# Patient Record
Sex: FEMALE | Race: WHITE | Employment: OTHER | URBAN - METROPOLITAN AREA
[De-identification: names, ages, dates, MRNs, and addresses within clinical notes are randomized per-mention and may not be internally consistent; named-entity substitution may affect disease eponyms.]

---

## 2020-08-06 ENCOUNTER — HOSPITAL ENCOUNTER (INPATIENT)
Age: 70
LOS: 12 days | Discharge: NURSING, REHAB OR LONG-TERM CARE | DRG: 853 | End: 2020-08-18
Attending: EMERGENCY MEDICINE | Admitting: INTERNAL MEDICINE
Payer: COMMERCIAL

## 2020-08-06 ENCOUNTER — APPOINTMENT (OUTPATIENT)
Dept: GENERAL RADIOLOGY | Age: 70
DRG: 853 | End: 2020-08-06
Payer: COMMERCIAL

## 2020-08-06 ENCOUNTER — APPOINTMENT (OUTPATIENT)
Dept: CT IMAGING | Age: 70
DRG: 853 | End: 2020-08-06
Payer: COMMERCIAL

## 2020-08-06 PROBLEM — S72.002A CLOSED LEFT HIP FRACTURE, INITIAL ENCOUNTER (HCC): Status: ACTIVE | Noted: 2020-08-06

## 2020-08-06 PROBLEM — E66.01 MORBID OBESITY WITH BMI OF 45.0-49.9, ADULT (HCC): Status: ACTIVE | Noted: 2020-08-06

## 2020-08-06 LAB
A/G RATIO: 0.7 (ref 1.1–2.2)
ALBUMIN SERPL-MCNC: 3 G/DL (ref 3.4–5)
ALP BLD-CCNC: 114 U/L (ref 40–129)
ALT SERPL-CCNC: 17 U/L (ref 10–40)
ANION GAP SERPL CALCULATED.3IONS-SCNC: 9 MMOL/L (ref 3–16)
AST SERPL-CCNC: 28 U/L (ref 15–37)
BASE EXCESS ARTERIAL: 2.1 MMOL/L (ref -3–3)
BASE EXCESS ARTERIAL: 3.1 MMOL/L (ref -3–3)
BASOPHILS ABSOLUTE: 0 K/UL (ref 0–0.2)
BASOPHILS RELATIVE PERCENT: 0.3 %
BILIRUB SERPL-MCNC: 0.5 MG/DL (ref 0–1)
BUN BLDV-MCNC: 17 MG/DL (ref 7–20)
CALCIUM SERPL-MCNC: 8.6 MG/DL (ref 8.3–10.6)
CARBOXYHEMOGLOBIN ARTERIAL: 5.6 % (ref 0–1.5)
CARBOXYHEMOGLOBIN ARTERIAL: 6.3 % (ref 0–1.5)
CHLORIDE BLD-SCNC: 104 MMOL/L (ref 99–110)
CO2: 30 MMOL/L (ref 21–32)
CREAT SERPL-MCNC: 0.8 MG/DL (ref 0.6–1.2)
EKG ATRIAL RATE: 87 BPM
EKG DIAGNOSIS: NORMAL
EKG P AXIS: 63 DEGREES
EKG P-R INTERVAL: 126 MS
EKG Q-T INTERVAL: 392 MS
EKG QRS DURATION: 86 MS
EKG QTC CALCULATION (BAZETT): 471 MS
EKG R AXIS: 56 DEGREES
EKG T AXIS: -9 DEGREES
EKG VENTRICULAR RATE: 87 BPM
EOSINOPHILS ABSOLUTE: 0 K/UL (ref 0–0.6)
EOSINOPHILS RELATIVE PERCENT: 0.3 %
GFR AFRICAN AMERICAN: >60
GFR NON-AFRICAN AMERICAN: >60
GLOBULIN: 4.2 G/DL
GLUCOSE BLD-MCNC: 103 MG/DL (ref 70–99)
HCO3 ARTERIAL: 31.5 MMOL/L (ref 21–29)
HCO3 ARTERIAL: 31.7 MMOL/L (ref 21–29)
HCT VFR BLD CALC: 51.4 % (ref 36–48)
HEMOGLOBIN, ART, EXTENDED: 15.3 G/DL (ref 12–16)
HEMOGLOBIN, ART, EXTENDED: 15.6 G/DL (ref 12–16)
HEMOGLOBIN: 15.4 G/DL (ref 12–16)
LYMPHOCYTES ABSOLUTE: 0.7 K/UL (ref 1–5.1)
LYMPHOCYTES RELATIVE PERCENT: 5.5 %
MCH RBC QN AUTO: 24.1 PG (ref 26–34)
MCHC RBC AUTO-ENTMCNC: 30 G/DL (ref 31–36)
MCV RBC AUTO: 80.2 FL (ref 80–100)
METHEMOGLOBIN ARTERIAL: 0.2 %
METHEMOGLOBIN ARTERIAL: 0.2 %
MONOCYTES ABSOLUTE: 0.7 K/UL (ref 0–1.3)
MONOCYTES RELATIVE PERCENT: 5.4 %
NEUTROPHILS ABSOLUTE: 11.2 K/UL (ref 1.7–7.7)
NEUTROPHILS RELATIVE PERCENT: 88.5 %
O2 CONTENT ARTERIAL: 18 ML/DL
O2 CONTENT ARTERIAL: 19 ML/DL
O2 SAT, ARTERIAL: 89.9 %
O2 SAT, ARTERIAL: 90 %
O2 THERAPY: ABNORMAL
O2 THERAPY: ABNORMAL
PCO2 ARTERIAL: 64.2 MMHG (ref 35–45)
PCO2 ARTERIAL: 69.3 MMHG (ref 35–45)
PDW BLD-RTO: 20.4 % (ref 12.4–15.4)
PH ARTERIAL: 7.27 (ref 7.35–7.45)
PH ARTERIAL: 7.3 (ref 7.35–7.45)
PLATELET # BLD: 313 K/UL (ref 135–450)
PMV BLD AUTO: 7.7 FL (ref 5–10.5)
PO2 ARTERIAL: 60.8 MMHG (ref 75–108)
PO2 ARTERIAL: 63.8 MMHG (ref 75–108)
POTASSIUM REFLEX MAGNESIUM: 4.1 MMOL/L (ref 3.5–5.1)
POTASSIUM REFLEX MAGNESIUM: 5.7 MMOL/L (ref 3.5–5.1)
PRO-BNP: 5928 PG/ML (ref 0–124)
PROCALCITONIN: 0.11 NG/ML (ref 0–0.15)
RBC # BLD: 6.4 M/UL (ref 4–5.2)
SARS-COV-2, NAAT: NOT DETECTED
SODIUM BLD-SCNC: 143 MMOL/L (ref 136–145)
TCO2 ARTERIAL: 33.6 MMOL/L
TCO2 ARTERIAL: 33.6 MMOL/L
TOTAL PROTEIN: 7.2 G/DL (ref 6.4–8.2)
WBC # BLD: 12.7 K/UL (ref 4–11)

## 2020-08-06 PROCEDURE — 72125 CT NECK SPINE W/O DYE: CPT

## 2020-08-06 PROCEDURE — 80053 COMPREHEN METABOLIC PANEL: CPT

## 2020-08-06 PROCEDURE — 71045 X-RAY EXAM CHEST 1 VIEW: CPT

## 2020-08-06 PROCEDURE — 94761 N-INVAS EAR/PLS OXIMETRY MLT: CPT

## 2020-08-06 PROCEDURE — 6370000000 HC RX 637 (ALT 250 FOR IP): Performed by: INTERNAL MEDICINE

## 2020-08-06 PROCEDURE — 96374 THER/PROPH/DIAG INJ IV PUSH: CPT

## 2020-08-06 PROCEDURE — 6360000002 HC RX W HCPCS: Performed by: EMERGENCY MEDICINE

## 2020-08-06 PROCEDURE — 2580000003 HC RX 258: Performed by: INTERNAL MEDICINE

## 2020-08-06 PROCEDURE — 6360000002 HC RX W HCPCS: Performed by: INTERNAL MEDICINE

## 2020-08-06 PROCEDURE — 82803 BLOOD GASES ANY COMBINATION: CPT

## 2020-08-06 PROCEDURE — 96375 TX/PRO/DX INJ NEW DRUG ADDON: CPT

## 2020-08-06 PROCEDURE — 83880 ASSAY OF NATRIURETIC PEPTIDE: CPT

## 2020-08-06 PROCEDURE — 94660 CPAP INITIATION&MGMT: CPT

## 2020-08-06 PROCEDURE — 99285 EMERGENCY DEPT VISIT HI MDM: CPT

## 2020-08-06 PROCEDURE — 85025 COMPLETE CBC W/AUTO DIFF WBC: CPT

## 2020-08-06 PROCEDURE — 96376 TX/PRO/DX INJ SAME DRUG ADON: CPT

## 2020-08-06 PROCEDURE — 72100 X-RAY EXAM L-S SPINE 2/3 VWS: CPT

## 2020-08-06 PROCEDURE — 36600 WITHDRAWAL OF ARTERIAL BLOOD: CPT

## 2020-08-06 PROCEDURE — 93010 ELECTROCARDIOGRAM REPORT: CPT | Performed by: INTERNAL MEDICINE

## 2020-08-06 PROCEDURE — 94760 N-INVAS EAR/PLS OXIMETRY 1: CPT

## 2020-08-06 PROCEDURE — 93005 ELECTROCARDIOGRAM TRACING: CPT | Performed by: EMERGENCY MEDICINE

## 2020-08-06 PROCEDURE — 36415 COLL VENOUS BLD VENIPUNCTURE: CPT

## 2020-08-06 PROCEDURE — 70450 CT HEAD/BRAIN W/O DYE: CPT

## 2020-08-06 PROCEDURE — 2060000000 HC ICU INTERMEDIATE R&B

## 2020-08-06 PROCEDURE — 84145 PROCALCITONIN (PCT): CPT

## 2020-08-06 PROCEDURE — 2700000000 HC OXYGEN THERAPY PER DAY

## 2020-08-06 PROCEDURE — U0002 COVID-19 LAB TEST NON-CDC: HCPCS

## 2020-08-06 PROCEDURE — 73502 X-RAY EXAM HIP UNI 2-3 VIEWS: CPT

## 2020-08-06 PROCEDURE — 94640 AIRWAY INHALATION TREATMENT: CPT

## 2020-08-06 PROCEDURE — 73552 X-RAY EXAM OF FEMUR 2/>: CPT

## 2020-08-06 PROCEDURE — 6370000000 HC RX 637 (ALT 250 FOR IP): Performed by: EMERGENCY MEDICINE

## 2020-08-06 PROCEDURE — 72131 CT LUMBAR SPINE W/O DYE: CPT

## 2020-08-06 PROCEDURE — 84132 ASSAY OF SERUM POTASSIUM: CPT

## 2020-08-06 RX ORDER — ALBUTEROL SULFATE 2.5 MG/3ML
SOLUTION RESPIRATORY (INHALATION)
Status: DISPENSED
Start: 2020-08-06 | End: 2020-08-06

## 2020-08-06 RX ORDER — IPRATROPIUM BROMIDE AND ALBUTEROL SULFATE 2.5; .5 MG/3ML; MG/3ML
SOLUTION RESPIRATORY (INHALATION)
Status: DISPENSED
Start: 2020-08-06 | End: 2020-08-06

## 2020-08-06 RX ORDER — ACETAMINOPHEN 325 MG/1
650 TABLET ORAL EVERY 6 HOURS PRN
Status: DISCONTINUED | OUTPATIENT
Start: 2020-08-06 | End: 2020-08-18 | Stop reason: HOSPADM

## 2020-08-06 RX ORDER — AZITHROMYCIN 500 MG/1
500 TABLET, FILM COATED ORAL DAILY
Status: COMPLETED | OUTPATIENT
Start: 2020-08-06 | End: 2020-08-06

## 2020-08-06 RX ORDER — BUDESONIDE AND FORMOTEROL FUMARATE DIHYDRATE 160; 4.5 UG/1; UG/1
2 AEROSOL RESPIRATORY (INHALATION) 2 TIMES DAILY
Status: DISCONTINUED | OUTPATIENT
Start: 2020-08-06 | End: 2020-08-18 | Stop reason: HOSPADM

## 2020-08-06 RX ORDER — ALBUTEROL SULFATE 2.5 MG/3ML
2.5 SOLUTION RESPIRATORY (INHALATION)
Status: DISCONTINUED | OUTPATIENT
Start: 2020-08-26 | End: 2020-08-06

## 2020-08-06 RX ORDER — ALBUTEROL SULFATE 2.5 MG/3ML
2.5 SOLUTION RESPIRATORY (INHALATION) ONCE
Status: COMPLETED | OUTPATIENT
Start: 2020-08-06 | End: 2020-08-06

## 2020-08-06 RX ORDER — GABAPENTIN 300 MG/1
300 CAPSULE ORAL 3 TIMES DAILY
Status: DISCONTINUED | OUTPATIENT
Start: 2020-08-06 | End: 2020-08-18 | Stop reason: HOSPADM

## 2020-08-06 RX ORDER — ACETAMINOPHEN 650 MG/1
650 SUPPOSITORY RECTAL EVERY 6 HOURS PRN
Status: DISCONTINUED | OUTPATIENT
Start: 2020-08-06 | End: 2020-08-18 | Stop reason: HOSPADM

## 2020-08-06 RX ORDER — FUROSEMIDE 10 MG/ML
40 INJECTION INTRAMUSCULAR; INTRAVENOUS DAILY
Status: DISCONTINUED | OUTPATIENT
Start: 2020-08-06 | End: 2020-08-09

## 2020-08-06 RX ORDER — GABAPENTIN 300 MG/1
300 CAPSULE ORAL 3 TIMES DAILY
COMMUNITY

## 2020-08-06 RX ORDER — IPRATROPIUM BROMIDE AND ALBUTEROL SULFATE 2.5; .5 MG/3ML; MG/3ML
1 SOLUTION RESPIRATORY (INHALATION) ONCE
Status: COMPLETED | OUTPATIENT
Start: 2020-08-06 | End: 2020-08-06

## 2020-08-06 RX ORDER — ALBUTEROL SULFATE 2.5 MG/3ML
2.5 SOLUTION RESPIRATORY (INHALATION) EVERY 6 HOURS PRN
COMMUNITY
End: 2020-08-06

## 2020-08-06 RX ORDER — METHYLPREDNISOLONE SODIUM SUCCINATE 40 MG/ML
40 INJECTION, POWDER, LYOPHILIZED, FOR SOLUTION INTRAMUSCULAR; INTRAVENOUS ONCE
Status: COMPLETED | OUTPATIENT
Start: 2020-08-06 | End: 2020-08-06

## 2020-08-06 RX ORDER — ALBUTEROL SULFATE 90 UG/1
2 AEROSOL, METERED RESPIRATORY (INHALATION) EVERY 4 HOURS PRN
COMMUNITY

## 2020-08-06 RX ORDER — SODIUM CHLORIDE 0.9 % (FLUSH) 0.9 %
10 SYRINGE (ML) INJECTION PRN
Status: DISCONTINUED | OUTPATIENT
Start: 2020-08-06 | End: 2020-08-09 | Stop reason: SDUPTHER

## 2020-08-06 RX ORDER — MORPHINE SULFATE 2 MG/ML
2 INJECTION, SOLUTION INTRAMUSCULAR; INTRAVENOUS
Status: DISCONTINUED | OUTPATIENT
Start: 2020-08-06 | End: 2020-08-09

## 2020-08-06 RX ORDER — ONDANSETRON 2 MG/ML
4 INJECTION INTRAMUSCULAR; INTRAVENOUS EVERY 6 HOURS PRN
Status: DISCONTINUED | OUTPATIENT
Start: 2020-08-06 | End: 2020-08-18 | Stop reason: HOSPADM

## 2020-08-06 RX ORDER — IPRATROPIUM BROMIDE AND ALBUTEROL SULFATE 2.5; .5 MG/3ML; MG/3ML
3 SOLUTION RESPIRATORY (INHALATION)
COMMUNITY
Start: 2019-05-23

## 2020-08-06 RX ORDER — AZITHROMYCIN 250 MG/1
250 TABLET, FILM COATED ORAL DAILY
Status: COMPLETED | OUTPATIENT
Start: 2020-08-07 | End: 2020-08-10

## 2020-08-06 RX ORDER — POLYETHYLENE GLYCOL 3350 17 G/17G
17 POWDER, FOR SOLUTION ORAL DAILY PRN
Status: DISCONTINUED | OUTPATIENT
Start: 2020-08-06 | End: 2020-08-18 | Stop reason: HOSPADM

## 2020-08-06 RX ORDER — SIMVASTATIN 80 MG
80 TABLET ORAL NIGHTLY
COMMUNITY
End: 2020-09-17 | Stop reason: ALTCHOICE

## 2020-08-06 RX ORDER — IPRATROPIUM BROMIDE AND ALBUTEROL SULFATE 2.5; .5 MG/3ML; MG/3ML
1 SOLUTION RESPIRATORY (INHALATION)
Status: DISCONTINUED | OUTPATIENT
Start: 2020-08-06 | End: 2020-08-18 | Stop reason: HOSPADM

## 2020-08-06 RX ORDER — SODIUM CHLORIDE 0.9 % (FLUSH) 0.9 %
10 SYRINGE (ML) INJECTION EVERY 12 HOURS SCHEDULED
Status: DISCONTINUED | OUTPATIENT
Start: 2020-08-06 | End: 2020-08-09 | Stop reason: SDUPTHER

## 2020-08-06 RX ORDER — 0.9 % SODIUM CHLORIDE 0.9 %
30 INTRAVENOUS SOLUTION INTRAVENOUS ONCE
Status: DISCONTINUED | OUTPATIENT
Start: 2020-08-06 | End: 2020-08-06

## 2020-08-06 RX ORDER — PREDNISONE 20 MG/1
40 TABLET ORAL DAILY
Status: DISCONTINUED | OUTPATIENT
Start: 2020-08-06 | End: 2020-08-07

## 2020-08-06 RX ORDER — ATORVASTATIN CALCIUM 40 MG/1
40 TABLET, FILM COATED ORAL DAILY
Status: DISCONTINUED | OUTPATIENT
Start: 2020-08-06 | End: 2020-08-18 | Stop reason: HOSPADM

## 2020-08-06 RX ORDER — FUROSEMIDE 10 MG/ML
20 INJECTION INTRAMUSCULAR; INTRAVENOUS ONCE
Status: COMPLETED | OUTPATIENT
Start: 2020-08-06 | End: 2020-08-06

## 2020-08-06 RX ORDER — MORPHINE SULFATE 4 MG/ML
4 INJECTION, SOLUTION INTRAMUSCULAR; INTRAVENOUS
Status: DISCONTINUED | OUTPATIENT
Start: 2020-08-06 | End: 2020-08-09

## 2020-08-06 RX ORDER — IBUPROFEN 600 MG/1
600 TABLET ORAL EVERY 6 HOURS PRN
Status: ON HOLD | COMMUNITY
End: 2020-08-15 | Stop reason: HOSPADM

## 2020-08-06 RX ORDER — FENTANYL CITRATE 50 UG/ML
50 INJECTION, SOLUTION INTRAMUSCULAR; INTRAVENOUS EVERY 30 MIN PRN
Status: COMPLETED | OUTPATIENT
Start: 2020-08-06 | End: 2020-08-06

## 2020-08-06 RX ORDER — PROMETHAZINE HYDROCHLORIDE 25 MG/1
12.5 TABLET ORAL EVERY 6 HOURS PRN
Status: DISCONTINUED | OUTPATIENT
Start: 2020-08-06 | End: 2020-08-18 | Stop reason: HOSPADM

## 2020-08-06 RX ADMIN — AZITHROMYCIN 500 MG: 500 TABLET, FILM COATED ORAL at 17:41

## 2020-08-06 RX ADMIN — GABAPENTIN 300 MG: 300 CAPSULE ORAL at 17:41

## 2020-08-06 RX ADMIN — ATORVASTATIN CALCIUM 40 MG: 40 TABLET, FILM COATED ORAL at 17:41

## 2020-08-06 RX ADMIN — CEFTRIAXONE 1 G: 1 INJECTION, POWDER, FOR SOLUTION INTRAMUSCULAR; INTRAVENOUS at 17:42

## 2020-08-06 RX ADMIN — SODIUM CHLORIDE, PRESERVATIVE FREE 10 ML: 5 INJECTION INTRAVENOUS at 20:29

## 2020-08-06 RX ADMIN — FENTANYL CITRATE 50 MCG: 50 INJECTION, SOLUTION INTRAMUSCULAR; INTRAVENOUS at 12:57

## 2020-08-06 RX ADMIN — IPRATROPIUM BROMIDE AND ALBUTEROL SULFATE 1 AMPULE: .5; 3 SOLUTION RESPIRATORY (INHALATION) at 16:00

## 2020-08-06 RX ADMIN — PREDNISONE 40 MG: 20 TABLET ORAL at 17:42

## 2020-08-06 RX ADMIN — BUDESONIDE AND FORMOTEROL FUMARATE DIHYDRATE 2 PUFF: 160; 4.5 AEROSOL RESPIRATORY (INHALATION) at 20:01

## 2020-08-06 RX ADMIN — IPRATROPIUM BROMIDE AND ALBUTEROL SULFATE 1 AMPULE: .5; 3 SOLUTION RESPIRATORY (INHALATION) at 12:16

## 2020-08-06 RX ADMIN — METHYLPREDNISOLONE SODIUM SUCCINATE 40 MG: 40 INJECTION, POWDER, FOR SOLUTION INTRAMUSCULAR; INTRAVENOUS at 11:56

## 2020-08-06 RX ADMIN — GABAPENTIN 300 MG: 300 CAPSULE ORAL at 20:29

## 2020-08-06 RX ADMIN — FUROSEMIDE 20 MG: 20 INJECTION, SOLUTION INTRAMUSCULAR; INTRAVENOUS at 13:18

## 2020-08-06 RX ADMIN — ENOXAPARIN SODIUM 40 MG: 40 INJECTION SUBCUTANEOUS at 17:42

## 2020-08-06 RX ADMIN — ALBUTEROL SULFATE 2.5 MG: 2.5 SOLUTION RESPIRATORY (INHALATION) at 12:16

## 2020-08-06 RX ADMIN — FENTANYL CITRATE 50 MCG: 50 INJECTION, SOLUTION INTRAMUSCULAR; INTRAVENOUS at 11:56

## 2020-08-06 RX ADMIN — FUROSEMIDE 40 MG: 10 INJECTION, SOLUTION INTRAMUSCULAR; INTRAVENOUS at 17:42

## 2020-08-06 RX ADMIN — MORPHINE SULFATE 4 MG: 4 INJECTION, SOLUTION INTRAMUSCULAR; INTRAVENOUS at 21:15

## 2020-08-06 SDOH — HEALTH STABILITY: MENTAL HEALTH: HOW OFTEN DO YOU HAVE A DRINK CONTAINING ALCOHOL?: NEVER

## 2020-08-06 ASSESSMENT — PAIN DESCRIPTION - LOCATION
LOCATION: BACK
LOCATION: HIP
LOCATION: LEG;HIP

## 2020-08-06 ASSESSMENT — PAIN DESCRIPTION - PAIN TYPE
TYPE: ACUTE PAIN

## 2020-08-06 ASSESSMENT — PAIN DESCRIPTION - ORIENTATION
ORIENTATION: MID
ORIENTATION: LEFT
ORIENTATION: LEFT

## 2020-08-06 ASSESSMENT — PAIN DESCRIPTION - FREQUENCY
FREQUENCY: CONTINUOUS

## 2020-08-06 ASSESSMENT — PAIN DESCRIPTION - ONSET
ONSET: ON-GOING
ONSET: SUDDEN

## 2020-08-06 ASSESSMENT — PAIN DESCRIPTION - DESCRIPTORS
DESCRIPTORS: ACHING
DESCRIPTORS: CONSTANT
DESCRIPTORS: ACHING

## 2020-08-06 ASSESSMENT — PAIN SCALES - GENERAL
PAINLEVEL_OUTOF10: 9
PAINLEVEL_OUTOF10: 9
PAINLEVEL_OUTOF10: 3
PAINLEVEL_OUTOF10: 10

## 2020-08-06 ASSESSMENT — PAIN DESCRIPTION - PROGRESSION: CLINICAL_PROGRESSION: NOT CHANGED

## 2020-08-06 NOTE — PROGRESS NOTES
4 Eyes Skin Assessment     The patient is being assess for  Admission    I agree that 2 RN's have performed a thorough Head to Toe Skin Assessment on the patient. ALL assessment sites listed below have been assessed. Areas assessed by both nurses:   [x]   Head, Face, and Ears   [x]   Shoulders, Back, and Chest  [x]   Arms, Elbows, and Hands   [x]   Coccyx, Sacrum, and IschIum  [x]   Legs, Feet, and Heels        Does the Patient have Skin Breakdown?   Yes LDA WOUND CARE was Initiated documentation include the Ginny-wound, Wound Assessment, Measurements, Dressing Treatment, Drainage, and Color\",         Nigel Prevention initiated:  Yes   Wound Care Orders initiated:  Yes      [address]  nurse consulted for Pressure Injury (Stage 3,4, Unstageable, DTI, NWPT, and Complex wounds), New and Established Ostomies:  NA      Nurse 1 eSignature: Electronically signed by Umang John RN on 8/6/20 at 7:01 PM EDT    **SHARE this note so that the co-signing nurse is able to place an eSignature**    Nurse 2 eSignature: Electronically signed by Archibald Seip, RN on 8/6/20 at 7:30 PM EDT

## 2020-08-06 NOTE — ED NOTES
Report called to  [facility]      RN   To Room   0435  Cardiac monitor on during transfer  Pt's pain level     VSS, Afebrile   IV site is clean, dry and intact, Normal saline locked   Family updated on transfer            Kurt Sauceda RN  08/06/20 4140

## 2020-08-06 NOTE — ED PROVIDER NOTES
Emergency Physician Note  [address] [facility]  [address] [place] [address]  Dept: [phone]  Loc: [phone]    Chief Complaint  Back Pain (mechanical fall at home. +hypoxic on arrival. hx of COPD. does not normally wear O2. on 3L nc on arrival. room air O2 sat 76%. ) and Leg Pain (left leg pain )       History of Present Illness  Jacobo Soto is a 78 y.o. female  has a past medical history of Chronic kidney disease, COPD (chronic obstructive pulmonary disease) ([address] Hyperlipidemia, and Hypertension. who presents to the ED for fall. Patient states she was attempting to transfer herself using a walker from the bed to the bedside commode when the walker got snagged and she fell over to the left. She immediately had left hip pain, left thigh pain, and lower back pain. She states she also hit her head and she is feeling type pain and tenderness behind her right ear however she did not lose consciousness. Patient does smoke, she does not use supplemental oxygen at home, however she states when she gets short of breath she does her inhaler which helps. She states that she has been at her baseline shortness of breath, has not worsened. Denies fever, chills, malaise, chest pain, shortness of breath, cough, abdominal pain, nausea, vomiting, diarrhea, headache, sore throat, dysuria, rash. No palliative/provocative factors.        Unless otherwise stated in this report or unable to obtain because of the patient's clinical or mental status as evidenced by the medical record, this patient's positive and negative responses for review of systems, constitutional, psych, eyes, ENT, cardiovascular, respiratory, gastrointestinal, neurological, genitourinary, musculoskeletal, integument systems and systems related to the presenting problem are either stated in the preceding paragraph or were not pertinent or were negative for the symptoms and/or complaints related to the medical problem. I have reviewed the following from the nursing documentation:      Prior to Admission medications    Medication Sig Start Date End Date Taking? Authorizing Provider   simvastatin (ZOCOR) 80 MG tablet Take 80 mg by mouth nightly   Yes Historical Provider, MD   ibuprofen (ADVIL;MOTRIN) 600 MG tablet Take 600 mg by mouth every 6 hours as needed for Pain   Yes Historical Provider, MD   gabapentin (NEURONTIN) 100 MG capsule Take by mouth 3 times daily. Mg unknown per pt   Yes Historical Provider, MD   albuterol (PROVENTIL) (2.5 MG/3ML) 0.083% nebulizer solution Take 2.5 mg by nebulization every 6 hours as needed for Wheezing or Shortness of Breath   Yes Historical Provider, MD       Allergies as of 08/06/2020    (No Known Allergies)       Past Medical History:   Diagnosis Date    Chronic kidney disease     COPD (chronic obstructive pulmonary disease) ([facility]     Hyperlipidemia     Hypertension         Surgical History:   Past Surgical History:   Procedure Laterality Date    CHOLECYSTECTOMY          Family History:  History reviewed. No pertinent family history.     Social History     Socioeconomic History    Marital status:      Spouse name: Not on file    Number of children: Not on file    Years of education: Not on file    Highest education level: Not on file   Occupational History    Not on file   Social Needs    Financial resource strain: Not on file    Food insecurity     Worry: Not on file     Inability: Not on file    Transportation needs     Medical: Not on file     Non-medical: Not on file   Tobacco Use    Smoking status: Current Every Day Smoker     Types: Cigarettes    Smokeless tobacco: Never Used    Tobacco comment: pack n half a day    Substance and Sexual Activity    Alcohol use: Never     Frequency: Never    Drug use: Yes     Types: Marijuana    Sexual activity: Not on file   Lifestyle    Physical activity     Days per week: Not on file     Minutes per session: Not on file    Stress: Not on file   Relationships    Social connections     Talks on phone: Not on file     Gets together: Not on file     Attends Nondenominational service: Not on file     Active member of club or organization: Not on file     Attends meetings of clubs or organizations: Not on file     Relationship status: Not on file    Intimate partner violence     Fear of current or ex partner: Not on file     Emotionally abused: Not on file     Physically abused: Not on file     Forced sexual activity: Not on file   Other Topics Concern    Not on file   Social History Narrative    Not on file       Nursing notes reviewed. ED Triage Vitals   Enc Vitals Group      BP 08/06/20 1100 (!) 150/81      Pulse 08/06/20 1100 83      Resp 08/06/20 1100 21      Temp 08/06/20 1100 99 °F (37.2 °C)      Temp Source 08/06/20 1100 Oral      SpO2 08/06/20 1058 (!) 76 %      Weight 08/06/20 1139 (!) 314 lb 4 oz (142.5 kg)      Height 08/06/20 1139 5' 7\" (1.702 m)      Head Circumference --       Peak Flow --       Pain Score --       Pain Loc --       Pain Edu? --       Excl. in GC? --        GENERAL:    Awake, alert. Well developed, well nourished with apparent distress. Nontoxic-appearing, non-ill-appearing. HENT:    Normocephalic, Atraumatic, no lacerations. No trismus. Mild tenderness behind the right ear however no obvious hematoma or induration developing  Oropharynx clear, no tonsilar inflammation, no tonsilar exudates,   no airway obstruction, moist mucous membranes. Uvula was midline and has symmetric rise. Tympanic membranes are without bulging, without erythema, without hemotympanum, without middle ear effusion bilaterally  EYES:    Conjunctiva normal,   Pupils equal round and reactive to light,   Extraocular movements normal.  NECK:    Trachea is midline. No stridor. No meningeal signs, Supple without JVD. No C-spine midline tenderness.     CHEST:    Regular rate and regular rhythm, no murmurs/rubs/gallops,   normal S1/S2, chest wall non-tender. LUNGS:    No respiratory distress. No abdominal retractions, no sternal retractions. Diminished breath sounds with poor respiratory effort, diffuse expiratory wheezing, no rhochi, no rales  Speaking comfortably in full sentences  Patient oxygen saturation was consistently around 88% when I was in the room speaking with her and conducting my physical exam, patient was on 2 L nasal cannula at the time I bumped it up to 4 L nasal cannula she has slight improvement and now at 91%. ABDOMEN:    Soft, non-tender, non-distended. No guarding. No rebound. EXTREMITIES:   Moves extremities x4 with purpose. Patient diffusely already has limited range of motion of all 4 extremities secondary to venous stasis changes of her bilateral lower extremity and body habitus of her upper extremity, bilateral lower extremity edema with venous stasis changes, they are not warm to touch, no active weeping vesicles  Patient does have moderate tenderness to palpation with loading of the left hip and the proximal femur, however due to body habitus is unable to discern if there is any obvious deformities  distal pulses present and equal bilaterally. BACK:    No midline tenderness in the cervical, thoracic spine. Patient does report midline lumbar spinal tenderness to palpation  No deformities, no step-off. Palpation did not elicit any paraspinous tenderness. SKIN:    Warm, dry and intact. NEUROLOGIC:  Normal mental status. Moving all extremities to command. Alert and oriented x4   without focal motor deficit or gross sensory deficit. Normal speech.     PSYCHIATRIC:  Not anxious,   normal mood and affect,   thoughts are linear and organized,   without delusions/hallucinations,   responds appropriately to questions      LABS and DIAGNOSTIC RESULTS  EKG  The Ekg interpreted by me shows  normal sinus rhythm with a rate of 87  Axis is   Normal  QTc is  normal  Intervals and Durations are unremarkable. ST Segments: normal  Delta waves, Brugada Syndrome, and Short NV are not present. Prior EKG to compare with was available. No significant changes compared to prior EKG from May 2, 2013, of note today's EKG has significant artifact however there is no gross ST elevation discernible. RADIOLOGY  X-RAYS:  I have reviewed radiologic plain film image(s). ALL OTHER NON-PLAIN FILM IMAGES SUCH AS CT, ULTRASOUND AND MRI HAVE BEEN READ BY THE RADIOLOGIST. CT Head WO Contrast   Final Result   No acute intracranial abnormality. CT Cervical Spine WO Contrast   Final Result   Multilevel postsurgical changes and degenerative changes. No acute osseous   abnormality. CT LUMBAR SPINE WO CONTRAST   Final Result   1. No acute finding in the lumbar spine. 2. Advanced degenerative changes that are most notable at L4-5 contributing   to severe spinal canal stenosis. XR LUMBAR SPINE (2-3 VIEWS)   Final Result   1. Moderately displaced intertrochanteric fracture of the proximal left femur. 2. Severe degenerative change in the left hip with moderate degenerative   change on the right. 3. Technically limited evaluation of the lumbar spine due to patient body   habitus. XR HIP 2-3 VW W PELVIS LEFT   Final Result   1. Moderately displaced intertrochanteric fracture of the proximal left femur. 2. Severe degenerative change in the left hip with moderate degenerative   change on the right. 3. Technically limited evaluation of the lumbar spine due to patient body   habitus. XR FEMUR LEFT (MIN 2 VIEWS)   Final Result   Intratrochanteric left hip fracture      Moderate to severe osteoarthritis of the left hip joint         XR CHEST PORTABLE   Final Result   Mild perihilar opacities and left basilar ground-glass opacities in the   chest.  Pattern may represent developing pulmonary edema or pneumonitis. Chest X-Ray  Interpreted by: Emergency Department Physician  View: Portable chest xray  Findings: See radiology report    LABS  Results for orders placed or performed during the hospital encounter of 08/06/20   Blood Gas, Arterial   Result Value Ref Range    pH, Arterial 7.302 (L) 7.350 - 7.450    pCO2, Arterial 64.2 (H) 35.0 - 45.0 mmHg    pO2, Arterial 60.8 (L) 75.0 - 108.0 mmHg    HCO3, Arterial 31.7 (H) 21.0 - 29.0 mmol/L    Base Excess, Arterial 3.1 (H) -3.0 - 3.0 mmol/L    Hemoglobin, Art, Extended 15.3 12.0 - 16.0 g/dL    O2 Sat, Arterial 89.9 (L) >92 %    Carboxyhgb, Arterial 6.3 (H) 0.0 - 1.5 %    Methemoglobin, Arterial 0.2 <1.5 %    TCO2, Arterial 33.6 Not Established mmol/L    O2 Content, Arterial 18 Not Established mL/dL    O2 Therapy See comment    CBC Auto Differential   Result Value Ref Range    WBC 12.7 (H) 4.0 - 11.0 K/uL    RBC 6.40 (H) 4.00 - 5.20 M/uL    Hemoglobin 15.4 12.0 - 16.0 g/dL    Hematocrit 51.4 (H) 36.0 - 48.0 %    MCV 80.2 80.0 - 100.0 fL    MCH 24.1 (L) 26.0 - 34.0 pg    MCHC 30.0 (L) 31.0 - 36.0 g/dL    RDW 20.4 (H) 12.4 - 15.4 %    Platelets 143 602 - 598 K/uL    MPV 7.7 5.0 - 10.5 fL    Neutrophils % 88.5 %    Lymphocytes % 5.5 %    Monocytes % 5.4 %    Eosinophils % 0.3 %    Basophils % 0.3 %    Neutrophils Absolute 11.2 (H) 1.7 - 7.7 K/uL    Lymphocytes Absolute 0.7 (L) 1.0 - 5.1 K/uL    Monocytes Absolute 0.7 0.0 - 1.3 K/uL    Eosinophils Absolute 0.0 0.0 - 0.6 K/uL    Basophils Absolute 0.0 0.0 - 0.2 K/uL   Comprehensive Metabolic Panel w/ Reflex to MG   Result Value Ref Range    Sodium 143 136 - 145 mmol/L    Potassium reflex Magnesium 5.7 (H) 3.5 - 5.1 mmol/L    Chloride 104 99 - 110 mmol/L    CO2 30 21 - 32 mmol/L    Anion Gap 9 3 - 16    Glucose 103 (H) 70 - 99 mg/dL    BUN 17 7 - 20 mg/dL    CREATININE 0.8 0.6 - 1.2 mg/dL    GFR Non-African American >60 >60    GFR African American >60 >60    Calcium 8.6 8.3 - 10.6 mg/dL    Total Protein 7.2 6.4 - 8.2 g/dL    Alb 3.0 (L) 3.4 - 5.0 g/dL    Albumin/Globulin Ratio 0.7 (L) 1.1 - 2.2    Total Bilirubin 0.5 0.0 - 1.0 mg/dL    Alkaline Phosphatase 114 40 - 129 U/L    ALT 17 10 - 40 U/L    AST 28 15 - 37 U/L    Globulin 4.2 g/dL   Potassium w/ Reflex to Magnesium   Result Value Ref Range    Potassium reflex Magnesium 4.1 3.5 - 5.1 mmol/L   EKG 12 Lead   Result Value Ref Range    Ventricular Rate 87 BPM    Atrial Rate 87 BPM    P-R Interval 126 ms    QRS Duration 86 ms    Q-T Interval 392 ms    QTc Calculation (Bazett) 471 ms    P Axis 63 degrees    R Axis 56 degrees    T Axis -9 degrees    Diagnosis       Normal sinus rhythmT wave abnormality, consider inferior ischemiaProlonged QTAbnormal ECGWhen compared with ECG of 02-MAY-2013 11:04,QRS voltage has decreasedT wave inversion now evident in Inferior leads       Dwayne    Procedures/interventions/images ordered for this visit  Orders Placed This Encounter   Procedures    XR CHEST PORTABLE    Blood Gas, Arterial    CBC Auto Differential    Comprehensive Metabolic Panel w/ Reflex to MG    Initiate Oxygen Therapy Protocol    EKG 12 Lead       Medications ordered for this visit  No orders of the defined types were placed in this encounter. ED course notes for this visit  ED Course as of Aug 06 1336   Thu Aug 06, 2020   1306 I reviewed the x-rays, the patient does have a left intertrochanteric fracture on her left femur. I went back in the room to evaluate her. She states she is normally mildly short of breath she does not feel more short of breath than usual.  When I was in the room she was talking to was in full sentences and satting at 90%. I left the room for a few minutes and came back and she was down to 77%. She still continues to deny feeling short of breath. We set her up a little bit in bed and she improved at that point.   I believe that the patient chronically lives at a low oxygen saturation.    [SG]      ED Course User Index  [SG] obtaining history, conducting a physical exam, performing and monitoring interventions, ordering, collecting and interpreting tests, and establishing medical decision-making and discussion with the patient and/or family, specifically for management of the presenting complaint and symptoms initially, direct bedside care, reevaluation, review of records, and consultation. There was a high probability of clinically significant life-threatening deterioration in the patient's condition, which required my urgent intervention. This time does not include separately billable procedures. The note was completed using Dragon voice recognition transcription. Every effort was made to ensure accuracy; however, inadvertent transcription errors may be present despite my best efforts to edit errors.     Shaquille Giles MD  [address], [place]  08/06/20 [address] MD  08/19/20 0296

## 2020-08-06 NOTE — ED NOTES
Pt took bipap and stated \" if I die I die\" pt stated that she does not want to be \"saved\" perfect serve sent to dr Pratibha Sanchez at this time with an update, pt also started taking off monitor leads and took off her blood pressure cuff     Carlie Servin RN  08/06/20 3313

## 2020-08-06 NOTE — PROGRESS NOTES
Received report from ER RN, Naheed Cornejo. COVID test for pre-surgical testing, patient does not need isolation per house supervisor.

## 2020-08-06 NOTE — ED NOTES
Pt placed on BIpap at this time, RT at bedside      Aniyah Vilchis, [place]  08/06/20 0161

## 2020-08-06 NOTE — LETTER
Dr Leonarda Alexis [phone] F: [phone]  Surgery Scheduling Form:  DEMOGRAPHICS:                                                                                                              .    Patient Name:  Carlos Jin    Patient :  1950   Patient SS#:  [id number]      Patient Phone:  [phone] (home) [phone] (work)     Patient Address:  [place] [id number]    Insurance:    Payor/Plan Subscr  Sex Relation Sub.  Ins. ID Effective Group Num   1. DIANE PULLIAM* SHIKHA RADFORD 1950 Female  [id number] 1/1/15                                    [address]     DIAGNOSIS & PROCEDURE:                                                                                            .    Diagnosis:   Left hip fracture S72.102A    Operation:   Open treatment of left hip fracture with cephalomedullary nail    Location:  [facility]    Surgeon:  Jayleen Han    SCHEDULING INFORMATION:                                                                                         .    Requested Date:  8/10/20 OR Time:     OR Time Required:  90  Minutes         Anesthesia:  General    SA Required:  Yes x 2    Equipment:  Synthes TFNA, HANA table, large C-arm    Status:  Inhouse    Latex Allergy:  no Defibrilator or Pacemaker:  no    Isolation Precautions:  no                    Nato Thornton MD     20   BILLING INFORMATION:                                                                                                    .                          CPT Code Modifier  Total hip    Prior auth:  [id number]

## 2020-08-06 NOTE — ED NOTES
Pt placed on 3 liters oxygen per NC upon arrival sats at 88-90% on 3 liters.  Pt has hx COPD, pt states only wears oxygen as needed      Neptali Gonzales RN  08/06/20 [facility]  08/06/20 0394

## 2020-08-06 NOTE — H&P
mouth every 6 hours as needed for Pain   Yes Historical Provider, MD   gabapentin (NEURONTIN) 300 MG capsule Take 300 mg by mouth 3 times daily. Yes Historical Provider, MD   fluticasone-salmeterol (ADVAIR) 250-50 MCG/DOSE AEPB Inhale 1 puff into the lungs 2 times daily 4/24/19  Yes Historical Provider, MD   ipratropium-albuterol (Therisa Rumble) 0.5-2.5 (3) MG/3ML SOLN nebulizer solution Inhale 3 mLs into the lungs every 2 hours as needed for Shortness of Breath 5/23/19  Yes Historical Provider, MD   albuterol sulfate  (90 Base) MCG/ACT inhaler Inhale 2 puffs into the lungs every 4-6 hours as needed for Shortness of Breath    Historical Provider, MD       Allergies:  Patient has no known allergies. Social History:  The patient currently lives with     TOBACCO:   reports that she has been smoking cigarettes. She has never used smokeless tobacco.  ETOH:   reports no history of alcohol use. Family History:  Reviewed in detail and negative for DM, Early CAD, Cancer, CVA. Positive as follows:    History reviewed. No pertinent family history. REVIEW OF SYSTEMS:   Positive as noted in the HPI. All other systems reviewed and negative. PHYSICAL EXAM:    BP (!) 150/81   Pulse 91   Temp 98.2 °F (36.8 °C) (Oral)   Resp (!) 32   Ht 5' 7\" (1.702 m)   Wt (!) 314 lb 4 oz (142.5 kg)   SpO2 (!) 88%   BMI 49.22 kg/m²     General appearance: No apparent distress appears stated age and cooperative. Obese  HEENT Normal cephalic, atraumatic without obvious deformity. Pupils equal, round, and reactive to light. Extra ocular muscles intact. Conjunctivae/corneas clear. Neck: Supple, trachea midline without thyromegaly or adenopathy with full range of motion.   Lungs: Diffuse bilateral wheezing  Heart: Regular rate and rhythm with Normal S1/S2 without murmurs, rubs or gallops, point of maximum impulse non-displaced  Abdomen: Soft, non-tender or non-distended without rigidity or guarding and positive bowel sounds all four quadrants. Extremities: Bilateral lower extremity edema with significant chronic skin changes, chronic per patient  Skin: Skin color, texture, turgor normal.  Chronic skin changes lower extremities  Neurologic: Alert and oriented X 3, neurovascularly intact with sensory/motor intact upper extremities/lower extremities, bilaterally. Cranial nerves: II-XII intact, grossly non-focal.  Mental status: Alert, oriented, thought content appropriate. Capillary Refill: Acceptable  < 3 seconds  Peripheral Pulses: +3 Easily felt, not easily obliterated with pressure      CXR:  I have reviewed the CXR with the following interpretation: Perihilar opacities, pulmonary edema versus pneumonitis  EKG:  I have reviewed the EKG with the following interpretation: Normal sinus rhythm, nonspecific ST changes    CBC   Recent Labs     08/06/20  1114   WBC 12.7*   HGB 15.4   HCT 51.4*         RENAL  Recent Labs     08/06/20  1114 08/06/20  1259     --    K 5.7* 4.1     --    CO2 30  --    BUN 17  --    CREATININE 0.8  --      LFT'S  Recent Labs     08/06/20  1114   AST 28   ALT 17   BILITOT 0.5   ALKPHOS 114     COAG  No results for input(s): INR in the last 72 hours. CARDIAC ENZYMES  No results for input(s): CKTOTAL, CKMB, CKMBINDEX, TROPONINI in the last 72 hours.     U/A:    Lab Results   Component Value Date    COLORU DK YELLOW 05/03/2013    WBCUA 6-10 05/03/2013    RBCUA 3-5 05/03/2013    MUCUS 1+ 05/03/2013    BACTERIA 1+ 05/03/2013    CLARITYU CLEAR 05/03/2013    SPECGRAV 1.020 05/03/2013    LEUKOCYTESUR NEGATIVE 05/03/2013    BLOODU TRACE 05/03/2013    GLUCOSEU NEGATIVE 05/03/2013       ABG    Lab Results   Component Value Date    KSN2NII 31.7 08/06/2020    BEART 3.1 08/06/2020    X5BDJTHL 89.9 08/06/2020    PHART 7.302 08/06/2020    TNO3HRW 64.2 08/06/2020    PO2ART 60.8 08/06/2020    FQN9ZRS 33.6 08/06/2020           There are no active hospital problems to display for this translational errors.

## 2020-08-07 ENCOUNTER — ANESTHESIA EVENT (OUTPATIENT)
Dept: OPERATING ROOM | Age: 70
DRG: 853 | End: 2020-08-07
Payer: COMMERCIAL

## 2020-08-07 LAB
ALBUMIN SERPL-MCNC: 2.9 G/DL (ref 3.4–5)
ANION GAP SERPL CALCULATED.3IONS-SCNC: 10 MMOL/L (ref 3–16)
BUN BLDV-MCNC: 21 MG/DL (ref 7–20)
CALCIUM SERPL-MCNC: 8.4 MG/DL (ref 8.3–10.6)
CHLORIDE BLD-SCNC: 104 MMOL/L (ref 99–110)
CO2: 29 MMOL/L (ref 21–32)
CREAT SERPL-MCNC: 1 MG/DL (ref 0.6–1.2)
GFR AFRICAN AMERICAN: >60
GFR NON-AFRICAN AMERICAN: 55
GLUCOSE BLD-MCNC: 140 MG/DL (ref 70–99)
HCT VFR BLD CALC: 48.5 % (ref 36–48)
HEMOGLOBIN: 14.3 G/DL (ref 12–16)
MAGNESIUM: 2 MG/DL (ref 1.8–2.4)
MCH RBC QN AUTO: 24 PG (ref 26–34)
MCHC RBC AUTO-ENTMCNC: 29.5 G/DL (ref 31–36)
MCV RBC AUTO: 81.2 FL (ref 80–100)
PDW BLD-RTO: 20.4 % (ref 12.4–15.4)
PHOSPHORUS: 5.7 MG/DL (ref 2.5–4.9)
PLATELET # BLD: 249 K/UL (ref 135–450)
PMV BLD AUTO: 7.6 FL (ref 5–10.5)
POTASSIUM SERPL-SCNC: 4.8 MMOL/L (ref 3.5–5.1)
RBC # BLD: 5.98 M/UL (ref 4–5.2)
SODIUM BLD-SCNC: 143 MMOL/L (ref 136–145)
WBC # BLD: 13 K/UL (ref 4–11)

## 2020-08-07 PROCEDURE — 80069 RENAL FUNCTION PANEL: CPT

## 2020-08-07 PROCEDURE — 85027 COMPLETE CBC AUTOMATED: CPT

## 2020-08-07 PROCEDURE — 6360000002 HC RX W HCPCS: Performed by: INTERNAL MEDICINE

## 2020-08-07 PROCEDURE — 6360000002 HC RX W HCPCS: Performed by: NURSE PRACTITIONER

## 2020-08-07 PROCEDURE — 99222 1ST HOSP IP/OBS MODERATE 55: CPT | Performed by: ORTHOPAEDIC SURGERY

## 2020-08-07 PROCEDURE — 83735 ASSAY OF MAGNESIUM: CPT

## 2020-08-07 PROCEDURE — 99223 1ST HOSP IP/OBS HIGH 75: CPT | Performed by: INTERNAL MEDICINE

## 2020-08-07 PROCEDURE — 94761 N-INVAS EAR/PLS OXIMETRY MLT: CPT

## 2020-08-07 PROCEDURE — 94660 CPAP INITIATION&MGMT: CPT

## 2020-08-07 PROCEDURE — 2580000003 HC RX 258: Performed by: INTERNAL MEDICINE

## 2020-08-07 PROCEDURE — 36415 COLL VENOUS BLD VENIPUNCTURE: CPT

## 2020-08-07 PROCEDURE — 6370000000 HC RX 637 (ALT 250 FOR IP): Performed by: INTERNAL MEDICINE

## 2020-08-07 PROCEDURE — 2060000000 HC ICU INTERMEDIATE R&B

## 2020-08-07 PROCEDURE — 94640 AIRWAY INHALATION TREATMENT: CPT

## 2020-08-07 PROCEDURE — 2700000000 HC OXYGEN THERAPY PER DAY

## 2020-08-07 RX ORDER — SODIUM CHLORIDE 9 MG/ML
INJECTION, SOLUTION INTRAVENOUS CONTINUOUS
Status: CANCELLED | OUTPATIENT
Start: 2020-08-07

## 2020-08-07 RX ORDER — SODIUM CHLORIDE 0.9 % (FLUSH) 0.9 %
10 SYRINGE (ML) INJECTION PRN
Status: CANCELLED | OUTPATIENT
Start: 2020-08-07

## 2020-08-07 RX ORDER — SODIUM CHLORIDE 0.9 % (FLUSH) 0.9 %
10 SYRINGE (ML) INJECTION EVERY 12 HOURS SCHEDULED
Status: CANCELLED | OUTPATIENT
Start: 2020-08-07

## 2020-08-07 RX ORDER — METHYLPREDNISOLONE SODIUM SUCCINATE 40 MG/ML
40 INJECTION, POWDER, LYOPHILIZED, FOR SOLUTION INTRAMUSCULAR; INTRAVENOUS EVERY 8 HOURS
Status: DISCONTINUED | OUTPATIENT
Start: 2020-08-07 | End: 2020-08-09

## 2020-08-07 RX ADMIN — IPRATROPIUM BROMIDE AND ALBUTEROL SULFATE 1 AMPULE: .5; 3 SOLUTION RESPIRATORY (INHALATION) at 16:08

## 2020-08-07 RX ADMIN — BUDESONIDE AND FORMOTEROL FUMARATE DIHYDRATE 2 PUFF: 160; 4.5 AEROSOL RESPIRATORY (INHALATION) at 08:09

## 2020-08-07 RX ADMIN — ENOXAPARIN SODIUM 40 MG: 40 INJECTION SUBCUTANEOUS at 09:27

## 2020-08-07 RX ADMIN — IPRATROPIUM BROMIDE AND ALBUTEROL SULFATE 1 AMPULE: .5; 3 SOLUTION RESPIRATORY (INHALATION) at 20:19

## 2020-08-07 RX ADMIN — ENOXAPARIN SODIUM 40 MG: 40 INJECTION SUBCUTANEOUS at 21:03

## 2020-08-07 RX ADMIN — GABAPENTIN 300 MG: 300 CAPSULE ORAL at 21:03

## 2020-08-07 RX ADMIN — METHYLPREDNISOLONE SODIUM SUCCINATE 40 MG: 40 INJECTION, POWDER, FOR SOLUTION INTRAMUSCULAR; INTRAVENOUS at 09:26

## 2020-08-07 RX ADMIN — GABAPENTIN 300 MG: 300 CAPSULE ORAL at 09:26

## 2020-08-07 RX ADMIN — GABAPENTIN 300 MG: 300 CAPSULE ORAL at 14:35

## 2020-08-07 RX ADMIN — FUROSEMIDE 40 MG: 10 INJECTION, SOLUTION INTRAMUSCULAR; INTRAVENOUS at 09:26

## 2020-08-07 RX ADMIN — BUDESONIDE AND FORMOTEROL FUMARATE DIHYDRATE 2 PUFF: 160; 4.5 AEROSOL RESPIRATORY (INHALATION) at 20:19

## 2020-08-07 RX ADMIN — IPRATROPIUM BROMIDE AND ALBUTEROL SULFATE 1 AMPULE: .5; 3 SOLUTION RESPIRATORY (INHALATION) at 12:41

## 2020-08-07 RX ADMIN — SODIUM CHLORIDE, PRESERVATIVE FREE 10 ML: 5 INJECTION INTRAVENOUS at 21:03

## 2020-08-07 RX ADMIN — AZITHROMYCIN MONOHYDRATE 250 MG: 250 TABLET ORAL at 09:26

## 2020-08-07 RX ADMIN — IPRATROPIUM BROMIDE AND ALBUTEROL SULFATE 1 AMPULE: .5; 3 SOLUTION RESPIRATORY (INHALATION) at 08:09

## 2020-08-07 RX ADMIN — CEFTRIAXONE 1 G: 1 INJECTION, POWDER, FOR SOLUTION INTRAMUSCULAR; INTRAVENOUS at 16:44

## 2020-08-07 RX ADMIN — ATORVASTATIN CALCIUM 40 MG: 40 TABLET, FILM COATED ORAL at 09:26

## 2020-08-07 RX ADMIN — SODIUM CHLORIDE, PRESERVATIVE FREE 10 ML: 5 INJECTION INTRAVENOUS at 10:35

## 2020-08-07 RX ADMIN — METHYLPREDNISOLONE SODIUM SUCCINATE 40 MG: 40 INJECTION, POWDER, FOR SOLUTION INTRAMUSCULAR; INTRAVENOUS at 16:44

## 2020-08-07 ASSESSMENT — PAIN SCALES - GENERAL: PAINLEVEL_OUTOF10: 0

## 2020-08-07 NOTE — PROGRESS NOTES
Intravenous, 2 times per day  sodium chloride flush 0.9 % injection 10 mL, 10 mL, Intravenous, PRN  acetaminophen (TYLENOL) tablet 650 mg, 650 mg, Oral, Q6H PRN **OR** acetaminophen (TYLENOL) suppository 650 mg, 650 mg, Rectal, Q6H PRN  polyethylene glycol (GLYCOLAX) packet 17 g, 17 g, Oral, Daily PRN  promethazine (PHENERGAN) tablet 12.5 mg, 12.5 mg, Oral, Q6H PRN **OR** ondansetron (ZOFRAN) injection 4 mg, 4 mg, Intravenous, Q6H PRN  enoxaparin (LOVENOX) injection 40 mg, 40 mg, Subcutaneous, Daily  [COMPLETED] azithromycin (ZITHROMAX) tablet 500 mg, 500 mg, Oral, Daily **FOLLOWED BY** azithromycin (ZITHROMAX) tablet 250 mg, 250 mg, Oral, Daily  cefTRIAXone (ROCEPHIN) 1 g IVPB in 50 mL D5W minibag, 1 g, Intravenous, Q24H  ipratropium-albuterol (DUONEB) nebulizer solution 1 ampule, 1 ampule, Inhalation, Q4H WA  morphine (PF) injection 2 mg, 2 mg, Intravenous, Q2H PRN **OR** morphine (PF) injection 4 mg, 4 mg, Intravenous, Q2H PRN  furosemide (LASIX) injection 40 mg, 40 mg, Intravenous, Daily    ASSESSMENT AND PLAN    Fall  Left hip pain  Left IT hip fx  Back pain, CT noted L4.5 stenosis, no fx reported. Morbid obesity  COPD with low O2, per hospitalist she has consult pulmonolgy to see  Plan ORIF left hip Monday if medically stable per Dr Eliel Sheehan  Discussed with Dr Eliel Sheehan. Lovenox for DVT prophylaxis.      Mary Short  8/7/2020  9:10 AM

## 2020-08-07 NOTE — PROGRESS NOTES
Pt agrees to trying bipap now and overnight tonight. Placed pt on bipap 18/5, rr 16, 60%. Tolerating well, SpO2 95%. Will continue to monitor.      Electronically signed by Fransisco Woodward RCP on 8/7/2020 at 1:20 PM

## 2020-08-07 NOTE — CONSULTS
REASON FOR CONSULTATION/CC: aecopd with hip fracture      Consult at request of Rowena Mckinney MD for aecopd with hip fracture    PCP: Charleen Rob MD  Established Pulmonologist:  None    HISTORY OF PRESENT ILLNESS: Miranda Sánchez is a 78y.o. year old female with a history of copd who presents with :     Patient has a history of COPD, chronic kidney disease, hyperlipidemia and hypertension who presented last night to the ED after a fall which was mechanical in nature after tripping with a transfer and hitting her head in the process. She does have COPD and shortness of breath at baseline. In the emergency room, she was noted to be hypoxemic with oxygen titrated up. This continues to be worse through the emergency room and was placed on BiPAP. Through the night, the patient refused BiPAP and change CODE STATUS to reflect this. She is currently on 18 L of oxygen. Arterial blood gas was completed in the emergency room demonstrating pH of 7.266, PCO2 69, PO2 63. PAST MEDICAL HISTORY:  Past Medical History:   Diagnosis Date    Chronic kidney disease     COPD [facility]chronic obstructive pulmonary disease) [facility] [address]     Hyperlipidemia     Hypertension        PAST SURGICAL HISTORY:  Past Surgical History:   Procedure Laterality Date    CHOLECYSTECTOMY         FAMILY HISTORY:  family history is not on file. Noncontributory     SOCIAL HISTORY:   reports that she has been smoking cigarettes. She has been smoking about 1.50 packs per day.  She has never used smokeless tobacco.    Scheduled Meds:   methylPREDNISolone  40 mg Intravenous Q8H    budesonide-formoterol  2 puff Inhalation BID    gabapentin  300 mg Oral TID    atorvastatin  40 mg Oral Daily    sodium chloride flush  10 mL Intravenous 2 times per day    enoxaparin  40 mg Subcutaneous Daily    azithromycin  250 mg Oral Daily    cefTRIAXone (ROCEPHIN) IV  1 g Intravenous Q24H    ipratropium-albuterol  1 ampule Inhalation Q4H WA    furosemide  40 mg Intravenous Daily       Continuous Infusions:      PRN Meds:  sodium chloride flush, acetaminophen **OR** acetaminophen, polyethylene glycol, promethazine **OR** ondansetron, morphine **OR** morphine    ALLERGIES:  Patient has No Known Allergies. REVIEW OF SYSTEMS:  Constitutional: Negative for fever   HENT: Negative for sore throat  Eyes: Negative for redness   Respiratory: + for dyspnea, + cough  Cardiovascular: Negative for chest pain  Gastrointestinal: Negative for vomiting, diarrhea   Genitourinary: Negative for hematuria   Musculoskeletal: Negative for arthralgias   Skin: Negative for rash  Neurological: Negative for syncope  Hematological: Negative for adenopathy  Psychiatric/Behavorial: Negative for anxiety    Objective:   PHYSICAL EXAM:  Blood pressure 125/77, pulse 94, temperature 98 °F (36.7 °C), temperature source Axillary, resp. rate 20, height 5' 7\" (1.702 m), weight (!) 315 lb 11.2 oz (143.2 kg), SpO2 96 %.'  Gen: No distress. Body mass index is 49.45 kg/m². Eyes: PERRL. No sclera icterus. No conjunctival injection. ENT: No discharge. Pharynx clear. External appearance of ears and nose normal.  Neck: Trachea midline. No obvious mass. Resp: No accessory muscle use. No crackles. No wheezes. No rhonchi. CV: Regular rate. Regular rhythm. No murmur or rub. ++ edema. Legs wrapped  GI: Non-tender. Non-distended. No hernia. Skin: Warm, dry, normal texture and turgor. No nodule on exposed extremities. Lymph: No cervical LAD. No supraclavicular LAD. M/S: No cyanosis. No clubbing. No joint deformity. Neuro: Moves all four extremities. Psych: Oriented x 3. No anxiety. Awake. Alert. Intact judgement and insight.       Data Reviewed:   LABS:  CBC:   Recent Labs     08/06/20  1114 08/07/20  0524   WBC 12.7* 13.0*   HGB 15.4 14.3   HCT 51.4* 48.5*   MCV 80.2 81.2    249     BMP:   Recent Labs     08/06/20  1114 08/06/20  1259 08/07/20  0524     --  143   K 5.7* 4.1 4.8    --  104   CO2 30  --  29   PHOS  --   --  5.7*   BUN 17  --  21*   CREATININE 0.8  --  1.0     LIVER PROFILE:   Recent Labs     08/06/20  1114   AST 28   ALT 17   BILITOT 0.5   ALKPHOS 114     PT/INR: No results for input(s): PROTIME, INR in the last 72 hours. APTT: No results for input(s): APTT in the last 72 hours. UA:No results for input(s): NITRITE, COLORU, PHUR, LABCAST, WBCUA, RBCUA, MUCUS, TRICHOMONAS, YEAST, BACTERIA, CLARITYU, SPECGRAV, LEUKOCYTESUR, UROBILINOGEN, BILIRUBINUR, BLOODU, GLUCOSEU, AMORPHOUS in the last 72 hours. Invalid input(s): Timothy Garcias  Recent Labs     08/06/20  1145 08/06/20  1345   PHART 7.302* 7.266*   WXD1LFQ 64.2* 69.3*   PO2ART 60.8* 63.8*       Vent Information  Skin Assessment: Clean, dry, & intact  FiO2 : 60 %  SpO2: 96 %  SpO2/FiO2 ratio: 160  I Time/ I Time %: 0.9 s  Mask Type: Full face mask  Mask Size: (S) Large    Radiology Review:  Pertinent images / reports were reviewed as a part of this visit. CT Chest w/ contrast: No results found for this or any previous visit. CT Chest w/o contrast: No results found for this or any previous visit. CTPA: No results found for this or any previous visit. CXR PA/LAT:   Results for orders placed during the hospital encounter of 08/04/15   XR Chest Standard TWO VW    Narrative    INDICATION: COPD. Hypoxia. FINDINGS: Two views. Comparison is made to study dated 5/5/2013. The lungs are hyperexpanded without focal consolidation. Heart  size, mediastinal contours and pulmonary vascularity are within  normal limits. The pleural spaces are clear. Impression IMPRESSION: COPD.        CXR portable:   Results for orders placed during the hospital encounter of 08/06/20   XR CHEST PORTABLE    Narrative EXAMINATION:  ONE XRAY VIEW OF THE CHEST    8/6/2020 11:21 am    COMPARISON:  08/04/2015 radiograph    HISTORY:  ORDERING SYSTEM PROVIDED HISTORY: SOB  TECHNOLOGIST PROVIDED HISTORY:  Reason for exam:->SOB  Reason for Exam: sob    FINDINGS:  Cardiac silhouette is magnified by technique. There is increasing perihilar  opacification centrally. No pneumothorax. No significant pleural fluid. Mild left greater than right basilar ground-glass opacities. No skeletal  abnormality in the chest.  Postsurgical changes are partially seen in the  cervical spine. Impression Mild perihilar opacities and left basilar ground-glass opacities in the  chest.  Pattern may represent developing pulmonary edema or pneumonitis. Assessment:     COPD  Acute hypercapnic respiratory failure  Acute hypoxemic respiratory failure saturations less than 90% on room air  Obesity  Closed hip fracture  DNR refusing BiPAP      Plan:        -The patient is requiring surgery for her hip. Unfortunately, she has acute hypercapnic and acute hypoxemic respiratory failure secondary to COPD. -To add to this complexity, the patient is a DNR DNI refusing BiPAP. I assume she will need to be as intubated for the surgery. Therefore, if not able to extubate after the surgery, it is not clear as to what is to be done. I attempted to address this with the patient. At the end of the conversation, I advised her to think about this.  -This may be an instance where the Vapotherm at low FiO2 and high flow rate makes sense. This can help eliminate some CO2 but is not as good as BiPAP. -Wean FiO2 to 90%. Currently 5 L. - check VBG tomorrow am.   -Continue duo nebs,   -Symbicort  -Solu-Medrol 40 every 8. Concern for wound healing and steroids. Attempt to wean aggressively. -Ceftriaxone 1 g every 24. Chest x-ray with pulmonary edema versus pneumonia. On Lasix as well. Follow-up procalcitonin tomorrow. First procalcitonin was low. This note was transcribed using [id number]. Please disregard any translational errors.     Thank you for the consult    Sherlyn Mane [facility]  [phone]

## 2020-08-07 NOTE — PROGRESS NOTES
Pt is refusing bipap on waking. Pt is only getting to 85% Sp02 on 5lpm NC. Pt agreed to wear a nonrebreather for now. O2 had to be turned to 15lpm to get patient to 91%.

## 2020-08-07 NOTE — PROGRESS NOTES
Daily     PRN Meds: sodium chloride flush, acetaminophen **OR** acetaminophen, polyethylene glycol, promethazine **OR** ondansetron, morphine **OR** morphine      Intake/Output Summary (Last 24 hours) at 8/7/2020 0746  Last data filed at 8/7/2020 0617  Gross per 24 hour   Intake 150 ml   Output --   Net 150 ml       Physical Exam Performed:    BP (!) 141/75   Pulse 96   Temp 98 °F (36.7 °C) (Axillary)   Resp 20   Ht 5' 7\" (1.702 m)   Wt (!) 315 lb 11.2 oz (143.2 kg)   SpO2 95%   BMI 49.45 kg/m²     General appearance: No apparent distress appears stated age and cooperative. Obese  HEENT Normal cephalic, atraumatic without obvious deformity. Pupils equal, round, and reactive to light. Extra ocular muscles intact. Conjunctivae/corneas clear. Neck: Supple, trachea midline without thyromegaly or adenopathy with full range of motion. Lungs: Diffuse bilateral wheezing  Heart: Regular rate and rhythm with Normal S1/S2 without murmurs, rubs or gallops, point of maximum impulse non-displaced  Abdomen: Soft, non-tender or non-distended without rigidity or guarding and positive bowel sounds all four quadrants. Extremities: Bilateral lower extremity edema with significant chronic skin changes, chronic per patient. Wrapped in ACE wraps  Skin: Skin color, texture, turgor normal.  Chronic skin changes lower extremities  Neurologic: Alert and oriented X 3, neurovascularly intact with sensory/motor intact upper extremities/lower extremities, bilaterally. Cranial nerves: II-XII intact, grossly non-focal.  Mental status: Alert, oriented, thought content appropriate.   Capillary Refill: Acceptable  < 3 seconds  Peripheral Pulses: +3 Easily felt, not easily obliterated with pressure      Labs:   Recent Labs     08/06/20  1114 08/07/20  0524   WBC 12.7* 13.0*   HGB 15.4 14.3   HCT 51.4* 48.5*    249     Recent Labs     08/06/20  1114 08/06/20  1259 08/07/20  0524     --  143   K 5.7* 4.1 4.8     --  104 CO2 30  --  29   BUN 17  --  21*   CREATININE 0.8  --  1.0   CALCIUM 8.6  --  8.4   PHOS  --   --  5.7*     Recent Labs     08/06/20  1114   AST 28   ALT 17   BILITOT 0.5   ALKPHOS 114     No results for input(s): INR in the last 72 hours. No results for input(s): Valentino Bun in the last 72 hours. Urinalysis:      Lab Results   Component Value Date    NITRU NEGATIVE 05/03/2013    WBCUA 6-10 05/03/2013    BACTERIA 1+ 05/03/2013    RBCUA 3-5 05/03/2013    BLOODU TRACE 05/03/2013    SPECGRAV 1.020 05/03/2013    GLUCOSEU NEGATIVE 05/03/2013       Radiology:  CT Head WO Contrast   Final Result   No acute intracranial abnormality. CT Cervical Spine WO Contrast   Final Result   Multilevel postsurgical changes and degenerative changes. No acute osseous   abnormality. CT LUMBAR SPINE WO CONTRAST   Final Result   1. No acute finding in the lumbar spine. 2. Advanced degenerative changes that are most notable at L4-5 contributing   to severe spinal canal stenosis. XR LUMBAR SPINE (2-3 VIEWS)   Final Result   1. Moderately displaced intertrochanteric fracture of the proximal left femur. 2. Severe degenerative change in the left hip with moderate degenerative   change on the right. 3. Technically limited evaluation of the lumbar spine due to patient body   habitus. XR HIP 2-3 VW W PELVIS LEFT   Final Result   1. Moderately displaced intertrochanteric fracture of the proximal left femur. 2. Severe degenerative change in the left hip with moderate degenerative   change on the right. 3. Technically limited evaluation of the lumbar spine due to patient body   habitus.          XR FEMUR LEFT (MIN 2 VIEWS)   Final Result   Intratrochanteric left hip fracture      Moderate to severe osteoarthritis of the left hip joint         XR CHEST PORTABLE   Final Result   Mild perihilar opacities and left basilar ground-glass opacities in the   chest.  Pattern may represent developing pulmonary edema or pneumonitis. Assessment/Plan:    Active Hospital Problems    Diagnosis    Closed left hip fracture, initial encounter ([facility] [address] [S72.002A]    Mor[facility] with [facility] 45.0[facility]49.9[facility] [E66.01, Z68.42]     L hip fracture  After mechanical fall.  -Ortho consult  - morphine for pain management  -Recommend deferring surgery until respiratory status improves, currently planing for Monday if clinically improved     Acute hypoxic respiratory failure  COPD exacerbation versus pneumonia versus edema. TTE in January with normal EF. COVID negative. Initial procal low  -Work-up for pneumonia  -will treat for community-acquired pneumonia with ceftriaxone and azithromycin  - 40 mg IV lasix daily  - AM procal     COPD exacerbation  Presenting with SOB, wheezes, + leukocytosis CXR w/o consolidation or infiltrate. Placed on BiPAP in ED, now refusing to wear BiPAP any longer.  -increase steroids to IV Solu-Medrol 40 mg every 8, wean as able prior to surgery  - azithromycin x 5 days  - Duonebs q4 hours  - sputum culture pending  - wean O2 as tolerated to keep O2 sats 88-92%  -Pulmonology consult     Chronic lower extremity edema  Normal EF, has been ongoing per PCP for years. - monitor  - IV lasix here     Left kidney obstruction  Chronic. Has been followed by urology since earlier this year with plan for nephrectomy at some point which is been canceled multiple times due to COVID and other factors. - monitor     Hypertension  Stable. - continue home meds     Hyperlipidemia  Continue statin.      Morbid  Obesity  Body mass index is 49.22 kg/m². Counseled on effects of weight on overall health and chronic medical conditions and discussed weight loss.      Severe spinal stenosis  Noted on CT. Outpatient neurosurg eval     Tobacco abuse  Counseled.    - declined NRT    DVT Prophylaxis: Lovenox  Diet: Diet NPO, After Midnight  Code Status: DNR-CCA    PT/OT Eval Status: deferred to

## 2020-08-07 NOTE — PROGRESS NOTES
Pt Sp02 is in the 70's while she sleeps with NC. Pt notified that she needs the bipap. Pt agreed to wear it and Sp02 went up to 95% with it on. Will continue to monitor.

## 2020-08-07 NOTE — CARE COORDINATION
INITIAL CASE MANAGEMENT ASSESSMENT    Reviewed chart, met with patient to assess possible discharge needs. Explained Case Management role/services. SW met with patient alone at bedside today. Living Situation: Patient reports that she resides in single family home with her , three cats and one dog. Patient reports that there are five stairs leading up to the front door and she trouble getting in and out of the property. ADLs: Prior to medical admission patient reports that her  assisted her with all matters except toileting. She stated that she may need a script for a wheelchair ramp prior to discharge. She is also on 6L of oxygen today which is new. Continue to monitor for needs. DME: Prior to medical admission patient reports that she used a bedside commode, walker, wheelchair and had grab bars installed in her restroom. She stated that she doesn't anticipate any needs at discharge. PT/OT Recs: Patient is currently on bedrest and expected to have hip surgery on Monday. She will be assessed for needs shortly thereafter. Active Services: Prior to medical admission patient was pending for Passport Waiver services through [employer] on [employer]. SW left message for [facility] specialist Kendal Gold [phone] to find out if they needed anything from us before discharge. Transportation: Prior to medical admission patient reports that her  transported her to and from medical appointments and errands. SW informed that this patient will need stretcher transport at discharge. Medications: Patient receives medications from [facility]. At this time there are no issues with access or affordability. PCP: Tia Camarillo [phone] (phone) and [phone] (fax number). PLAN/COMMENTS:   1) PT/OT consult and follow up with recommendations. 2) Script for wheelchair ramp (give to family) at discharge per COA  3) Home oxygen consult if needed prior to discharge.

## 2020-08-07 NOTE — CARE COORDINATION
Advance Care Planning     Advance Care Planning Activator (Inpatient)  Conversation Note      Date of ACP Conversation: 8/6/2020    Conversation Conducted with: Patient with Decision Making Capacity    ACP Activator: [address] makes decisions on behalf of the incapacitated patient: Decision Maker is asked to consider and make decisions based on patient values, known preferences, or best interests. Health Care Decision Maker:     Current Designated Health Care Decision Maker:   Primary Decision Maker: KaylinAbrahan - Spouse - [phone]  (If there is a valid [address] named in the [address] [facility] Makers\" box in the ACP activity, but it is not visible above, be sure to open that field and then select the health care decision maker relationship (ie \"primary\") in the blank space to the right of the name.) Validate  this information as still accurate & up-to-date; edit [address] field as needed.)    Note: Assess and validate information in current ACP documents, as indicated. If no Decision Maker listed above or available through scanned documents, then:    If no Authorized Decision Maker has previously been identified, then patient chooses [address]:  \"Who would you like to name as your primary health care decision-maker? \"               Name: Smita Rosales          Phone number: [phone]  Berenice Goodwin this person be reached easily? \" Yes  Note: If the relationship of these Decision-Makers to the patient does NOT follow your state's Next of Kin hierarchy, recommend that patient complete ACP document that meets state-specific requirements to allow them to act on the patient's behalf when appropriate. Care Preferences    Ventilation:   \"If you were in your present state of health and suddenly became very ill and were unable to breathe on your own, what would your preference be about the use of a ventilator (breathing machine) if it were available to you? \"      Would the patient desire the use of ventilator (breathing machine)?: NO    \"If your health worsens and it becomes clear that your chance of recovery is unlikely, what would your preference be about the use of a ventilator (breathing machine) if it were available to you? \"     Would the patient desire the use of ventilator (breathing machine)?: NO    Resuscitation  \"CPR works best to restart the heart when there is a sudden event, like a heart attack, in someone who is otherwise healthy. Unfortunately, CPR does not typically restart the heart for people who have serious health conditions or who are very sick. \"    \"In the event your heart stopped as a result of an underlying serious health condition, would you want attempts to be made to restart your heart (answer \"yes\" for attempt to resuscitate) or would you prefer a natural death (answer \"no\" for do not attempt to resuscitate)? \" NO    NOTE: If the patient has a valid advance directive AND now provides care preference(s) that are inconsistent with that prior directive, advise the patient to consider either: creating a new advance directive that complies with state-specific requirements; or, if that is not possible, orally revoking that prior directive in accordance with state-specific requirements, which must be documented in the EHR. [x] Yes   [] No   Educated Patient / Reji Nicole regarding differences between Advance Directives and portable DNR orders. Length of ACP Conversation in minutes:  4    Conversation Outcomes:  [x] ACP discussion completed  [] Existing advance directive reviewed with patient; no changes to patient's previously recorded wishes  [] New Advance Directive completed  [] Portable Do Not Rescitate prepared for Provider review and signature  [] POLST/POST/MOLST/MOST prepared for Provider review and signature.     Follow-up plan:    [] Schedule follow-up conversation to continue planning  [] Referred individual to Provider for additional questions/concerns   [] Advised patient/agent/surrogate to review completed ACP document and update if needed with changes in condition, patient preferences or care setting    [] This note routed to one or more involved healthcare providers    Will ask MD on rounds if the patient is interested in a portable DNR-CC and follow up as appropriate. Respectfully submitted,    RAN Noel  [facility]   [phone]    Electronically signed by RAN Vasquez on 8/7/2020 at 10:35 AM

## 2020-08-08 LAB
ABO/RH: NORMAL
ALBUMIN SERPL-MCNC: 3.1 G/DL (ref 3.4–5)
ANION GAP SERPL CALCULATED.3IONS-SCNC: 7 MMOL/L (ref 3–16)
ANTIBODY SCREEN: NORMAL
APTT: 31.7 SEC (ref 24.2–36.2)
BASE EXCESS VENOUS: 3.4 MMOL/L
BUN BLDV-MCNC: 26 MG/DL (ref 7–20)
CALCIUM SERPL-MCNC: 8.9 MG/DL (ref 8.3–10.6)
CARBOXYHEMOGLOBIN: 1.5 %
CHLORIDE BLD-SCNC: 102 MMOL/L (ref 99–110)
CO2: 32 MMOL/L (ref 21–32)
CREAT SERPL-MCNC: 0.9 MG/DL (ref 0.6–1.2)
GFR AFRICAN AMERICAN: >60
GFR NON-AFRICAN AMERICAN: >60
GLUCOSE BLD-MCNC: 147 MG/DL (ref 70–99)
HCO3 VENOUS: 34 MMOL/L (ref 23–29)
HCT VFR BLD CALC: 49.2 % (ref 36–48)
HEMOGLOBIN: 14.4 G/DL (ref 12–16)
INR BLD: 1.02 (ref 0.86–1.14)
MAGNESIUM: 2.1 MG/DL (ref 1.8–2.4)
MCH RBC QN AUTO: 23.8 PG (ref 26–34)
MCHC RBC AUTO-ENTMCNC: 29.2 G/DL (ref 31–36)
MCV RBC AUTO: 81.6 FL (ref 80–100)
METHEMOGLOBIN VENOUS: 0.4 %
O2 CONTENT, VEN: 20 ML/DL
O2 SAT, VEN: 96 %
O2 THERAPY: ABNORMAL
PCO2, VEN: 80.3 MMHG (ref 40–50)
PDW BLD-RTO: 20.4 % (ref 12.4–15.4)
PH VENOUS: 7.23 (ref 7.35–7.45)
PHOSPHORUS: 4.9 MG/DL (ref 2.5–4.9)
PLATELET # BLD: 250 K/UL (ref 135–450)
PMV BLD AUTO: 7.9 FL (ref 5–10.5)
PO2, VEN: 85 MMHG
POTASSIUM SERPL-SCNC: 4.8 MMOL/L (ref 3.5–5.1)
PROCALCITONIN: 0.09 NG/ML (ref 0–0.15)
PROTHROMBIN TIME: 11.8 SEC (ref 10–13.2)
RBC # BLD: 6.03 M/UL (ref 4–5.2)
REPORT: NORMAL
SODIUM BLD-SCNC: 141 MMOL/L (ref 136–145)
TCO2 CALC VENOUS: 36 MMOL/L
WBC # BLD: 15 K/UL (ref 4–11)

## 2020-08-08 PROCEDURE — 83735 ASSAY OF MAGNESIUM: CPT

## 2020-08-08 PROCEDURE — 2700000000 HC OXYGEN THERAPY PER DAY

## 2020-08-08 PROCEDURE — 85610 PROTHROMBIN TIME: CPT

## 2020-08-08 PROCEDURE — 6370000000 HC RX 637 (ALT 250 FOR IP): Performed by: INTERNAL MEDICINE

## 2020-08-08 PROCEDURE — 0100U HC RESPIRPTHGN MULT REV TRANS & AMP PRB TECH 21 TRGT: CPT

## 2020-08-08 PROCEDURE — 86901 BLOOD TYPING SEROLOGIC RH(D): CPT

## 2020-08-08 PROCEDURE — 2580000003 HC RX 258: Performed by: INTERNAL MEDICINE

## 2020-08-08 PROCEDURE — 85027 COMPLETE CBC AUTOMATED: CPT

## 2020-08-08 PROCEDURE — 6360000002 HC RX W HCPCS: Performed by: INTERNAL MEDICINE

## 2020-08-08 PROCEDURE — 36415 COLL VENOUS BLD VENIPUNCTURE: CPT

## 2020-08-08 PROCEDURE — 80069 RENAL FUNCTION PANEL: CPT

## 2020-08-08 PROCEDURE — 94640 AIRWAY INHALATION TREATMENT: CPT

## 2020-08-08 PROCEDURE — 2060000000 HC ICU INTERMEDIATE R&B

## 2020-08-08 PROCEDURE — 82803 BLOOD GASES ANY COMBINATION: CPT

## 2020-08-08 PROCEDURE — 94761 N-INVAS EAR/PLS OXIMETRY MLT: CPT

## 2020-08-08 PROCEDURE — 86900 BLOOD TYPING SEROLOGIC ABO: CPT

## 2020-08-08 PROCEDURE — 86850 RBC ANTIBODY SCREEN: CPT

## 2020-08-08 PROCEDURE — 84145 PROCALCITONIN (PCT): CPT

## 2020-08-08 PROCEDURE — 94660 CPAP INITIATION&MGMT: CPT

## 2020-08-08 PROCEDURE — 85730 THROMBOPLASTIN TIME PARTIAL: CPT

## 2020-08-08 PROCEDURE — 87641 MR-STAPH DNA AMP PROBE: CPT

## 2020-08-08 RX ADMIN — AZITHROMYCIN MONOHYDRATE 250 MG: 250 TABLET ORAL at 09:03

## 2020-08-08 RX ADMIN — IPRATROPIUM BROMIDE AND ALBUTEROL SULFATE 1 AMPULE: .5; 3 SOLUTION RESPIRATORY (INHALATION) at 09:21

## 2020-08-08 RX ADMIN — IPRATROPIUM BROMIDE AND ALBUTEROL SULFATE 1 AMPULE: .5; 3 SOLUTION RESPIRATORY (INHALATION) at 20:25

## 2020-08-08 RX ADMIN — IPRATROPIUM BROMIDE AND ALBUTEROL SULFATE 1 AMPULE: .5; 3 SOLUTION RESPIRATORY (INHALATION) at 11:26

## 2020-08-08 RX ADMIN — MORPHINE SULFATE 2 MG: 2 INJECTION, SOLUTION INTRAMUSCULAR; INTRAVENOUS at 09:07

## 2020-08-08 RX ADMIN — METHYLPREDNISOLONE SODIUM SUCCINATE 40 MG: 40 INJECTION, POWDER, FOR SOLUTION INTRAMUSCULAR; INTRAVENOUS at 16:45

## 2020-08-08 RX ADMIN — CEFTRIAXONE 1 G: 1 INJECTION, POWDER, FOR SOLUTION INTRAMUSCULAR; INTRAVENOUS at 16:45

## 2020-08-08 RX ADMIN — ATORVASTATIN CALCIUM 40 MG: 40 TABLET, FILM COATED ORAL at 09:03

## 2020-08-08 RX ADMIN — ENOXAPARIN SODIUM 40 MG: 40 INJECTION SUBCUTANEOUS at 09:03

## 2020-08-08 RX ADMIN — SODIUM CHLORIDE, PRESERVATIVE FREE 10 ML: 5 INJECTION INTRAVENOUS at 21:00

## 2020-08-08 RX ADMIN — GABAPENTIN 300 MG: 300 CAPSULE ORAL at 20:52

## 2020-08-08 RX ADMIN — GABAPENTIN 300 MG: 300 CAPSULE ORAL at 09:03

## 2020-08-08 RX ADMIN — BUDESONIDE AND FORMOTEROL FUMARATE DIHYDRATE 2 PUFF: 160; 4.5 AEROSOL RESPIRATORY (INHALATION) at 09:20

## 2020-08-08 RX ADMIN — GABAPENTIN 300 MG: 300 CAPSULE ORAL at 13:59

## 2020-08-08 RX ADMIN — IPRATROPIUM BROMIDE AND ALBUTEROL SULFATE 1 AMPULE: .5; 3 SOLUTION RESPIRATORY (INHALATION) at 16:26

## 2020-08-08 RX ADMIN — ENOXAPARIN SODIUM 40 MG: 40 INJECTION SUBCUTANEOUS at 20:52

## 2020-08-08 RX ADMIN — FUROSEMIDE 40 MG: 10 INJECTION, SOLUTION INTRAMUSCULAR; INTRAVENOUS at 09:03

## 2020-08-08 RX ADMIN — SODIUM CHLORIDE, PRESERVATIVE FREE 10 ML: 5 INJECTION INTRAVENOUS at 10:18

## 2020-08-08 RX ADMIN — METHYLPREDNISOLONE SODIUM SUCCINATE 40 MG: 40 INJECTION, POWDER, FOR SOLUTION INTRAMUSCULAR; INTRAVENOUS at 00:41

## 2020-08-08 RX ADMIN — METHYLPREDNISOLONE SODIUM SUCCINATE 40 MG: 40 INJECTION, POWDER, FOR SOLUTION INTRAMUSCULAR; INTRAVENOUS at 09:03

## 2020-08-08 ASSESSMENT — PAIN DESCRIPTION - DESCRIPTORS
DESCRIPTORS: ACHING
DESCRIPTORS: ACHING

## 2020-08-08 ASSESSMENT — PAIN DESCRIPTION - PAIN TYPE
TYPE: ACUTE PAIN
TYPE: ACUTE PAIN

## 2020-08-08 ASSESSMENT — PAIN SCALES - GENERAL
PAINLEVEL_OUTOF10: 3
PAINLEVEL_OUTOF10: 10
PAINLEVEL_OUTOF10: 0

## 2020-08-08 ASSESSMENT — PAIN DESCRIPTION - FREQUENCY
FREQUENCY: CONTINUOUS
FREQUENCY: CONTINUOUS

## 2020-08-08 ASSESSMENT — PAIN DESCRIPTION - LOCATION
LOCATION: HIP
LOCATION: HIP

## 2020-08-08 ASSESSMENT — PAIN DESCRIPTION - ONSET
ONSET: ON-GOING
ONSET: ON-GOING

## 2020-08-08 ASSESSMENT — PAIN DESCRIPTION - PROGRESSION: CLINICAL_PROGRESSION: NOT CHANGED

## 2020-08-08 ASSESSMENT — PAIN DESCRIPTION - ORIENTATION
ORIENTATION: LEFT
ORIENTATION: LEFT

## 2020-08-08 ASSESSMENT — PAIN - FUNCTIONAL ASSESSMENT: PAIN_FUNCTIONAL_ASSESSMENT: PREVENTS OR INTERFERES SOME ACTIVE ACTIVITIES AND ADLS

## 2020-08-08 NOTE — PLAN OF CARE
Patient free from falls this shift. Fall precautions in place at all times. Bed in lowest position with two side rails up and wheels locked. Call light within reach. Patient able and agreeable to contact for safety appropriately. Skin assessment performed each shift per protocol. Patient turned and repositioned every two hours and prn with pillow support. Patient checked for incontence every two hours. Pt able to express presence/absence of pain and rate pain appropriately using numerical scale. Pain/discomfort being managed with PRN analgesics per MD orders (see MAR). Pain assessed every shift and after interventions.

## 2020-08-08 NOTE — PROGRESS NOTES
08/08/20 0930   Oxygen Therapy/Pulse Ox   O2 Therapy Oxygen humidified   $Oxygen $Daily Charge   O2 Device High flow nasal cannula  (pt refuses BiPap to ks>90)   O2 Flow Rate (L/min) 15 L/min   SpO2 95 %   Skin Assessment Clean, dry, & intact   Pulse Oximeter Device Mode Continuous   Pulse Oximeter Device Location Finger   $Pulse Oximeter $Spot check (multiple/continuous)

## 2020-08-08 NOTE — PROGRESS NOTES
Patient keeps taking nonrebreather off and stating she does not want bipap or oxygen. Pt educated on O2 demand needing to improve in order for the doctor to perform hip surgery. Pt states \"that is bullshit\". Pt does not believe she needs oxygen. Will continue to monitor and educate. Pt Sp02 drops into the 70's when she takes her Oxygen off.

## 2020-08-08 NOTE — PROGRESS NOTES
cefTRIAXone (ROCEPHIN) IV  1 g Intravenous Q24H    ipratropium-albuterol  1 ampule Inhalation Q4H WA    furosemide  40 mg Intravenous Daily     PRN Meds: sodium chloride flush, acetaminophen **OR** acetaminophen, polyethylene glycol, promethazine **OR** ondansetron, morphine **OR** morphine      Intake/Output Summary (Last 24 hours) at 8/8/2020 1000  Last data filed at 8/8/2020 0354  Gross per 24 hour   Intake 530 ml   Output 1775 ml   Net -1245 ml       Physical Exam Performed:    /83   Pulse 90   Temp 98.2 °F (36.8 °C)   Resp 22   Ht 5' 7\" (1.702 m)   Wt (!) 315 lb 11.2 oz (143.2 kg)   SpO2 95%   BMI 49.45 kg/m²     General appearance: No apparent distress appears stated age and cooperative. Obese  HEENT Normal cephalic, atraumatic without obvious deformity. Pupils equal, round, and reactive to light. Extra ocular muscles intact. Conjunctivae/corneas clear. Neck: Supple, trachea midline without thyromegaly or adenopathy with full range of motion. Lungs: Diffuse bilateral wheezing  Heart: Regular rate and rhythm with Normal S1/S2 without murmurs, rubs or gallops, point of maximum impulse non-displaced  Abdomen: Soft, non-tender or non-distended without rigidity or guarding and positive bowel sounds all four quadrants. Extremities: Bilateral lower extremity edema with significant chronic skin changes, chronic per patient. Wrapped in ACE wraps  Skin: Skin color, texture, turgor normal.  Chronic skin changes lower extremities  Neurologic: Alert and oriented X 3, neurovascularly intact with sensory/motor intact upper extremities/lower extremities, bilaterally. Cranial nerves: II-XII intact, grossly non-focal.  Mental status: Alert, oriented, thought content appropriate.   Capillary Refill: Acceptable  < 3 seconds  Peripheral Pulses: +3 Easily felt, not easily obliterated with pressure      Labs:   Recent Labs     08/06/20  1114 08/07/20  0524 08/08/20  0623   WBC 12.7* 13.0* 15.0*   HGB 15.4 14.3 14.4   HCT 51.4* 48.5* 49.2*    249 250     Recent Labs     08/06/20  1114 08/06/20  1259 08/07/20  0524 08/08/20  0623     --  143 141   K 5.7* 4.1 4.8 4.8     --  104 102   CO2 30  --  29 32   BUN 17  --  21* 26*   CREATININE 0.8  --  1.0 0.9   CALCIUM 8.6  --  8.4 8.9   PHOS  --   --  5.7* 4.9     Recent Labs     08/06/20  1114   AST 28   ALT 17   BILITOT 0.5   ALKPHOS 114     Recent Labs     08/08/20  0623   INR 1.02     No results for input(s): Ortiz Kaplan in the last 72 hours. Urinalysis:      Lab Results   Component Value Date    NITRU NEGATIVE 05/03/2013    WBCUA 6-10 05/03/2013    BACTERIA 1+ 05/03/2013    RBCUA 3-5 05/03/2013    BLOODU TRACE 05/03/2013    SPECGRAV 1.020 05/03/2013    GLUCOSEU NEGATIVE 05/03/2013       Radiology:  CT Head WO Contrast   Final Result   No acute intracranial abnormality. CT Cervical Spine WO Contrast   Final Result   Multilevel postsurgical changes and degenerative changes. No acute osseous   abnormality. CT LUMBAR SPINE WO CONTRAST   Final Result   1. No acute finding in the lumbar spine. 2. Advanced degenerative changes that are most notable at L4-5 contributing   to severe spinal canal stenosis. XR LUMBAR SPINE (2-3 VIEWS)   Final Result   1. Moderately displaced intertrochanteric fracture of the proximal left femur. 2. Severe degenerative change in the left hip with moderate degenerative   change on the right. 3. Technically limited evaluation of the lumbar spine due to patient body   habitus. XR HIP 2-3 VW W PELVIS LEFT   Final Result   1. Moderately displaced intertrochanteric fracture of the proximal left femur. 2. Severe degenerative change in the left hip with moderate degenerative   change on the right. 3. Technically limited evaluation of the lumbar spine due to patient body   habitus.          XR FEMUR LEFT (MIN 2 VIEWS)   Final Result   Intratrochanteric left hip fracture      Moderate to severe osteoarthritis of the left hip joint         XR CHEST PORTABLE   Final Result   Mild perihilar opacities and left basilar ground-glass opacities in the   chest.  Pattern may represent developing pulmonary edema or pneumonitis. Assessment/Plan:    Active Hospital Problems    Diagnosis    Hypoxemia requiring supplemental oxygen [R09.02, Z99.81]    Acute on chronic respiratory failure with hypoxia (HCC) [J96.21]    Fall [W19. XXXA]    Closed left hip fracture, initial encounter ([facility] [S72.002A]    Morbid obesity with BMI of 45.0-49.9, adult ([facility] [E66.01, Z68.42]     L hip fracture  After mechanical fall.  -Ortho consult  - morphine for pain management  -Recommend deferring surgery until respiratory status improves, currently planing for Monday if clinically improved     Acute hypoxic respiratory failure  COPD exacerbation versus pneumonia versus edema. TTE in January with normal EF. COVID negative. Initial procal low  -Work-up for pneumonia  -will treat for community-acquired pneumonia with ceftriaxone and azithromycin  - 40 mg IV lasix daily  -BiPAP as tolerated     COPD exacerbation  Presenting with SOB, wheezes, + leukocytosis CXR w/o consolidation or infiltrate. Placed on BiPAP in ED, now refusing to wear BiPAP any longer. CO2 worse this AM and more somnolent.  -increase steroids to IV Solu-Medrol 40 mg every 8, wean as able prior to surgery  - azithromycin x 5 days  - Duonebs q4 hours  - sputum culture pending  - wean O2 as tolerated to keep O2 sats 88-92%  -Pulmonology consult  - trial vapotherm, will  Not wear BIPAP     Chronic lower extremity edema  Normal EF, has been ongoing per PCP for years. - monitor  - IV lasix here     Left kidney obstruction  Chronic. Has been followed by urology since earlier this year with plan for nephrectomy at some point which is been canceled multiple times due to COVID and other factors. - monitor     Hypertension  Stable.    - continue home meds     Hyperlipidemia  Continue statin.      Morbid  Obesity  Body mass index is 49.22 kg/m². Counseled on effects of weight on overall health and chronic medical conditions and discussed weight loss.      Severe spinal stenosis  Noted on CT. Outpatient neurosurg eval     Tobacco abuse  Counseled. - declined NRT    DVT Prophylaxis: Lovenox  Diet: DIET GENERAL;  Diet NPO, After Midnight  Diet NPO, After Midnight  Code Status: DNR-CCA    PT/OT Eval Status: deferred to post-op    Dispo - pending    Emerita Ivan MD    This note was transcribed using [id number]. Please disregard any translational errors.

## 2020-08-09 ENCOUNTER — APPOINTMENT (OUTPATIENT)
Dept: GENERAL RADIOLOGY | Age: 70
DRG: 853 | End: 2020-08-09
Payer: COMMERCIAL

## 2020-08-09 LAB
ALBUMIN SERPL-MCNC: 3.2 G/DL (ref 3.4–5)
ANION GAP SERPL CALCULATED.3IONS-SCNC: 7 MMOL/L (ref 3–16)
BASE EXCESS ARTERIAL: 11 (ref -3–3)
BASE EXCESS ARTERIAL: 12.6 MMOL/L (ref -3–3)
BUN BLDV-MCNC: 33 MG/DL (ref 7–20)
CALCIUM IONIZED: 1.22 MMOL/L (ref 1.12–1.32)
CALCIUM SERPL-MCNC: 8.9 MG/DL (ref 8.3–10.6)
CARBOXYHEMOGLOBIN ARTERIAL: 1.3 % (ref 0–1.5)
CHLORIDE BLD-SCNC: 99 MMOL/L (ref 99–110)
CO2: 33 MMOL/L (ref 21–32)
CREAT SERPL-MCNC: 1 MG/DL (ref 0.6–1.2)
GFR AFRICAN AMERICAN: >60
GFR NON-AFRICAN AMERICAN: 55
GLUCOSE BLD-MCNC: 117 MG/DL (ref 70–99)
GLUCOSE BLD-MCNC: 140 MG/DL (ref 70–99)
GLUCOSE BLD-MCNC: 156 MG/DL (ref 70–99)
HCO3 ARTERIAL: 38.8 MMOL/L (ref 21–29)
HCO3 ARTERIAL: 39.7 MMOL/L (ref 21–29)
HCT VFR BLD CALC: 50.5 % (ref 36–48)
HEMOGLOBIN, ART, EXTENDED: 14.5 G/DL (ref 12–16)
HEMOGLOBIN: 14.3 G/DL (ref 12–16)
HEMOGLOBIN: 20.1 GM/DL (ref 12–16)
L. PNEUMOPHILA SEROGP 1 UR AG: NORMAL
LACTATE: 0.71 MMOL/L (ref 0.4–2)
MAGNESIUM: 2.2 MG/DL (ref 1.8–2.4)
MCH RBC QN AUTO: 23.5 PG (ref 26–34)
MCHC RBC AUTO-ENTMCNC: 28.4 G/DL (ref 31–36)
MCV RBC AUTO: 82.9 FL (ref 80–100)
METHEMOGLOBIN ARTERIAL: 0.4 %
MRSA SCREEN RT-PCR: NORMAL
O2 CONTENT ARTERIAL: 19 ML/DL
O2 SAT, ARTERIAL: 100 % (ref 93–100)
O2 SAT, ARTERIAL: 95.5 %
O2 THERAPY: ABNORMAL
PCO2 ARTERIAL: 115.3 MM HG (ref 35–45)
PCO2 ARTERIAL: 54.2 MMHG (ref 35–45)
PDW BLD-RTO: 20 % (ref 12.4–15.4)
PERFORMED ON: ABNORMAL
PERFORMED ON: ABNORMAL
PH ARTERIAL: 7.14 (ref 7.35–7.45)
PH ARTERIAL: 7.46 (ref 7.35–7.45)
PHOSPHORUS: 4.9 MG/DL (ref 2.5–4.9)
PLATELET # BLD: 232 K/UL (ref 135–450)
PMV BLD AUTO: 7.3 FL (ref 5–10.5)
PO2 ARTERIAL: 352.9 MM HG (ref 75–108)
PO2 ARTERIAL: 62.5 MMHG (ref 75–108)
POC HEMATOCRIT: 59 % (ref 36–48)
POC POTASSIUM: 5.5 MMOL/L (ref 3.5–5.1)
POC SAMPLE TYPE: ABNORMAL
POC SODIUM: 143 MMOL/L (ref 136–145)
POTASSIUM SERPL-SCNC: 5.6 MMOL/L (ref 3.5–5.1)
RBC # BLD: 6.1 M/UL (ref 4–5.2)
SODIUM BLD-SCNC: 139 MMOL/L (ref 136–145)
STREP PNEUMONIAE ANTIGEN, URINE: NORMAL
TCO2 ARTERIAL: 40.5 MMOL/L
TCO2 ARTERIAL: 43 MMOL/L
WBC # BLD: 12.5 K/UL (ref 4–11)

## 2020-08-09 PROCEDURE — 2500000003 HC RX 250 WO HCPCS: Performed by: INTERNAL MEDICINE

## 2020-08-09 PROCEDURE — 82330 ASSAY OF CALCIUM: CPT

## 2020-08-09 PROCEDURE — C9113 INJ PANTOPRAZOLE SODIUM, VIA: HCPCS | Performed by: INTERNAL MEDICINE

## 2020-08-09 PROCEDURE — 94761 N-INVAS EAR/PLS OXIMETRY MLT: CPT

## 2020-08-09 PROCEDURE — 0BH17EZ INSERTION OF ENDOTRACHEAL AIRWAY INTO TRACHEA, VIA NATURAL OR ARTIFICIAL OPENING: ICD-10-PCS | Performed by: INTERNAL MEDICINE

## 2020-08-09 PROCEDURE — 2000000000 HC ICU R&B

## 2020-08-09 PROCEDURE — 94750 HC PULMONARY COMPLIANCE STUDY: CPT

## 2020-08-09 PROCEDURE — 94002 VENT MGMT INPAT INIT DAY: CPT

## 2020-08-09 PROCEDURE — 71045 X-RAY EXAM CHEST 1 VIEW: CPT

## 2020-08-09 PROCEDURE — 36569 INSJ PICC 5 YR+ W/O IMAGING: CPT

## 2020-08-09 PROCEDURE — C1751 CATH, INF, PER/CENT/MIDLINE: HCPCS

## 2020-08-09 PROCEDURE — 99291 CRITICAL CARE FIRST HOUR: CPT | Performed by: INTERNAL MEDICINE

## 2020-08-09 PROCEDURE — 83735 ASSAY OF MAGNESIUM: CPT

## 2020-08-09 PROCEDURE — 87040 BLOOD CULTURE FOR BACTERIA: CPT

## 2020-08-09 PROCEDURE — 83605 ASSAY OF LACTIC ACID: CPT

## 2020-08-09 PROCEDURE — 85027 COMPLETE CBC AUTOMATED: CPT

## 2020-08-09 PROCEDURE — 6360000002 HC RX W HCPCS: Performed by: INTERNAL MEDICINE

## 2020-08-09 PROCEDURE — 94640 AIRWAY INHALATION TREATMENT: CPT

## 2020-08-09 PROCEDURE — 94660 CPAP INITIATION&MGMT: CPT

## 2020-08-09 PROCEDURE — 94003 VENT MGMT INPAT SUBQ DAY: CPT

## 2020-08-09 PROCEDURE — 82947 ASSAY GLUCOSE BLOOD QUANT: CPT

## 2020-08-09 PROCEDURE — 85014 HEMATOCRIT: CPT

## 2020-08-09 PROCEDURE — 2580000003 HC RX 258: Performed by: INTERNAL MEDICINE

## 2020-08-09 PROCEDURE — 2700000000 HC OXYGEN THERAPY PER DAY

## 2020-08-09 PROCEDURE — 87449 NOS EACH ORGANISM AG IA: CPT

## 2020-08-09 PROCEDURE — 6370000000 HC RX 637 (ALT 250 FOR IP): Performed by: INTERNAL MEDICINE

## 2020-08-09 PROCEDURE — 36600 WITHDRAWAL OF ARTERIAL BLOOD: CPT

## 2020-08-09 PROCEDURE — 80069 RENAL FUNCTION PANEL: CPT

## 2020-08-09 PROCEDURE — 82803 BLOOD GASES ANY COMBINATION: CPT

## 2020-08-09 PROCEDURE — 84132 ASSAY OF SERUM POTASSIUM: CPT

## 2020-08-09 PROCEDURE — 36415 COLL VENOUS BLD VENIPUNCTURE: CPT

## 2020-08-09 PROCEDURE — 87205 SMEAR GRAM STAIN: CPT

## 2020-08-09 PROCEDURE — 5A1945Z RESPIRATORY VENTILATION, 24-96 CONSECUTIVE HOURS: ICD-10-PCS | Performed by: INTERNAL MEDICINE

## 2020-08-09 PROCEDURE — 84295 ASSAY OF SERUM SODIUM: CPT

## 2020-08-09 PROCEDURE — 93005 ELECTROCARDIOGRAM TRACING: CPT | Performed by: INTERNAL MEDICINE

## 2020-08-09 PROCEDURE — 76937 US GUIDE VASCULAR ACCESS: CPT

## 2020-08-09 PROCEDURE — 87070 CULTURE OTHR SPECIMN AEROBIC: CPT

## 2020-08-09 RX ORDER — PANTOPRAZOLE SODIUM 40 MG/10ML
40 INJECTION, POWDER, LYOPHILIZED, FOR SOLUTION INTRAVENOUS DAILY
Status: DISCONTINUED | OUTPATIENT
Start: 2020-08-09 | End: 2020-08-18 | Stop reason: HOSPADM

## 2020-08-09 RX ORDER — PREDNISONE 20 MG/1
40 TABLET ORAL DAILY
Status: COMPLETED | OUTPATIENT
Start: 2020-08-09 | End: 2020-08-13

## 2020-08-09 RX ORDER — PROPOFOL 10 MG/ML
INJECTION, EMULSION INTRAVENOUS
Status: DISPENSED
Start: 2020-08-09 | End: 2020-08-09

## 2020-08-09 RX ORDER — LIDOCAINE HYDROCHLORIDE 20 MG/ML
JELLY TOPICAL ONCE
Status: DISCONTINUED | OUTPATIENT
Start: 2020-08-09 | End: 2020-08-10

## 2020-08-09 RX ORDER — LIDOCAINE HYDROCHLORIDE 10 MG/ML
5 INJECTION, SOLUTION EPIDURAL; INFILTRATION; INTRACAUDAL; PERINEURAL ONCE
Status: DISCONTINUED | OUTPATIENT
Start: 2020-08-09 | End: 2020-08-10

## 2020-08-09 RX ORDER — ETOMIDATE 2 MG/ML
INJECTION INTRAVENOUS
Status: COMPLETED | OUTPATIENT
Start: 2020-08-09 | End: 2020-08-09

## 2020-08-09 RX ORDER — SODIUM CHLORIDE 0.9 % (FLUSH) 0.9 %
10 SYRINGE (ML) INJECTION EVERY 12 HOURS SCHEDULED
Status: DISCONTINUED | OUTPATIENT
Start: 2020-08-09 | End: 2020-08-18 | Stop reason: HOSPADM

## 2020-08-09 RX ORDER — METOPROLOL TARTRATE 5 MG/5ML
10 INJECTION INTRAVENOUS ONCE
Status: DISCONTINUED | OUTPATIENT
Start: 2020-08-09 | End: 2020-08-09

## 2020-08-09 RX ORDER — SODIUM CHLORIDE 0.9 % (FLUSH) 0.9 %
10 SYRINGE (ML) INJECTION PRN
Status: DISCONTINUED | OUTPATIENT
Start: 2020-08-09 | End: 2020-08-18 | Stop reason: HOSPADM

## 2020-08-09 RX ORDER — PROPOFOL 10 MG/ML
10 INJECTION, EMULSION INTRAVENOUS
Status: DISCONTINUED | OUTPATIENT
Start: 2020-08-09 | End: 2020-08-12

## 2020-08-09 RX ORDER — CHLORHEXIDINE GLUCONATE 0.12 MG/ML
15 RINSE ORAL 2 TIMES DAILY
Status: DISCONTINUED | OUTPATIENT
Start: 2020-08-09 | End: 2020-08-12

## 2020-08-09 RX ORDER — SODIUM CHLORIDE 9 MG/ML
10 INJECTION INTRAVENOUS DAILY
Status: DISCONTINUED | OUTPATIENT
Start: 2020-08-09 | End: 2020-08-18 | Stop reason: HOSPADM

## 2020-08-09 RX ORDER — DILTIAZEM HYDROCHLORIDE 5 MG/ML
10 INJECTION INTRAVENOUS ONCE
Status: COMPLETED | OUTPATIENT
Start: 2020-08-09 | End: 2020-08-09

## 2020-08-09 RX ORDER — FUROSEMIDE 10 MG/ML
40 INJECTION INTRAMUSCULAR; INTRAVENOUS 2 TIMES DAILY
Status: DISCONTINUED | OUTPATIENT
Start: 2020-08-09 | End: 2020-08-12

## 2020-08-09 RX ORDER — ROCURONIUM BROMIDE 10 MG/ML
INJECTION, SOLUTION INTRAVENOUS
Status: COMPLETED | OUTPATIENT
Start: 2020-08-09 | End: 2020-08-09

## 2020-08-09 RX ORDER — FENTANYL CITRATE 50 UG/ML
25 INJECTION, SOLUTION INTRAMUSCULAR; INTRAVENOUS
Status: DISCONTINUED | OUTPATIENT
Start: 2020-08-09 | End: 2020-08-12

## 2020-08-09 RX ORDER — SODIUM POLYSTYRENE SULFONATE 15 G/60ML
15 SUSPENSION ORAL; RECTAL ONCE
Status: COMPLETED | OUTPATIENT
Start: 2020-08-09 | End: 2020-08-09

## 2020-08-09 RX ADMIN — CHLORHEXIDINE GLUCONATE 15 ML: 1.2 RINSE ORAL at 13:31

## 2020-08-09 RX ADMIN — PROPOFOL 20 MCG/KG/MIN: 10 INJECTION, EMULSION INTRAVENOUS at 16:58

## 2020-08-09 RX ADMIN — PREDNISONE 40 MG: 20 TABLET ORAL at 18:44

## 2020-08-09 RX ADMIN — PROPOFOL 20 MCG/KG/MIN: 10 INJECTION, EMULSION INTRAVENOUS at 09:44

## 2020-08-09 RX ADMIN — PROPOFOL 15 MCG/KG/MIN: 10 INJECTION, EMULSION INTRAVENOUS at 22:46

## 2020-08-09 RX ADMIN — Medication 25 MCG/HR: at 06:05

## 2020-08-09 RX ADMIN — CEFTRIAXONE 1 G: 1 INJECTION, POWDER, FOR SOLUTION INTRAMUSCULAR; INTRAVENOUS at 16:59

## 2020-08-09 RX ADMIN — ETOMIDATE 20 MG: 2 INJECTION INTRAVENOUS at 05:17

## 2020-08-09 RX ADMIN — IPRATROPIUM BROMIDE AND ALBUTEROL SULFATE 1 AMPULE: .5; 3 SOLUTION RESPIRATORY (INHALATION) at 15:40

## 2020-08-09 RX ADMIN — ENOXAPARIN SODIUM 40 MG: 40 INJECTION SUBCUTANEOUS at 08:17

## 2020-08-09 RX ADMIN — IPRATROPIUM BROMIDE AND ALBUTEROL SULFATE 1 AMPULE: .5; 3 SOLUTION RESPIRATORY (INHALATION) at 11:26

## 2020-08-09 RX ADMIN — CHLORHEXIDINE GLUCONATE 15 ML: 1.2 RINSE ORAL at 21:07

## 2020-08-09 RX ADMIN — SODIUM POLYSTYRENE SULFONATE 15 G: 15 SUSPENSION ORAL; RECTAL at 08:54

## 2020-08-09 RX ADMIN — METHYLPREDNISOLONE SODIUM SUCCINATE 40 MG: 40 INJECTION, POWDER, FOR SOLUTION INTRAMUSCULAR; INTRAVENOUS at 00:20

## 2020-08-09 RX ADMIN — METHYLPREDNISOLONE SODIUM SUCCINATE 40 MG: 40 INJECTION, POWDER, FOR SOLUTION INTRAMUSCULAR; INTRAVENOUS at 16:58

## 2020-08-09 RX ADMIN — ENOXAPARIN SODIUM 40 MG: 40 INJECTION SUBCUTANEOUS at 21:07

## 2020-08-09 RX ADMIN — DILTIAZEM HYDROCHLORIDE 10 MG: 5 INJECTION INTRAVENOUS at 12:04

## 2020-08-09 RX ADMIN — Medication 10 ML: at 21:07

## 2020-08-09 RX ADMIN — DILTIAZEM HYDROCHLORIDE 10 MG/HR: 5 INJECTION INTRAVENOUS at 17:31

## 2020-08-09 RX ADMIN — IPRATROPIUM BROMIDE AND ALBUTEROL SULFATE 1 AMPULE: .5; 3 SOLUTION RESPIRATORY (INHALATION) at 07:45

## 2020-08-09 RX ADMIN — PROPOFOL 10 MCG/KG/MIN: 10 INJECTION, EMULSION INTRAVENOUS at 06:04

## 2020-08-09 RX ADMIN — ROCURONIUM BROMIDE 100 MG: 10 INJECTION INTRAVENOUS at 05:19

## 2020-08-09 RX ADMIN — GABAPENTIN 300 MG: 300 CAPSULE ORAL at 08:17

## 2020-08-09 RX ADMIN — PROPOFOL 30 MCG/KG/MIN: 10 INJECTION, EMULSION INTRAVENOUS at 06:59

## 2020-08-09 RX ADMIN — Medication 10 ML: at 13:29

## 2020-08-09 RX ADMIN — ATORVASTATIN CALCIUM 40 MG: 40 TABLET, FILM COATED ORAL at 08:17

## 2020-08-09 RX ADMIN — GABAPENTIN 300 MG: 300 CAPSULE ORAL at 21:07

## 2020-08-09 RX ADMIN — DILTIAZEM HYDROCHLORIDE 5 MG/HR: 5 INJECTION INTRAVENOUS at 12:41

## 2020-08-09 RX ADMIN — GABAPENTIN 300 MG: 300 CAPSULE ORAL at 12:43

## 2020-08-09 RX ADMIN — PIPERACILLIN AND TAZOBACTAM 3.38 G: 3; .375 INJECTION, POWDER, LYOPHILIZED, FOR SOLUTION INTRAVENOUS at 18:44

## 2020-08-09 RX ADMIN — SODIUM CHLORIDE, PRESERVATIVE FREE 10 ML: 5 INJECTION INTRAVENOUS at 08:19

## 2020-08-09 RX ADMIN — METHYLPREDNISOLONE SODIUM SUCCINATE 40 MG: 40 INJECTION, POWDER, FOR SOLUTION INTRAMUSCULAR; INTRAVENOUS at 08:17

## 2020-08-09 RX ADMIN — FUROSEMIDE 40 MG: 10 INJECTION, SOLUTION INTRAMUSCULAR; INTRAVENOUS at 16:58

## 2020-08-09 RX ADMIN — MORPHINE SULFATE 2 MG: 2 INJECTION, SOLUTION INTRAMUSCULAR; INTRAVENOUS at 02:03

## 2020-08-09 RX ADMIN — PANTOPRAZOLE SODIUM 40 MG: 40 INJECTION, POWDER, FOR SOLUTION INTRAVENOUS at 13:29

## 2020-08-09 RX ADMIN — AZITHROMYCIN MONOHYDRATE 250 MG: 250 TABLET ORAL at 08:17

## 2020-08-09 RX ADMIN — BUDESONIDE AND FORMOTEROL FUMARATE DIHYDRATE 2 PUFF: 160; 4.5 AEROSOL RESPIRATORY (INHALATION) at 07:45

## 2020-08-09 RX ADMIN — FUROSEMIDE 40 MG: 10 INJECTION, SOLUTION INTRAMUSCULAR; INTRAVENOUS at 08:17

## 2020-08-09 RX ADMIN — BUDESONIDE AND FORMOTEROL FUMARATE DIHYDRATE 2 PUFF: 160; 4.5 AEROSOL RESPIRATORY (INHALATION) at 21:05

## 2020-08-09 RX ADMIN — IPRATROPIUM BROMIDE AND ALBUTEROL SULFATE 1 AMPULE: .5; 3 SOLUTION RESPIRATORY (INHALATION) at 20:30

## 2020-08-09 ASSESSMENT — PULMONARY FUNCTION TESTS
PIF_VALUE: 26
PIF_VALUE: 25
PIF_VALUE: 25
PIF_VALUE: 26
PIF_VALUE: 26
PIF_VALUE: 27
PIF_VALUE: 25
PIF_VALUE: 29
PIF_VALUE: 36
PIF_VALUE: 25
PIF_VALUE: 25
PIF_VALUE: 41
PIF_VALUE: 34
PIF_VALUE: 27
PIF_VALUE: 34
PIF_VALUE: 35
PIF_VALUE: 35
PIF_VALUE: 25
PIF_VALUE: 25
PIF_VALUE: 30
PIF_VALUE: 41
PIF_VALUE: 25
PIF_VALUE: 27
PIF_VALUE: 25
PIF_VALUE: 40
PIF_VALUE: 47
PIF_VALUE: 25
PIF_VALUE: 26

## 2020-08-09 ASSESSMENT — PAIN SCALES - GENERAL: PAINLEVEL_OUTOF10: 6

## 2020-08-09 NOTE — PROGRESS NOTES
Pulmonary Progress Note    CC: Acute hypoxic respiratory failure  Acute on chronic hypercarbic respiratory failure  Acute pulmonary edema  Multifocal pneumonia  Rapid atrial fibrillation  Possible COPD    24 hr events:  A rapid response was called early this morning when patient was found to be hypoxic to 81% and with agonal breathing. She required bag mask ventilation. ABG showed a pH of 7.1 and a PCO2 of 115. She was subsequently intubated and transferred to the ICU. She subsequently developed rapid atrial fibrillation and was started on diltiazem drip. PHYSICAL EXAM:  Blood pressure (!) 153/92, pulse 79, temperature 98.1 °F (36.7 °C), temperature source Axillary, resp. rate 20, height 5' 7\" (1.702 m), weight 238 lb 1.6 oz (108 kg), SpO2 100 %. VC+ 20/450/80%/10    Intake/Output Summary (Last 24 hours) at 8/9/2020 0729  Last data filed at 8/9/2020 0425  Gross per 24 hour   Intake 480 ml   Output 1750 ml   Net -1270 ml       Gen: Well developed; well nourished  Eyes: No scleral icterus. No conjunctival injection. ENT:  Oropharynx with ETT. External appearance of ears and nose normal.  Neck: Trachea midline. No obvious mass. No visible thyroid enlargement    Respiratory: Crackles bilaterally, no accessory muscle use  Cardiovascular: Irregular, no appreciable murmurs. BLE and BUE edema. Skin: Warm and dry. No rashes or ulcers on visible areas. Normal texture and turgor  Musculoskeletal: No cyanosis, clubbing or joint deformity.     Psychiatric: Intubated and sedated    Medications:  Current Facility-Administered Medications: propofol injection, 10 mcg/kg/min, Intravenous, Titrated  fentaNYL 10 mcg/mL infusion, 25 mcg/hr, Intravenous, Continuous  fentaNYL (SUBLIMAZE) injection 25 mcg, 25 mcg, Intravenous, Q1H PRN  lidocaine (XYLOCAINE) 2 % jelly, , Topical, Once  methylPREDNISolone sodium (SOLU-MEDROL) injection 40 mg, 40 mg, Intravenous, Q8H  enoxaparin (LOVENOX) injection 40 mg, 40 mg, Subcutaneous, BID  budesonide-formoterol (SYMBICORT) 160-4.5 MCG/ACT inhaler 2 puff, 2 puff, Inhalation, BID  gabapentin (NEURONTIN) capsule 300 mg, 300 mg, Oral, TID  atorvastatin (LIPITOR) tablet 40 mg, 40 mg, Oral, Daily  sodium chloride flush 0.9 % injection 10 mL, 10 mL, Intravenous, 2 times per day  sodium chloride flush 0.9 % injection 10 mL, 10 mL, Intravenous, PRN  acetaminophen (TYLENOL) tablet 650 mg, 650 mg, Oral, Q6H PRN **OR** acetaminophen (TYLENOL) suppository 650 mg, 650 mg, Rectal, Q6H PRN  polyethylene glycol (GLYCOLAX) packet 17 g, 17 g, Oral, Daily PRN  promethazine (PHENERGAN) tablet 12.5 mg, 12.5 mg, Oral, Q6H PRN **OR** ondansetron (ZOFRAN) injection 4 mg, 4 mg, Intravenous, Q6H PRN  [COMPLETED] azithromycin (ZITHROMAX) tablet 500 mg, 500 mg, Oral, Daily **FOLLOWED BY** azithromycin (ZITHROMAX) tablet 250 mg, 250 mg, Oral, Daily  cefTRIAXone (ROCEPHIN) 1 g IVPB in 50 mL D5W minibag, 1 g, Intravenous, Q24H  ipratropium-albuterol (DUONEB) nebulizer solution 1 ampule, 1 ampule, Inhalation, Q4H WA  morphine (PF) injection 2 mg, 2 mg, Intravenous, Q2H PRN **OR** morphine (PF) injection 4 mg, 4 mg, Intravenous, Q2H PRN  furosemide (LASIX) injection 40 mg, 40 mg, Intravenous, Daily    Data reviewed:  Labs:  CBC:   Recent Labs     08/07/20 0524 08/08/20 0623 08/09/20  0536 08/09/20  0630   WBC 13.0* 15.0*  --  12.5*   HGB 14.3 14.4 20.1* 14.3   HCT 48.5* 49.2*  --  50.5*   MCV 81.2 81.6  --  82.9    250  --  232     BMP:   Recent Labs     08/07/20 0524 08/08/20  0623 08/09/20  0630    141 139   K 4.8 4.8 5.6*    102 99   CO2 29 32 33*   PHOS 5.7* 4.9 4.9   BUN 21* 26* 33*   CREATININE 1.0 0.9 1.0     LIVER PROFILE:   Recent Labs     08/06/20  1114   AST 28   ALT 17   BILITOT 0.5   ALKPHOS 114     PT/INR:   Recent Labs     08/08/20  0623   PROTIME 11.8   INR 1.02     APTT:   Recent Labs     08/08/20  0623   APTT 31.7       Cultures:   Blood culture (8/9): pending  Nasal MRSA probe: pending  Respiratory viral panel: Negative      Films:  Chest images and reports were reviewed by me. My interpretation is:  CXR (8/9/20): RLL infiltrate and vascular congestion; ETT and gastric tube in place      Assessment:   Acute hypoxic respiratory failure  Acute on chronic hypercarbic respiratory failure  Acute pulmonary edema  Multifocal pneumonia  Rapid atrial fibrillation  Possible COPD      Plan:    Acute hypoxic respiratory failure  -Appears to be due to a combination of pulmonary edema and pneumonia  -Continue mechanical ventilation. Wean FiO2 and PEEP as able to keep oxygen saturation greater than 90%    Acute on chronic hypercarbic respiratory failure  -Appears to be due to a combination of pulmonary edema and pneumonia  -Continue mechanical ventilation. Repeat ABG    Acute pulmonary edema  -Increase Lasix to 40 mg IV twice daily  -Strict I's and O's    Multifocal pneumonia  -Change ceftriaxone to Zosyn. Continue azithromycin  -Follow culture data    Rapid atrial fibrillation  -Diltiazem drip    Possible COPD  -Change Solu-Medrol to prednisone 40 mg daily x5 days  -Symbicort   -DuoNebs every 4 hours      Prophylaxis  DVT- lovenox  GI- protonix  VAP- peridex    I spent 45 minutes of critical care time with this patient excluding procedures.     MD Juan Jose Rodriguez Pulmonary, Critical Care and Sleep

## 2020-08-09 NOTE — PROGRESS NOTES
Order for PICC line from Dr. Richard Perez. No issues gaining access into pt's right cephalic vein. Guidewire and introducer introduced with no issues. Pt is in A fib so x ray ordered to verify PICC tip placement. Awaiting results     Site CDI, however pt does have about a 6cm by6cm bruise around site from previous sticks    Bed lowered, side rails up, restraint tied.     Lesly Ronquillo RN updated on POC

## 2020-08-09 NOTE — PROGRESS NOTES
[facility], patient's , calling for updates. Plan of care and patient condition reviewed. Electronically signed by Maciel Roland RN on 8/9/2020 at 9:59 AM   1120: Patient's  in room. Dr. Joana Espinal, hospitalist in room, updating him on plan of care. Patient HR changed from NSR to A-FIB RVR, EKG to confirm. 1138: EKG showed A-fib rvr, . Dr. Joana Espinal notified. 1204: Cardizem IV push given over 2 min. Electronically signed by Maciel Roland RN on 8/9/2020 at 12:07 PM   35 67 15: Message to Dr. Joana Espinal about continued a-fib despite cardizem injection. Orders for cardizem gtt. 1255: Rounds with dr Fang Torres, pulmonology complete. Patient cardizem gtt increased per verbal order. Orders for PICC line. 1602: Patient coughing, turning purple. Patient eyes open, ventilator alarming. Propofol increased for synchrony with vent. Picc line being placed at this time. Dr. Joana Espinal, hospitalist, aware of patient's 's-150's. [id number] [address] Sandra as long as MAP is >65. Cardiology paged per verbal order from Dr. Joana Espinal. Electronically signed by Maciel Roland RN on 8/9/2020 at 4:09 PM   [id number] 0982: Patient flipped out of A-fib RVR and in to NSR. Patient slightly hypotensive, Dr. Joana Espinal notified. Ok to turn off gtt. 1858: Patient attempting to speak with ventilator in. HR increasing, patient turning purple. Patient tearful. Propofol increased.    Electronically signed by Maciel Roland RN on 8/9/2020 at 7:00 PM

## 2020-08-09 NOTE — PROGRESS NOTES
Hospitalist Progress Note      PCP: Alysha Petersen MD    Date of Admission: 8/6/2020    Chief Complaint:   Chief Complaint   Patient presents with    Back Pain     mechanical fall at home. +hypoxic on arrival. hx of COPD. does not normally wear O2. on 3L nc on arrival. room air O2 sat 76%.  Leg Pain     left leg pain      Hospital Course:  78 y.o. female with PMH significant for CKD, COPD, hypertension, hyperlipidemia who presents to [facility] with mechanical fall at home and back and leg pain. Patient says she had a mechanical fall on her left side with subsequent hip and back pain. Thinks she hit her head but no LOC. She denies feeling SOB but has had wheezing. Denies chest pain, nausea, vomiting, fevers, abdominal pain. She does not use O2 at home. LE edema and skin changes are chronic per patient and not worse than baseline.      In the ED patient was hypoxic with incr Co2 on repeat blood gas and placed on BiPAP.      Labs in the ED remarkable for potassium 5.7, recheck 4.1, White count 12.7, ABG 7.3, CO2 64, PO2 60. Imaging showed severe spinal canal stenosis at L4-L5, left hip displaced intertrochanteric fracture of the proximal left femur. Subjective: She did wear her BiPAP for most of the day yesterday but apparently overnight was refusing BiPAP again. Rapid response called for change in mental status and reportedly agonal breathing. ABG with pH 7.1, CO2 115. Seems have been misunderstanding about her CODE STATUS and she was intubated which she had stated previously she did not want. Unable to complete review of systems as patient is intubated. Updated her  at bedside. He is grateful that she was intubated and hoping she will be able to get surgery.      In a fib RVR this AM.     Medications:  Reviewed    Infusion Medications    propofol 30 mcg/kg/min (08/09/20 0659)    fentaNYL 25 mcg/hr (08/09/20 1805)     Scheduled Medications    lidocaine   Topical Once    methylPREDNISolone  40 mg Intravenous Q8H    enoxaparin  40 mg Subcutaneous BID    budesonide-formoterol  2 puff Inhalation BID    gabapentin  300 mg Oral TID    atorvastatin  40 mg Oral Daily    sodium chloride flush  10 mL Intravenous 2 times per day    azithromycin  250 mg Oral Daily    cefTRIAXone (ROCEPHIN) IV  1 g Intravenous Q24H    ipratropium-albuterol  1 ampule Inhalation Q4H WA    furosemide  40 mg Intravenous Daily     PRN Meds: fentanNYL, sodium chloride flush, acetaminophen **OR** acetaminophen, polyethylene glycol, promethazine **OR** ondansetron, morphine **OR** morphine      Intake/Output Summary (Last 24 hours) at 8/9/2020 0744  Last data filed at 8/9/2020 0425  Gross per 24 hour   Intake 480 ml   Output 1750 ml   Net -1270 ml       Physical Exam Performed:    BP (!) 153/92   Pulse 79   Temp 98.1 °F (36.7 °C) (Axillary)   Resp 20   Ht 5' 7\" (1.702 m)   Wt 238 lb 1.6 oz (108 kg)   SpO2 100%   BMI 37.29 kg/m²     General appearance: No apparent distress appears stated age and cooperative. Obese  HEENT Normal cephalic, atraumatic without obvious deformity. Pupils equal, round, and reactive to light. Neck: Supple, trachea midline without thyromegaly or adenopathy with full range of motion. Lungs: Diffuse bilateral wheezing. Intubated. Heart: Regular rate and rhythm with Normal S1/S2 without murmurs, rubs or gallops, point of maximum impulse non-displaced  Abdomen: Soft, non-tender or non-distended without rigidity or guarding and positive bowel sounds all four quadrants. Extremities: Bilateral lower extremity edema with significant chronic skin changes, chronic per patient. Wrapped in ACE wraps  Skin: Skin color, texture, turgor normal.  Chronic skin changes lower extremities.  Multiple wounds per nursing  Neurologic: Moves all 4 extremities  Mental status: Intubated  Capillary Refill: Acceptable  < 3 seconds  Peripheral Pulses: +3 Easily felt, not easily obliterated with pressure      Labs:   Recent Labs     08/07/20  0524 08/08/20  0623 08/09/20  0536 08/09/20  0630   WBC 13.0* 15.0*  --  12.5*   HGB 14.3 14.4 20.1* 14.3   HCT 48.5* 49.2*  --  50.5*    250  --  232     Recent Labs     08/07/20  0524 08/08/20  0623 08/09/20  0630    141 139   K 4.8 4.8 5.6*    102 99   CO2 29 32 33*   BUN 21* 26* 33*   CREATININE 1.0 0.9 1.0   CALCIUM 8.4 8.9 8.9   PHOS 5.7* 4.9 4.9     Recent Labs     08/06/20  1114   AST 28   ALT 17   BILITOT 0.5   ALKPHOS 114     Recent Labs     08/08/20  0623   INR 1.02     No results for input(s): CKTOTAL, TROPONINI in the last 72 hours. Urinalysis:      Lab Results   Component Value Date    NITRU NEGATIVE 05/03/2013    WBCUA 6-10 05/03/2013    BACTERIA 1+ 05/03/2013    RBCUA 3-5 05/03/2013    BLOODU TRACE 05/03/2013    SPECGRAV 1.020 05/03/2013    GLUCOSEU NEGATIVE 05/03/2013       Radiology:  XR CHEST PORTABLE   Final Result   Interval intubation. There is improved vascular congestion centrally, but   worsening airspace change in the right lower lung zone. CT Head WO Contrast   Final Result   No acute intracranial abnormality. CT Cervical Spine WO Contrast   Final Result   Multilevel postsurgical changes and degenerative changes. No acute osseous   abnormality. CT LUMBAR SPINE WO CONTRAST   Final Result   1. No acute finding in the lumbar spine. 2. Advanced degenerative changes that are most notable at L4-5 contributing   to severe spinal canal stenosis. XR LUMBAR SPINE (2-3 VIEWS)   Final Result   1. Moderately displaced intertrochanteric fracture of the proximal left femur. 2. Severe degenerative change in the left hip with moderate degenerative   change on the right. 3. Technically limited evaluation of the lumbar spine due to patient body   habitus. XR HIP 2-3 VW W PELVIS LEFT   Final Result   1. Moderately displaced intertrochanteric fracture of the proximal left femur.    2. Severe degenerative change in the left hip with moderate degenerative   change on the right. 3. Technically limited evaluation of the lumbar spine due to patient body   habitus. XR FEMUR LEFT (MIN 2 VIEWS)   Final Result   Intratrochanteric left hip fracture      Moderate to severe osteoarthritis of the left hip joint         XR CHEST PORTABLE   Final Result   Mild perihilar opacities and left basilar ground-glass opacities in the   chest.  Pattern may represent developing pulmonary edema or pneumonitis. Assessment/Plan:    Active Hospital Problems    Diagnosis    Hypoxemia requiring supplemental oxygen [R09.02, Z99.81]    Acute on chronic respiratory failure with hypoxia (HCC) [J96.21]    Fall [W19. XXXA]    Closed left hip fracture, initial encounter ([facility] [S72.002A]    Morbid obesity with BMI of 45.0-49.9, adult ([facility] [E66.01, Z68.42]     L hip fracture  After mechanical fall.  -Ortho consult  - morphine for pain management  - planning for OR Monday if clinically improved-- reassess Monday, may be able to proceed now that she is intubated     Acute hypoxic respiratory failure  COPD exacerbation versus pneumonia versus edema. TTE in January with normal EF. COVID negative. Initial procal low. -Work-up for pneumonia  -will treat for community-acquired pneumonia with ceftriaxone and azithromycin  - 40 mg IV lasix daily  -BiPAP as tolerated--early morning 8/9 rapid response called patient was emergently intubated. -Vent management per critical care     COPD exacerbation  Presenting with SOB, wheezes, + leukocytosis CXR w/o consolidation or infiltrate. Placed on BiPAP in ED, now refusing to wear BiPAP any longer.  CO2 worse this AM and more somnolent.  -increase steroids to IV Solu-Medrol 40 mg every 8, wean as able prior to surgery  - azithromycin x 5 days  - Duonebs q4 hours  - sputum culture pending  - wean O2 as tolerated to keep O2 sats 88-92%  -Pulmonology consult    A fib RVR  New AM 8/9.  - trial IV dilt, if not responding will start drip    Hyperkalemia  K 5.6.  - will give kayexelate     Chronic lower extremity edema  Normal EF, has been ongoing per PCP for years. - monitor  - IV lasix here    Wounds  Sacral wound, bilateral elbow wounds per nursing.   - wound care     Left kidney obstruction  Chronic. Has been followed by urology since earlier this year with plan for nephrectomy at some point which is been canceled multiple times due to COVID and other factors. - monitor     Hypertension  Stable. - continue home meds     Hyperlipidemia  Continue statin.      Morbid  Obesity  Body mass index is 49.22 kg/m². Counseled on effects of weight on overall health and chronic medical conditions and discussed weight loss.      Severe spinal stenosis  Noted on CT. Outpatient neurosurg eval     Tobacco abuse  Counseled. - declined NRT    DVT Prophylaxis: Lovenox  Diet: DIET GENERAL;  Diet NPO, After Midnight  Diet NPO, After Midnight  Code Status: DNR-CCA    PT/OT Eval Status: deferred to post-op    Dispo - pending    Ida Agosto MD    This note was transcribed using [id number] [employer]. Please disregard any translational errors.

## 2020-08-09 NOTE — PROGRESS NOTES
4 Eyes Skin Assessment     The patient is being assess for  Shift Handoff    I agree that 2 RN's have performed a thorough Head to Toe Skin Assessment on the patient. ALL assessment sites listed below have been assessed. Areas assessed by both nurses: angelo/  [x]   Head, Face, and Ears   [x]   Shoulders, Back, and Chest  [x]   Arms, Elbows, and Hands   [x]   Coccyx, Sacrum, and IschIum  [x]   Legs, Feet, and Heels        Does the Patient have Skin Breakdown?   Yes LDA WOUND CARE was Initiated documentation include the Ginny-wound, Wound Assessment, Measurements, Dressing Treatment, Drainage, and Color\",         Nigel Prevention initiated:  Yes   Wound Care Orders initiated:  Yes      [id number]  nurse consulted for Pressure Injury (Stage 3,4, Unstageable, DTI, NWPT, and Complex wounds), New and Established Ostomies:  No    Nurse 1 eSignature: Electronically signed by Michael Scott RN on 8/9/20 at 7:00 PM EDT    **SHARE this note so that the co-signing nurse is able to place an eSignature**    Nurse 2 eSignature: Electronically signed by Xochitl Ramirez RN on 8/10/20 at 2:39 AM EDT

## 2020-08-10 ENCOUNTER — ANESTHESIA (OUTPATIENT)
Dept: OPERATING ROOM | Age: 70
DRG: 853 | End: 2020-08-10
Payer: COMMERCIAL

## 2020-08-10 ENCOUNTER — APPOINTMENT (OUTPATIENT)
Dept: GENERAL RADIOLOGY | Age: 70
DRG: 853 | End: 2020-08-10
Payer: COMMERCIAL

## 2020-08-10 VITALS
TEMPERATURE: 96.8 F | SYSTOLIC BLOOD PRESSURE: 89 MMHG | DIASTOLIC BLOOD PRESSURE: 61 MMHG | OXYGEN SATURATION: 94 % | RESPIRATION RATE: 11 BRPM

## 2020-08-10 LAB
ALBUMIN SERPL-MCNC: 2.8 G/DL (ref 3.4–5)
ANION GAP SERPL CALCULATED.3IONS-SCNC: 11 MMOL/L (ref 3–16)
BASE EXCESS ARTERIAL: 14.8 MMOL/L (ref -3–3)
BUN BLDV-MCNC: 41 MG/DL (ref 7–20)
CALCIUM SERPL-MCNC: 8.8 MG/DL (ref 8.3–10.6)
CARBOXYHEMOGLOBIN ARTERIAL: 1 % (ref 0–1.5)
CHLORIDE BLD-SCNC: 97 MMOL/L (ref 99–110)
CO2: 33 MMOL/L (ref 21–32)
CREAT SERPL-MCNC: 1.2 MG/DL (ref 0.6–1.2)
EKG ATRIAL RATE: 312 BPM
EKG DIAGNOSIS: NORMAL
EKG Q-T INTERVAL: 314 MS
EKG QRS DURATION: 88 MS
EKG QTC CALCULATION (BAZETT): 467 MS
EKG R AXIS: 70 DEGREES
EKG T AXIS: -59 DEGREES
EKG VENTRICULAR RATE: 133 BPM
GFR AFRICAN AMERICAN: 54
GFR NON-AFRICAN AMERICAN: 45
GLUCOSE BLD-MCNC: 124 MG/DL (ref 70–99)
GLUCOSE BLD-MCNC: 124 MG/DL (ref 70–99)
GLUCOSE BLD-MCNC: 135 MG/DL (ref 70–99)
HCO3 ARTERIAL: 39.3 MMOL/L (ref 21–29)
HCT VFR BLD CALC: 44.5 % (ref 36–48)
HEMOGLOBIN, ART, EXTENDED: 13.7 G/DL (ref 12–16)
HEMOGLOBIN: 13.7 G/DL (ref 12–16)
MAGNESIUM: 1.9 MG/DL (ref 1.8–2.4)
MCH RBC QN AUTO: 23.9 PG (ref 26–34)
MCHC RBC AUTO-ENTMCNC: 30.8 G/DL (ref 31–36)
MCV RBC AUTO: 77.6 FL (ref 80–100)
METHEMOGLOBIN ARTERIAL: 0 %
O2 CONTENT ARTERIAL: 19 ML/DL
O2 SAT, ARTERIAL: 98.4 %
O2 THERAPY: ABNORMAL
PCO2 ARTERIAL: 46 MMHG (ref 35–45)
PDW BLD-RTO: 19.7 % (ref 12.4–15.4)
PERFORMED ON: ABNORMAL
PERFORMED ON: ABNORMAL
PH ARTERIAL: 7.54 (ref 7.35–7.45)
PHOSPHORUS: 2.2 MG/DL (ref 2.5–4.9)
PLATELET # BLD: 169 K/UL (ref 135–450)
PMV BLD AUTO: 7.4 FL (ref 5–10.5)
PO2 ARTERIAL: 83.2 MMHG (ref 75–108)
POTASSIUM SERPL-SCNC: 4.1 MMOL/L (ref 3.5–5.1)
RBC # BLD: 5.73 M/UL (ref 4–5.2)
RESPIRATORY PANEL PCR: NORMAL
SODIUM BLD-SCNC: 141 MMOL/L (ref 136–145)
TCO2 ARTERIAL: 40.7 MMOL/L
WBC # BLD: 10.9 K/UL (ref 4–11)

## 2020-08-10 PROCEDURE — 85027 COMPLETE CBC AUTOMATED: CPT

## 2020-08-10 PROCEDURE — 2700000000 HC OXYGEN THERAPY PER DAY

## 2020-08-10 PROCEDURE — 6370000000 HC RX 637 (ALT 250 FOR IP): Performed by: INTERNAL MEDICINE

## 2020-08-10 PROCEDURE — 3209999900 FLUORO FOR SURGICAL PROCEDURES

## 2020-08-10 PROCEDURE — 2500000003 HC RX 250 WO HCPCS: Performed by: INTERNAL MEDICINE

## 2020-08-10 PROCEDURE — 0QS736Z REPOSITION LEFT UPPER FEMUR WITH INTRAMEDULLARY INTERNAL FIXATION DEVICE, PERCUTANEOUS APPROACH: ICD-10-PCS | Performed by: ORTHOPAEDIC SURGERY

## 2020-08-10 PROCEDURE — 99291 CRITICAL CARE FIRST HOUR: CPT | Performed by: INTERNAL MEDICINE

## 2020-08-10 PROCEDURE — 6360000002 HC RX W HCPCS: Performed by: INTERNAL MEDICINE

## 2020-08-10 PROCEDURE — 2780000010 HC IMPLANT OTHER: Performed by: ORTHOPAEDIC SURGERY

## 2020-08-10 PROCEDURE — 94003 VENT MGMT INPAT SUBQ DAY: CPT

## 2020-08-10 PROCEDURE — 27245 TREAT THIGH FRACTURE: CPT | Performed by: ORTHOPAEDIC SURGERY

## 2020-08-10 PROCEDURE — 94640 AIRWAY INHALATION TREATMENT: CPT

## 2020-08-10 PROCEDURE — 71045 X-RAY EXAM CHEST 1 VIEW: CPT

## 2020-08-10 PROCEDURE — 94761 N-INVAS EAR/PLS OXIMETRY MLT: CPT

## 2020-08-10 PROCEDURE — 2580000003 HC RX 258: Performed by: INTERNAL MEDICINE

## 2020-08-10 PROCEDURE — 93010 ELECTROCARDIOGRAM REPORT: CPT | Performed by: INTERNAL MEDICINE

## 2020-08-10 PROCEDURE — 2720000010 HC SURG SUPPLY STERILE: Performed by: ORTHOPAEDIC SURGERY

## 2020-08-10 PROCEDURE — 94750 HC PULMONARY COMPLIANCE STUDY: CPT

## 2020-08-10 PROCEDURE — C1769 GUIDE WIRE: HCPCS | Performed by: ORTHOPAEDIC SURGERY

## 2020-08-10 PROCEDURE — 2580000003 HC RX 258: Performed by: ORTHOPAEDIC SURGERY

## 2020-08-10 PROCEDURE — 3600000014 HC SURGERY LEVEL 4 ADDTL 15MIN: Performed by: ORTHOPAEDIC SURGERY

## 2020-08-10 PROCEDURE — 36600 WITHDRAWAL OF ARTERIAL BLOOD: CPT

## 2020-08-10 PROCEDURE — 2709999900 HC NON-CHARGEABLE SUPPLY: Performed by: ORTHOPAEDIC SURGERY

## 2020-08-10 PROCEDURE — 82803 BLOOD GASES ANY COMBINATION: CPT

## 2020-08-10 PROCEDURE — 99232 SBSQ HOSP IP/OBS MODERATE 35: CPT | Performed by: ORTHOPAEDIC SURGERY

## 2020-08-10 PROCEDURE — 36592 COLLECT BLOOD FROM PICC: CPT

## 2020-08-10 PROCEDURE — 3700000000 HC ANESTHESIA ATTENDED CARE: Performed by: ORTHOPAEDIC SURGERY

## 2020-08-10 PROCEDURE — 99221 1ST HOSP IP/OBS SF/LOW 40: CPT | Performed by: NURSE PRACTITIONER

## 2020-08-10 PROCEDURE — C1713 ANCHOR/SCREW BN/BN,TIS/BN: HCPCS | Performed by: ORTHOPAEDIC SURGERY

## 2020-08-10 PROCEDURE — 73502 X-RAY EXAM HIP UNI 2-3 VIEWS: CPT

## 2020-08-10 PROCEDURE — 2580000003 HC RX 258: Performed by: NURSE ANESTHETIST, CERTIFIED REGISTERED

## 2020-08-10 PROCEDURE — 2500000003 HC RX 250 WO HCPCS: Performed by: ORTHOPAEDIC SURGERY

## 2020-08-10 PROCEDURE — 3700000001 HC ADD 15 MINUTES (ANESTHESIA): Performed by: ORTHOPAEDIC SURGERY

## 2020-08-10 PROCEDURE — 3600000004 HC SURGERY LEVEL 4 BASE: Performed by: ORTHOPAEDIC SURGERY

## 2020-08-10 PROCEDURE — C9113 INJ PANTOPRAZOLE SODIUM, VIA: HCPCS | Performed by: INTERNAL MEDICINE

## 2020-08-10 PROCEDURE — 83735 ASSAY OF MAGNESIUM: CPT

## 2020-08-10 PROCEDURE — 80069 RENAL FUNCTION PANEL: CPT

## 2020-08-10 PROCEDURE — 2000000000 HC ICU R&B

## 2020-08-10 PROCEDURE — 2500000003 HC RX 250 WO HCPCS: Performed by: NURSE ANESTHETIST, CERTIFIED REGISTERED

## 2020-08-10 DEVICE — SCREW BNE L36MM DIA5MM TIB LT GRN TI ST CANN LOK FULL THRD: Type: IMPLANTABLE DEVICE | Site: HIP | Status: FUNCTIONAL

## 2020-08-10 DEVICE — NAIL IM L235MM DIA11MM 130DEG SHT L PROX FEM GRN TI CANN: Type: IMPLANTABLE DEVICE | Site: HIP | Status: FUNCTIONAL

## 2020-08-10 DEVICE — IMPLANTABLE DEVICE: Type: IMPLANTABLE DEVICE | Site: HIP | Status: FUNCTIONAL

## 2020-08-10 RX ORDER — PROPOFOL 10 MG/ML
12 INJECTION, EMULSION INTRAVENOUS CONTINUOUS PRN
Status: DISCONTINUED | OUTPATIENT
Start: 2020-08-10 | End: 2020-08-12

## 2020-08-10 RX ORDER — AMMONIUM LACTATE 12 G/100G
LOTION TOPICAL PRN
Status: DISCONTINUED | OUTPATIENT
Start: 2020-08-10 | End: 2020-08-18 | Stop reason: HOSPADM

## 2020-08-10 RX ORDER — ROCURONIUM BROMIDE 10 MG/ML
INJECTION, SOLUTION INTRAVENOUS PRN
Status: DISCONTINUED | OUTPATIENT
Start: 2020-08-10 | End: 2020-08-10 | Stop reason: SDUPTHER

## 2020-08-10 RX ORDER — BUPIVACAINE HYDROCHLORIDE AND EPINEPHRINE 2.5; 5 MG/ML; UG/ML
INJECTION, SOLUTION INFILTRATION; PERINEURAL
Status: COMPLETED | OUTPATIENT
Start: 2020-08-10 | End: 2020-08-10

## 2020-08-10 RX ORDER — MAGNESIUM HYDROXIDE 1200 MG/15ML
LIQUID ORAL CONTINUOUS PRN
Status: COMPLETED | OUTPATIENT
Start: 2020-08-10 | End: 2020-08-10

## 2020-08-10 RX ORDER — SODIUM CHLORIDE 9 MG/ML
INJECTION, SOLUTION INTRAVENOUS CONTINUOUS PRN
Status: DISCONTINUED | OUTPATIENT
Start: 2020-08-10 | End: 2020-08-10 | Stop reason: SDUPTHER

## 2020-08-10 RX ORDER — POLYVINYL ALCOHOL 14 MG/ML
1 SOLUTION/ DROPS OPHTHALMIC PRN
Status: DISCONTINUED | OUTPATIENT
Start: 2020-08-10 | End: 2020-08-18 | Stop reason: HOSPADM

## 2020-08-10 RX ORDER — KETAMINE HCL IN NACL, ISO-OSM 100MG/10ML
SYRINGE (ML) INJECTION PRN
Status: DISCONTINUED | OUTPATIENT
Start: 2020-08-10 | End: 2020-08-10 | Stop reason: SDUPTHER

## 2020-08-10 RX ADMIN — Medication 25 MCG/HR: at 00:50

## 2020-08-10 RX ADMIN — Medication 20 MG: at 15:30

## 2020-08-10 RX ADMIN — PROPOFOL 30 MCG/KG/MIN: 10 INJECTION, EMULSION INTRAVENOUS at 10:34

## 2020-08-10 RX ADMIN — BUDESONIDE AND FORMOTEROL FUMARATE DIHYDRATE 2 PUFF: 160; 4.5 AEROSOL RESPIRATORY (INHALATION) at 19:44

## 2020-08-10 RX ADMIN — PANTOPRAZOLE SODIUM 40 MG: 40 INJECTION, POWDER, FOR SOLUTION INTRAVENOUS at 08:04

## 2020-08-10 RX ADMIN — ROCURONIUM BROMIDE 50 MG: 10 INJECTION INTRAVENOUS at 14:56

## 2020-08-10 RX ADMIN — DIBASIC SODIUM PHOSPHATE, MONOBASIC POTASSIUM PHOSPHATE AND MONOBASIC SODIUM PHOSPHATE 1 TABLET: 852; 155; 130 TABLET ORAL at 08:05

## 2020-08-10 RX ADMIN — GABAPENTIN 300 MG: 300 CAPSULE ORAL at 08:05

## 2020-08-10 RX ADMIN — IPRATROPIUM BROMIDE AND ALBUTEROL SULFATE 1 AMPULE: .5; 3 SOLUTION RESPIRATORY (INHALATION) at 16:57

## 2020-08-10 RX ADMIN — FUROSEMIDE 40 MG: 10 INJECTION, SOLUTION INTRAMUSCULAR; INTRAVENOUS at 08:04

## 2020-08-10 RX ADMIN — IPRATROPIUM BROMIDE AND ALBUTEROL SULFATE 1 AMPULE: .5; 3 SOLUTION RESPIRATORY (INHALATION) at 19:43

## 2020-08-10 RX ADMIN — ANORECTAL OINTMENT 4 EACH: 15.7; .44; 24; 20.6 OINTMENT TOPICAL at 10:32

## 2020-08-10 RX ADMIN — PIPERACILLIN AND TAZOBACTAM 3.38 G: 3; .375 INJECTION, POWDER, LYOPHILIZED, FOR SOLUTION INTRAVENOUS at 10:34

## 2020-08-10 RX ADMIN — SODIUM CHLORIDE: 9 INJECTION, SOLUTION INTRAVENOUS at 15:58

## 2020-08-10 RX ADMIN — Medication 10 ML: at 08:06

## 2020-08-10 RX ADMIN — DIBASIC SODIUM PHOSPHATE, MONOBASIC POTASSIUM PHOSPHATE AND MONOBASIC SODIUM PHOSPHATE 1 TABLET: 852; 155; 130 TABLET ORAL at 20:31

## 2020-08-10 RX ADMIN — Medication 10 ML: at 08:04

## 2020-08-10 RX ADMIN — PIPERACILLIN AND TAZOBACTAM 3.38 G: 3; .375 INJECTION, POWDER, LYOPHILIZED, FOR SOLUTION INTRAVENOUS at 17:04

## 2020-08-10 RX ADMIN — ANORECTAL OINTMENT 3 EACH: 15.7; .44; 24; 20.6 OINTMENT TOPICAL at 08:05

## 2020-08-10 RX ADMIN — ROCURONIUM BROMIDE 50 MG: 10 INJECTION INTRAVENOUS at 16:21

## 2020-08-10 RX ADMIN — GABAPENTIN 300 MG: 300 CAPSULE ORAL at 20:31

## 2020-08-10 RX ADMIN — PREDNISONE 40 MG: 20 TABLET ORAL at 08:05

## 2020-08-10 RX ADMIN — BUDESONIDE AND FORMOTEROL FUMARATE DIHYDRATE 2 PUFF: 160; 4.5 AEROSOL RESPIRATORY (INHALATION) at 07:21

## 2020-08-10 RX ADMIN — ATORVASTATIN CALCIUM 40 MG: 40 TABLET, FILM COATED ORAL at 08:05

## 2020-08-10 RX ADMIN — Medication 10 ML: at 20:23

## 2020-08-10 RX ADMIN — Medication 50 MCG/HR: at 14:06

## 2020-08-10 RX ADMIN — PROPOFOL 20 MCG/KG/MIN: 10 INJECTION, EMULSION INTRAVENOUS at 05:04

## 2020-08-10 RX ADMIN — GABAPENTIN 300 MG: 300 CAPSULE ORAL at 14:06

## 2020-08-10 RX ADMIN — PROPOFOL 30 MCG/KG/MIN: 10 INJECTION, EMULSION INTRAVENOUS at 14:07

## 2020-08-10 RX ADMIN — SODIUM CHLORIDE: 9 INJECTION, SOLUTION INTRAVENOUS at 14:48

## 2020-08-10 RX ADMIN — ENOXAPARIN SODIUM 40 MG: 40 INJECTION SUBCUTANEOUS at 20:31

## 2020-08-10 RX ADMIN — IPRATROPIUM BROMIDE AND ALBUTEROL SULFATE 1 AMPULE: .5; 3 SOLUTION RESPIRATORY (INHALATION) at 07:21

## 2020-08-10 RX ADMIN — PROPOFOL 30 MCG/KG/MIN: 10 INJECTION, EMULSION INTRAVENOUS at 21:33

## 2020-08-10 RX ADMIN — PIPERACILLIN AND TAZOBACTAM 3.38 G: 3; .375 INJECTION, POWDER, LYOPHILIZED, FOR SOLUTION INTRAVENOUS at 01:47

## 2020-08-10 RX ADMIN — AZITHROMYCIN MONOHYDRATE 250 MG: 250 TABLET ORAL at 08:05

## 2020-08-10 RX ADMIN — Medication 10 MG: at 15:56

## 2020-08-10 RX ADMIN — CHLORHEXIDINE GLUCONATE 15 ML: 1.2 RINSE ORAL at 20:22

## 2020-08-10 RX ADMIN — SODIUM PHOSPHATE, MONOBASIC, MONOHYDRATE 20 MMOL: 276; 142 INJECTION, SOLUTION INTRAVENOUS at 13:43

## 2020-08-10 RX ADMIN — CHLORHEXIDINE GLUCONATE 15 ML: 1.2 RINSE ORAL at 08:07

## 2020-08-10 RX ADMIN — IPRATROPIUM BROMIDE AND ALBUTEROL SULFATE 1 AMPULE: .5; 3 SOLUTION RESPIRATORY (INHALATION) at 12:06

## 2020-08-10 RX ADMIN — FUROSEMIDE 40 MG: 10 INJECTION, SOLUTION INTRAMUSCULAR; INTRAVENOUS at 17:03

## 2020-08-10 ASSESSMENT — PULMONARY FUNCTION TESTS
PIF_VALUE: 26
PIF_VALUE: 25
PIF_VALUE: 2
PIF_VALUE: 26
PIF_VALUE: 25
PIF_VALUE: 23
PIF_VALUE: 25
PIF_VALUE: 24
PIF_VALUE: 26
PIF_VALUE: 25
PIF_VALUE: 21
PIF_VALUE: 26
PIF_VALUE: 27
PIF_VALUE: 22
PIF_VALUE: 26
PIF_VALUE: 23
PIF_VALUE: 26
PIF_VALUE: 22
PIF_VALUE: 24
PIF_VALUE: 27
PIF_VALUE: 21
PIF_VALUE: 27
PIF_VALUE: 24
PIF_VALUE: 23
PIF_VALUE: 25
PIF_VALUE: 25
PIF_VALUE: 26
PIF_VALUE: 22
PIF_VALUE: 26
PIF_VALUE: 25
PIF_VALUE: 23
PIF_VALUE: 25
PIF_VALUE: 23
PIF_VALUE: 26
PIF_VALUE: 25
PIF_VALUE: 27
PIF_VALUE: 25
PIF_VALUE: 26
PIF_VALUE: 25
PIF_VALUE: 22
PIF_VALUE: 21
PIF_VALUE: 27
PIF_VALUE: 26
PIF_VALUE: 25
PIF_VALUE: 25
PIF_VALUE: 23
PIF_VALUE: 25
PIF_VALUE: 23
PIF_VALUE: 25
PIF_VALUE: 26
PIF_VALUE: 26
PIF_VALUE: 25
PIF_VALUE: 25
PIF_VALUE: 28
PIF_VALUE: 20
PIF_VALUE: 26
PIF_VALUE: 24
PIF_VALUE: 29
PIF_VALUE: 26
PIF_VALUE: 21
PIF_VALUE: 25
PIF_VALUE: 23
PIF_VALUE: 25
PIF_VALUE: 26
PIF_VALUE: 26
PIF_VALUE: 25
PIF_VALUE: 25
PIF_VALUE: 3
PIF_VALUE: 27
PIF_VALUE: 26
PIF_VALUE: 22
PIF_VALUE: 23
PIF_VALUE: 25
PIF_VALUE: 23
PIF_VALUE: 25
PIF_VALUE: 26
PIF_VALUE: 25
PIF_VALUE: 29
PIF_VALUE: 25
PIF_VALUE: 25
PIF_VALUE: 16
PIF_VALUE: 24
PIF_VALUE: 26
PIF_VALUE: 27
PIF_VALUE: 25
PIF_VALUE: 26
PIF_VALUE: 24
PIF_VALUE: 26
PIF_VALUE: 25
PIF_VALUE: 26
PIF_VALUE: 24
PIF_VALUE: 23
PIF_VALUE: 22
PIF_VALUE: 24
PIF_VALUE: 26
PIF_VALUE: 26
PIF_VALUE: 24
PIF_VALUE: 26
PIF_VALUE: 25
PIF_VALUE: 25
PIF_VALUE: 26
PIF_VALUE: 27
PIF_VALUE: 25
PIF_VALUE: 9
PIF_VALUE: 24
PIF_VALUE: 26
PIF_VALUE: 26
PIF_VALUE: 25
PIF_VALUE: 23
PIF_VALUE: 26
PIF_VALUE: 25
PIF_VALUE: 27
PIF_VALUE: 3
PIF_VALUE: 23
PIF_VALUE: 26
PIF_VALUE: 23
PIF_VALUE: 22
PIF_VALUE: 23
PIF_VALUE: 24
PIF_VALUE: 26
PIF_VALUE: 24
PIF_VALUE: 25
PIF_VALUE: 26
PIF_VALUE: 25
PIF_VALUE: 27
PIF_VALUE: 25
PIF_VALUE: 26
PIF_VALUE: 26
PIF_VALUE: 25

## 2020-08-10 ASSESSMENT — COPD QUESTIONNAIRES: CAT_SEVERITY: SEVERE

## 2020-08-10 ASSESSMENT — ENCOUNTER SYMPTOMS: SHORTNESS OF BREATH: 1

## 2020-08-10 ASSESSMENT — PAIN SCALES - GENERAL: PAINLEVEL_OUTOF10: 0

## 2020-08-10 NOTE — PROGRESS NOTES
ORTHOPAEDIC PROGRESS NOTE    Chief Complaint   Patient presents with    Back Pain     mechanical fall at home. +hypoxic on arrival. hx of COPD. does not normally wear O2. on 3L nc on arrival. room air O2 sat 76%.      Leg Pain     left leg pain        HPI  Patient seen at bedside in the ICU  Intubated, but easily awoken  Cannot perform ROS at this time      Past Medical History:   Diagnosis Date    Chronic kidney disease     COPD (chronic obstructive pulmonary disease) ([facility] [address]     Hyperlipidemia     Hypertension        Past Surgical History:   Procedure Laterality Date    CHOLECYSTECTOMY         Social History     Socioeconomic History    Marital status:      Spouse name: Not on file    Number of children: Not on file    Years of education: Not on file    Highest education level: Not on file   Occupational History    Not on file   Social Needs    Financial resource strain: Not on file    Food insecurity     Worry: Not on file     Inability: Not on file    Transportation needs     Medical: Not on file     Non-medical: Not on file   Tobacco Use    Smoking status: Current Every Day Smoker     Packs/day: 1.50     Types: Cigarettes    Smokeless tobacco: Never Used    Tobacco comment: pack n half a day    Substance and Sexual Activity    Alcohol use: Never     Frequency: Never    Drug use: Yes     Types: Marijuana     Comment: occasional     Sexual activity: Not on file   Lifestyle    Physical activity     Days per week: Not on file     Minutes per session: Not on file    Stress: Not on file   Relationships    Social connections     Talks on phone: Not on file     Gets together: Not on file     Attends Mosque service: Not on file     Active member of club or organization: Not on file     Attends meetings of clubs or organizations: Not on file     Relationship status: Not on file    Intimate partner violence     Fear of current or ex partner: Not on file     Emotionally abused: Not on file Physically abused: Not on file     Forced sexual activity: Not on file   Other Topics Concern    Not on file   Social History Narrative    Not on file       Current Facility-Administered Medications   Medication Dose Route Frequency Provider Last Rate Last Dose    polyvinyl alcohol (LIQUIFILM TEARS) 1.4 % ophthalmic solution 1 drop  1 drop Both Eyes PRN Komal Marie MD        phosphorus (K PHOS NEUTRAL) tablet 1 tablet  250 mg Oral BID Alcides Daley MD   1 tablet at 08/10/20 0805    ammonium lactate (LAC-HYDRIN) 12 % lotion   Topical PRN Alcides Daley MD        propofol injection 120 mg  12 mL Intravenous Continuous PRN Henrry Stallings MD        sodium phosphate 20 mmol in dextrose 5 % 250 mL IVPB  20 mmol Intravenous Once Henrry Stallings MD        propofol injection  10 mcg/kg/min Intravenous Titrated Trevor Arauz MD 21.4 mL/hr at 08/10/20 1034 30 mcg/kg/min at 08/10/20 1034    fentaNYL 10 mcg/mL infusion  25 mcg/hr Intravenous Continuous Henrry Stallings MD 5 mL/hr at 08/10/20 0733 50 mcg/hr at 08/10/20 0733    fentaNYL (SUBLIMAZE) injection 25 mcg  25 mcg Intravenous Q1H PRN Trevor Arauz MD        menthol-zinc oxide (CALMOSEPTINE) 0.44-20.6 % ointment 1-4 each  1-4 each Topical 4x Daily PRN Cloretchon Mckinney MD   4 each at 08/10/20 1032    pantoprazole (PROTONIX) injection 40 mg  40 mg Intravenous Daily Dioni Hurley MD   40 mg at 08/10/20 0804    And    sodium chloride (PF) 0.9 % injection 10 mL  10 mL Intravenous Daily Dioni Hurley MD   10 mL at 08/10/20 0804    chlorhexidine (PERIDEX) 0.12 % solution 15 mL  15 mL Mouth/Throat BID Dioni Hurley MD   15 mL at 08/10/20 0807    sodium chloride flush 0.9 % injection 10 mL  10 mL Intravenous 2 times per day Dioni Hurley MD   10 mL at 08/10/20 0806    sodium chloride flush 0.9 % injection 10 mL  10 mL Intravenous PRN Dioni Hurley MD        furosemide (LASIX) injection 40 mg  40 mg Intravenous BID Lionel SUÁREZ Xiomara Cohen MD   40 mg at 08/10/20 0804    piperacillin-tazobactam (ZOSYN) 3.375 g in dextrose 5 % 100 mL IVPB extended infusion (mini-bag)  3.375 g Intravenous Q8H Ebony Thapa MD 25 mL/hr at 08/10/20 1034 3.375 g at 08/10/20 1034    predniSONE (DELTASONE) tablet 40 mg  40 mg Oral Daily Yair Martins MD   40 mg at 08/10/20 0805    enoxaparin (LOVENOX) injection 40 mg  40 mg Subcutaneous BID Robel Ramos MD   40 mg at 08/09/20 2107    budesonide-formoterol (SYMBICORT) 160-4.5 MCG/ACT inhaler 2 puff  2 puff Inhalation BID Robel Ramos MD   2 puff at 08/10/20 7946    gabapentin (NEURONTIN) capsule 300 mg  300 mg Oral TID Robel Ramos MD   300 mg at 08/10/20 0805    atorvastatin (LIPITOR) tablet 40 mg  40 mg Oral Daily Robel Ramos MD   40 mg at 08/10/20 0805    acetaminophen (TYLENOL) tablet 650 mg  650 mg Oral Q6H PRN Robel Ramos MD        Or    acetaminophen (TYLENOL) suppository 650 mg  650 mg Rectal Q6H PRN Robel Ramos MD        polyethylene glycol [facility]) packet 17 g  17 g Oral Daily PRN Robel Ramos MD        promethazine (PHENERGAN) tablet 12.5 mg  12.5 mg Oral Q6H PRN Robel Ramos MD        Or    ondansetron [facility]) injection 4 mg  4 mg Intravenous Q6H PRN Robel Ramos MD        ipratropium-albuterol (DUONEB) nebulizer solution 1 ampule  1 ampule Inhalation Q4H WA Robel Ramos MD   1 ampule at 08/10/20 1206        Vitals:    08/10/20 1139 08/10/20 1200 08/10/20 1206 08/10/20 1208   BP: (!) 145/69 123/67     Pulse: 71 71 71    Resp: 20 20 20 20   Temp: 98.6 °F (37 °C)      TempSrc: Axillary      SpO2: 98%      Weight:       Height:           Physical Exam:  Body mass index is 39.64 kg/m².   NAD, sleeping  Intubated, cannot test orientation  DP pulse intact, RRR, + peripheral edema  HEENT - normocephalic and atraumatic  Abdomen - soft NDNT, protuberant  Neuro - EHL/FHL/TA/GS intact   Nods her head when asked if she can feel me touching the dorsal and plantar aspects of her left foot  LLE - TTP hip   + log roll   Does not appear in pain with palpation of knee, leg, ankle, foot      Imaging:  Images were personally reviewed by myself and discussed with the patient  Narrative    EXAMINATION:    [address] [place] [address] Drive, [address]; ONE XRAY VIEW OF THE PELVIS AND TWO    XRAY VIEWS LEFT HIP         8/6/2020 12:07 pm         COMPARISON:    None.         HISTORY:    ORDERING SYSTEM PROVIDED HISTORY: s/p fall with head trauma, c/o headache,    lower back pain, left hip pain and left thigh pain    TECHNOLOGIST PROVIDED HISTORY:    Reason for exam:->s/p fall with head trauma, c/o headache, lower back pain,    left hip pain and left thigh pain    Reason for Exam: fall,  pain hip,  pt extremely obese and immovable; ORDERING    SYSTEM PROVIDED HISTORY: s/p fall with head trauma, c/o headache, left hip    pain and left thigh pain    TECHNOLOGIST PROVIDED HISTORY:    Reason for exam:->s/p fall with head trauma, c/o headache, left hip pain and    left thigh pain    Reason for Exam: fall,  pain hip,  pt extremely obese and immovable         FINDINGS:    Very limited evaluation of the lumbar spine due to patient body habitus. Imaging was performed using a cross-table lateral technique and penetration    is suboptimal for characterization of the lumbar spine.  No obvious fractures    on AP imaging.  There is moderate degenerative disc disease and spondylosis    throughout the lumbar spine.         There is severe degenerative change in the left hip joint with near complete    loss of joint space, sclerotic change of the femoral head and acetabulum and    flattening of normal sphericity of the femoral head.  Moderate degenerative    change on the right.  There is a mildly displaced intertrochanteric fracture    of the left proximal femur.  No surrounding soft tissue abnormality.              Impression    1. Moderately displaced intertrochanteric fracture of the proximal left femur.     2. Severe degenerative change in the left hip with moderate degenerative    change on the right. 3. Technically limited evaluation of the lumbar spine due to patient body    habitus. Labs:  Reviewed    SARS-CoV-2, NAAT   COVID-19   Collected:  08/06/20 1419    Result status:  Final    Resulting lab:  [facility]    Reference range:  Not Detected    Value:  Not Detected      8/10/2020  5:55 AM - Bonilla Garsia Incoming Lab Results From Soft (Epic Adt)     Component  Value  Ref Range & Units  Status  Collected  Lab    WBC  10.9  4.0 - 11.0 K/uL  Final  08/10/2020  5:20 AM  [facility]    RBC  5. 73High    4.00 - 5.20 M/uL  Final  08/10/2020  5:20 AM  [facility]    Hemoglobin  13.7  12.0 - 16.0 g/dL  Final  08/10/2020  5:20 AM  [address] Lab    Hematocrit  44.5  36.0 - 48.0 %  Final  08/10/2020  5:20 AM  [facility]    MCV  77.6Low    80.0 - 100.0 fL  Final  08/10/2020  5:20 AM  [facility]    [facility]  23.9Low    26.0 - 34.0 pg  Final  08/10/2020  5:20 AM  [facility]    MCHC  30.8Low    31.0 - 36.0 g/dL  Final  08/10/2020  5:20 AM  [facility]    RDW  19.7High    12.4 - 15.4 %  Final  08/10/2020  5:20 AM  [facility]    Platelets  776  500 - 450 K/uL  Final  08/10/2020  5:20 AM  [facility]    MPV  7.4  5.0 - 10.5 fL  Final  08/10/2020  5:20 AM  [facility]      INR 1.02  APTT 31.7      Assessment & Plan:  78 y.o. female seen for left intertrochanteric hip fracture  Left hip osteoarthritis, severe  Morbid obesity  COPD  Tobacco abuse  Pneumonia - azithromycin and zosyn  Acute hypercapnic respiratory failure  Acute hypoxemic respiratory failure  Mechanical ventilation  Atrial fibrillation with RVR yesterday, resolved with Cardizem gtt - cleared by cardiology today    I have discussed with the patient's  Abrahan the severity of hip fractures in the elderly, including its association with high morbidity and mortality in both short- and long-term follow-up. Factors that are associated with even worse outcomes include altered mental status, dementia, poor baseline functional status, poor pulmonary function, and multiple medical comorbidities. Surgery is generally performed as soon as possible after medical clearance to minimize perioperative complications, including pneumonia, DVT/PE, urinary tract infections, delirium, pressure sores. Risks and benefits of left hip fracture fixation with cephalomedullary nail were discussed at length with the patient's  and an opportunity for questions was afforded. Possible complications of this fracture and surgery include, but are not limited to, non-union, malunion, dislocation, leg-length discrepancy, loss of reduction, bleeding, infection, persistent pain, weakness, blood clots, hardware failure, periprosthetic fracture, painful hardware, neurovascular injury, numbness around the incision, and wound healing problems. He demonstrated a good understanding of the procedure, anticipated outcomes, possible complications, the post-operative restrictions and therapy required (including possible stay at a rehabilitation facility/skilled nursing facility), and at this time voiced desire to proceed. He is aware of high risk for poor outcome given her medical and pulmonary comorbidities. An informed consent form was signed and the patient will proceed with surgery later today. I discussed with the patient's  the negative consequences of tobacco use in relation to overall general health, but also surgical healing (wound, soft tissue/tendon/ligament, bone), and how tobacco use is associated with worse pain and worse functional outcomes. Briefly described cessation techniques, including gum, patches, and even medications such as Chantix. I have advised the patient to discuss with their primary care physician.     Ana Cristina Sahu

## 2020-08-10 NOTE — PROGRESS NOTES
Pulmonary Progress Note    Date of Admission: 8/6/2020   LOS: 4 days     CC:  Chief Complaint   Patient presents with    Back Pain     mechanical fall at home. +hypoxic on arrival. hx of COPD. does not normally wear O2. on 3L nc on arrival. room air O2 sat 76%.  Leg Pain     left leg pain        HPI/Subjective  Intubated, ventilator requirements decreasing. FiO2 of 50%, PEEP of 10. Plan for or today    ROS:   Unable to obtain due to mechanical ventilation      Intake/Output Summary (Last 24 hours) at 8/10/2020 0899  Last data filed at 8/10/2020 0649  Gross per 24 hour   Intake 810.3 ml   Output 2875 ml   Net -2064.7 ml         PHYSICAL EXAM:   Blood pressure 128/63, pulse 72, temperature 97.9 °F (36.6 °C), resp. rate 20, height 5' 7\" (1.702 m), weight 253 lb 1.4 oz (114.8 kg), SpO2 96 %.'  Gen:  No acute distress. Eyes: PERRL. Anicteric sclera. No conjunctival injection. ENT: No discharge. Pharynx with ET tube. External appearance of ears and nose normal.  Neck: Trachea midline. No mass   Resp:  No crackles. No wheezes. No rhonchi. No dullness on percussion. CV: Regular rate. Regular rhythm. No murmur or rub. No edema. GI: Soft, Non-tender. Non-distended. +BS  Skin: Warm, dry, w/o erythema. Lymph: No cervical or supraclavicular LAD. M/S: No cyanosis. No clubbing.     Neuro: Intubated and sedated    Medications:    Scheduled Meds:   phosphorus  250 mg Oral BID    pantoprazole  40 mg Intravenous Daily    And    sodium chloride (PF)  10 mL Intravenous Daily    chlorhexidine  15 mL Mouth/Throat BID    sodium chloride flush  10 mL Intravenous 2 times per day    furosemide  40 mg Intravenous BID    piperacillin-tazobactam  3.375 g Intravenous Q8H    predniSONE  40 mg Oral Daily    enoxaparin  40 mg Subcutaneous BID    budesonide-formoterol  2 puff Inhalation BID    gabapentin  300 mg Oral TID    atorvastatin  40 mg Oral Daily    ipratropium-albuterol  1 ampule Inhalation Q4H WA mediastinal contours and pulmonary vascularity are within  normal limits. The pleural spaces are clear. Impression IMPRESSION: COPD. CXR portable:   Results for orders placed during the hospital encounter of 08/06/20   XR CHEST PORTABLE    Narrative EXAMINATION:  ONE XRAY VIEW OF THE CHEST    8/10/2020 4:10 am    COMPARISON:  08/09/2020 radiograph    HISTORY:  ORDERING SYSTEM PROVIDED HISTORY: respiratory failure  TECHNOLOGIST PROVIDED HISTORY:  Reason for exam:->respiratory failure  Reason for Exam: respiratory failure    FINDINGS:  Supportive devices are stable. The heart is enlarged. Moderate vascular  congestion centrally. Small pleural effusions are stable bilaterally and  there is bibasilar edema versus atelectasis in the lower lung zones. Impression Stable appearance of the chest with overall pattern representing vascular  congestion/pulmonary edema. Access  Arterial       PICC       PICC Double Lumen 97/12/80 Right Cephalic (Active)   Continued need for line? Yes 08/09/20 1647   Is this a power PICC? Yes 08/10/20 0600   Site Assessment Clean;Dry; Intact; Ecchymotic 08/10/20 0600   Red Lumen Status Flushed; Infusing 08/10/20 0600   Purple Lumen Status Flushed; Infusing 08/10/20 0600   Exposed Catheter (cm) 0 cm 08/09/20 1647   Extremity Circumference (cm) 45 cm 08/09/20 1647   Dressing Status Clean;Dry; Intact 08/10/20 0600   Dressing Type Transparent; Anti-microbial patch 08/10/20 0600   Dressing Change Due 08/19/20 08/10/20 0600   Dressing Intervention New 08/09/20 1647   Reason Not Rotated Not due 08/09/20 1829   Number of days: 0        CVC                 Assessment:       Acute hypoxemic respiratory failure with SPO2 less than 90% on room air  Chronic hypercapnic respiratory failure  Acute pulmonary edema  Pneumonia, likely gram-negative  Hip fracture    Plan:        -Full vent support,Wean supplemental oxygen to goal saturation of >90%  -Continue Lasix twice daily  -On Zosyn x7 days  -Solu-Medrol x5 days  -DuoNeb scheduled, Symbicort twice daily  -From pulmonary standpoint patient okay to proceed to surgery. She has ongoing pneumonia but ventilator requirements are much improved. Due to the immediate potential for life-threatening deterioration due to respiratory failure, I spent 33 minutes providing critical care. This time is excluding time spent performing separately billable procedures.       Thank you for this consult,    Ismael Baldwin [address] [facility]

## 2020-08-10 NOTE — ANESTHESIA POSTPROCEDURE EVALUATION
[facility] Department of Anesthesiology  Post-Anesthesia Note       Name:  Lynette Thornton                                  Age:  78 y.o. MRN:  [id number]     Last Vitals & Oxygen Saturation: /61   Pulse 71   Temp 98.6 °F (37 °C) (Axillary)   Resp 20   Ht 5' 7\" (1.702 m)   Wt 253 lb 1.4 oz (114.8 kg)   SpO2 97%   BMI 39.64 kg/m²   Patient Vitals for the past 4 hrs:   BP Pulse Resp SpO2   08/10/20 1657 -- 71 20 97 %   08/10/20 1400 128/61 75 24 --       Level of consciousness:  Sedated on vent    Respiratory: Ventilated    Cardiovascular: Hemodynamically stable. Hydration: Adequate. PONV: Adequately managed. Post-op pain: Adequately controlled. Post-op assessment: Tolerated anesthetic well without complication. Complications:  Transported to ICU on propac and ambu bag. No issues.   Report given to ICU staff    Lizzy Hatfield MD  August 10, 2020   5:14 PM

## 2020-08-10 NOTE — PROGRESS NOTES
[id number] [facility] Orthopedic Surgery   Progress Note      S/P :  SUBJECTIVE  IN ICU on vent. Able to follow simple commands and nod head to questions. Understands will have left hip surgery today. She does not appear in distress. OBJECTIVE              Physical                      VITALS:  BP (!) 114/93   Pulse 82   Temp 98.4 °F (36.9 °C) (Axillary)   Resp 18   Ht 5' 7\" (1.702 m)   Wt 253 lb 1.4 oz (114.8 kg)   SpO2 96%   BMI 39.64 kg/m²                     MUSCULOSKELETAL:  left foot NVI. Wiggles toes to command. Pedal pulses are palpable. Bilateral LE swollen and light red with scaling, appears chronic. NEUROLOGIC:                                  Sensory:  Touch:  Left Lower Extremity:  Normal, withdraws to tickling.                                               Data       CBC:   Lab Results   Component Value Date    WBC 10.9 08/10/2020    RBC 5.73 08/10/2020    HGB 13.7 08/10/2020    HCT 44.5 08/10/2020    MCV 77.6 08/10/2020    MCH 23.9 08/10/2020    MCHC 30.8 08/10/2020    RDW 19.7 08/10/2020     08/10/2020    MPV 7.4 08/10/2020        WBC:    Lab Results   Component Value Date    WBC 10.9 08/10/2020        Hemoglobin/Hematocrit:    Lab Results   Component Value Date    HGB 13.7 08/10/2020    HCT 44.5 08/10/2020        PT/INR:    Lab Results   Component Value Date    PROTIME 11.8 08/08/2020    INR 1.02 08/08/2020              Current Inpatient Medications             Current Facility-Administered Medications: polyvinyl alcohol (LIQUIFILM TEARS) 1.4 % ophthalmic solution 1 drop, 1 drop, Both Eyes, PRN  phosphorus (K PHOS NEUTRAL) tablet 1 tablet, 250 mg, Oral, BID  propofol injection, 10 mcg/kg/min, Intravenous, Titrated  fentaNYL 10 mcg/mL infusion, 25 mcg/hr, Intravenous, Continuous  fentaNYL (SUBLIMAZE) injection 25 mcg, 25 mcg, Intravenous, Q1H PRN  menthol-zinc oxide (CALMOSEPTINE) 0.44-20.6 % ointment 1-4 each, 1-4 each, Topical, 4x Daily PRN  dilTIAZem 125 mg in dextrose 5 % 125 mL infusion, 5 mg/hr, Intravenous, Continuous  pantoprazole (PROTONIX) injection 40 mg, 40 mg, Intravenous, Daily **AND** sodium chloride (PF) 0.9 % injection 10 mL, 10 mL, Intravenous, Daily  chlorhexidine (PERIDEX) 0.12 % solution 15 mL, 15 mL, Mouth/Throat, BID  sodium chloride flush 0.9 % injection 10 mL, 10 mL, Intravenous, 2 times per day  sodium chloride flush 0.9 % injection 10 mL, 10 mL, Intravenous, PRN  furosemide (LASIX) injection 40 mg, 40 mg, Intravenous, BID  piperacillin-tazobactam (ZOSYN) 3.375 g in dextrose 5 % 100 mL IVPB extended infusion (mini-bag), 3.375 g, Intravenous, Q8H  predniSONE (DELTASONE) tablet 40 mg, 40 mg, Oral, Daily  enoxaparin (LOVENOX) injection 40 mg, 40 mg, Subcutaneous, BID  budesonide-formoterol (SYMBICORT) 160-4.5 MCG/ACT inhaler 2 puff, 2 puff, Inhalation, BID  gabapentin (NEURONTIN) capsule 300 mg, 300 mg, Oral, TID  atorvastatin (LIPITOR) tablet 40 mg, 40 mg, Oral, Daily  acetaminophen (TYLENOL) tablet 650 mg, 650 mg, Oral, Q6H PRN **OR** acetaminophen (TYLENOL) suppository 650 mg, 650 mg, Rectal, Q6H PRN  polyethylene glycol (GLYCOLAX) packet 17 g, 17 g, Oral, Daily PRN  promethazine (PHENERGAN) tablet 12.5 mg, 12.5 mg, Oral, Q6H PRN **OR** ondansetron (ZOFRAN) injection 4 mg, 4 mg, Intravenous, Q6H PRN  ipratropium-albuterol (DUONEB) nebulizer solution 1 ampule, 1 ampule, Inhalation, Q4H WA    ASSESSMENT AND PLAN    Fall  Left hip pain  Left IT hip fx  Back pain, CT noted L4.5 stenosis, no fx reported. Morbid obesity  COPD with low O2,Hypoxia, had refused BIPAP, agonal breathing, sent to ICU and intubated now, stable VS, awakens to name  Plan ORIF left hip today if approved by Dr Alexus Martínez  Discussed with Dr Romayne Book.   Lovenox was held today       Sinan Edgar  8/10/2020  10:31 AM

## 2020-08-10 NOTE — CARE COORDINATION
[facility] Wound Ostomy Continence Nurse  Consult Note       NAME:  Gus Garcia  MEDICAL RECORD NUMBER:  [id number]  AGE: 78 y.o. GENDER: female  : 1950  TODAY'S DATE:  8/10/2020    Subjective   Reason for WOCN Evaluation and Assessment: mu;tiple pressure wounds and skin issues      Gus Garcia is a 78 y.o. female referred by:   [] Physician  [x] Nursing  [] Other:     Wound Identification:  Wound Type: MASD  Contributing Factors: venous stasis, decreased mobility, obesity and incontinence of urine    Wound History: chronic issues with BLE skin changes, hx of psoriasis, incontinence  Current Wound Care Treatment:  Calmoseptine to buttocks    Patient Goal of Care:  [x] Wound Healing  [] Odor Control  [] Palliative Care  [] Pain Control   [] Other:         PAST MEDICAL HISTORY        Diagnosis Date    Chronic kidney disease     [facility] disease) [facility]     Hyperlipidemia     Hypertension        PAST SURGICAL HISTORY    Past Surgical History:   Procedure Laterality Date    CHOLECYSTECTOMY         FAMILY HISTORY    History reviewed. No pertinent family history. SOCIAL HISTORY    Social History     Tobacco Use    Smoking status: Current Every Day Smoker     Packs/day: 1.50     Types: Cigarettes    Smokeless tobacco: Never Used    Tobacco comment: pack n half a day    Substance Use Topics    Alcohol use: Never     Frequency: Never    Drug use: Yes     Types: Marijuana     Comment: occasional        ALLERGIES    No Known Allergies    MEDICATIONS    No current facility-administered medications on file prior to encounter. Current Outpatient Medications on File Prior to Encounter   Medication Sig Dispense Refill    simvastatin (ZOCOR) 80 MG tablet Take 80 mg by mouth nightly      ibuprofen (ADVIL;MOTRIN) 600 MG tablet Take 600 mg by mouth every 6 hours as needed for Pain      gabapentin (NEURONTIN) 300 MG capsule Take 300 mg by mouth 3 times daily.        fluticasone-salmeterol (ADVAIR) 250-50 MCG/DOSE AEPB Inhale 1 puff into the lungs 2 times daily      ipratropium-albuterol (DUONEB) 0.5-2.5 (3) MG/3ML SOLN nebulizer solution Inhale 3 mLs into the lungs every 2 hours as needed for Shortness of Breath      albuterol sulfate  (90 Base) MCG/ACT inhaler Inhale 2 puffs into the lungs every 4-6 hours as needed for Shortness of Breath         Objective    BP (!) 114/93   Pulse 82   Temp 98.4 °F (36.9 °C) (Axillary)   Resp 18   Ht 5' 7\" (1.702 m)   Wt 253 lb 1.4 oz (114.8 kg)   SpO2 96%   BMI 39.64 kg/m²     LABS:  WBC:    Lab Results   Component Value Date    WBC 10.9 08/10/2020     H/H:    Lab Results   Component Value Date    HGB 13.7 08/10/2020    HCT 44.5 08/10/2020     PTT:    Lab Results   Component Value Date    APTT 31.7 08/08/2020   [APTT}  PT/INR:    Lab Results   Component Value Date    PROTIME 11.8 08/08/2020    INR 1.02 08/08/2020     HgBA1c:    Lab Results   Component Value Date    LABA1C 6.1 05/03/2013       Assessment   Nigel Risk Score: Nigel Scale Score: 16    Patient Active Problem List   Diagnosis Code    Closed left hip fracture, initial encounter ([facility] S72.002A    Morbid obesity with BMI of 45.0-49.9, adult ([facility]) E66.01, Z68.42    Hypoxemia requiring supplemental oxygen R09.02, Z99.81    Acute on chronic respiratory failure with hypoxia [facility]) J96.21    Fall W19. XXXA    Acute respiratory failure with hypoxia ([facility]) J96.01    Acute on chronic respiratory failure with hypercapnia ([facility]) J96.22    Acute pulmonary edema ([facility]) J81.0    Multifocal pneumonia J18.9    Rapid atrial fibrillation ([facility]) I48.91    Chronic obstructive pulmonary disease ([facility]) J44.9       Measurements:   Pt see. On vent, plans for OR later today. Has pink areas under breasts, L>R, abd pannus with linear open areas, improving with interdry. BLE with chronic venous changes, dry, scaling skin. Bilat heels intact.   Psoriasis noted to right ring finger and to elbow.  Pt does not have pressure to buttocks. sacral area. Has MASD. Response to treatment:  Well tolerated by patient.        Plan   Plan of Care: [REMOVED] Wound 08/06/20 Thigh Left;Posterior-Dressing/Treatment: Calmoseptine  [REMOVED] Wound 08/06/20 Coccyx-Dressing/Treatment: Silicone border  -moisture barrier to buttocks -Calmoseptine  -turn and reposition every 2 hours  -elevate heels, apply liquid barrier film twice daily  -amlactin to BLE      Specialty Bed Required : Yes   [x] Low Air Loss   [] Pressure Redistribution  [] Fluid Immersion  [x] Bariatric  [] Total Pressure Relief  [] Other:     Current Diet: Diet NPO, After Midnight  Dietician consult:  No    Discharge Plan:  Placement for patient upon discharge: TBD  Patient appropriate for Outpatient [address]: No    Referrals:  []   [] [facility]  [] Supplies  [] Other    Patient/Caregiver Teaching:  Level of patient/caregiver understanding able to:   [] Indicates understanding       [x] Needs reinforcement  [] Unsuccessful      [] Verbal Understanding  [] Demonstrated understanding       [] No evidence of learning  [] Refused teaching         [] N/A       Electronically signed by JUANA Lloyd on 8/10/2020 at 11:23 AM

## 2020-08-10 NOTE — CONSULTS
[facility]  Palliative Care   Consult Note    NAME:  Pranav [facility] [address] RECORD NUMBER:  [id number]  AGE: 78 y.o. GENDER: female  : 1950  TODAY'S DATE:  8/10/2020    Subjective     Reason for Consult:  goals of care and code status   Visit Type: Initial Consult      Sergio Tejada is a 78 y.o. female referred by:  [x] Physician    PAST MEDICAL HISTORY      Diagnosis Date    Chronic kidney disease     COPD (chronic obstructive pulmonary disease) ([facility]     Hyperlipidemia     Hypertension        PAST SURGICAL HISTORY  Past Surgical History:   Procedure Laterality Date    CHOLECYSTECTOMY         FAMILY HISTORY  History reviewed. No pertinent family history. SOCIAL HISTORY  Social History     Tobacco Use    Smoking status: Current Every Day Smoker     Packs/day: 1.50     Types: Cigarettes    Smokeless tobacco: Never Used    Tobacco comment: pack n half a day    Substance Use Topics    Alcohol use: Never     Frequency: Never    Drug use: Yes     Types: Marijuana     Comment: occasional        ALLERGIES  No Known Allergies    MEDICATIONS  No current facility-administered medications on file prior to encounter. Current Outpatient Medications on File Prior to Encounter   Medication Sig Dispense Refill    simvastatin (ZOCOR) 80 MG tablet Take 80 mg by mouth nightly      ibuprofen (ADVIL;MOTRIN) 600 MG tablet Take 600 mg by mouth every 6 hours as needed for Pain      gabapentin (NEURONTIN) 300 MG capsule Take 300 mg by mouth 3 times daily.        fluticasone-salmeterol (ADVAIR) 250-50 MCG/DOSE AEPB Inhale 1 puff into the lungs 2 times daily      ipratropium-albuterol (DUONEB) 0.5-2.5 (3) MG/3ML SOLN nebulizer solution Inhale 3 mLs into the lungs every 2 hours as needed for Shortness of Breath      albuterol sulfate  (90 Base) MCG/ACT inhaler Inhale 2 puffs into the lungs every 4-6 hours as needed for Shortness of Breath         Objective         /61   Pulse 71   Temp 98.6 °F (37 °C) (Axillary)   Resp 20   Ht 5' 7\" (1.702 m)   Wt 253 lb 1.4 oz (114.8 kg)   SpO2 97%   BMI 39.64 kg/m²     Code Status: DNR-CCA    Advanced Directives: not completed     Assessment        Management and Education    Persons available for education:     [] Self     [] Caregiver       [x] Spouse       [] Other Family Member   []  Other    Spiritual History:  notified: Yes,     Does the patient have a Primary Care Physician? Yes    Level of patient/caregiver understanding able to:       [x] Verbalize Understanding   [] Demonstrate Understanding       [] Teach Back       [] Needs Reinforcement     []  Other:      Teaching Time:  1hours  0 minutes     Plan        Social Service Consult Made:  Yes  Assistance filling out Living Will/HPOA:  No      Discharge Plan:  Education/support to family  Education/support to patient  Code status clarified: [facility]  Palliative care orders introduced    Discharge Environment:  [] Hospice Consult Agency:  [] Inpatient Hospice    [] Home with Hospice Care   [] ECF with Hospice  [] ECF skilled care with Hospice to follow   [] Other:    Discussion: Patient lives at home with her . I have called him he states she fell last Tuesday 8/4 refused to come to hospital, she then fell again Thursday 8/6 came to hospital found to have fracture. Her , Akbar Sarabia gives a history of her living in lift chair 24/7, she has not been in bathtub for 2 months, she was able to go to kitchen sink so he could wash her hair and then he would give her sponge bath. She had BSC next to her chair. She was able to feed herself. Smokes about pack & 1/2 a day. We have discussed code status and he agrees she does not want chest compressions/shocking or ventilator or resuscitative meds if her heart stops or she stops breathing she has repeatedly told him she wants to die a natural/peaceful death.    He did state he would like to use [facility] for rehab when she is ready to be discharged. Dr Cedeno Double informed of above. I will continue to follow Ms. Ewing's care as needed. Thank you for allowing me to participate in the care of Ms. Ewing .      Electronically signed by Diana Smith RN, [address] [facility] on 8/10/2020 at 5:13 PM  [address] [place] [address]  Office: [phone]

## 2020-08-10 NOTE — CONSULTS
Cardiac Electrophysiology Consultation   Date: 8/10/2020  Admit Date:  8/6/2020  Admission Diagnosis: Closed left hip fracture, initial encounter ([facility] [S72.002A]  Closed left hip fracture, initial encounter ([facility] [S72.002A]     Reason for Consultation: new onset atrial fibrillation with RVR  Consult Requesting Physician: Shaun Schulte MD     History of Present Illness  Nickolas Parrish is a 78y.o. year old female with past medical history significant for COPD, CKD, HTN (not on medication for this) and HLD who presented to the ED following a mechanical fall. The patient is currently intubated and sedated so most of this history was obtained through the chart. She was admitted over the weekend after sustaining a fall at home without any mention of syncope. She was found to be hypoxic, BiPap was ordered but the patient apparently took it off. She was found unresponsive with agonal breathing, a rapid response was called and the patient was intubated. She did go into atrial fibrillation with RVR yesterday morning for about 6 hours. She was on a Cardizem drip briefly and did convert to SR yesterday evening. There are plans for her to go to the OR today for her hip fracture.        Past Medical History:   Diagnosis Date    Chronic kidney disease     COPD (chronic obstructive pulmonary disease) ([facility]     Hyperlipidemia     Hypertension         Past Surgical History:   Procedure Laterality Date    CHOLECYSTECTOMY         Current Outpatient Medications   Medication Instructions    albuterol sulfate  (90 Base) MCG/ACT inhaler 2 puffs, Inhalation, EVERY 4-6 HOURS PRN    fluticasone-salmeterol (ADVAIR) 250-50 MCG/DOSE AEPB 1 puff, Inhalation, 2 TIMES DAILY    gabapentin (NEURONTIN) 300 mg, Oral, 3 TIMES DAILY    ibuprofen (ADVIL;MOTRIN) 600 mg, Oral, EVERY 6 HOURS PRN    ipratropium-albuterol (DUONEB) 0.5-2.5 (3) MG/3ML SOLN nebulizer solution 3 mLs, Inhalation, EVERY 2 HOURS PRN    simvastatin (ZOCOR) 80 mg, Oral, NIGHTLY        No Known Allergies    Social History:   reports that she has been smoking cigarettes. She has been smoking about 1.50 packs per day. She has never used smokeless tobacco. She reports current drug use. Drug: Marijuana. She reports that she does not drink alcohol. Family History:  family history is not on file. Review of Systems:  Unable to be obtained as pt is intubated and sedated.     Medications:  Scheduled Meds:   phosphorus  250 mg Oral BID    pantoprazole  40 mg Intravenous Daily    And    sodium chloride (PF)  10 mL Intravenous Daily    chlorhexidine  15 mL Mouth/Throat BID    sodium chloride flush  10 mL Intravenous 2 times per day    furosemide  40 mg Intravenous BID    piperacillin-tazobactam  3.375 g Intravenous Q8H    predniSONE  40 mg Oral Daily    enoxaparin  40 mg Subcutaneous BID    budesonide-formoterol  2 puff Inhalation BID    gabapentin  300 mg Oral TID    atorvastatin  40 mg Oral Daily    ipratropium-albuterol  1 ampule Inhalation Q4H WA      Continuous Infusions:   propofol 30 mcg/kg/min (08/10/20 1034)    fentaNYL 50 mcg/hr (08/10/20 0733)    dilTIAZem Stopped (20 1732)     PRN Meds:.polyvinyl alcohol, ammonium lactate, fentanNYL, menthol-zinc oxide, sodium chloride flush, acetaminophen **OR** acetaminophen, polyethylene glycol, promethazine **OR** ondansetron     Physical Examination:  Vitals:    08/10/20 0800   BP: (!) 114/93   Pulse: 82   Resp: 18   Temp: 98.4 °F (36.9 °C)   SpO2: 96%        Intake/Output Summary (Last 24 hours) at 8/10/2020 1150  Last data filed at 8/10/2020 0800  Gross per 24 hour   Intake 850.3 ml   Output 2360 ml   Net -1509.7 ml     In: 670.5 [I.V.:342.5; NG/GT:40]  Out: 1935    Wt Readings from Last 3 Encounters:   08/10/20 253 lb 1.4 oz (114.8 kg)     Temp  Av.2 °F (36.8 °C)  Min: 97.9 °F (36.6 °C)  Max: 98.7 °F (37.1 °C)  Pulse  Av.3  Min: 53  Max: 153  BP  Min: 94/52  Max: 131/72  SpO2  Av.4 % Min: 93 %  Max: 100 %  FiO2   Av.8 %  Min: 60 %  Max: 80 %    · Telemetry: Sinus rhythm  · Constitutional: Alert, in no acute distress. Appears stated age. · Head: Normocephalic and atraumatic. · Eyes: Conjunctivae normal. EOM are normal.   · Neck: Neck supple. No lymphadenopathy. No rigidity. No JVD present. · Cardiovascular: Normal rate, regular rhythm. No murmurs, rubs or gallops. No S3 or S4.  · Pulmonary/Chest: Scattered expiratory wheezes up upper lung fields. No crackles or rhonchi. No respiratory accessory muscle use. · Abdominal: Soft. Normal bowel sounds present. No distension, No tenderness. · Musculoskeletal: No tenderness. 1+ BLE edema    · Lymphadenopathy: Has no cervical adenopathy. · Neurological: Alert and oriented. No gross deficits. · Skin: Skin is warm and dry. No rash, lesions, ulcerations noted. · Psychiatric: No anxiety nor agitation. Labs:  Reviewed. Recent Labs     20  0620  0630 08/10/20  0520    139 141   K 4.8 5.6* 4.1    99 97*   CO2 32 33* 33*   PHOS 4.9 4.9 2.2*   BUN 26* 33* 41*   CREATININE 0.9 1.0 1.2     Recent Labs     20  0623 20  0536 20  0630 08/10/20  0520   WBC 15.0*  --  12.5* 10.9   HGB 14.4 20.1* 14.3 13.7   HCT 49.2*  --  50.5* 44.5   MCV 81.6  --  82.9 77.6*     --  232 169     No results found for: CKTOTAL, CKMB, CKMBINDEX, TROPONINI  Lab Results   Component Value Date    BNP 61.3 2013     Lab Results   Component Value Date    PROTIME 11.8 2020    INR 1.02 2020     No results found for: CHOL, HDL, TRIG    Diagnostic and imaging results reviewed. EC20  SR at 87 BPM. Baseline artifact. Echo: 20  Summary:  Overall left ventricular ejection fraction is estimated to be 55-60%. The left ventricular wall motion is normal.  There is mild concentric left ventricular hypertrophy.   The diastolic function is impaired and classified as Grade 1 (impaired  relaxation). The Aortic Valve is mildly calcified. The systolic pulmonary artery pressure cannot be estimated due to insufficient  tricuspid regurgitation. The left atrium is mildly dilated. Stress: 5/22/19    IMPRESSION      Normal nuclear myocardial perfusion scan    Assessment & Plan:    Paroxysmal atrial fibrillation with RVR, new onset   - in the setting of respiratory failure, ?pneumonia, hip fracture also short in duration (~6 hours) converted while on Cardizem drip   - CHADS2-VASc at least 2 (gender, age), possibly 3 ? HTN (not on medication for this) - would not recommend anticoagulation at this time given short duration of A fib with other acute issues - if has documented recurrence in the future would consider adding anticoagulation    Acute hypoxic and hypercapnic respiratory failure   - on vent, management per pulmonary   - ?pneumonia underlying as well    L hip fracture   - awaiting surgery, planned for today per nurse, EP status is stable for surgery    HLD   - on statin    COPD    Discussed with Dr. Tenzin Logan.     GABRIELA Cruz  [facility] [address] [place] [address], [address] [place] [address]  Phone: [phone]  Fax: [phone]    Electronically signed by GABRIELA Mckee - CNP on 8/10/2020 at 11:50 AM

## 2020-08-10 NOTE — BRIEF OP NOTE
Brief Postoperative Note      Patient: Miranda Sánchez  YOB: 1950  MRN: [id number]    Date of Procedure: 8/10/2020    Pre-Op Diagnosis: LEFT HIP FRACTURE    Post-Op Diagnosis: Same       Procedure(s):  OPEN TREATMENT OF LEFT HIP FRACTURE WITH CEPHALOMEDULLARY NAIL    Surgeon(s):  Anshul Martinez MD    Assistant:  Surgical Assistant: Ellyn Weiss    Anesthesia: General    Estimated Blood Loss (mL): 474    Complications: None    Specimens:   * No specimens in log *    Implants:  Implant Name Type Inv. Item Serial No.  Lot No. LRB No. Used Action   NAIL TFN 11MM 130 DEG Screw/Plate/Nail/Hua NAIL TFN 11MM 130 DEG  SYNTHES [id number] Left 1 Implanted   IMPL BLADE HELICAL TFNA FEN 94AA Screw/Plate/Nail/Hua IMPL BLADE HELICAL TFNA FEN 86FW  SYN[employer]ES [id number] Left 1 Implanted   SCREW LK W/ T25 STARDRIVE [id number] Screw/Plate/Nail/Hua SCREW LK W/ T25 STARDRIVE [id number]  SYNTHES [id number] Left 1 Implanted         Drains:   NG/OG/NJ/NE Tube Orogastric Center mouth (Active)   Surrounding Skin Dry; Intact 08/10/20 1201   Securement device Yes 08/10/20 1201   Status Clamped 08/10/20 1201   Placement Verified by Respiratory Status;by External Catheter Length;by Gastric Contents 08/10/20 1201   NG/OG/NJ/NE External Measurement (cm) 60 cm 08/10/20 1201   Drainage Appearance Green 08/10/20 1201   Free Water Flush (mL) 40 mL 08/10/20 0800   Output (mL) 0 ml 08/10/20 0800       Urethral Catheter Non-latex 16 fr (Active)   Catheter Indications Need for fluid management in critically ill patients in a critical care setting not able to be managed by other means such as BSC with hat, bedpan, urinal, condom catheter, or short term intermittent urethral catherization 08/10/20 1139   Securement Device Date Changed 08/09/20 08/10/20 1139   Site Assessment Pink;Red;Swelling;Urethral drainage 08/10/20 1139   Urine Color Yellow 08/10/20 1139   Urine Appearance Clear 08/10/20 1139   Output (mL) 500 mL 08/10/20 1300 Findings: acute fx    Electronically signed by Betsey Cervantes MD on 8/10/2020 at 4:49 PM

## 2020-08-10 NOTE — PROGRESS NOTES
0745: Dr. Candi Avendano in room, assessing patient. Patient aware of conversation and is able to shake head yes and no. 0900: Chaplain santillan in the room, speaking with patient. 1043: Dr. Jane Marie, cardiology and Albina Cisneros NP in room, assessing patient. 1105: Stephy Pena, Wound care in room, assessing patient's skin. 1120: Patient's  signing informed consent and DNR portion of consent. Electronically signed by Truong Khalil RN on 8/10/2020 at 11:50 AM   [id number]: Dr. Shi Found in room to see patient. Aware of DNR consent. 1434: Report given to Antonio Moraes, Surgery RN. Patient has fentanyl and propofol infusing. Electronically signed by Truong Khalil RN on 8/10/2020 at 2:35 PM   1700: Patient back from OR.   Electronically signed by Truong Khalil RN on 8/10/2020 at 7:16 PM

## 2020-08-10 NOTE — CONSULTS
Comprehensive Nutrition Assessment    Type and Reason for Visit:  Initial, Consult(vent / TF ordering / mgmt)    Nutrition Recommendations/Plan:   Refer to Nursing for TF recs post op. Regimen includes fiber containing formula, Jevity 1.2, goal rate of 50 ml per hour. With increased need for protein to heal & avoid overfeeding, recommend 2 bottles of proteinex per feeding tube per day. Do not mix directly with TF. Flush per provider. Monitor propofol dose for need to adj TF recs    Nutrition Assessment:  Pt with pmh of CKD, COPD, spinal stenosis (w/ limited mobility), HLD, HTN, left kidney obstruction (with need for nephrectomy), adm following a fall at home. Initially diet was adv to general. Pt became increasingly hypoxic with need for intubation. Propofol on board at current rate of 21.4 ml per hour, adding 565 kcals per day. Fortunately po intake noted as good prior to vent. Pt has been medically cleared for surgery with plan for left hip repair this date. Nursing reported plan to start TF post op. Will refer to Nursing for TF recs. Malnutrition Assessment:  Malnutrition Status:  Insufficient data    Context:  Acute Illness     Due to current CDC guidelines recommending 6-ft distancing for social isolation for COVID19 prevention, NFPE/malnutrition assessment was deferred at this time. Estimated Daily Nutrient Needs:  Energy (kcal):  0164-5461 (15-18 x  kg); Weight Used for Energy Requirements:        Protein (g):   (.8-1 x ABW (adj for ckd & healing); Weight Used for Protein Requirements:           Fluid (ml/day):  1 ml per kcal; Weight Used for Fluid Requirements:         Nutrition Related Findings:  Noted +2-3 generalized edema. Wounds:  (excoriated abd & buttocks. Noted Psoriasis as well.)       Current Nutrition Therapies:    Diet NPO, After Midnight  ·  Tube Feeding Will refer to Nursing for TF recs post op.  Regimen includes fiber containing formula, Jevity 1.2, goal rate

## 2020-08-10 NOTE — PROGRESS NOTES
Hospitalist Progress Note      PCP: Mau Mackey MD    Date of Admission: 8/6/2020      Subjective: intubated, nurse at bedside. Medications:  Reviewed    Infusion Medications    propofol 30 mcg/kg/min (08/10/20 0734)    fentaNYL 50 mcg/hr (08/10/20 0733)    dilTIAZem Stopped (08/09/20 1732)     Scheduled Medications    phosphorus  250 mg Oral BID    pantoprazole  40 mg Intravenous Daily    And    sodium chloride (PF)  10 mL Intravenous Daily    chlorhexidine  15 mL Mouth/Throat BID    sodium chloride flush  10 mL Intravenous 2 times per day    furosemide  40 mg Intravenous BID    piperacillin-tazobactam  3.375 g Intravenous Q8H    predniSONE  40 mg Oral Daily    enoxaparin  40 mg Subcutaneous BID    budesonide-formoterol  2 puff Inhalation BID    gabapentin  300 mg Oral TID    atorvastatin  40 mg Oral Daily    ipratropium-albuterol  1 ampule Inhalation Q4H WA     PRN Meds: polyvinyl alcohol, fentanNYL, menthol-zinc oxide, sodium chloride flush, acetaminophen **OR** acetaminophen, polyethylene glycol, promethazine **OR** ondansetron      Intake/Output Summary (Last 24 hours) at 8/10/2020 0925  Last data filed at 8/10/2020 0800  Gross per 24 hour   Intake 850.3 ml   Output 2960 ml   Net -2109.7 ml       Physical Exam Performed:    BP (!) 114/93   Pulse 82   Temp 98.4 °F (36.9 °C) (Axillary)   Resp 18   Ht 5' 7\" (1.702 m)   Wt 253 lb 1.4 oz (114.8 kg)   SpO2 96%   BMI 39.64 kg/m²     General appearance: No apparent distress  Neck: Supple  Respiratory:  Expiratory wheezes   Cardiovascular: Regular rate and rhythm with normal S1/S2 without murmurs, rubs or gallops. Abdomen: Soft. Musculoskeletal: No clubbing  Skin: Skin color, texture, turgor normal.  No rashes or lesions.   Neurologic:  Moving extremities   Psychiatric: sedated, but with some response   Capillary Refill: Brisk,< 3 seconds   Peripheral Pulses: +2 palpable, equal bilaterally       Labs:   Recent Labs 08/08/20  2640 08/09/20  0536 08/09/20  0630 08/10/20  0520   WBC 15.0*  --  12.5* 10.9   HGB 14.4 20.1* 14.3 13.7   HCT 49.2*  --  50.5* 44.5     --  232 169     Recent Labs     08/08/20  0623 08/09/20  0630 08/10/20  0520    139 141   K 4.8 5.6* 4.1    99 97*   CO2 32 33* 33*   BUN 26* 33* 41*   CREATININE 0.9 1.0 1.2   CALCIUM 8.9 8.9 8.8   PHOS 4.9 4.9 2.2*     No results for input(s): AST, ALT, BILIDIR, BILITOT, ALKPHOS in the last 72 hours. Recent Labs     08/08/20  0623   INR 1.02     No results for input(s): Ortiz Curlew in the last 72 hours. Urinalysis:      Lab Results   Component Value Date    NITRU NEGATIVE 05/03/2013    WBCUA 6-10 05/03/2013    BACTERIA 1+ 05/03/2013    RBCUA 3-5 05/03/2013    BLOODU TRACE 05/03/2013    SPECGRAV 1.020 05/03/2013    GLUCOSEU NEGATIVE 05/03/2013       Radiology:  XR CHEST PORTABLE   Final Result   Stable appearance of the chest with overall pattern representing vascular   congestion/pulmonary edema. XR CHEST PORTABLE   Final Result   Bilateral pleural effusions and bilateral airspace disease most compatible   with edema, increased since earlier today. The vilma appear prominent, likely related to enlarged pulmonary arteries. A   follow-up is recommended as hilar adenopathy is not excluded. Interval placement of a right arm PICC. XR CHEST PORTABLE   Final Result   Interval intubation. There is improved vascular congestion centrally, but   worsening airspace change in the right lower lung zone. CT Head WO Contrast   Final Result   No acute intracranial abnormality. CT Cervical Spine WO Contrast   Final Result   Multilevel postsurgical changes and degenerative changes. No acute osseous   abnormality. CT LUMBAR SPINE WO CONTRAST   Final Result   1. No acute finding in the lumbar spine. 2. Advanced degenerative changes that are most notable at L4-5 contributing   to severe spinal canal stenosis. XR LUMBAR SPINE (2-3 VIEWS)   Final Result   1. Moderately displaced intertrochanteric fracture of the proximal left femur. 2. Severe degenerative change in the left hip with moderate degenerative   change on the right. 3. Technically limited evaluation of the lumbar spine due to patient body   habitus. XR HIP 2-3 VW W PELVIS LEFT   Final Result   1. Moderately displaced intertrochanteric fracture of the proximal left femur. 2. Severe degenerative change in the left hip with moderate degenerative   change on the right. 3. Technically limited evaluation of the lumbar spine due to patient body   habitus. XR FEMUR LEFT (MIN 2 VIEWS)   Final Result   Intratrochanteric left hip fracture      Moderate to severe osteoarthritis of the left hip joint         XR CHEST PORTABLE   Final Result   Mild perihilar opacities and left basilar ground-glass opacities in the   chest.  Pattern may represent developing pulmonary edema or pneumonitis. Assessment/Plan:    Active Hospital Problems    Diagnosis    Acute respiratory failure with hypoxia ([address] [J96.01]    Acute on chronic respiratory failure with hypercapnia ([facility]) [J96.22]    Acute pulmonary edema ([facility]) [J81.0]    Multifocal pneumonia [J18.9]    Rapid atrial fibrillation ([facility]) [I48.91]    Chronic obstructive pulmonary disease ([address] [J44.9]    Hypoxemia requiring supplemental oxygen [R09.02, Z99.81]    Acute on chronic respiratory failure with hypoxia ([address] [J96.21]    Fall [W19. XXXA]    Closed left hip fracture, initial encounter [facility] [S72.002A]    Morbid obesity with BMI of 45.0-49.9, adult ([facility]) [E66.01, Z68.42]          1. Acute hypoxic respiratory failure,  COPD exacerbation and pneumonia along with pulmonary edema. procal low, iv antibiotics, intubated, steroids, pulmonary consulted. 2. Possible COPD exacerbation, presented with SOB, wheezes, + leukocytosis CXR w/o consolidation or infiltrate.  Placed on BiPAP in ED, then refused to wear BiPAP. 3. Acute on chronic respiratory failure with hypercapnia and encephalopathy, Rapid response called and patient was intubated and transferred to ICU. Pulmo following, still intubated, po steroids. 4. Acute encephalopathy, due to hypercapnia, now sedated. 5. Pneumonia, iv antibiotics as above. 6. L hip fracture, after mechanical fall. Ortho consult  7. A fib RVR,  new AM 8/9.  8. Hyperkalemia, improved  9. Hypophosphatemia, will replace  10. Sacral and elbows Wounds, wound care  11. Left kidney obstruction, chronic.  Has been followed by urology since earlier this year with plan for nephrectomy at some point which is been canceled multiple times due to COVID and other factors. 12. Essential Hypertension, monitor  13. Severe spinal stenosis, noted on CT. Outpatient neurosurg eval  14.  Tobacco abuse, counseled by my colleague during this       Diet: Diet NPO, After Midnight  Code Status: DNR-CCA        Donnell Huggins MD

## 2020-08-10 NOTE — ANESTHESIA PRE PROCEDURE
[facility] Department of Anesthesiology  Pre-Anesthesia Evaluation/Consultation       Name:  Irene Willson  : 1950  Age:  78 y.o. MRN:  [id number]  Date: 8/10/2020           Surgeon: Surgeon(s):  Mj Nichole MD    Procedure: Procedure(s):  OPEN TREATMENT OF LEFT HIP FRACTURE WITH CEPHALOMEDULLARY NAIL     No Known Allergies  Patient Active Problem List   Diagnosis    Closed left [facility] fracture, initial encounter [facility]    Morbid obesity with BMI of 45.0-49.9, adult ([facility]    Hypoxemia requiring supplemental oxygen    Acute on chronic respiratory failure with hypoxia ([facility]    Fall    Acute respiratory failure with hypoxia ([facility]    Acute on chronic respiratory failure with hypercapnia (HCC)    Acute pulmonary edema (HCC)    Multifocal pneumonia    Rapid atrial fibrillation (HCC)    Chronic obstructive pulmonary disease ([facility]     Past Medical History:   Diagnosis Date    Chronic kidney disease     COPD (chronic obstructive pulmonary disease) ([facility]     Hyperlipidemia     Hypertension      Past Surgical History:   Procedure Laterality Date    CHOLECYSTECTOMY       Social History     Tobacco Use    Smoking status: Current Every Day Smoker     Packs/day: 1.50     Types: Cigarettes    Smokeless tobacco: Never Used    Tobacco comment: pack n half a day    Substance Use Topics    Alcohol use: Never     Frequency: Never    Drug use: Yes     Types: Marijuana     Comment: occasional      Medications  No current facility-administered medications on file prior to encounter. Current Outpatient Medications on File Prior to Encounter   Medication Sig Dispense Refill    simvastatin (ZOCOR) 80 MG tablet Take 80 mg by mouth nightly      ibuprofen (ADVIL;MOTRIN) 600 MG tablet Take 600 mg by mouth every 6 hours as needed for Pain      gabapentin (NEURONTIN) 300 MG capsule Take 300 mg by mouth 3 times daily.        fluticasone-salmeterol (ADVAIR) 250-50 MCG/DOSE AEPB Inhale 1 puff into the lungs 2 times daily      ipratropium-albuterol (DUONEB) 0.5-2.5 (3) MG/3ML SOLN nebulizer solution Inhale 3 mLs into the lungs every 2 hours as needed for Shortness of Breath      albuterol sulfate  (90 Base) MCG/ACT inhaler Inhale 2 puffs into the lungs every 4-6 hours as needed for Shortness of Breath       Current Facility-Administered Medications   Medication Dose Route Frequency Provider Last Rate Last Dose    polyvinyl alcohol (LIQUIFILM TEARS) 1.4 % ophthalmic solution 1 drop  1 drop Both Eyes PRN Na Huntley MD        phosphorus (K PHOS NEUTRAL) tablet 1 tablet  250 mg Oral BID Alcides Daley MD   1 tablet at 08/10/20 0805    ammonium lactate (LAC-HYDRIN) 12 % lotion   Topical PRN Alcides Daley MD        propofol injection 120 mg  12 mL Intravenous Continuous PRN Clotilde Donnelly MD        sodium phosphate 20 mmol in dextrose 5 % 250 mL IVPB  20 mmol Intravenous Once Clotilde Donnelly MD        propofol injection  10 mcg/kg/min Intravenous Titrated Trevor Arauz MD 21.4 mL/hr at 08/10/20 1034 30 mcg/kg/min at 08/10/20 1034    fentaNYL 10 mcg/mL infusion  25 mcg/hr Intravenous Continuous Clotilde Donnelly MD 5 mL/hr at 08/10/20 0733 50 mcg/hr at 08/10/20 0733    fentaNYL (SUBLIMAZE) injection 25 mcg  25 mcg Intravenous Q1H PRN Trevor Arauz MD        menthol-zinc oxide (CALMOSEPTINE) 0.44-20.6 % ointment 1-4 each  1-4 each Topical 4x Daily PRN Mikayla Bonilla MD   4 each at 08/10/20 1032    pantoprazole (PROTONIX) injection 40 mg  40 mg Intravenous Daily Alice Euceda MD   40 mg at 08/10/20 0804    And    sodium chloride (PF) 0.9 % injection 10 mL  10 mL Intravenous Daily Alice Euceda MD   10 mL at 08/10/20 0804    chlorhexidine (PERIDEX) 0.12 % solution 15 mL  15 mL Mouth/Throat BID Alice Euceda MD   15 mL at 08/10/20 0807    sodium chloride flush 0.9 % injection 10 mL  10 mL Intravenous 2 times per day Alice Euceda MD   10 mL at 08/10/20 0806    sodium chloride flush 0.9 % injection 10 mL  10 mL Intravenous PRN Piero Chavez MD        furosemide (LASIX) injection 40 mg  40 mg Intravenous BID Piero Chavez MD   40 mg at 08/10/20 0804    piperacillin-tazobactam (ZOSYN) 3.375 g in dextrose 5 % 100 mL IVPB extended infusion (mini-bag)  3.375 g Intravenous Q8H Ang Sin MD 25 mL/hr at 08/10/20 1034 3.375 g at 08/10/20 1034    predniSONE (DELTASONE) tablet 40 mg  40 mg Oral Daily Piero Chavez MD   40 mg at 08/10/20 0805    enoxaparin (LOVENOX) injection 40 mg  40 mg Subcutaneous BID Adriana Christian MD   40 mg at 20 2107    budesonide-formoterol (SYMBICORT) 160-4.5 MCG/ACT inhaler 2 puff  2 puff Inhalation BID Adriana Christian MD   2 puff at 08/10/20 7883    gabapentin (NEURONTIN) capsule 300 mg  300 mg Oral TID Adriana Christian MD   300 mg at 08/10/20 0805    atorvastatin (LIPITOR) tablet 40 mg  40 mg Oral Daily Adriana Christian MD   40 mg at 08/10/20 0805    acetaminophen (TYLENOL) tablet 650 mg  650 mg Oral Q6H PRN Adriana Christian MD        Or    acetaminophen (TYLENOL) suppository 650 mg  650 mg Rectal Q6H PRN Adriana Christian MD        polyethylene glycol [facility]) packet 17 g  17 g Oral Daily PRN Adriana Christian MD        promethazine (PHENERGAN) tablet 12.5 mg  12.5 mg Oral Q6H PRN Adriana Christian MD        Or    ondansetron [facility]) injection 4 mg  4 mg Intravenous Q6H PRN Adriana Christian MD        ipratropium-albuterol (DUONEB) nebulizer solution 1 ampule  1 ampule Inhalation Q4H WA Adriana Christian MD   1 ampule at 08/10/20 1206     Vital Signs (Current)   Vitals:    08/10/20 1208   BP:    Pulse:    Resp: 20   Temp:    SpO2:      Vital Signs Statistics (for past 48 hrs)     Temp  Av.2 °F (36.8 °C)  Min: 97.5 °F (36.4 °C)   Min taken time: 20 1511  Max: 98.7 °F (37.1 °C)   Max taken time: 20 1631  Pulse  Av.9  Min: 48   Min taken time: 08/10/20 0500  Max: 153   Max taken time: 20 1700  Resp  Av.1  Min: 12   Min taken time: 08/10/20 0600  Max: 25   Max taken time: 20 0903  BP  Min: 92/76   Min taken time: 20 0903  Max: 188/96   Max taken time: 20 0512  MAP (mmHg)  Av.6  Min: 62   Min taken time: 20 1744  Max: 120   Max taken time: 20 1511  SpO2  Av.9 %  Min: 92 %   Min taken time: 20 2048  Max: 100 %   Max taken time: 20 2300    BP Readings from Last 3 Encounters:   08/10/20 123/67     BMI  Body mass index is 39.64 kg/m². Estimated body mass index is 39.64 kg/m² as calculated from the following:    Height as of this encounter: 5' 7\" (1.702 m). Weight as of this encounter: 253 lb 1.4 oz (114.8 kg).     CBC   Lab Results   Component Value Date    WBC 10.9 08/10/2020    RBC 5.73 08/10/2020    HGB 13.7 08/10/2020    HCT 44.5 08/10/2020    MCV 77.6 08/10/2020    RDW 19.7 08/10/2020     08/10/2020     CMP    Lab Results   Component Value Date     08/10/2020    K 4.1 08/10/2020    K 4.1 2020    CL 97 08/10/2020    CO2 33 08/10/2020    BUN 41 08/10/2020    CREATININE 1.2 08/10/2020    GFRAA 54 08/10/2020    GFRAA >60 2013    AGRATIO 0.7 2020    LABGLOM 45 08/10/2020    GLUCOSE 135 08/10/2020    PROT 7.2 2020    CALCIUM 8.8 08/10/2020    BILITOT 0.5 2020    ALKPHOS 114 2020    AST 28 2020    ALT 17 2020     BMP    Lab Results   Component Value Date     08/10/2020    K 4.1 08/10/2020    K 4.1 2020    CL 97 08/10/2020    CO2 33 08/10/2020    BUN 41 08/10/2020    CREATININE 1.2 08/10/2020    CALCIUM 8.8 08/10/2020    GFRAA 54 08/10/2020    GFRAA >60 2013    LABGLOM 45 08/10/2020    GLUCOSE 135 08/10/2020     POCGlucose  Recent Labs     20  0623 20  0630 08/10/20  0520   GLUCOSE 147* 140* 135*      Coags    Lab Results   Component Value Date    PROTIME 11.8 2020    INR 1.02 2020    APTT 31.7      HCG (If Applicable) No results found for: Celine Shutters, HCG, HCGQUANT   ABGs   Lab Results   Component Value Date    PHART 7.540 08/10/2020    PO2ART 83.2 08/10/2020    WJR2LAI 46.0 08/10/2020    EPJ3HVF 39.3 08/10/2020    BEART 14.8 08/10/2020    M1DQLNZN 98.4 08/10/2020      Type & Screen (If Applicable)  No results found for: LABABO, LABRH                         BMI: Wt Readings from Last 3 Encounters:       NPO Status: 8 Hours                          Anesthesia Evaluation  Patient summary reviewed no history of anesthetic complications:   Airway: Mallampati: III  TM distance: >3 FB   Neck ROM: full   Dental:          Pulmonary:   (+) COPD: severe,  shortness of breath:  decreased breath sounds,                            ROS comment: ETT in situ     Cardiovascular:  Exercise tolerance: poor (<4 METS),   (+) hypertension:,         Rhythm: regular  Rate: normal                    Neuro/Psych:   Negative Neuro/Psych ROS              GI/Hepatic/Renal:   (+) renal disease: CRI,           Endo/Other: Negative Endo/Other ROS                    Abdominal:   (+) obese,         Vascular: negative vascular ROS. Anesthesia Plan      general     ASA 4 - emergent     (Spoke with patient in room. Spoke with , Justino Qureshi, over the phone at [phone]. R/B/A of procedure and anesthesia described. Patient/family understand and would like to proceed. Questions and concerns addressed. DNR discussed. Patient and  would like to maintain DNR status in perioperative period.  okay with some blood pressure medications in non life threatening situations, such as normal BP fluctuations during anesthesia.  )  Induction: intravenous. MIPS: Postoperative opioids intended and Prophylactic antiemetics administered. Anesthetic plan and risks discussed with patient and spouse. Plan discussed with CRNA.               This pre-anesthesia assessment may be used as a history and physical.    DOS STAFF ADDENDUM:    Pt seen and examined, chart reviewed (including anesthesia, drug and allergy history). No interval changes to history and physical examination. Anesthetic plan, risks, benefits, alternatives, and personnel involved discussed with patient. Questions and concerns addressed. Patient(family) verbalized an understanding and agrees to proceed.       Joenathan Schilder, MD  August 10, 2020  12:50 PM

## 2020-08-11 LAB
ALBUMIN SERPL-MCNC: 2.6 G/DL (ref 3.4–5)
ANION GAP SERPL CALCULATED.3IONS-SCNC: 11 MMOL/L (ref 3–16)
BUN BLDV-MCNC: 49 MG/DL (ref 7–20)
CALCIUM SERPL-MCNC: 8.2 MG/DL (ref 8.3–10.6)
CHLORIDE BLD-SCNC: 95 MMOL/L (ref 99–110)
CO2: 36 MMOL/L (ref 21–32)
CREAT SERPL-MCNC: 1.2 MG/DL (ref 0.6–1.2)
CULTURE, RESPIRATORY: NORMAL
GFR AFRICAN AMERICAN: 54
GFR NON-AFRICAN AMERICAN: 45
GLUCOSE BLD-MCNC: 115 MG/DL (ref 70–99)
GLUCOSE BLD-MCNC: 130 MG/DL (ref 70–99)
GRAM STAIN RESULT: NORMAL
HCT VFR BLD CALC: 42.9 % (ref 36–48)
HEMOGLOBIN: 13.4 G/DL (ref 12–16)
MAGNESIUM: 1.8 MG/DL (ref 1.8–2.4)
MCH RBC QN AUTO: 23.8 PG (ref 26–34)
MCHC RBC AUTO-ENTMCNC: 31.2 G/DL (ref 31–36)
MCV RBC AUTO: 76.5 FL (ref 80–100)
PDW BLD-RTO: 19.4 % (ref 12.4–15.4)
PERFORMED ON: ABNORMAL
PHOSPHORUS: 4.4 MG/DL (ref 2.5–4.9)
PLATELET # BLD: 192 K/UL (ref 135–450)
PMV BLD AUTO: 7.4 FL (ref 5–10.5)
POTASSIUM SERPL-SCNC: 3 MMOL/L (ref 3.5–5.1)
POTASSIUM SERPL-SCNC: 4.2 MMOL/L (ref 3.5–5.1)
RBC # BLD: 5.61 M/UL (ref 4–5.2)
SODIUM BLD-SCNC: 142 MMOL/L (ref 136–145)
WBC # BLD: 13.6 K/UL (ref 4–11)

## 2020-08-11 PROCEDURE — C9113 INJ PANTOPRAZOLE SODIUM, VIA: HCPCS | Performed by: INTERNAL MEDICINE

## 2020-08-11 PROCEDURE — 94750 HC PULMONARY COMPLIANCE STUDY: CPT

## 2020-08-11 PROCEDURE — 6360000002 HC RX W HCPCS: Performed by: INTERNAL MEDICINE

## 2020-08-11 PROCEDURE — 2580000003 HC RX 258: Performed by: INTERNAL MEDICINE

## 2020-08-11 PROCEDURE — 85027 COMPLETE CBC AUTOMATED: CPT

## 2020-08-11 PROCEDURE — 6370000000 HC RX 637 (ALT 250 FOR IP): Performed by: INTERNAL MEDICINE

## 2020-08-11 PROCEDURE — 2700000000 HC OXYGEN THERAPY PER DAY

## 2020-08-11 PROCEDURE — 2000000000 HC ICU R&B

## 2020-08-11 PROCEDURE — 36592 COLLECT BLOOD FROM PICC: CPT

## 2020-08-11 PROCEDURE — 99024 POSTOP FOLLOW-UP VISIT: CPT | Performed by: ORTHOPAEDIC SURGERY

## 2020-08-11 PROCEDURE — 94003 VENT MGMT INPAT SUBQ DAY: CPT

## 2020-08-11 PROCEDURE — 99232 SBSQ HOSP IP/OBS MODERATE 35: CPT | Performed by: NURSE PRACTITIONER

## 2020-08-11 PROCEDURE — 84132 ASSAY OF SERUM POTASSIUM: CPT

## 2020-08-11 PROCEDURE — 80069 RENAL FUNCTION PANEL: CPT

## 2020-08-11 PROCEDURE — 99291 CRITICAL CARE FIRST HOUR: CPT | Performed by: INTERNAL MEDICINE

## 2020-08-11 PROCEDURE — 94760 N-INVAS EAR/PLS OXIMETRY 1: CPT

## 2020-08-11 PROCEDURE — 83735 ASSAY OF MAGNESIUM: CPT

## 2020-08-11 PROCEDURE — 94640 AIRWAY INHALATION TREATMENT: CPT

## 2020-08-11 RX ORDER — POTASSIUM CHLORIDE 29.8 MG/ML
20 INJECTION INTRAVENOUS PRN
Status: DISCONTINUED | OUTPATIENT
Start: 2020-08-11 | End: 2020-08-18 | Stop reason: HOSPADM

## 2020-08-11 RX ORDER — POTASSIUM CHLORIDE 750 MG/1
40 TABLET, FILM COATED, EXTENDED RELEASE ORAL ONCE
Status: DISCONTINUED | OUTPATIENT
Start: 2020-08-11 | End: 2020-08-11

## 2020-08-11 RX ADMIN — IPRATROPIUM BROMIDE AND ALBUTEROL SULFATE 1 AMPULE: .5; 3 SOLUTION RESPIRATORY (INHALATION) at 19:31

## 2020-08-11 RX ADMIN — GABAPENTIN 300 MG: 300 CAPSULE ORAL at 13:30

## 2020-08-11 RX ADMIN — ENOXAPARIN SODIUM 40 MG: 40 INJECTION SUBCUTANEOUS at 20:55

## 2020-08-11 RX ADMIN — Medication: at 03:00

## 2020-08-11 RX ADMIN — PROPOFOL 30 MCG/KG/MIN: 10 INJECTION, EMULSION INTRAVENOUS at 02:16

## 2020-08-11 RX ADMIN — GABAPENTIN 300 MG: 300 CAPSULE ORAL at 10:28

## 2020-08-11 RX ADMIN — POTASSIUM CHLORIDE 20 MEQ: 29.8 INJECTION, SOLUTION INTRAVENOUS at 11:10

## 2020-08-11 RX ADMIN — PROPOFOL 20 MCG/KG/MIN: 10 INJECTION, EMULSION INTRAVENOUS at 08:30

## 2020-08-11 RX ADMIN — Medication 50 MCG/HR: at 02:22

## 2020-08-11 RX ADMIN — PANTOPRAZOLE SODIUM 40 MG: 40 INJECTION, POWDER, FOR SOLUTION INTRAVENOUS at 10:27

## 2020-08-11 RX ADMIN — FUROSEMIDE 40 MG: 10 INJECTION, SOLUTION INTRAMUSCULAR; INTRAVENOUS at 10:27

## 2020-08-11 RX ADMIN — Medication 10 ML: at 10:27

## 2020-08-11 RX ADMIN — ANORECTAL OINTMENT 4 EACH: 15.7; .44; 24; 20.6 OINTMENT TOPICAL at 05:00

## 2020-08-11 RX ADMIN — CHLORHEXIDINE GLUCONATE 15 ML: 1.2 RINSE ORAL at 20:56

## 2020-08-11 RX ADMIN — FUROSEMIDE 40 MG: 10 INJECTION, SOLUTION INTRAMUSCULAR; INTRAVENOUS at 17:05

## 2020-08-11 RX ADMIN — PIPERACILLIN AND TAZOBACTAM 3.38 G: 3; .375 INJECTION, POWDER, LYOPHILIZED, FOR SOLUTION INTRAVENOUS at 17:40

## 2020-08-11 RX ADMIN — IPRATROPIUM BROMIDE AND ALBUTEROL SULFATE 1 AMPULE: .5; 3 SOLUTION RESPIRATORY (INHALATION) at 08:33

## 2020-08-11 RX ADMIN — Medication 10 ML: at 20:19

## 2020-08-11 RX ADMIN — PIPERACILLIN AND TAZOBACTAM 3.38 G: 3; .375 INJECTION, POWDER, LYOPHILIZED, FOR SOLUTION INTRAVENOUS at 10:28

## 2020-08-11 RX ADMIN — BUDESONIDE AND FORMOTEROL FUMARATE DIHYDRATE 2 PUFF: 160; 4.5 AEROSOL RESPIRATORY (INHALATION) at 08:33

## 2020-08-11 RX ADMIN — CHLORHEXIDINE GLUCONATE 15 ML: 1.2 RINSE ORAL at 10:19

## 2020-08-11 RX ADMIN — ENOXAPARIN SODIUM 40 MG: 40 INJECTION SUBCUTANEOUS at 10:27

## 2020-08-11 RX ADMIN — Medication 50 MCG/HR: at 13:29

## 2020-08-11 RX ADMIN — GABAPENTIN 300 MG: 300 CAPSULE ORAL at 20:55

## 2020-08-11 RX ADMIN — ATORVASTATIN CALCIUM 40 MG: 40 TABLET, FILM COATED ORAL at 10:28

## 2020-08-11 RX ADMIN — PIPERACILLIN AND TAZOBACTAM 3.38 G: 3; .375 INJECTION, POWDER, LYOPHILIZED, FOR SOLUTION INTRAVENOUS at 02:16

## 2020-08-11 RX ADMIN — PREDNISONE 40 MG: 20 TABLET ORAL at 10:28

## 2020-08-11 RX ADMIN — POTASSIUM CHLORIDE 20 MEQ: 29.8 INJECTION, SOLUTION INTRAVENOUS at 12:30

## 2020-08-11 RX ADMIN — Medication 10 ML: at 10:34

## 2020-08-11 RX ADMIN — IPRATROPIUM BROMIDE AND ALBUTEROL SULFATE 1 AMPULE: .5; 3 SOLUTION RESPIRATORY (INHALATION) at 15:54

## 2020-08-11 RX ADMIN — POTASSIUM CHLORIDE 20 MEQ: 29.8 INJECTION, SOLUTION INTRAVENOUS at 13:30

## 2020-08-11 RX ADMIN — IPRATROPIUM BROMIDE AND ALBUTEROL SULFATE 1 AMPULE: .5; 3 SOLUTION RESPIRATORY (INHALATION) at 11:54

## 2020-08-11 RX ADMIN — BUDESONIDE AND FORMOTEROL FUMARATE DIHYDRATE 2 PUFF: 160; 4.5 AEROSOL RESPIRATORY (INHALATION) at 19:31

## 2020-08-11 RX ADMIN — PROPOFOL 20 MCG/KG/MIN: 10 INJECTION, EMULSION INTRAVENOUS at 22:06

## 2020-08-11 RX ADMIN — Medication 50 MCG/HR: at 23:26

## 2020-08-11 RX ADMIN — PROPOFOL 20 MCG/KG/MIN: 10 INJECTION, EMULSION INTRAVENOUS at 15:49

## 2020-08-11 ASSESSMENT — PULMONARY FUNCTION TESTS
PIF_VALUE: 22
PIF_VALUE: 22
PIF_VALUE: 25
PIF_VALUE: 24
PIF_VALUE: 23
PIF_VALUE: 7
PIF_VALUE: 22
PIF_VALUE: 24
PIF_VALUE: 23
PIF_VALUE: 22
PIF_VALUE: 22
PIF_VALUE: 24
PIF_VALUE: 22
PIF_VALUE: 23
PIF_VALUE: 23
PIF_VALUE: 22
PIF_VALUE: 23
PIF_VALUE: 22
PIF_VALUE: 21
PIF_VALUE: 22
PIF_VALUE: 23
PIF_VALUE: 22
PIF_VALUE: 10
PIF_VALUE: 24
PIF_VALUE: 22
PIF_VALUE: 23
PIF_VALUE: 24
PIF_VALUE: 22
PIF_VALUE: 23
PIF_VALUE: 22
PIF_VALUE: 19

## 2020-08-11 ASSESSMENT — PAIN SCALES - GENERAL: PAINLEVEL_OUTOF10: 0

## 2020-08-11 NOTE — PROGRESS NOTES
Hospitalist Progress Note      PCP: Lupe Cho MD    Date of Admission: 8/6/2020      Subjective: intubated       Medications:  Reviewed    Infusion Medications    propofol      propofol 20 mcg/kg/min (08/11/20 0600)    fentaNYL 50 mcg/hr (08/11/20 0222)     Scheduled Medications    pantoprazole  40 mg Intravenous Daily    And    sodium chloride (PF)  10 mL Intravenous Daily    chlorhexidine  15 mL Mouth/Throat BID    sodium chloride flush  10 mL Intravenous 2 times per day    furosemide  40 mg Intravenous BID    piperacillin-tazobactam  3.375 g Intravenous Q8H    predniSONE  40 mg Oral Daily    enoxaparin  40 mg Subcutaneous BID    budesonide-formoterol  2 puff Inhalation BID    gabapentin  300 mg Oral TID    atorvastatin  40 mg Oral Daily    ipratropium-albuterol  1 ampule Inhalation Q4H WA     PRN Meds: polyvinyl alcohol, ammonium lactate, propofol, fentanNYL, menthol-zinc oxide, sodium chloride flush, acetaminophen **OR** acetaminophen, polyethylene glycol, promethazine **OR** ondansetron      Intake/Output Summary (Last 24 hours) at 8/11/2020 0652  Last data filed at 8/11/2020 0600  Gross per 24 hour   Intake 1706.44 ml   Output 4760 ml   Net -3053.56 ml       Physical Exam Performed:    BP (!) 99/56   Pulse 87   Temp 97.5 °F (36.4 °C) (Axillary)   Resp 20   Ht 5' 7\" (1.702 m)   Wt 216 lb 0.8 oz (98 kg)   SpO2 92%   BMI 33.84 kg/m²     General appearance: No apparent distress  Neck: Supple  Respiratory:  Much less wheezes   Cardiovascular: Regular rate and rhythm with normal S1/S2 without murmurs, rubs or gallops. Abdomen: Soft, non-tender, non-distended with normal bowel sounds. Musculoskeletal: No clubbing, cyanosis  Skin: Skin color, texture, turgor normal.  No rashes or lesions.   Neurologic:  Unable to examen   Psychiatric: sedated   Capillary Refill: Brisk,< 3 seconds   Peripheral Pulses: +2 palpable, equal bilaterally       Labs:   Recent Labs     08/09/20  0630 08/10/20  0520 08/11/20  0530   WBC 12.5* 10.9 13.6*   HGB 14.3 13.7 13.4   HCT 50.5* 44.5 42.9    169 192     Recent Labs     08/09/20  0630 08/10/20  0520 08/11/20  0530    141 142   K 5.6* 4.1 3.0*   CL 99 97* 95*   CO2 33* 33* 36*   BUN 33* 41* 49*   CREATININE 1.0 1.2 1.2   CALCIUM 8.9 8.8 8.2*   PHOS 4.9 2.2* 4.4     No results for input(s): AST, ALT, BILIDIR, BILITOT, ALKPHOS in the last 72 hours. No results for input(s): INR in the last 72 hours. No results for input(s): Laverda Dancer in the last 72 hours. Urinalysis:      Lab Results   Component Value Date    NITRU NEGATIVE 05/03/2013    WBCUA 6-10 05/03/2013    BACTERIA 1+ 05/03/2013    RBCUA 3-5 05/03/2013    BLOODU TRACE 05/03/2013    SPECGRAV 1.020 05/03/2013    GLUCOSEU NEGATIVE 05/03/2013       Radiology:  XR HIP LEFT (2-3 VIEWS)   Final Result      FLUORO FOR SURGICAL PROCEDURES   Final Result      XR CHEST PORTABLE   Final Result   Stable appearance of the chest with overall pattern representing vascular   congestion/pulmonary edema. XR CHEST PORTABLE   Final Result   Bilateral pleural effusions and bilateral airspace disease most compatible   with edema, increased since earlier today. The vilma appear prominent, likely related to enlarged pulmonary arteries. A   follow-up is recommended as hilar adenopathy is not excluded. Interval placement of a right arm PICC. XR CHEST PORTABLE   Final Result   Interval intubation. There is improved vascular congestion centrally, but   worsening airspace change in the right lower lung zone. CT Head WO Contrast   Final Result   No acute intracranial abnormality. CT Cervical Spine WO Contrast   Final Result   Multilevel postsurgical changes and degenerative changes. No acute osseous   abnormality. CT LUMBAR SPINE WO CONTRAST   Final Result   1. No acute finding in the lumbar spine.    2. Advanced degenerative changes that are most notable at L4-5 contributing   to severe spinal canal stenosis. XR LUMBAR SPINE (2-3 VIEWS)   Final Result   1. Moderately displaced intertrochanteric fracture of the proximal left femur. 2. Severe degenerative change in the left hip with moderate degenerative   change on the right. 3. Technically limited evaluation of the lumbar spine due to patient body   habitus. XR HIP 2-3 VW W PELVIS LEFT   Final Result   1. Moderately displaced intertrochanteric fracture of the proximal left femur. 2. Severe degenerative change in the left hip with moderate degenerative   change on the right. 3. Technically limited evaluation of the lumbar spine due to patient body   habitus. XR FEMUR LEFT (MIN 2 VIEWS)   Final Result   Intratrochanteric left hip fracture      Moderate to severe osteoarthritis of the left hip joint         XR CHEST PORTABLE   Final Result   Mild perihilar opacities and left basilar ground-glass opacities in the   chest.  Pattern may represent developing pulmonary edema or pneumonitis. Assessment/Plan:    Active Hospital Problems    Diagnosis    Acute respiratory failure with hypoxia ([facility] [J96.01]    Acute on chronic respiratory failure with hypercapnia (HCC) [J96.22]    Acute pulmonary edema (HCC) [J81.0]    Multifocal pneumonia [J18.9]    Rapid atrial fibrillation (HCC) [I48.91]    Chronic obstructive pulmonary disease ([address] [J44.9]    Hypoxemia requiring supplemental oxygen [R09.02, Z99.81]    Acute on chronic respiratory failure with hypoxia ([facility] [J96.21]    Fall [W19. XXXA]    Closed left hip fracture, initial encounter ([facility] [S72.002A]    Morbid obesity with BMI of 45.0-49.9, adult ([facility] [E66.01, Z68.42]     1. Acute hypoxic respiratory failure,  COPD exacerbation and pneumonia along with pulmonary edema. procal low, iv antibiotics, intubated, steroids, pulmonary consulted.    2. Possible COPD exacerbation, presented with SOB, wheezes, + leukocytosis CXR w/o consolidation or infiltrate. Placed on BiPAP in ED, then refused to wear BiPAP and was intubated after her PCO2 got worse and her encephalopathy got worse. 3. Acute on chronic respiratory failure with hypercapnia and encephalopathy, Rapid response was called on the floor, and patient was intubated and transferred to ICU. Pulmo following, still intubated, po steroids. 4. Acute encephalopathy, due to hypercapnia, now sedated. 5. Pneumonia, iv antibiotics as above. 6. L hip fracture, after mechanical fall. Ortho consulted, post op day 1.  7. A fib RVR,  new AM 8/9. Cardiology following, no OAC recommended at this time. 8. Hypokalemia, will replace  9. Sacral and elbows Wounds, wound care  10. Left kidney obstruction, chronic.  Has been followed by urology since earlier this year with plan for nephrectomy at some point which is been canceled multiple times due to COVID and other factors. 11. Essential Hypertension, monitor  12. Severe spinal stenosis, noted on CT. Outpatient neurosurg eval  13.  Tobacco abuse, counseled by my colleague during this       Diet: DIET TUBE FEED CONTINUOUS/CYCLIC NPO; STANDARD WITH FIBER; Orogastric; 25; 50  Code Status: Sony Sullivan MD

## 2020-08-11 NOTE — PROGRESS NOTES
1012 Bedside handoff completed with JEFFREY Gil RN. Patient remains intubated and sedated on ventilator, able to open eyes and follow commands then drifts back asleep when not engaged. IVFs infusing per pump without difficulty. Grace patent to gravity bedside drainage. Continues with bilateral soft wrist restraints. [facility] Dr Emili Raymond at bedside on AM rounds, new orders noted. 1140 Orthopedic NP, CARLOS ALBERTO Higuera, at bedside and changed Left Hip dressings, new orders noted. 1148 Sedation medications stopped for pending SBT. 1156  Patient able to wake up and follow commands. RT placed patient on SBT. 1254 RT spoke with Dr Emili Raymond and stated to place patient on previous settings, will continue with diuresis BID today and repeat SBT Weds 8/12 with possible extubation. 1259 Fentanyl and Propofol restarted at previous settings. 1736 Patient resting quietly in bed at present, remains intubated and sedated. IVFs infusing per pump without difficulty. Tolerating OG TF to present time. Grace patent to gravity bedside drainage. [address] Patient resting quietly in bed, no changes noted.   Electronically signed by Sancho Tanner RN on 8/11/2020 at 7:27 PM

## 2020-08-11 NOTE — PROGRESS NOTES
2000:Patient on mechanical ventilator,sedated,able to follow commands. It is noted 2 PIVs saline lock and PICC line in her RUE connected to propofol and fentanyl drip; goff catheter patent with urine yellow color; OG tube connected to feeding tube with Jevity 1.2 at 25 ml/hr. Lungs:breath sounds decreased in both hemithorax; skin:surgical incision in her left hip and lateral leg covered with gauze;edema:generalized. Call bell in reach. Side rails up x 3. Bed locked and low position. Cardiac monitor:NSR. Continue monitoring. 8/11/2020:  0300:Bed bath was given. linens and chux were replaced. Skin care was provided with Calmoseptine . Patient tolerate well. 0530:Blood was drawn for lab purposes.

## 2020-08-11 NOTE — PROGRESS NOTES
19:10 Shift handoff report received from Mohan Manning RN. Skin assessment completed and documented - see 4 Eyes skin assessment sheet for details. 19:55 Patient opens eyes to voice and is able to follow commands. She shakes her head no when asked re: any pain/discomfort. She is calm and cooperative. Checked left hip and drsgs. Checked ETT placement. Checked OG placement and residuals. Gave patient her proteinex through her OG tube - see diet orders. Checked PICC and PIV sites and flushed. Provided oral and goff care. Patient repositioned. Full assessment completed and documented - see assessment sheets for details. 22:00 Patient repositioned. 00:00 Checked OG placement and residuals. Checked left hip and drsgs. Checked ETT placement. Checked PICC and PIV sites. Bath given and gown and linens changed. Repositioned. Full assessment completed and documented - see assessment sheets for details. 02:00 Patient repositioned. 04:00 Checked OG placement and residuals. Checked left hip and drsgs. Checked ETT placement. Checked PICC and PIV sites. Repositioned. Full assessment completed and documented - see assessment sheets for details. 06:00 Patient repositioned. Left thigh/hip drsgs changed. PICC drsg changed. Morning labs drawn and sent to the lab for processing. Vent tubing and canisters changed.

## 2020-08-11 NOTE — OP NOTE
[address] [facility]                                OPERATIVE REPORT    PATIENT NAME: Alia Franklin                    :        1950  MED REC NO:   [id number]                          ROOM:       2105  ACCOUNT NO:   [de-identified]                           ADMIT DATE: 2020  PROVIDER:     Nelly Griffith MD      DATE OF PROCEDURE:  08/10/2020    PREOPERATIVE DIAGNOSES:  1. Left intertrochanteric hip fracture. 2.  Left hip severe osteoarthritis. 3.  Morbid obesity with BMI of 50.  4.  Respiratory failure, status post intubation. 5.  COPD and tobacco abuse. 6.  Atrial fibrillation with rapid ventricular response. 7.  Pneumonia. POSTOPERATIVE DIAGNOSES:  1. Left intertrochanteric hip fracture. 2.  Left hip severe osteoarthritis. 3.  Morbid obesity with BMI of 50.  4.  Respiratory failure, status post intubation. 5.  COPD and tobacco abuse. 6.  Atrial fibrillation with rapid ventricular response. 7.  Pneumonia. OPERATION PERFORMED:  Open treatment of left hip fracture with  cephalomedullary nail. SURGEON:  Nelly Griffith MD    ASSISTANT:  Omar Ruelas. BLOOD LOSS:  Approximately 500 mL. IMPLANTS:  1. Synthes left TFNA intermediate nail, 11 mm x 235 mm. 2.  06-JOYNER length TFNA helical blade. 3.  5.0 mm distal interlocking screw. INDICATIONS:  The patient is a 79-year-old female with extensive medical  history and morbid obesity with BMI of 50, who was admitted to the  hospital last week. She was found to have a left intertrochanteric hip  fracture. She was not cleared for surgery due to her respiratory  status. Her case was delayed. She ended being intubated on the floor  yesterday and admitted to the intensive care unit. She was cleared for  surgery today. Please refer to my preoperative note for full details of  my discussion with the patient's .   He wished to proceed. OPERATIVE PROCEDURE:  The patient was brought back to the OR directly  from the ICU. Again, she was already intubated. Traction boots were  placed to both feet. She was transferred onto the [facility] table and a  well-padded perineal post was used. All pressure points were well  padded. The legs were brought into the scissored position to facilitate  intraoperative imaging. The left operative extremity was brought into  slight adduction, rotation so that the patella was facing straight up,  and longitudinal traction. With this maneuver, the hip fracture did  reduce reasonably well. The entire left lower extremity up to the flank  was subsequently prepped and draped in the usual sterile fashion. A  full timeout was performed with all parties in agreement. The patient  is on scheduled Zosyn for her pneumonia, however, was re-dosed with  Ancef prior to the procedure. A longitudinal incision was made proximal to the greater trochanter, and  the incision was carried down past the skin and subcutaneous fat. The  patient had a very thick subcutaneous fat layer over 6 inches deep  proximally. Hemostasis was obtained as best as possible using  electrocautery and the deep fascia was incised in line with the skin  incision. Using fluoroscopic imaging, the starting point was obtained  with the starting awl and confirmed on orthogonal imaging. The imaging  was particularly difficult on the lateral view due to her body habitus. Once I was satisfied with the starting point, the awl was introduced to  the level of the lesser trochanter and the ball-tipped guidewire was  inserted. The opening reamer was used followed by sequential reaming. There was  chatter obtained at the 11 mm diameter reamer and I reamed up to size  13. The formal intermediate nail was opened and assembled on the back  table and then malleted down to the appropriate depth.   The external  targeter was then assembled and the trocar for the helical blade was  then inserted down to the lateral cortex after a small lateral hip  incision was made and the deep IT band was incised as well. The guidewire for the helical blade was then inserted and its position  was adjusted until I was happy with its position on orthogonal imaging. The goal was tip-apex distance of less than 25 mm in total.  Once I was  satisfied with the guidewire positioning, the cannulated drill was used. I decided on a 88-FF length helical blade, which was opened and then  assembled. It was then SIMEON SANCHE[facility] down to the appropriate position. I  then compressed through the helical blade and locked the proximal cap   screw down to the static position. Next, using the distal targeter, the distal interlocking screw was then  inserted. The jigs were removed and final fluoroscopic spot images were  obtained and then saved. The two separate incisions were thoroughly  irrigated with normal saline and infiltrated with Marcaine with  epinephrine. Topical tranexamic acid was also applied in the more  proximal large incision for hemostasis purposes. She could not receive  IV tranexamic acid. The wounds were then closed in a layered fashion. The proximal incision was closed in multiple layers. Deep fascia was  closed with 0 Vicryl sutures and the thick subcutaneous fat layer was  closed with multiple layers of #1 Vicryl suture. The skin was then  closed with inverted Vicryls and skin staples. The wounds were dressed  in the usual sterile fashion using water-occlusive dressing. The drapes  were removed. The patient was transferred onto the hospital bed where the traction  boots were removed. She was then transferred back to the ICU.     22-MODIFIER:  Given the patient's morbid obesity with BMI of 50, her  body habitus with thick subcutaneous fat layer of over 6 inches, the  entire procedure took over 100% longer than typical as well as required  100% more fluoroscopy time

## 2020-08-11 NOTE — PLAN OF CARE
Nonviolent/Non-Self-Destructive Behavior:  Goal: Absence of restraint-related injury  Description: Absence of restraint-related injury  Outcome: Ongoing     Problem: Nutrition  Goal: Optimal nutrition therapy  Outcome: Ongoing  Note: Patient on tube feeding with Jevity 1. 2. Tolerating well.

## 2020-08-11 NOTE — PLAN OF CARE
Problem: Urinary Elimination:  Goal: Signs and symptoms of infection will decrease  Description: Signs and symptoms of infection will decrease  Outcome: Ongoing     Goff catheter noted to be intact and secured with use of stat-lock. No signs of infection noted to urine or aidan area; including an absence of urine odor/discoloration, or aidan area drainage. Aidan care completed per shift and prn basis. Goff bag suspended from bed per protocol. Continued monitoring for risks of infection remains in place. Will re-assess clinical need for continued goff catheterization; Will discontinue per protocol/physician order. Problem: Infection - Central Venous Catheter-Associated Bloodstream Infection:  Goal: Will show no infection signs and symptoms  Description: Will show no infection signs and symptoms  8/11/2020 1223 by Meghan De La Cruz RN  Outcome: Ongoing  Site remains free of signs/symptoms for infection. No drainage, swelling, redness, pain, or warmth noted. Dressing changes continue per protocol; and on an as needed basis. Patient educated on proper Central Venous Catheter hygiene care.       Electronically signed by Meghan De La Cruz RN on 8/11/2020 at 12:26 PM

## 2020-08-11 NOTE — PROGRESS NOTES
mg, Oral, EVERY 6 HOURS PRN    ipratropium-albuterol (DUONEB) 0.5-2.5 (3) MG/3ML SOLN nebulizer solution 3 mLs, Inhalation, EVERY 2 HOURS PRN    simvastatin (ZOCOR) 80 mg, Oral, NIGHTLY        No Known Allergies    Social History:   reports that she has been smoking cigarettes. She has been smoking about 1.50 packs per day. She has never used smokeless tobacco. She reports current drug use. Drug: Marijuana. She reports that she does not drink alcohol. Family History:  family history is not on file. Review of Systems:  Unable to be obtained as pt is intubated.     Medications:  Scheduled Meds:   pantoprazole  40 mg Intravenous Daily    And    sodium chloride (PF)  10 mL Intravenous Daily    chlorhexidine  15 mL Mouth/Throat BID    sodium chloride flush  10 mL Intravenous 2 times per day    furosemide  40 mg Intravenous BID    piperacillin-tazobactam  3.375 g Intravenous Q8H    predniSONE  40 mg Oral Daily    enoxaparin  40 mg Subcutaneous BID    budesonide-formoterol  2 puff Inhalation BID    gabapentin  300 mg Oral TID    atorvastatin  40 mg Oral Daily    ipratropium-albuterol  1 ampule Inhalation Q4H WA      Continuous Infusions:   propofol      propofol Stopped (20 1148)    fentaNYL Stopped (20 1148)     PRN Meds:.potassium chloride, polyvinyl alcohol, ammonium lactate, propofol, fentanNYL, menthol-zinc oxide, sodium chloride flush, acetaminophen **OR** acetaminophen, polyethylene glycol, promethazine **OR** ondansetron     Physical Examination:  Vitals:    20 1200   BP: 101/67   Pulse: 85   Resp: 18   Temp: 98.6 °F (37 °C)   SpO2: 91%        Intake/Output Summary (Last 24 hours) at 2020 1252  Last data filed at 2020 1115  Gross per 24 hour   Intake 1776.44 ml   Output 4255 ml   Net -2478.56 ml     In: 1255.4 [I.V.:545.4; NG/GT:460]  Out: 3755    Wt Readings from Last 3 Encounters:   20 216 lb 0.8 oz (98 kg)     Temp  Av.4 °F (36.3 °C)  Min: 94.4 °F (34.7 °C)  Max: 98.8 °F (37.1 °C)  Pulse  Av.7  Min: 66  Max: 90  BP  Min: 89/61  Max: 149/83  SpO2  Av.3 %  Min: 78 %  Max: 100 %  FiO2   Av.9 %  Min: 40 %  Max: 100 %    · Telemetry: Sinus rhythm in 90s. · Constitutional: Alert, in no acute distress, able to follow simple comands. Appears stated age. · Head: Normocephalic and atraumatic. · Eyes: Conjunctivae normal. EOM are normal.   · Neck: Neck supple. No lymphadenopathy. No rigidity. No JVD present. · Cardiovascular: Normal rate, regular rhythm. No murmurs, rubs or gallops. No S3 or S4.  · Pulmonary/Chest: Rhonchi bilaterally. No wheezes or crackles. No respiratory accessory muscle use. · Abdominal: Soft. Normal bowel sounds present. No distension, No tenderness. · Musculoskeletal: No tenderness. 1+ BLE edema    · Lymphadenopathy: Has no cervical adenopathy. · Neurological: Alert and oriented. No gross deficits. · Skin: Skin is warm and dry. No rash, lesions, ulcerations noted. · Psychiatric: No anxiety nor agitation. Labs:  Reviewed. Recent Labs     20  0630 08/10/20  0520 08/11/20  0530    141 142   K 5.6* 4.1 3.0*   CL 99 97* 95*   CO2 33* 33* 36*   PHOS 4.9 2.2* 4.4   BUN 33* 41* 49*   CREATININE 1.0 1.2 1.2     Recent Labs     08/09/20  0630 08/10/20  0520 08/11/20  0530   WBC 12.5* 10.9 13.6*   HGB 14.3 13.7 13.4   HCT 50.5* 44.5 42.9   MCV 82.9 77.6* 76.5*    169 192     No results found for: CKTOTAL, CKMB, CKMBINDEX, TROPONINI  Lab Results   Component Value Date    BNP 61.3 2013     Lab Results   Component Value Date    PROTIME 11.8 2020    INR 1.02 2020     No results found for: CHOL, HDL, TRIG    Diagnostic and imaging results reviewed. EC20  SR at 87 BPM. Baseline artifact. Echo: 20  Summary:  Overall left ventricular ejection fraction is estimated to be 55-60%.   The left ventricular wall motion is normal.  There is mild concentric left ventricular hypertrophy. The diastolic function is impaired and classified as Grade 1 (impaired  relaxation). The Aortic Valve is mildly calcified. The systolic pulmonary artery pressure cannot be estimated due to insufficient  tricuspid regurgitation. The left atrium is mildly dilated. Stress: 5/22/19    IMPRESSION      Normal nuclear myocardial perfusion scan    Assessment & Plan:    Paroxysmal atrial fibrillation with RVR, new onset - stable   - has been in SR   - in the setting of respiratory failure, ?pneumonia, hip fracture also short in duration (~6 hours) converted while on Cardizem drip   - CHADS2-VASc at least 2 (gender, age), possibly 3 ? HTN (not on medication for this) - would not recommend anticoagulation at this time given short duration of A fib with other acute issues - if has documented recurrence in the future would consider adding anticoagulation    Acute hypoxic and hypercapnic respiratory failure   - on vent, management per pulmonary   - ?pneumonia underlying as well, also getting diuresed with good output net -7L    L hip fracture   - s/p L hip ORIF, POD 1    HLD   - on statin    COPD    Pt is stable from EP standpoint, will sign off. Please let us know if any issues arise.     GABRIELA Edwards  [facility], [address] [place] [address]  Phone: [phone]  Fax: [phone]    Electronically signed by GABRIELA Dougherty - CNP on 8/11/2020 at 12:52 PM

## 2020-08-11 NOTE — PROGRESS NOTES
Pulmonary Progress Note    Date of Admission: 8/6/2020   LOS: 5 days     CC:  Chief Complaint   Patient presents with    Back Pain     mechanical fall at home. +hypoxic on arrival. hx of COPD. does not normally wear O2. on 3L nc on arrival. room air O2 sat 76%.  Leg Pain     left leg pain        HPI/Subjective  Requirements continue to decline FiO2 remains a 50% with PEEP is down to 5. Status post hip repair yesterday. ROS:   Unable to obtain due to mechanical ventilation      Intake/Output Summary (Last 24 hours) at 8/11/2020 0833  Last data filed at 8/11/2020 0600  Gross per 24 hour   Intake 1666.44 ml   Output 4675 ml   Net -3008.56 ml         PHYSICAL EXAM:   Blood pressure (!) 99/56, pulse 87, temperature 97.5 °F (36.4 °C), temperature source Axillary, resp. rate 20, height 5' 7\" (1.702 m), weight 216 lb 0.8 oz (98 kg), SpO2 92 %.'  Gen:  No acute distress. Eyes: PERRL. Anicteric sclera. No conjunctival injection. ENT: No discharge. Pharynx with ET tube. External appearance of ears and nose normal.  Neck: Trachea midline. No mass   Resp:  No crackles. No wheezes. No rhonchi. No dullness on percussion. CV: Regular rate. Regular rhythm. No murmur or rub. No edema. GI: Soft, Non-tender. Non-distended. +BS  Skin: Warm, dry, w/o erythema. Lymph: No cervical or supraclavicular LAD. M/S: No cyanosis. No clubbing.     Neuro: Intubated and sedated    Medications:    Scheduled Meds:   potassium chloride  40 mEq Oral Once    pantoprazole  40 mg Intravenous Daily    And    sodium chloride (PF)  10 mL Intravenous Daily    chlorhexidine  15 mL Mouth/Throat BID    sodium chloride flush  10 mL Intravenous 2 times per day    furosemide  40 mg Intravenous BID    piperacillin-tazobactam  3.375 g Intravenous Q8H    predniSONE  40 mg Oral Daily    enoxaparin  40 mg Subcutaneous BID    budesonide-formoterol  2 puff Inhalation BID    gabapentin  300 mg Oral TID    atorvastatin  40 mg Oral Daily    ipratropium-albuterol  1 ampule Inhalation Q4H WA       Continuous Infusions:   propofol      propofol 20 mcg/kg/min (08/11/20 0600)    fentaNYL 50 mcg/hr (08/11/20 0222)       PRN Meds:  polyvinyl alcohol, ammonium lactate, propofol, fentanNYL, menthol-zinc oxide, sodium chloride flush, acetaminophen **OR** acetaminophen, polyethylene glycol, promethazine **OR** ondansetron    Labs reviewed:  CBC:   Recent Labs     08/09/20  0630 08/10/20  0520 08/11/20  0530   WBC 12.5* 10.9 13.6*   HGB 14.3 13.7 13.4   HCT 50.5* 44.5 42.9   MCV 82.9 77.6* 76.5*    169 192     BMP:   Recent Labs     08/09/20  0630 08/10/20  0520 08/11/20  0530    141 142   K 5.6* 4.1 3.0*   CL 99 97* 95*   CO2 33* 33* 36*   PHOS 4.9 2.2* 4.4   BUN 33* 41* 49*   CREATININE 1.0 1.2 1.2     LIVER PROFILE: No results for input(s): AST, ALT, LIPASE, BILIDIR, BILITOT, ALKPHOS in the last 72 hours. Invalid input(s): AMYLASE,  ALB  PT/INR:   No results for input(s): PROTIME, INR in the last 72 hours. APTT:   No results for input(s): APTT in the last 72 hours. UA:No results for input(s): NITRITE, COLORU, PHUR, LABCAST, WBCUA, RBCUA, MUCUS, TRICHOMONAS, YEAST, BACTERIA, CLARITYU, SPECGRAV, LEUKOCYTESUR, UROBILINOGEN, BILIRUBINUR, BLOODU, GLUCOSEU, AMORPHOUS in the last 72 hours. Invalid input(s): Carroll People  No results for input(s): PH, PCO2, PO2 in the last 72 hours. Films:  Radiology Review:  Pertinent images / reports were reviewed as a part of this visit. CT Chest w/ contrast: No results found for this or any previous visit. CT Chest w/o contrast: No results found for this or any previous visit. CTPA: No results found for this or any previous visit. CXR PA/LAT:   Results for orders placed during the hospital encounter of 08/04/15   XR Chest Standard TWO VW    Narrative    INDICATION: COPD. Hypoxia. FINDINGS: Two views. Comparison is made to study dated 5/5/2013.   The lungs are hyperexpanded without focal down to minimal vent settings, plan for SAT/SBT  -On Lasix, dose twice daily  -On Zosyn x7 days, stop date in  -Solu-Medrol x5 days  -DuoNeb scheduled, Symbicort twice daily  -Post hip repair yesterday    Due to the immediate potential for life-threatening deterioration due to respiratory failure, I spent 33 minutes providing critical care. This time is excluding time spent performing separately billable procedures.       Thank you for this consult,    Ismael Baldwin [address] [facility]

## 2020-08-11 NOTE — PROGRESS NOTES
[facility] Orthopedic Surgery   Progress Note      S/P :  SUBJECTIVE  In ICU on vent. Opens eyes to name. Follows simple command. She does not appear in distress. Post left hip ORIF yesterday per Dr Radha Macias    OBJECTIVE              Physical                      VITALS:  BP (!) 92/53   Pulse 82   Temp 98.6 °F (37 °C) (Axillary)   Resp 20   Ht 5' 7\" (1.702 m)   Wt 216 lb 0.8 oz (98 kg)   SpO2 93%   BMI 33.84 kg/m²                     MUSCULOSKELETAL:  Left foot wiggles to command. Left foot with palpable DP pulse. Bilateral LE with swelling, erythema and scaling, chronic in appearance. Left hip dressing saturated with serous drainage. Staples intact at left hip and cleansed with hibiclens swab then redressed in AG dressing covered with ABD pads.                     NEUROLOGIC:                                  Sensory:  Touch:  Left Lower Extremity:  normal                                        Data       CBC:   Lab Results   Component Value Date    WBC 13.6 08/11/2020    RBC 5.61 08/11/2020    HGB 13.4 08/11/2020    HCT 42.9 08/11/2020    MCV 76.5 08/11/2020    MCH 23.8 08/11/2020    MCHC 31.2 08/11/2020    RDW 19.4 08/11/2020     08/11/2020    MPV 7.4 08/11/2020        WBC:    Lab Results   Component Value Date    WBC 13.6 08/11/2020        Hemoglobin/Hematocrit:    Lab Results   Component Value Date    HGB 13.4 08/11/2020    HCT 42.9 08/11/2020        PT/INR:    Lab Results   Component Value Date    PROTIME 11.8 08/08/2020    INR 1.02 08/08/2020              Current Inpatient Medications             Current Facility-Administered Medications: potassium chloride 20 mEq/50 mL IVPB (Central Line), 20 mEq, Intravenous, PRN  polyvinyl alcohol (LIQUIFILM TEARS) 1.4 % ophthalmic solution 1 drop, 1 drop, Both Eyes, PRN  ammonium lactate (LAC-HYDRIN) 12 % lotion, , Topical, PRN  propofol injection 120 mg, 12 mL, Intravenous, Continuous PRN  propofol injection, 10 mcg/kg/min, Intravenous, Titrated  fentaNYL 10 mcg/mL infusion, 25 mcg/hr, Intravenous, Continuous  fentaNYL (SUBLIMAZE) injection 25 mcg, 25 mcg, Intravenous, Q1H PRN  menthol-zinc oxide (CALMOSEPTINE) 0.44-20.6 % ointment 1-4 each, 1-4 each, Topical, 4x Daily PRN  pantoprazole (PROTONIX) injection 40 mg, 40 mg, Intravenous, Daily **AND** sodium chloride (PF) 0.9 % injection 10 mL, 10 mL, Intravenous, Daily  chlorhexidine (PERIDEX) 0.12 % solution 15 mL, 15 mL, Mouth/Throat, BID  sodium chloride flush 0.9 % injection 10 mL, 10 mL, Intravenous, 2 times per day  sodium chloride flush 0.9 % injection 10 mL, 10 mL, Intravenous, PRN  furosemide (LASIX) injection 40 mg, 40 mg, Intravenous, BID  piperacillin-tazobactam (ZOSYN) 3.375 g in dextrose 5 % 100 mL IVPB extended infusion (mini-bag), 3.375 g, Intravenous, Q8H  predniSONE (DELTASONE) tablet 40 mg, 40 mg, Oral, Daily  enoxaparin (LOVENOX) injection 40 mg, 40 mg, Subcutaneous, BID  budesonide-formoterol (SYMBICORT) 160-4.5 MCG/ACT inhaler 2 puff, 2 puff, Inhalation, BID  gabapentin (NEURONTIN) capsule 300 mg, 300 mg, Oral, TID  atorvastatin (LIPITOR) tablet 40 mg, 40 mg, Oral, Daily  acetaminophen (TYLENOL) tablet 650 mg, 650 mg, Oral, Q6H PRN **OR** acetaminophen (TYLENOL) suppository 650 mg, 650 mg, Rectal, Q6H PRN  polyethylene glycol (GLYCOLAX) packet 17 g, 17 g, Oral, Daily PRN  promethazine (PHENERGAN) tablet 12.5 mg, 12.5 mg, Oral, Q6H PRN **OR** ondansetron (ZOFRAN) injection 4 mg, 4 mg, Intravenous, Q6H PRN  ipratropium-albuterol (DUONEB) nebulizer solution 1 ampule, 1 ampule, Inhalation, Q4H WA    ASSESSMENT AND PLAN    Fall  Left hip pain  Left IT hip fx  Back pain, CT noted L4.5 stenosis, no fx reported. Morbid obesity  COPD with low O2,Hypoxia, had refused BIPAP, agonal breathing, sent to ICU and intubated now, stable VS, awakens to name. Pulm following.    Post ORIF left hip yesterday per Dr Salma Lagunas, stable exam. Serous drainage, change dressing prn saturation  May be LLE 50% partial weight bearing when able to be out of bed  Lovenox for DVT prophylaxis    Ney [facility]  8/11/2020  11:31 AM

## 2020-08-11 NOTE — PROGRESS NOTES
4 Eyes Skin Assessment     The patient is being assess for  Shift Handoff    I agree that 2 RN's have performed a thorough Head to Toe Skin Assessment on the patient. ALL assessment sites listed below have been assessed. Areas assessed by both nurses: Berna/Jackelin  [x]   Head, Face, and Ears   [x]   Shoulders, Back, and Chest  [x]   Arms, Elbows, and Hands   [x]   Coccyx, Sacrum, and IschIum  [x]   Legs, Feet, and Heels        Does the Patient have Skin Breakdown?   Yes LDA WOUND CARE was Initiated documentation include the Ginny-wound, Wound Assessment, Measurements, Dressing Treatment, Drainage, and Color\",         Nigel Prevention initiated:  Yes   Wound Care Orders initiated:  Yes      [id number] nurse consulted for Pressure Injury (Stage 3,4, Unstageable, DTI, NWPT, and Complex wounds), New and Established Ostomies:  Yes      Nurse 1 eSignature: Electronically signed by Sudeep Meek RN on 8/11/20 at 7:26 PM EDT    **SHARE this note so that the co-signing nurse is able to place an eSignature**    Nurse 2 eSignature: Electronically signed by Filomena Jones RN on 8/11/20 at 7:27 PM EDT

## 2020-08-11 NOTE — PLAN OF CARE
Problem: Falls - Risk of:  Goal: Will remain free from falls  Description: Will remain free from falls  Outcome: Ongoing   Falling star program remains in place. Call light and personal belongings within reach. Frequent visual monitoring continues. Toileting program inplace. Patient dependent on staff for  turning/repositioning at least once every 2 hours, and on a prn basis, utilizing Maxi move lan lift. Problem: Skin Integrity:  Goal: Will show no infection signs and symptoms  Description: Will show no infection signs and symptoms  Outcome: Ongoing  Monitoring patient skin integrity for skin breakdown, turning and repositioning q2h with pillow support per protocol. Patient dependent on staff for turning and repositioning self, Maxi move lan lift utilized. Problem: Pain:  Goal: Pain level will decrease  Description: Pain level will decrease  Outcome: Ongoing   Continuing to monitor pain and discomfort. Monitoring pain level on scale of 0-10. Non- pharmacological measures encouraged to reduce discomfort/pain. PRN pain meds administeration continues when/if applicable as ordered by physician. Problem: Restraint Use - Nonviolent/Non-Self-Destructive Behavior:  Goal: Absence of restraint indications  Description: Absence of restraint indications  Outcome: Ongoing   Continues with bilateral soft wrist restraints to prevent self extubation. See flow sheet. Problem: Nutrition  Goal: Optimal nutrition therapy  Outcome: Ongoing   Continues with OG tube feeding at goal rate, tolerating to present time. Continuing to monitor.   Electronically signed by Jose Ramon Wills RN on 8/11/2020 at 12:13 PM

## 2020-08-12 LAB
ALBUMIN SERPL-MCNC: 2.6 G/DL (ref 3.4–5)
ANION GAP SERPL CALCULATED.3IONS-SCNC: 10 MMOL/L (ref 3–16)
BUN BLDV-MCNC: 50 MG/DL (ref 7–20)
CALCIUM SERPL-MCNC: 7.7 MG/DL (ref 8.3–10.6)
CHLORIDE BLD-SCNC: 93 MMOL/L (ref 99–110)
CO2: 38 MMOL/L (ref 21–32)
CREAT SERPL-MCNC: 1 MG/DL (ref 0.6–1.2)
GFR AFRICAN AMERICAN: >60
GFR NON-AFRICAN AMERICAN: 55
GLUCOSE BLD-MCNC: 126 MG/DL (ref 70–99)
HCT VFR BLD CALC: 41.2 % (ref 36–48)
HEMOGLOBIN: 12.6 G/DL (ref 12–16)
MAGNESIUM: 1.9 MG/DL (ref 1.8–2.4)
MCH RBC QN AUTO: 23.6 PG (ref 26–34)
MCHC RBC AUTO-ENTMCNC: 30.6 G/DL (ref 31–36)
MCV RBC AUTO: 76.9 FL (ref 80–100)
PDW BLD-RTO: 19.4 % (ref 12.4–15.4)
PHOSPHORUS: 4.6 MG/DL (ref 2.5–4.9)
PLATELET # BLD: 162 K/UL (ref 135–450)
PMV BLD AUTO: 7.5 FL (ref 5–10.5)
POTASSIUM SERPL-SCNC: 3.5 MMOL/L (ref 3.5–5.1)
RBC # BLD: 5.36 M/UL (ref 4–5.2)
SODIUM BLD-SCNC: 141 MMOL/L (ref 136–145)
WBC # BLD: 12.5 K/UL (ref 4–11)

## 2020-08-12 PROCEDURE — 94760 N-INVAS EAR/PLS OXIMETRY 1: CPT

## 2020-08-12 PROCEDURE — 36592 COLLECT BLOOD FROM PICC: CPT

## 2020-08-12 PROCEDURE — 6370000000 HC RX 637 (ALT 250 FOR IP): Performed by: INTERNAL MEDICINE

## 2020-08-12 PROCEDURE — 83735 ASSAY OF MAGNESIUM: CPT

## 2020-08-12 PROCEDURE — 99291 CRITICAL CARE FIRST HOUR: CPT | Performed by: INTERNAL MEDICINE

## 2020-08-12 PROCEDURE — 97530 THERAPEUTIC ACTIVITIES: CPT

## 2020-08-12 PROCEDURE — 94750 HC PULMONARY COMPLIANCE STUDY: CPT

## 2020-08-12 PROCEDURE — 97162 PT EVAL MOD COMPLEX 30 MIN: CPT

## 2020-08-12 PROCEDURE — 6360000002 HC RX W HCPCS: Performed by: INTERNAL MEDICINE

## 2020-08-12 PROCEDURE — 99024 POSTOP FOLLOW-UP VISIT: CPT | Performed by: NURSE PRACTITIONER

## 2020-08-12 PROCEDURE — 85027 COMPLETE CBC AUTOMATED: CPT

## 2020-08-12 PROCEDURE — 97167 OT EVAL HIGH COMPLEX 60 MIN: CPT

## 2020-08-12 PROCEDURE — 2000000000 HC ICU R&B

## 2020-08-12 PROCEDURE — 80069 RENAL FUNCTION PANEL: CPT

## 2020-08-12 PROCEDURE — 2580000003 HC RX 258: Performed by: INTERNAL MEDICINE

## 2020-08-12 PROCEDURE — C9113 INJ PANTOPRAZOLE SODIUM, VIA: HCPCS | Performed by: INTERNAL MEDICINE

## 2020-08-12 PROCEDURE — 94640 AIRWAY INHALATION TREATMENT: CPT

## 2020-08-12 PROCEDURE — 2700000000 HC OXYGEN THERAPY PER DAY

## 2020-08-12 PROCEDURE — 94003 VENT MGMT INPAT SUBQ DAY: CPT

## 2020-08-12 RX ORDER — OXYCODONE HYDROCHLORIDE 5 MG/1
5 TABLET ORAL EVERY 6 HOURS PRN
Status: DISCONTINUED | OUTPATIENT
Start: 2020-08-12 | End: 2020-08-18 | Stop reason: HOSPADM

## 2020-08-12 RX ORDER — ACETAZOLAMIDE 250 MG/1
250 TABLET ORAL DAILY
Status: DISCONTINUED | OUTPATIENT
Start: 2020-08-12 | End: 2020-08-17

## 2020-08-12 RX ORDER — FUROSEMIDE 10 MG/ML
40 INJECTION INTRAMUSCULAR; INTRAVENOUS DAILY
Status: DISCONTINUED | OUTPATIENT
Start: 2020-08-13 | End: 2020-08-17

## 2020-08-12 RX ADMIN — IPRATROPIUM BROMIDE AND ALBUTEROL SULFATE 1 AMPULE: .5; 3 SOLUTION RESPIRATORY (INHALATION) at 20:35

## 2020-08-12 RX ADMIN — PIPERACILLIN AND TAZOBACTAM 3.38 G: 3; .375 INJECTION, POWDER, LYOPHILIZED, FOR SOLUTION INTRAVENOUS at 18:22

## 2020-08-12 RX ADMIN — ENOXAPARIN SODIUM 40 MG: 40 INJECTION SUBCUTANEOUS at 08:37

## 2020-08-12 RX ADMIN — ATORVASTATIN CALCIUM 40 MG: 40 TABLET, FILM COATED ORAL at 08:38

## 2020-08-12 RX ADMIN — Medication 10 ML: at 08:38

## 2020-08-12 RX ADMIN — PIPERACILLIN AND TAZOBACTAM 3.38 G: 3; .375 INJECTION, POWDER, LYOPHILIZED, FOR SOLUTION INTRAVENOUS at 02:15

## 2020-08-12 RX ADMIN — FUROSEMIDE 40 MG: 10 INJECTION, SOLUTION INTRAMUSCULAR; INTRAVENOUS at 08:38

## 2020-08-12 RX ADMIN — BUDESONIDE AND FORMOTEROL FUMARATE DIHYDRATE 2 PUFF: 160; 4.5 AEROSOL RESPIRATORY (INHALATION) at 20:35

## 2020-08-12 RX ADMIN — OXYCODONE 5 MG: 5 TABLET ORAL at 11:17

## 2020-08-12 RX ADMIN — GABAPENTIN 300 MG: 300 CAPSULE ORAL at 08:38

## 2020-08-12 RX ADMIN — IPRATROPIUM BROMIDE AND ALBUTEROL SULFATE 1 AMPULE: .5; 3 SOLUTION RESPIRATORY (INHALATION) at 16:28

## 2020-08-12 RX ADMIN — POTASSIUM CHLORIDE 20 MEQ: 29.8 INJECTION, SOLUTION INTRAVENOUS at 08:38

## 2020-08-12 RX ADMIN — PIPERACILLIN AND TAZOBACTAM 3.38 G: 3; .375 INJECTION, POWDER, LYOPHILIZED, FOR SOLUTION INTRAVENOUS at 10:31

## 2020-08-12 RX ADMIN — GABAPENTIN 300 MG: 300 CAPSULE ORAL at 17:03

## 2020-08-12 RX ADMIN — Medication 10 ML: at 19:16

## 2020-08-12 RX ADMIN — PANTOPRAZOLE SODIUM 40 MG: 40 INJECTION, POWDER, FOR SOLUTION INTRAVENOUS at 08:37

## 2020-08-12 RX ADMIN — IPRATROPIUM BROMIDE AND ALBUTEROL SULFATE 1 AMPULE: .5; 3 SOLUTION RESPIRATORY (INHALATION) at 07:28

## 2020-08-12 RX ADMIN — Medication 10 ML: at 08:37

## 2020-08-12 RX ADMIN — ENOXAPARIN SODIUM 40 MG: 40 INJECTION SUBCUTANEOUS at 19:15

## 2020-08-12 RX ADMIN — POTASSIUM CHLORIDE 20 MEQ: 29.8 INJECTION, SOLUTION INTRAVENOUS at 10:32

## 2020-08-12 RX ADMIN — ACETAMINOPHEN 650 MG: 325 TABLET ORAL at 10:37

## 2020-08-12 RX ADMIN — CHLORHEXIDINE GLUCONATE 15 ML: 1.2 RINSE ORAL at 08:37

## 2020-08-12 RX ADMIN — BUDESONIDE AND FORMOTEROL FUMARATE DIHYDRATE 2 PUFF: 160; 4.5 AEROSOL RESPIRATORY (INHALATION) at 07:28

## 2020-08-12 RX ADMIN — OXYCODONE 5 MG: 5 TABLET ORAL at 21:26

## 2020-08-12 RX ADMIN — PREDNISONE 40 MG: 20 TABLET ORAL at 08:38

## 2020-08-12 RX ADMIN — GABAPENTIN 300 MG: 300 CAPSULE ORAL at 19:15

## 2020-08-12 RX ADMIN — IPRATROPIUM BROMIDE AND ALBUTEROL SULFATE 1 AMPULE: .5; 3 SOLUTION RESPIRATORY (INHALATION) at 11:29

## 2020-08-12 RX ADMIN — ACETAZOLAMIDE 250 MG: 250 TABLET ORAL at 10:38

## 2020-08-12 ASSESSMENT — PULMONARY FUNCTION TESTS
PIF_VALUE: 22
PIF_VALUE: 14
PIF_VALUE: 11
PIF_VALUE: 21
PIF_VALUE: 29
PIF_VALUE: 8
PIF_VALUE: 23
PIF_VALUE: 24
PIF_VALUE: 23

## 2020-08-12 ASSESSMENT — PAIN DESCRIPTION - PAIN TYPE
TYPE: SURGICAL PAIN

## 2020-08-12 ASSESSMENT — PAIN SCALES - GENERAL
PAINLEVEL_OUTOF10: 10
PAINLEVEL_OUTOF10: 0
PAINLEVEL_OUTOF10: 0
PAINLEVEL_OUTOF10: 10
PAINLEVEL_OUTOF10: 6
PAINLEVEL_OUTOF10: 10
PAINLEVEL_OUTOF10: 0
PAINLEVEL_OUTOF10: 3
PAINLEVEL_OUTOF10: 0

## 2020-08-12 ASSESSMENT — PAIN DESCRIPTION - DESCRIPTORS
DESCRIPTORS: THROBBING
DESCRIPTORS: STABBING
DESCRIPTORS: STABBING

## 2020-08-12 ASSESSMENT — PAIN DESCRIPTION - ONSET
ONSET: ON-GOING
ONSET: ON-GOING

## 2020-08-12 ASSESSMENT — PAIN DESCRIPTION - FREQUENCY
FREQUENCY: CONTINUOUS
FREQUENCY: CONTINUOUS

## 2020-08-12 ASSESSMENT — PAIN DESCRIPTION - PROGRESSION
CLINICAL_PROGRESSION: GRADUALLY IMPROVING
CLINICAL_PROGRESSION: GRADUALLY WORSENING

## 2020-08-12 ASSESSMENT — PAIN DESCRIPTION - LOCATION
LOCATION: HIP

## 2020-08-12 ASSESSMENT — PAIN - FUNCTIONAL ASSESSMENT
PAIN_FUNCTIONAL_ASSESSMENT: PREVENTS OR INTERFERES WITH MANY ACTIVE NOT PASSIVE ACTIVITIES
PAIN_FUNCTIONAL_ASSESSMENT: PREVENTS OR INTERFERES WITH MANY ACTIVE NOT PASSIVE ACTIVITIES

## 2020-08-12 ASSESSMENT — PAIN DESCRIPTION - ORIENTATION
ORIENTATION: LEFT

## 2020-08-12 NOTE — PROGRESS NOTES
Hospitalist Progress Note      PCP: Alysha Petersen MD    Date of Admission: 8/6/2020        Subjective: intubated, awake, nurse at bedside. Medications:  Reviewed    Infusion Medications    propofol      propofol 15 mcg/kg/min (08/11/20 2313)    fentaNYL 50 mcg/hr (08/11/20 2326)     Scheduled Medications    pantoprazole  40 mg Intravenous Daily    And    sodium chloride (PF)  10 mL Intravenous Daily    chlorhexidine  15 mL Mouth/Throat BID    sodium chloride flush  10 mL Intravenous 2 times per day    furosemide  40 mg Intravenous BID    piperacillin-tazobactam  3.375 g Intravenous Q8H    predniSONE  40 mg Oral Daily    enoxaparin  40 mg Subcutaneous BID    budesonide-formoterol  2 puff Inhalation BID    gabapentin  300 mg Oral TID    atorvastatin  40 mg Oral Daily    ipratropium-albuterol  1 ampule Inhalation Q4H WA     PRN Meds: potassium chloride, polyvinyl alcohol, ammonium lactate, propofol, fentanNYL, menthol-zinc oxide, sodium chloride flush, acetaminophen **OR** acetaminophen, polyethylene glycol, promethazine **OR** ondansetron      Intake/Output Summary (Last 24 hours) at 8/12/2020 0748  Last data filed at 8/12/2020 0600  Gross per 24 hour   Intake 1961.3 ml   Output 3350 ml   Net -1388.7 ml       Physical Exam Performed:    BP (!) 100/56   Pulse 88   Temp 98.6 °F (37 °C) (Axillary)   Resp 18   Ht 5' 7\" (1.702 m)   Wt 215 lb 2.7 oz (97.6 kg)   SpO2 93%   BMI 33.70 kg/m²     General appearance: No apparent distress  Neck: Supple  Respiratory:  Expiratory wheezes   Cardiovascular: Regular rate and rhythm with normal S1/S2 without murmurs, rubs or gallops. Abdomen: Soft, non-tender, non-distended with normal bowel sounds. Musculoskeletal: No clubbing, cyanosis   Skin: Skin color, texture, turgor normal.  No rashes or lesions.   Neurologic:  Moving all extremities   Psychiatric: Awake   Capillary Refill: Brisk,< 3 seconds   Peripheral Pulses: +2 palpable, equal bilaterally Labs:   Recent Labs     08/10/20  0520 08/11/20  0530 08/12/20  0600   WBC 10.9 13.6* 12.5*   HGB 13.7 13.4 12.6   HCT 44.5 42.9 41.2    192 162     Recent Labs     08/10/20  0520 08/11/20  0530 08/11/20  1535 08/12/20  0600    142  --  141   K 4.1 3.0* 4.2 3.5   CL 97* 95*  --  93*   CO2 33* 36*  --  38*   BUN 41* 49*  --  50*   CREATININE 1.2 1.2  --  1.0   CALCIUM 8.8 8.2*  --  7.7*   PHOS 2.2* 4.4  --  4.6     No results for input(s): AST, ALT, BILIDIR, BILITOT, ALKPHOS in the last 72 hours. No results for input(s): INR in the last 72 hours. No results for input(s): Valentino Bun in the last 72 hours. Urinalysis:      Lab Results   Component Value Date    NITRU NEGATIVE 05/03/2013    WBCUA 6-10 05/03/2013    BACTERIA 1+ 05/03/2013    RBCUA 3-5 05/03/2013    BLOODU TRACE 05/03/2013    SPECGRAV 1.020 05/03/2013    GLUCOSEU NEGATIVE 05/03/2013       Radiology:  XR HIP LEFT (2-3 VIEWS)   Final Result      FLUORO FOR SURGICAL PROCEDURES   Final Result      XR CHEST PORTABLE   Final Result   Stable appearance of the chest with overall pattern representing vascular   congestion/pulmonary edema. XR CHEST PORTABLE   Final Result   Bilateral pleural effusions and bilateral airspace disease most compatible   with edema, increased since earlier today. The vilma appear prominent, likely related to enlarged pulmonary arteries. A   follow-up is recommended as hilar adenopathy is not excluded. Interval placement of a right arm PICC. XR CHEST PORTABLE   Final Result   Interval intubation. There is improved vascular congestion centrally, but   worsening airspace change in the right lower lung zone. CT Head WO Contrast   Final Result   No acute intracranial abnormality. CT Cervical Spine WO Contrast   Final Result   Multilevel postsurgical changes and degenerative changes. No acute osseous   abnormality. CT LUMBAR SPINE WO CONTRAST   Final Result   1. No acute finding in the lumbar spine. 2. Advanced degenerative changes that are most notable at L4-5 contributing   to severe spinal canal stenosis. XR LUMBAR SPINE (2-3 VIEWS)   Final Result   1. Moderately displaced intertrochanteric fracture of the proximal left femur. 2. Severe degenerative change in the left hip with moderate degenerative   change on the right. 3. Technically limited evaluation of the lumbar spine due to patient body   habitus. XR HIP 2-3 VW W PELVIS LEFT   Final Result   1. Moderately displaced intertrochanteric fracture of the proximal left femur. 2. Severe degenerative change in the left hip with moderate degenerative   change on the right. 3. Technically limited evaluation of the lumbar spine due to patient body   habitus. XR FEMUR LEFT (MIN 2 VIEWS)   Final Result   Intratrochanteric left hip fracture      Moderate to severe osteoarthritis of the left hip joint         XR CHEST PORTABLE   Final Result   Mild perihilar opacities and left basilar ground-glass opacities in the   chest.  Pattern may represent developing pulmonary edema or pneumonitis. Assessment/Plan:    Active Hospital Problems    Diagnosis    Acute respiratory failure with hypoxia ([facility] [J96.01]    Acute on chronic respiratory failure with hypercapnia (HCC) [J96.22]    Acute pulmonary edema (HCC) [J81.0]    Multifocal pneumonia [J18.9]    Rapid atrial fibrillation (HCC) [I48.91]    Chronic obstructive pulmonary disease ([address] [J44.9]    Hypoxemia requiring supplemental oxygen [R09.02, Z99.81]    Acute on chronic respiratory failure with hypoxia ([address] [J96.21]    Fall [W19. XXXA]    [facility] [S72.002A]    Morbid obesity with BMI of 45.0-49.9, adult ([facility] [E66.01, Z68.42]     1. Acute hypoxic respiratory failure,  COPD exacerbation and pneumonia along with pulmonary edema.  procal low, iv antibiotics, intubated, steroids, pulmonary consulted. Hopefully to extubate today. 2. Possible COPD exacerbation, presented with SOB, wheezes, + leukocytosis CXR w/o consolidation or infiltrate. Placed on BiPAP in ED, then refused to wear BiPAP and was intubated after her PCO2 got worse and her encephalopathy got worse. possible extubation today. 3. Acute on chronic respiratory failure with hypercapnia and encephalopathy, Rapid response was called on the floor, and patient was intubated and transferred to ICU. Pulmo following, still intubated, po steroids. 4. Acute encephalopathy, due to hypercapnia, more awake this am.   5. Pneumonia, iv antibiotics as above. 6. L hip fracture, after mechanical fall. Ortho consulted, post op day 2.  7. A fib RVR,  new AM 8/9. Cardiology following, no OAC recommended at this time. 8.  Sacral and elbows Wounds, wound care  9. Left kidney obstruction, chronic.  Has been followed by urology since earlier this year with plan for nephrectomy at some point which is been canceled multiple times due to COVID and other factors. 10. Essential Hypertension, monitor  11. Severe spinal stenosis, noted on CT.  Outpatient neurosurg eval  12. Tobacco abuse, counseled by my colleague during this       Diet: DIET TUBE FEED CONTINUOUS/CYCLIC NPO; STANDARD WITH FIBER; Orogastric; 25; 50  Code Status: Meeta Murillo MD

## 2020-08-12 NOTE — PROGRESS NOTES
[facility] Orthopedic Surgery   Progress Note      S/P :  SUBJECTIVE  In bed. Extubated this AM. Awake and oriented to self and hospital and hip surgery. Pain is   described in left hip and with the intensity of severe. Pain is described as aching, tender. OBJECTIVE              Physical                      VITALS:  /77   Pulse 101   Temp 98.6 °F (37 °C) (Axillary)   Resp 15   Ht 5' 7\" (1.702 m)   Wt 215 lb 2.7 oz (97.6 kg)   SpO2 93%   BMI 33.70 kg/m²                     MUSCULOSKELETAL:  left foot NVI. Wiggles toes to command. Pedal pulses are palpable. NEUROLOGIC:                                  Sensory:  Touch:  Left Lower Extremity:  normal                                                 Surgical wound appears with small serous drainage on mepilex distal left thigh and small serous on ABD dressing left upper thigh.      Data       CBC:   Lab Results   Component Value Date    WBC 12.5 08/12/2020    RBC 5.36 08/12/2020    HGB 12.6 08/12/2020    HCT 41.2 08/12/2020    MCV 76.9 08/12/2020    MCH 23.6 08/12/2020    MCHC 30.6 08/12/2020    RDW 19.4 08/12/2020     08/12/2020    MPV 7.5 08/12/2020        WBC:    Lab Results   Component Value Date    WBC 12.5 08/12/2020        Hemoglobin/Hematocrit:    Lab Results   Component Value Date    HGB 12.6 08/12/2020    HCT 41.2 08/12/2020        PT/INR:    Lab Results   Component Value Date    PROTIME 11.8 08/08/2020    INR 1.02 08/08/2020              Current Inpatient Medications             Current Facility-Administered Medications: [START ON 8/13/2020] furosemide (LASIX) injection 40 mg, 40 mg, Intravenous, Daily  acetaZOLAMIDE (DIAMOX) tablet 250 mg, 250 mg, Oral, Daily  oxyCODONE (ROXICODONE) immediate release tablet 5 mg, 5 mg, Oral, Q6H PRN  potassium chloride 20 mEq/50 mL IVPB (Central Line), 20 mEq, Intravenous, PRN  polyvinyl alcohol (LIQUIFILM TEARS) 1.4 % ophthalmic solution 1 drop, 1 drop, Both Eyes, PRN  ammonium lactate (LAC-HYDRIN) 12 % lotion, , Topical, PRN  menthol-zinc oxide (CALMOSEPTINE) 0.44-20.6 % ointment 1-4 each, 1-4 each, Topical, 4x Daily PRN  pantoprazole (PROTONIX) injection 40 mg, 40 mg, Intravenous, Daily **AND** sodium chloride (PF) 0.9 % injection 10 mL, 10 mL, Intravenous, Daily  sodium chloride flush 0.9 % injection 10 mL, 10 mL, Intravenous, 2 times per day  sodium chloride flush 0.9 % injection 10 mL, 10 mL, Intravenous, PRN  piperacillin-tazobactam (ZOSYN) 3.375 g in dextrose 5 % 100 mL IVPB extended infusion (mini-bag), 3.375 g, Intravenous, Q8H  predniSONE (DELTASONE) tablet 40 mg, 40 mg, Oral, Daily  enoxaparin (LOVENOX) injection 40 mg, 40 mg, Subcutaneous, BID  budesonide-formoterol (SYMBICORT) 160-4.5 MCG/ACT inhaler 2 puff, 2 puff, Inhalation, BID  gabapentin (NEURONTIN) capsule 300 mg, 300 mg, Oral, TID  atorvastatin (LIPITOR) tablet 40 mg, 40 mg, Oral, Daily  acetaminophen (TYLENOL) tablet 650 mg, 650 mg, Oral, Q6H PRN **OR** acetaminophen (TYLENOL) suppository 650 mg, 650 mg, Rectal, Q6H PRN  polyethylene glycol (GLYCOLAX) packet 17 g, 17 g, Oral, Daily PRN  promethazine (PHENERGAN) tablet 12.5 mg, 12.5 mg, Oral, Q6H PRN **OR** ondansetron (ZOFRAN) injection 4 mg, 4 mg, Intravenous, Q6H PRN  ipratropium-albuterol (DUONEB) nebulizer solution 1 ampule, 1 ampule, Inhalation, Q4H WA    ASSESSMENT AND PLAN    Fall  Left hip pain  Left IT hip fx  Back pain, CT noted L4.5 stenosis, no fx reported. Morbid obesity  COPD with low O2,Hypoxia, had refused BIPAP, agonal breathing, sent to ICU and intubated,  Extubated today, stable VS, awakens to name. Pulm following.    Post ORIF left hip per Dr Dene [place], stable exam. Serous drainage, change dressing prn saturation  May be LLE 50% partial weight bearing when able to be out of bed  Lovenox for DVT prophylaxis      Ney [facility]  8/12/2020  12:28 PM

## 2020-08-12 NOTE — PROGRESS NOTES
Physical Therapy    Facility/Department: [address] ICU  Initial Assessment    NAME: Tommy Benavidez  : 1950  MRN: [id number]    Date of Service: 2020    Discharge Recommendations:      PT Equipment Recommendations  Other: Defer to next level of care. Tommy Benavidez scored a 8/24 on the AM-PAC short mobility form. Current research shows that an AM-PAC score of 17 or less is typically not associated with a discharge to the patient's home setting. Based on the patient's AM-PAC score and their current functional mobility deficits, it is recommended that the patient have 3-5 sessions per week of Physical Therapy at d/c to increase the patient's independence. Please see assessment section for further patient specific details. If patient discharges prior to next session this note will serve as a discharge summary. Please see below for the latest assessment towards goals. Assessment   Body structures, Functions, Activity limitations: Decreased functional mobility ; Decreased strength;Decreased endurance  Assessment: 79 y/o female admit 2020 with L Hip Fx following fall at home. (Reportedly pt fell at home  although refused to go to hospital). 8/10/2020 S/P ORIF L Hip Fx.  8/ Pt Intubated. PMH as noted including CKD, COPD. Pt joe EOB activities Depend assist x 2. Anticipate need cont PT Services low/mid intensity setting. Will monitor pt's progress. Treatment Diagnosis: 79 y/o female admit 2020 with L Hip Fx following fall at home. (Reportedly pt fell at home  although refused to go to hospital). 8/10/2020 S/P ORIF L Hip Fx.  8/ Pt Intubated. PMH as noted including CKD, COPD. Prognosis: Fair;Good  Decision Making: Medium Complexity  History: 79 y/o female admit 2020 with L Hip Fx following fall at home. (Reportedly pt fell at home  although refused to go to hospital). 8/10/2020 S/P ORIF L Hip Fx.  8/ Pt Intubated.   PMH as noted including CKD, COPD. Exam: See above. Clinical Presentation: See above. Patient Education: Role of PT, POC, Need to call for assist, Wgt bear restrictions L LE. Barriers to Learning: Pain. REQUIRES PT FOLLOW UP: Yes  Activity Tolerance  Activity Tolerance: Patient limited by endurance; Patient limited by cognitive status; Patient limited by pain       Patient Diagnosis(es): The primary encounter diagnosis was Hypoxemia requiring supplemental oxygen. Diagnoses of Acute on chronic respiratory failure with hypoxia ([address] Left leg pain, Strain of lumbar region, initial encounter, and Fall, initial encounter were also pertinent to this visit. has a past medical history of Chronic kidney disease, COPD (chronic obstructive pulmonary disease) ([address] Hyperlipidemia, and Hypertension. has a past surgical history that includes Cholecystectomy and Hip fracture surgery (Left, 8/10/2020). Restrictions  Restrictions/Precautions  Restrictions/Precautions: Weight Bearing, Fall Risk  Lower Extremity Weight Bearing Restrictions  Left Lower Extremity Weight Bearing: Partial Weight Bearing(50% Wgt Bear.)  Position Activity Restriction  Other position/activity restrictions: Morbid Obesity : 215 lb. Very limited functional activity pta. Vision/Hearing  Vision: Within Functional Limits  Hearing: Within functional limits     Subjective  General  Chart Reviewed: Yes  Patient assessed for rehabilitation services?: Yes  Additional Pertinent Hx: 79 y/o female admit 8/6/2020 with L Hip Fx following fall at home. (Reportedly pt fell at home 8/4 although refused to go to hospital). 8/10/2020 S/P ORIF L Hip Fx.  8/10-8/12/2020 Pt Intubated. PMH as noted including CKD, COPD. Family / Caregiver Present: No  Referring Practitioner: Dr. Morgan Elam  Diagnosis: L Hip Fx S/P ORIF. Follows Commands: Within Functional Limits  Subjective  Subjective: Pt agreeable to PT Eval/Rx.   Pain Screening  Patient Currently in Pain: Denies Orientation     Social/Functional History  Social/Functional History  Lives With: Spouse()  Type of Home: House  Home Layout: One level  Home Access: Stairs to enter with rails  Entrance Stairs - Number of Steps: 5 LUZ ELENA  Bathroom Shower/Tub: (Pt sponge bathes only with assist.)  Bathroom Toilet: Bedside commode  Bathroom Accessibility: Accessible  Home Equipment: Rolling walker, 4 wheeled walker, Wheelchair-manual, Grab bars, Lift chair(Pt sleeps in lift chair.)  ADL Assistance: Needs assistance( assists with all ADLs.)  Ambulation Assistance: Independent(With Rolling Walker pta. Pt amb short distances within home at most.)  Active : No( drives)  Occupation: Retired  Additional Comments: Pt reports a fall pta; states RW caused her fall. Cognition        Objective          AROM RLE (degrees)  RLE AROM: WFL  AROM LLE (degrees)  LLE AROM : WFL  AROM RUE (degrees)  RUE AROM : WFL  AROM LUE (degrees)  LUE AROM : WFL  Strength RLE  Comment: Grossly 3+/5. Functionally weak. Strength LLE  Comment: Hip 2-/5; Knee Ankle 3 3+/5. Functionally weak. Bed mobility  Supine to Sit: Maximum assistance;2 Person assistance  Sit to Supine: Maximum assistance;2 Person assistance  Transfers  Bed to Chair: Dependent/Total([address] Maxi-Ricardo.)           Exercises  Knee Long Arc Quad: 5x2 B LEs. Ankle Pumps: 10x2. Plan   Plan  Times per week: 3-5x week while in acute care setting.   Current Treatment Recommendations: Strengthening, Functional Mobility Training, Transfer Training, Safety Education & Training, Patient/Caregiver Education & Training  Safety Devices  Type of devices: Call light within reach, Left in chair, Nurse notified           AM-PAC Score  AM-PAC Inpatient Mobility Raw Score : 8 (08/12/20 1502)  AM-PAC Inpatient T-Scale Score : 28.52 (08/12/20 1502)  Mobility Inpatient CMS 0-100% Score: 86.62 (08/12/20 1502)  Mobility Inpatient CMS G-Code Modifier : CM (08/12/20 1502) Goals  Short term goals  Time Frame for Short term goals: Upon d/c acute care setting. Short term goal 1: Bed Mob Mod x 2. Short term goal 2: Attempt Transfer via Lift Equipt/Assist device assist x 2; PWB L LE (50% wgt bear). Short term goal 3: Pt participating in approp Strength Exs. Patient Goals   Patient goals : Be able to go home.        Therapy Time   Individual Concurrent Group Co-treatment   Time In 1300         Time Out [phone]         Minutes [address] [place] [address] Bruno

## 2020-08-12 NOTE — PROGRESS NOTES
Pulmonary Progress Note    Date of Admission: 8/6/2020   LOS: 6 days     CC:  Chief Complaint   Patient presents with    Back Pain     mechanical fall at home. +hypoxic on arrival. hx of COPD. does not normally wear O2. on 3L nc on arrival. room air O2 sat 76%.  Leg Pain     left leg pain        HPI/Subjective  On minimal vent settings. Currently tolerating SBT with good R SBI. -Patient is awake alert following commands. ROS:   Unable to obtain due to mechanical ventilation      Intake/Output Summary (Last 24 hours) at 8/12/2020 2856  Last data filed at 8/12/2020 0600  Gross per 24 hour   Intake 1961.3 ml   Output 3350 ml   Net -1388.7 ml         PHYSICAL EXAM:   Blood pressure (!) 109/55, pulse 93, temperature 98.6 °F (37 °C), temperature source Axillary, resp. rate 17, height 5' 7\" (1.702 m), weight 215 lb 2.7 oz (97.6 kg), SpO2 92 %.'  Gen:  No acute distress. Eyes: PERRL. Anicteric sclera. No conjunctival injection. ENT: No discharge. Pharynx with ET tube. External appearance of ears and nose normal.  Neck: Trachea midline. No mass   Resp:  No crackles. No wheezes. No rhonchi. No dullness on percussion. CV: Regular rate. Regular rhythm. No murmur or rub. No edema. GI: Soft, Non-tender.  + Obese. +BS  Skin: Warm, dry, w/o erythema. Lymph: No cervical or supraclavicular LAD. M/S: No cyanosis. No clubbing. Neuro: Awake on vent, moves all extremities. Follows commands. Strong cough.     Medications:    Scheduled Meds:   pantoprazole  40 mg Intravenous Daily    And    sodium chloride (PF)  10 mL Intravenous Daily    chlorhexidine  15 mL Mouth/Throat BID    sodium chloride flush  10 mL Intravenous 2 times per day    furosemide  40 mg Intravenous BID    piperacillin-tazobactam  3.375 g Intravenous Q8H    predniSONE  40 mg Oral Daily    enoxaparin  40 mg Subcutaneous BID    budesonide-formoterol  2 puff Inhalation BID    gabapentin  300 mg Oral TID    atorvastatin  40 mg Oral Daily dated 5/5/2013. The lungs are hyperexpanded without focal consolidation. Heart  size, mediastinal contours and pulmonary vascularity are within  normal limits. The pleural spaces are clear. Impression IMPRESSION: COPD. CXR portable:   Results for orders placed during the hospital encounter of 08/06/20   XR CHEST PORTABLE    Narrative EXAMINATION:  ONE XRAY VIEW OF THE CHEST    8/10/2020 4:10 am    COMPARISON:  08/09/2020 radiograph    HISTORY:  ORDERING SYSTEM PROVIDED HISTORY: respiratory failure  TECHNOLOGIST PROVIDED HISTORY:  Reason for exam:->respiratory failure  Reason for Exam: respiratory failure    FINDINGS:  Supportive devices are stable. The heart is enlarged. Moderate vascular  congestion centrally. Small pleural effusions are stable bilaterally and  there is bibasilar edema versus atelectasis in the lower lung zones. Impression Stable appearance of the chest with overall pattern representing vascular  congestion/pulmonary edema. Access  Arterial       PICC       PICC Double Lumen 78/00/87 Right Cephalic (Active)   Continued need for line? Yes 08/09/20 1647   Is this a power PICC? Yes 08/10/20 0600   Site Assessment Clean;Dry; Intact; Ecchymotic 08/10/20 0600   Red Lumen Status Flushed; Infusing 08/10/20 0600   Purple Lumen Status Flushed; Infusing 08/10/20 0600   Exposed Catheter (cm) 0 cm 08/09/20 1647   Extremity Circumference (cm) 45 cm 08/09/20 1647   Dressing Status Clean;Dry; Intact 08/10/20 0600   Dressing Type Transparent; Anti-microbial patch 08/10/20 0600   Dressing Change Due 08/19/20 08/10/20 0600   Dressing Intervention New 08/09/20 1647   Reason Not Rotated Not due 08/09/20 1829   Number of days: 0        CVC                 Assessment:       Acute hypoxemic respiratory failure with SPO2 less than 90% on room air  Chronic hypercapnic respiratory failure  Acute pulmonary edema  Pneumonia, likely gram-negative  Hip fracture    Plan:      -Full vent support,Wean supplemental oxygen to goal saturation of >90%  -on SBT, doing well. Plan for extubation today  -On Lasix, appears to have worsening contraction. Will decrease lasix to daily, add diamox.  -On Zosyn x7 days, stop date in  -IV steroids x5 days and stop. -DuoNeb scheduled, Symbicort twice daily  -Post hip repair    Due to the immediate potential for life-threatening deterioration due to respiratory failure, I spent 31 minutes providing critical care. This time is excluding time spent performing separately billable procedures.       Thank you for this consult,    Ismael Baldwin [facility]

## 2020-08-12 NOTE — PLAN OF CARE
Problem: Falls - Risk of:  Goal: Will remain free from falls  Description: Will remain free from falls  Outcome: Ongoing  Goal: Absence of physical injury  Description: Absence of physical injury  Outcome: Ongoing     Problem: Skin Integrity:  Goal: Will show no infection signs and symptoms  Description: Will show no infection signs and symptoms  Outcome: Ongoing  Goal: Absence of new skin breakdown  Description: Absence of new skin breakdown  Outcome: Ongoing     Problem: Pain:  Goal: Pain level will decrease  Description: Pain level will decrease  Outcome: Ongoing  Goal: Control of acute pain  Description: Control of acute pain  Outcome: Ongoing  Goal: Control of chronic pain  Description: Control of chronic pain  Outcome: Ongoing     Problem: Nutrition  Goal: Optimal nutrition therapy  Outcome: Ongoing     Problem: Urinary Elimination:  Goal: Signs and symptoms of infection will decrease  Description: Signs and symptoms of infection will decrease  Outcome: Ongoing  Goal: Complications related to the disease process, condition or treatment will be avoided or minimized  Description: Complications related to the disease process, condition or treatment will be avoided or minimized  Outcome: Ongoing

## 2020-08-12 NOTE — PROGRESS NOTES
4 Eyes Skin Assessment     The patient is being assess for  Shift Handoff    I agree that 2 RN's have performed a thorough Head to Toe Skin Assessment on the patient. ALL assessment sites listed below have been assessed. Areas assessed by both nurses:  [x]   Head, Face, and Ears   [x]   Shoulders, Back, and Chest  [x]   Arms, Elbows, and Hands   [x]   Coccyx, Sacrum, and IschIum  [x]   Legs, Feet, and Heels        Does the Patient have Skin Breakdown?   Yes LDA WOUND CARE was Initiated documentation include the Ginny-wound, Wound Assessment, Measurements, Dressing Treatment, Drainage, and Color\",         Nigel Prevention initiated:  Yes   Wound Care Orders initiated:  Yes      [id number]  nurse consulted for Pressure Injury (Stage 3,4, Unstageable, DTI, NWPT, and Complex wounds), New and Established Ostomies:  Yes      Nurse 1 eSignature: Electronically signed by Chantell Muro RN on 8/12/20 at 8:15 AM EDT    **SHARE this note so that the co-signing nurse is able to place an eSignature**    Nurse 2 eSignature: {Esignature:[id number]}

## 2020-08-12 NOTE — PLAN OF CARE
Problem: Falls - Risk of:  Goal: Will remain free from falls  Description: Will remain free from falls  8/11/2020 2336 by Hortensia Rodríguez RN  Outcome: Ongoing  Note: Fall risk precautions in place. Frequent visual monitoring in place q2h. Patient is currently intubated/sedated and has bilateral soft wrist restraints on. Problem: Falls - Risk of:  Goal: Absence of physical injury  Description: Absence of physical injury  8/11/2020 2336 by Hortensia Rodríguez RN  Outcome: Ongoing  8/11/2020 1223 by Jose Ramon Wills RN  Outcome: Ongoing  8/11/2020 1211 by Jose Ramon Wills RN  Outcome: Ongoing     Problem: Skin Integrity:  Goal: Will show no infection signs and symptoms  Description: Will show no infection signs and symptoms  8/11/2020 2336 by Hortensia Rodríguez RN  Outcome: Ongoing  Note: Monitoring patient skin integrity for skin breakdown, turning and repositioning q2h per protocol. 8/11/2020 1223 by Jose Ramon Wills RN  Outcome: Ongoing  8/11/2020 1211 by Jose Ramon Wills RN  Outcome: Ongoing     Problem: Pain:  Description: Pain management should include both nonpharmacologic and pharmacologic interventions. Goal: Pain level will decrease  Description: Pain level will decrease  8/11/2020 2336 by Hortensia Rodríguez RN  Outcome: Ongoing  Note: Continuing to monitor pain and discomfort. Monitoring pain level on the CPOT scale. Patient is intubated/sedated and is on a propofol gtt and a fentanyl gtt. Will closely monitor. Problem: Restraint Use - Nonviolent/Non-Self-Destructive Behavior:  Goal: Absence of restraint-related injury  Description: Absence of restraint-related injury  8/11/2020 2336 by Hortensia Rodríguez RN  Outcome: Ongoing  Note: There are not any restraint related injuries. Will continue to monitor closely.

## 2020-08-12 NOTE — CARE COORDINATION
INITIAL CASE MANAGEMENT ASSESSMENT    Reviewed chart, met with patient to assess possible discharge needs. Explained Case Management role/services. Living Situation: confirmed that she resides w/ her  in a single story home w/ 5 steps to ener    ADLs: independent prior to this fall w/ fracture     DME: [facility], RW, w/c, bryant angulo    PT/OT Recs: per therapists will need SNF     Active Services: [facility] was working to get ramp installed / no [facility] services     Transportation:      Medications: confirmed Basia Delacruz dual, rx are covered and obtained at [facility]    PCP: Elyssa Rios      HD/PD: n/a    PLAN/COMMENTS:   Pt requested referral to [facility] as it is near their home  Sent electronically  Called Crystal MCKENNA/CM provided contact information for patient or family to call with any questions. SW/CM will follow and assist as needed.   Electronically signed by Daniel Masters RN on 8/12/2020 at 2:13 PM

## 2020-08-12 NOTE — PROGRESS NOTES
Occupational Therapy   Occupational Therapy Initial Assessment  Date: 2020   Patient Name: Carlos Jin  MRN: [id number]     : 1950    Date of Service: 2020    Discharge Recommendations:  Patient would benefit from continued therapy after discharge, 3-5 sessions per week  OT Equipment Recommendations  Other: bon [place] scored a 13/24 on the AM-PAC ADL Inpatient form. Current research shows that an AM-PAC score of 17 or less is typically not associated with a discharge to the patient's home setting. Based on the patient's AM-PAC score and their current ADL deficits, it is recommended that the patient have 3-5 sessions per week of Occupational Therapy at d/c to increase the patient's independence. Please see assessment section for further patient specific details. If patient discharges prior to next session this note will serve as a discharge summary. Please see below for the latest assessment towards goals. Assessment   Performance deficits / Impairments: Decreased functional mobility ; Decreased endurance;Decreased ADL status; Decreased balance;Decreased strength;Decreased safe awareness  Assessment: 79 y/o female admitted  after mechanical fall causing leg and back pain. Hx of COPD. S/P ORIF L Hip Fx 8/10. Pt intubated 8/. PTA, pt lives at home with  who assists her with all ADLs. Today, pt required max A x2 for supine to sit, and sat EOB ~10 minutes max A initially then CGA with cuing for hand placement on bedrails. Pt required constant cuing to adjust posture when sitting EOB; tends to lean to right when sitting or laying. Anticipate pt will require up to max A for ADLs due to weakness, pain and WB precautions. Pt would benefit from skilled therapy while in acute care and at discharge to [address] [place] [address] and level of independence prior to returning home.   Prognosis: Good  Decision Making: High Complexity  OT Education: OT Role;Plan of Care  REQUIRES OT FOLLOW UP: Yes  Activity Tolerance  Activity Tolerance: Patient limited by pain  Safety Devices  Safety Devices in place: Yes  Type of devices: Left in chair;Call light within reach;Nurse notified; Patient at risk for falls           Patient Diagnosis(es): The primary encounter diagnosis was Hypoxemia requiring supplemental oxygen. Diagnoses of Acute on chronic respiratory failure with hypoxia ([facility] [address] Left leg pain, Strain of lumbar region, initial encounter, and Fall, initial encounter were also pertinent to this visit. has a past medical history of Chronic kidney disease, COPD (chronic obstructive pulmonary disease) ([address] Hyperlipidemia, and Hypertension. has a past surgical history that includes Cholecystectomy and Hip fracture surgery (Left, 8/10/2020). Restrictions  Restrictions/Precautions  Restrictions/Precautions: Weight Bearing, Fall Risk  Lower Extremity Weight Bearing Restrictions  Left Lower Extremity Weight Bearing: Partial Weight Bearing(50% Wgt Bear.)  Position Activity Restriction  Other position/activity restrictions: Morbid Obesity : 215 lb. Very limited functional activity pta. Subjective   General  Chart Reviewed: Yes  Patient assessed for rehabilitation services?: Yes  Additional Pertinent Hx: 77 y/o female admitted 8/6 after mechanical fall causing leg and back pain. Hx of COPD. S/P ORIF L Hip Fx 8/10. Pt intubated 8/10-8/12/2020. Family / Caregiver Present: No  Referring Practitioner: Deanna Duke MD  Subjective  Subjective: Pt in bed upon arrival and agreeable to therapy. General Comment  Comments: Per RN, deepali for therapy.     Social/Functional History  Social/Functional History  Lives With: Spouse()  Type of Home: House  Home Layout: One level  Home Access: Stairs to enter with rails  Entrance Stairs - Number of Steps: 5 LUZ ELENA  Bathroom Shower/Tub: (Pt sponge bathes only with assist.)  Bathroom Toilet: Bedside commode(Primarily uses BSC,  empties)  Bathroom Accessibility: Accessible  Home Equipment: Rolling walker, 4 wheeled walker, Wheelchair-manual, Grab bars, Lift chair(Pt sleeps in lift chair.)  ADL Assistance: Needs assistance( assists with all ADLs.)  Ambulation Assistance: Independent(With Rolling Walker pta. Pt amb short distances within home at most.)  Active : No( drives)  Occupation: Retired  Additional Comments: Pt reports a fall pta; states RW caused her fall. Objective   Vision: Within Functional Limits  Hearing: Within functional limits          Balance  Sitting Balance: Contact guard assistance(mod A initially; CGA with cuing for hand placement on bedrails. EOB ~10 minutes)  Standing Balance  Time: Unable to assess due to pain. Functional Mobility  Functional Mobility Comments: bed > chair via Maxi Ricardo; Unable to assess functional mobility due to pain  ADL  Toileting: Dependent/Total(Grace)  Additional Comments: Anticipate pt will require up to max A for ADLs at this time due to weakness, pain and WB precautions. Bed mobility  Supine to Sit: Maximum assistance;2 Person assistance(HOB elevated; mattress deflated)  Sit to Supine: (Pt in chair at end of session.)  Transfers  Transfer Comments: bed > chair via [facility]; Pt in chair at end of session. Cognition  Overall Cognitive Status: WNL  Cognition Comment: Pt very tearful and screams with movement from pain.     LUE AROM (degrees)  LUE AROM : WFL  Left Hand AROM (degrees)  Left Hand AROM: WFL  RUE AROM (degrees)  RUE AROM : WFL  Right Hand AROM (degrees)  Right Hand AROM: WFL     Plan   Plan  Times per week: 3-5  Current Treatment Recommendations: Strengthening, Patient/Caregiver Education & Training, Safety Education & Training, Self-Care / ADL, Endurance Training, Functional Mobility Training, Balance Training, Gait Training    AM-PAC Score  AM-PAC Inpatient Daily Activity Raw Score: 13 (08/12/20 8826)  AM-PAC Inpatient ADL T-Scale Score : 32.03 (08/12/20 1416)  ADL Inpatient CMS 0-100% Score: 63.03 (08/12/20 1416)  ADL Inpatient CMS G-Code Modifier : CL (08/12/20 1416)    Goals  Short term goals  Time Frame for Short term goals: Prior to DC: Short term goal 1: Pt will tolerate standing > 5 minutes for ADL tasks while maintaining WB precautions max A. Short term goal 2: Pt will complete functional mobility for ADL tasks max A. Short term goal 3: Pt will complete 10 reps of B UE therex in all planes to increase strength for ADL tasks. Short term goal 4: Pt will complete UE bathing/dressing min A. Short term goal 5: Pt will complete functional transfers for ADL tasks max A. Long term goals  Time Frame for Long term goals : STGS=LTGS  Patient Goals   Patient goals : No goals stated       Therapy Time   Individual Concurrent Group Co-treatment   Time In 1300         Time Out 1340         Minutes 40         Timed Code Treatment Minutes: 25 Minutes     This note to serve as OT d/c summary if pt is d/c-ed prior to next therapy session. Dima Smith, S/OT    I provided direct supervision and agree with note provided by S/OT.     Jason Sainz, OTR/L

## 2020-08-13 LAB
ALBUMIN SERPL-MCNC: 3 G/DL (ref 3.4–5)
ANION GAP SERPL CALCULATED.3IONS-SCNC: 8 MMOL/L (ref 3–16)
BLOOD CULTURE, ROUTINE: NORMAL
BUN BLDV-MCNC: 42 MG/DL (ref 7–20)
CALCIUM SERPL-MCNC: 7.9 MG/DL (ref 8.3–10.6)
CHLORIDE BLD-SCNC: 98 MMOL/L (ref 99–110)
CO2: 38 MMOL/L (ref 21–32)
CREAT SERPL-MCNC: 1.2 MG/DL (ref 0.6–1.2)
CULTURE, BLOOD 2: NORMAL
GFR AFRICAN AMERICAN: 54
GFR NON-AFRICAN AMERICAN: 45
GLUCOSE BLD-MCNC: 90 MG/DL (ref 70–99)
HCT VFR BLD CALC: 41.2 % (ref 36–48)
HEMOGLOBIN: 12.4 G/DL (ref 12–16)
MAGNESIUM: 2.2 MG/DL (ref 1.8–2.4)
MCH RBC QN AUTO: 23.5 PG (ref 26–34)
MCHC RBC AUTO-ENTMCNC: 30.1 G/DL (ref 31–36)
MCV RBC AUTO: 78.2 FL (ref 80–100)
PDW BLD-RTO: 19.3 % (ref 12.4–15.4)
PHOSPHORUS: 4.6 MG/DL (ref 2.5–4.9)
PLATELET # BLD: 164 K/UL (ref 135–450)
PMV BLD AUTO: 8.4 FL (ref 5–10.5)
POTASSIUM SERPL-SCNC: 3.8 MMOL/L (ref 3.5–5.1)
RBC # BLD: 5.27 M/UL (ref 4–5.2)
SODIUM BLD-SCNC: 144 MMOL/L (ref 136–145)
WBC # BLD: 14.2 K/UL (ref 4–11)

## 2020-08-13 PROCEDURE — 2500000003 HC RX 250 WO HCPCS: Performed by: INTERNAL MEDICINE

## 2020-08-13 PROCEDURE — 80069 RENAL FUNCTION PANEL: CPT

## 2020-08-13 PROCEDURE — 94760 N-INVAS EAR/PLS OXIMETRY 1: CPT

## 2020-08-13 PROCEDURE — 6360000002 HC RX W HCPCS: Performed by: ORTHOPAEDIC SURGERY

## 2020-08-13 PROCEDURE — 6360000002 HC RX W HCPCS: Performed by: INTERNAL MEDICINE

## 2020-08-13 PROCEDURE — 6370000000 HC RX 637 (ALT 250 FOR IP): Performed by: INTERNAL MEDICINE

## 2020-08-13 PROCEDURE — 2580000003 HC RX 258: Performed by: ORTHOPAEDIC SURGERY

## 2020-08-13 PROCEDURE — 83735 ASSAY OF MAGNESIUM: CPT

## 2020-08-13 PROCEDURE — 6370000000 HC RX 637 (ALT 250 FOR IP): Performed by: ORTHOPAEDIC SURGERY

## 2020-08-13 PROCEDURE — 97530 THERAPEUTIC ACTIVITIES: CPT

## 2020-08-13 PROCEDURE — 85027 COMPLETE CBC AUTOMATED: CPT

## 2020-08-13 PROCEDURE — 2580000003 HC RX 258: Performed by: INTERNAL MEDICINE

## 2020-08-13 PROCEDURE — C9113 INJ PANTOPRAZOLE SODIUM, VIA: HCPCS | Performed by: INTERNAL MEDICINE

## 2020-08-13 PROCEDURE — 2060000000 HC ICU INTERMEDIATE R&B

## 2020-08-13 PROCEDURE — 94640 AIRWAY INHALATION TREATMENT: CPT

## 2020-08-13 PROCEDURE — 2700000000 HC OXYGEN THERAPY PER DAY

## 2020-08-13 RX ORDER — DILTIAZEM HYDROCHLORIDE 5 MG/ML
5 INJECTION INTRAVENOUS ONCE
Status: COMPLETED | OUTPATIENT
Start: 2020-08-13 | End: 2020-08-13

## 2020-08-13 RX ORDER — DILTIAZEM HYDROCHLORIDE 5 MG/ML
10 INJECTION INTRAVENOUS ONCE
Status: DISCONTINUED | OUTPATIENT
Start: 2020-08-13 | End: 2020-08-13

## 2020-08-13 RX ADMIN — Medication 10 ML: at 21:11

## 2020-08-13 RX ADMIN — GABAPENTIN 300 MG: 300 CAPSULE ORAL at 21:11

## 2020-08-13 RX ADMIN — OXYCODONE 5 MG: 5 TABLET ORAL at 05:35

## 2020-08-13 RX ADMIN — PANTOPRAZOLE SODIUM 40 MG: 40 INJECTION, POWDER, FOR SOLUTION INTRAVENOUS at 09:32

## 2020-08-13 RX ADMIN — ACETAZOLAMIDE 250 MG: 250 TABLET ORAL at 11:46

## 2020-08-13 RX ADMIN — ENOXAPARIN SODIUM 40 MG: 40 INJECTION SUBCUTANEOUS at 09:31

## 2020-08-13 RX ADMIN — PIPERACILLIN AND TAZOBACTAM 3.38 G: 3; .375 INJECTION, POWDER, LYOPHILIZED, FOR SOLUTION INTRAVENOUS at 01:48

## 2020-08-13 RX ADMIN — Medication 10 ML: at 09:35

## 2020-08-13 RX ADMIN — PREDNISONE 40 MG: 20 TABLET ORAL at 09:32

## 2020-08-13 RX ADMIN — ATORVASTATIN CALCIUM 40 MG: 40 TABLET, FILM COATED ORAL at 09:34

## 2020-08-13 RX ADMIN — IPRATROPIUM BROMIDE AND ALBUTEROL SULFATE 1 AMPULE: .5; 3 SOLUTION RESPIRATORY (INHALATION) at 12:18

## 2020-08-13 RX ADMIN — ANORECTAL OINTMENT 1 EACH: 15.7; .44; 24; 20.6 OINTMENT TOPICAL at 23:00

## 2020-08-13 RX ADMIN — BUDESONIDE AND FORMOTEROL FUMARATE DIHYDRATE 2 PUFF: 160; 4.5 AEROSOL RESPIRATORY (INHALATION) at 20:44

## 2020-08-13 RX ADMIN — PIPERACILLIN AND TAZOBACTAM 3.38 G: 3; .375 INJECTION, POWDER, LYOPHILIZED, FOR SOLUTION INTRAVENOUS at 17:24

## 2020-08-13 RX ADMIN — ENOXAPARIN SODIUM 40 MG: 40 INJECTION SUBCUTANEOUS at 21:11

## 2020-08-13 RX ADMIN — PIPERACILLIN AND TAZOBACTAM 3.38 G: 3; .375 INJECTION, POWDER, LYOPHILIZED, FOR SOLUTION INTRAVENOUS at 09:32

## 2020-08-13 RX ADMIN — BUDESONIDE AND FORMOTEROL FUMARATE DIHYDRATE 2 PUFF: 160; 4.5 AEROSOL RESPIRATORY (INHALATION) at 07:53

## 2020-08-13 RX ADMIN — GABAPENTIN 300 MG: 300 CAPSULE ORAL at 09:32

## 2020-08-13 RX ADMIN — GABAPENTIN 300 MG: 300 CAPSULE ORAL at 13:56

## 2020-08-13 RX ADMIN — DILTIAZEM HYDROCHLORIDE 5 MG: 5 INJECTION INTRAVENOUS at 15:43

## 2020-08-13 RX ADMIN — IPRATROPIUM BROMIDE AND ALBUTEROL SULFATE 1 AMPULE: .5; 3 SOLUTION RESPIRATORY (INHALATION) at 20:43

## 2020-08-13 RX ADMIN — OXYCODONE 5 MG: 5 TABLET ORAL at 13:56

## 2020-08-13 RX ADMIN — DILTIAZEM HYDROCHLORIDE 5 MG: 5 INJECTION INTRAVENOUS at 16:29

## 2020-08-13 RX ADMIN — IPRATROPIUM BROMIDE AND ALBUTEROL SULFATE 1 AMPULE: .5; 3 SOLUTION RESPIRATORY (INHALATION) at 07:53

## 2020-08-13 RX ADMIN — OXYCODONE 5 MG: 5 TABLET ORAL at 21:11

## 2020-08-13 RX ADMIN — Medication 10 ML: at 09:40

## 2020-08-13 RX ADMIN — FUROSEMIDE 40 MG: 10 INJECTION, SOLUTION INTRAMUSCULAR; INTRAVENOUS at 09:32

## 2020-08-13 RX ADMIN — DILTIAZEM HYDROCHLORIDE 5 MG/HR: 5 INJECTION INTRAVENOUS at 17:23

## 2020-08-13 ASSESSMENT — PAIN SCALES - GENERAL
PAINLEVEL_OUTOF10: 2
PAINLEVEL_OUTOF10: 0
PAINLEVEL_OUTOF10: 0
PAINLEVEL_OUTOF10: 10
PAINLEVEL_OUTOF10: 0
PAINLEVEL_OUTOF10: 8
PAINLEVEL_OUTOF10: 9
PAINLEVEL_OUTOF10: 8
PAINLEVEL_OUTOF10: 7
PAINLEVEL_OUTOF10: 8

## 2020-08-13 ASSESSMENT — PAIN DESCRIPTION - DESCRIPTORS
DESCRIPTORS: ACHING
DESCRIPTORS: ACHING
DESCRIPTORS: SHARP
DESCRIPTORS: DISCOMFORT

## 2020-08-13 ASSESSMENT — PAIN DESCRIPTION - FREQUENCY
FREQUENCY: CONTINUOUS
FREQUENCY: INTERMITTENT

## 2020-08-13 ASSESSMENT — PAIN - FUNCTIONAL ASSESSMENT
PAIN_FUNCTIONAL_ASSESSMENT: PREVENTS OR INTERFERES WITH MANY ACTIVE NOT PASSIVE ACTIVITIES
PAIN_FUNCTIONAL_ASSESSMENT: INTOLERABLE, UNABLE TO DO ANY ACTIVE OR PASSIVE ACTIVITIES
PAIN_FUNCTIONAL_ASSESSMENT: PREVENTS OR INTERFERES WITH MANY ACTIVE NOT PASSIVE ACTIVITIES
PAIN_FUNCTIONAL_ASSESSMENT: PREVENTS OR INTERFERES SOME ACTIVE ACTIVITIES AND ADLS

## 2020-08-13 ASSESSMENT — PAIN DESCRIPTION - ONSET
ONSET: ON-GOING

## 2020-08-13 ASSESSMENT — PAIN DESCRIPTION - PAIN TYPE
TYPE: SURGICAL PAIN

## 2020-08-13 ASSESSMENT — PAIN DESCRIPTION - ORIENTATION
ORIENTATION: LEFT

## 2020-08-13 ASSESSMENT — PAIN DESCRIPTION - LOCATION
LOCATION: HIP

## 2020-08-13 ASSESSMENT — PAIN DESCRIPTION - PROGRESSION
CLINICAL_PROGRESSION: NOT CHANGED
CLINICAL_PROGRESSION: GRADUALLY WORSENING
CLINICAL_PROGRESSION: RAPIDLY WORSENING
CLINICAL_PROGRESSION: NOT CHANGED

## 2020-08-13 NOTE — PROGRESS NOTES
08/12/20  0600 08/13/20  0400   WBC 13.6* 12.5* 14.2*   HGB 13.4 12.6 12.4   HCT 42.9 41.2 41.2    162 164     Recent Labs     08/11/20  0530 08/11/20  1535 08/12/20  0600 08/13/20  0400     --  141 144   K 3.0* 4.2 3.5 3.8   CL 95*  --  93* 98*   CO2 36*  --  38* 38*   BUN 49*  --  50* 42*   CREATININE 1.2  --  1.0 1.2   CALCIUM 8.2*  --  7.7* 7.9*   PHOS 4.4  --  4.6 4.6     No results for input(s): AST, ALT, BILIDIR, BILITOT, ALKPHOS in the last 72 hours. No results for input(s): INR in the last 72 hours. No results for input(s): Glean Moravian in the last 72 hours. Urinalysis:      Lab Results   Component Value Date    NITRU NEGATIVE 05/03/2013    WBCUA 6-10 05/03/2013    BACTERIA 1+ 05/03/2013    RBCUA 3-5 05/03/2013    BLOODU TRACE 05/03/2013    SPECGRAV 1.020 05/03/2013    GLUCOSEU NEGATIVE 05/03/2013       Radiology:  XR HIP LEFT (2-3 VIEWS)   Final Result      FLUORO FOR SURGICAL PROCEDURES   Final Result      XR CHEST PORTABLE   Final Result   Stable appearance of the chest with overall pattern representing vascular   congestion/pulmonary edema. XR CHEST PORTABLE   Final Result   Bilateral pleural effusions and bilateral airspace disease most compatible   with edema, increased since earlier today. The vilma appear prominent, likely related to enlarged pulmonary arteries. A   follow-up is recommended as hilar adenopathy is not excluded. Interval placement of a right arm PICC. XR CHEST PORTABLE   Final Result   Interval intubation. There is improved vascular congestion centrally, but   worsening airspace change in the right lower lung zone. CT Head WO Contrast   Final Result   No acute intracranial abnormality. CT Cervical Spine WO Contrast   Final Result   Multilevel postsurgical changes and degenerative changes. No acute osseous   abnormality. CT LUMBAR SPINE WO CONTRAST   Final Result   1. No acute finding in the lumbar spine.    2. Advanced degenerative changes that are most notable at L4-5 contributing   to severe spinal canal stenosis. XR LUMBAR SPINE (2-3 VIEWS)   Final Result   1. Moderately displaced intertrochanteric fracture of the proximal left femur. 2. Severe degenerative change in the left hip with moderate degenerative   change on the right. 3. Technically limited evaluation of the lumbar spine due to patient body   habitus. XR HIP 2-3 VW W PELVIS LEFT   Final Result   1. Moderately displaced intertrochanteric fracture of the proximal left femur. 2. Severe degenerative change in the left hip with moderate degenerative   change on the right. 3. Technically limited evaluation of the lumbar spine due to patient body   habitus. XR FEMUR LEFT (MIN 2 VIEWS)   Final Result   Intratrochanteric left hip fracture      Moderate to severe osteoarthritis of the left hip joint         XR CHEST PORTABLE   Final Result   Mild perihilar opacities and left basilar ground-glass opacities in the   chest.  Pattern may represent developing pulmonary edema or pneumonitis. Assessment/Plan:    Active Hospital Problems    Diagnosis    Acute respiratory failure with hypoxia ([facility] [J96.01]    Acute on chronic respiratory failure with hypercapnia (HCC) [J96.22]    Acute pulmonary edema (HCC) [J81.0]    Multifocal pneumonia [J18.9]    Rapid atrial fibrillation (HCC) [I48.91]    Chronic obstructive pulmonary disease ([address] [J44.9]    Hypoxemia requiring supplemental oxygen [R09.02, Z99.81]    Acute on chronic respiratory failure with hypoxia ([address] [J96.21]    Fall [W19. XXXA]    [facility] [S72.002A]    Morbid obesity with BMI of 45.0-49.9, adult ([facility] [E66.01, Z68.42]     1. Acute hypoxic respiratory failure,  COPD exacerbation and pneumonia along with pulmonary edema. procal low, iv antibiotics, intubated, steroids, pulmonary consulted. extubated yesterday.    2. Possible COPD exacerbation, presented with SOB, wheezes, + leukocytosis CXR w/o consolidation or infiltrate. Placed on BiPAP in ED, then refused to wear BiPAP and was intubated after her PCO2 got worse and her encephalopathy got worse. Extubated yesterday. 3. Acute on chronic respiratory failure with hypercapnia and encephalopathy, Rapid response was called on the floor, and patient was intubated and transferred to ICU. Pulmo following, steroids,   4. Acute encephalopathy, due to hypercapnia, close to baseline now. 5. Pneumonia, iv antibiotics as above. 6. L hip fracture, after mechanical fall. Ortho consulted, post op day 3.  7. A fib RVR,  new AM 8/9. Cardiology following, no OAC recommended at this time.   8.  Sacral and elbows Wounds, wound care  9. Left kidney obstruction, chronic.  Has been followed by urology since earlier this year with plan for nephrectomy at some point which is been canceled multiple times due to COVID and other factors. 10. Essential Hypertension, monitor  11. Severe spinal stenosis, noted on CT. Outpatient neurosurg eval  12. Tobacco abuse, counseled by my colleague during this admission.        Diet: DIET GENERAL;  Code Status: Homa Browning MD

## 2020-08-13 NOTE — DISCHARGE INSTR - COC
Isolation/Infection:   Isolation          No Isolation        Patient Infection Status     Infection Onset Added Last Indicated Last Indicated By Review Planned Expiration Resolved Resolved By    None active    Resolved    COVID-19 Rule Out 08/06/20 08/06/20 08/06/20 COVID-19 (Ordered)   08/06/20 Rule-Out Test Resulted          Nurse Assessment:  Last Vital Signs: /73   Pulse 76   Temp 98.3 °F (36.8 °C) (Oral)   Resp 16   Ht 5' 7\" (1.702 m)   Wt 212 lb (96.2 kg)   SpO2 92%   BMI 33.20 kg/m²     Last documented pain score (0-10 scale): Pain Level: 2  Last Weight:   Wt Readings from Last 1 Encounters:   08/13/20 212 lb (96.2 kg)     Mental Status:  {IP PT MENTAL STATUS:20030}    IV Access:  - None    Nursing Mobility/ADLs:  Walking   Assisted  Transfer  Assisted  Bathing  Assisted  Dressing  Assisted  Toileting  Assisted  Feeding  Independent  Med Admin  Assisted  Med Delivery   whole    Wound Care Documentation and Therapy: Change left hip dressing bid and prn saturation. Elimination:  Continence:   · Bowel: Yes  · Bladder: Yes  Urinary Catheter: None   Colostomy/Ileostomy/Ileal Conduit: No       Date of Last BM: 08/18/2020    Intake/Output Summary (Last 24 hours) at 8/13/2020 1242  Last data filed at 8/13/2020 1218  Gross per 24 hour   Intake 1125 ml   Output 3270 ml   Net -2145 ml     I/O last 3 completed shifts: In: 725 [P.O.:480; I.V.:145; IV Piggyback:100]  Out: 4395 [Urine:4395]    Safety Concerns:     History of Falls (last 30 days) and At Risk for Falls    Impairments/Disabilities:      None    Nutrition Therapy:  Current Nutrition Therapy:   - Oral Diet:  General  - Oral Nutrition Supplement:  Standard  twice a day    Routes of Feeding: Oral  Liquids:  Thin Liquids  Daily Fluid Restriction: no  Last Modified Barium Swallow with Video (Video Swallowing Test): not done    Treatments at the Time of Hospital Discharge:   Respiratory Treatments:   Oxygen Therapy:  is not on home oxygen therapy. Ventilator:    - No ventilator support    Rehab Therapies: Physical Therapy, Occupational Therapy and nursing  Weight Bearing Status/Restrictions: 50% WB left leg  Other Medical Equipment (for information only, NOT a DME order):  maximove  Other Treatments:     Patient's personal belongings (please select all that are sent with patient):  None    RN SIGNATURE:  Electronically signed by Barney Flannery RN on 8/18/2020 at 12:43 PM      CASE MANAGEMENT/SOCIAL WORK SECTION    Inpatient Status Date: ***    Readmission Risk Assessment Score:  Readmission Risk              Risk of Unplanned Readmission:        13           Discharging to Facility/ Agency   · Name:   · Address:  · Phone:  · Fax:    Dialysis Facility (if applicable)   · Name:  · Address:  · Dialysis Schedule:  · Phone:  · Fax:    / signature: {Esignature:[id number]}    PHYSICIAN SECTION    Prognosis: Good    Condition at Discharge: Stable    Rehab Potential (if transferring to Rehab): Good    Recommended Labs or Other Treatments After Discharge: PT, OT, follow up with cardiology in 1 week, follow up with ortho in 1 week, follow up with PCP in 1 week. Physician Certification: I certify the above information and transfer of Wendy Manuel  is necessary for the continuing treatment of the diagnosis listed and that she requires Located within [facility] for less 30 days.      Update Admission H&P: No change in H&P    PHYSICIAN SIGNATURE:  Electronically signed by GABRIELA Juárez CNP on 8/13/20 at 12:46 PM EDT/ Dr Stacy Crowder

## 2020-08-13 NOTE — PLAN OF CARE
Nutrition Problem #1: Increased nutrient needs  Intervention: Food and/or Nutrient Delivery: Continue Current Diet, Start Oral Nutrition Supplement  Nutritional Goals: tolerate most appropriate form of nutrition

## 2020-08-13 NOTE — PROGRESS NOTES
Occupational Therapy  Facility/Department: [facility] 5W PROGRESSIVE CARE  Daily Treatment Note  NAME: Niya Reaves  : 1950  MRN: [id number]    Date of Service: 2020    Discharge Recommendations:  Patient would benefit from continued therapy after discharge, 3-5 sessions per week  OT Equipment Recommendations  Other: defer to [address] [employer] scored a  on the AM-PAC ADL Inpatient form. Current research shows that an AM-PAC score of 17 or less is typically not associated with a discharge to the patient's home setting. Based on the patient's AM-PAC score and their current ADL deficits, it is recommended that the patient have 3-5 sessions per week of Occupational Therapy at d/c to increase the patient's independence. Please see assessment section for further patient specific details. If patient discharges prior to next session this note will serve as a discharge summary. Please see below for the latest assessment towards goals. Assessment   Performance deficits / Impairments: Decreased functional mobility ; Decreased endurance;Decreased ADL status; Decreased balance;Decreased strength;Decreased safe awareness  Assessment: Pt functioning well below baseline, limited by L LE pain/WB restrictions  affecting pt's balance, fxl mobility, fxl activity tolerance, and ADL status. This date, pt max A x2 for bed mobility, sat EOB ~5 minutes with max A initially progressing to CGA, and anticipate pt would require overall max A for ADLs. Pt crying in pain throughout and HR in 160s with sititng activity. Pt unable to tolerate OOB to chair, but anticipate pt would have required use of lift equipment (maximove). Pt demonstrates need for ongoing skilled OT at d/c to maximize pt's safety and independence prior to return home. Will cont to follow while hospitalized.   Prognosis: Good  OT Education: OT Role;Plan of Care;Precautions  REQUIRES OT FOLLOW UP: Yes  Activity Tolerance  Activity Tolerance: 160s)  Standing Balance: Unable to assess(comment)    Bed mobility  Rolling to Left: 2 Person assistance;Maximum assistance  Rolling to Right: 2 Person assistance;Maximum assistance  Supine to Sit: 2 Person assistance;Maximum assistance(HOB elevated, use of rail, increased time)  Sit to Supine: 2 Person assistance;Dependent/Total(total A x3)  Scootin Person assistance;Dependent/Total(total A x4 to scoot pt up in bed)     Transfers  Transfer Comments: pt unable to tolerate d/t pain and elevated HR but would have required use of maximove     Cognition  Overall Cognitive Status: [facility]  Cognition Comment: Pt very emotional about the pain she is in during movement. Encouraged to take deep breaths        Plan   Plan  Times per week: 3-5  Current Treatment Recommendations: Strengthening, Patient/Caregiver Education & Training, Safety Education & Training, Self-Care / ADL, Endurance Training, Functional Mobility Training, Balance Training, Gait Training    AM-PAC Score        AM-[facility] Inpatient Daily Activity Raw Score: 12 (20 155)  AM-PAC Inpatient ADL T-Scale Score : 30.6 (20 155)  ADL Inpatient CMS 0-100% Score: 66.57 (20 1552)  ADL Inpatient CMS G-Code Modifier : CL (20)    Goals  Short term goals  Time Frame for Short term goals: Prior to DC: STATUS GOALS : ALL GOALS ONGOING  Short term goal 1: Pt will tolerate standing > 5 minute for ADL tasks while maintaining WB precautions max A. GOAL REVISED: Pt will tolerate standing >1 minute for functional task with max A x1-2 while maintaining WB precautions. Short term goal 2: Pt will complete functional mobility for ADL tasks max A. Short term goal 3: Pt will complete 10 reps of B UE therex in all planes to increase strength for ADL tasks. Short term goal 4: Pt will complete UE bathing/dressing min A. Short term goal 5: Pt will complete functional transfers for ADL tasks max A x1-2 using AD/lift equipment.   Long term goals  Time Frame for Long term goals : STGS=LTGS  Patient Goals   Patient goals : No goals stated       Therapy Time   Individual Concurrent Group Co-treatment   Time In 1310         Time Out 1340         Minutes Bobbi Sky, [place] [facility] 0286

## 2020-08-13 NOTE — PROGRESS NOTES
Comprehensive Nutrition Assessment    Type and Reason for Visit:  Reassess    Nutrition Recommendations/Plan:   Add Ensure Enlive 1 time per day, to start, (given ckd)    Nutrition Assessment:  Follow-up. Pt is s/p left hip repair on 8/10. Pt now extubated with diet adv to general. Intake prior to surgery was good. Anticipate improved po post op. Will add Ensure Enlive 1 time of day to start, given CKD. Wt loss trend noted, which could be r/t need for limited diet orders & diuresis. Malnutrition Assessment:  Malnutrition Status:  Insufficient data    Context:  Acute Illness     Due to current CDC guidelines recommending 6-ft distancing for social isolation for COVID19 prevention, NFPE/malnutrition assessment was deferred at this time. Estimated Daily Nutrient Needs:  Energy (kcal):  1392-5435 (15-18 x  kg); Weight Used for Energy Requirements:        Protein (g):   (.8-1 x ABW (adj for ckd & healing); Weight Used for Protein Requirements:           Fluid (ml/day):  1 ml per kcal; Weight Used for Fluid Requirements:         Nutrition Related Findings:  Noted +2 edema to upper & lower extremities. Noted BS  up & down with steroids on board      Wounds:  (SI to left hip, Psoriasis & excoriated aidan)       Current Nutrition Therapies:    DIET GENERAL;  Dietary Nutrition Supplements: Standard High Calorie Oral Supplement    Anthropometric Measures:  · Height: 5' 7\" (170.2 cm)  · Current Body Weight: 215 lb (97.5 kg)      · Ideal Body Weight: 135 lbs; % Ideal Body Weight     · BMI: 33.7  · BMI Categories: Obese Class 1 (BMI 30.0-34. 9)       Nutrition Diagnosis:   · Increased nutrient needs related to increase demand for energy/nutrients as evidenced by wounds      Nutrition Interventions:   Food and/or Nutrient Delivery:  Continue Current Diet, Start Oral Nutrition Supplement  Nutrition Education/Counseling:  No recommendation at this time   Coordination of Nutrition Care:  Continued Inpatient Monitoring    Goals:  tolerate most appropriate form of nutrition       Nutrition Monitoring and Evaluation:   Food/Nutrient Intake Outcomes:  Food and Nutrient Intake, Supplement Intake  Physical Signs/Symptoms Outcomes:  Biochemical Data, Constipation, Diarrhea, Fluid Status or Edema, Nutrition Focused Physical Findings, Skin, Weight     Discharge Planning:     Too soon to determine     Electronically signed by Yao Kirk RD, LD on 8/13/20 at 9:05 AM EDT    Contact: [phone]

## 2020-08-13 NOTE — PROGRESS NOTES
[facility] Orthopedic Surgery   Progress Note      S/P :  SUBJECTIVE  In bed. Alert and oriented . O2 nc, was moved to floor today. Pain is   described in left hip and with the intensity of moderate. Pain is described as aching. No SOB reported. OBJECTIVE              Physical                      VITALS:  /73   Pulse 76   Temp 98.3 °F (36.8 °C) (Oral)   Resp 16   Ht 5' 7\" (1.702 m)   Wt 212 lb (96.2 kg)   SpO2 92%   BMI 33.20 kg/m²                     MUSCULOSKELETAL:  left foot NVI. Wiggles toes to command. Pedal pulses are palpable. NEUROLOGIC:                                  Sensory:  Touch:  Left Lower Extremity:  normal                                                 Surgical wound appears with small serosang on left hip dressing at present time.      Data       CBC:   Lab Results   Component Value Date    WBC 14.2 08/13/2020    RBC 5.27 08/13/2020    HGB 12.4 08/13/2020    HCT 41.2 08/13/2020    MCV 78.2 08/13/2020    MCH 23.5 08/13/2020    MCHC 30.1 08/13/2020    RDW 19.3 08/13/2020     08/13/2020    MPV 8.4 08/13/2020        WBC:    Lab Results   Component Value Date    WBC 14.2 08/13/2020        Hemoglobin/Hematocrit:    Lab Results   Component Value Date    HGB 12.4 08/13/2020    HCT 41.2 08/13/2020        PT/INR:    Lab Results   Component Value Date    PROTIME 11.8 08/08/2020    INR 1.02 08/08/2020              Current Inpatient Medications             Current Facility-Administered Medications: furosemide (LASIX) injection 40 mg, 40 mg, Intravenous, Daily  acetaZOLAMIDE (DIAMOX) tablet 250 mg, 250 mg, Oral, Daily  oxyCODONE (ROXICODONE) immediate release tablet 5 mg, 5 mg, Oral, Q6H PRN  potassium chloride 20 mEq/50 mL IVPB (Central Line), 20 mEq, Intravenous, PRN  polyvinyl alcohol (LIQUIFILM TEARS) 1.4 % ophthalmic solution 1 drop, 1 drop, Both Eyes, PRN  ammonium lactate (LAC-HYDRIN) 12 % lotion, , Topical, PRN  menthol-zinc oxide (CALMOSEPTINE) 0.44-20.6 % ointment 1-4 each, 1-4 each, Topical, 4x Daily PRN  pantoprazole (PROTONIX) injection 40 mg, 40 mg, Intravenous, Daily **AND** sodium chloride (PF) 0.9 % injection 10 mL, 10 mL, Intravenous, Daily  sodium chloride flush 0.9 % injection 10 mL, 10 mL, Intravenous, 2 times per day  sodium chloride flush 0.9 % injection 10 mL, 10 mL, Intravenous, PRN  piperacillin-tazobactam (ZOSYN) 3.375 g in dextrose 5 % 100 mL IVPB extended infusion (mini-bag), 3.375 g, Intravenous, Q8H  enoxaparin (LOVENOX) injection 40 mg, 40 mg, Subcutaneous, BID  budesonide-formoterol (SYMBICORT) 160-4.5 MCG/ACT inhaler 2 puff, 2 puff, Inhalation, BID  gabapentin (NEURONTIN) capsule 300 mg, 300 mg, Oral, TID  atorvastatin (LIPITOR) tablet 40 mg, 40 mg, Oral, Daily  acetaminophen (TYLENOL) tablet 650 mg, 650 mg, Oral, Q6H PRN **OR** acetaminophen (TYLENOL) suppository 650 mg, 650 mg, Rectal, Q6H PRN  polyethylene glycol (GLYCOLAX) packet 17 g, 17 g, Oral, Daily PRN  promethazine (PHENERGAN) tablet 12.5 mg, 12.5 mg, Oral, Q6H PRN **OR** ondansetron (ZOFRAN) injection 4 mg, 4 mg, Intravenous, Q6H PRN  ipratropium-albuterol (DUONEB) nebulizer solution 1 ampule, 1 ampule, Inhalation, Q4H WA    ASSESSMENT AND PLAN    Fall  Left hip pain  Left IT hip fx  Back pain, CT noted L4.5 stenosis, no fx reported. Morbid obesity  COPD with low O2,Hypoxia, had refused BIPAP, agonal breathing, sent to ICU and intubated,  Extubated today, stable VS, awakens to name. Pulm following. Now on nasal O2 moved to ortho floor doing better.    Post ORIF left hip per Dr Chantel Vo, stable exam. Serous drainage, change dressing prn saturation  May be LLE 50% partial weight bearing when able to be out of bed  Lovenox for DVT prophylaxis      [facility]  8/13/2020  12:40 PM

## 2020-08-13 NOTE — PROGRESS NOTES
Pt to transfer to room 5128. Report called to [facility] 95. on 5w. Pt brought up to 5W with all belongings.  Electronically signed by Jennifer Meraz RN on 8/13/2020 at 2:57 PM

## 2020-08-13 NOTE — PROGRESS NOTES
Physical Therapy    Facility/Department: [address] ORTHOPEDICS  Daily Treatment Note  This note serves as patient discharge summary if pt discharges prior to next PT visit    NAME: Yamila Hoang  : 1950  MRN: [id number]    Date of Service: 2020    Discharge Recommendations:  24 hour supervision or assist, Patient would benefit from continued therapy after discharge, 3-5 sessions per week   Yamila Hoang scored a  on the AM-PAC short mobility form. Current research shows that an AM-PAC score of 17 or less is typically not associated with a discharge to the patient's home setting. Based on the patient's AM-PAC score and their current functional mobility deficits, it is recommended that the patient have 3-5 sessions per week of Physical Therapy at d/c to increase the patient's independence. Please see assessment section for further patient specific details. Assessment   Body structures, Functions, Activity limitations: Decreased functional mobility ; Decreased strength;Decreased endurance; Increased pain  Assessment: Pt is a 78 y.o. female w/ L hip fracture after falling at home. Pt underwent ORIF L Hip fracture on 8/10/2020, pt intubated from 8/. PTA, pt indep in ambulation, transfers, and ADLs using rolling walker but notes only ambulating short distances within the home. Pt lived in home w/  w/ 5 LUZ ELENA. 2020 status: Bed mobility supine>sit max A x2, rolling to left/right max A x2 but improved throughout session, sit>supine max A x3, scooting up in bed max A x4. Pt sat on EOB max A in the beginning however improved to CGA on bilat LEs for ~5 mins w/ bilateral UE support for sitting balance. Currently, patient is severely limited in functional mobility d/t high levels of pain. Pt would benefit from continued therapy while in the acute setting to improve functional mobility, transfers, and independence.  D/t high levels of pain and decreased functional mobility, anticipate pt to Social/Functional History  Social/Functional History  Lives With: Spouse()  Type of Home: House  Home Layout: One level  Home Access: Stairs to enter with rails  Entrance Stairs - Number of Steps: 5 LUZ ELENA  Bathroom Shower/Tub: (Pt sponge bathes only with assist.)  Bathroom Toilet: Bedside commode(Primarily uses BSC,  empties)  Bathroom Accessibility: Accessible  Home Equipment: Rolling walker, 4 wheeled walker, Wheelchair-manual, Grab bars, Lift chair(Pt sleeps in lift chair.)  ADL Assistance: Needs assistance( assists with all ADLs.)  Ambulation Assistance: Independent(With Rolling Walker pta. Pt amb short distances within home at most.)  Active : No( drives)  Occupation: Retired  Additional Comments: Pt reports a fall pta; states RW caused her fall. Cognition   Cognition  Overall Cognitive Status: WNL  Cognition Comment: Pt very emotional about the pain she is in during movement.  Encouraged to take deep breaths    Objective  Bed mobility  Rolling to Left: 2 Person assistance;Maximum assistance  Rolling to Right: 2 Person assistance;Maximum assistance(Rolling to R improved throughout session)  Supine to Sit: 2 Person assistance;Maximum assistance(HOB elevated, use of hand rails, and increased time)  Sit to Supine: Maximum assistance(x3. Pt needed A lifting bilateral LEs and moving trunk back into bed)  Scooting: Maximal assistance(x4)  Transfers  Comment: Unable to transfer d/t increased pain and decreased patient mobility  Balance  Posture: Fair  Sitting - Static: Fair(CGA from therapist on LEs)  Sitting - Dynamic: Fair;-(used bilateral hand railings for support and max A from therapist to maintain balance initally)  Comments: Pt sat EOB for ~5 mins using bilateral railings for support and max A from therapist in the beginning and then CGA on bilat LEs    Plan   Plan  Times per week: 3--5  Current Treatment Recommendations: Strengthening, Functional Mobility Training, Transfer Training, Safety Education & Training, Patient/Caregiver Education & Training, Home Exercise Program  Safety Devices  Type of devices: Patient at risk for falls, Left in bed, Bed alarm in place, Nurse notified, Call light within reach    AM-PAC Score  AM-PAC Inpatient Mobility Raw Score : 8 (08/13/20 1406)  AM-PAC Inpatient T-Scale Score : 28.52 (08/13/20 1406)  Mobility Inpatient CMS 0-100% Score: 86.62 (08/13/20 1406)  Mobility Inpatient CMS G-Code Modifier : CM (08/13/20 1406)    Goals  Short term goals  Time Frame for Short term goals: Upon d/c acute care setting. All goals ongoing 8/13/2020  Short term goal 1: Bed Mob Mod x 2. Short term goal 2: Attempt Transfer via Lift Equipt/Assist device assist x 2; PWB L LE (50% wgt bear). Short term goal 3: Pt participating in approp Strength Exs. Patient Goals   Patient goals : Be able to go home.        Therapy Time   Individual Concurrent Group Co-treatment   Time In 1310         Time Out 1340         Minutes [address] Physical Therapist  I attest that I was present for and made a skilled & mindful clinical judgement during the evaluation and/or treatment of this patient on 8/13/2020  Electronically signed by Cindy Wilder, [address] [place] [address] Drive [phone])  on 8/13/2020 at 3:12 PM

## 2020-08-13 NOTE — PROGRESS NOTES
HR has been sustained into 140s. Pt switching in and out of a.fib. Dr Araceli Ryan notified and called back and ordered x1 dose of cardizem 5mg IVP. Order placed and will administer as directed. Will monitor. Electronically signed by Andrew Perkins RN on 8/13/2020 at 2:04 PM       I took order for x1 dose of cardizem IVP which I was unaware that we couldn't administer this on our floor or med/surg w/ tele. Re-messaged Dr Araceli Ryan and he said to transfer patient. Order placed. Charge RN notified. Will monitor.   Electronically signed by Andrew Perkins RN on 8/13/2020 at 2:25 PM

## 2020-08-13 NOTE — PLAN OF CARE
Problem: Falls - Risk of:  Goal: Will remain free from falls  Description: Will remain free from falls  Outcome: Ongoing  Note: Fall risk assessment completed. Fall precautions in place. Call light within reach. Pt educated on calling for assistance before getting up. Walkway free of clutter. Will continue to monitor. Problem: Skin Integrity:  Goal: Will show no infection signs and symptoms  Description: Will show no infection signs and symptoms  Outcome: Ongoing  Note: Surgical incision assessed. Serosanguinous drainage noted, reinforced PRN. No odor to note. Pt being treated with IV antibiotics. Labs monitored. No overt s/s of infection. Problem: Pain:  Goal: Pain level will decrease  Description: Pain level will decrease  Outcome: Ongoing  Note: Pt assessed for pain. Pt in pain and assessed with 0-10 pain rating scale. Pt given prescribed analgesic for pain. (See eMar) Pt satisfied with pain relief thus far. Will reassess and continue to monitor. Problem: Infection - Central Venous Catheter-Associated Bloodstream Infection:  Goal: Will show no infection signs and symptoms  Description: Will show no infection signs and symptoms  Outcome: Ongoing  Note: Surgical incision assessed. Serosanguinous drainage noted, reinforced PRN. No odor to note. Pt being treated with IV antibiotics. Labs monitored. No overt s/s of infection.

## 2020-08-14 LAB
ALBUMIN SERPL-MCNC: 2.7 G/DL (ref 3.4–5)
ANION GAP SERPL CALCULATED.3IONS-SCNC: 8 MMOL/L (ref 3–16)
BUN BLDV-MCNC: 40 MG/DL (ref 7–20)
CALCIUM SERPL-MCNC: 8.1 MG/DL (ref 8.3–10.6)
CHLORIDE BLD-SCNC: 98 MMOL/L (ref 99–110)
CO2: 35 MMOL/L (ref 21–32)
CREAT SERPL-MCNC: 1 MG/DL (ref 0.6–1.2)
GFR AFRICAN AMERICAN: >60
GFR NON-AFRICAN AMERICAN: 55
GLUCOSE BLD-MCNC: 102 MG/DL (ref 70–99)
HCT VFR BLD CALC: 38.6 % (ref 36–48)
HEMOGLOBIN: 11.6 G/DL (ref 12–16)
MAGNESIUM: 2.1 MG/DL (ref 1.8–2.4)
MCH RBC QN AUTO: 24.1 PG (ref 26–34)
MCHC RBC AUTO-ENTMCNC: 30.2 G/DL (ref 31–36)
MCV RBC AUTO: 79.9 FL (ref 80–100)
PDW BLD-RTO: 19.7 % (ref 12.4–15.4)
PHOSPHORUS: 3.7 MG/DL (ref 2.5–4.9)
PLATELET # BLD: 164 K/UL (ref 135–450)
PMV BLD AUTO: 8.2 FL (ref 5–10.5)
POTASSIUM SERPL-SCNC: 3.4 MMOL/L (ref 3.5–5.1)
RBC # BLD: 4.83 M/UL (ref 4–5.2)
SODIUM BLD-SCNC: 141 MMOL/L (ref 136–145)
WBC # BLD: 15.1 K/UL (ref 4–11)

## 2020-08-14 PROCEDURE — 6360000002 HC RX W HCPCS: Performed by: ORTHOPAEDIC SURGERY

## 2020-08-14 PROCEDURE — 6370000000 HC RX 637 (ALT 250 FOR IP): Performed by: INTERNAL MEDICINE

## 2020-08-14 PROCEDURE — C9113 INJ PANTOPRAZOLE SODIUM, VIA: HCPCS | Performed by: ORTHOPAEDIC SURGERY

## 2020-08-14 PROCEDURE — 2700000000 HC OXYGEN THERAPY PER DAY

## 2020-08-14 PROCEDURE — 6360000002 HC RX W HCPCS: Performed by: INTERNAL MEDICINE

## 2020-08-14 PROCEDURE — 6370000000 HC RX 637 (ALT 250 FOR IP): Performed by: ORTHOPAEDIC SURGERY

## 2020-08-14 PROCEDURE — 80069 RENAL FUNCTION PANEL: CPT

## 2020-08-14 PROCEDURE — 94760 N-INVAS EAR/PLS OXIMETRY 1: CPT

## 2020-08-14 PROCEDURE — 85027 COMPLETE CBC AUTOMATED: CPT

## 2020-08-14 PROCEDURE — 94761 N-INVAS EAR/PLS OXIMETRY MLT: CPT

## 2020-08-14 PROCEDURE — 94640 AIRWAY INHALATION TREATMENT: CPT

## 2020-08-14 PROCEDURE — 2580000003 HC RX 258: Performed by: ORTHOPAEDIC SURGERY

## 2020-08-14 PROCEDURE — 99232 SBSQ HOSP IP/OBS MODERATE 35: CPT | Performed by: INTERNAL MEDICINE

## 2020-08-14 PROCEDURE — 2060000000 HC ICU INTERMEDIATE R&B

## 2020-08-14 PROCEDURE — U0003 INFECTIOUS AGENT DETECTION BY NUCLEIC ACID (DNA OR RNA); SEVERE ACUTE RESPIRATORY SYNDROME CORONAVIRUS 2 (SARS-COV-2) (CORONAVIRUS DISEASE [COVID-19]), AMPLIFIED PROBE TECHNIQUE, MAKING USE OF HIGH THROUGHPUT TECHNOLOGIES AS DESCRIBED BY CMS-2020-01-R: HCPCS

## 2020-08-14 PROCEDURE — 83735 ASSAY OF MAGNESIUM: CPT

## 2020-08-14 RX ORDER — POTASSIUM CHLORIDE 750 MG/1
40 TABLET, FILM COATED, EXTENDED RELEASE ORAL ONCE
Status: COMPLETED | OUTPATIENT
Start: 2020-08-14 | End: 2020-08-14

## 2020-08-14 RX ORDER — PREDNISONE 20 MG/1
40 TABLET ORAL DAILY
Status: COMPLETED | OUTPATIENT
Start: 2020-08-14 | End: 2020-08-18

## 2020-08-14 RX ADMIN — Medication 10 ML: at 10:00

## 2020-08-14 RX ADMIN — PIPERACILLIN AND TAZOBACTAM 3.38 G: 3; .375 INJECTION, POWDER, LYOPHILIZED, FOR SOLUTION INTRAVENOUS at 18:37

## 2020-08-14 RX ADMIN — ACETAZOLAMIDE 250 MG: 250 TABLET ORAL at 09:55

## 2020-08-14 RX ADMIN — ACETAMINOPHEN 650 MG: 325 TABLET ORAL at 02:01

## 2020-08-14 RX ADMIN — ATORVASTATIN CALCIUM 40 MG: 40 TABLET, FILM COATED ORAL at 09:22

## 2020-08-14 RX ADMIN — Medication 10 ML: at 20:57

## 2020-08-14 RX ADMIN — PIPERACILLIN AND TAZOBACTAM 3.38 G: 3; .375 INJECTION, POWDER, LYOPHILIZED, FOR SOLUTION INTRAVENOUS at 09:55

## 2020-08-14 RX ADMIN — PANTOPRAZOLE SODIUM 40 MG: 40 INJECTION, POWDER, FOR SOLUTION INTRAVENOUS at 09:23

## 2020-08-14 RX ADMIN — GABAPENTIN 300 MG: 300 CAPSULE ORAL at 14:21

## 2020-08-14 RX ADMIN — PREDNISONE 40 MG: 20 TABLET ORAL at 18:37

## 2020-08-14 RX ADMIN — IPRATROPIUM BROMIDE AND ALBUTEROL SULFATE 1 AMPULE: .5; 3 SOLUTION RESPIRATORY (INHALATION) at 12:31

## 2020-08-14 RX ADMIN — IPRATROPIUM BROMIDE AND ALBUTEROL SULFATE 1 AMPULE: .5; 3 SOLUTION RESPIRATORY (INHALATION) at 09:09

## 2020-08-14 RX ADMIN — BUDESONIDE AND FORMOTEROL FUMARATE DIHYDRATE 2 PUFF: 160; 4.5 AEROSOL RESPIRATORY (INHALATION) at 09:09

## 2020-08-14 RX ADMIN — APIXABAN 5 MG: 5 TABLET, FILM COATED ORAL at 20:55

## 2020-08-14 RX ADMIN — APIXABAN 5 MG: 5 TABLET, FILM COATED ORAL at 09:55

## 2020-08-14 RX ADMIN — POTASSIUM CHLORIDE 40 MEQ: 750 TABLET, FILM COATED, EXTENDED RELEASE ORAL at 09:55

## 2020-08-14 RX ADMIN — FUROSEMIDE 40 MG: 10 INJECTION, SOLUTION INTRAMUSCULAR; INTRAVENOUS at 09:23

## 2020-08-14 RX ADMIN — IPRATROPIUM BROMIDE AND ALBUTEROL SULFATE 1 AMPULE: .5; 3 SOLUTION RESPIRATORY (INHALATION) at 20:33

## 2020-08-14 RX ADMIN — GABAPENTIN 300 MG: 300 CAPSULE ORAL at 20:55

## 2020-08-14 RX ADMIN — ACETAMINOPHEN 650 MG: 325 TABLET ORAL at 14:21

## 2020-08-14 RX ADMIN — OXYCODONE 5 MG: 5 TABLET ORAL at 15:15

## 2020-08-14 RX ADMIN — BUDESONIDE AND FORMOTEROL FUMARATE DIHYDRATE 2 PUFF: 160; 4.5 AEROSOL RESPIRATORY (INHALATION) at 20:33

## 2020-08-14 RX ADMIN — IPRATROPIUM BROMIDE AND ALBUTEROL SULFATE 1 AMPULE: .5; 3 SOLUTION RESPIRATORY (INHALATION) at 16:29

## 2020-08-14 RX ADMIN — OXYCODONE 5 MG: 5 TABLET ORAL at 21:15

## 2020-08-14 RX ADMIN — GABAPENTIN 300 MG: 300 CAPSULE ORAL at 09:22

## 2020-08-14 RX ADMIN — PIPERACILLIN AND TAZOBACTAM 3.38 G: 3; .375 INJECTION, POWDER, LYOPHILIZED, FOR SOLUTION INTRAVENOUS at 02:02

## 2020-08-14 RX ADMIN — Medication 10 ML: at 09:57

## 2020-08-14 RX ADMIN — OXYCODONE 5 MG: 5 TABLET ORAL at 09:22

## 2020-08-14 ASSESSMENT — PAIN DESCRIPTION - LOCATION: LOCATION: HIP

## 2020-08-14 ASSESSMENT — PAIN SCALES - GENERAL
PAINLEVEL_OUTOF10: 0
PAINLEVEL_OUTOF10: 7
PAINLEVEL_OUTOF10: 0
PAINLEVEL_OUTOF10: 10
PAINLEVEL_OUTOF10: 0
PAINLEVEL_OUTOF10: 10
PAINLEVEL_OUTOF10: 7
PAINLEVEL_OUTOF10: 8
PAINLEVEL_OUTOF10: 6
PAINLEVEL_OUTOF10: 9

## 2020-08-14 ASSESSMENT — PAIN DESCRIPTION - ORIENTATION: ORIENTATION: LEFT

## 2020-08-14 ASSESSMENT — PAIN DESCRIPTION - PAIN TYPE: TYPE: SURGICAL PAIN

## 2020-08-14 ASSESSMENT — PAIN DESCRIPTION - DESCRIPTORS: DESCRIPTORS: SHARP

## 2020-08-14 ASSESSMENT — PAIN DESCRIPTION - FREQUENCY: FREQUENCY: INTERMITTENT

## 2020-08-14 ASSESSMENT — PAIN - FUNCTIONAL ASSESSMENT: PAIN_FUNCTIONAL_ASSESSMENT: INTOLERABLE, UNABLE TO DO ANY ACTIVE OR PASSIVE ACTIVITIES

## 2020-08-14 ASSESSMENT — PAIN DESCRIPTION - ONSET: ONSET: ON-GOING

## 2020-08-14 ASSESSMENT — PAIN DESCRIPTION - PROGRESSION: CLINICAL_PROGRESSION: RAPIDLY WORSENING

## 2020-08-14 NOTE — PROGRESS NOTES
Hospitalist Progress Note      PCP: Lupe Cho MD    Date of Admission: 8/6/2020        Subjective: Feels ok, no fever or chills, no nausea, vomiting, palpitation, chest pain or SOB. Medications:  Reviewed    Infusion Medications    dilTIAZem Stopped (08/13/20 1900)     Scheduled Medications    enoxaparin  40 mg Subcutaneous BID    furosemide  40 mg Intravenous Daily    acetaZOLAMIDE  250 mg Oral Daily    pantoprazole  40 mg Intravenous Daily    And    sodium chloride (PF)  10 mL Intravenous Daily    sodium chloride flush  10 mL Intravenous 2 times per day    piperacillin-tazobactam  3.375 g Intravenous Q8H    budesonide-formoterol  2 puff Inhalation BID    gabapentin  300 mg Oral TID    atorvastatin  40 mg Oral Daily    ipratropium-albuterol  1 ampule Inhalation Q4H WA     PRN Meds: oxyCODONE, potassium chloride, polyvinyl alcohol, ammonium lactate, menthol-zinc oxide, sodium chloride flush, acetaminophen **OR** acetaminophen, polyethylene glycol, promethazine **OR** ondansetron      Intake/Output Summary (Last 24 hours) at 8/14/2020 0916  Last data filed at 8/14/2020 0738  Gross per 24 hour   Intake 951 ml   Output 1775 ml   Net -824 ml       Physical Exam Performed:    /68   Pulse 77   Temp 98.1 °F (36.7 °C) (Oral)   Resp 18   Ht 5' 7\" (1.702 m)   Wt 205 lb 0.4 oz (93 kg)   SpO2 93%   BMI 32.11 kg/m²     General appearance: No apparent distress  Neck: Supple  Respiratory:  Expiratory wheezes   Cardiovascular: Regular rate and rhythm with normal S1/S2 without murmurs, rubs or gallops. Abdomen: Soft, non-tender, obese. Musculoskeletal: No clubbing, cyanosis  Skin: Skin color, texture, turgor normal.  No rashes or lesions.   Neurologic:  No focal weakness   Psychiatric: Alert and oriented  Capillary Refill: Brisk,< 3 seconds   Peripheral Pulses: +2 palpable, equal bilaterally       Labs:   Recent Labs     08/12/20  0600 08/13/20  0400 08/14/20  0600   WBC 12.5* 14.2* 15.1* HGB 12.6 12.4 11.6*   HCT 41.2 41.2 38.6    164 164     Recent Labs     08/12/20  0600 08/13/20  0400 08/14/20  0600    144 141   K 3.5 3.8 3.4*   CL 93* 98* 98*   CO2 38* 38* 35*   BUN 50* 42* 40*   CREATININE 1.0 1.2 1.0   CALCIUM 7.7* 7.9* 8.1*   PHOS 4.6 4.6 3.7     No results for input(s): AST, ALT, BILIDIR, BILITOT, ALKPHOS in the last 72 hours. No results for input(s): INR in the last 72 hours. No results for input(s): Yoko Moellers in the last 72 hours. Urinalysis:      Lab Results   Component Value Date    NITRU NEGATIVE 05/03/2013    WBCUA 6-10 05/03/2013    BACTERIA 1+ 05/03/2013    RBCUA 3-5 05/03/2013    BLOODU TRACE 05/03/2013    SPECGRAV 1.020 05/03/2013    GLUCOSEU NEGATIVE 05/03/2013       Radiology:  XR HIP LEFT (2-3 VIEWS)   Final Result      FLUORO FOR SURGICAL PROCEDURES   Final Result      XR CHEST PORTABLE   Final Result   Stable appearance of the chest with overall pattern representing vascular   congestion/pulmonary edema. XR CHEST PORTABLE   Final Result   Bilateral pleural effusions and bilateral airspace disease most compatible   with edema, increased since earlier today. The vilma appear prominent, likely related to enlarged pulmonary arteries. A   follow-up is recommended as hilar adenopathy is not excluded. Interval placement of a right arm PICC. XR CHEST PORTABLE   Final Result   Interval intubation. There is improved vascular congestion centrally, but   worsening airspace change in the right lower lung zone. CT Head WO Contrast   Final Result   No acute intracranial abnormality. CT Cervical Spine WO Contrast   Final Result   Multilevel postsurgical changes and degenerative changes. No acute osseous   abnormality. CT LUMBAR SPINE WO CONTRAST   Final Result   1. No acute finding in the lumbar spine. 2. Advanced degenerative changes that are most notable at L4-5 contributing   to severe spinal canal stenosis. XR LUMBAR SPINE (2-3 VIEWS)   Final Result   1. Moderately displaced intertrochanteric fracture of the proximal left femur. 2. Severe degenerative change in the left hip with moderate degenerative   change on the right. 3. Technically limited evaluation of the lumbar spine due to patient body   habitus. XR HIP 2-3 VW W PELVIS LEFT   Final Result   1. Moderately displaced intertrochanteric fracture of the proximal left femur. 2. Severe degenerative change in the left hip with moderate degenerative   change on the right. 3. Technically limited evaluation of the lumbar spine due to patient body   habitus. XR FEMUR LEFT (MIN 2 VIEWS)   Final Result   Intratrochanteric left hip fracture      Moderate to severe osteoarthritis of the left hip joint         XR CHEST PORTABLE   Final Result   Mild perihilar opacities and left basilar ground-glass opacities in the   chest.  Pattern may represent developing pulmonary edema or pneumonitis. Assessment/Plan:    Active Hospital Problems    Diagnosis    Acute respiratory failure with hypoxia ([facility] [J96.01]    Acute on chronic respiratory failure with hypercapnia (HCC) [J96.22]    Acute pulmonary edema (HCC) [J81.0]    Multifocal pneumonia [J18.9]    Rapid atrial fibrillation (HCC) [I48.91]    Chronic obstructive pulmonary disease ([facility] [J44.9]    Hypoxemia requiring supplemental oxygen [R09.02, Z99.81]    Acute on chronic respiratory failure with hypoxia [facility] [J96.21]    Fall [W19. XXXA]    Closed left hip fracture, initial encounter ([facility] [S72.002A]    Morbid obesity with BMI of 45.0-49.9, adult ([facility] [E66.01, Z68.42]     1. Acute hypoxic respiratory failure,  COPD exacerbation and pneumonia along with pulmonary edema. procal low, iv antibiotics, intubated, steroids, pulmonary consulted.  extubated 2 days ago. 2. Possible COPD exacerbation, presented with SOB, wheezes, + leukocytosis CXR w/o consolidation or infiltrate. Placed on BiPAP in ED, then refused to wear BiPAP and was intubated after her PCO2 got worse and her encephalopathy got worse. Extubated now. 3. Acute on chronic respiratory failure with hypercapnia and encephalopathy, Rapid response was called on the floor, and patient was intubated and transferred to ICU. Pulmo following, steroids,   4. Acute encephalopathy, due to hypercapnia, close to baseline now. 5. Pneumonia, iv antibiotics as above. 6. L hip fracture, after mechanical fall. Ortho consulted, post op day 3.  7. A fib RVR,  new AM 8/9, another episode yesterday, iv cardizem bolus dId not control, stArterd on drip, now SR. Cardiology were following, no OAC recommended at this time, but now with recurrence with RVR, I wilL sudhakar eliquis and ask cardiology to reevaluate. 8.  Sacral and elbows Wounds, wound care  9. Left kidney obstruction, chronic.  Has been followed by urology since earlier this year with plan for nephrectomy at some point which is been canceled multiple times due to COVID and other factors. 10. Essential Hypertension, monitor  11. Severe spinal stenosis, noted on CT. Outpatient neurosurg eval  12. Tobacco abuse, counseled by my colleague during this admission.       Diet: DIET GENERAL;  Dietary Nutrition Supplements: Standard High Calorie Oral Supplement  Code Status: Laina Mohamud MD

## 2020-08-14 NOTE — PROGRESS NOTES
Pulmonary Progress Note    CC: Acute hypoxic respiratory failure  Acute on chronic hypercarbic respiratory failure  Acute pulmonary edema  Multifocal pneumonia  Rapid atrial fibrillation  Possible COPD    24 hr events:  Transferred from ICU yesterday. On supplemental oxygen. She has a non-productive cough. ROS:  No SOB  +cough  No vomiting    PHYSICAL EXAM:  Blood pressure 116/75, pulse 84, temperature 97.2 °F (36.2 °C), temperature source Oral, resp. rate 18, height 5' 7\" (1.702 m), weight 205 lb 0.4 oz (93 kg), SpO2 94 %. Intake/Output Summary (Last 24 hours) at 8/14/2020 1706  Last data filed at 8/14/2020 1258  Gross per 24 hour   Intake 551 ml   Output 1925 ml   Net -1374 ml       Gen: Well developed; well nourished  Eyes: No scleral icterus. No conjunctival injection. ENT: External appearance of ears and nose normal.  Neck: Trachea midline. No obvious mass. No visible thyroid enlargement    Respiratory: Expiratory wheezes bilaterally, no accessory muscle use  Cardiovascular: Regular rate and rhythm, no appreciable murmurs. No edema. Skin: Warm and dry. No rashes or ulcers on visible areas. Normal texture and turgor  Musculoskeletal: No cyanosis, clubbing or joint deformity.     Psychiatric: Normal mood and affect; exhibits normal insight and judgment    Medications:  Current Facility-Administered Medications: apixaban (ELIQUIS) tablet 5 mg, 5 mg, Oral, BID  dilTIAZem 125 mg in dextrose 5 % 125 mL infusion, 5 mg/hr, Intravenous, Continuous  furosemide (LASIX) injection 40 mg, 40 mg, Intravenous, Daily  acetaZOLAMIDE (DIAMOX) tablet 250 mg, 250 mg, Oral, Daily  oxyCODONE (ROXICODONE) immediate release tablet 5 mg, 5 mg, Oral, Q6H PRN  potassium chloride 20 mEq/50 mL IVPB (Central Line), 20 mEq, Intravenous, PRN  polyvinyl alcohol (LIQUIFILM TEARS) 1.4 % ophthalmic solution 1 drop, 1 drop, Both Eyes, PRN  ammonium lactate (LAC-HYDRIN) 12 % lotion, , Topical, PRN  menthol-zinc oxide (CALMOSEPTINE) 0.44-20.6 % ointment 1-4 each, 1-4 each, Topical, 4x Daily PRN  pantoprazole (PROTONIX) injection 40 mg, 40 mg, Intravenous, Daily **AND** sodium chloride (PF) 0.9 % injection 10 mL, 10 mL, Intravenous, Daily  sodium chloride flush 0.9 % injection 10 mL, 10 mL, Intravenous, 2 times per day  sodium chloride flush 0.9 % injection 10 mL, 10 mL, Intravenous, PRN  piperacillin-tazobactam (ZOSYN) 3.375 g in dextrose 5 % 100 mL IVPB extended infusion (mini-bag), 3.375 g, Intravenous, Q8H  budesonide-formoterol (SYMBICORT) 160-4.5 MCG/ACT inhaler 2 puff, 2 puff, Inhalation, BID  gabapentin (NEURONTIN) capsule 300 mg, 300 mg, Oral, TID  atorvastatin (LIPITOR) tablet 40 mg, 40 mg, Oral, Daily  acetaminophen (TYLENOL) tablet 650 mg, 650 mg, Oral, Q6H PRN **OR** acetaminophen (TYLENOL) suppository 650 mg, 650 mg, Rectal, Q6H PRN  polyethylene glycol (GLYCOLAX) packet 17 g, 17 g, Oral, Daily PRN  promethazine (PHENERGAN) tablet 12.5 mg, 12.5 mg, Oral, Q6H PRN **OR** ondansetron (ZOFRAN) injection 4 mg, 4 mg, Intravenous, Q6H PRN  ipratropium-albuterol (DUONEB) nebulizer solution 1 ampule, 1 ampule, Inhalation, Q4H WA    Data reviewed:  Labs:  CBC:   Recent Labs     08/12/20  0600 08/13/20  0400 08/14/20  0600   WBC 12.5* 14.2* 15.1*   HGB 12.6 12.4 11.6*   HCT 41.2 41.2 38.6   MCV 76.9* 78.2* 79.9*    164 164     BMP:   Recent Labs     08/12/20  0600 08/13/20  0400 08/14/20  0600    144 141   K 3.5 3.8 3.4*   CL 93* 98* 98*   CO2 38* 38* 35*   PHOS 4.6 4.6 3.7   BUN 50* 42* 40*   CREATININE 1.0 1.2 1.0     LIVER PROFILE:   No results for input(s): AST, ALT, LIPASE, BILIDIR, BILITOT, ALKPHOS in the last 72 hours. Invalid input(s): AMYLASE,  ALB  PT/INR:   No results for input(s): PROTIME, INR in the last 72 hours. APTT:   No results for input(s): APTT in the last 72 hours.     Cultures:   Blood culture (8/9): Negative  Sputum culture (8/9): Normal gloria  Nasal MRSA probe: Negative  Respiratory viral panel: Negative  Urine strep and legionella antigens: Negative      Films:  Chest images and reports were reviewed by me. My interpretation is:  CXR (8/9/20): RLL infiltrate and vascular congestion; ETT and gastric tube in place      Assessment:   Acute hypoxic respiratory failure  Acute on chronic hypercarbic respiratory failure  Acute pulmonary edema  Multifocal pneumonia  Rapid atrial fibrillation  Possible COPD      Plan:    Acute hypoxic respiratory failure  -Appears to be due to a combination of pulmonary edema and pneumonia  -Wean supplemental oxygen as able to keep oxygen saturation greater than 90%    Acute on chronic hypercarbic respiratory failure  -PCO2 is now at her baseline    Acute pulmonary edema  -Lasix and Diamox daily  -Strict I's and O's    Multifocal pneumonia  -Zosyn (day #5). Complete 7 days of antibiotics    Possible COPD  -Resume prednisone 40 mg daily  -Symbicort twice daily  -DuoNebs every 4 hours      Thank you for allowing me to participate in the care of this patient. Will sign off. Please call with any questions or concerns.   She should come for pulmonary follow-up 9/24/2020 at 3 PM.    MD Neha Flores Pulmonary, Critical Care and Sleep

## 2020-08-14 NOTE — CARE COORDINATION
Discharge Planning:  SW contacted Louis Sepulveda at [facility] [phone] to follow up on the referral that was sent to her. Louis Sepulveda stated that she is out of network with [employer] but she has submitted for a one time approval and initiated the pre cert. Louis Sepulveda stated that she will keep this SW informed on the response. Plan: Pre cert initiated with [employer]. Will require a HENS.   Electronically signed by Babatunde Patten on 8/14/2020 at 1:05 PM

## 2020-08-15 LAB
ALBUMIN SERPL-MCNC: 2.8 G/DL (ref 3.4–5)
ANION GAP SERPL CALCULATED.3IONS-SCNC: 7 MMOL/L (ref 3–16)
BUN BLDV-MCNC: 35 MG/DL (ref 7–20)
CALCIUM SERPL-MCNC: 8.4 MG/DL (ref 8.3–10.6)
CHLORIDE BLD-SCNC: 101 MMOL/L (ref 99–110)
CO2: 32 MMOL/L (ref 21–32)
CREAT SERPL-MCNC: 0.8 MG/DL (ref 0.6–1.2)
GFR AFRICAN AMERICAN: >60
GFR NON-AFRICAN AMERICAN: >60
GLUCOSE BLD-MCNC: 138 MG/DL (ref 70–99)
HCT VFR BLD CALC: 40.9 % (ref 36–48)
HEMOGLOBIN: 12.1 G/DL (ref 12–16)
MAGNESIUM: 2.1 MG/DL (ref 1.8–2.4)
MCH RBC QN AUTO: 23.8 PG (ref 26–34)
MCHC RBC AUTO-ENTMCNC: 29.6 G/DL (ref 31–36)
MCV RBC AUTO: 80.5 FL (ref 80–100)
PDW BLD-RTO: 19.7 % (ref 12.4–15.4)
PHOSPHORUS: 3.4 MG/DL (ref 2.5–4.9)
PLATELET # BLD: 200 K/UL (ref 135–450)
PMV BLD AUTO: 8.4 FL (ref 5–10.5)
POTASSIUM SERPL-SCNC: 4.2 MMOL/L (ref 3.5–5.1)
RBC # BLD: 5.08 M/UL (ref 4–5.2)
SARS-COV-2, NAA: NOT DETECTED
SODIUM BLD-SCNC: 140 MMOL/L (ref 136–145)
WBC # BLD: 13.8 K/UL (ref 4–11)

## 2020-08-15 PROCEDURE — 94760 N-INVAS EAR/PLS OXIMETRY 1: CPT

## 2020-08-15 PROCEDURE — 6370000000 HC RX 637 (ALT 250 FOR IP): Performed by: INTERNAL MEDICINE

## 2020-08-15 PROCEDURE — 2580000003 HC RX 258: Performed by: ORTHOPAEDIC SURGERY

## 2020-08-15 PROCEDURE — 87070 CULTURE OTHR SPECIMN AEROBIC: CPT

## 2020-08-15 PROCEDURE — 87205 SMEAR GRAM STAIN: CPT

## 2020-08-15 PROCEDURE — 6370000000 HC RX 637 (ALT 250 FOR IP): Performed by: ORTHOPAEDIC SURGERY

## 2020-08-15 PROCEDURE — 2060000000 HC ICU INTERMEDIATE R&B

## 2020-08-15 PROCEDURE — 99233 SBSQ HOSP IP/OBS HIGH 50: CPT | Performed by: INTERNAL MEDICINE

## 2020-08-15 PROCEDURE — 85027 COMPLETE CBC AUTOMATED: CPT

## 2020-08-15 PROCEDURE — 6360000002 HC RX W HCPCS: Performed by: ORTHOPAEDIC SURGERY

## 2020-08-15 PROCEDURE — 83735 ASSAY OF MAGNESIUM: CPT

## 2020-08-15 PROCEDURE — 80069 RENAL FUNCTION PANEL: CPT

## 2020-08-15 PROCEDURE — 6360000002 HC RX W HCPCS: Performed by: INTERNAL MEDICINE

## 2020-08-15 PROCEDURE — 94640 AIRWAY INHALATION TREATMENT: CPT

## 2020-08-15 PROCEDURE — 2700000000 HC OXYGEN THERAPY PER DAY

## 2020-08-15 PROCEDURE — 87077 CULTURE AEROBIC IDENTIFY: CPT

## 2020-08-15 PROCEDURE — C9113 INJ PANTOPRAZOLE SODIUM, VIA: HCPCS | Performed by: ORTHOPAEDIC SURGERY

## 2020-08-15 RX ORDER — FUROSEMIDE 40 MG/1
40 TABLET ORAL DAILY
Qty: 60 TABLET | Refills: 0
Start: 2020-08-15

## 2020-08-15 RX ORDER — ACETAZOLAMIDE 250 MG/1
250 TABLET ORAL DAILY
Qty: 30 TABLET | Refills: 0 | Status: SHIPPED | OUTPATIENT
Start: 2020-08-16 | End: 2020-09-17 | Stop reason: ALTCHOICE

## 2020-08-15 RX ORDER — PREDNISONE 20 MG/1
40 TABLET ORAL DAILY
Qty: 4 TABLET | Refills: 0 | Status: SHIPPED | OUTPATIENT
Start: 2020-08-16 | End: 2020-08-16

## 2020-08-15 RX ADMIN — BUDESONIDE AND FORMOTEROL FUMARATE DIHYDRATE 2 PUFF: 160; 4.5 AEROSOL RESPIRATORY (INHALATION) at 20:27

## 2020-08-15 RX ADMIN — ANORECTAL OINTMENT 2 EACH: 15.7; .44; 24; 20.6 OINTMENT TOPICAL at 09:51

## 2020-08-15 RX ADMIN — IPRATROPIUM BROMIDE AND ALBUTEROL SULFATE 1 AMPULE: .5; 3 SOLUTION RESPIRATORY (INHALATION) at 16:46

## 2020-08-15 RX ADMIN — ACETAMINOPHEN 650 MG: 325 TABLET ORAL at 02:39

## 2020-08-15 RX ADMIN — OXYCODONE 5 MG: 5 TABLET ORAL at 09:51

## 2020-08-15 RX ADMIN — IPRATROPIUM BROMIDE AND ALBUTEROL SULFATE 1 AMPULE: .5; 3 SOLUTION RESPIRATORY (INHALATION) at 20:27

## 2020-08-15 RX ADMIN — PIPERACILLIN AND TAZOBACTAM 3.38 G: 3; .375 INJECTION, POWDER, LYOPHILIZED, FOR SOLUTION INTRAVENOUS at 18:45

## 2020-08-15 RX ADMIN — ATORVASTATIN CALCIUM 40 MG: 40 TABLET, FILM COATED ORAL at 09:51

## 2020-08-15 RX ADMIN — OXYCODONE 5 MG: 5 TABLET ORAL at 23:33

## 2020-08-15 RX ADMIN — PIPERACILLIN AND TAZOBACTAM 3.38 G: 3; .375 INJECTION, POWDER, LYOPHILIZED, FOR SOLUTION INTRAVENOUS at 09:51

## 2020-08-15 RX ADMIN — PIPERACILLIN AND TAZOBACTAM 3.38 G: 3; .375 INJECTION, POWDER, LYOPHILIZED, FOR SOLUTION INTRAVENOUS at 02:18

## 2020-08-15 RX ADMIN — Medication 10 ML: at 21:57

## 2020-08-15 RX ADMIN — PANTOPRAZOLE SODIUM 40 MG: 40 INJECTION, POWDER, FOR SOLUTION INTRAVENOUS at 09:51

## 2020-08-15 RX ADMIN — PREDNISONE 40 MG: 20 TABLET ORAL at 09:51

## 2020-08-15 RX ADMIN — APIXABAN 5 MG: 5 TABLET, FILM COATED ORAL at 09:51

## 2020-08-15 RX ADMIN — IPRATROPIUM BROMIDE AND ALBUTEROL SULFATE 1 AMPULE: .5; 3 SOLUTION RESPIRATORY (INHALATION) at 12:28

## 2020-08-15 RX ADMIN — ANORECTAL OINTMENT 3 EACH: 15.7; .44; 24; 20.6 OINTMENT TOPICAL at 02:40

## 2020-08-15 RX ADMIN — APIXABAN 5 MG: 5 TABLET, FILM COATED ORAL at 21:57

## 2020-08-15 RX ADMIN — ACETAZOLAMIDE 250 MG: 250 TABLET ORAL at 09:51

## 2020-08-15 RX ADMIN — BUDESONIDE AND FORMOTEROL FUMARATE DIHYDRATE 2 PUFF: 160; 4.5 AEROSOL RESPIRATORY (INHALATION) at 09:30

## 2020-08-15 RX ADMIN — GABAPENTIN 300 MG: 300 CAPSULE ORAL at 21:56

## 2020-08-15 RX ADMIN — POLYETHYLENE GLYCOL 3350 17 G: 17 POWDER, FOR SOLUTION ORAL at 09:51

## 2020-08-15 RX ADMIN — OXYCODONE 5 MG: 5 TABLET ORAL at 15:40

## 2020-08-15 RX ADMIN — GABAPENTIN 300 MG: 300 CAPSULE ORAL at 09:51

## 2020-08-15 RX ADMIN — FUROSEMIDE 40 MG: 10 INJECTION, SOLUTION INTRAMUSCULAR; INTRAVENOUS at 09:51

## 2020-08-15 RX ADMIN — IPRATROPIUM BROMIDE AND ALBUTEROL SULFATE 1 AMPULE: .5; 3 SOLUTION RESPIRATORY (INHALATION) at 09:30

## 2020-08-15 ASSESSMENT — PAIN DESCRIPTION - ONSET
ONSET: ON-GOING
ONSET: ON-GOING

## 2020-08-15 ASSESSMENT — PAIN DESCRIPTION - ORIENTATION
ORIENTATION: LEFT
ORIENTATION: LEFT

## 2020-08-15 ASSESSMENT — PAIN DESCRIPTION - FREQUENCY
FREQUENCY: CONTINUOUS
FREQUENCY: INTERMITTENT

## 2020-08-15 ASSESSMENT — PAIN DESCRIPTION - PROGRESSION
CLINICAL_PROGRESSION: GRADUALLY WORSENING
CLINICAL_PROGRESSION: NOT CHANGED

## 2020-08-15 ASSESSMENT — PAIN DESCRIPTION - DESCRIPTORS
DESCRIPTORS: CONSTANT
DESCRIPTORS: SHARP

## 2020-08-15 ASSESSMENT — PAIN SCALES - GENERAL
PAINLEVEL_OUTOF10: 6
PAINLEVEL_OUTOF10: 10
PAINLEVEL_OUTOF10: 8
PAINLEVEL_OUTOF10: 9
PAINLEVEL_OUTOF10: 0
PAINLEVEL_OUTOF10: 0

## 2020-08-15 ASSESSMENT — PAIN - FUNCTIONAL ASSESSMENT
PAIN_FUNCTIONAL_ASSESSMENT: PREVENTS OR INTERFERES SOME ACTIVE ACTIVITIES AND ADLS
PAIN_FUNCTIONAL_ASSESSMENT: PREVENTS OR INTERFERES SOME ACTIVE ACTIVITIES AND ADLS

## 2020-08-15 ASSESSMENT — PAIN DESCRIPTION - PAIN TYPE
TYPE: ACUTE PAIN
TYPE: ACUTE PAIN

## 2020-08-15 ASSESSMENT — PAIN DESCRIPTION - LOCATION
LOCATION: ANKLE
LOCATION: HIP

## 2020-08-15 NOTE — PLAN OF CARE
Problem: Falls - Risk of:  Goal: Will remain free from falls  Description: Will remain free from falls  Outcome: Ongoing  Goal: Absence of physical injury  Description: Absence of physical injury  Outcome: Ongoing     Problem: Skin Integrity:  Goal: Will show no infection signs and symptoms  Description: Will show no infection signs and symptoms  Outcome: Ongoing  Goal: Absence of new skin breakdown  Description: Absence of new skin breakdown  Outcome: Ongoing     Problem: Pain:  Goal: Pain level will decrease  Description: Pain level will decrease  Outcome: Ongoing  Goal: Control of acute pain  Description: Control of acute pain  Outcome: Ongoing  Goal: Control of chronic pain  Description: Control of chronic pain  Outcome: Ongoing     Problem: Nutrition  Goal: Optimal nutrition therapy  Outcome: Ongoing     Problem: Urinary Elimination:  Goal: Signs and symptoms of infection will decrease  Description: Signs and symptoms of infection will decrease  Outcome: Ongoing  Goal: Complications related to the disease process, condition or treatment will be avoided or minimized  Description: Complications related to the disease process, condition or treatment will be avoided or minimized  Outcome: Ongoing     Problem: Infection - Central Venous Catheter-Associated Bloodstream Infection:  Goal: Will show no infection signs and symptoms  Description: Will show no infection signs and symptoms  Outcome: Ongoing     Problem: OXYGENATION/RESPIRATORY FUNCTION  Goal: Patient will maintain patent airway  Outcome: Ongoing  Goal: Patient will achieve/maintain normal respiratory rate/effort  Description: Respiratory rate and effort will be within normal limits for the patient  Outcome: Ongoing     Problem: HEMODYNAMIC STATUS  Goal: Patient has stable vital signs and fluid balance  Outcome: Ongoing     Problem: FLUID AND ELECTROLYTE IMBALANCE  Goal: Fluid and electrolyte balance are achieved/maintained  Outcome: Ongoing     Problem: ACTIVITY INTOLERANCE/IMPAIRED MOBILITY  Goal: Mobility/activity is maintained at optimum level for patient  Outcome: Ongoing

## 2020-08-15 NOTE — CARE COORDINATION
Still waiting on COVID results and [employer] precert. Will continue to monitor. Respectfully submitted,    RAN Lin  [employer]   [phone]    Electronically signed by RAN Chang on 8/15/2020 at 1:54 PM

## 2020-08-15 NOTE — PROGRESS NOTES
Hospitalist Progress Note      PCP: Charleen Rob MD    Date of Admission: 8/6/2020      Subjective: feels much better, on RA, no chest pain, sob at rest or palpitation. Medications:  Reviewed    Infusion Medications    dilTIAZem Stopped (08/13/20 1900)     Scheduled Medications    apixaban  5 mg Oral BID    predniSONE  40 mg Oral Daily    furosemide  40 mg Intravenous Daily    acetaZOLAMIDE  250 mg Oral Daily    pantoprazole  40 mg Intravenous Daily    And    sodium chloride (PF)  10 mL Intravenous Daily    sodium chloride flush  10 mL Intravenous 2 times per day    piperacillin-tazobactam  3.375 g Intravenous Q8H    budesonide-formoterol  2 puff Inhalation BID    gabapentin  300 mg Oral TID    atorvastatin  40 mg Oral Daily    ipratropium-albuterol  1 ampule Inhalation Q4H WA     PRN Meds: oxyCODONE, potassium chloride, polyvinyl alcohol, ammonium lactate, menthol-zinc oxide, sodium chloride flush, acetaminophen **OR** acetaminophen, polyethylene glycol, promethazine **OR** ondansetron      Intake/Output Summary (Last 24 hours) at 8/15/2020 1210  Last data filed at 8/15/2020 0630  Gross per 24 hour   Intake 1165.7 ml   Output 2900 ml   Net -1734.3 ml       Physical Exam Performed:    /71   Pulse 80   Temp 97.7 °F (36.5 °C) (Axillary)   Resp 22   Ht 5' 7\" (1.702 m)   Wt 207 lb 7.3 oz (94.1 kg)   SpO2 97%   BMI 32.49 kg/m²     General appearance: No apparent distress  Neck: Supple  Respiratory:  Improving air entry  Cardiovascular: Regular rate and rhythm with normal S1/S2 without murmurs, rubs or gallops. Abdomen: Soft, non-tender, non-distended with normal bowel sounds. Musculoskeletal: No clubbing, cyanosis  Skin: Skin color, texture, turgor normal.  No rashes or lesions. Neurologic:  No focal weakness.   Psychiatric: Alert and oriented  Capillary Refill: Brisk,< 3 seconds   Peripheral Pulses: +2 palpable, equal bilaterally       Labs:   Recent Labs     08/13/20  0400 08/14/20  0600 08/15/20  0620   WBC 14.2* 15.1* 13.8*   HGB 12.4 11.6* 12.1   HCT 41.2 38.6 40.9    164 200     Recent Labs     08/13/20  0400 08/14/20  0600 08/15/20  0620    141 140   K 3.8 3.4* 4.2   CL 98* 98* 101   CO2 38* 35* 32   BUN 42* 40* 35*   CREATININE 1.2 1.0 0.8   CALCIUM 7.9* 8.1* 8.4   PHOS 4.6 3.7 3.4     No results for input(s): AST, ALT, BILIDIR, BILITOT, ALKPHOS in the last 72 hours. No results for input(s): INR in the last 72 hours. No results for input(s): Leellen Carota in the last 72 hours. Urinalysis:      Lab Results   Component Value Date    NITRU NEGATIVE 05/03/2013    WBCUA 6-10 05/03/2013    BACTERIA 1+ 05/03/2013    RBCUA 3-5 05/03/2013    BLOODU TRACE 05/03/2013    SPECGRAV 1.020 05/03/2013    GLUCOSEU NEGATIVE 05/03/2013       Radiology:  XR HIP LEFT (2-3 VIEWS)   Final Result      FLUORO FOR SURGICAL PROCEDURES   Final Result      XR CHEST PORTABLE   Final Result   Stable appearance of the chest with overall pattern representing vascular   congestion/pulmonary edema. XR CHEST PORTABLE   Final Result   Bilateral pleural effusions and bilateral airspace disease most compatible   with edema, increased since earlier today. The vilma appear prominent, likely related to enlarged pulmonary arteries. A   follow-up is recommended as hilar adenopathy is not excluded. Interval placement of a right arm PICC. XR CHEST PORTABLE   Final Result   Interval intubation. There is improved vascular congestion centrally, but   worsening airspace change in the right lower lung zone. CT Head WO Contrast   Final Result   No acute intracranial abnormality. CT Cervical Spine WO Contrast   Final Result   Multilevel postsurgical changes and degenerative changes. No acute osseous   abnormality. CT LUMBAR SPINE WO CONTRAST   Final Result   1. No acute finding in the lumbar spine.    2. Advanced degenerative changes that are most notable at L4-5 contributing   to severe spinal canal stenosis. XR LUMBAR SPINE (2-3 VIEWS)   Final Result   1. Moderately displaced intertrochanteric fracture of the proximal left femur. 2. Severe degenerative change in the left hip with moderate degenerative   change on the right. 3. Technically limited evaluation of the lumbar spine due to patient body   habitus. XR HIP 2-3 VW W PELVIS LEFT   Final Result   1. Moderately displaced intertrochanteric fracture of the proximal left femur. 2. Severe degenerative change in the left hip with moderate degenerative   change on the right. 3. Technically limited evaluation of the lumbar spine due to patient body   habitus. XR FEMUR LEFT (MIN 2 VIEWS)   Final Result   Intratrochanteric left hip fracture      Moderate to severe osteoarthritis of the left hip joint         XR CHEST PORTABLE   Final Result   Mild perihilar opacities and left basilar ground-glass opacities in the   chest.  Pattern may represent developing pulmonary edema or pneumonitis. Assessment/Plan:    Active Hospital Problems    Diagnosis    Acute respiratory failure with hypoxia ([facility] [J96.01]    Acute on chronic respiratory failure with hypercapnia (HCC) [J96.22]    Acute pulmonary edema (HCC) [J81.0]    Multifocal pneumonia [J18.9]    Rapid atrial fibrillation (HCC) [I48.91]    Chronic obstructive pulmonary disease ([address] [J44.9]    Hypoxemia requiring supplemental oxygen [R09.02, Z99.81]    Acute on chronic respiratory failure with hypoxia ([facility] [J96.21]    Fall [W19. XXXA]    [facility] [S72.002A]    Morbid obesity with BMI of 45.0-49.9, adult ([facility] [E66.01, Z68.42]     1. Acute hypoxic respiratory failure,  COPD exacerbation and pneumonia along with pulmonary edema. procal low, iv antibiotics, intubated, steroids, pulmonary consulted.  extubated  days ago. doing well, on RA currently  2.  Possible COPD exacerbation,

## 2020-08-15 NOTE — PROGRESS NOTES
[facility]              Progress Note      Admit Date 2020  HPI: recurrent afib for 6 hours yesterday  [id number] score 2-3    Interval history:     Ms. Pepe Lau denies chest pain, shortness of breath, palpitations      Subjective:       Scheduled Meds:   apixaban  5 mg Oral BID    predniSONE  40 mg Oral Daily    furosemide  40 mg Intravenous Daily    acetaZOLAMIDE  250 mg Oral Daily    pantoprazole  40 mg Intravenous Daily    And    sodium chloride (PF)  10 mL Intravenous Daily    sodium chloride flush  10 mL Intravenous 2 times per day    piperacillin-tazobactam  3.375 g Intravenous Q8H    budesonide-formoterol  2 puff Inhalation BID    gabapentin  300 mg Oral TID    atorvastatin  40 mg Oral Daily    ipratropium-albuterol  1 ampule Inhalation Q4H WA     Continuous Infusions:   dilTIAZem Stopped (20 1900)     PRN Meds:oxyCODONE, potassium chloride, polyvinyl alcohol, ammonium lactate, menthol-zinc oxide, sodium chloride flush, acetaminophen **OR** acetaminophen, polyethylene glycol, promethazine **OR** ondansetron       Objective:      Wt Readings from Last 3 Encounters:   08/15/20 207 lb 7.3 oz (94.1 kg)   Admit weight: Weight: (!) 314 lb 4 oz (142.5 kg)      Temperature range over 24hrs:   Temp  Av.9 °F (36.6 °C)  Min: 97.6 °F (36.4 °C)  Max: 98.2 °F (36.8 °C)  Current Respiratory Rate:  Resp: 17  Current Pulse:  Pulse: 83  Current Blood Pressure:  BP: (!) 97/56  24hr Blood Pressure Range:  Systolic (26VQG), GPD:420 , Min:97 , USL:583   ; Diastolic (44EUX), LWS:28, Min:50, Max:78    Current Pulse Oximetry:  SpO2: 95 %      Intake/Output Summary (Last 24 hours) at 8/15/2020 1934  Last data filed at 8/15/2020 1853  Gross per 24 hour   Intake 1185.7 ml   Output 1350 ml   Net -164.3 ml       Telemetry monitor:     Physical Exam:  General:   alert, NAD  Skin:  Warm and dry  Neck:  No JVD  Chest:  Clear to auscultation, respiration easy  Cardiovascular:  RRR S1S2 no murmur to auscultation  Abdomen: Bowel sounds normal, abd soft, non-tender  Extremities:  No edema  : unremarkable      Imaging  Echo: 1/9/20  Summary:  Overall left ventricular ejection fraction is estimated to be 55-60%. The left ventricular wall motion is normal.  There is mild concentric left ventricular hypertrophy. The diastolic function is impaired and classified as Grade 1 (impaired  relaxation). The Aortic Valve is mildly calcified. The systolic pulmonary artery pressure cannot be estimated due to insufficient  tricuspid regurgitation.   The left atrium is mildly dilated.       Lab Review     Renal Profile:    CC CG = 100 ml/min  Lab Results   Component Value Date    CREATININE 0.8 08/15/2020    BUN 35 08/15/2020     08/15/2020    K 4.2 08/15/2020    K 4.1 08/06/2020     08/15/2020    CO2 32 08/15/2020     CBC:    Lab Results   Component Value Date    WBC 13.8 08/15/2020    RBC 5.08 08/15/2020    HGB 12.1 08/15/2020    HCT 40.9 08/15/2020    MCV 80.5 08/15/2020    RDW 19.7 08/15/2020     08/15/2020     BNP:    Lab Results   Component Value Date    BNP 61.3 05/03/2013     Fasting Lipid Panel:  No results found for: CHOL, HDL, TRIG  Cardiac Enzymes:  No results found for: CKTOTAL, CKMB, CKMBINDEX, TROPONINI  PT/ INR   Lab Results   Component Value Date    INR 1.02 08/08/2020    PROTIME 11.8 08/08/2020     PTT No results found for: PTT   Lab Results   Component Value Date    MG 2.10 08/15/2020    No results found for: TSH    Assessment/Plan:     Patient Active Problem List   Diagnosis    Closed left hip fracture, initial encounter ([facility]    Morbid obesity with BMI of 45.0-49.9, adult ([facility]    Hypoxemia requiring supplemental oxygen    Acute on chronic respiratory failure with hypoxia ([facility]    Fall    Acute respiratory failure with hypoxia (HCC)    Acute on chronic respiratory failure with hypercapnia (HCC)    Acute pulmonary edema (HCC)    Multifocal pneumonia    Rapid atrial fibrillation ([facility]    [facility]      1. Agree with [address]. 2. Her bp is on low side thus not sure she will tolerate cardizem or metoprolol. 3. If symptoms of rapid rates, consider dig. 4. If rehab here, could monitor to get an idea about recurrence. 5. If not,  Will see as outpatient  6.  Please osiris if questions

## 2020-08-16 PROBLEM — I48.0 PAROXYSMAL ATRIAL FIBRILLATION (HCC): Status: ACTIVE | Noted: 2020-08-16

## 2020-08-16 LAB
ALBUMIN SERPL-MCNC: 3 G/DL (ref 3.4–5)
ANION GAP SERPL CALCULATED.3IONS-SCNC: 11 MMOL/L (ref 3–16)
BUN BLDV-MCNC: 39 MG/DL (ref 7–20)
CALCIUM SERPL-MCNC: 8.7 MG/DL (ref 8.3–10.6)
CHLORIDE BLD-SCNC: 103 MMOL/L (ref 99–110)
CO2: 29 MMOL/L (ref 21–32)
CREAT SERPL-MCNC: 1.1 MG/DL (ref 0.6–1.2)
GFR AFRICAN AMERICAN: 60
GFR NON-AFRICAN AMERICAN: 49
GLUCOSE BLD-MCNC: 82 MG/DL (ref 70–99)
HCT VFR BLD CALC: 40 % (ref 36–48)
HEMOGLOBIN: 12.1 G/DL (ref 12–16)
MAGNESIUM: 2.1 MG/DL (ref 1.8–2.4)
MCH RBC QN AUTO: 24.2 PG (ref 26–34)
MCHC RBC AUTO-ENTMCNC: 30.3 G/DL (ref 31–36)
MCV RBC AUTO: 79.6 FL (ref 80–100)
PDW BLD-RTO: 19.7 % (ref 12.4–15.4)
PHOSPHORUS: 3.2 MG/DL (ref 2.5–4.9)
PLATELET # BLD: 232 K/UL (ref 135–450)
PMV BLD AUTO: 8.4 FL (ref 5–10.5)
POTASSIUM SERPL-SCNC: 3.7 MMOL/L (ref 3.5–5.1)
RBC # BLD: 5.02 M/UL (ref 4–5.2)
SODIUM BLD-SCNC: 143 MMOL/L (ref 136–145)
WBC # BLD: 16.2 K/UL (ref 4–11)

## 2020-08-16 PROCEDURE — 6370000000 HC RX 637 (ALT 250 FOR IP): Performed by: ORTHOPAEDIC SURGERY

## 2020-08-16 PROCEDURE — 94760 N-INVAS EAR/PLS OXIMETRY 1: CPT

## 2020-08-16 PROCEDURE — 83735 ASSAY OF MAGNESIUM: CPT

## 2020-08-16 PROCEDURE — 6360000002 HC RX W HCPCS: Performed by: INTERNAL MEDICINE

## 2020-08-16 PROCEDURE — 6370000000 HC RX 637 (ALT 250 FOR IP): Performed by: INTERNAL MEDICINE

## 2020-08-16 PROCEDURE — 2580000003 HC RX 258: Performed by: ORTHOPAEDIC SURGERY

## 2020-08-16 PROCEDURE — 99232 SBSQ HOSP IP/OBS MODERATE 35: CPT | Performed by: NURSE PRACTITIONER

## 2020-08-16 PROCEDURE — 80069 RENAL FUNCTION PANEL: CPT

## 2020-08-16 PROCEDURE — 6360000002 HC RX W HCPCS: Performed by: ORTHOPAEDIC SURGERY

## 2020-08-16 PROCEDURE — 2060000000 HC ICU INTERMEDIATE R&B

## 2020-08-16 PROCEDURE — 85027 COMPLETE CBC AUTOMATED: CPT

## 2020-08-16 PROCEDURE — 6370000000 HC RX 637 (ALT 250 FOR IP): Performed by: NURSE PRACTITIONER

## 2020-08-16 PROCEDURE — 94640 AIRWAY INHALATION TREATMENT: CPT

## 2020-08-16 PROCEDURE — C9113 INJ PANTOPRAZOLE SODIUM, VIA: HCPCS | Performed by: ORTHOPAEDIC SURGERY

## 2020-08-16 RX ORDER — PREDNISONE 20 MG/1
40 TABLET ORAL DAILY
Qty: 2 TABLET | Refills: 0
Start: 2020-08-16 | End: 2020-08-17

## 2020-08-16 RX ADMIN — BUDESONIDE AND FORMOTEROL FUMARATE DIHYDRATE 2 PUFF: 160; 4.5 AEROSOL RESPIRATORY (INHALATION) at 08:45

## 2020-08-16 RX ADMIN — IPRATROPIUM BROMIDE AND ALBUTEROL SULFATE 1 AMPULE: .5; 3 SOLUTION RESPIRATORY (INHALATION) at 08:46

## 2020-08-16 RX ADMIN — IPRATROPIUM BROMIDE AND ALBUTEROL SULFATE 1 AMPULE: .5; 3 SOLUTION RESPIRATORY (INHALATION) at 12:19

## 2020-08-16 RX ADMIN — BUDESONIDE AND FORMOTEROL FUMARATE DIHYDRATE 2 PUFF: 160; 4.5 AEROSOL RESPIRATORY (INHALATION) at 20:45

## 2020-08-16 RX ADMIN — OXYCODONE 5 MG: 5 TABLET ORAL at 05:58

## 2020-08-16 RX ADMIN — GABAPENTIN 300 MG: 300 CAPSULE ORAL at 14:48

## 2020-08-16 RX ADMIN — PIPERACILLIN AND TAZOBACTAM 3.38 G: 3; .375 INJECTION, POWDER, LYOPHILIZED, FOR SOLUTION INTRAVENOUS at 10:12

## 2020-08-16 RX ADMIN — IPRATROPIUM BROMIDE AND ALBUTEROL SULFATE 1 AMPULE: .5; 3 SOLUTION RESPIRATORY (INHALATION) at 20:45

## 2020-08-16 RX ADMIN — GABAPENTIN 300 MG: 300 CAPSULE ORAL at 20:27

## 2020-08-16 RX ADMIN — APIXABAN 5 MG: 5 TABLET, FILM COATED ORAL at 20:28

## 2020-08-16 RX ADMIN — ATORVASTATIN CALCIUM 40 MG: 40 TABLET, FILM COATED ORAL at 10:12

## 2020-08-16 RX ADMIN — Medication 10 ML: at 20:28

## 2020-08-16 RX ADMIN — OXYCODONE 5 MG: 5 TABLET ORAL at 14:48

## 2020-08-16 RX ADMIN — FUROSEMIDE 40 MG: 10 INJECTION, SOLUTION INTRAMUSCULAR; INTRAVENOUS at 10:12

## 2020-08-16 RX ADMIN — PREDNISONE 40 MG: 20 TABLET ORAL at 10:12

## 2020-08-16 RX ADMIN — ACETAZOLAMIDE 250 MG: 250 TABLET ORAL at 10:12

## 2020-08-16 RX ADMIN — DILTIAZEM HYDROCHLORIDE 30 MG: 30 TABLET, FILM COATED ORAL at 20:27

## 2020-08-16 RX ADMIN — PIPERACILLIN AND TAZOBACTAM 3.38 G: 3; .375 INJECTION, POWDER, LYOPHILIZED, FOR SOLUTION INTRAVENOUS at 02:07

## 2020-08-16 RX ADMIN — IPRATROPIUM BROMIDE AND ALBUTEROL SULFATE 1 AMPULE: .5; 3 SOLUTION RESPIRATORY (INHALATION) at 16:01

## 2020-08-16 RX ADMIN — APIXABAN 5 MG: 5 TABLET, FILM COATED ORAL at 10:12

## 2020-08-16 RX ADMIN — PANTOPRAZOLE SODIUM 40 MG: 40 INJECTION, POWDER, FOR SOLUTION INTRAVENOUS at 10:12

## 2020-08-16 RX ADMIN — DILTIAZEM HYDROCHLORIDE 30 MG: 30 TABLET, FILM COATED ORAL at 14:48

## 2020-08-16 RX ADMIN — GABAPENTIN 300 MG: 300 CAPSULE ORAL at 10:12

## 2020-08-16 ASSESSMENT — PAIN SCALES - GENERAL
PAINLEVEL_OUTOF10: 7
PAINLEVEL_OUTOF10: 9
PAINLEVEL_OUTOF10: 5
PAINLEVEL_OUTOF10: 4
PAINLEVEL_OUTOF10: 0

## 2020-08-16 ASSESSMENT — PAIN DESCRIPTION - PROGRESSION
CLINICAL_PROGRESSION: NOT CHANGED

## 2020-08-16 ASSESSMENT — PAIN DESCRIPTION - ONSET: ONSET: ON-GOING

## 2020-08-16 ASSESSMENT — PAIN DESCRIPTION - ORIENTATION
ORIENTATION: LEFT
ORIENTATION: LEFT

## 2020-08-16 ASSESSMENT — PAIN DESCRIPTION - DESCRIPTORS: DESCRIPTORS: CONSTANT

## 2020-08-16 ASSESSMENT — PAIN DESCRIPTION - LOCATION
LOCATION: HIP
LOCATION: HIP

## 2020-08-16 ASSESSMENT — PAIN DESCRIPTION - FREQUENCY: FREQUENCY: CONTINUOUS

## 2020-08-16 ASSESSMENT — PAIN DESCRIPTION - PAIN TYPE
TYPE: ACUTE PAIN
TYPE: ACUTE PAIN

## 2020-08-16 ASSESSMENT — PAIN - FUNCTIONAL ASSESSMENT: PAIN_FUNCTIONAL_ASSESSMENT: PREVENTS OR INTERFERES SOME ACTIVE ACTIVITIES AND ADLS

## 2020-08-16 NOTE — PROGRESS NOTES
[facility]   Daily Progress Note      Admit Date:  8/6/2020    CC: atrial fib    HPI:   Nelly Hodge is a 78 y.o. female with PMH COPD, CKD, HTN, HLP. + smoker. Patient was admitted to [facility] after a fall at home and suffered a hip fx s/pORIF 8/10/20. In the hospital, she was found to be hypoxic and unresponsive requiring intubation. She was in AF for 6 hours and EP was consulted. She converted to NSR with Cardizem gtt. [address] not recommended D/T short duration of AF during acute issues. EP signed off 8/11. Dr. Leonila Freed reconsulted yesterday for recurrent AF for 6 hrs on 8/14 then back to NSR. Patient was started on [address] D?t recurrent AF with elevated CHADSVASC. Unable to start BB or CCB D/T soft BP. Today she continues with brief episodes of tachycardia. Dr. Leonila Freed reviews and is calling it atrial tach. Patient denies CP, palpitations, SOB, LH, syncope with tachycardia. In fact, she voices she would like to take the monitor off because \"nothing is wrong. \"     Review of Systems:   General: Denies fever, chills, fatigue, weakness  Skin: Denies skin changes, rash, itching, lesions.   HEENT: Denies headache, dizziness, vision changes, nosebleeds, sore throat, nasal drainage  RESP: Denies cough, sputum, dyspnea, wheeze, snoring  CARD: Denies palpitations,  Murmur, chest pain  GI:Denies nausea, vomiting, heartburn, loss of appetite, change in bowels  : Denies frequency, pain, incontinence, polyuria  VASC: Denies claudication, leg cramps, clots  MUSC/SKEL: Denies pain, stiffness, arthritis  PSYCH: Denies anxiety, depression, stress  NEURO: Denies numbness, tingling, weakness,change in mood or memory  HEME: Denies abn bruising, bleeding, anemia  ENDO: Denies intolerance to heat, cold, excessive thirst or hunger, hx thyroid disease    Objective:   /77   Pulse 68   Temp 97.9 °F (36.6 °C) (Oral)   Resp 18   Ht 5' 7\" (1.702 m)   Wt 235 lb 7.2 oz (106.8 kg)   SpO2 94%   BMI 36.88 kg/m²           Intake/Output Summary (Last 24 hours) at 8/16/2020 1110  Last data filed at 8/16/2020 1012  Gross per 24 hour   Intake 600 ml   Output 900 ml   Net -300 ml       WEIGHT:Admit Weight: (!) 314 lb 4 oz (142.5 kg)         Today  Weight: 235 lb 7.2 oz (106.8 kg)   DRY WEIGHT:  Wt Readings from Last 3 Encounters:   08/16/20 235 lb 7.2 oz (106.8 kg)       Physical Exam:  GEN: Appears obese, no acute distress  SKIN: Pink, warm, dry. Nails without clubbing. HEENT: PERRLA. Normocephalic, atraumatic. Neck supple. No adenopathy. LUNG: AP diameter normal. Diminished BS w/ exp wheezes throughout. Respiratory effort increased with activity. HEART: S1S2 A/R. No JVD. No carotid bruit. No murmur, rub or gallop. ABD: Soft, nontender. +BS X 4 quads. No hepatomegaly. EXT: Radial and pedal pulses 2+ and symmetric. Without varicosities. Trace edema. MUSCSKEL: Good ROM X4 extremities. No deformity. NEURO: A/O X3. Calm and cooperative. Telemetry: NSR HR 80s, brief episodes of tachycardia    Medications:    apixaban  5 mg Oral BID    predniSONE  40 mg Oral Daily    furosemide  40 mg Intravenous Daily    acetaZOLAMIDE  250 mg Oral Daily    pantoprazole  40 mg Intravenous Daily    And    sodium chloride (PF)  10 mL Intravenous Daily    sodium chloride flush  10 mL Intravenous 2 times per day    piperacillin-tazobactam  3.375 g Intravenous Q8H    budesonide-formoterol  2 puff Inhalation BID    gabapentin  300 mg Oral TID    atorvastatin  40 mg Oral Daily    ipratropium-albuterol  1 ampule Inhalation Q4H WA      dilTIAZem Stopped (08/13/20 1900)     oxyCODONE, potassium chloride, polyvinyl alcohol, ammonium lactate, menthol-zinc oxide, sodium chloride flush, acetaminophen **OR** acetaminophen, polyethylene glycol, promethazine **OR** ondansetron    Lab Data: I have reviewed all labs below today.    CBC:   Recent Labs     08/14/20  0600 08/15/20  0620 08/16/20  0605   WBC 15.1* 13.8* 16.2*   HGB 11.6* 12.1 12.1   HCT 38.6 40.9 40.0   MCV 79.9* 80.5 79.6*    200 232     BMP:   Recent Labs     08/14/20  0600 08/15/20  0620 08/16/20  0605    140 143   K 3.4* 4.2 3.7   CL 98* 101 103   CO2 35* 32 29   PHOS 3.7 3.4 3.2   BUN 40* 35* 39*   CREATININE 1.0 0.8 1.1     GLUCOSE:   Recent Labs     08/14/20  0600 08/15/20  0620 08/16/20 0605   GLUCOSE 102* 138* 82     LIVER PROFILE:   Lab Results   Component Value Date    AST 28 08/06/2020    ALT 17 08/06/2020    LABALBU 3.0 08/16/2020    BILIDIR 0.12 05/03/2013    BILITOT 0.5 08/06/2020    ALKPHOS 114 08/06/2020     PT/INR:   Lab Results   Component Value Date    PROTIME 11.8 08/08/2020    INR 1.02 08/08/2020     APTT:   Lab Results   Component Value Date    APTT 31.7 08/08/2020     Pro-BNP:    Lab Results   Component Value Date    PROBNP 5,928 08/06/2020     ENZYMES:No results found for: CKTOTAL, CKMB, CKMBINDEX, TROPONINI  FASTING LIPID PANEL:No results found for: CHOL, HDL, LDLCALC, TRIG    Diagnostics:    EKG:   Atrial fibrillation with rapid ventricular responseNonspecific ST and T wave abnormality , probably digitalis effectConfirmed by Christian DAVID MD ([phone]) on 8/10/2020 8:32:06 AM     ECHO: 1/9/20  Summary:  Overall left ventricular ejection fraction is estimated to be 55-60%. The left ventricular wall motion is normal.  There is mild concentric left ventricular hypertrophy. The diastolic function is impaired and classified as Grade 1 (impaired  relaxation). The Aortic Valve is mildly calcified. The systolic pulmonary artery pressure cannot be estimated due to insufficient  tricuspid regurgitation. The left atrium is mildly dilated. Stress Test:     Cardiac Angiography:     Assessment/Plan:  1.) Paroxysmal Atrial Fib: Intermittent tachycardia. Discussed with Dr. Jose E Nice. He does not think Dig will help. Her BP is improved today. Will try to add low dose short acting cardizem.    CHADSVASC- 3     Thromboembolic risk reduction: eliquis  Rate Control: Start cardizem 30mg po q6h, can change to cardizem CD as outpatient if tolerates  Consider outpatient event monitor if continues with tachycardia    Electronically signed by GABRIELA Silver CNP on 8/16/2020 at 11:10 AM

## 2020-08-16 NOTE — PROGRESS NOTES
Hospitalist Progress Note      PCP: Ann Sanchez MD    Date of Admission: 8/6/2020        Subjective: feels ok on RA, no fever, chills, chest pain or SOB at rest, no abdominal pain, hip pain controlled. Medications:  Reviewed    Infusion Medications    dilTIAZem Stopped (08/13/20 1900)     Scheduled Medications    apixaban  5 mg Oral BID    predniSONE  40 mg Oral Daily    furosemide  40 mg Intravenous Daily    acetaZOLAMIDE  250 mg Oral Daily    pantoprazole  40 mg Intravenous Daily    And    sodium chloride (PF)  10 mL Intravenous Daily    sodium chloride flush  10 mL Intravenous 2 times per day    piperacillin-tazobactam  3.375 g Intravenous Q8H    budesonide-formoterol  2 puff Inhalation BID    gabapentin  300 mg Oral TID    atorvastatin  40 mg Oral Daily    ipratropium-albuterol  1 ampule Inhalation Q4H WA     PRN Meds: oxyCODONE, potassium chloride, polyvinyl alcohol, ammonium lactate, menthol-zinc oxide, sodium chloride flush, acetaminophen **OR** acetaminophen, polyethylene glycol, promethazine **OR** ondansetron      Intake/Output Summary (Last 24 hours) at 8/16/2020 0915  Last data filed at 8/16/2020 0531  Gross per 24 hour   Intake 600 ml   Output 400 ml   Net 200 ml       Physical Exam Performed:    /70   Pulse 78   Temp 97.5 °F (36.4 °C) (Oral)   Resp 18   Ht 5' 7\" (1.702 m)   Wt 235 lb 7.2 oz (106.8 kg)   SpO2 94%   BMI 36.88 kg/m²     General appearance: No apparent distress  Neck: Supple  Respiratory:  Some wheezes   Cardiovascular: Regular rate and rhythm with normal S1/S2 without murmurs, rubs or gallops. Abdomen: Soft, non-tender, non-distended with normal bowel sounds. Musculoskeletal: No clubbing, cyanosis   Skin: Skin color, texture, turgor normal.  No rashes or lesions.   Neurologic:  No focal weakness   Psychiatric: Alert and oriented  Capillary Refill: Brisk,< 3 seconds   Peripheral Pulses: +2 palpable, equal bilaterally       Labs:   Recent Labs 08/14/20  0600 08/15/20  0620 08/16/20  0605   WBC 15.1* 13.8* 16.2*   HGB 11.6* 12.1 12.1   HCT 38.6 40.9 40.0    200 232     Recent Labs     08/14/20  0600 08/15/20  0620 08/16/20  0605    140 143   K 3.4* 4.2 3.7   CL 98* 101 103   CO2 35* 32 29   BUN 40* 35* 39*   CREATININE 1.0 0.8 1.1   CALCIUM 8.1* 8.4 8.7   PHOS 3.7 3.4 3.2     No results for input(s): AST, ALT, BILIDIR, BILITOT, ALKPHOS in the last 72 hours. No results for input(s): INR in the last 72 hours. No results for input(s): [place] in the last 72 hours. Urinalysis:      Lab Results   Component Value Date    NITRU NEGATIVE 05/03/2013    WBCUA 6-10 05/03/2013    BACTERIA 1+ 05/03/2013    RBCUA 3-5 05/03/2013    BLOODU TRACE 05/03/2013    SPECGRAV 1.020 05/03/2013    GLUCOSEU NEGATIVE 05/03/2013       Radiology:  XR HIP LEFT (2-3 VIEWS)   Final Result      FLUORO FOR SURGICAL PROCEDURES   Final Result      XR CHEST PORTABLE   Final Result   Stable appearance of the chest with overall pattern representing vascular   congestion/pulmonary edema. XR CHEST PORTABLE   Final Result   Bilateral pleural effusions and bilateral airspace disease most compatible   with edema, increased since earlier today. The vilma appear prominent, likely related to enlarged pulmonary arteries. A   follow-up is recommended as hilar adenopathy is not excluded. Interval placement of a right arm PICC. XR CHEST PORTABLE   Final Result   Interval intubation. There is improved vascular congestion centrally, but   worsening airspace change in the right lower lung zone. CT Head WO Contrast   Final Result   No acute intracranial abnormality. CT Cervical Spine WO Contrast   Final Result   Multilevel postsurgical changes and degenerative changes. No acute osseous   abnormality. CT LUMBAR SPINE WO CONTRAST   Final Result   1. No acute finding in the lumbar spine.    2. Advanced degenerative changes that are most notable at L4-5 contributing   to severe spinal canal stenosis. XR LUMBAR SPINE (2-3 VIEWS)   Final Result   1. Moderately displaced intertrochanteric fracture of the proximal left femur. 2. Severe degenerative change in the left hip with moderate degenerative   change on the right. 3. Technically limited evaluation of the lumbar spine due to patient body   habitus. XR HIP 2-3 VW W PELVIS LEFT   Final Result   1. Moderately displaced intertrochanteric fracture of the proximal left femur. 2. Severe degenerative change in the left hip with moderate degenerative   change on the right. 3. Technically limited evaluation of the lumbar spine due to patient body   habitus. XR FEMUR LEFT (MIN 2 VIEWS)   Final Result   Intratrochanteric left hip fracture      Moderate to severe osteoarthritis of the left hip joint         XR CHEST PORTABLE   Final Result   Mild perihilar opacities and left basilar ground-glass opacities in the   chest.  Pattern may represent developing pulmonary edema or pneumonitis. Assessment/Plan:    Active Hospital Problems    Diagnosis    Acute respiratory failure with hypoxia ([facility] [J96.01]    Acute on chronic respiratory failure with hypercapnia (HCC) [J96.22]    Acute pulmonary edema (HCC) [J81.0]    Multifocal pneumonia [J18.9]    Rapid atrial fibrillation (HCC) [I48.91]    Chronic obstructive pulmonary disease ([address] [J44.9]    Hypoxemia requiring supplemental oxygen [R09.02, Z99.81]    Acute on chronic respiratory failure with hypoxia ([facility] [J96.21]    Fall [W19. XXXA]    [facility] [S72.002A]    Morbid obesity with BMI of 45.0-49.9, adult ([facility] [E66.01, Z68.42]     1. Acute hypoxic respiratory failure,  COPD exacerbation and pneumonia along with pulmonary edema. procal low, iv antibiotics, intubated, steroids, pulmonary consulted.  extubated  days ago. doing well, on RA currently  2.  Possible COPD exacerbation, presented with SOB, wheezes, + leukocytosis CXR w/o consolidation or infiltrate. Placed on BiPAP in ED, then refused to wear BiPAP and was intubated after her PCO2 got worse and her encephalopathy got worse. Extubated now. po prednisone, review again on discharge based when we will be able to get her precert as she might complete her course inpatient. 3. Acute on chronic respiratory failure with hypercapnia and encephalopathy, Rapid response was called on the floor, and patient was intubated and transferred to ICU. Pulmo were following, received iv steroids, currently on prednisone. 4. Acute encephalopathy, due to hypercapnia,at baseline now. 5. Pneumonia, iv antibiotics as above. 6. L hip fracture, after mechanical fall. Ortho consulted, post op day 3.  7. A fib RVR,  new AM 8/9, had another event of RVR, treated with iv Cardizem drip, started on eliquis, follow up with cardiology as outpatient. 8.  sepsis, POA, pneumonia and COPD related, treated. 9. Left kidney obstruction, chronic.  Has been followed by urology since earlier this year with plan for nephrectomy at some point which is been canceled multiple times due to COVID and other factors. 10. Essential Hypertension, monitor  11. Severe spinal stenosis, noted on CT. Outpatient neurosurg eval  12. Tobacco abuse, counseled by my colleague during this admission.   13. Sacral and elbows Wounds, wound care      Diet: DIET GENERAL;  Dietary Nutrition Supplements: Standard High Calorie Oral Supplement  Code Status: Limited        Dispo - pending Oxana Lucas MD

## 2020-08-16 NOTE — CARE COORDINATION
8/16  placed call to [facility] regarding status of precert. Await return call. Electronically signed by Jonathan Bermudez RN on 8/16/2020 at 4:39 PM      Received call back from Ynaeth at [facility].  Precert is still pending    Electronically signed by TRACEY Suh on 8/16/2020 at 5:14 PM

## 2020-08-16 NOTE — PLAN OF CARE
Problem: Falls - Risk of:  Goal: Will remain free from falls  Description: Will remain free from falls  8/16/2020 0411 by Joyce Blum RN  Outcome: Ongoing    Goal: Absence of physical injury  Description: Absence of physical injury  8/16/2020 0411 by Joyce Blum RN  Outcome: Ongoing     Problem: Skin Integrity:  Goal: Will show no infection signs and symptoms  Description: Will show no infection signs and symptoms  8/16/2020 0411 by Joyce Blum RN  Outcome: Ongoing    Goal: Absence of new skin breakdown  Description: Absence of new skin breakdown  8/16/2020 0411 by Joyce Blum RN  Outcome: Ongoing     Problem: Pain:  Description: Pain management should include both nonpharmacologic and pharmacologic interventions.   Goal: Pain level will decrease  Description: Pain level will decrease  8/16/2020 0411 by Joyce Blum RN  Outcome: Ongoing    Goal: Control of acute pain  Description: Control of acute pain  8/16/2020 0411 by Joyce Blum RN  Outcome: Ongoing     Problem: Urinary Elimination:  Goal: Signs and symptoms of infection will decrease  Description: Signs and symptoms of infection will decrease  8/16/2020 0411 by Joyce Blum RN  Outcome: Ongoing     Problem: Infection - Central Venous Catheter-Associated Bloodstream Infection:  Goal: Will show no infection signs and symptoms  Description: Will show no infection signs and symptoms  8/16/2020 0411 by Joyce Blum RN  Outcome: Ongoing    Problem: OXYGENATION/RESPIRATORY FUNCTION  Goal: Patient will maintain patent airway  8/16/2020 0411 by Joyce Blum RN  Outcome: Ongoing     Problem: HEMODYNAMIC STATUS  Goal: Patient has stable vital signs and fluid balance  8/16/2020 0411 by Joyce Blum RN  Outcome: Ongoing     Problem: FLUID AND ELECTROLYTE IMBALANCE  Goal: Fluid and electrolyte balance are achieved/maintained  8/16/2020 0411 by Joyce Blum RN  Outcome: Ongoing

## 2020-08-17 ENCOUNTER — TELEPHONE (OUTPATIENT)
Dept: PULMONOLOGY | Age: 70
End: 2020-08-17

## 2020-08-17 LAB
ALBUMIN SERPL-MCNC: 3.1 G/DL (ref 3.4–5)
ANION GAP SERPL CALCULATED.3IONS-SCNC: 11 MMOL/L (ref 3–16)
BUN BLDV-MCNC: 38 MG/DL (ref 7–20)
CALCIUM SERPL-MCNC: 8.8 MG/DL (ref 8.3–10.6)
CHLORIDE BLD-SCNC: 105 MMOL/L (ref 99–110)
CO2: 28 MMOL/L (ref 21–32)
CREAT SERPL-MCNC: 0.9 MG/DL (ref 0.6–1.2)
CULTURE, RESPIRATORY: NORMAL
GFR AFRICAN AMERICAN: >60
GFR NON-AFRICAN AMERICAN: >60
GLUCOSE BLD-MCNC: 110 MG/DL (ref 70–99)
GRAM STAIN RESULT: NORMAL
HCT VFR BLD CALC: 43.7 % (ref 36–48)
HEMOGLOBIN: 12.8 G/DL (ref 12–16)
MAGNESIUM: 2 MG/DL (ref 1.8–2.4)
MCH RBC QN AUTO: 23.6 PG (ref 26–34)
MCHC RBC AUTO-ENTMCNC: 29.3 G/DL (ref 31–36)
MCV RBC AUTO: 80.7 FL (ref 80–100)
PDW BLD-RTO: 20.2 % (ref 12.4–15.4)
PHOSPHORUS: 3.9 MG/DL (ref 2.5–4.9)
PLATELET # BLD: 293 K/UL (ref 135–450)
PMV BLD AUTO: 8.3 FL (ref 5–10.5)
POTASSIUM SERPL-SCNC: 3.4 MMOL/L (ref 3.5–5.1)
RBC # BLD: 5.41 M/UL (ref 4–5.2)
SODIUM BLD-SCNC: 144 MMOL/L (ref 136–145)
WBC # BLD: 14.5 K/UL (ref 4–11)

## 2020-08-17 PROCEDURE — 6360000002 HC RX W HCPCS: Performed by: ORTHOPAEDIC SURGERY

## 2020-08-17 PROCEDURE — 97530 THERAPEUTIC ACTIVITIES: CPT

## 2020-08-17 PROCEDURE — 6370000000 HC RX 637 (ALT 250 FOR IP): Performed by: INTERNAL MEDICINE

## 2020-08-17 PROCEDURE — 80069 RENAL FUNCTION PANEL: CPT

## 2020-08-17 PROCEDURE — 6370000000 HC RX 637 (ALT 250 FOR IP): Performed by: NURSE PRACTITIONER

## 2020-08-17 PROCEDURE — 94761 N-INVAS EAR/PLS OXIMETRY MLT: CPT

## 2020-08-17 PROCEDURE — 6370000000 HC RX 637 (ALT 250 FOR IP): Performed by: ORTHOPAEDIC SURGERY

## 2020-08-17 PROCEDURE — C9113 INJ PANTOPRAZOLE SODIUM, VIA: HCPCS | Performed by: ORTHOPAEDIC SURGERY

## 2020-08-17 PROCEDURE — 83735 ASSAY OF MAGNESIUM: CPT

## 2020-08-17 PROCEDURE — 99232 SBSQ HOSP IP/OBS MODERATE 35: CPT | Performed by: NURSE PRACTITIONER

## 2020-08-17 PROCEDURE — 85027 COMPLETE CBC AUTOMATED: CPT

## 2020-08-17 PROCEDURE — 6360000002 HC RX W HCPCS: Performed by: INTERNAL MEDICINE

## 2020-08-17 PROCEDURE — 2060000000 HC ICU INTERMEDIATE R&B

## 2020-08-17 PROCEDURE — 94640 AIRWAY INHALATION TREATMENT: CPT

## 2020-08-17 PROCEDURE — 2580000003 HC RX 258: Performed by: ORTHOPAEDIC SURGERY

## 2020-08-17 PROCEDURE — 97110 THERAPEUTIC EXERCISES: CPT

## 2020-08-17 RX ORDER — FUROSEMIDE 40 MG/1
40 TABLET ORAL DAILY
Status: DISCONTINUED | OUTPATIENT
Start: 2020-08-18 | End: 2020-08-18 | Stop reason: HOSPADM

## 2020-08-17 RX ORDER — DILTIAZEM HYDROCHLORIDE 180 MG/1
180 CAPSULE, COATED, EXTENDED RELEASE ORAL DAILY
Status: DISCONTINUED | OUTPATIENT
Start: 2020-08-17 | End: 2020-08-18 | Stop reason: HOSPADM

## 2020-08-17 RX ORDER — POTASSIUM CHLORIDE 20 MEQ/1
40 TABLET, EXTENDED RELEASE ORAL ONCE
Status: COMPLETED | OUTPATIENT
Start: 2020-08-17 | End: 2020-08-17

## 2020-08-17 RX ADMIN — OXYCODONE 5 MG: 5 TABLET ORAL at 06:22

## 2020-08-17 RX ADMIN — OXYCODONE 5 MG: 5 TABLET ORAL at 12:27

## 2020-08-17 RX ADMIN — APIXABAN 5 MG: 5 TABLET, FILM COATED ORAL at 20:55

## 2020-08-17 RX ADMIN — GABAPENTIN 300 MG: 300 CAPSULE ORAL at 14:40

## 2020-08-17 RX ADMIN — IPRATROPIUM BROMIDE AND ALBUTEROL SULFATE 1 AMPULE: .5; 3 SOLUTION RESPIRATORY (INHALATION) at 20:15

## 2020-08-17 RX ADMIN — ACETAZOLAMIDE 250 MG: 250 TABLET ORAL at 09:22

## 2020-08-17 RX ADMIN — Medication 10 ML: at 09:23

## 2020-08-17 RX ADMIN — GABAPENTIN 300 MG: 300 CAPSULE ORAL at 09:22

## 2020-08-17 RX ADMIN — BUDESONIDE AND FORMOTEROL FUMARATE DIHYDRATE 2 PUFF: 160; 4.5 AEROSOL RESPIRATORY (INHALATION) at 20:15

## 2020-08-17 RX ADMIN — FUROSEMIDE 40 MG: 10 INJECTION, SOLUTION INTRAMUSCULAR; INTRAVENOUS at 09:07

## 2020-08-17 RX ADMIN — Medication 10 ML: at 09:08

## 2020-08-17 RX ADMIN — DILTIAZEM HYDROCHLORIDE 180 MG: 180 CAPSULE, COATED, EXTENDED RELEASE ORAL at 09:23

## 2020-08-17 RX ADMIN — IPRATROPIUM BROMIDE AND ALBUTEROL SULFATE 1 AMPULE: .5; 3 SOLUTION RESPIRATORY (INHALATION) at 17:06

## 2020-08-17 RX ADMIN — PREDNISONE 40 MG: 20 TABLET ORAL at 09:22

## 2020-08-17 RX ADMIN — POTASSIUM CHLORIDE 40 MEQ: 1500 TABLET, EXTENDED RELEASE ORAL at 12:27

## 2020-08-17 RX ADMIN — BUDESONIDE AND FORMOTEROL FUMARATE DIHYDRATE 2 PUFF: 160; 4.5 AEROSOL RESPIRATORY (INHALATION) at 08:36

## 2020-08-17 RX ADMIN — PANTOPRAZOLE SODIUM 40 MG: 40 INJECTION, POWDER, FOR SOLUTION INTRAVENOUS at 09:08

## 2020-08-17 RX ADMIN — GABAPENTIN 300 MG: 300 CAPSULE ORAL at 20:55

## 2020-08-17 RX ADMIN — APIXABAN 5 MG: 5 TABLET, FILM COATED ORAL at 09:22

## 2020-08-17 RX ADMIN — ATORVASTATIN CALCIUM 40 MG: 40 TABLET, FILM COATED ORAL at 09:22

## 2020-08-17 RX ADMIN — DILTIAZEM HYDROCHLORIDE 30 MG: 30 TABLET, FILM COATED ORAL at 03:00

## 2020-08-17 RX ADMIN — IPRATROPIUM BROMIDE AND ALBUTEROL SULFATE 1 AMPULE: .5; 3 SOLUTION RESPIRATORY (INHALATION) at 12:36

## 2020-08-17 RX ADMIN — Medication 10 ML: at 20:55

## 2020-08-17 RX ADMIN — IPRATROPIUM BROMIDE AND ALBUTEROL SULFATE 1 AMPULE: .5; 3 SOLUTION RESPIRATORY (INHALATION) at 08:35

## 2020-08-17 ASSESSMENT — PAIN DESCRIPTION - PROGRESSION
CLINICAL_PROGRESSION: NOT CHANGED

## 2020-08-17 ASSESSMENT — PAIN SCALES - GENERAL
PAINLEVEL_OUTOF10: 6
PAINLEVEL_OUTOF10: 8
PAINLEVEL_OUTOF10: 10
PAINLEVEL_OUTOF10: 0

## 2020-08-17 ASSESSMENT — PAIN - FUNCTIONAL ASSESSMENT
PAIN_FUNCTIONAL_ASSESSMENT: PREVENTS OR INTERFERES WITH MANY ACTIVE NOT PASSIVE ACTIVITIES
PAIN_FUNCTIONAL_ASSESSMENT: PREVENTS OR INTERFERES SOME ACTIVE ACTIVITIES AND ADLS

## 2020-08-17 ASSESSMENT — PAIN DESCRIPTION - FREQUENCY
FREQUENCY: CONTINUOUS
FREQUENCY: CONTINUOUS

## 2020-08-17 ASSESSMENT — PAIN DESCRIPTION - ORIENTATION
ORIENTATION: LEFT
ORIENTATION: LEFT

## 2020-08-17 ASSESSMENT — PAIN DESCRIPTION - PAIN TYPE
TYPE: ACUTE PAIN
TYPE: ACUTE PAIN

## 2020-08-17 ASSESSMENT — PAIN DESCRIPTION - LOCATION
LOCATION: HIP
LOCATION: HIP

## 2020-08-17 ASSESSMENT — PAIN DESCRIPTION - ONSET
ONSET: ON-GOING
ONSET: ON-GOING

## 2020-08-17 ASSESSMENT — PAIN DESCRIPTION - DESCRIPTORS
DESCRIPTORS: CONSTANT
DESCRIPTORS: CONSTANT

## 2020-08-17 NOTE — PROGRESS NOTES
Cardiac Electrophysiology Progress Note  Date: 8/17/2020  Admit Date:  8/6/2020  Admission Diagnosis: Closed left hip fracture, initial encounter ([facility] [S72.002A]  Closed left hip fracture, initial encounter ([facility] [S72.002A]     Reason for Follow up: new onset atrial fibrillation with RVR    History of Present Illness  Gus Garcia is a 78y.o. year old female with past medical history significant for COPD, CKD, HTN (not on medication for this) and HLD who presented to the ED following a mechanical fall. The patient is currently intubated and sedated so most of this history was obtained through the chart. She was admitted over the weekend after sustaining a fall at home without any mention of syncope. She was found to be hypoxic, BiPap was ordered but the patient apparently took it off. She was found unresponsive with agonal breathing, a rapid response was called and the patient was intubated. She did go into atrial fibrillation with RVR on 8/9 for about 6 hours. She was on a Cardizem drip briefly and did convert to SR. She again had atrial fibrillation on 8/14. She was started on short-acting Cardizem 30mg Q6H with hold parameters due to soft blood pressures. Pt feeling okay today, does have a cough. SOB at baseline. No CP or palpitations. Has been in SR with some short runs of what looks like PAT.     Past Medical History:   Diagnosis Date    Chronic kidney disease     COPD [facility]chronic obstructive pulmonary disease) ([facility]     Hyperlipidemia     Hypertension         Past Surgical History:   Procedure Laterality Date    CHOLECYSTECTOMY      HIP FRACTURE SURGERY Left 8/10/2020    OPEN TREATMENT OF LEFT HIP FRACTURE WITH CEPHALOMEDULLARY NAIL performed by Deanna Duke MD at [place] [address]       Current Outpatient Medications   Medication Instructions    acetaZOLAMIDE (DIAMOX) 250 mg, Oral, DAILY    albuterol sulfate  (90 Base) MCG/ACT inhaler 2 puffs, Inhalation, EVERY 4-6 HOURS PRN    apixaban (ELIQUIS) 5 mg, Oral, 2 TIMES DAILY    fluticasone-salmeterol (ADVAIR) 250-50 MCG/DOSE AEPB 1 puff, Inhalation, 2 TIMES DAILY    furosemide (LASIX) 40 mg, Oral, DAILY    gabapentin (NEURONTIN) 300 mg, Oral, 3 TIMES DAILY    ipratropium-albuterol (DUONEB) 0.5-2.5 (3) MG/3ML SOLN nebulizer solution 3 mLs, Inhalation, EVERY 2 HOURS PRN    predniSONE (DELTASONE) 40 mg, Oral, DAILY    simvastatin (ZOCOR) 80 mg, Oral, NIGHTLY        No Known Allergies    Social History:   reports that she has been smoking cigarettes. She has been smoking about 1.50 packs per day. She has never used smokeless tobacco. She reports current drug use. Drug: Marijuana. She reports that she does not drink alcohol. Family History:  family history is not on file. Review of Systems:  Unable to be obtained as pt is intubated.     Medications:  Scheduled Meds:   dilTIAZem  180 mg Oral Daily    potassium chloride  40 mEq Oral Once    apixaban  5 mg Oral BID    predniSONE  40 mg Oral Daily    furosemide  40 mg Intravenous Daily    acetaZOLAMIDE  250 mg Oral Daily    pantoprazole  40 mg Intravenous Daily    And    sodium chloride (PF)  10 mL Intravenous Daily    sodium chloride flush  10 mL Intravenous 2 times per day    budesonide-formoterol  2 puff Inhalation BID    gabapentin  300 mg Oral TID    atorvastatin  40 mg Oral Daily    ipratropium-albuterol  1 ampule Inhalation Q4H WA      Continuous Infusions:    PRN Meds:.oxyCODONE, potassium chloride, polyvinyl alcohol, ammonium lactate, menthol-zinc oxide, sodium chloride flush, acetaminophen **OR** acetaminophen, polyethylene glycol, promethazine **OR** ondansetron     Physical Examination:  Vitals:    08/17/20 0905   BP: 113/70   Pulse: 66   Resp: 18   Temp: 97.7 °F (36.5 °C)   SpO2: 92%        Intake/Output Summary (Last 24 hours) at 8/17/2020 0929  Last data filed at 8/17/2020 0923  Gross per 24 hour   Intake 110 ml   Output 3055 ml   Net -2945 ml     In: 10 [I.V.:10]  Out: 1055 Wt Readings from Last 3 Encounters:   20 176 lb 2.4 oz (79.9 kg)     Temp  Av.8 °F (36.6 °C)  Min: 97.4 °F (36.3 °C)  Max: 98.3 °F (36.8 °C)  Pulse  Av  Min: 14  Max: 89  BP  Min: 97/68  Max: 124/75  SpO2  Av.7 %  Min: 88 %  Max: 94 %    · Telemetry: Sinus rhythm in the 80s. · Constitutional: Alert, in no acute distress. Appears stated age. · Head: Normocephalic and atraumatic. · Eyes: Conjunctivae normal. EOM are normal.   · Neck: Neck supple. No lymphadenopathy. No rigidity. No JVD present. · Cardiovascular: Normal rate, regular rhythm. No murmurs, rubs or gallops. No S3 or S4.  · Pulmonary/Chest: Scattered expiratory wheezes bilaterally. No rhonchi or crackles. No respiratory accessory muscle use. · Abdominal: Soft. Normal bowel sounds present. No distension, No tenderness. · Musculoskeletal: No tenderness. No edema    · Lymphadenopathy: Has no cervical adenopathy. · Neurological: Alert and oriented. No gross deficits. · Skin: Skin is warm and dry. No rash, lesions, ulcerations noted. · Psychiatric: No anxiety nor agitation. Labs:  Reviewed. Recent Labs     08/15/20  0620  0605 20  0644    143 144   K 4.2 3.7 3.4*    103 105   CO2 32 29 28   PHOS 3.4 3.2 3.9   BUN 35* 39* 38*   CREATININE 0.8 1.1 0.9     Recent Labs     08/15/20  0620 20  0605 20  0644   WBC 13.8* 16.2* 14.5*   HGB 12.1 12.1 12.8   HCT 40.9 40.0 43.7   MCV 80.5 79.6* 80.7    232 293     No results found for: CKTOTAL, CKMB, CKMBINDEX, TROPONINI  Lab Results   Component Value Date    BNP 61.3 2013     Lab Results   Component Value Date    PROTIME 11.8 2020    INR 1.02 2020     No results found for: CHOL, HDL, TRIG    Diagnostic and imaging results reviewed. EC20  SR at 87 BPM. Baseline artifact. Echo: 20  Summary:  Overall left ventricular ejection fraction is estimated to be 55-60%.   The left ventricular wall motion is normal.  There is mild concentric left ventricular hypertrophy. The diastolic function is impaired and classified as Grade 1 (impaired  relaxation). The Aortic Valve is mildly calcified. The systolic pulmonary artery pressure cannot be estimated due to insufficient  tricuspid regurgitation. The left atrium is mildly dilated. Stress: 5/22/19    IMPRESSION      Normal nuclear myocardial perfusion scan    Assessment & Plan:    Paroxysmal atrial fibrillation with RVR   - change Cardizem to long acting 180mg QD since BP seemed to tolerate short acting    - CHADS2-VASc at least 2 (gender, age), possibly 3 ? HTN (not on medication for this) - on Eliquis 5mg BID    Paroxysmal atrial tachycardia   - short runs on tele, asymptomatic   - continue Cardizem, replace K    Acute hypoxic and hypercapnic respiratory failure   - improved, care per primary   - has been diuresed, can likely change Lasix to PO, consider stopping Diamox - will defer to primary/pulmonary    L hip fracture   - s/p L hip ORIF    HLD   - on statin    COPD, possible exacerbation   - care per primary    GABRIELA Guidry  [facility] [address] [place] [address], [address] [place] [address]  Phone: [phone]  Fax: [phone]    Electronically signed by GABRIELA Rojas - CNP on 8/17/2020 at 9:29 AM

## 2020-08-17 NOTE — CARE COORDINATION
Discharge Planning:  SW received a call from Henry Rowland at [facility] stating that pts insurance denied the admission to [facility] as [facility] is considered an out of network facility. SW met with pt to discuss other SNF options. List discussed with pt. Pt expressed frustration and disappointment but stated that she would at least like to stay close to [facility]. Pt is requesting a referral to Home at [facility]. SW contacted Kaleb Richey with the Home at [facility] at [phone]. Referral initiated. Will await response. Plan: Referral sent to the Home at [facility]. Awaiting response.   Electronically signed by Mary Ellen Gonis on 8/17/2020 at 2:53 PM

## 2020-08-17 NOTE — PROGRESS NOTES
Hospitalist Progress Note      PCP: Keila Garsia MD    Date of Admission: 8/6/2020        Subjective: Pt S/E. No acute events. On room air. Medications:  Reviewed    Infusion Medications     Scheduled Medications    dilTIAZem  180 mg Oral Daily    apixaban  5 mg Oral BID    predniSONE  40 mg Oral Daily    furosemide  40 mg Intravenous Daily    acetaZOLAMIDE  250 mg Oral Daily    pantoprazole  40 mg Intravenous Daily    And    sodium chloride (PF)  10 mL Intravenous Daily    sodium chloride flush  10 mL Intravenous 2 times per day    budesonide-formoterol  2 puff Inhalation BID    gabapentin  300 mg Oral TID    atorvastatin  40 mg Oral Daily    ipratropium-albuterol  1 ampule Inhalation Q4H WA     PRN Meds: oxyCODONE, potassium chloride, polyvinyl alcohol, ammonium lactate, menthol-zinc oxide, sodium chloride flush, acetaminophen **OR** acetaminophen, polyethylene glycol, promethazine **OR** ondansetron      Intake/Output Summary (Last 24 hours) at 8/17/2020 0924  Last data filed at 8/17/2020 0293  Gross per 24 hour   Intake 100 ml   Output 3055 ml   Net -2955 ml       Physical Exam Performed:    /70   Pulse 66   Temp 97.7 °F (36.5 °C) (Oral)   Resp 18   Ht 5' 7\" (1.702 m)   Wt 176 lb 2.4 oz (79.9 kg)   SpO2 92%   BMI 27.59 kg/m²     General appearance: No acute distress, appears comfortable  Neck: Supple ,no jvd  Respiratory:  Some wheezes   Cardiovascular: Regular rate and rhythm with normal S1/S2 without murmurs, rubs or gallops. Abdomen: Soft, non-tender, non-distended with normal bowel sounds. Musculoskeletal: No clubbing, cyanosis   Skin: Skin color, texture, turgor normal.  No rashes or lesions.   Neurologic:  No focal deficits  Psychiatric: Alert and oriented  Capillary Refill: Brisk,< 3 seconds   Peripheral Pulses: +2 palpable, equal bilaterally       Labs:   Recent Labs     08/15/20  0620 08/16/20  0605 08/17/20  0644   WBC 13.8* 16.2* 14.5*   HGB 12.1 12.1 12.8   HCT 40.9 40.0 43.7    232 293     Recent Labs     08/15/20  0620 08/16/20  0605 08/17/20  0644    143 144   K 4.2 3.7 3.4*    103 105   CO2 32 29 28   BUN 35* 39* 38*   CREATININE 0.8 1.1 0.9   CALCIUM 8.4 8.7 8.8   PHOS 3.4 3.2 3.9     No results for input(s): AST, ALT, BILIDIR, BILITOT, ALKPHOS in the last 72 hours. No results for input(s): INR in the last 72 hours. No results for input(s): [place] in the last 72 hours. Urinalysis:      Lab Results   Component Value Date    NITRU NEGATIVE 05/03/2013    WBCUA 6-10 05/03/2013    BACTERIA 1+ 05/03/2013    RBCUA 3-5 05/03/2013    BLOODU TRACE 05/03/2013    SPECGRAV 1.020 05/03/2013    GLUCOSEU NEGATIVE 05/03/2013       Radiology:  XR HIP LEFT (2-3 VIEWS)   Final Result      FLUORO FOR SURGICAL PROCEDURES   Final Result      XR CHEST PORTABLE   Final Result   Stable appearance of the chest with overall pattern representing vascular   congestion/pulmonary edema. XR CHEST PORTABLE   Final Result   Bilateral pleural effusions and bilateral airspace disease most compatible   with edema, increased since earlier today. The vilma appear prominent, likely related to enlarged pulmonary arteries. A   follow-up is recommended as hilar adenopathy is not excluded. Interval placement of a right arm PICC. XR CHEST PORTABLE   Final Result   Interval intubation. There is improved vascular congestion centrally, but   worsening airspace change in the right lower lung zone. CT Head WO Contrast   Final Result   No acute intracranial abnormality. CT Cervical Spine WO Contrast   Final Result   Multilevel postsurgical changes and degenerative changes. No acute osseous   abnormality. CT LUMBAR SPINE WO CONTRAST   Final Result   1. No acute finding in the lumbar spine. 2. Advanced degenerative changes that are most notable at L4-5 contributing   to severe spinal canal stenosis.          XR LUMBAR SPINE (2-3 VIEWS)   Final Result   1. Moderately displaced intertrochanteric fracture of the proximal left femur. 2. Severe degenerative change in the left hip with moderate degenerative   change on the right. 3. Technically limited evaluation of the lumbar spine due to patient body   habitus. XR HIP 2-3 VW W PELVIS LEFT   Final Result   1. Moderately displaced intertrochanteric fracture of the proximal left femur. 2. Severe degenerative change in the left hip with moderate degenerative   change on the right. 3. Technically limited evaluation of the lumbar spine due to patient body   habitus. XR FEMUR LEFT (MIN 2 VIEWS)   Final Result   Intratrochanteric left hip fracture      Moderate to severe osteoarthritis of the left hip joint         XR CHEST PORTABLE   Final Result   Mild perihilar opacities and left basilar ground-glass opacities in the   chest.  Pattern may represent developing pulmonary edema or pneumonitis. Assessment/Plan:    Active Hospital Problems    Diagnosis    Paroxysmal atrial fibrillation (HCC) [I48.0]    Acute respiratory failure with hypoxia ([facility]) [J96.01]    Acute on chronic respiratory failure with hypercapnia ([facility]) [J96.22]    Acute pulmonary edema ([facility]) [J81.0]    Multifocal pneumonia [J18.9]    Rapid atrial fibrillation (HCC) [I48.91]    Chronic obstructive pulmonary disease ([facility] [J44.9]    Hypoxemia requiring supplemental oxygen [R09.02, Z99.81]    Acute on chronic respiratory failure with hypoxia ([facility] [J96.21]    Fall [W19. XXXA]    Closed left hip fracture, initial encounter ([facility] [S72.002A]    Morbid obesity with BMI of 45.0-49.9, adult ([facility]) [E66.01, Z68.42]     COPD exacerbation  - nebs, steroids  - prednisone 40 mg po daily, will complete 8/18    Acute on chronic hypoxic respiratory failure, POA - improved, now extubated, and on RA  - with criteria: < 90% on RA, accessory muscle use, RR> 18, due to PNA on baseline COPD, pulm edema  -  Placed on BiPAP in ED, then refused to wear BiPAP and was intubated after her PCO2 got worse and her encephalopathy got worse. - pulmonary consulted     Acute encephalopathy  - due to hypercapnia  - at baseline now    CAP  -  Completed 7 days of zosyn  - blood and sputum cultures are negative  - procalcitonin is low  - mucinex    L hip fracture, after mechanical fall  - Ortho consulted, post op day 4    A fib RVR  -  new AM 8/9, had another event of RVR, now rate controlled  - treated with iv Cardizem drip  - started on eliquis  - follow up with cardiology as outpatient    sepsis, POA, pneumonia and COPD related - resolved    Left kidney obstruction, chronic.  Has been followed by urology since earlier this year with plan for nephrectomy at some point which is been canceled multiple times due to COVID and other factors    Essential Hypertension, monitor  Severe spinal stenosis, noted on CT.  Outpatient neurosurg eval  Tobacco abuse, counseled during this admission  Sacral and elbows Wounds, wound care      Diet: DIET GENERAL;  Dietary Nutrition Supplements: Standard High Calorie Oral Supplement  Code Status: Limited    Dispo - pending precert    Luzmaria Carrion DO

## 2020-08-17 NOTE — PROGRESS NOTES
continue to follow and progress as patient tolerates. Treatment Diagnosis: Impaired functional mobility  Prognosis: Good;Fair  History: as noted  PT Education: PT Role;Plan of Care;Transfer Training;General Safety; Functional Mobility Training;Weight-bearing Education  REQUIRES PT FOLLOW UP: Yes  Activity Tolerance  Activity Tolerance: Patient limited by endurance; Patient Tolerated treatment well  Activity Tolerance: Room air throughout session: 90-91%, HR 77-79 throughout session. Patient reporting mild dizziness once seated in BS chair. Patient Diagnosis(es): The primary encounter diagnosis was Hypoxemia requiring supplemental oxygen. Diagnoses of Acute on chronic respiratory failure with hypoxia ([address] Left leg pain, Strain of lumbar region, initial encounter, and Fall, initial encounter were also pertinent to this visit. has a past medical history of Chronic kidney disease, COPD (chronic obstructive pulmonary disease) ([address] Hyperlipidemia, and Hypertension. has a past surgical history that includes Cholecystectomy and Hip fracture surgery (Left, 8/10/2020). Restrictions  Restrictions/Precautions  Restrictions/Precautions: Weight Bearing, Fall Risk  Lower Extremity Weight Bearing Restrictions  Left Lower Extremity Weight Bearing: Partial Weight Bearing(50% WBing on LLE)  Position Activity Restriction  Other position/activity restrictions: Morbid Obesity : 215 lb. Very limited functional activity pta. Subjective  General  Chart Reviewed: Yes  Additional Pertinent Hx: 77 y/o female admit 8/6/2020 with L Hip Fx following fall at home. (Reportedly pt fell at home 8/4 although refused to go to hospital). 8/10/2020 S/P ORIF L Hip Fx.  8/10-8/12/2020 Pt Intubated. Patient transferred to [facility] on 8- due to atrial fibrillation, requiring Cardizem. PMH as noted including CKD, COPD. Response To Previous Treatment: Patient with no complaints from previous session.   Family / Caregiver Present: Yes()  Referring Practitioner: Dr. Radha Macias  Subjective  Subjective: Patient in bed. Awake. Lanette Rowan to \"do something. I want to move. \"  Reports L hip pain is better, rates at 6/10 during use of maximove for out of bed. Pain Screening  Patient Currently in Pain: No    Orientation  Orientation  Overall Orientation Status: Within Functional Limits     Social/Functional History  Social/Functional History  Lives With: Spouse()  Type of Home: House  Home Layout: One level  Home Access: Stairs to enter with rails  Entrance Stairs - Number of Steps: 5 LUZ ELENA  Bathroom Shower/Tub: (Pt sponge bathes only with assist.)  Bathroom Toilet: Bedside commode(Primarily uses BS,  empties)  Bathroom Accessibility: Accessible  Home Equipment: Rolling walker, 4 wheeled walker, Wheelchair-manual, Grab bars, Lift chair(Pt sleeps in lift chair.)  ADL Assistance: Needs assistance( assists with all ADLs.)  Ambulation Assistance: Independent(With Rolling Walker pta. Pt amb short distances within home at most.)  Active : No( drives)  Occupation: Retired  Additional Comments: Pt reports a fall pta; states RW caused her fall. Cognition   Cognition  Overall Cognitive Status: WFL  Cognition Comment: but expresses some unrealistic expectations regarding her ability to function at reduced L LE WBng status. Objective  Bed mobility  Rolling to Left: Moderate assistance;Maximum assistance(extra time, effortful. Use of siderail)  Rolling to Right: Moderate assistance;Maximum assistance(extra time, effortful. Use of siderail)  Scooting: Dependent/Total(to scoot back in BS chair.)  Comment: Patient rolls L and R multiple times to clean bottom, and place brief and maxi move pad under bottom  Transfers  Sit to Stand: (attempted sit to stand from [facility] chair to HD wh walker, Mod A, but patient only able to clear bottom off of chair. Trialed x 3.  Good effort.)  Bed to Chair: Dependent/Total(via maxi move lift.)  Comment: Once seated in BS chair, patient performs chair push ups, as if going to stand, with equal WBng B LEs (to assure L LE 50% WBng), x 3. Able to clear bottom. Ambulation  Ambulation?: No  Stairs/Curb  Stairs?: No  Balance  Posture: Good  Sitting - Static: Good(unsupported, in BS chair)  Sitting - Dynamic: Good(unsupported, in BS chair.)  Exercises  Knee Long Arc Quad: x 10 B, ROM L Knee extension lacks grossly 15 degrees active (full extension PROM)  Comments: Unsupported sitting in BS chair x 5 minutes. Multiple partial transfers as noted above. Plan   Plan  Times per week: 3--5  Current Treatment Recommendations: Strengthening, Functional Mobility Training, Transfer Training, Safety Education & Training, Patient/Caregiver Education & Training, Home Exercise Program  Safety Devices  Type of devices: Call light within reach, Chair alarm in place, Gait belt, Patient at risk for falls, Left in chair, Nurse notified(RN Ashley informed)    AM-PAC Score  AM-PAC Inpatient Mobility Raw Score : 9 (08/17/20 1624)  AM-PAC Inpatient T-Scale Score : 30.55 (08/17/20 1624)  Mobility Inpatient CMS 0-100% Score: 81.38 (08/17/20 1624)  Mobility Inpatient CMS G-Code Modifier : CM (08/17/20 1624)     Goals  Short term goals  Time Frame for Short term goals: Upon d/c acute care setting. All goals ongoing 8/13/2020  Short term goal 1: Bed Mob Mod x 2. Short term goal 2: Attempt Transfer via Lift Equipt/Assist device assist x 2; PWB L LE (50% wgt bear). Short term goal 3: Pt participating in approp Strength Exs.  8-: goal met  Patient Goals   Patient goals : Be able to go home.     Therapy Time   Individual Concurrent Group Co-treatment   Time In 7886         Time Out 8676         Minutes [address] [facility]  Electronically signed by Manas Mullins, [address] [place] [address] Drive [phone]  on 8/17/2020 at 5:18 PM

## 2020-08-17 NOTE — PROGRESS NOTES
Occupational Therapy    Pt seen bedside as OT/PT cotreat to transfer to chair. Pt required max A to roll R/L and place lift pad. Pt able to hold self in sidelying with use of bed rail. Pt required maxi move to transfer to chair and positioned for comfort. Pt was grateful to be out of bed. Pt left in room with physical therapy. Continue to recommend SNF for continued skilled therapy at discharge.     Time: 13 mins

## 2020-08-17 NOTE — TELEPHONE ENCOUNTER
----- Message from Saloni Solorzano MD sent at 8/14/2020  5:13 PM EDT -----  Please give patient an appointment to see me 9/24/2020 at 3 PM for hospital follow-up.

## 2020-08-17 NOTE — TELEPHONE ENCOUNTER
vm is not set up   I was able to reach the patient while she was inpatient at the hospital . She is aware this will be on her discharge forms.

## 2020-08-17 NOTE — PLAN OF CARE
Problem: Falls - Risk of:  Goal: Will remain free from falls  Description: Will remain free from falls  Outcome: Ongoing  Goal: Absence of physical injury  Description: Absence of physical injury  Outcome: Ongoing     Problem: Skin Integrity:  Goal: Will show no infection signs and symptoms  Description: Will show no infection signs and symptoms  Outcome: Ongoing  Goal: Absence of new skin breakdown  Description: Absence of new skin breakdown  Outcome: Ongoing     Problem: Pain:  Description: Pain management should include both nonpharmacologic and pharmacologic interventions.   Goal: Pain level will decrease  Description: Pain level will decrease  Outcome: Ongoing  Goal: Control of acute pain  Description: Control of acute pain  Outcome: Ongoing     Problem: Nutrition  Goal: Optimal nutrition therapy  Outcome: Ongoing     Problem: Urinary Elimination:  Goal: Signs and symptoms of infection will decrease  Description: Signs and symptoms of infection will decrease  Outcome: Ongoing     Problem: Infection - Central Venous Catheter-Associated Bloodstream Infection:  Goal: Will show no infection signs and symptoms  Description: Will show no infection signs and symptoms  Outcome: Ongoing     Problem: OXYGENATION/RESPIRATORY FUNCTION  Goal: Patient will maintain patent airway  Outcome: Ongoing  Goal: Patient will achieve/maintain normal respiratory rate/effort  Description: Respiratory rate and effort will be within normal limits for the patient  Outcome: Ongoing     Problem: HEMODYNAMIC STATUS  Goal: Patient has stable vital signs and fluid balance  Outcome: Ongoing

## 2020-08-18 ENCOUNTER — TELEPHONE (OUTPATIENT)
Dept: ORTHOPEDIC SURGERY | Age: 70
End: 2020-08-18

## 2020-08-18 VITALS
OXYGEN SATURATION: 90 % | SYSTOLIC BLOOD PRESSURE: 120 MMHG | HEIGHT: 67 IN | BODY MASS INDEX: 28.03 KG/M2 | WEIGHT: 178.6 LBS | DIASTOLIC BLOOD PRESSURE: 75 MMHG | TEMPERATURE: 98.1 F | HEART RATE: 86 BPM | RESPIRATION RATE: 18 BRPM

## 2020-08-18 LAB
ALBUMIN SERPL-MCNC: 3 G/DL (ref 3.4–5)
ANION GAP SERPL CALCULATED.3IONS-SCNC: 10 MMOL/L (ref 3–16)
BUN BLDV-MCNC: 43 MG/DL (ref 7–20)
CALCIUM SERPL-MCNC: 9 MG/DL (ref 8.3–10.6)
CHLORIDE BLD-SCNC: 106 MMOL/L (ref 99–110)
CO2: 29 MMOL/L (ref 21–32)
CREAT SERPL-MCNC: 0.9 MG/DL (ref 0.6–1.2)
GFR AFRICAN AMERICAN: >60
GFR NON-AFRICAN AMERICAN: >60
GLUCOSE BLD-MCNC: 78 MG/DL (ref 70–99)
GLUCOSE BLD-MCNC: 81 MG/DL (ref 70–99)
GLUCOSE BLD-MCNC: 94 MG/DL (ref 70–99)
HCT VFR BLD CALC: 40.9 % (ref 36–48)
HEMOGLOBIN: 12.5 G/DL (ref 12–16)
MAGNESIUM: 2.1 MG/DL (ref 1.8–2.4)
MCH RBC QN AUTO: 24.2 PG (ref 26–34)
MCHC RBC AUTO-ENTMCNC: 30.4 G/DL (ref 31–36)
MCV RBC AUTO: 79.5 FL (ref 80–100)
PDW BLD-RTO: 19.7 % (ref 12.4–15.4)
PERFORMED ON: NORMAL
PERFORMED ON: NORMAL
PHOSPHORUS: 3.7 MG/DL (ref 2.5–4.9)
PLATELET # BLD: 328 K/UL (ref 135–450)
PMV BLD AUTO: 7.9 FL (ref 5–10.5)
POTASSIUM SERPL-SCNC: 3.7 MMOL/L (ref 3.5–5.1)
RBC # BLD: 5.15 M/UL (ref 4–5.2)
SODIUM BLD-SCNC: 145 MMOL/L (ref 136–145)
WBC # BLD: 16.3 K/UL (ref 4–11)

## 2020-08-18 PROCEDURE — C9113 INJ PANTOPRAZOLE SODIUM, VIA: HCPCS | Performed by: ORTHOPAEDIC SURGERY

## 2020-08-18 PROCEDURE — 97535 SELF CARE MNGMENT TRAINING: CPT

## 2020-08-18 PROCEDURE — 6360000002 HC RX W HCPCS: Performed by: ORTHOPAEDIC SURGERY

## 2020-08-18 PROCEDURE — 6370000000 HC RX 637 (ALT 250 FOR IP): Performed by: NURSE PRACTITIONER

## 2020-08-18 PROCEDURE — 94760 N-INVAS EAR/PLS OXIMETRY 1: CPT

## 2020-08-18 PROCEDURE — 6370000000 HC RX 637 (ALT 250 FOR IP): Performed by: INTERNAL MEDICINE

## 2020-08-18 PROCEDURE — 80069 RENAL FUNCTION PANEL: CPT

## 2020-08-18 PROCEDURE — 94640 AIRWAY INHALATION TREATMENT: CPT

## 2020-08-18 PROCEDURE — 85027 COMPLETE CBC AUTOMATED: CPT

## 2020-08-18 PROCEDURE — 83735 ASSAY OF MAGNESIUM: CPT

## 2020-08-18 PROCEDURE — 97530 THERAPEUTIC ACTIVITIES: CPT

## 2020-08-18 PROCEDURE — 2580000003 HC RX 258: Performed by: ORTHOPAEDIC SURGERY

## 2020-08-18 PROCEDURE — 36592 COLLECT BLOOD FROM PICC: CPT

## 2020-08-18 PROCEDURE — 6370000000 HC RX 637 (ALT 250 FOR IP): Performed by: ORTHOPAEDIC SURGERY

## 2020-08-18 RX ORDER — DILTIAZEM HYDROCHLORIDE 180 MG/1
180 CAPSULE, COATED, EXTENDED RELEASE ORAL DAILY
Qty: 30 CAPSULE | Refills: 3 | Status: ON HOLD | OUTPATIENT
Start: 2020-08-19 | End: 2021-12-23 | Stop reason: HOSPADM

## 2020-08-18 RX ADMIN — APIXABAN 5 MG: 5 TABLET, FILM COATED ORAL at 08:35

## 2020-08-18 RX ADMIN — OXYCODONE 5 MG: 5 TABLET ORAL at 12:56

## 2020-08-18 RX ADMIN — OXYCODONE 5 MG: 5 TABLET ORAL at 06:51

## 2020-08-18 RX ADMIN — GABAPENTIN 300 MG: 300 CAPSULE ORAL at 08:35

## 2020-08-18 RX ADMIN — PANTOPRAZOLE SODIUM 40 MG: 40 INJECTION, POWDER, FOR SOLUTION INTRAVENOUS at 08:35

## 2020-08-18 RX ADMIN — Medication 10 ML: at 08:42

## 2020-08-18 RX ADMIN — IPRATROPIUM BROMIDE AND ALBUTEROL SULFATE 1 AMPULE: .5; 3 SOLUTION RESPIRATORY (INHALATION) at 08:53

## 2020-08-18 RX ADMIN — ATORVASTATIN CALCIUM 40 MG: 40 TABLET, FILM COATED ORAL at 08:35

## 2020-08-18 RX ADMIN — OXYCODONE 5 MG: 5 TABLET ORAL at 01:44

## 2020-08-18 RX ADMIN — PREDNISONE 40 MG: 20 TABLET ORAL at 08:35

## 2020-08-18 RX ADMIN — FUROSEMIDE 40 MG: 40 TABLET ORAL at 08:35

## 2020-08-18 RX ADMIN — Medication 10 ML: at 08:37

## 2020-08-18 RX ADMIN — GABAPENTIN 300 MG: 300 CAPSULE ORAL at 12:57

## 2020-08-18 RX ADMIN — DILTIAZEM HYDROCHLORIDE 180 MG: 180 CAPSULE, COATED, EXTENDED RELEASE ORAL at 08:35

## 2020-08-18 RX ADMIN — BUDESONIDE AND FORMOTEROL FUMARATE DIHYDRATE 2 PUFF: 160; 4.5 AEROSOL RESPIRATORY (INHALATION) at 08:53

## 2020-08-18 RX ADMIN — ANORECTAL OINTMENT 2 EACH: 15.7; .44; 24; 20.6 OINTMENT TOPICAL at 01:45

## 2020-08-18 ASSESSMENT — PAIN - FUNCTIONAL ASSESSMENT
PAIN_FUNCTIONAL_ASSESSMENT: PREVENTS OR INTERFERES SOME ACTIVE ACTIVITIES AND ADLS
PAIN_FUNCTIONAL_ASSESSMENT: PREVENTS OR INTERFERES WITH MANY ACTIVE NOT PASSIVE ACTIVITIES
PAIN_FUNCTIONAL_ASSESSMENT: PREVENTS OR INTERFERES WITH MANY ACTIVE NOT PASSIVE ACTIVITIES

## 2020-08-18 ASSESSMENT — PAIN DESCRIPTION - DESCRIPTORS
DESCRIPTORS: ACHING;CONSTANT
DESCRIPTORS: ACHING
DESCRIPTORS: CONSTANT;ACHING

## 2020-08-18 ASSESSMENT — PAIN DESCRIPTION - PROGRESSION
CLINICAL_PROGRESSION: NOT CHANGED
CLINICAL_PROGRESSION: GRADUALLY IMPROVING
CLINICAL_PROGRESSION: NOT CHANGED

## 2020-08-18 ASSESSMENT — PAIN DESCRIPTION - LOCATION
LOCATION: HIP

## 2020-08-18 ASSESSMENT — PAIN DESCRIPTION - FREQUENCY
FREQUENCY: CONTINUOUS

## 2020-08-18 ASSESSMENT — PAIN DESCRIPTION - PAIN TYPE
TYPE: SURGICAL PAIN

## 2020-08-18 ASSESSMENT — PAIN SCALES - GENERAL
PAINLEVEL_OUTOF10: 5
PAINLEVEL_OUTOF10: 10
PAINLEVEL_OUTOF10: 5
PAINLEVEL_OUTOF10: 9
PAINLEVEL_OUTOF10: 9

## 2020-08-18 ASSESSMENT — PAIN DESCRIPTION - ORIENTATION
ORIENTATION: LEFT

## 2020-08-18 ASSESSMENT — PAIN DESCRIPTION - ONSET
ONSET: ON-GOING
ONSET: ON-GOING

## 2020-08-18 NOTE — DISCHARGE SUMMARY
Hospital Medicine Discharge Summary    Patient: Tommy Benavidez     Gender: female  : 1950   Age: 78 y.o. MRN: [id number]    Code Status: Limited     Primary Care Provider: Waldo Curry MD    Admit Date: 2020   Discharge Date:   2020    Admitting Physician: Micheal Joshi MD  Discharge Physician: Etta Nichole, DO     Discharge Diagnoses: Active Hospital Problems    Diagnosis Date Noted    Paroxysmal atrial fibrillation ([facility] [I48.0] 2020    Acute on chronic respiratory failure with hypercapnia (HCC) [J96.22]     Acute pulmonary edema (HCC) [J81.0]     Multifocal pneumonia [J18.9]     Rapid atrial fibrillation (HCC) [I48.91]     Chronic obstructive pulmonary disease (HCC) [J44.9]     Hypoxemia requiring supplemental oxygen [R09.02, Z99.81]     Acute on chronic respiratory failure with hypoxia ([facility] [facility] [J96.21]     Fall [W19. XXXA]     Closed left hip fracture, initial encounter ([facility] [address] Griffin Curiel 2020    Morbid obesity with BMI of 45.0-49.9, adult ([facility] [address] [E66.01, Z68.42] 2020       Hospital Course: Tommy Benavidez is a 78 y.o. female with PMH COPD, CKD, HTN. Patient was admitted to [facility] after a fall at home and suffered a hip fx s/pORIF 8/10/20. In the hospital, she was found to be hypoxic and unresponsive requiring intubation. She was in AF for 6 hours and EP was consulted. She converted to NSR with Cardizem gtt. She was treated with iv abx as below with improvement. She will now be discharged to snf for rehab for her hip fracture. She will follow up with her pcp, cardiology as well. Acute on chronic hypoxic respiratory failure, POA - improved, now extubated, and on RA  - with criteria: < 90% on RA, accessory muscle use, RR> 18, due to PNA on baseline COPD, pulm edema  -  Placed on BiPAP in ED, then refused to wear BiPAP and was intubated after her PCO2 got worse and her encephalopathy got worse.   - pulmonary consulted     Acute encephalopathy  - due to hypercapnia  - at baseline now      CAP -resolved  -  Completed 7 days of zosyn  - blood and sputum cultures are negative  - procalcitonin is low     L hip fracture, after mechanical fall  - Ortho consulted, post op day 4     A fib RVR  -  new AM 8/9, now rate controlled  - started on eliquis  - follow up with cardiology as outpatient     sepsis, POA, pneumonia and COPD related - resolved     Left kidney obstruction, chronic.  Has been followed by urology since earlier this year with plan for nephrectomy at some point which is been canceled multiple times due to COVID and other factors     Essential Hypertension, monitor  Severe spinal stenosis, noted on CT. Outpatient neurosurg eval  Sacral and elbows Wounds, wound care    Disposition: To a non-[facility] facility    Exam:     /75   Pulse 86   Temp 98.1 °F (36.7 °C) (Oral)   Resp 18   Ht 5' 7\" (1.702 m)   Wt 178 lb 9.6 oz (81 kg)   SpO2 90%   BMI 27.97 kg/m²     General appearance:  No apparent distress, appears stated age and cooperative. HEENT:  Normal cephalic, atraumatic without obvious deformity. Pupils equal, round, and reactive to light. Extra ocular muscles intact. Conjunctivae/corneas clear. Neck: Supple, with full range of motion. No jugular venous distention. Trachea midline. Respiratory:  Normal respiratory effort. Clear to auscultation, bilaterally without Rales/Wheezes/Rhonchi. Cardiovascular:  Regular rate and rhythm with normal S1/S2 without murmurs, rubs or gallops. Abdomen: Soft, non-tender, non-distended with normal bowel sounds. Musculoskeletal:  No clubbing, cyanosis or edema bilaterally. Left hip/leg in brace  Skin: Skin color, texture, turgor normal.  No rashes or lesions. Neurologic:  Neurovascularly intact without any focal sensory/motor deficits.  Cranial nerves: II-XII intact, grossly non-focal.  Psychiatric:  Alert and oriented, thought content appropriate, normal insight    Consults:     IP CONSULT TO HOSPITALIST  IP CONSULT TO PULMONOLOGY  IP CONSULT TO CARDIOLOGY  IP CONSULT TO DIETITIAN  IP CONSULT TO PALLIATIVE CARE  IP CONSULT TO CARDIOLOGY    Labs: For convenience and continuity at follow-up the following most recent labs are provided:    Lab Results   Component Value Date    WBC 16.3 08/18/2020    HGB 12.5 08/18/2020    HCT 40.9 08/18/2020    MCV 79.5 08/18/2020     08/18/2020     08/18/2020    K 3.7 08/18/2020    K 4.1 08/06/2020     08/18/2020    CO2 29 08/18/2020    BUN 43 08/18/2020    CREATININE 0.9 08/18/2020    CALCIUM 9.0 08/18/2020    PHOS 3.7 08/18/2020    BNP 61.3 05/03/2013    ALKPHOS 114 08/06/2020    ALT 17 08/06/2020    AST 28 08/06/2020    BILITOT 0.5 08/06/2020    BILIDIR 0.12 05/03/2013    LABALBU 3.0 08/18/2020     Lab Results   Component Value Date    INR 1.02 08/08/2020       Radiology:  XR HIP LEFT (2-3 VIEWS)   Final Result      FLUORO FOR SURGICAL PROCEDURES   Final Result      XR CHEST PORTABLE   Final Result   Stable appearance of the chest with overall pattern representing vascular   congestion/pulmonary edema. XR CHEST PORTABLE   Final Result   Bilateral pleural effusions and bilateral airspace disease most compatible   with edema, increased since earlier today. The vilma appear prominent, likely related to enlarged pulmonary arteries. A   follow-up is recommended as hilar adenopathy is not excluded. Interval placement of a right arm PICC. XR CHEST PORTABLE   Final Result   Interval intubation. There is improved vascular congestion centrally, but   worsening airspace change in the right lower lung zone. CT Head WO Contrast   Final Result   No acute intracranial abnormality. CT Cervical Spine WO Contrast   Final Result   Multilevel postsurgical changes and degenerative changes. No acute osseous   abnormality. CT LUMBAR SPINE WO CONTRAST   Final Result   1. No acute finding in the lumbar spine.    2. Advanced degenerative MG capsule Take 300 mg by mouth 3 times daily. fluticasone-salmeterol (ADVAIR) 250-50 MCG/DOSE AEPB Inhale 1 puff into the lungs 2 times daily      ipratropium-albuterol (DUONEB) 0.5-2.5 (3) MG/3ML SOLN nebulizer solution Inhale 3 mLs into the lungs every 2 hours as needed for Shortness of Breath      albuterol sulfate  (90 Base) MCG/ACT inhaler Inhale 2 puffs into the lungs every 4-6 hours as needed for Shortness of Breath           Current Discharge Medication List      STOP taking these medications       ibuprofen (ADVIL;MOTRIN) 600 MG tablet Comments:   Reason for Stopping:         albuterol (PROVENTIL) (2.5 MG/3ML) 0.083% nebulizer solution Comments:   Reason for Stopping: Follow-up appointments:  two weeks    Provider Follow-up:    pcp    Condition at Discharge:  Stable    The patient was seen and examined on day of discharge and this discharge summary is in conjunction with any daily progress note from day of discharge. Time Spent on discharge is 45 minutes  in the examination, evaluation, counseling and review of medications and discharge plan. Signed:    Richard Almeida DO   8/18/2020      Thank you Ann Sanchez MD for the opportunity to be involved in this patient's care. If you have any questions or concerns please feel free to contact me at [phone].

## 2020-08-18 NOTE — CARE COORDINATION
DISCHARGE SUMMARY     DATE OF DISCHARGE: 08/18/2020    DISCHARGE DESTINATION:     FACILITY  Home at [facility]  Level of Care: Skilled    Report Number: [phone] ask for [facility], nurse. Fax Number:  [phone]    HENS completed    TRANSPORTATION:   Company Name:  [facility] up Time: 1pm  Phone Number: [phone]    COMMENTS: SW spoke to  via telephone and he is in agreement with discharge. RN to give him a copy of discharge paperwork so he can follow up with the facility. No further needs noted unless indicated by patient, family and/or medical staff. Respectfully submitted,    RAN Vasquez  [facility]   [phone]    Electronically signed by RAN Elizabeth on 8/18/2020 at 12:08 PM

## 2020-08-18 NOTE — PROGRESS NOTES
Pt discharged SNF at 1302. Discharge instructions given and explained. All questions explained. IV is removed. Dressing applied to site.  Transportation to SNF provided by [facility].    Electronically signed by Sneha Stanley RN on 8/18/2020 at 1:22 PM

## 2020-08-18 NOTE — PLAN OF CARE
Problem: Falls - Risk of:  Goal: Will remain free from falls  Description: Will remain free from falls  8/18/2020 0014 by Miguel Rowan RN  Outcome: Ongoing  Goal: Absence of physical injury  Description: Absence of physical injury  8/18/2020 0014 by Miguel Rowan RN  Outcome: Ongoing     Problem: Skin Integrity:  Goal: Will show no infection signs and symptoms  Description: Will show no infection signs and symptoms  8/18/2020 0014 by Miguel Rowan RN  Outcome: Ongoing  Goal: Absence of new skin breakdown  Description: Absence of new skin breakdown  8/18/2020 0014 by Miguel Rowan RN  Outcome: Ongoing     Problem: Pain:  Description: Pain management should include both nonpharmacologic and pharmacologic interventions.   Goal: Pain level will decrease  Description: Pain level will decrease  8/18/2020 0014 by Miguel Rowan RN  Outcome: Ongoing  Goal: Control of acute pain  Description: Control of acute pain  8/18/2020 0014 by Miguel Rowan RN  Outcome: Ongoing  Goal: Control of chronic pain  Description: Control of chronic pain  8/18/2020 0014 by Miguel Rowan RN  Outcome: Ongoing

## 2020-08-18 NOTE — PROGRESS NOTES
Physical Therapy  Facility/Department: [facility]  Daily Treatment Note/Cotx with OT   NAME: Jeanie Baker  : 1950  MRN: [id number]    Date of Service: 2020  Discharge Recommendations:  3-5 sessions per week, Patient would benefit from continued therapy after discharge   PT Equipment Recommendations  Other: Defer to next level of care. Assessment   Body structures, Functions, Activity limitations: Decreased functional mobility ; Decreased strength;Decreased endurance; Increased pain  Assessment: Pt is a 78 y.o. female w/ L hip fracture after falling at home. Pt underwent ORIF L Hip fracture on 8/10/2020, pt intubated from 8/. Transferred to [facility] 2020 due to atrial fibrillation, requiring Cardizem drip. Prior To Admit, pt indep in short home distance ambulation, transfers, and ADLs using rolling walker. Pt lived in home w/  w/ 5 [address]. 20 status was Supine<>sitting with Mod-Max A x 2,  Rolling Mod-Max A x 1-2 at times, and is dependent for scooting. Transfers from bed to [facility] chair Dependent, via [address]. Tolerates B LE exs in BS chair. Patient tolerated sitting EOB x 10  minutes and attempted the Stedy--however, narrow width of device did not accomdate pt. Patient continues to be functioning far below baseline status, and requires ongoing skilled therapy of low-moderate intensity, to maximize functional capabilities. Will continue to follow and progress as patient tolerates. Jeanie Baker scored a 9/24 on the AM-PAC short mobility form. Current research shows that an AM-PAC score of 17 or less is typically not associated with a discharge to the patient's home setting. Based on the patient's AM-PAC score and their current functional mobility deficits, it is recommended that the patient have 3-5 sessions per week of Physical Therapy at d/c to increase the patient's independence. Please see assessment section for further patient specific details.     If patient discharges prior to next session this note will serve as a discharge summary. Please see below for the latest assessment towards goals. Treatment Diagnosis: Impaired functional mobility  Prognosis: Good;Fair  PT Education: PT Role;Plan of Care;Transfer Training;General Safety; Functional Mobility Training;Weight-bearing Education  Patient Education: Role of PT, POC, Need to call for assist, Wgt bear restrictions L LE. Barriers to Learning: lower endurance  REQUIRES PT FOLLOW UP: Yes  Activity Tolerance  Activity Tolerance: Patient limited by endurance; Patient Tolerated treatment well     Patient Diagnosis(es): The primary encounter diagnosis was Hypoxemia requiring supplemental oxygen. Diagnoses of Acute on chronic respiratory failure with hypoxia ([facility] [address] Left leg pain, Strain of lumbar region, initial encounter, and Fall, initial encounter were also pertinent to this visit. has a past medical history of Chronic kidney disease, COPD (chronic obstructive pulmonary disease) ([facility] [address] Hyperlipidemia, and Hypertension. has a past surgical history that includes Cholecystectomy and Hip fracture surgery (Left, 8/10/2020). Restrictions  Restrictions/Precautions  Restrictions/Precautions: Weight Bearing, Fall Risk  Lower Extremity Weight Bearing Restrictions  Left Lower Extremity Weight Bearing: Partial Weight Bearing  Position Activity Restriction  Other position/activity restrictions: Morbid Obesity : 215 lb. Very limited functional activity pta. Subjective   General  Chart Reviewed: Yes  Additional Pertinent Hx: 77 y/o female admit 8/6/2020 with L Hip Fx following fall at home. (Reportedly pt fell at home 8/4 although refused to go to hospital). 8/10/2020 S/P ORIF L Hip Fx.  8/10-8/12/2020 Pt Intubated. Patient transferred to [facility] on 8- due to atrial fibrillation, requiring Cardizem. PMH as noted including CKD, COPD.   Family / Caregiver Present: No  Referring Practitioner:  Leisa Casanova  Subjective  Subjective: Pt in supine and agrees to therapy session. Pain seems better, but rated 8/10 for therapists this date. Orientation  Orientation  Overall Orientation Status: Within Functional Limits(henna decreasee insight.)     Objective   Bed mobility  Rolling to Left: Moderate assistance;Maximum assistance(x1-2; increased time to complete. Use of bed rail. Pt rolled for hygiene needs, and lift pad placement)  Rolling to Right: Moderate assistance;Maximum assistance(x1-2; increased time to complete. Use of bed rail)  Supine to Sit: Moderate assistance;Maximum assistance;2 Person assistance(HOB raised and use of rail. Asssit for LLE and trunk(pt pulling on therapist's hand w/L hand)  Sit to Supine: Maximum assistance;Dependent/Total(x3)  Scooting: Dependent/Total;Maximal assistance(scooting at EOB)  Comment: Pt rolls many time for placement of brief/clean up and linens prior to transfer. Transfers  Sit to Stand: Maximum Assistance;Dependent/Total;2 Person Assistance(attempted sit<>stand x one trial at [facility], this was unsuccessfule due to size/decreased width of the device to accomodate pt--pt was unable to pull self up to attempt standing despite Max A x 2 persons.)  Bed to Chair: Dependent/Total(opted for [facility] lift bed to recliner. Once, upright, positioned to comfort with call light and needs in reach.)  Ambulation  Ambulation?: No(unable to safely attempt)     Balance  Sitting - Static: Good  Sitting - Dynamic: Good  Comments: Pt sat eob > 10 minutes prior to attempt at standing a Stedy, CGA. Attempted stance x 1 in Stedy--unsuccessful despite Max A x 2/cannot accomade/narrow width. Comment: Assist for pericare/clean up & linens chage prior to getting up/oob vi aMaxiMove this date.               AM-PAC Score  AM-PAC Inpatient Mobility Raw Score : 9 (08/18/20 1030)  AM-PAC Inpatient T-Scale Score : 30.55 (08/18/20 1030)  Mobility Inpatient CMS 0-100% Score: 81.38 (08/18/20 1030)  Mobility Inpatient CMS G-Code Modifier : CM (08/18/20 1030)     Goals  Short term goals  Time Frame for Short term goals: Upon d/c acute care setting. All goals ongoing 8/18/2020  Short term goal 1: Bed Mob Mod x 2. Short term goal 2: Attempt Transfer via Lift Equipt/Assist device assist x 2; PWB L LE (50% wgt bear). Short term goal 3: Pt participating in approp Strength Exs.  8-: goal met & ongoing  Short term goal 4: Assess Sit<> parallel bars, Max A x 2 person. Long term goals  Time Frame for Long term goals : tbd at next level of care. Patient Goals   Patient goals : Be able to go home.     Plan    Plan  Times per week: 3--5x wk in acute setting  Current Treatment Recommendations: Strengthening, Functional Mobility Training, Transfer Training, Safety Education & Training, Patient/Caregiver Education & Training, Home Exercise Program  Safety Devices  Type of devices: Call light within reach, Chair alarm in place, Gait belt, Patient at risk for falls, Left in chair, Nurse notified(FEMI Martinez informed)   Therapy Time   Individual Concurrent Group Co-treatment   Time In [id number]         Time Out 1025         Minutes [address] Electronically signed by Sherley Mcmillan PT on 8/18/2020 at 10:32 AM

## 2020-08-18 NOTE — PROGRESS NOTES
Occupational Therapy  Facility/Department: [facility] C[facility]  Daily Treatment Note  NAME: Sergio Tejada  : 1950  MRN: [id number]    Date of Service: 2020    Discharge Recommendations:  Patient would benefit from continued therapy after discharge, 3-5 sessions per week       Sergio Tejada scored a  on the AM-PAC ADL Inpatient form. Current research shows that an AM-PAC score of 17 or less is typically not associated with a discharge to the patient's home setting. Based on the patient's AM-PAC score and their current ADL deficits, it is recommended that the patient have 3-5 sessions per week of Occupational Therapy at d/c to increase the patient's independence. Please see assessment section for further patient specific details. If patient discharges prior to next session this note will serve as a discharge summary. Please see below for the latest assessment towards goals. Assessment   Performance deficits / Impairments: Decreased functional mobility ; Decreased endurance;Decreased ADL status; Decreased balance;Decreased strength;Decreased safe awareness  Assessment: Pt toleraed session fairly well. Pt limited by the above deficits, s/p L hip fx and 50% wt bearing status. Pt requiring up to max Ax1-2 for bed mob, dependent for transfers using maxi move lift for bed to chair (unsuccessful this date with atempted sit>stand to yasmine montejo). Pt required up to [address] for ADL's. Pt is functioning below her baseline and will benefit from cont OT at d/c, at low-mod frequency. Cont poc. Prognosis: Good  OT Education: OT Role;Plan of Care;Precautions; ADL Adaptive Strategies;Transfer Training  REQUIRES OT FOLLOW UP: Yes  Activity Tolerance  Activity Tolerance: Patient limited by fatigue;Patient Tolerated treatment well  Safety Devices  Safety Devices in place: Yes(Jaymie KAHN aware)  Type of devices: Call light within reach; Left in chair;Nurse notified; Chair alarm in place; All fall risk EOB, x10 min)  Wheelchair Bed Transfers  Level of Asssistance: Dependent/Total  Wheelchair Transfers Comments: bed to chair via Maxi move lift  Bed mobility  Rolling to Left: Moderate assistance;Maximum assistance(x1-2; increased time to complete. Use of bed rail. Pt rolled for hygiene needs, and lift pad placement)  Rolling to Right: Moderate assistance;Maximum assistance(x1-2; increased time to complete. Use of bed rail)  Supine to Sit: Moderate assistance;Maximum assistance;2 Person assistance(HOB raised and use of rail. Asssit for LLE and trunk(pt pulling on therapist's hand w/L hand)  Sit to Supine: Maximum assistance;Dependent/Total(x3)  Scooting: Dependent/Total;Maximal assistance(scooting at EOB)  Transfers  Transfer Comments: Unable to complete sit>stand onto yasmine stedy, d/t limited width of stedy to accomodate pt's hips. Cognition  Overall Cognitive Status: [facility]  Cognition Comment: Noted decreased insight into deficits. Type of ROM/Therapeutic Exercise  Type of ROM/Therapeutic Exercise: AROM; Self PROM  Comment: Encouraged BUE ex's througout the day. Pt demonstrating elbow ROM, L shoulder ROM. Ptlimited in R shoulder ROM, requiring assist of LUE to assist R shoulder to 90* or less sh flexion.   Exercises  Elbow Flexion: x10  Elbow Extension: x10  Other: shoulder rotation ex's-IR,ER, x10 reps         Plan   Plan  Times per week: 3-5  Current Treatment Recommendations: Strengthening, Patient/Caregiver Education & Training, Safety Education & Training, Self-Care / ADL, Endurance Training, Functional Mobility Training, Balance Training    AM-PAC Score        AM-PAC Inpatient Daily Activity Raw Score: 12 (08/18/20 1032)  AM-PAC Inpatient ADL T-Scale Score : 30.6 (08/18/20 1032)  ADL Inpatient CMS 0-100% Score: 66.57 (08/18/20 1032)  ADL Inpatient CMS G-Code Modifier : CL (08/18/20 1032)    Goals  Short term goals  Time Frame for Short term goals: Prior to DC: goals ongoing  Short term goal 1: Pt

## 2020-08-18 NOTE — CARE COORDINATION
[phone] SW reached out to Reunion in admissions at [facility] at [facility] today regarding pre-cert. SW left message for her today. BALA will wait for a response. Respectfully submitted,    Nely WEBB, RAN  [facility]   [phone]    Electronically signed by RAN Gay on 8/18/2020 at 8:49 AM

## 2020-08-18 NOTE — CARE COORDINATION
BALA received phone call back from Juan in admissions at [facility] at [facility] today regarding pre-cert. For future reference her phone number is [phone]. SW was informed pre-cert was started this morning because therapy notes weren't in until late yesterday afternoon. She stated that insurance may have an issue with the format of the occupational therapist note. She stated that it may be too short but she will let this writer know. BALA will wait for a response and will notify medical staff.      Respectfully submitted,     RAN Yoder  [facility]   [phone]    Electronically signed by RNA Galloway on 8/18/2020 at 9:25 AM

## 2020-08-18 NOTE — PLAN OF CARE
Problem: Falls - Risk of:  Goal: Will remain free from falls  Description: Will remain free from falls  8/18/2020 1149 by Aftab Owusu RN  Outcome: Ongoing  8/18/2020 0014 by Pk Lenz RN  Outcome: Ongoing  Goal: Absence of physical injury  Description: Absence of physical injury  8/18/2020 1149 by Aftab Owusu RN  Outcome: Ongoing  8/18/2020 0014 by Pk Lenz RN  Outcome: Ongoing     Problem: Skin Integrity:  Goal: Will show no infection signs and symptoms  Description: Will show no infection signs and symptoms  8/18/2020 1149 by Aftab Owusu RN  Outcome: Ongoing  8/18/2020 0014 by Pk Lenz RN  Outcome: Ongoing  Goal: Absence of new skin breakdown  Description: Absence of new skin breakdown  8/18/2020 1149 by Aftab Owusu RN  Outcome: Ongoing  8/18/2020 0014 by Pk Lenz RN  Outcome: Ongoing     Problem: Pain:  Goal: Pain level will decrease  Description: Pain level will decrease  8/18/2020 1149 by Aftab Owusu RN  Outcome: Ongoing  8/18/2020 0014 by Pk Lenz RN  Outcome: Ongoing  Goal: Control of acute pain  Description: Control of acute pain  8/18/2020 1149 by Aftab Owusu RN  Outcome: Ongoing  8/18/2020 0014 by Pk Lenz RN  Outcome: Ongoing  Goal: Control of chronic pain  Description: Control of chronic pain  8/18/2020 1149 by Aftab Owusu RN  Outcome: Ongoing  8/18/2020 0014 by Pk Lenz RN  Outcome: Ongoing     Problem: Nutrition  Goal: Optimal nutrition therapy  Outcome: Ongoing     Problem: Infection - Central Venous Catheter-Associated Bloodstream Infection:  Goal: Will show no infection signs and symptoms  Description: Will show no infection signs and symptoms  8/18/2020 1149 by Aftab Owusu RN  Outcome: Ongoing  8/18/2020 0014 by Pk Lenz RN  Outcome: Ongoing     Problem: OXYGENATION/RESPIRATORY FUNCTION  Goal: Patient will maintain patent airway  Outcome: Ongoing  Goal: Patient will achieve/maintain normal respiratory rate/effort  Description: Respiratory rate and effort will be within normal limits for the patient  Outcome: Ongoing     Problem: HEMODYNAMIC STATUS  Goal: Patient has stable vital signs and fluid balance  Outcome: Ongoing     Problem: FLUID AND ELECTROLYTE IMBALANCE  Goal: Fluid and electrolyte balance are achieved/maintained  Outcome: Ongoing     Problem: ACTIVITY INTOLERANCE/IMPAIRED MOBILITY  Goal: Mobility/activity is maintained at optimum level for patient  Outcome: Ongoing

## 2020-08-26 ENCOUNTER — OFFICE VISIT (OUTPATIENT)
Dept: ORTHOPEDIC SURGERY | Age: 70
End: 2020-08-26

## 2020-08-26 VITALS — RESPIRATION RATE: 16 BRPM | TEMPERATURE: 97.5 F

## 2020-08-26 PROCEDURE — 99024 POSTOP FOLLOW-UP VISIT: CPT | Performed by: ORTHOPAEDIC SURGERY

## 2020-08-26 NOTE — PROGRESS NOTES
ORTHOPAEDIC PROGRESS NOTE    Chief Complaint   Patient presents with    Post-Op Check     left hip DOS 8-       HPI  8/26/2020  First postop  At [facility]  No major issues      8/10/2020  OPERATION PERFORMED:  Open treatment of left hip fracture with cephalomedullary nail.     Past Medical History:   Diagnosis Date    Chronic kidney disease     COPD (chronic obstructive pulmonary disease) ([address]     Hyperlipidemia     Hypertension        Past Surgical History:   Procedure Laterality Date    CHOLECYSTECTOMY      HIP FRACTURE SURGERY Left 8/10/2020    OPEN TREATMENT OF LEFT HIP FRACTURE WITH CEPHALOMEDULLARY NAIL performed by Rich Monson MD at [address] [place] [address] History     Socioeconomic History    Marital status:      Spouse name: Not on file    Number of children: Not on file    Years of education: Not on file    Highest education level: Not on file   Occupational History    Not on file   Social Needs    Financial resource strain: Not on file    Food insecurity     Worry: Not on file     Inability: Not on file    Transportation needs     Medical: Not on file     Non-medical: Not on file   Tobacco Use    Smoking status: Current Every Day Smoker     Packs/day: 1.50     Types: Cigarettes    Smokeless tobacco: Never Used    Tobacco comment: pack n half a day    Substance and Sexual Activity    Alcohol use: Never     Frequency: Never    Drug use: Yes     Types: Marijuana     Comment: occasional     Sexual activity: Not on file   Lifestyle    Physical activity     Days per week: Not on file     Minutes per session: Not on file    Stress: Not on file   Relationships    Social connections     Talks on phone: Not on file     Gets together: Not on file     Attends Yarsanism service: Not on file     Active member of club or organization: Not on file     Attends meetings of clubs or organizations: Not on file     Relationship status: Not on file    Intimate partner violence     Fear of current or ex partner: Not on file     Emotionally abused: Not on file     Physically abused: Not on file     Forced sexual activity: Not on file   Other Topics Concern    Not on file   Social History Narrative    Not on file       Current Outpatient Medications   Medication Sig Dispense Refill    apixaban (ELIQUIS) 5 MG TABS tablet Take 1 tablet by mouth 2 times daily 60 tablet 1    dilTIAZem (CARDIZEM CD) 180 MG extended release capsule Take 1 capsule by mouth daily 30 capsule 3    acetaZOLAMIDE (DIAMOX) 250 MG tablet Take 1 tablet by mouth daily 30 tablet 0    furosemide (LASIX) 40 MG tablet Take 1 tablet by mouth daily 60 tablet 0    simvastatin (ZOCOR) 80 MG tablet Take 80 mg by mouth nightly      gabapentin (NEURONTIN) 300 MG capsule Take 300 mg by mouth 3 times daily.  fluticasone-salmeterol (ADVAIR) 250-50 MCG/DOSE AEPB Inhale 1 puff into the lungs 2 times daily      ipratropium-albuterol (DUONEB) 0.5-2.5 (3) MG/3ML SOLN nebulizer solution Inhale 3 mLs into the lungs every 2 hours as needed for Shortness of Breath      albuterol sulfate  (90 Base) MCG/ACT inhaler Inhale 2 puffs into the lungs every 4-6 hours as needed for Shortness of Breath       No current facility-administered medications for this visit. Vitals:    08/26/20 0856   Resp: 16   Temp: 97.5 °F (36.4 °C)   TempSrc: Temporal       Physical Exam:  There is no height or weight on file to calculate BMI. Left hip/thigh surgical incisions are well healed   Staples DC'ed and steristrips applied  LLE SILT SP/DP/T/sural/saphenous nerve distributions; EHL/FHL/TA/GS intact  WWP      Imaging:  Images were personally reviewed by myself and discussed with the patient   AP pelvis and Left hip 2 views performed today in clinic - s/p cephalomedullary nailing to the hip fracture with stable reduction/alignment. There is compression through the fracture site. No hardware complications seen.   Baseline severe left hip arthrosis present, unchanged. Assessment & Plan:  78 y.o. female following up for   Diagnosis Orders   1. Closed 2-part intertrochanteric fracture of left femur with routine healing, subsequent encounter  XR HIP 2-3 VW W PELVIS LEFT   2. Arthritis of left hip         No orders of the defined types were placed in this encounter.     XRs look good  Okay to advance LLE WBAT  Recovery will be challenged by her weight, baseline medical co morbidities, and baseline severe left hip arthrosis    FU 6 months    Augustus Ranks

## 2020-08-27 PROBLEM — I47.19 PAT (PAROXYSMAL ATRIAL TACHYCARDIA): Status: ACTIVE | Noted: 2020-08-27

## 2020-08-27 PROBLEM — I47.1 PAT (PAROXYSMAL ATRIAL TACHYCARDIA) (HCC): Status: ACTIVE | Noted: 2020-08-27

## 2020-09-14 NOTE — PROGRESS NOTES
[facility]   Electrophysiology      Date: 9/17/2020    Chief Complaint:   Chief Complaint   Patient presents with    Follow-up     afib     History of Present Illness:    I saw Michael Coombs in the office for electrophysiology follow up today. She is a 78 y.o. female with a past medical history of paroxsymal atrial fibrillation, COPD, CKD, HTN and HLD. She was recently hospitalized with a hip fracture after a mechanical fall. She had respiratory failure and ended up intubated. She had two episodes of atrial fibrillation with RVR while in the hospital. She was rate controlled with Cardizem and started on Eliquis 5mg BID. She was diuresed while in the hospital as well and discharged on Lasix and Diamox. Diamox has since been stopped. She says her heart has been doing \"fine\" and she has been trying to do rehab to get home soon. She is very uncomfortable due to the stretcher. Denies any palpitations or chest pain. Dyspnea is at her baseline. No syncope or near syncope. No bleeding problem. Allergies:  No Known Allergies  Home Medications:  Prior to Visit Medications    Medication Sig Taking? Authorizing Provider   atorvastatin (LIPITOR) 40 MG tablet Take 40 mg by mouth daily Yes Historical Provider, MD   apixaban (ELIQUIS) 5 MG TABS tablet Take 1 tablet by mouth 2 times daily Yes Luzmaria Carrion DO   dilTIAZem (CARDIZEM CD) 180 MG extended release capsule Take 1 capsule by mouth daily Yes Luzmaria Carrion DO   furosemide (LASIX) 40 MG tablet Take 1 tablet by mouth daily Yes Alcides Daley MD   gabapentin (NEURONTIN) 300 MG capsule Take 300 mg by mouth 3 times daily.   Yes Historical Provider, MD   fluticasone-salmeterol (ADVAIR) 250-50 MCG/DOSE AEPB Inhale 1 puff into the lungs 2 times daily Yes Historical Provider, MD   ipratropium-albuterol (DUONEB) 0.5-2.5 (3) MG/3ML SOLN nebulizer solution Inhale 3 mLs into the lungs every 2 hours as needed for Shortness of Breath Yes Historical Provider, MD albuterol sulfate  (90 Base) MCG/ACT inhaler Inhale 2 puffs into the lungs every 4-6 hours as needed for Shortness of Breath Yes Historical Provider, MD        Past Medical History:  Past Medical History:   Diagnosis Date    Chronic kidney disease     COPD (chronic obstructive pulmonary disease) ([address]     Hyperlipidemia     Hypertension        Past Surgical History:   Past Surgical History:   Procedure Laterality Date    CHOLECYSTECTOMY      HIP FRACTURE SURGERY Left 8/10/2020    OPEN TREATMENT OF LEFT HIP FRACTURE WITH CEPHALOMEDULLARY NAIL performed by Nasir Cornejo MD at [address] [place] [address] History:   reports that she has been smoking cigarettes. She has been smoking about 1.50 packs per day. She has never used smokeless tobacco. She reports current drug use. Drug: Marijuana. She reports that she does not drink alcohol. Family History:  History reviewed. No pertinent family history. Review of Systems   Constitutional: Negative for chills, fatigue, fever and unexpected weight change. HENT: Negative for congestion, hearing loss, sinus pressure, sore throat and trouble swallowing. Respiratory: Positive for cough and shortness of breath. Negative for wheezing. Cardiovascular: Negative for chest pain, palpitations and leg swelling. Gastrointestinal: Negative for abdominal pain, blood in stool, constipation, diarrhea, nausea and vomiting. Genitourinary: Negative for hematuria. Musculoskeletal: Positive for arthralgias, back pain and gait problem. Negative for myalgias. Skin: Negative for color change, rash and wound. Neurological: Negative for dizziness, seizures, syncope, speech difficulty, weakness and light-headedness. Hematological: Does not bruise/bleed easily.         Physical Examination:  Vitals:    09/17/20 1529   BP: 108/64   Pulse: 113   Temp: 97.5 °F (36.4 °C)   SpO2: 92%      Wt Readings from Last 3 Encounters:   08/18/20 178 lb 9.6 oz (81 kg)       Physical Exam  Vitals signs reviewed. Constitutional:       Appearance: Normal appearance. She is obese. Comments: Appears uncomfortable. HENT:      Head: Normocephalic and atraumatic. Nose: Nose normal.      Mouth/Throat:      Mouth: Mucous membranes are moist.   Eyes:      Conjunctiva/sclera: Conjunctivae normal.      Pupils: Pupils are equal, round, and reactive to light. Cardiovascular:      Rate and Rhythm: Regular rhythm. Tachycardia present. Heart sounds: No murmur. No friction rub. No gallop. Pulmonary:      Effort: No respiratory distress. Breath sounds: Rhonchi present. No wheezing or rales. Comments: Scattered rhonchi bilaterally. Abdominal:      General: Abdomen is flat. Bowel sounds are normal.      Palpations: Abdomen is soft. Musculoskeletal: Normal range of motion. Right lower leg: No edema. Left lower leg: No edema. Skin:     General: Skin is warm and dry. Findings: No bruising. Neurological:      General: No focal deficit present. Mental Status: She is alert and oriented to person, place, and time. Motor: No weakness. Psychiatric:         Mood and Affect: Mood normal.         Behavior: Behavior normal.          Pertinent labs, diagnostic, device, and imaging results reviewed as a part of this visit    LABS    CBC:   Lab Results   Component Value Date    WBC 16.3 (H) 08/18/2020    HGB 12.5 08/18/2020    HCT 40.9 08/18/2020    MCV 79.5 (L) 08/18/2020     08/18/2020     BMP:   Lab Results   Component Value Date    CREATININE 0.9 08/18/2020    BUN 43 (H) 08/18/2020     08/18/2020    K 3.7 08/18/2020     08/18/2020    CO2 29 08/18/2020     CrCl cannot be calculated (Unknown ideal weight.).    Lab Results   Component Value Date    BNP 61.3 05/03/2013       Thyroid: No results found for: TSH, G1LEAEM, G7TINKA, THYROIDAB  Lipid Panel: No results found for: CHOL, HDL, TRIG  LFTs:  Lab Results   Component Value Date    ALT 17 2020    AST 28 2020    ALKPHOS 114 2020    BILITOT 0.5 2020     Coags:   Lab Results   Component Value Date    PROTIME 11.8 2020    INR 1.02 2020    APTT 31.7 2020       EC2020   ST at 110 BPM. Non-specific ST-T wave changes. Echo: 20  Summary:  Overall left ventricular ejection fraction is estimated to be 55-60%. The left ventricular wall motion is normal.  There is mild concentric left ventricular hypertrophy. The diastolic function is impaired and classified as Grade 1 (impaired  relaxation). The Aortic Valve is mildly calcified. The systolic pulmonary artery pressure cannot be estimated due to insufficient  tricuspid regurgitation. The left atrium is mildly dilated. Stress test: 19    IMPRESSION      Normal nuclear myocardial perfusion scan    Assessment & Plan:    Paroxysmal atrial fibrillation   - diagnosed during hospitalization 2020 in the setting of a hip fracture, pneumonia and respiratory failure   - on Cardizem 180mg QD, in ST today   - CHADS2-VASc 3 (age, gender, HTN) on Eliquis 5mg BID   - pt wanting to focus on rehab and getting home, does not want to make any changes to A fib therapy, advised to call should she have any symptoms concerning for A fib - likely not a good ablation candidate due to lung disease, would prefer to remain conservative with management    Paroxysmal atrial tachycardia   - also seen during recent hospitalization    - continue rate control    Benign HTN   - BP controlled    Mixed HLD   - on statin    Thank you for allowing to us to participate in the care of Nickolas Parrish. Return in about 1 year (around 2021) for w/ Dr. Aly Drivers.      GABRIELA Banda  [facility] [address]  Phone: [phone]  Fax: [phone]    Electronically signed by GABRIELA Raymond - CNP on 2020 at 3:54 PM

## 2020-09-17 ENCOUNTER — OFFICE VISIT (OUTPATIENT)
Dept: CARDIOLOGY CLINIC | Age: 70
End: 2020-09-17
Payer: COMMERCIAL

## 2020-09-17 VITALS
TEMPERATURE: 97.5 F | HEART RATE: 113 BPM | DIASTOLIC BLOOD PRESSURE: 64 MMHG | SYSTOLIC BLOOD PRESSURE: 108 MMHG | OXYGEN SATURATION: 92 %

## 2020-09-17 PROCEDURE — 1090F PRES/ABSN URINE INCON ASSESS: CPT | Performed by: NURSE PRACTITIONER

## 2020-09-17 PROCEDURE — G8400 PT W/DXA NO RESULTS DOC: HCPCS | Performed by: NURSE PRACTITIONER

## 2020-09-17 PROCEDURE — 3017F COLORECTAL CA SCREEN DOC REV: CPT | Performed by: NURSE PRACTITIONER

## 2020-09-17 PROCEDURE — 93000 ELECTROCARDIOGRAM COMPLETE: CPT | Performed by: NURSE PRACTITIONER

## 2020-09-17 PROCEDURE — 4040F PNEUMOC VAC/ADMIN/RCVD: CPT | Performed by: NURSE PRACTITIONER

## 2020-09-17 PROCEDURE — 1123F ACP DISCUSS/DSCN MKR DOCD: CPT | Performed by: NURSE PRACTITIONER

## 2020-09-17 PROCEDURE — G8417 CALC BMI ABV UP PARAM F/U: HCPCS | Performed by: NURSE PRACTITIONER

## 2020-09-17 PROCEDURE — 99214 OFFICE O/P EST MOD 30 MIN: CPT | Performed by: NURSE PRACTITIONER

## 2020-09-17 PROCEDURE — 4004F PT TOBACCO SCREEN RCVD TLK: CPT | Performed by: NURSE PRACTITIONER

## 2020-09-17 PROCEDURE — G8427 DOCREV CUR MEDS BY ELIG CLIN: HCPCS | Performed by: NURSE PRACTITIONER

## 2020-09-17 PROCEDURE — 1111F DSCHRG MED/CURRENT MED MERGE: CPT | Performed by: NURSE PRACTITIONER

## 2020-09-17 RX ORDER — ATORVASTATIN CALCIUM 40 MG/1
40 TABLET, FILM COATED ORAL DAILY
COMMUNITY

## 2020-09-17 ASSESSMENT — ENCOUNTER SYMPTOMS
TROUBLE SWALLOWING: 0
BACK PAIN: 1
NAUSEA: 0
SHORTNESS OF BREATH: 1
VOMITING: 0
CONSTIPATION: 0
COLOR CHANGE: 0
DIARRHEA: 0
ABDOMINAL PAIN: 0
SINUS PRESSURE: 0
COUGH: 1
WHEEZING: 0
SORE THROAT: 0
BLOOD IN STOOL: 0

## 2020-09-18 ENCOUNTER — APPOINTMENT (OUTPATIENT)
Dept: CT IMAGING | Age: 70
DRG: 853 | End: 2020-09-18
Payer: MEDICARE

## 2020-09-18 ENCOUNTER — APPOINTMENT (OUTPATIENT)
Dept: GENERAL RADIOLOGY | Age: 70
DRG: 853 | End: 2020-09-18
Payer: MEDICARE

## 2020-09-18 ENCOUNTER — HOSPITAL ENCOUNTER (INPATIENT)
Age: 70
LOS: 8 days | Discharge: NURSING, REHAB OR LONG-TERM CARE | DRG: 853 | End: 2020-09-26
Attending: EMERGENCY MEDICINE | Admitting: INTERNAL MEDICINE
Payer: MEDICARE

## 2020-09-18 PROBLEM — E78.5 HYPERLIPIDEMIA: Status: ACTIVE | Noted: 2020-09-18

## 2020-09-18 PROBLEM — N20.0 KIDNEY STONE: Status: ACTIVE | Noted: 2020-09-18

## 2020-09-18 PROBLEM — D72.9 NEUTROPHILIC LEUKOCYTOSIS: Status: ACTIVE | Noted: 2020-09-18

## 2020-09-18 PROBLEM — N28.9 KIDNEY LESION, NATIVE, LEFT: Status: ACTIVE | Noted: 2020-09-18

## 2020-09-18 PROBLEM — R57.9 SHOCK (HCC): Status: ACTIVE | Noted: 2020-09-18

## 2020-09-18 PROBLEM — E66.01 MORBID OBESITY DUE TO EXCESS CALORIES (HCC): Status: ACTIVE | Noted: 2020-09-18

## 2020-09-18 PROBLEM — J96.11 CHRONIC RESPIRATORY FAILURE WITH HYPOXIA (HCC): Status: ACTIVE | Noted: 2020-09-18

## 2020-09-18 LAB
A/G RATIO: 0.6 (ref 1.1–2.2)
ALBUMIN SERPL-MCNC: 2.5 G/DL (ref 3.4–5)
ALP BLD-CCNC: 170 U/L (ref 40–129)
ALT SERPL-CCNC: 24 U/L (ref 10–40)
ANION GAP SERPL CALCULATED.3IONS-SCNC: 10 MMOL/L (ref 3–16)
AST SERPL-CCNC: 60 U/L (ref 15–37)
BACTERIA: ABNORMAL /HPF
BASOPHILS ABSOLUTE: 0 K/UL (ref 0–0.2)
BASOPHILS RELATIVE PERCENT: 0.2 %
BILIRUB SERPL-MCNC: 0.4 MG/DL (ref 0–1)
BILIRUBIN URINE: ABNORMAL
BLOOD, URINE: ABNORMAL
BUN BLDV-MCNC: 47 MG/DL (ref 7–20)
CALCIUM SERPL-MCNC: 8.5 MG/DL (ref 8.3–10.6)
CHLORIDE BLD-SCNC: 99 MMOL/L (ref 99–110)
CLARITY: ABNORMAL
CO2: 23 MMOL/L (ref 21–32)
COLOR: ABNORMAL
CREAT SERPL-MCNC: 2.7 MG/DL (ref 0.6–1.2)
EOSINOPHILS ABSOLUTE: 0.1 K/UL (ref 0–0.6)
EOSINOPHILS RELATIVE PERCENT: 0.4 %
GFR AFRICAN AMERICAN: 21
GFR NON-AFRICAN AMERICAN: 18
GLOBULIN: 4.4 G/DL
GLUCOSE BLD-MCNC: 86 MG/DL (ref 70–99)
GLUCOSE URINE: ABNORMAL MG/DL
HCT VFR BLD CALC: 48.5 % (ref 36–48)
HEMOGLOBIN: 14.2 G/DL (ref 12–16)
KETONES, URINE: ABNORMAL MG/DL
LACTIC ACID: 1.2 MMOL/L (ref 0.4–2)
LEUKOCYTE ESTERASE, URINE: ABNORMAL
LYMPHOCYTES ABSOLUTE: 1.2 K/UL (ref 1–5.1)
LYMPHOCYTES RELATIVE PERCENT: 4.5 %
MCH RBC QN AUTO: 24.8 PG (ref 26–34)
MCHC RBC AUTO-ENTMCNC: 29.3 G/DL (ref 31–36)
MCV RBC AUTO: 84.5 FL (ref 80–100)
MICROSCOPIC EXAMINATION: YES
MONOCYTES ABSOLUTE: 1.3 K/UL (ref 0–1.3)
MONOCYTES RELATIVE PERCENT: 4.9 %
NEUTROPHILS ABSOLUTE: 23.4 K/UL (ref 1.7–7.7)
NEUTROPHILS RELATIVE PERCENT: 90 %
NITRITE, URINE: ABNORMAL
PDW BLD-RTO: 21.2 % (ref 12.4–15.4)
PH UA: ABNORMAL (ref 5–8)
PLATELET # BLD: 283 K/UL (ref 135–450)
PMV BLD AUTO: 8.7 FL (ref 5–10.5)
POTASSIUM REFLEX MAGNESIUM: 5.3 MMOL/L (ref 3.5–5.1)
PRO-BNP: 4662 PG/ML (ref 0–124)
PROCALCITONIN: 82.84 NG/ML (ref 0–0.15)
PROTEIN UA: ABNORMAL MG/DL
RBC # BLD: 5.74 M/UL (ref 4–5.2)
RBC UA: >100 /HPF (ref 0–4)
SARS-COV-2, NAAT: NOT DETECTED
SODIUM BLD-SCNC: 132 MMOL/L (ref 136–145)
SPECIFIC GRAVITY UA: ABNORMAL (ref 1–1.03)
TOTAL PROTEIN: 6.9 G/DL (ref 6.4–8.2)
URINE REFLEX TO CULTURE: ABNORMAL
URINE TYPE: ABNORMAL
UROBILINOGEN, URINE: ABNORMAL E.U./DL
WBC # BLD: 25.9 K/UL (ref 4–11)
WBC UA: ABNORMAL /HPF (ref 0–5)

## 2020-09-18 PROCEDURE — 36415 COLL VENOUS BLD VENIPUNCTURE: CPT

## 2020-09-18 PROCEDURE — 83605 ASSAY OF LACTIC ACID: CPT

## 2020-09-18 PROCEDURE — 2000000000 HC ICU R&B

## 2020-09-18 PROCEDURE — 96365 THER/PROPH/DIAG IV INF INIT: CPT

## 2020-09-18 PROCEDURE — 87040 BLOOD CULTURE FOR BACTERIA: CPT

## 2020-09-18 PROCEDURE — 87077 CULTURE AEROBIC IDENTIFY: CPT

## 2020-09-18 PROCEDURE — 2580000003 HC RX 258: Performed by: NURSE PRACTITIONER

## 2020-09-18 PROCEDURE — 87186 SC STD MICRODIL/AGAR DIL: CPT

## 2020-09-18 PROCEDURE — 74176 CT ABD & PELVIS W/O CONTRAST: CPT

## 2020-09-18 PROCEDURE — 87086 URINE CULTURE/COLONY COUNT: CPT

## 2020-09-18 PROCEDURE — 80053 COMPREHEN METABOLIC PANEL: CPT

## 2020-09-18 PROCEDURE — U0002 COVID-19 LAB TEST NON-CDC: HCPCS

## 2020-09-18 PROCEDURE — 2580000003 HC RX 258: Performed by: EMERGENCY MEDICINE

## 2020-09-18 PROCEDURE — 96367 TX/PROPH/DG ADDL SEQ IV INF: CPT

## 2020-09-18 PROCEDURE — 71045 X-RAY EXAM CHEST 1 VIEW: CPT

## 2020-09-18 PROCEDURE — 99285 EMERGENCY DEPT VISIT HI MDM: CPT

## 2020-09-18 PROCEDURE — 83880 ASSAY OF NATRIURETIC PEPTIDE: CPT

## 2020-09-18 PROCEDURE — 84145 PROCALCITONIN (PCT): CPT

## 2020-09-18 PROCEDURE — 6360000002 HC RX W HCPCS: Performed by: EMERGENCY MEDICINE

## 2020-09-18 PROCEDURE — 6360000002 HC RX W HCPCS: Performed by: NURSE PRACTITIONER

## 2020-09-18 PROCEDURE — 81001 URINALYSIS AUTO W/SCOPE: CPT

## 2020-09-18 PROCEDURE — 85025 COMPLETE CBC W/AUTO DIFF WBC: CPT

## 2020-09-18 RX ORDER — 0.9 % SODIUM CHLORIDE 0.9 %
1000 INTRAVENOUS SOLUTION INTRAVENOUS ONCE
Status: COMPLETED | OUTPATIENT
Start: 2020-09-18 | End: 2020-09-19

## 2020-09-18 RX ORDER — IPRATROPIUM BROMIDE AND ALBUTEROL SULFATE 2.5; .5 MG/3ML; MG/3ML
3 SOLUTION RESPIRATORY (INHALATION)
Status: CANCELLED | OUTPATIENT
Start: 2020-09-18

## 2020-09-18 RX ADMIN — MEROPENEM 1 G: 1 INJECTION, POWDER, FOR SOLUTION INTRAVENOUS at 22:43

## 2020-09-18 RX ADMIN — CEFTRIAXONE 1 G: 1 INJECTION, POWDER, FOR SOLUTION INTRAMUSCULAR; INTRAVENOUS at 21:19

## 2020-09-18 RX ADMIN — SODIUM CHLORIDE 1000 ML: 9 INJECTION, SOLUTION INTRAVENOUS at 22:24

## 2020-09-18 ASSESSMENT — ENCOUNTER SYMPTOMS
ABDOMINAL DISTENTION: 0
DIARRHEA: 0
NAUSEA: 0
BACK PAIN: 0
COLOR CHANGE: 0
VOMITING: 0
BLOOD IN STOOL: 0
CONSTIPATION: 0
COUGH: 0
ABDOMINAL PAIN: 0
SHORTNESS OF BREATH: 0

## 2020-09-18 NOTE — ED NOTES
Bed: B-09  Expected date:   Expected time:   Means of arrival:   Comments:  79 y/o WBC 35.5 ECF      Rajeev Tena RN  09/18/20 1941

## 2020-09-19 ENCOUNTER — ANESTHESIA EVENT (OUTPATIENT)
Dept: OPERATING ROOM | Age: 70
DRG: 853 | End: 2020-09-19
Payer: MEDICARE

## 2020-09-19 ENCOUNTER — APPOINTMENT (OUTPATIENT)
Dept: GENERAL RADIOLOGY | Age: 70
DRG: 853 | End: 2020-09-19
Payer: MEDICARE

## 2020-09-19 ENCOUNTER — ANESTHESIA (OUTPATIENT)
Dept: OPERATING ROOM | Age: 70
DRG: 853 | End: 2020-09-19
Payer: MEDICARE

## 2020-09-19 VITALS
DIASTOLIC BLOOD PRESSURE: 81 MMHG | SYSTOLIC BLOOD PRESSURE: 148 MMHG | RESPIRATION RATE: 6 BRPM | OXYGEN SATURATION: 99 %

## 2020-09-19 PROBLEM — E87.5 HYPERKALEMIA: Status: ACTIVE | Noted: 2020-09-19

## 2020-09-19 PROBLEM — A41.9 SEPTIC SHOCK (HCC): Status: ACTIVE | Noted: 2020-09-18

## 2020-09-19 PROBLEM — E87.1 HYPONATREMIA: Status: ACTIVE | Noted: 2020-09-19

## 2020-09-19 PROBLEM — E44.0 MODERATE PROTEIN-CALORIE MALNUTRITION (HCC): Status: ACTIVE | Noted: 2020-09-19

## 2020-09-19 PROBLEM — N17.9 ACUTE RENAL FAILURE (ARF) (HCC): Status: ACTIVE | Noted: 2020-09-19

## 2020-09-19 PROBLEM — R31.9 HEMATURIA: Status: ACTIVE | Noted: 2020-09-19

## 2020-09-19 PROBLEM — Q62.11 HYDRONEPHROSIS WITH URETEROPELVIC JUNCTION (UPJ) OBSTRUCTION: Status: ACTIVE | Noted: 2020-09-19

## 2020-09-19 PROBLEM — N13.5 URETERAL OBSTRUCTION: Status: ACTIVE | Noted: 2020-09-19

## 2020-09-19 PROBLEM — R65.21 SEPTIC SHOCK (HCC): Status: ACTIVE | Noted: 2020-09-18

## 2020-09-19 LAB
ANION GAP SERPL CALCULATED.3IONS-SCNC: 12 MMOL/L (ref 3–16)
BUN BLDV-MCNC: 47 MG/DL (ref 7–20)
CALCIUM SERPL-MCNC: 8.3 MG/DL (ref 8.3–10.6)
CHLORIDE BLD-SCNC: 107 MMOL/L (ref 99–110)
CO2: 21 MMOL/L (ref 21–32)
CREAT SERPL-MCNC: 2 MG/DL (ref 0.6–1.2)
GFR AFRICAN AMERICAN: 30
GFR NON-AFRICAN AMERICAN: 25
GLUCOSE BLD-MCNC: 112 MG/DL (ref 70–99)
GLUCOSE BLD-MCNC: 133 MG/DL (ref 70–99)
HCT VFR BLD CALC: 45 % (ref 36–48)
HEMOGLOBIN: 13.8 G/DL (ref 12–16)
MAGNESIUM: 1.7 MG/DL (ref 1.8–2.4)
MCH RBC QN AUTO: 25.5 PG (ref 26–34)
MCHC RBC AUTO-ENTMCNC: 30.7 G/DL (ref 31–36)
MCV RBC AUTO: 83 FL (ref 80–100)
PDW BLD-RTO: 20.9 % (ref 12.4–15.4)
PERFORMED ON: ABNORMAL
PHOSPHORUS: 4.9 MG/DL (ref 2.5–4.9)
PLATELET # BLD: 263 K/UL (ref 135–450)
PMV BLD AUTO: 8.4 FL (ref 5–10.5)
POTASSIUM SERPL-SCNC: 4.1 MMOL/L (ref 3.5–5.1)
RBC # BLD: 5.42 M/UL (ref 4–5.2)
SODIUM BLD-SCNC: 140 MMOL/L (ref 136–145)
WBC # BLD: 26.7 K/UL (ref 4–11)

## 2020-09-19 PROCEDURE — 2500000003 HC RX 250 WO HCPCS: Performed by: ANESTHESIOLOGY

## 2020-09-19 PROCEDURE — 02HV33Z INSERTION OF INFUSION DEVICE INTO SUPERIOR VENA CAVA, PERCUTANEOUS APPROACH: ICD-10-PCS | Performed by: EMERGENCY MEDICINE

## 2020-09-19 PROCEDURE — 7100000000 HC PACU RECOVERY - FIRST 15 MIN: Performed by: UROLOGY

## 2020-09-19 PROCEDURE — 6370000000 HC RX 637 (ALT 250 FOR IP): Performed by: INTERNAL MEDICINE

## 2020-09-19 PROCEDURE — 2709999900 HC NON-CHARGEABLE SUPPLY: Performed by: UROLOGY

## 2020-09-19 PROCEDURE — 80048 BASIC METABOLIC PNL TOTAL CA: CPT

## 2020-09-19 PROCEDURE — 6360000002 HC RX W HCPCS: Performed by: INTERNAL MEDICINE

## 2020-09-19 PROCEDURE — 99291 CRITICAL CARE FIRST HOUR: CPT | Performed by: INTERNAL MEDICINE

## 2020-09-19 PROCEDURE — 7100000001 HC PACU RECOVERY - ADDTL 15 MIN: Performed by: UROLOGY

## 2020-09-19 PROCEDURE — 83935 ASSAY OF URINE OSMOLALITY: CPT

## 2020-09-19 PROCEDURE — 2000000000 HC ICU R&B

## 2020-09-19 PROCEDURE — 2580000003 HC RX 258: Performed by: INTERNAL MEDICINE

## 2020-09-19 PROCEDURE — 83735 ASSAY OF MAGNESIUM: CPT

## 2020-09-19 PROCEDURE — 84300 ASSAY OF URINE SODIUM: CPT

## 2020-09-19 PROCEDURE — XW043N5 INTRODUCTION OF MEROPENEM-VABORBACTAM ANTI-INFECTIVE INTO CENTRAL VEIN, PERCUTANEOUS APPROACH, NEW TECHNOLOGY GROUP 5: ICD-10-PCS | Performed by: INTERNAL MEDICINE

## 2020-09-19 PROCEDURE — 2580000003 HC RX 258: Performed by: UROLOGY

## 2020-09-19 PROCEDURE — 94640 AIRWAY INHALATION TREATMENT: CPT

## 2020-09-19 PROCEDURE — 3700000001 HC ADD 15 MINUTES (ANESTHESIA): Performed by: UROLOGY

## 2020-09-19 PROCEDURE — 2580000003 HC RX 258: Performed by: EMERGENCY MEDICINE

## 2020-09-19 PROCEDURE — C1769 GUIDE WIRE: HCPCS | Performed by: UROLOGY

## 2020-09-19 PROCEDURE — 2700000000 HC OXYGEN THERAPY PER DAY

## 2020-09-19 PROCEDURE — 3700000000 HC ANESTHESIA ATTENDED CARE: Performed by: UROLOGY

## 2020-09-19 PROCEDURE — 36415 COLL VENOUS BLD VENIPUNCTURE: CPT

## 2020-09-19 PROCEDURE — 0T778DZ DILATION OF LEFT URETER WITH INTRALUMINAL DEVICE, VIA NATURAL OR ARTIFICIAL OPENING ENDOSCOPIC: ICD-10-PCS | Performed by: UROLOGY

## 2020-09-19 PROCEDURE — 85027 COMPLETE CBC AUTOMATED: CPT

## 2020-09-19 PROCEDURE — 6360000004 HC RX CONTRAST MEDICATION: Performed by: UROLOGY

## 2020-09-19 PROCEDURE — C2617 STENT, NON-COR, TEM W/O DEL: HCPCS | Performed by: UROLOGY

## 2020-09-19 PROCEDURE — 94761 N-INVAS EAR/PLS OXIMETRY MLT: CPT

## 2020-09-19 PROCEDURE — 6360000002 HC RX W HCPCS: Performed by: ANESTHESIOLOGY

## 2020-09-19 PROCEDURE — BT1F1ZZ FLUOROSCOPY OF LEFT KIDNEY, URETER AND BLADDER USING LOW OSMOLAR CONTRAST: ICD-10-PCS | Performed by: UROLOGY

## 2020-09-19 PROCEDURE — 3600000002 HC SURGERY LEVEL 2 BASE: Performed by: UROLOGY

## 2020-09-19 PROCEDURE — 82570 ASSAY OF URINE CREATININE: CPT

## 2020-09-19 PROCEDURE — 6370000000 HC RX 637 (ALT 250 FOR IP): Performed by: ANESTHESIOLOGY

## 2020-09-19 PROCEDURE — 2500000003 HC RX 250 WO HCPCS: Performed by: EMERGENCY MEDICINE

## 2020-09-19 PROCEDURE — 74420 UROGRAPHY RTRGR +-KUB: CPT

## 2020-09-19 PROCEDURE — 3600000012 HC SURGERY LEVEL 2 ADDTL 15MIN: Performed by: UROLOGY

## 2020-09-19 PROCEDURE — 84100 ASSAY OF PHOSPHORUS: CPT

## 2020-09-19 PROCEDURE — C1758 CATHETER, URETERAL: HCPCS | Performed by: UROLOGY

## 2020-09-19 DEVICE — URETERAL STENT
Type: IMPLANTABLE DEVICE | Site: URETER | Status: FUNCTIONAL
Brand: CONTOUR™

## 2020-09-19 RX ORDER — ACETAMINOPHEN 650 MG/1
650 SUPPOSITORY RECTAL EVERY 4 HOURS PRN
Status: DISCONTINUED | OUTPATIENT
Start: 2020-09-19 | End: 2020-09-26 | Stop reason: HOSPADM

## 2020-09-19 RX ORDER — HEPARIN SODIUM 5000 [USP'U]/ML
5000 INJECTION, SOLUTION INTRAVENOUS; SUBCUTANEOUS EVERY 8 HOURS SCHEDULED
Status: DISCONTINUED | OUTPATIENT
Start: 2020-09-19 | End: 2020-09-20

## 2020-09-19 RX ORDER — PHENYLEPHRINE HCL IN 0.9% NACL 1 MG/10 ML
SYRINGE (ML) INTRAVENOUS PRN
Status: DISCONTINUED | OUTPATIENT
Start: 2020-09-19 | End: 2020-09-19 | Stop reason: SDUPTHER

## 2020-09-19 RX ORDER — SUCCINYLCHOLINE/SOD CL,ISO/PF 200MG/10ML
SYRINGE (ML) INTRAVENOUS PRN
Status: DISCONTINUED | OUTPATIENT
Start: 2020-09-19 | End: 2020-09-19 | Stop reason: SDUPTHER

## 2020-09-19 RX ORDER — PROPOFOL 10 MG/ML
INJECTION, EMULSION INTRAVENOUS PRN
Status: DISCONTINUED | OUTPATIENT
Start: 2020-09-19 | End: 2020-09-19 | Stop reason: SDUPTHER

## 2020-09-19 RX ORDER — BUDESONIDE AND FORMOTEROL FUMARATE DIHYDRATE 160; 4.5 UG/1; UG/1
2 AEROSOL RESPIRATORY (INHALATION) 2 TIMES DAILY
Status: DISCONTINUED | OUTPATIENT
Start: 2020-09-19 | End: 2020-09-19 | Stop reason: SDUPTHER

## 2020-09-19 RX ORDER — LACTOBACILLUS RHAMNOSUS GG 10B CELL
2 CAPSULE ORAL 2 TIMES DAILY WITH MEALS
Status: DISCONTINUED | OUTPATIENT
Start: 2020-09-19 | End: 2020-09-26 | Stop reason: HOSPADM

## 2020-09-19 RX ORDER — ONDANSETRON 2 MG/ML
INJECTION INTRAMUSCULAR; INTRAVENOUS PRN
Status: DISCONTINUED | OUTPATIENT
Start: 2020-09-19 | End: 2020-09-19 | Stop reason: SDUPTHER

## 2020-09-19 RX ORDER — GABAPENTIN 300 MG/1
300 CAPSULE ORAL 3 TIMES DAILY
Status: DISCONTINUED | OUTPATIENT
Start: 2020-09-19 | End: 2020-09-19 | Stop reason: DRUGHIGH

## 2020-09-19 RX ORDER — SODIUM CHLORIDE 9 MG/ML
INJECTION, SOLUTION INTRAVENOUS CONTINUOUS
Status: DISCONTINUED | OUTPATIENT
Start: 2020-09-19 | End: 2020-09-19 | Stop reason: SDUPTHER

## 2020-09-19 RX ORDER — SODIUM CHLORIDE 9 MG/ML
INJECTION, SOLUTION INTRAVENOUS CONTINUOUS
Status: DISCONTINUED | OUTPATIENT
Start: 2020-09-19 | End: 2020-09-21

## 2020-09-19 RX ORDER — FENTANYL CITRATE 50 UG/ML
25 INJECTION, SOLUTION INTRAMUSCULAR; INTRAVENOUS
Status: DISCONTINUED | OUTPATIENT
Start: 2020-09-19 | End: 2020-09-20

## 2020-09-19 RX ORDER — ONDANSETRON 2 MG/ML
4 INJECTION INTRAMUSCULAR; INTRAVENOUS EVERY 4 HOURS PRN
Status: DISCONTINUED | OUTPATIENT
Start: 2020-09-19 | End: 2020-09-26 | Stop reason: HOSPADM

## 2020-09-19 RX ORDER — GABAPENTIN 300 MG/1
300 CAPSULE ORAL 2 TIMES DAILY
Status: DISCONTINUED | OUTPATIENT
Start: 2020-09-19 | End: 2020-09-21

## 2020-09-19 RX ORDER — BUDESONIDE AND FORMOTEROL FUMARATE DIHYDRATE 160; 4.5 UG/1; UG/1
2 AEROSOL RESPIRATORY (INHALATION) 2 TIMES DAILY
Status: DISCONTINUED | OUTPATIENT
Start: 2020-09-19 | End: 2020-09-26 | Stop reason: HOSPADM

## 2020-09-19 RX ORDER — BISACODYL 10 MG
10 SUPPOSITORY, RECTAL RECTAL DAILY PRN
Status: DISCONTINUED | OUTPATIENT
Start: 2020-09-19 | End: 2020-09-26 | Stop reason: HOSPADM

## 2020-09-19 RX ORDER — DEXAMETHASONE SODIUM PHOSPHATE 4 MG/ML
INJECTION, SOLUTION INTRA-ARTICULAR; INTRALESIONAL; INTRAMUSCULAR; INTRAVENOUS; SOFT TISSUE PRN
Status: DISCONTINUED | OUTPATIENT
Start: 2020-09-19 | End: 2020-09-19 | Stop reason: SDUPTHER

## 2020-09-19 RX ORDER — ETOMIDATE 2 MG/ML
INJECTION INTRAVENOUS PRN
Status: DISCONTINUED | OUTPATIENT
Start: 2020-09-19 | End: 2020-09-19 | Stop reason: SDUPTHER

## 2020-09-19 RX ORDER — FENTANYL CITRATE 50 UG/ML
25 INJECTION, SOLUTION INTRAMUSCULAR; INTRAVENOUS EVERY 5 MIN PRN
Status: DISCONTINUED | OUTPATIENT
Start: 2020-09-19 | End: 2020-09-19 | Stop reason: HOSPADM

## 2020-09-19 RX ORDER — PROMETHAZINE HYDROCHLORIDE 25 MG/ML
6.25 INJECTION, SOLUTION INTRAMUSCULAR; INTRAVENOUS
Status: DISCONTINUED | OUTPATIENT
Start: 2020-09-19 | End: 2020-09-19 | Stop reason: HOSPADM

## 2020-09-19 RX ORDER — ATORVASTATIN CALCIUM 40 MG/1
40 TABLET, FILM COATED ORAL DAILY
Status: DISCONTINUED | OUTPATIENT
Start: 2020-09-19 | End: 2020-09-26 | Stop reason: HOSPADM

## 2020-09-19 RX ORDER — FENTANYL CITRATE 50 UG/ML
50 INJECTION, SOLUTION INTRAMUSCULAR; INTRAVENOUS
Status: DISCONTINUED | OUTPATIENT
Start: 2020-09-19 | End: 2020-09-20

## 2020-09-19 RX ORDER — ACETAMINOPHEN 325 MG/1
650 TABLET ORAL EVERY 4 HOURS PRN
Status: DISCONTINUED | OUTPATIENT
Start: 2020-09-19 | End: 2020-09-26 | Stop reason: HOSPADM

## 2020-09-19 RX ORDER — IPRATROPIUM BROMIDE AND ALBUTEROL SULFATE 2.5; .5 MG/3ML; MG/3ML
1 SOLUTION RESPIRATORY (INHALATION)
Status: DISCONTINUED | OUTPATIENT
Start: 2020-09-19 | End: 2020-09-21

## 2020-09-19 RX ORDER — LABETALOL HYDROCHLORIDE 5 MG/ML
5 INJECTION, SOLUTION INTRAVENOUS EVERY 10 MIN PRN
Status: DISCONTINUED | OUTPATIENT
Start: 2020-09-19 | End: 2020-09-19 | Stop reason: HOSPADM

## 2020-09-19 RX ORDER — ALBUTEROL SULFATE 90 UG/1
2 AEROSOL, METERED RESPIRATORY (INHALATION) EVERY 6 HOURS PRN
Status: DISCONTINUED | OUTPATIENT
Start: 2020-09-19 | End: 2020-09-26 | Stop reason: HOSPADM

## 2020-09-19 RX ORDER — ALBUTEROL SULFATE 90 UG/1
AEROSOL, METERED RESPIRATORY (INHALATION) PRN
Status: DISCONTINUED | OUTPATIENT
Start: 2020-09-19 | End: 2020-09-19 | Stop reason: SDUPTHER

## 2020-09-19 RX ORDER — SODIUM CHLORIDE 0.9 % (FLUSH) 0.9 %
10 SYRINGE (ML) INJECTION PRN
Status: DISCONTINUED | OUTPATIENT
Start: 2020-09-19 | End: 2020-09-26 | Stop reason: HOSPADM

## 2020-09-19 RX ORDER — FENTANYL CITRATE 50 UG/ML
INJECTION, SOLUTION INTRAMUSCULAR; INTRAVENOUS PRN
Status: DISCONTINUED | OUTPATIENT
Start: 2020-09-19 | End: 2020-09-19 | Stop reason: SDUPTHER

## 2020-09-19 RX ORDER — LIDOCAINE HYDROCHLORIDE 20 MG/ML
INJECTION, SOLUTION EPIDURAL; INFILTRATION; INTRACAUDAL; PERINEURAL PRN
Status: DISCONTINUED | OUTPATIENT
Start: 2020-09-19 | End: 2020-09-19 | Stop reason: SDUPTHER

## 2020-09-19 RX ORDER — SODIUM CHLORIDE 0.9 % (FLUSH) 0.9 %
10 SYRINGE (ML) INJECTION EVERY 12 HOURS SCHEDULED
Status: DISCONTINUED | OUTPATIENT
Start: 2020-09-19 | End: 2020-09-26 | Stop reason: HOSPADM

## 2020-09-19 RX ORDER — MAGNESIUM HYDROXIDE 1200 MG/15ML
LIQUID ORAL
Status: COMPLETED | OUTPATIENT
Start: 2020-09-19 | End: 2020-09-19

## 2020-09-19 RX ADMIN — GABAPENTIN 300 MG: 300 CAPSULE ORAL at 21:20

## 2020-09-19 RX ADMIN — ETOMIDATE 10 MG: 2 INJECTION INTRAVENOUS at 09:30

## 2020-09-19 RX ADMIN — LIDOCAINE HYDROCHLORIDE 100 MG: 20 INJECTION, SOLUTION EPIDURAL; INFILTRATION; INTRACAUDAL; PERINEURAL at 09:30

## 2020-09-19 RX ADMIN — FENTANYL CITRATE 50 MCG: 50 INJECTION, SOLUTION INTRAMUSCULAR; INTRAVENOUS at 08:14

## 2020-09-19 RX ADMIN — Medication 2 MCG/MIN: at 00:14

## 2020-09-19 RX ADMIN — PROPOFOL 40 MG: 10 INJECTION, EMULSION INTRAVENOUS at 09:30

## 2020-09-19 RX ADMIN — BUDESONIDE AND FORMOTEROL FUMARATE DIHYDRATE 2 PUFF: 160; 4.5 AEROSOL RESPIRATORY (INHALATION) at 20:13

## 2020-09-19 RX ADMIN — DEXAMETHASONE SODIUM PHOSPHATE 10 MG: 4 INJECTION, SOLUTION INTRAMUSCULAR; INTRAVENOUS at 09:30

## 2020-09-19 RX ADMIN — Medication 140 MG: at 09:30

## 2020-09-19 RX ADMIN — GABAPENTIN 300 MG: 300 CAPSULE ORAL at 11:19

## 2020-09-19 RX ADMIN — ALBUTEROL SULFATE 2 PUFF: 90 AEROSOL, METERED RESPIRATORY (INHALATION) at 09:46

## 2020-09-19 RX ADMIN — SODIUM CHLORIDE 1000 ML: 9 INJECTION, SOLUTION INTRAVENOUS at 00:14

## 2020-09-19 RX ADMIN — MEROPENEM 500 MG: 500 INJECTION, POWDER, FOR SOLUTION INTRAVENOUS at 16:11

## 2020-09-19 RX ADMIN — BUDESONIDE AND FORMOTEROL FUMARATE DIHYDRATE 2 PUFF: 160; 4.5 AEROSOL RESPIRATORY (INHALATION) at 08:26

## 2020-09-19 RX ADMIN — HEPARIN SODIUM 5000 UNITS: 5000 INJECTION INTRAVENOUS; SUBCUTANEOUS at 15:04

## 2020-09-19 RX ADMIN — Medication 8 MCG/MIN: at 09:24

## 2020-09-19 RX ADMIN — IPRATROPIUM BROMIDE AND ALBUTEROL SULFATE 1 AMPULE: .5; 3 SOLUTION RESPIRATORY (INHALATION) at 16:30

## 2020-09-19 RX ADMIN — HEPARIN SODIUM 5000 UNITS: 5000 INJECTION INTRAVENOUS; SUBCUTANEOUS at 21:07

## 2020-09-19 RX ADMIN — MEROPENEM 500 MG: 500 INJECTION, POWDER, FOR SOLUTION INTRAVENOUS at 08:04

## 2020-09-19 RX ADMIN — Medication 2 CAPSULE: at 16:12

## 2020-09-19 RX ADMIN — IPRATROPIUM BROMIDE AND ALBUTEROL SULFATE 1 AMPULE: .5; 3 SOLUTION RESPIRATORY (INHALATION) at 08:26

## 2020-09-19 RX ADMIN — Medication 2 CAPSULE: at 11:20

## 2020-09-19 RX ADMIN — FENTANYL CITRATE 50 MCG: 50 INJECTION, SOLUTION INTRAMUSCULAR; INTRAVENOUS at 11:08

## 2020-09-19 RX ADMIN — IPRATROPIUM BROMIDE AND ALBUTEROL SULFATE 1 AMPULE: .5; 3 SOLUTION RESPIRATORY (INHALATION) at 20:13

## 2020-09-19 RX ADMIN — Medication 100 MCG: at 09:36

## 2020-09-19 RX ADMIN — SODIUM CHLORIDE: 9 INJECTION, SOLUTION INTRAVENOUS at 08:05

## 2020-09-19 RX ADMIN — FENTANYL CITRATE 50 MCG: 50 INJECTION, SOLUTION INTRAMUSCULAR; INTRAVENOUS at 15:17

## 2020-09-19 RX ADMIN — Medication 200 MCG: at 09:43

## 2020-09-19 RX ADMIN — FENTANYL CITRATE 50 MCG: 50 INJECTION INTRAMUSCULAR; INTRAVENOUS at 09:30

## 2020-09-19 RX ADMIN — ONDANSETRON 4 MG: 2 INJECTION INTRAMUSCULAR; INTRAVENOUS at 09:30

## 2020-09-19 RX ADMIN — Medication 100 MCG: at 09:56

## 2020-09-19 RX ADMIN — ATORVASTATIN CALCIUM 40 MG: 40 TABLET, FILM COATED ORAL at 11:20

## 2020-09-19 ASSESSMENT — PULMONARY FUNCTION TESTS
PIF_VALUE: 20
PIF_VALUE: 0
PIF_VALUE: 1
PIF_VALUE: 19
PIF_VALUE: 1
PIF_VALUE: 6
PIF_VALUE: 20
PIF_VALUE: 20
PIF_VALUE: 8
PIF_VALUE: 0
PIF_VALUE: 20
PIF_VALUE: 17
PIF_VALUE: 21
PIF_VALUE: 0
PIF_VALUE: 1
PIF_VALUE: 0
PIF_VALUE: 20
PIF_VALUE: 1
PIF_VALUE: 1
PIF_VALUE: 20
PIF_VALUE: 1
PIF_VALUE: 30
PIF_VALUE: 21
PIF_VALUE: 9
PIF_VALUE: 0
PIF_VALUE: 13
PIF_VALUE: 1
PIF_VALUE: 14
PIF_VALUE: 21
PIF_VALUE: 20
PIF_VALUE: 2
PIF_VALUE: 2
PIF_VALUE: 22
PIF_VALUE: 15
PIF_VALUE: 6
PIF_VALUE: 0
PIF_VALUE: 21
PIF_VALUE: 0
PIF_VALUE: 20
PIF_VALUE: 20
PIF_VALUE: 31
PIF_VALUE: 1
PIF_VALUE: 20
PIF_VALUE: 29
PIF_VALUE: 20
PIF_VALUE: 21
PIF_VALUE: 21
PIF_VALUE: 0
PIF_VALUE: 13
PIF_VALUE: 1
PIF_VALUE: 20
PIF_VALUE: 21
PIF_VALUE: 15
PIF_VALUE: 20
PIF_VALUE: 20
PIF_VALUE: 21
PIF_VALUE: 3
PIF_VALUE: 20
PIF_VALUE: 19
PIF_VALUE: 20

## 2020-09-19 ASSESSMENT — PAIN DESCRIPTION - DESCRIPTORS
DESCRIPTORS: SHARP
DESCRIPTORS: ACHING
DESCRIPTORS: SHARP
DESCRIPTORS: SHARP

## 2020-09-19 ASSESSMENT — PAIN DESCRIPTION - PROGRESSION
CLINICAL_PROGRESSION: GRADUALLY WORSENING
CLINICAL_PROGRESSION: GRADUALLY WORSENING
CLINICAL_PROGRESSION: GRADUALLY IMPROVING
CLINICAL_PROGRESSION: GRADUALLY WORSENING

## 2020-09-19 ASSESSMENT — COPD QUESTIONNAIRES: CAT_SEVERITY: MODERATE

## 2020-09-19 ASSESSMENT — PAIN DESCRIPTION - ORIENTATION
ORIENTATION: POSTERIOR;MID
ORIENTATION: POSTERIOR;MID
ORIENTATION: RIGHT;LOWER;MID;POSTERIOR
ORIENTATION: POSTERIOR;MID

## 2020-09-19 ASSESSMENT — ENCOUNTER SYMPTOMS
COUGH: 0
STRIDOR: 0
APNEA: 0
CHEST TIGHTNESS: 0
ABDOMINAL PAIN: 1
SHORTNESS OF BREATH: 0
CHOKING: 0
EYE ITCHING: 0
WHEEZING: 0
EYE REDNESS: 0
ABDOMINAL DISTENTION: 0
EYE PAIN: 0
PHOTOPHOBIA: 0
EYE DISCHARGE: 0

## 2020-09-19 ASSESSMENT — PAIN DESCRIPTION - DIRECTION
RADIATING_TOWARDS: BACK

## 2020-09-19 ASSESSMENT — PAIN - FUNCTIONAL ASSESSMENT
PAIN_FUNCTIONAL_ASSESSMENT: PREVENTS OR INTERFERES SOME ACTIVE ACTIVITIES AND ADLS
PAIN_FUNCTIONAL_ASSESSMENT: PREVENTS OR INTERFERES SOME ACTIVE ACTIVITIES AND ADLS
PAIN_FUNCTIONAL_ASSESSMENT: PREVENTS OR INTERFERES WITH MANY ACTIVE NOT PASSIVE ACTIVITIES
PAIN_FUNCTIONAL_ASSESSMENT: PREVENTS OR INTERFERES SOME ACTIVE ACTIVITIES AND ADLS

## 2020-09-19 ASSESSMENT — PAIN DESCRIPTION - PAIN TYPE
TYPE: ACUTE PAIN

## 2020-09-19 ASSESSMENT — PAIN SCALES - GENERAL
PAINLEVEL_OUTOF10: 10
PAINLEVEL_OUTOF10: 8
PAINLEVEL_OUTOF10: 4
PAINLEVEL_OUTOF10: 9
PAINLEVEL_OUTOF10: 0
PAINLEVEL_OUTOF10: 4
PAINLEVEL_OUTOF10: 6
PAINLEVEL_OUTOF10: 0

## 2020-09-19 ASSESSMENT — PAIN DESCRIPTION - LOCATION
LOCATION: ABDOMEN

## 2020-09-19 ASSESSMENT — PAIN DESCRIPTION - FREQUENCY
FREQUENCY: INTERMITTENT

## 2020-09-19 ASSESSMENT — PAIN DESCRIPTION - ONSET
ONSET: AWAKENED FROM SLEEP
ONSET: ON-GOING
ONSET: ON-GOING

## 2020-09-19 NOTE — ED NOTES
Pt O2 sat 88% on room air while sleeping, placed pt on 2L NC now sat is 95%, Dinorah HILL made aware     Gem Curtis RN  09/18/20 7926

## 2020-09-19 NOTE — PROGRESS NOTES
PACU Transfer Note    Vitals:    09/19/20 1050   BP: 139/65   Pulse: 78   Resp: 16   Temp: 96.1 °F (35.6 °C)   SpO2: 97%       In: -   Out: 225 [Urine:225]    Pain assessment:  None; resting RR>10  Pain Level: 0    Report given to Receiving unit [facility] RN.    9/19/2020 10:52 AM

## 2020-09-19 NOTE — ANESTHESIA PRE PROCEDURE
[facility] Department of Anesthesiology  Pre-Anesthesia Evaluation/Consultation       Name:  Gabrielle Cordero  : 1950  Age:  78 y.o.                                            MRN:  [id number]  Date: 2020           Surgeon: Surgeon(s):  Olinda Yanez MD    Procedure: Procedure(s):  CYSTOSCOPY URETERAL STENT INSERTION LEFT     No Known Allergies  Patient Active Problem List   Diagnosis    [facility]osed left [facility] fracture[facility]    Morbid obesity with BMI of 45.0-49.9, adult ([address]    Hypoxemia requiring supplemental oxygen    Acute on chronic respiratory failure with hypoxia ([address]    Acute on chronic respiratory failure with hypercapnia (HCC)    Acute pulmonary edema (HCC)    Multifocal pneumonia    Rapid atrial fibrillation (HCC)    Chronic obstructive pulmonary disease (HCC)    Paroxysmal atrial fibrillation (HCC)    PAT (paroxysmal atrial tachycardia) ([facility]    Kidney stone    Shock ([address]    Neutrophilic leukocytosis    Morbid obesity due to excess calories (HCC)    Chronic respiratory failure with hypoxia (HCC)    Hyperlipidemia    Kidney lesion, native, left    Hyponatremia    Hyperkalemia    Acute renal failure (ARF) (HCC)    Moderate protein-calorie malnutrition (HCC)    Hydronephrosis with left ureteropelvic junction (UPJ) obstruction    Hematuria     Past Medical History:   Diagnosis Date    Chronic kidney disease     COPD (chronic obstructive pulmonary disease) ([facility]     Hyperlipidemia     Hypertension      Past Surgical History:   Procedure Laterality Date    CHOLECYSTECTOMY      HIP FRACTURE SURGERY Left 8/10/2020    OPEN TREATMENT OF LEFT HIP FRACTURE WITH CEPHALOMEDULLARY NAIL performed by Eliceo [facility]orga [facility] History     Tobacco Use    Smoking status: Current Every Day Smoker     Packs/day: 1.50     Types: Cigarettes    Smokeless tobacco: Never Used    Tobacco comment: pack n half a day    Substance Use Topics    Alcohol use: Never     Frequency: Never    Drug use: Yes     Types: Marijuana     Comment: occasional      Medications  No current facility-administered medications on file prior to encounter. Current Outpatient Medications on File Prior to Encounter   Medication Sig Dispense Refill    gabapentin (NEURONTIN) 300 MG capsule Take 300 mg by mouth 3 times daily.        atorvastatin (LIPITOR) 40 MG tablet Take 40 mg by mouth daily      apixaban (ELIQUIS) 5 MG TABS tablet Take 1 tablet by mouth 2 times daily 60 tablet 1    dilTIAZem (CARDIZEM CD) 180 MG extended release capsule Take 1 capsule by mouth daily 30 capsule 3    furosemide (LASIX) 40 MG tablet Take 1 tablet by mouth daily 60 tablet 0    fluticasone-salmeterol (ADVAIR) 250-50 MCG/DOSE AEPB Inhale 1 puff into the lungs 2 times daily      ipratropium-albuterol (DUONEB) 0.5-2.5 (3) MG/3ML SOLN nebulizer solution Inhale 3 mLs into the lungs every 2 hours as needed for Shortness of Breath      albuterol sulfate  (90 Base) MCG/ACT inhaler Inhale 2 puffs into the lungs every 4-6 hours as needed for Shortness of Breath       Current Facility-Administered Medications   Medication Dose Route Frequency Provider Last Rate Last Dose    atorvastatin (LIPITOR) tablet 40 mg  40 mg Oral Daily Mitch Bucio MD        sodium chloride flush 0.9 % injection 10 mL  10 mL Intravenous 2 times per day Mitch Bucio MD        sodium chloride flush 0.9 % injection 10 mL  10 mL Intravenous PRN Mitch Bucio MD        acetaminophen (TYLENOL) tablet 650 mg  650 mg Oral Q4H PRN Mitch Bucio MD        Or    acetaminophen (TYLENOL) suppository 650 mg  650 mg Rectal Q4H PRN Mitch Bucio MD        ondansetron [facility]) injection 4 mg  4 mg Intravenous Q4H PRN Mitch Bucio MD        bisacodyl (DULCOLAX) suppository 10 mg  10 mg Rectal Daily PRN Mitch Bucio MD        meropenem (MERREM) 500 mg IVPB (mini-bag)  500 mg Intravenous Antony Herrmann MD time: 20 0630  MAP (mmHg)  Av  Min: 39   Min taken time: 20 0430  Max: 80   Max taken time: 20 0630  SpO2  Av.1 %  Min: 83 %   Min taken time: 20 0545  Max: 100 %   Max taken time: 20 0700    BP Readings from Last 3 Encounters:   20 86/63   20 108/64   20 120/75     BMI  Body mass index is 38.29 kg/m². Estimated body mass index is 38.29 kg/m² as calculated from the following:    Height as of this encounter: 5' 7\" (1.702 m). Weight as of this encounter: 244 lb 7.8 oz (110.9 kg).     CBC   Lab Results   Component Value Date    WBC 26.7 2020    RBC 5.42 2020    HGB 13.8 2020    HCT 45.0 2020    MCV 83.0 2020    RDW 20.9 2020     2020     CMP    Lab Results   Component Value Date     2020    K 4.1 2020    K 5.3 2020     2020    CO2 21 2020    BUN 47 2020    CREATININE 2.0 2020    GFRAA 30 2020    GFRAA >60 2013    AGRATIO 0.6 2020    LABGLOM 25 2020    GLUCOSE 112 2020    PROT 6.9 2020    CALCIUM 8.3 2020    BILITOT 0.4 2020    ALKPHOS 170 2020    AST 60 2020    ALT 24 2020     BMP    Lab Results   Component Value Date     2020    K 4.1 2020    K 5.3 2020     2020    CO2 21 2020    BUN 47 2020    CREATININE 2.0 2020    CALCIUM 8.3 2020    GFRAA 30 2020    GFRAA >60 2013    LABGLOM 25 2020    GLUCOSE 112 2020     POCGlucose  Recent Labs     20  19520  0805   GLUCOSE 86 112*      Coags    Lab Results   Component Value Date    PROTIME 11.8 2020    INR 1.02 2020    APTT 31.7      HCG (If Applicable) No results found for: PREGTESTUR, PREGSERUM, HCG, HCGQUANT   ABGs   Lab Results   Component Value Date    PHART 7.540 08/10/2020    PO2ART 83.2 08/10/2020    WXX7BSN 46.0 08/10/2020 UKS3COT 39.3 08/10/2020    BEART 14.8 08/10/2020    U9CLXAHE 98.4 08/10/2020      Type & Screen (If Applicable)  No results found for: LABABO, LABRH                         BMI: Wt Readings from Last 3 Encounters:       NPO Status:8 hours                          Anesthesia Evaluation  Patient summary reviewed no history of anesthetic complications:   Airway: Mallampati: III  TM distance: >3 FB   Neck ROM: full   Dental:    (+) partials      Pulmonary:normal exam    (+) COPD: moderate,                             Cardiovascular:  Exercise tolerance: poor (<4 METS),   (+) hypertension:, dysrhythmias: atrial fibrillation,       ECG reviewed  Rhythm: regular  Rate: normal  Echocardiogram reviewed         Beta Blocker:  Not on Beta Blocker         Neuro/Psych:   Negative Neuro/Psych ROS              GI/Hepatic/Renal:   (+) renal disease: kidney stones,           Endo/Other:    (+) blood dyscrasia (Eliquis): anticoagulation therapy:., .                 Abdominal:   (+) obese,         Vascular: negative vascular ROS. Anesthesia Plan      general     ASA 4 - emergent       Induction: intravenous. MIPS: Postoperative opioids intended and Prophylactic antiemetics administered. Anesthetic plan and risks discussed with patient and spouse. This pre-anesthesia assessment may be used as a history and physical.    DOS STAFF ADDENDUM:    Pt seen and examined, chart reviewed (including anesthesia, drug and allergy history). No interval changes to history and physical examination. Anesthetic plan, risks, benefits, alternatives, and personnel involved discussed with patient. Questions and concerns addressed. Patient(family) verbalized an understanding and agrees to proceed.       Linwood Haque MD  September 19, 2020  8:51 AM

## 2020-09-19 NOTE — OP NOTE
[address] [place] [address] Patricio Nettles 16                                OPERATIVE REPORT    PATIENT NAME: Tadeo Prasad                    :        1950  MED REC NO:   [id number]                          ROOM:       2114  ACCOUNT NO:   [de-identified]                           ADMIT DATE: 2020  PROVIDER:     Ambrose Rodriguez MD    DATE OF PROCEDURE:  2020    PREOPERATIVE DIAGNOSIS:  Left UPJ stone, complicated by infection. POSTOPERATIVE DIAGNOSIS:  Left UPJ stone, complicated by infection. OPERATIONS PERFORMED:  Cystoscopy and left ureteral stent placement,  which included a retrograde pyelogram.    SURGEON:  Ambrose Rodriguez MD    ANESTHESIA:  General.    SPECIMEN:  None. STENT:  A 7-Bengali, 26-cm double-J stent. ESTIMATED BLOOD LOSS:  Less than 50 mL. INDICATIONS:  A 78year-old patient who presented with gross hematuria  and an elevated white blood cell count. CT scan showed an obstructing  stone with marked inflammatory changes of the left kidney. OPERATIVE PROCEDURE:  After appropriate anesthesia, routine prep and  drape, cystoscopic examination showed just a few cystitis throughout the  bladder. A small amount of clot was irrigated from the bladder. Both  ureteral orifices were in the normal location. The left ureteral orifice was cannulated with a 0.028 guidewire that was  advanced up to the stone, but initially would not bypass the stone. Using an open-ended ureteral stent and an angled guidewire, the stone  was very carefully negotiated and the guidewire was eventually  successfully moved beyond the stone into the upper collecting system.    The open-ended catheter was used at times to inject contrast to outline  the ureter and then it was advanced up over the guidewire into the renal  pelvis and these were passed by the collecting system, indicating that  the guidewire had been advanced into a good position. The wire was now reinserted. The open-ended catheter was removed. An  8-Swazi stent was not available. A 7-Swazi, 26-cm double-J stent that  was successfully placed over the guidewire using fluoroscopic and  cystoscopic guidance. The catheter was replaced in her bladder, and she was awakened and  transferred to the recovery room, having tolerated the procedure well.         Sumi Rivero MD    D: 09/19/2020 10:45:14       T: 09/19/2020 11:27:48     [email or web address]  Job#: [id number]     Doc#: [id number]    CC:

## 2020-09-19 NOTE — ED NOTES
Called Urology [phone] at [phone];  Dr. Helms Adjutant called back at [phone], spoke to [address] [place] [address]  09/18/20 6146

## 2020-09-19 NOTE — PROGRESS NOTES
8130- Patient admitted to ICU from ED. Transferred to ICU bed without difficulty. Alert and oriented x4. Levophed running. See MAR. Dr. Randall Brood aware. See new orders. 7919- Critical care (Dr. Drea Lucas) made aware via [employer]. No new orders. 0825- Dr. Sumaya Estrella called this RN. Patient will go to OR for cystoscopy around 0830 today. 0730- Report given to Ramiro Paul RN.

## 2020-09-19 NOTE — PLAN OF CARE
Problem: Skin Integrity:  Goal: Absence of new skin breakdown  Outcome: Ongoing, pt made aware of POC to reposition Q2hrs, in agreement, mepilex on R heel, protective cream to buttocks. Problem: Pain:  Goal: Control of acute pain  Outcome: Ongoing, c/o pain L flank area, intermittent, says 6/10 tolerable, pt made aware to let nurse know if pain start to increase.

## 2020-09-19 NOTE — ANESTHESIA POSTPROCEDURE EVALUATION
[facility] Department of Anesthesiology  Post-Anesthesia Note       Name:  Shiela Wahl                                  Age:  78 y.o. MRN:  [id number]     Last Vitals & Oxygen Saturation: /65   Pulse 78   Temp 96.1 °F (35.6 °C) (Temporal)   Resp 16   Ht 5' 7\" (1.702 m)   Wt 244 lb 7.8 oz (110.9 kg)   SpO2 97%   BMI 38.29 kg/m²   Patient Vitals for the past 4 hrs:   BP Temp Temp src Pulse Resp SpO2   09/19/20 1050 139/65 96.1 °F (35.6 °C) Temporal 78 16 97 %   09/19/20 1040 117/60 -- -- 77 23 98 %   09/19/20 1035 117/67 -- -- 77 19 100 %   09/19/20 1030 (!) 137/119 -- -- 80 27 100 %   09/19/20 1027 (!) 117/40 96.8 °F (36 °C) Temporal 78 19 94 %   09/19/20 0800 -- -- -- 71 -- --       Level of consciousness:  Awake, alert to baseline    Respiratory: Respirations easy, no distress. Stable. 3L NC 02    Cardiovascular: Hemodynamically stable. Hydration: Adequate. PONV: Adequately managed. Post-op pain: Adequately controlled. Post-op assessment: Tolerated anesthetic well without complication. Complications:  None. Patient transferred to ICU on propac and NC 02. No issues. Report given to ICU staff.     Joenathan Schilder, MD  September 19, 2020   11:55 AM

## 2020-09-19 NOTE — CONSULTS
PATIENT IS SEEN AT THE REQUEST OF DR. Tinoco for respiratory failure    CONSULTING PHYSICIAN: Earnest    HISTORY OF PRESENT ILLNESS:  This is a 78 y.o. female who presented to the ED on 9/18 with a CC of abnormal labs. Patient was sent to the ED for abnormal labs. Elevated WBC was the concern. Admitted for kidney stone. No history of kidney problems. Has been at rehab for recent hip fracture. Does indeed feel sick but denies any significant medical problems       Established Pulmonologist:  [facility] in the past     PAST MEDICAL HISTORY:  Past Medical History:   Diagnosis Date    Chronic kidney disease     COPD [facility]ch[facility] obstructive [facility] disease) [facility] [address]     Hyperlipidemia     Hypertension        PAST SURGICAL HISTORY:  Past Surgical History:   Procedure Laterality Date    CHOLECYSTECTOMY      HIP FRACTURE SURGERY Left 8/10/2020    OPEN TREATMENT OF LEFT HIP FRACTURE WITH CEPHALOMEDULLARY NAIL performed by Yara Yanes MD at [facility]:  Denies any family medical history     SOCIAL HISTORY:   reports that she has been smoking cigarettes. She has been smoking about 1.50 packs per day. She has never used smokeless tobacco.   No ETOH no drugs   Has been in nursing home rehabing from [facility]    Scheduled Meds:   meropenem  500 mg Intravenous Q8H       Continuous Infusions:   norepinephrine 10 mcg/min (09/19/20 0533)       PRN Meds:  albuterol sulfate HFA, iopamidol    ALLERGIES:  Patient has No Known Allergies.     REVIEW OF SYSTEMS:  Constitutional: Negative for fever or chills  HENT: Negative for sore throat  Eyes: Negative for redness   Respiratory: Some SOB  Cardiovascular: Negative for chest pain  Gastrointestinal: Ab pain   Genitourinary: Negative for hematuria, negative for dysuria  Musculoskeletal: Negative for arthralgias   Skin: Negative for rash  Neurological: Negative for syncope  Hematological: Negative for adenopathy  Extremities:  Negative for swelling    PHYSICAL EXAM:  Blood pressure 99/78, pulse 73, temperature 97.6 °F (36.4 °C), temperature source Axillary, resp. rate 12, height 5' 7\" (1.702 m), weight 244 lb 7.8 oz (110.9 kg), SpO2 100 %.'  Gen: Moderate distress   Eyes: PERRL. No sclera icterus. No conjunctival injection. ENT: No discharge. Pharynx clear. Neck: Trachea midline. No obvious mass. Resp: Diminished   CV: Regular rate. Regular rhythm. No murmur or rub. GI: Non-tender. Non-distended. No hernia. BS present. Skin: Dry skin  Lymph: No cervical LAD. No supraclavicular LAD. M/S: No cyanosis. No joint deformity. Neuro: Awake. Alert. Moves all four extremities. EXT:   trace edema, no clubbing    LABS:  CBC:   Recent Labs     09/18/20 1951   WBC 25.9*   HGB 14.2   HCT 48.5*   MCV 84.5        BMP:   Recent Labs     09/18/20 1951   *   K 5.3*   CL 99   CO2 23   BUN 47*   CREATININE 2.7*     LIVER PROFILE:   Recent Labs     09/18/20 1951   AST 60*   ALT 24   BILITOT 0.4   ALKPHOS 170*     PT/INR: No results for input(s): PROTIME, INR in the last 72 hours. APTT: No results for input(s): APTT in the last 72 hours. UA:  Recent Labs     09/18/20 1949   COLORU RED*   PHUR Color Interfer*   WBCUA 6-9*   RBCUA >100*   BACTERIA 4+*   CLARITYU TURBID*   SPECGRAV Color Interfer   LEUKOCYTESUR LARGE*   UROBILINOGEN Color Interfer*   BILIRUBINUR Color Interfer*   BLOODU LARGE*   GLUCOSEU Color Interfer*     No results for input(s): PHART, LKX0AHG, PO2ART in the last 72 hours. Cultures:  Pending     PFTs:  Mild restriction        ECHO:  Overall left ventricular ejection fraction is estimated to be 55-60%. The left ventricular wall motion is normal. There is mild concentric left ventricular hypertrophy. The diastolic function is impaired and classified as Grade 1 (impaired relaxation). The Aortic Valve is mildly calcified. The systolic pulmonary artery pressure cannot be estimated due to insufficient tricuspid regurgitation.  The left atrium is mildly dilated. ABG:  None    Chest X-ray:  Chest imaging was reviewed by me and showed interstitial prominence, c-spine hardware, clear lungs otherwise     Ab CT:  There is moderate left hydronephrosis which appears to be secondary to an 8    mm calculus at the UPJ.         That said, there is also a suspicious appearing lesion adjacent to or within    the left kidney, with heterogeneous hyperdensity.  That could represent a    hematoma from the kidney, but renal neoplasm must be considered as well. Urologic consultation is recommended. I reviewed all the above labs and studies pertaining to this visit. ASSESSMENT:  · Septic Shock  · Left Hydronephrosis due to nephrolithiasis   · DARSHAN with CKD  · Acute Hypoxic Respiratory Failure with saturations less than 90% on room air  · COPD per Chart (no obstruction on old PFTs)  · ? Renal lesion     PLAN:  · Levophed for MAP of 65  · Add IV maintenance fluids with NS at 100 ml/hr  · Add Heparin for DVT prophylaxis  · Merrem to continue  · Add Lactobacillus  · Trend Procal  · Urine cultures  · Add Duonebs q 4 hours  · Add symbicort as replacement for Advair  · Urology consult with OR planned for today       Critical care time of 31 minutes. This does not include procedural time. Please see procedural notes for details.     Mikala Das, [facility] Pulmonary

## 2020-09-19 NOTE — ED NOTES
Pt sat dropping again while sleeping into 80's, bumped O2 to 4L NC, O2 sat now 95%        Bhavna Raines, FEMI  09/19/20 5678

## 2020-09-19 NOTE — ED NOTES
Report given to [facility], all questions answered at this time.       Alka Mattson, FEMI  09/19/20 2614

## 2020-09-19 NOTE — ED NOTES
This procedure has been fully reviewed with the patient and written informed consent has been obtained     Delaine Ganser, FEMI  09/18/20 8203

## 2020-09-19 NOTE — H&P
Hospital Medicine  History and Physical    PCP: Argenis Stanton MD  Patient Name: Sergio Tejada    Date of Service: Pt seen/examined on 09/18/2020 and admitted to Inpatient with expected LOS greater than two midnights due to medical therapy    CHIEF COMPLAINT:  Pt sent from her NH for evaluation of leukocytosis and hematuria  HISTORY OF PRESENT ILLNESS: Patient is a 53-year-old woman with a history of hyperlipidemia, atrial fibrillation, COPD with chronic hypoxic respiratory failure and morbid obesity. She was sent from her care facility for evaluation after finding abnormal labs. She had an elevated white blood cell count and hematuria. Patient denies any symptoms. On evaluation she was found to have Hydronephrosis with left UPJ obstruction thought to be a kidney stone. After her arrival in the ER she became hypotensive requiring pressers to maintain her blood pressure. She is being admitted for further evaluation and treatment. Associated signs and symptoms do not include fever or chills, nausea, vomiting, abdominal pain, hemoptysis, hematochezia, diarrhea, constipation or urinary symptoms. Past Medical History:        Diagnosis Date    Chronic kidney disease     COPD [facility]chronic obstructive [facility] disease) ([facility] [address]     Hyperlipidemia     Hypertension        Past Surgical History:        Procedure Laterality Date    CHOLECYSTECTOMY      HIP FRACTURE SURGERY Left 8/10/2020    OPEN TREATMENT OF LEFT HIP FRACTURE WITH CEPHALOMEDULLARY NAIL performed by Carmelia Siemens, MD at [address] [place] [address] Place:  Patient has no known allergies. Medications Prior to Admission:    Prior to Admission medications    Medication Sig Start Date End Date Taking?  Authorizing Provider   atorvastatin (LIPITOR) 40 MG tablet Take 40 mg by mouth daily    Historical Provider, MD   apixaban (ELIQUIS) 5 MG TABS tablet Take 1 tablet by mouth 2 times daily 8/18/20   Luzmaria Carrion, DO   dilTIAZem (CARDIZEM CD) 180 MG extended release capsule Take 1 capsule by mouth daily 8/19/20   Luzmaria Carrion DO   furosemide (LASIX) 40 MG tablet Take 1 tablet by mouth daily 8/15/20   Alcides Daley MD   gabapentin (NEURONTIN) 300 MG capsule Take 300 mg by mouth 3 times daily. Historical Provider, MD   fluticasone-salmeterol (ADVAIR) 250-50 MCG/DOSE AEPB Inhale 1 puff into the lungs 2 times daily 4/24/19   Historical Provider, MD   ipratropium-albuterol (Melene Sat) 0.5-2.5 (3) MG/3ML SOLN nebulizer solution Inhale 3 mLs into the lungs every 2 hours as needed for Shortness of Breath 5/23/19   Historical Provider, MD   albuterol sulfate  (90 Base) MCG/ACT inhaler Inhale 2 puffs into the lungs every 4-6 hours as needed for Shortness of Breath    Historical Provider, MD       Family History:   Family history is negative for accelerated coronary artery disease, diabetes or malignancies. Social History:   TOBACCO:   reports that she has been smoking cigarettes. She has been smoking about 1.50 packs per day. She has never used smokeless tobacco.  ETOH:   reports no history of alcohol use. OCCUPATION:      REVIEW OF SYSTEMS:  A full review of systems was performed and is negative except for that which appears in the HPI    Physical Exam:    Vitals: BP (!) 84/49   Pulse 111   Temp 98.6 °F (37 °C) (Oral)   Resp 14   Wt 248 lb 3.8 oz (112.6 kg)   SpO2 90%   BMI 38.88 kg/m²   General appearance: WD/WN 78y.o. year-old  female who is alert, appears stated age and is cooperative  HEENT: Head: Normocephalic, no lesions, without obvious abnormality. Eye: Normal external eye, conjunctiva, lids cornea, PEERL. Ears: Normal external ears. Non-tender. Nose: Normal external nose, mucus membranes and septum. Pharynx: Dental Hygiene adequate. Normal buccal mucosa. Normal pharynx.   Neck: no adenopathy, no carotid bruit, no JVD, supple, symmetrical, trachea midline and thyroid not enlarged, symmetric, no tenderness/mass/nodules  Lungs: clear to auscultation was utilized to reduce the radiation dose to as low as reasonably achievable. COMPARISON: None. HISTORY: ORDERING SYSTEM PROVIDED HISTORY: painless hematuria TECHNOLOGIST PROVIDED HISTORY: Reason for exam:->painless hematuria Additional Contrast?->None Reason for Exam: painless hematuria Acuity: Acute Type of Exam: Initial FINDINGS: Lower Chest: There is dependent atelectasis/scarring within the visualized lungs bilaterally, greater on the right. The base of the heart is unremarkable. Visualized extra thoracic soft tissues are. Organs: Evaluation of the solid abdominal viscera is limited without intravenous contrast.  In addition, there is streak artifact generated by the patient's body habitus and by the patient's arms at her side. Having said that, no acute or suspicious hepatic abnormalities identified. Noncontrast imaging of the spleen is unremarkable. Normal adrenals. Normal pancreas. There is an indeterminate but suspicious lesions seen within the left kidney, measuring 6.8 x 5.9 x 6.8 cm. This lesion appears to be obliterating the majority of an atrophy renal parenchyma. There is moderate left-sided hydronephrosis identified, which appears to be secondary to a 8 mm calculus at the left ureteropelvic junction. The more distal left ureter is unremarkable in appearance. The right kidney is unremarkable in appearance. No hydronephrosis or hydroureter is seen. No renal or ureteral calculi are seen on the right GI/Bowel: There is moderate diverticulosis of the large bowel, but without CT evidence of diverticulitis. The appendix is normal. Distal esophagus, stomach, duodenal sweep, and the remainder of the small bowel are unremarkable. Pelvis: Uterus and adnexa regions are unremarkable in appearance. Urinary bladder unremarkable. No free pelvic fluid is seen. Peritoneum/Retroperitoneum: Abdominal aorta normal in caliber. Shotty retroperitoneal lymph nodes are seen.   No pathologically enlarged lymph nodes are identified, based upon size criteria. Bones/Soft Tissues: Patient is status post left hip gamma nail placement. The fracture plane continues to be visualized, but callus formation is seen. No acute bony abnormalities are detected. There is moderate left hydronephrosis which appears to be secondary to an 8 mm calculus at the UPJ. That said, there is also a suspicious appearing lesion adjacent to or within the left kidney, with heterogeneous hyperdensity. That could represent a hematoma from the kidney, but renal neoplasm must be considered as well. Urologic consultation is recommended. Xr Chest Portable    Result Date: 9/18/2020  EXAMINATION: ONE XRAY VIEW OF THE CHEST 9/18/2020 8:09 pm COMPARISON: Chest radiograph performed August 10, 2020. HISTORY: ORDERING SYSTEM PROVIDED HISTORY: SOB TECHNOLOGIST PROVIDED HISTORY: Reason for exam:-> SOB Reason for Exam: sob Acuity: Acute Type of Exam: Initial FINDINGS: The lungs are clear without focal consolidation. No pleural effusion or pneumothorax. The cardiac silhouette is within normal limits. Atherosclerotic changes of the thoracic aorta. No acute osseous abnormality. Partially visualized cervicothoracic spinal fusion hardware. No acute cardiopulmonary findings. Xr Hip 2-3 Vw W Pelvis Left    Result Date: 8/26/2020  Radiology exam is complete. No Radiologist dictation. Please follow up with ordering provider.        Assessment:   Principal Problem:    Kidney stone  Active Problems:    Chronic obstructive pulmonary disease (HCC)    Paroxysmal atrial fibrillation (HCC)    Shock (HCC)    Neutrophilic leukocytosis    Morbid obesity due to excess calories (HCC)    Chronic respiratory failure with hypoxia (HCC)    Hyperlipidemia    Kidney lesion, native, left    Hyponatremia    Hyperkalemia    Acute renal failure (ARF) (HCC)    Moderate protein-calorie malnutrition (HCC)    Hydronephrosis with left ureteropelvic junction (UPJ) obstruction Hematuria  Resolved Problems:    * No resolved hospital problems. *      Plan:       *COVID testing in anticipation of possible surgery/procedure and per hospital policy as patient came from an outside facility. Low clinical concern for COVID19    Kidney stone with hydronephrosis with left ureteropelvic junction (UPJ) obstruction with Hematuria - Urology has been contacted and plans a cystoscopy with possible stone extraction in the morning. Will keep NPO after midnight to facilitate this procedure     Kidney lesion, native, left - Unclear significance; will consult Urology to evaluate    Neutrophilic leukocytosis - possibility of infected stone? Will start Meropenem and monitor     Shock ([address] - a central line was placed and Pt was started on Levophed. Will monitor in the ICU    Acute renal failure (ARF) ([address] - possibly associated with ureteropelvic junction (UPJ) obstruction vs shock. Will collect renal labs (see below), hydrate and avoid nephrotoxic agents  · Urinalysis with Micro  · Urine Culture  · FEna (Urine Sodium, Urine Creatinine)  · Cortisol (total)  · Uric Acid  · TSH with Reflex  · Urine Osmolality    Hyponatremia - mild and asymptomatic. Will give normal Saline and monitor Sodium levels. Hyperkalemia - monitor for improvement with IV fluids    COPD - without acute exacerbation. Will provide inhaled Albuterol  treatments q4 hours as needed. Patient will be monitored closely, and deep breathing and coughing will be encouraged while awake. Chronic respiratory failure with hypoxia (HCC) - due to COPD; provide supplemental oxygen as necessary to keep SaO2 92% or greater. Paroxysmal atrial fibrillation (HCC) - rate controlled. Hold po Cardizem due to hypotension and continue Eliquis      Moderate protein-calorie malnutrition ([address] - Patient encouraged to eat a balanced diet, including protein-rich foods. Dietary has been consulted to assess her nutrition status and make recommendations. Hyperlipidemia - No current evidence of Rhabdomyolysis or other adverse effects. Continue statin therapy while in the hospital    Morbid obesity due to excess calories (Body mass index is 38.88 kg/m². ) - Complicating assessment and treatment. Placing patient at risk for multiple co-morbidities as well as early death and contributing to the patient's presentation.  on weight loss when appropriate. Pt has a high probability of imminent or life-threatening deterioration requiring close monitoring, and highly complex decision-making and/or interventions of high intensity to assess, manipulate, and support his critical organ systems to prevent the his inevitable decline which would occur if left untreated. A total critical care time 55 minutes was used. This includes but not limited to examining patient, collaborating with other physicians, monitoring vital signs, telemetry, continuous pulse oximetry, and clinical response to IV medications; documentation time, review and interpretation of laboratory and radiological data, review of nursing notes and old record review. This time excludes any time that may have been spent performing procedures for life threatening organ failure. DVT Prophylaxis: Eliquis  Diet: Diet NPO Effective Now  Code Status: Limited  (Advanced care planning has been discussed with patient and/or responsible family member and is reflected in the code status.  Further orders associated with this have been entered if appropriate)    Disposition: Anticipate that patient will remain in the hospital for 2 to 3 days depending on further evaluation and clinical course    Please note that over 50 minutes was spent in evaluating the patient, review of records and results, discussion with staff/family, etc.    Nia Kaplan MD

## 2020-09-19 NOTE — ED PROVIDER NOTES
[address]      Pt Name: June Shepherd  CGN:[id number]  [place] 1950  Date of evaluation: 9/18/2020  Provider: GABRIELA Gil - CNP   Attending provider:  Valentin Sherwood MD      Chief Complaint:    Chief Complaint   Patient presents with    Abnormal Lab     Pt sent from [facility] due to blood in urine and they said she had WBC of 35.5. Pt denies any painful urination or itching burning. Nursing Notes, Past Medical Hx, Past Surgical Hx, Social Hx, Allergies, and Family Hx were all reviewed and agreed with or any disagreements were addressed in the HPI.    HPI:  (Location, Duration, Timing, Severity, Quality, Assoc Sx, Context, Modifying factors)  This is a  78 y.o. female with PMH significant for HLD, HTN, COPD, CKD, and A. fib anticoagulated on either Plavix or Xarelto who presents to the emergency department today via EMS from nursing home due to a high white count and gross hematuria. No fevers. Patient is alert and oriented to person place and year. She denies any complaints.     PastMedical/Surgical History:      Diagnosis Date    Chronic kidney disease     COPD (chronic obstructive pulmonary disease) (HCC)     Hyperlipidemia     Hypertension          Procedure Laterality Date    CHOLECYSTECTOMY      HIP FRACTURE SURGERY Left 8/10/2020    OPEN TREATMENT OF LEFT HIP FRACTURE WITH CEPHALOMEDULLARY NAIL performed by Lamond Osgood, MD at [place] [address]       Medications:  Previous Medications    ALBUTEROL SULFATE  (90 BASE) MCG/ACT INHALER    Inhale 2 puffs into the lungs every 4-6 hours as needed for Shortness of Breath    APIXABAN (ELIQUIS) 5 MG TABS TABLET    Take 1 tablet by mouth 2 times daily    ATORVASTATIN (LIPITOR) 40 MG TABLET    Take 40 mg by mouth daily    DILTIAZEM (CARDIZEM CD) 180 MG EXTENDED RELEASE CAPSULE    Take 1 capsule by mouth daily    FLUTICASONE-SALMETEROL (ADVAIR) 250-50 MCG/DOSE AEPB Inhale 1 puff into the lungs 2 times daily    FUROSEMIDE (LASIX) 40 MG TABLET    Take 1 tablet by mouth daily    GABAPENTIN (NEURONTIN) 300 MG CAPSULE    Take 300 mg by mouth 3 times daily. IPRATROPIUM-ALBUTEROL (DUONEB) 0.5-2.5 (3) MG/3ML SOLN NEBULIZER SOLUTION    Inhale 3 mLs into the lungs every 2 hours as needed for Shortness of Breath         Review of Systems:  Review of Systems   Constitutional: Negative for chills, diaphoresis, fatigue and fever. HENT: Negative. Respiratory: Negative for cough and shortness of breath. Cardiovascular: Negative for chest pain. Gastrointestinal: Negative for abdominal distention, abdominal pain, blood in stool, constipation, diarrhea, nausea and vomiting. Genitourinary: Positive for hematuria. Negative for dysuria, flank pain, frequency and pelvic pain. Musculoskeletal: Negative for back pain. Skin: Negative for color change, pallor and rash. Allergic/Immunologic: Negative for immunocompromised state. Neurological: Negative for dizziness, syncope and headaches. Hematological: Negative for adenopathy. Psychiatric/Behavioral: Negative for confusion. All other systems reviewed and are negative. Positives and Pertinent negatives as per HPI. Except as noted above in the ROS, problem specific ROS was completed and is negative. Physical Exam:  Physical Exam  Vitals signs and nursing note reviewed. Constitutional:       General: She is not in acute distress. Appearance: Normal appearance. She is well-developed. She is not toxic-appearing. HENT:      Head: Normocephalic and atraumatic. Eyes:      General: No scleral icterus. Conjunctiva/sclera: Conjunctivae normal.   Neck:      Musculoskeletal: Normal range of motion and neck supple. Vascular: No JVD. Cardiovascular:      Rate and Rhythm: Normal rate and regular rhythm. Heart sounds: Normal heart sounds.    Pulmonary:      Effort: Pulmonary effort is normal. No respiratory distress. Breath sounds: Normal breath sounds. Abdominal:      General: Bowel sounds are normal. There is no distension. Palpations: Abdomen is soft. Abdomen is not rigid. Tenderness: There is no abdominal tenderness. There is no right CVA tenderness, left CVA tenderness, guarding or rebound. Comments: Obese nontender abdomen   Musculoskeletal: Normal range of motion. Right lower leg: Pitting Edema present. Left lower leg: Pitting Edema present. Skin:     General: Skin is warm and dry. Capillary Refill: Capillary refill takes less than 2 seconds. Findings: No rash. Neurological:      General: No focal deficit present. Mental Status: She is alert and oriented to person, place, and time. Cranial Nerves: Cranial nerves are intact.    Psychiatric:         Mood and Affect: Mood normal.         MEDICAL DECISION MAKING    Vitals:    Vitals:    09/18/20 2226 09/18/20 2243 09/18/20 2255 09/18/20 2303   BP:  95/60  102/65   Pulse: 75   74   Resp: 17      Temp:       TempSrc:       SpO2: 93%  98% 99%   Weight:           LABS:  Labs Reviewed   CBC WITH AUTO DIFFERENTIAL - Abnormal; Notable for the following components:       Result Value    WBC 25.9 (*)     RBC 5.74 (*)     Hematocrit 48.5 (*)     MCH 24.8 (*)     MCHC 29.3 (*)     RDW 21.2 (*)     Neutrophils Absolute 23.4 (*)     All other components within normal limits    Narrative:     Performed at:  [address] [address]   Phone [phone]   COMPREHENSIVE METABOLIC PANEL W/ REFLEX TO MG FOR LOW K - Abnormal; Notable for the following components:    Sodium 132 (*)     Potassium reflex Magnesium 5.3 (*)     BUN 47 (*)     CREATININE 2.7 (*)     GFR Non- 18 (*)     GFR  21 (*)     Alb 2.5 (*)     Albumin/Globulin Ratio 0.6 (*)     Alkaline Phosphatase 170 (*)     AST 60 (*)     All other components within normal limits Narrative:     Performed at:  [facility]  [address] [facility] [address]   Phone [phone]   URINE RT REFLEX TO CULTURE - Abnormal; Notable for the following components:    Color, UA RED (*)     Clarity, UA TURBID (*)     Glucose, Ur Color Interfer (*)     Bilirubin Urine Color Interfer (*)     Ketones, Urine Color Interfer (*)     Blood, Urine LARGE (*)     pH, UA Color Interfer (*)     Protein, UA Color Interfer (*)     Urobilinogen, Urine Color Interfer (*)     Nitrite, Urine Color Interfer (*)     Leukocyte Esterase, Urine LARGE (*)     All other components within normal limits    Narrative:     Performed at:  [facility]  [address] [facility] [address]   Phone [phone]   PROCALCITONIN - Abnormal; Notable for the following components:    Procalcitonin 82.84 (*)     All other components within normal limits    Narrative:     Performed at:  [facility]  [address] [facility] [address]   Phone [phone]   MICROSCOPIC URINALYSIS - Abnormal; Notable for the following components:    WBC, UA 6-9 (*)     RBC, UA >100 (*)     Bacteria, UA 4+ (*)     All other components within normal limits    Narrative:     Performed at:  [facility]  [address] [facility] [address]   Phone [phone]   BRAIN NATRIURETIC PEPTIDE - Abnormal; Notable for the following components:    Pro-BNP 4,662 (*)     All other components within normal limits    Narrative:     Performed at:  [facility]  [address] [facility] [address]   Phone [phone]   CULTURE, BLOOD 1   CULTURE, BLOOD 2   LACTIC ACID, PLASMA    Narrative:     Performed at:  [facility]  [address] [facility] [address]   Phone [phone] of labs reviewed and werenegative at this time or not returned at the time of this note. RADIOLOGY:   Non-plain film images such as CT, Ultrasound and MRI are read by the radiologist. GABRIELA Jimenez CNP have directly visualized the radiologic plain film image(s) with the below findings:        Interpretation per the Radiologist below, if available at the time of this note:    CT ABDOMEN PELVIS WO CONTRAST Additional Contrast? None   Final Result   There is moderate left hydronephrosis which appears to be secondary to an 8   mm calculus at the UPJ. That said, there is also a suspicious appearing lesion adjacent to or within   the left kidney, with heterogeneous hyperdensity. That could represent a   hematoma from the kidney, but renal neoplasm must be considered as well. Urologic consultation is recommended. XR CHEST PORTABLE   Final Result   No acute cardiopulmonary findings. Xr Chest Portable    Result Date: 9/18/2020  EXAMINATION: ONE XRAY VIEW OF THE CHEST 9/18/2020 8:09 pm COMPARISON: Chest radiograph performed August 10, 2020. HISTORY: ORDERING SYSTEM PROVIDED HISTORY: SOB TECHNOLOGIST PROVIDED HISTORY: Reason for exam:-> SOB Reason for Exam: sob Acuity: Acute Type of Exam: Initial FINDINGS: The lungs are clear without focal consolidation. No pleural effusion or pneumothorax. The cardiac silhouette is within normal limits. Atherosclerotic changes of the thoracic aorta. No acute osseous abnormality. Partially visualized cervicothoracic spinal fusion hardware. No acute cardiopulmonary findings. Xr Hip 2-3 Vw W Pelvis Left    Result Date: 8/26/2020  Radiology exam is complete. No Radiologist dictation. Please follow up with ordering provider. MEDICAL DECISION MAKING / ED COURSE:      CRITICAL CARE NOTE:  There was a high probability of clinically significant life-threatening deterioration of the patient's condition requiring my urgent intervention.     Total critical care time was at least 32 minutes. This includes vital sign monitoring, pulse oximetry monitoring, telemetry monitoring, clinical response to the IV medications, reviewing the nursing notes, consultation time, dictation/documentation time, and interpretation of the labwork. This excludes any separately billable procedures performed. PROCEDURES:   Procedures    None    Patient was given:  Medications   iopamidol (ISOVUE-370) 76 % injection 75 mL (0 mLs Intravenous Not Given 9/18/20 2117)   0.9 % sodium chloride bolus (1,000 mLs Intravenous New Bag 9/18/20 2224)   0.9 % sodium chloride bolus (has no administration in time range)   norepinephrine (LEVOPHED) 16 mg in dextrose 5% 250 mL infusion (has no administration in time range)   cefTRIAXone (ROCEPHIN) 1 g IVPB in 50 mL D5W minibag (0 g Intravenous Stopped 9/18/20 2200)   meropenem (MERREM) 1 g in sodium chloride 0.9 % 100 mL IVPB (mini-bag) (1 g Intravenous New Bag 9/18/20 2243)       Differential Diagnosis: Urinary retention, pyelonephritis, bladder infection, urosepsis, GI emergency, surgical emergency, dehydration, musculoskeletal back pain, vaginal candidiasis, other    Patient presents from nursing home with high white count and gross hematuria. See HPI for full presentation. Physical exam as above. Obese 78year-old lying in bed in no acute distress. She is alert and oriented to person place and year. She denies any complaints. Abdomen soft and nontender. Lungs CTA and cardiac exam normal.  CT shows an 8 mm stone at the left UPJ. There is also a suspicion for left kidney lesion. Urine macro shows large leukocytes, and micro micro shows 6-9 WBCs, >100 RBCs, and 4+ bacteria. Procalcitonin 82. CBC shows leukocytosis with a white count of 25 and left shift of 23 with no bandemia. Hemoglobin 14. Lactic acid normal.  Chemistry shows an DARSHAN with a creatinine of 2.7 and GFR of 18. No gross electrolyte abnormality or liver dysfunction.     Emergency department course included meropenem. She was also given fluids which did not help her blood pressures ultimately a central line was placed and Levophed was ordered. I consulted with urology, Dr. Jesse Garcia, who advised he would take for stent in the morning. Patient was given all test results and given an opportunity to ask and have any questions answered. She was agreeable to admission. The hospitalist, Dr. Danielle Stapleton, was consulted and agreed to meet patient and write orders. The patient tolerated their visit well. I evaluated the patient. The physician was available for consultation as needed. The patient and / or the family were informed of the results of any tests, a time was given to answer questions, a plan was proposed and they agreed with plan. CLINICAL IMPRESSION:  1. Kidney stone on left side    2. Gross hematuria    3. DARSHAN (acute kidney injury) ([facility] [address]    4. Leukocytosis, unspecified type    5. Elevated procalcitonin    6.  Abnormal CT scan, kidney        DISPOSITION Admitted 09/18/2020 11:12:52 PM      PATIENT REFERRED TO:  Magdaleno Ibrahim, [address] [facility] [phone]            DISCHARGE MEDICATIONS:  New Prescriptions    No medications on file       DISCONTINUED MEDICATIONS:  Discontinued Medications    No medications on file              (Please note the MDM and HPI sections of this note were completed with a voice recognition program.  Efforts were made to edit the dictations but occasionally words are mis-transcribed.)    Electronically signed, GABRIELA Messer CNP,           GABRIELA Messer CNP  09/18/20 5091

## 2020-09-19 NOTE — CONSULTS
Comprehensive Nutrition Assessment    Type and Reason for Visit:  Consult, Positive Nutrition Screen, Wound(nutritional assessment)    Nutrition Recommendations/Plan:   Diet progression per MD, currently NPO. Start Ensure HP once diet advances for wound healing. Nutrition Assessment:  Pt presented with abnormal labs and found to have kidney stone. Pt with PMH of COPD and HTN. Pt with increased needs r/t wounds to buttock and ankle. Pt wt is currently up 30#  from previous admission last month. Pt currently NPO but will trial HP supplement when diet advances.     Malnutrition Assessment:  Malnutrition Status:  No malnutrition      Estimated Daily Nutrient Needs:  Energy (kcal):  4317-2248 kcal (15-18 kcal/kg CBW)  Protein (g):   gm (1.4-2gm/kg IBW)       Fluid (ml/day):  1 mL/kcal    Nutrition Related Findings:  Generalized trace edema, elevated K+      Wounds:  Stage II, Unstageable, Pressure Injury(ankle, buttock)       Current Nutrition Therapies:    Diet NPO Effective Now Exceptions are: Ice Chips, Sips with Meds    Anthropometric Measures:  · Height: 5' 7\" (170.2 cm)  · Current Body Weight: 244 lb (110.7 kg)   · Admission Body Weight: 248 lb (112.5 kg)     · Ideal Body Weight: 135 lbs; % Ideal Body Weight 180.7 %   · BMI: 38.2  · BMI Categories: Obese Class 2 (BMI 35.0 -39.9)       Nutrition Diagnosis:   · Increased nutrient needs related to increase demand for energy/nutrients as evidenced by wounds      Nutrition Interventions:   Food and/or Nutrient Delivery:  Continue NPO(start diet/supplement when appropriate per MD)  Nutrition Education/Counseling:  No recommendation at this time   Coordination of Nutrition Care:  Continued Inpatient Monitoring    Goals:  tolerate most appropriate form of nutrition per MD       Nutrition Monitoring and Evaluation:   Food/Nutrient Intake Outcomes:  Diet Advancement/Tolerance  Physical Signs/Symptoms Outcomes:  Biochemical Data, Fluid Status or Edema, Weight, Skin, Nutrition Focused Physical Findings     Discharge Planning:     Too soon to determine     Electronically signed by Martir Pardees RD, LD on 9/19/20 at 7:40 AM EDT    Contact: [phone]

## 2020-09-19 NOTE — CONSULTS
Intimate partner violence     Fear of current or ex partner: Not on file     Emotionally abused: Not on file     Physically abused: Not on file     Forced sexual activity: Not on file   Other Topics Concern    Not on file   Social History Narrative    Not on file       Family History:  History reviewed. No pertinent family history.     Meds:   Current Facility-Administered Medications: atorvastatin (LIPITOR) tablet 40 mg, 40 mg, Oral, Daily  apixaban (ELIQUIS) tablet 5 mg, 5 mg, Oral, BID  sodium chloride flush 0.9 % injection 10 mL, 10 mL, Intravenous, 2 times per day  sodium chloride flush 0.9 % injection 10 mL, 10 mL, Intravenous, PRN  acetaminophen (TYLENOL) tablet 650 mg, 650 mg, Oral, Q4H PRN **OR** acetaminophen (TYLENOL) suppository 650 mg, 650 mg, Rectal, Q4H PRN  ondansetron (ZOFRAN) injection 4 mg, 4 mg, Intravenous, Q4H PRN  0.9 % sodium chloride infusion, , Intravenous, Continuous  bisacodyl (DULCOLAX) suppository 10 mg, 10 mg, Rectal, Daily PRN  meropenem (MERREM) 500 mg IVPB (mini-bag), 500 mg, Intravenous, Q8H  albuterol sulfate  (90 Base) MCG/ACT inhaler 2 puff, 2 puff, Inhalation, Q6H PRN  heparin (porcine) injection 5,000 Units, 5,000 Units, Subcutaneous, 3 times per day  lactobacillus (CULTURELLE) capsule 2 capsule, 2 capsule, Oral, BID WC  0.9 % sodium chloride infusion, , Intravenous, Continuous  ipratropium-albuterol (DUONEB) nebulizer solution 1 ampule, 1 ampule, Inhalation, Q4H WA  budesonide-formoterol (SYMBICORT) 160-4.5 MCG/ACT inhaler 2 puff, 2 puff, Inhalation, BID  gabapentin (NEURONTIN) capsule 300 mg, 300 mg, Oral, BID  fentaNYL (SUBLIMAZE) injection 25 mcg, 25 mcg, Intravenous, Q1H PRN **OR** fentaNYL (SUBLIMAZE) injection 50 mcg, 50 mcg, Intravenous, Q1H PRN  iopamidol (ISOVUE-370) 76 % injection 75 mL, 75 mL, Intravenous, ONCE PRN  norepinephrine (LEVOPHED) 16 mg in dextrose 5% 250 mL infusion, 2 mcg/min, Intravenous, Continuous    Vitals:  BP 86/63   Pulse 70   Temp recommended.         -    Impression/Plan: left UPJ stone - complicated by infection  - cysto left stent   - AB rx per cultures   Later ESWL vs ureter    ELY Leung MD 6/25/14031:22 AM

## 2020-09-19 NOTE — BRIEF OP NOTE
Brief Postoperative Note      Patient: Carlos Jin  YOB: 1950  MRN: [id number]    Date of Procedure: 9/19/2020    Pre-Op Diagnosis: Left UPJ stone -     Post-Op Diagnosis: Same       Procedure(s):  CYSTOSCOPY URETERAL STENT INSERTION LEFT RETROGRADE PYELOGRAM    Surgeon(s):  Agnes Obregon MD    Assistant:  Surgical Assistant: Rodriguez Lazo RN    Anesthesia: Monitor Anesthesia Care    Estimated Blood Loss (mL): Minimal    Complications: None    Specimens:   * No specimens in log *    Implants:  Implant Name Type Inv.  Item Serial No.  Lot No. LRB No. Used Action   STENT URET CONTUR W/O GW [id number] Stent:Urological STENT URET CONTUR W/O GW [id number]  [facility]: [address] [place] [address] [id number] 1 Implanted         Drains:   Urethral Catheter Latex 16 fr (Active)   Output (mL) 225 mL 09/19/20 0900       [REMOVED] Ureteral Catheter 16 fr (Removed)   Urine Color Red 09/19/20 0353   Urine Appearance Hazy 09/19/20 0353   Urine Odor Malodorous 09/19/20 0353   Output (ml) 50 ml 09/19/20 0353       [REMOVED] External Urinary Catheter (Removed)   Placement Initiated 09/19/20 0327   Skin Assessment No Injury 09/19/20 0327       Findings: left 7/26 cm stent - purulent drainage     Electronically signed by Ashwini Underwood MD on 9/19/2020 at 10:16 AM

## 2020-09-19 NOTE — PLAN OF CARE
Problem: Nutrition  Goal: Optimal nutrition therapy  Outcome: Ongoing   Nutrition Problem #1: Increased nutrient needs  Intervention: Food and/or Nutrient Delivery: Continue NPO(start diet/supplement when appropriate per MD)  Nutritional Goals: tolerate most appropriate form of nutrition per MD

## 2020-09-19 NOTE — PROGRESS NOTES
Hospitalist ICU Progress Note    CC: Hydronephrosis with ureteropelvic junction (UPJ) obstruction    Hospital course:  67yo WF with PMHX sig for hyperlipidemia, afib, COPD with chronic hypoxic resp failure and morbid obesity presents with 1 week of worsening L flank pain and feeling poorly. She was found to have L hydronephrosis with L UPJ obstruction and was hypotensive on admission. Septic shock POA based on UTI, ureteral obstruction, leukocytosis, hypotension requiring pressors, acute renal failure. PT doing somewhat better this AM, going to OR for stent placement. Admit date: 9/18/2020  Days in hospital:  1    24 Hour Events: flank pain and feels poorly    Subjective: still c/o L sided flank pain and feeling poorly - going for cysto with stent placement this AM    ROS:  A comprehensive review of systems was negative except for: L sided flank pain, weak . Objective:    VITALS:  /65   Pulse 78   Temp 96.1 °F (35.6 °C) (Temporal)   Resp 16   Ht 5' 7\" (1.702 m)   Wt 244 lb 7.8 oz (110.9 kg)   SpO2 97%   BMI 38.29 kg/m²     Gen: obese, ill appearing  HEENT: NC/AT, moist mucous membranes, no oropharyngeal erythema or exudate  Unable to fully assess due to mechanical ventilation and /or BiPAP  Neck: supple, trachea midline, no anterior cervical or SC LAD  Heart:  Normal s1/s2, RRR, no murmurs, gallops, or rubs. trace leg edema  Lungs:  diminished bilaterally, no wheeze, no rales, no rhonchi, no crackles, no use of accessory muscles  Abd: bowel sounds present, soft, nontender, nondistended, no masses  Extrem:  No clubbing, cyanosis,  trace edema, peripheral pulses 1+, sluggish capillary refill  Skin: no rashes or lesions, pale color/perfusion  Psych:  A & O x3    Neuro: grossly intact, moves all four extremities.   weak    Radiology (personally reviewed by me):  CT abd pelvis:    Impression:          There is moderate left hydronephrosis which appears to be secondary to an 8   mm calculus at the UPJ. That said, there is also a suspicious appearing lesion adjacent to or within   the left kidney, with heterogeneous hyperdensity.  That could represent a   hematoma from the kidney, but renal neoplasm must be considered as well. Urologic consultation is recommended. Assessment:    Principal Problem:    Hydronephrosis with left ureteropelvic junction (UPJ) obstruction  Active Problems:    Chronic obstructive pulmonary disease (HCC)    Paroxysmal atrial fibrillation (HCC)    Kidney stone    Shock (HCC)    Neutrophilic leukocytosis    Morbid obesity due to excess calories (HCC)    Chronic respiratory failure with hypoxia (HCC)    Hyperlipidemia    Kidney lesion, native, left    Hyponatremia    Hyperkalemia    Acute renal failure (ARF) (HCC)    Hematuria    Ureteral obstruction  Resolved Problems:    * No resolved hospital problems. *      Plan:  1. Ureteral obstruction with kidney stone with L sided hydronephrosis  2. UTI - on meropenem - awaiting cultures  3. Septic shock - POA based on UTI, ureteral obstruction, leukocytosis, hypotension requiring pressors, acute renal failure   4. Acute renal failure - improving with IVF  5. HTN - hold meds for now - cardizem CD and lasix, monitor closely  6. Holding eliquis for now due to renal failure - can likely resume tomorrow    I spent 35 minutes providing critical care for septic shock. This time is excluding time spent performing procedures.     Prognosis:  Fair    Code status:  DNR/CCA    DVT prophylaxis: Heparin IV  GI prophylaxis: none  Antibiotic prophylaxis indicated:   yes - lactobacillus    Diet:  Diet NPO Effective Now Exceptions are: Ice Chips, Sips with Meds    Disposition:  Long term nursing facility or group home    Medications:  Scheduled Meds:   atorvastatin  40 mg Oral Daily    sodium chloride flush  10 mL Intravenous 2 times per day    meropenem  500 mg Intravenous Q8H    heparin Monica Berry MD on 9/19/2020 at 11:00 AM

## 2020-09-19 NOTE — ED PROVIDER NOTES
facial asymmetry and headaches. Hematological: Negative for adenopathy. Does not bruise/bleed easily. Psychiatric/Behavioral: Negative for agitation, behavioral problems, confusion, decreased concentration, dysphoric mood, hallucinations, self-injury, sleep disturbance and suicidal ideas. The patient is not nervous/anxious and is not hyperactive. Except as noted above the remainder of the review of systems was reviewed and negative. PAST MEDICAL HISTORY     Past Medical History:   Diagnosis Date    Chronic kidney disease     COPD (chronic obstructive pulmonary disease) ([facility] [address]     Hyperlipidemia     Hypertension        SURGICAL HISTORY       Past Surgical History:   Procedure Laterality Date    CHOLECYSTECTOMY      HIP FRACTURE SURGERY Left 8/10/2020    OPEN TREATMENT OF LEFT HIP FRACTURE WITH CEPHALOMEDULLARY NAIL performed by Eliceo Mayorga MD at [address]       Current Discharge Medication List      CONTINUE these medications which have NOT CHANGED    Details   atorvastatin (LIPITOR) 40 MG tablet Take 40 mg by mouth daily      apixaban (ELIQUIS) 5 MG TABS tablet Take 1 tablet by mouth 2 times daily  Qty: 60 tablet, Refills: 1      dilTIAZem (CARDIZEM CD) 180 MG extended release capsule Take 1 capsule by mouth daily  Qty: 30 capsule, Refills: 3      furosemide (LASIX) 40 MG tablet Take 1 tablet by mouth daily  Qty: 60 tablet, Refills: 0      gabapentin (NEURONTIN) 300 MG capsule Take 300 mg by mouth 3 times daily. fluticasone-salmeterol (ADVAIR) 250-50 MCG/DOSE AEPB Inhale 1 puff into the lungs 2 times daily      ipratropium-albuterol (DUONEB) 0.5-2.5 (3) MG/3ML SOLN nebulizer solution Inhale 3 mLs into the lungs every 2 hours as needed for Shortness of Breath      albuterol sulfate  (90 Base) MCG/ACT inhaler Inhale 2 puffs into the lungs every 4-6 hours as needed for Shortness of Breath             ALLERGIES     Patient has no known allergies.     FAMILY HISTORY      History reviewed. No pertinent family history. SOCIAL HISTORY       Social History     Socioeconomic History    Marital status:      Spouse name: None    Number of children: None    Years of education: None    Highest education level: None   Occupational History    None   Social Needs    Financial resource strain: None    Food insecurity     Worry: None     Inability: None    Transportation needs     Medical: None     Non-medical: None   Tobacco Use    Smoking status: Current Every Day Smoker     Packs/day: 1.50     Types: Cigarettes    Smokeless tobacco: Never Used    Tobacco comment: pack n half a day    Substance and Sexual Activity    Alcohol use: Never     Frequency: Never    Drug use: Yes     Types: Marijuana     Comment: occasional     Sexual activity: None   Lifestyle    Physical activity     Days per week: None     Minutes per session: None    Stress: None   Relationships    Social connections     Talks on phone: None     Gets together: None     Attends Yarsani service: None     Active member of club or organization: None     Attends meetings of clubs or organizations: None     Relationship status: None    Intimate partner violence     Fear of current or ex partner: None     Emotionally abused: None     Physically abused: None     Forced sexual activity: None   Other Topics Concern    None   Social History Narrative    None       PHYSICAL EXAM       ED Triage Vitals   BP Temp Temp Source Pulse Resp SpO2 Height Weight   09/18/20 1942 09/18/20 1942 09/18/20 1942 09/18/20 1942 09/18/20 1942 09/18/20 1942 09/19/20 0115 09/18/20 1942   (!) 103/51 98.6 °F (37 °C) Oral 73 18 93 % 5' 7\" (1.702 m) 248 lb 3.8 oz (112.6 kg)       Physical Exam  Vitals signs and nursing note reviewed. Constitutional:       Appearance: She is well-developed. She is ill-appearing. She is not diaphoretic. HENT:      Head: Normocephalic and atraumatic.       Right Ear: External ear normal. Left Ear: External ear normal.   Eyes:      General:         Right eye: No discharge. Left eye: No discharge. Conjunctiva/sclera: Conjunctivae normal.      Pupils: Pupils are equal, round, and reactive to light. Neck:      Musculoskeletal: Normal range of motion and neck supple. Cardiovascular:      Rate and Rhythm: Normal rate and regular rhythm. Heart sounds: No murmur. Pulmonary:      Effort: Pulmonary effort is normal. No respiratory distress. Breath sounds: Normal breath sounds. No wheezing or rales. Abdominal:      General: Bowel sounds are normal. There is no distension. Palpations: Abdomen is soft. There is no mass. Tenderness: There is no abdominal tenderness. There is no guarding or rebound. Genitourinary:     Comments: Deferred  Musculoskeletal: Normal range of motion. General: No deformity. Skin:     General: Skin is warm. Findings: No erythema or rash. Neurological:      Mental Status: She is alert and oriented to person, place, and time. She is not disoriented. Cranial Nerves: No cranial nerve deficit. Motor: No atrophy or abnormal muscle tone. Coordination: Coordination normal.   Psychiatric:         Behavior: Behavior normal.         Thought Content: Thought content normal.         DIAGNOSTIC RESULTS     EKG: All EKG's are interpreted by the Emergency Department Physician who either signs or Co-signs this chart in the absence of acardiologist.    None    RADIOLOGY:   Non-plain film images such as CT, Ultrasoundand MRI are read by the radiologist. Plain radiographic images are visualized and preliminarily interpreted by the emergency physician with the below findings:    Impression    There is moderate left hydronephrosis which appears to be secondary to an 8    mm calculus at the UPJ.         That said, there is also a suspicious appearing lesion adjacent to or within    the left kidney, with heterogeneous hyperdensity. (*)     All other components within normal limits    Narrative:     Performed at:  [facility]  [address] [place], [place] [address]   Phone [phone]   MICROSCOPIC URINALYSIS - Abnormal; Notable for the following components:    WBC, UA 6-9 (*)     RBC, UA >100 (*)     Bacteria, UA 4+ (*)     All other components within normal limits    Narrative:     Performed at:  [facility]  [address] [place], [place] [address]   Phone [phone]   BRAIN NATRIURETIC PEPTIDE - Abnormal; Notable for the following components:    Pro-BNP 4,662 (*)     All other components within normal limits    Narrative:     Performed at:  [facility]  [address] [facility] [address]   Phone [phone]   CULTURE, BLOOD 1   CULTURE, BLOOD 2   LACTIC ACID, PLASMA    Narrative:     Performed at:  [address] [address]   Phone [phone]    Narrative:     Performed at:  [facility]  [address] [place] [address]   Phone [phone]   OSMOLALITY, URINE   SODIUM, URINE, RANDOM   CREATININE, RANDOM URINE   CORTISOL TOTAL   URIC ACID   TSH WITH REFLEX       All other labs were withinnormal range or not returned as of this dictation. EMERGENCY DEPARTMENT COURSE and DIFFERENTIAL DIAGNOSIS/MDM:     PMH, Surgical Hx, FH, Social Hx reviewed by myself (ETOH usage, Tobacco usage, Drug usage reviewed by myself, no pertinent Hx)- No Pertinent Hx     Old records were reviewed by me    78 yr old with ureter stone and possible urine infection. Concern for sepsis/septic shock. Hypotensive. Fluids and pressors started. Dr Kacie Laughlin for Urology consulted. Will arrange early morning stent placement. Afebrile rectally. Possible lesion on kidney unclear if neoplasm or hematoma, discussed with Urology.  Admission to  [facility]. CRITICAL CARE TIME   Total Critical Caretime was 99 minutes, excluding separately reportable procedures. There was a high probability of clinically significant/life threatening deterioration in the patient's condition which required my urgent intervention. PROCEDURES:  Central Line    Date/Time: 9/19/2020 5:07 AM  Performed by: Sheyla Fong MD  Authorized by: Sheyla Fong MD     Consent:     Consent obtained:  Verbal    Consent given by:  Patient    Risks discussed:  Arterial puncture, bleeding, infection, incorrect placement, pneumothorax and nerve damage    Alternatives discussed:  No treatment  Pre-procedure details:     Hand hygiene: Hand hygiene performed prior to insertion      Sterile barrier technique: All elements of maximal sterile technique followed      Skin preparation:  2% chlorhexidine    Skin preparation agent: Skin preparation agent completely dried prior to procedure    Procedure details:     Location:  R femoral    Patient position:  Flat    Procedural supplies:  Triple lumen    Catheter size:  7 Fr    Landmarks identified: yes      Ultrasound guidance: yes      Sterile ultrasound techniques: Sterile gel and sterile probe covers were used      Number of attempts:  1    Successful placement: yes    Post-procedure details:     Post-procedure:  Dressing applied    Assessment:  Blood return through all ports and free fluid flow    Patient tolerance of procedure: Tolerated well, no immediate complications  Comments:      EBL 10cc         FINAL IMPRESSION      1. Kidney stone on left side    2. Gross hematuria    3. DARSHAN (acute kidney injury) ([address]    4. Leukocytosis, unspecified type    5. Elevated procalcitonin    6. Abnormal CT scan, kidney    7.  Septicemia [facility])          DISPOSITION/PLAN   DISPOSITION Admitted 09/18/2020 11:12:52 PM    PATIENT REFERRED TO:  Betzy Vallejo [id number] [phone]            DISCHARGE MEDICATIONS:  Current Discharge Medication List             (Please note that portions ofthis note were completed with a voice recognition program.  Efforts were made to edit the dictations but occasionally words are mis-transcribed.)    Valentin Sherwood MD(electronically signed)  Attending Emergency Physician          Valentin Sherwood MD  09/22/20 [id number]

## 2020-09-19 NOTE — PROGRESS NOTES
4 Eyes Skin Assessment     The patient is being assess for  Admission    I agree that 2 RN's have performed a thorough Head to Toe Skin Assessment on the patient. ALL assessment sites listed below have been assessed. Areas assessed by both nurses: Sheliah Deann  [x]   Head, Face, and Ears   [x]   Shoulders, Back, and Chest  [x]   Arms, Elbows, and Hands   [x]   Coccyx, Sacrum, and IschIum  [x]   Legs, Feet, and Heels        Does the Patient have Skin Breakdown?   Yes LDA WOUND CARE was Initiated documentation include the Ginny-wound, Wound Assessment, Measurements, Dressing Treatment, Drainage, and Color\",         Nigel Prevention initiated:  {YES/NO:19732}   Wound Care Orders initiated:  No      WOC nurse consulted for Pressure Injury (Stage 3,4, Unstageable, DTI, NWPT, and Complex wounds), New and Established Ostomies:  Yes      Nurse 1 eSignature: Electronically signed by Sena Hernandez RN on 9/19/20 at 7:55 AM EDT    **SHARE this note so that the co-signing nurse is able to place an eSignature**    Nurse 2 eSignature: {Esignature:[id number]}

## 2020-09-20 LAB
ANION GAP SERPL CALCULATED.3IONS-SCNC: 7 MMOL/L (ref 3–16)
BASOPHILS ABSOLUTE: 0.1 K/UL (ref 0–0.2)
BASOPHILS RELATIVE PERCENT: 0.6 %
BUN BLDV-MCNC: 43 MG/DL (ref 7–20)
CALCIUM SERPL-MCNC: 8.2 MG/DL (ref 8.3–10.6)
CHLORIDE BLD-SCNC: 109 MMOL/L (ref 99–110)
CO2: 22 MMOL/L (ref 21–32)
CREAT SERPL-MCNC: 1.3 MG/DL (ref 0.6–1.2)
CREATININE URINE: 6.5 MG/DL (ref 28–259)
EOSINOPHILS ABSOLUTE: 0 K/UL (ref 0–0.6)
EOSINOPHILS RELATIVE PERCENT: 0 %
GFR AFRICAN AMERICAN: 49
GFR NON-AFRICAN AMERICAN: 41
GLUCOSE BLD-MCNC: 132 MG/DL (ref 70–99)
HCT VFR BLD CALC: 40.4 % (ref 36–48)
HEMOGLOBIN: 12.3 G/DL (ref 12–16)
LYMPHOCYTES ABSOLUTE: 0.7 K/UL (ref 1–5.1)
LYMPHOCYTES RELATIVE PERCENT: 5.5 %
MCH RBC QN AUTO: 25.1 PG (ref 26–34)
MCHC RBC AUTO-ENTMCNC: 30.4 G/DL (ref 31–36)
MCV RBC AUTO: 82.3 FL (ref 80–100)
MONOCYTES ABSOLUTE: 0.4 K/UL (ref 0–1.3)
MONOCYTES RELATIVE PERCENT: 3.4 %
NEUTROPHILS ABSOLUTE: 10.7 K/UL (ref 1.7–7.7)
NEUTROPHILS RELATIVE PERCENT: 90.5 %
OSMOLALITY URINE: 460 MOSM/KG (ref 390–1070)
PDW BLD-RTO: 20.5 % (ref 12.4–15.4)
PLATELET # BLD: 281 K/UL (ref 135–450)
PMV BLD AUTO: 8.3 FL (ref 5–10.5)
POTASSIUM REFLEX MAGNESIUM: 5 MMOL/L (ref 3.5–5.1)
PROCALCITONIN: 21.99 NG/ML (ref 0–0.15)
RBC # BLD: 4.91 M/UL (ref 4–5.2)
SODIUM BLD-SCNC: 138 MMOL/L (ref 136–145)
SODIUM URINE: 46 MMOL/L
WBC # BLD: 11.9 K/UL (ref 4–11)

## 2020-09-20 PROCEDURE — 6370000000 HC RX 637 (ALT 250 FOR IP): Performed by: INTERNAL MEDICINE

## 2020-09-20 PROCEDURE — 2700000000 HC OXYGEN THERAPY PER DAY

## 2020-09-20 PROCEDURE — 84145 PROCALCITONIN (PCT): CPT

## 2020-09-20 PROCEDURE — 6360000002 HC RX W HCPCS: Performed by: INTERNAL MEDICINE

## 2020-09-20 PROCEDURE — 2580000003 HC RX 258: Performed by: INTERNAL MEDICINE

## 2020-09-20 PROCEDURE — 85025 COMPLETE CBC W/AUTO DIFF WBC: CPT

## 2020-09-20 PROCEDURE — 94640 AIRWAY INHALATION TREATMENT: CPT

## 2020-09-20 PROCEDURE — 99233 SBSQ HOSP IP/OBS HIGH 50: CPT | Performed by: INTERNAL MEDICINE

## 2020-09-20 PROCEDURE — 80048 BASIC METABOLIC PNL TOTAL CA: CPT

## 2020-09-20 PROCEDURE — 1200000000 HC SEMI PRIVATE

## 2020-09-20 PROCEDURE — 94761 N-INVAS EAR/PLS OXIMETRY MLT: CPT

## 2020-09-20 RX ORDER — TRAMADOL HYDROCHLORIDE 50 MG/1
50 TABLET ORAL EVERY 6 HOURS PRN
Status: DISCONTINUED | OUTPATIENT
Start: 2020-09-20 | End: 2020-09-26 | Stop reason: HOSPADM

## 2020-09-20 RX ORDER — GABAPENTIN 300 MG/1
300 CAPSULE ORAL 3 TIMES DAILY
Status: DISCONTINUED | OUTPATIENT
Start: 2020-09-20 | End: 2020-09-20

## 2020-09-20 RX ORDER — TRAMADOL HYDROCHLORIDE 50 MG/1
100 TABLET ORAL EVERY 6 HOURS PRN
Status: DISCONTINUED | OUTPATIENT
Start: 2020-09-20 | End: 2020-09-26 | Stop reason: HOSPADM

## 2020-09-20 RX ADMIN — FENTANYL CITRATE 25 MCG: 50 INJECTION, SOLUTION INTRAMUSCULAR; INTRAVENOUS at 03:04

## 2020-09-20 RX ADMIN — IPRATROPIUM BROMIDE AND ALBUTEROL SULFATE 1 AMPULE: .5; 3 SOLUTION RESPIRATORY (INHALATION) at 12:17

## 2020-09-20 RX ADMIN — GABAPENTIN 300 MG: 300 CAPSULE ORAL at 08:16

## 2020-09-20 RX ADMIN — MEROPENEM 500 MG: 500 INJECTION, POWDER, FOR SOLUTION INTRAVENOUS at 00:47

## 2020-09-20 RX ADMIN — GABAPENTIN 300 MG: 300 CAPSULE ORAL at 20:51

## 2020-09-20 RX ADMIN — IPRATROPIUM BROMIDE AND ALBUTEROL SULFATE 1 AMPULE: .5; 3 SOLUTION RESPIRATORY (INHALATION) at 08:39

## 2020-09-20 RX ADMIN — APIXABAN 5 MG: 5 TABLET, FILM COATED ORAL at 08:16

## 2020-09-20 RX ADMIN — MEROPENEM 500 MG: 500 INJECTION, POWDER, FOR SOLUTION INTRAVENOUS at 16:29

## 2020-09-20 RX ADMIN — BUDESONIDE AND FORMOTEROL FUMARATE DIHYDRATE 2 PUFF: 160; 4.5 AEROSOL RESPIRATORY (INHALATION) at 20:00

## 2020-09-20 RX ADMIN — IPRATROPIUM BROMIDE AND ALBUTEROL SULFATE 1 AMPULE: .5; 3 SOLUTION RESPIRATORY (INHALATION) at 16:31

## 2020-09-20 RX ADMIN — MEROPENEM 500 MG: 500 INJECTION, POWDER, FOR SOLUTION INTRAVENOUS at 08:16

## 2020-09-20 RX ADMIN — SODIUM CHLORIDE: 9 INJECTION, SOLUTION INTRAVENOUS at 13:32

## 2020-09-20 RX ADMIN — SODIUM CHLORIDE, PRESERVATIVE FREE 10 ML: 5 INJECTION INTRAVENOUS at 20:55

## 2020-09-20 RX ADMIN — Medication 2 CAPSULE: at 08:16

## 2020-09-20 RX ADMIN — IPRATROPIUM BROMIDE AND ALBUTEROL SULFATE 1 AMPULE: .5; 3 SOLUTION RESPIRATORY (INHALATION) at 20:00

## 2020-09-20 RX ADMIN — Medication 2 CAPSULE: at 16:29

## 2020-09-20 RX ADMIN — ATORVASTATIN CALCIUM 40 MG: 40 TABLET, FILM COATED ORAL at 08:16

## 2020-09-20 RX ADMIN — APIXABAN 5 MG: 5 TABLET, FILM COATED ORAL at 20:51

## 2020-09-20 RX ADMIN — MEROPENEM 500 MG: 500 INJECTION, POWDER, FOR SOLUTION INTRAVENOUS at 20:51

## 2020-09-20 RX ADMIN — BUDESONIDE AND FORMOTEROL FUMARATE DIHYDRATE 2 PUFF: 160; 4.5 AEROSOL RESPIRATORY (INHALATION) at 08:39

## 2020-09-20 RX ADMIN — SODIUM CHLORIDE: 9 INJECTION, SOLUTION INTRAVENOUS at 04:18

## 2020-09-20 ASSESSMENT — PAIN DESCRIPTION - LOCATION: LOCATION: ABDOMEN

## 2020-09-20 ASSESSMENT — PAIN SCALES - GENERAL
PAINLEVEL_OUTOF10: 0
PAINLEVEL_OUTOF10: 0
PAINLEVEL_OUTOF10: 2
PAINLEVEL_OUTOF10: 0

## 2020-09-20 ASSESSMENT — PAIN DESCRIPTION - ORIENTATION: ORIENTATION: RIGHT;MID;POSTERIOR

## 2020-09-20 ASSESSMENT — PAIN DESCRIPTION - DIRECTION: RADIATING_TOWARDS: BACK

## 2020-09-20 ASSESSMENT — PAIN DESCRIPTION - PAIN TYPE: TYPE: ACUTE PAIN

## 2020-09-20 ASSESSMENT — PAIN DESCRIPTION - FREQUENCY: FREQUENCY: INTERMITTENT

## 2020-09-20 NOTE — PROGRESS NOTES
Pt lying in bed, eyes closed but awakens when entering room. Dinner set up. Pt denies any discomfort at this time. Pt on 4L/min NC. SR on monitor.

## 2020-09-20 NOTE — PROGRESS NOTES
Pt Name: [id number] Record Number: [id number]  Date of Birth 1950   Today's Date: 9/20/2020      Subjective:  Feels better - no pain - tolerating PO    ROS: Constitutional: No fever    Vitals:  Vitals:    09/20/20 0800 09/20/20 0815 09/20/20 0830 09/20/20 0900   BP: 134/78  138/79 135/62   Pulse: 79 76 73 77   Resp: 14 15 14 13   Temp: 98.2 °F (36.8 °C)      TempSrc: Axillary      SpO2: 99% 99% 97% 98%   Weight:       Height:         I/O last 3 completed shifts:   In: 2472 [P.O.:30; I.V.:2142; IV Piggyback:300]  Out: 1160 [Urine:1160]    Exam:  General: Awake, oriented, no acute distress  Respiratory: Nonlabored breathing  Abdomen: Soft, non-tender, non-distended, no masses  : urine clearing  Skin: Skin color, texture, turgor normal, no rashes or lesions  Neurologic: no gross deficits    CURRENT MEDICATIONS   Scheduled Meds:   apixaban  5 mg Oral BID    atorvastatin  40 mg Oral Daily    sodium chloride flush  10 mL Intravenous 2 times per day    meropenem  500 mg Intravenous Q8H    lactobacillus  2 capsule Oral BID WC    ipratropium-albuterol  1 ampule Inhalation Q4H WA    budesonide-formoterol  2 puff Inhalation BID    gabapentin  300 mg Oral BID     Continuous Infusions:   sodium chloride 100 mL/hr at 09/20/20 0418    norepinephrine Stopped (09/20/20 0849)     PRN Meds:.traMADol **OR** traMADol, sodium chloride flush, acetaminophen **OR** acetaminophen, ondansetron, bisacodyl, albuterol sulfate HFA, iopamidol    LABS     Recent Labs     09/18/20 1951 09/19/20  0805 09/19/20  0816 09/20/20  0520   WBC 25.9* 26.7*  --  11.9*   HGB 14.2 13.8  --  12.3   HCT 48.5* 45.0  --  40.4    263  --  281   * 140  --  138   K 5.3* 4.1  --  5.0   CL 99 107  --  109   CO2 23 21  --  22   BUN 47* 47*  --  43*   CREATININE 2.7* 2.0*  --  1.3*   MG  --   --  1.70*  --    PHOS  --   --  4.9  --    CALCIUM 8.5 8.3  --  8.2*   AST 60*  --   --   --    ALT 24  --   --   --    BILITOT 0.4  -- --   --      - cultures in progress      ASSESSMENT   1. Hospital day # 2  2. Left UPJ stone with pyonephrosis- feels better, lower WBC, off pressors   PLAN   1. Continue AB rx - follow UC&S   2. Will evaluate renal fx on left before deciding on ureteroscopy - kidney appears more diffusely infected and less concerning for a mass   3.  Will sign off - office will arrange f/u testing and procedures       Mady Morales MD 9/20/2020 9:37 AM

## 2020-09-20 NOTE — PROGRESS NOTES
At 1300 oxygen saturation alarmed 68% with good correlation. Pt lying in bed with eyes closed. No distress. Woke pt up and put on 2L/min NC and oxygen immed came up to the 90s. MD Maira Enriquez aware this am on rounds pt with oxygenation drops with sleep and overnight pulse oximetry ordered.

## 2020-09-20 NOTE — PROGRESS NOTES
9/19/2020  1930 handoff from Katrinevelyne Collazo, pt awake, oriented to name, place and situation, 02 @2l/nc, sats 97%, levo at 2mcgs/min. Rates pain 6/10 L flank, states its tolerable. S/p Cystiscopy and left ureteral stent placement. Grace with brown/bloody tinted urine. 2030 Repostioned, tolerated well., 02 weaned to 1l/nc. 2230 o2 sats kept dropping low 80's, pt denies DX of sleep apnea,     9/20/2020  0030 Levo up to 4mcgs, o2 @ 2l/nc BP drop when pt asleep. 0630  Pt had episodes of sleep apnea sats dropped to 80'S, but up to 90's once woken up, Dr. Yenny Yancey notified, if could benefit from sleep study, NNO at this time. 0710 Handoff to [employer].  RN  Electronically signed by Dottie Palomo RN on 9/20/2020 at 11:23 PM

## 2020-09-20 NOTE — PROGRESS NOTES
Hospitalist ICU Progress Note    CC: Hydronephrosis with ureteropelvic junction (UPJ) obstruction    Hospital course:  67yo WF with PMHX sig for hyperlipidemia, afib, COPD with chronic hypoxic resp failure and morbid obesity presents with 1 week of worsening L flank pain and feeling poorly. She was found to have L hydronephrosis with L UPJ obstruction and was hypotensive on admission. Septic shock POA based on UTI, ureteral obstruction, leukocytosis, hypotension requiring pressors, acute renal failure. Had stent placed in OR on 9/20. Will need stone removal at later point. Admit date: 9/18/2020  Days in hospital:  2    24 Hour Events: sleeping well, showing some signs of apnea overnight    Subjective: only required minimal fentanyl overnight - one dose - weaning levophed - will change to tramadol. Renal function improving, so will restart eliquis today. ROS:  A comprehensive review of systems was nor performed due to pt resting. Appears comfortable    Objective:    VITALS:  BP (!) 110/58   Pulse 78   Temp 98.4 °F (36.9 °C) (Oral)   Resp 16   Ht 5' 7\" (1.702 m)   Wt 246 lb 4.1 oz (111.7 kg)   SpO2 96%   BMI 38.57 kg/m²     Gen: obese, ill appearing  HEENT: NC/AT, moist mucous membranes, no oropharyngeal erythema or exudate    Neck: supple, trachea midline, no anterior cervical or SC LAD  Heart:  Normal s1/s2, RRR, no murmurs, gallops, or rubs.  trace leg edema  Lungs:  diminished bilaterally, no wheeze, no rales, no rhonchi, no crackles, no use of accessory muscles  Abd: bowel sounds present, soft, nontender, nondistended, no masses  Extrem:  No clubbing, cyanosis,  trace edema, peripheral pulses 1+, sluggish capillary refill  Skin: no rashes or lesions, pale color/perfusion  Psych:    Neuro: Unable to assess due to sleeping but moved all extremities to roll over in bed    Radiology (personally reviewed by me):  CT abd pelvis:    Impression:         flush  10 mL Intravenous 2 times per day    meropenem  500 mg Intravenous Q8H    heparin (porcine)  5,000 Units Subcutaneous 3 times per day    lactobacillus  2 capsule Oral BID WC    ipratropium-albuterol  1 ampule Inhalation Q4H WA    budesonide-formoterol  2 puff Inhalation BID    gabapentin  300 mg Oral BID       PRN Meds:  sodium chloride flush, acetaminophen **OR** acetaminophen, ondansetron, bisacodyl, albuterol sulfate HFA, fentanNYL **OR** fentanNYL, iopamidol    IV:   sodium chloride 100 mL/hr at 09/20/20 0418    norepinephrine 4 mcg/min (09/20/20 0046)         Intake/Output Summary (Last 24 hours) at 9/20/2020 0649  Last data filed at 9/20/2020 0401  Gross per 24 hour   Intake 2472 ml   Output 1160 ml   Net 1312 ml       Results:  CBC:   Recent Labs     09/18/20 1951 09/19/20 0805 09/20/20 0520   WBC 25.9* 26.7* 11.9*   HGB 14.2 13.8 12.3   HCT 48.5* 45.0 40.4   MCV 84.5 83.0 82.3    263 281     BMP:   Recent Labs     09/18/20 1951 09/19/20 0805 09/19/20 0816 09/20/20  0520   * 140  --  138   K 5.3* 4.1  --  5.0   CL 99 107  --  109   CO2 23 21  --  22   PHOS  --   --  4.9  --    BUN 47* 47*  --  43*   CREATININE 2.7* 2.0*  --  1.3*     Mag: No results for input(s): MAG in the last 72 hours. LIVER PROFILE:   Recent Labs     09/18/20 1951   AST 60*   ALT 24   BILITOT 0.4   ALKPHOS 170*     PT/INR: No results for input(s): PROTIME, INR in the last 72 hours. APTT: No results for input(s): APTT in the last 72 hours.   UA:  Recent Labs     09/18/20 1951   COLORU RED*   PHUR Color Interfer*   WBCUA 6-9*   RBCUA >100*   BACTERIA 4+*   CLARITYU TURBID*   SPECGRAV Color Interfer   LEUKOCYTESUR LARGE*   UROBILINOGEN Color Interfer*   BILIRUBINUR Color Interfer*   BLOODU LARGE*   GLUCOSEU Color Interfer*       ABG:   Lab Results   Component Value Date    PHART 7.540 08/10/2020    HJV1DMK 46.0 08/10/2020    PO2ART 83.2 08/10/2020    PHI0WUD 39.3 08/10/2020    BEART 14.8 08/10/2020 CDX9UMX 40.7 08/10/2020    T7UDJKWL 98.4 08/10/2020       Lab Results   Component Value Date    CALCIUM 8.2 (L) 09/20/2020    PHOS 4.9 09/19/2020             Electronically signed by Elida Henriquez MD on 9/20/2020 at 6:49 AM

## 2020-09-20 NOTE — PROGRESS NOTES
Pulmonary Progress Note    CC:  Follow up hydronephrosis    Subjective:  OR yesterday for ureteral stent  Levophed requirements have decreased  Cr improved   Feels better  Weak    ROS  Feels better  Weak    Intake/Output Summary (Last 24 hours) at 9/20/2020 0728  Last data filed at 9/20/2020 0401  Gross per 24 hour   Intake 2472 ml   Output 1160 ml   Net 1312 ml         PHYSICAL EXAM:  Blood pressure (!) 110/58, pulse 78, temperature 98.4 °F (36.9 °C), temperature source Oral, resp. rate 16, height 5' 7\" (1.702 m), weight 246 lb 4.1 oz (111.7 kg), SpO2 96 %.'  Gen: No distress. Tired appearing   Eyes: PERRL. No sclera icterus. No conjunctival injection. ENT: No discharge. Pharynx clear. External appearance of ears and nose normal.  Neck: Trachea midline. No obvious mass. Resp: No crackles. No wheezes. No rhonchi. No dullness on percussion. CV: Regular rate. Regular rhythm. No murmur or rub. GI: Non-tender. Non-distended. No hernia. Skin: Dry, flaky skin  Lymph: No cervical LAD. No supraclavicular LAD. M/S: No cyanosis. No clubbing. No joint deformity. Neuro: Moves all four extremities. CN 2-12 tested, no defect noted.   Ext:   + edema    Medications:    Scheduled Meds:   apixaban  5 mg Oral BID    atorvastatin  40 mg Oral Daily    sodium chloride flush  10 mL Intravenous 2 times per day    meropenem  500 mg Intravenous Q8H    lactobacillus  2 capsule Oral BID     ipratropium-albuterol  1 ampule Inhalation Q4H WA    budesonide-formoterol  2 puff Inhalation BID    gabapentin  300 mg Oral BID       Continuous Infusions:   sodium chloride 100 mL/hr at 09/20/20 0418    norepinephrine 4 mcg/min (09/20/20 0046)       PRN Meds:  traMADol **OR** traMADol, sodium chloride flush, acetaminophen **OR** acetaminophen, ondansetron, bisacodyl, albuterol sulfate HFA, iopamidol    Labs:  CBC:   Recent Labs     09/18/20  1951 09/19/20  0805 09/20/20  0520   WBC 25.9* 26.7* 11.9*   HGB 14.2 13.8 12.3   HCT

## 2020-09-20 NOTE — PROGRESS NOTES
Physician Progress Note      Jaymie Pak  CSN #:                  [id number]  :                       1950  ADMIT DATE:       2020 7:40 PM  [address] [place] DATE:  RESPONDING  PROVIDER #:        Shun Ramos MD          QUERY TEXT:    Pt admitted with Kidney stone with hydronephrosis  . Pt noted to have elevated   WBC, DARSHAN and + UA, and noted Shock in H and P. If possible, please document   in the progress notes and discharge summary if you are evaluating and /or   treating any of the following: The medical record reflects the following:  Risk Factors: Hx COPD, CKD  Clinical Indicators: Presents with hematuria and reported elevated WBC,   WBC=25.9, Neutrophils=23.4, BUN=47, Creat=2.7, GFR=18, UA+, B/P initially   103/51 dropped to 84/49  Treatment: Meropenem and Rocephin given, IV bolus and IVF and Levophed,   Central line placed  Options provided:  -- Sepsis, present on admission  -- Sepsis, now resolved,  -- No Sepsis, localized infection only *** (if known)  -- Sepsis was ruled out  -- Other - I will add my own diagnosis  -- Disagree - Not applicable / Not valid  -- Disagree - Clinically unable to determine / Unknown  -- Refer to Clinical Documentation Reviewer    PROVIDER RESPONSE TEXT:    This patient has sepsis which was present on admission. Query created by: [facility] on 2020 8:06 AM      QUERY TEXT:    Pt admitted with Kidney stone and has shock documented. If possible, please   document in progress notes and discharge summary further specificity regarding   the type of shock:     The medical record reflects the following:  Risk Factors: Hx HTN, COPD, CKD  Clinical Indicators: B/P initially 103/51 dropped to 84/49,WBC=25.9,   Neutrophils=23.4, Iw=117, K=5.3, BUN=47, Creat=2.7, GFR=18, Alb=2.5, Alk   Phos=170, AST=60, Procal=82.84, UA+, Pro-TIB=8125,  Treatment: IV bolus, IVF and Levophed, Admit to ICU  Options provided:  -- Septic Shock  -- Hypovolemic Shock  -- Other - I will add my own diagnosis  -- Disagree - Not applicable / Not valid  -- Disagree - Clinically unable to determine / Unknown  -- Refer to Clinical Documentation Reviewer    PROVIDER RESPONSE TEXT:    This patient has Septic Shock. Query created by:  Catie Porras on 9/19/2020 8:21 AM      Electronically signed by:  Jens Espinal MD 9/20/2020 6:12 AM

## 2020-09-21 LAB
ANION GAP SERPL CALCULATED.3IONS-SCNC: 6 MMOL/L (ref 3–16)
BASOPHILS ABSOLUTE: 0 K/UL (ref 0–0.2)
BASOPHILS RELATIVE PERCENT: 0.1 %
BUN BLDV-MCNC: 35 MG/DL (ref 7–20)
CALCIUM SERPL-MCNC: 8.4 MG/DL (ref 8.3–10.6)
CHLORIDE BLD-SCNC: 114 MMOL/L (ref 99–110)
CO2: 22 MMOL/L (ref 21–32)
CREAT SERPL-MCNC: 0.9 MG/DL (ref 0.6–1.2)
EOSINOPHILS ABSOLUTE: 0 K/UL (ref 0–0.6)
EOSINOPHILS RELATIVE PERCENT: 0 %
GFR AFRICAN AMERICAN: >60
GFR NON-AFRICAN AMERICAN: >60
GLUCOSE BLD-MCNC: 78 MG/DL (ref 70–99)
GLUCOSE BLD-MCNC: 83 MG/DL (ref 70–99)
HCT VFR BLD CALC: 38.5 % (ref 36–48)
HEMOGLOBIN: 11.7 G/DL (ref 12–16)
LYMPHOCYTES ABSOLUTE: 1.1 K/UL (ref 1–5.1)
LYMPHOCYTES RELATIVE PERCENT: 7.8 %
MAGNESIUM: 1.8 MG/DL (ref 1.8–2.4)
MCH RBC QN AUTO: 25 PG (ref 26–34)
MCHC RBC AUTO-ENTMCNC: 30.3 G/DL (ref 31–36)
MCV RBC AUTO: 82.5 FL (ref 80–100)
MONOCYTES ABSOLUTE: 0.4 K/UL (ref 0–1.3)
MONOCYTES RELATIVE PERCENT: 3.1 %
NEUTROPHILS ABSOLUTE: 12.3 K/UL (ref 1.7–7.7)
NEUTROPHILS RELATIVE PERCENT: 89 %
PDW BLD-RTO: 20.4 % (ref 12.4–15.4)
PERFORMED ON: NORMAL
PHOSPHORUS: 2.5 MG/DL (ref 2.5–4.9)
PLATELET # BLD: 230 K/UL (ref 135–450)
PMV BLD AUTO: 8.4 FL (ref 5–10.5)
POTASSIUM REFLEX MAGNESIUM: 3.9 MMOL/L (ref 3.5–5.1)
RBC # BLD: 4.66 M/UL (ref 4–5.2)
SODIUM BLD-SCNC: 142 MMOL/L (ref 136–145)
WBC # BLD: 13.8 K/UL (ref 4–11)

## 2020-09-21 PROCEDURE — 6360000002 HC RX W HCPCS: Performed by: NURSE PRACTITIONER

## 2020-09-21 PROCEDURE — 85025 COMPLETE CBC W/AUTO DIFF WBC: CPT

## 2020-09-21 PROCEDURE — 97162 PT EVAL MOD COMPLEX 30 MIN: CPT

## 2020-09-21 PROCEDURE — 94760 N-INVAS EAR/PLS OXIMETRY 1: CPT

## 2020-09-21 PROCEDURE — 97530 THERAPEUTIC ACTIVITIES: CPT

## 2020-09-21 PROCEDURE — 2580000003 HC RX 258: Performed by: INTERNAL MEDICINE

## 2020-09-21 PROCEDURE — 84100 ASSAY OF PHOSPHORUS: CPT

## 2020-09-21 PROCEDURE — 2060000000 HC ICU INTERMEDIATE R&B

## 2020-09-21 PROCEDURE — 6370000000 HC RX 637 (ALT 250 FOR IP): Performed by: NURSE PRACTITIONER

## 2020-09-21 PROCEDURE — 2500000003 HC RX 250 WO HCPCS: Performed by: INTERNAL MEDICINE

## 2020-09-21 PROCEDURE — 6370000000 HC RX 637 (ALT 250 FOR IP): Performed by: INTERNAL MEDICINE

## 2020-09-21 PROCEDURE — APPNB15 APP NON BILLABLE TIME 0-15 MINS: Performed by: NURSE PRACTITIONER

## 2020-09-21 PROCEDURE — 6360000002 HC RX W HCPCS: Performed by: INTERNAL MEDICINE

## 2020-09-21 PROCEDURE — 80048 BASIC METABOLIC PNL TOTAL CA: CPT

## 2020-09-21 PROCEDURE — 97167 OT EVAL HIGH COMPLEX 60 MIN: CPT

## 2020-09-21 PROCEDURE — 94640 AIRWAY INHALATION TREATMENT: CPT

## 2020-09-21 PROCEDURE — 83735 ASSAY OF MAGNESIUM: CPT

## 2020-09-21 PROCEDURE — 99232 SBSQ HOSP IP/OBS MODERATE 35: CPT | Performed by: INTERNAL MEDICINE

## 2020-09-21 PROCEDURE — 97110 THERAPEUTIC EXERCISES: CPT

## 2020-09-21 PROCEDURE — 2700000000 HC OXYGEN THERAPY PER DAY

## 2020-09-21 PROCEDURE — 36415 COLL VENOUS BLD VENIPUNCTURE: CPT

## 2020-09-21 RX ORDER — ESCITALOPRAM OXALATE 10 MG/1
20 TABLET ORAL DAILY
COMMUNITY
End: 2021-09-10

## 2020-09-21 RX ORDER — IPRATROPIUM BROMIDE AND ALBUTEROL SULFATE 2.5; .5 MG/3ML; MG/3ML
1 SOLUTION RESPIRATORY (INHALATION) EVERY 4 HOURS PRN
Status: DISCONTINUED | OUTPATIENT
Start: 2020-09-21 | End: 2020-09-26 | Stop reason: HOSPADM

## 2020-09-21 RX ORDER — DILTIAZEM HYDROCHLORIDE 180 MG/1
180 CAPSULE, COATED, EXTENDED RELEASE ORAL DAILY
Status: DISCONTINUED | OUTPATIENT
Start: 2020-09-21 | End: 2020-09-26 | Stop reason: HOSPADM

## 2020-09-21 RX ORDER — POTASSIUM CHLORIDE 20 MEQ/1
20 TABLET, EXTENDED RELEASE ORAL DAILY
COMMUNITY

## 2020-09-21 RX ORDER — DILTIAZEM HYDROCHLORIDE 5 MG/ML
10 INJECTION INTRAVENOUS ONCE
Status: COMPLETED | OUTPATIENT
Start: 2020-09-21 | End: 2020-09-21

## 2020-09-21 RX ORDER — ACETAZOLAMIDE 250 MG/1
250 TABLET ORAL DAILY
Status: ON HOLD | COMMUNITY
End: 2021-12-23 | Stop reason: HOSPADM

## 2020-09-21 RX ORDER — OXYCODONE HYDROCHLORIDE 5 MG/1
5 TABLET ORAL EVERY 4 HOURS PRN
Status: ON HOLD | COMMUNITY
End: 2020-09-26 | Stop reason: HOSPADM

## 2020-09-21 RX ORDER — MAGNESIUM SULFATE IN WATER 40 MG/ML
2 INJECTION, SOLUTION INTRAVENOUS ONCE
Status: COMPLETED | OUTPATIENT
Start: 2020-09-21 | End: 2020-09-21

## 2020-09-21 RX ORDER — GABAPENTIN 300 MG/1
300 CAPSULE ORAL 3 TIMES DAILY
Status: DISCONTINUED | OUTPATIENT
Start: 2020-09-21 | End: 2020-09-26 | Stop reason: HOSPADM

## 2020-09-21 RX ORDER — ESCITALOPRAM OXALATE 10 MG/1
10 TABLET ORAL DAILY
Status: DISCONTINUED | OUTPATIENT
Start: 2020-09-21 | End: 2020-09-26 | Stop reason: HOSPADM

## 2020-09-21 RX ADMIN — BUDESONIDE AND FORMOTEROL FUMARATE DIHYDRATE 2 PUFF: 160; 4.5 AEROSOL RESPIRATORY (INHALATION) at 08:20

## 2020-09-21 RX ADMIN — ATORVASTATIN CALCIUM 40 MG: 40 TABLET, FILM COATED ORAL at 10:15

## 2020-09-21 RX ADMIN — DILTIAZEM HYDROCHLORIDE 2.5 MG/HR: 5 INJECTION INTRAVENOUS at 03:15

## 2020-09-21 RX ADMIN — MEROPENEM 500 MG: 500 INJECTION, POWDER, FOR SOLUTION INTRAVENOUS at 15:50

## 2020-09-21 RX ADMIN — GABAPENTIN 300 MG: 300 CAPSULE ORAL at 20:37

## 2020-09-21 RX ADMIN — BUDESONIDE AND FORMOTEROL FUMARATE DIHYDRATE 2 PUFF: 160; 4.5 AEROSOL RESPIRATORY (INHALATION) at 20:03

## 2020-09-21 RX ADMIN — TRAMADOL HYDROCHLORIDE 100 MG: 50 TABLET, FILM COATED ORAL at 23:27

## 2020-09-21 RX ADMIN — MAGNESIUM SULFATE IN WATER 2 G: 40 INJECTION, SOLUTION INTRAVENOUS at 11:52

## 2020-09-21 RX ADMIN — DILTIAZEM HYDROCHLORIDE 180 MG: 180 CAPSULE, COATED, EXTENDED RELEASE ORAL at 10:16

## 2020-09-21 RX ADMIN — SODIUM CHLORIDE, PRESERVATIVE FREE 10 ML: 5 INJECTION INTRAVENOUS at 20:37

## 2020-09-21 RX ADMIN — ANORECTAL OINTMENT: 15.7; .44; 24; 20.6 OINTMENT TOPICAL at 16:49

## 2020-09-21 RX ADMIN — DILTIAZEM HYDROCHLORIDE 10 MG: 5 INJECTION INTRAVENOUS at 03:06

## 2020-09-21 RX ADMIN — APIXABAN 5 MG: 5 TABLET, FILM COATED ORAL at 10:15

## 2020-09-21 RX ADMIN — MEROPENEM 500 MG: 500 INJECTION, POWDER, FOR SOLUTION INTRAVENOUS at 02:41

## 2020-09-21 RX ADMIN — ESCITALOPRAM OXALATE 10 MG: 10 TABLET ORAL at 10:16

## 2020-09-21 RX ADMIN — GABAPENTIN 300 MG: 300 CAPSULE ORAL at 15:50

## 2020-09-21 RX ADMIN — APIXABAN 5 MG: 5 TABLET, FILM COATED ORAL at 20:37

## 2020-09-21 RX ADMIN — MEROPENEM 500 MG: 500 INJECTION, POWDER, FOR SOLUTION INTRAVENOUS at 10:15

## 2020-09-21 RX ADMIN — MEROPENEM 500 MG: 500 INJECTION, POWDER, FOR SOLUTION INTRAVENOUS at 20:35

## 2020-09-21 RX ADMIN — Medication 2 CAPSULE: at 10:15

## 2020-09-21 RX ADMIN — IPRATROPIUM BROMIDE AND ALBUTEROL SULFATE 1 AMPULE: .5; 3 SOLUTION RESPIRATORY (INHALATION) at 08:21

## 2020-09-21 RX ADMIN — Medication 2 CAPSULE: at 16:49

## 2020-09-21 ASSESSMENT — PAIN DESCRIPTION - LOCATION: LOCATION: NECK

## 2020-09-21 ASSESSMENT — PAIN DESCRIPTION - FREQUENCY: FREQUENCY: CONTINUOUS

## 2020-09-21 ASSESSMENT — PAIN SCALES - GENERAL
PAINLEVEL_OUTOF10: 0
PAINLEVEL_OUTOF10: 0
PAINLEVEL_OUTOF10: 10
PAINLEVEL_OUTOF10: 0

## 2020-09-21 ASSESSMENT — PAIN DESCRIPTION - ORIENTATION: ORIENTATION: UPPER

## 2020-09-21 ASSESSMENT — PAIN DESCRIPTION - DESCRIPTORS: DESCRIPTORS: ACHING

## 2020-09-21 ASSESSMENT — PAIN DESCRIPTION - ONSET: ONSET: ON-GOING

## 2020-09-21 NOTE — CARE COORDINATION
[facility] Wound Ostomy Continence Nurse  Consult Note       NAME:  Michael Coombs  MEDICAL RECORD NUMBER:  [id number]  AGE: 78 y.o. GENDER: female  : 1950  TODAY'S DATE:  2020    Subjective   Reason for WOCN Evaluation and Assessment: buttock wounds, L heel wound    Michael Coombs is a 78 y.o. female referred by:   [] Physician  [x] Nursing  [] Other:     Wound Identification:  Wound Type: pressure and MASD  Contributing Factors: chronic pressure, decreased mobility, shear force, obesity and incontinence of urine    Wound History: patient from home. States that she sits in chair most of the day. Wears briefs . Current Wound Care Treatment:  mepilex border, zinc barrier    Patient Goal of Care:  [x] Wound Healing  [] Odor Control  [] Palliative Care  [] Pain Control   [] Other:         PAST MEDICAL HISTORY        Diagnosis Date    Chronic kidney disease     COPD (chronic obstructive pulmonary disease) ([facility] [address]     Hyperlipidemia     Hypertension        PAST SURGICAL HISTORY    Past Surgical History:   Procedure Laterality Date    CHOLECYSTECTOMY      CYSTOSCOPY Left 2020    CYSTOSCOPY URETERAL STENT INSERTION LEFT RETROGRADE PYELOGRAM performed by Sherry Diego MD at [address] [place] [address] Left 8/10/2020    OPEN TREATMENT OF LEFT HIP FRACTURE WITH CEPHALOMEDULLARY NAIL performed by Barbara Cruz MD at [address] [place] [address]    History reviewed. No pertinent family history. SOCIAL HISTORY    Social History     Tobacco Use    Smoking status: Current Every Day Smoker     Packs/day: 1.50     Types: Cigarettes    Smokeless tobacco: Never Used    Tobacco comment: pack n half a day    Substance Use Topics    Alcohol use: Never     Frequency: Never    Drug use: Yes     Types: Marijuana     Comment: occasional        ALLERGIES    No Known Allergies    MEDICATIONS    No current facility-administered medications on file prior to encounter.       Current Outpatient Medications on File Prior to Encounter   Medication Sig Dispense Refill    acetaZOLAMIDE (DIAMOX) 250 MG tablet Take 250 mg by mouth daily      escitalopram (LEXAPRO) 10 MG tablet Take 10 mg by mouth daily      oxyCODONE (ROXICODONE) 5 MG immediate release tablet Take 5 mg by mouth every 4 hours as needed for Pain.  potassium chloride (KLOR-CON M) 20 MEQ extended release tablet Take 20 mEq by mouth daily      atorvastatin (LIPITOR) 40 MG tablet Take 40 mg by mouth daily      apixaban (ELIQUIS) 5 MG TABS tablet Take 1 tablet by mouth 2 times daily 60 tablet 1    dilTIAZem (CARDIZEM CD) 180 MG extended release capsule Take 1 capsule by mouth daily 30 capsule 3    furosemide (LASIX) 40 MG tablet Take 1 tablet by mouth daily 60 tablet 0    gabapentin (NEURONTIN) 300 MG capsule Take 300 mg by mouth 3 times daily.        fluticasone-salmeterol (ADVAIR) 250-50 MCG/DOSE AEPB Inhale 1 puff into the lungs 2 times daily      ipratropium-albuterol (DUONEB) 0.5-2.5 (3) MG/3ML SOLN nebulizer solution Inhale 3 mLs into the lungs every 2 hours as needed for Shortness of Breath      albuterol sulfate  (90 Base) MCG/ACT inhaler Inhale 2 puffs into the lungs every 4-6 hours as needed for Shortness of Breath         Objective    /69   Pulse 101   Temp 98.4 °F (36.9 °C) (Axillary)   Resp 19   Ht 5' 7\" (1.702 m)   Wt 260 lb 9.3 oz (118.2 kg)   SpO2 97%   BMI 40.81 kg/m²     LABS:  WBC:    Lab Results   Component Value Date    WBC 13.8 09/21/2020     H/H:    Lab Results   Component Value Date    HGB 11.7 09/21/2020    HCT 38.5 09/21/2020     PTT:    Lab Results   Component Value Date    APTT 31.7 08/08/2020   [APTT}  PT/INR:    Lab Results   Component Value Date    PROTIME 11.8 08/08/2020    INR 1.02 08/08/2020     HgBA1c:    Lab Results   Component Value Date    LABA1C 6.1 05/03/2013       Assessment   Nigel Risk Score: Nigel Scale Score: 16    Patient Active Problem List   Diagnosis Code    Closed 1511   Ginny-wound Assessment Red 09/21/20 1511   Pink%Wound Bed 10 09/21/20 1511   Red%Wound Bed 10 09/19/20 0425   Yellow%Wound Bed 90 09/21/20 1511   Culture Taken No 09/19/20 0425   Number of days: 2     Patient awake and alert in bed. Mepilex border removed from patient L heel. Patient has area of slough. Unstageable wound. Surrounded by redness. Aquacel AG ribbon cut to fit, dampened 2x2, kerlix. Change q3 days. Offload heels with heel protectors. Patient assist with turning by bedside RN. Patient has multiple areas of redness, flaky, scattered purple areas, most likely from chronic tissue injury and incontinence. These areas on buttock are from MASD and not pressure. Would recommend calmoseptine. Pt has goff catheter to manage urinary incontinence at this time. Do not use briefs or diapers. Pt already in bariatric specialty bed. Response to treatment:  Well tolerated by patient.      Plan   Plan of Care:   L heel wound: aquacel AG, dampened 2x2, kerlix; change q3 days  MASD on buttocks- calmoseptine  -turn and reposition every 2 hours  -underwicking pad only- no diapers or briefs    Specialty Bed Required : Yes   [x] Low Air Loss   [] Pressure Redistribution  [] Fluid Immersion  [x] Bariatric  [] Total Pressure Relief  [] Other:     Current Diet: DIET GENERAL;  Dietary Nutrition Supplements: Low Calorie High Protein Supplement  Dietician consult:  No    Discharge Plan:  Placement for patient upon discharge:TBD  Patient appropriate for Outpatient [address]: No    Referrals:  []   [] [facility]  [] Supplies  [] Other    Patient/Caregiver Teaching:  Level of patient/caregiver understanding able to:   [] Indicates understanding       [] Needs reinforcement  [] Unsuccessful      [x] Verbal Understanding  [] Demonstrated understanding       [] No evidence of learning  [] Refused teaching         [] N/A       Electronically signed by Aydee Nava RN, on 9/21/2020 at 3:17 PM

## 2020-09-21 NOTE — PROGRESS NOTES
Overnight sleep study performed on room air. Results are in the soft chart.   Electronically signed by Cuong Webber RCP on 9/21/20 at 5:06 AM EDT

## 2020-09-21 NOTE — PROGRESS NOTES
Hospitalist Progress Note      PCP: Ron Fernandez MD    Date of Admission: 9/18/2020      Hospital Course: patient with chronic respiratory failure, admitted with left flank pain, diagnosed with septic shock, pyelonephritis, kidney lesion, urology consulted, their assessment this is related to infection, stent placed on the 19. developed RVR, started on cardizem drip,switching to po and transferring to PCU. Subjective: feels much better, no fever, chills, chest pain or palpitation        Medications:  Reviewed    Infusion Medications   Scheduled Medications    dilTIAZem  180 mg Oral Daily    gabapentin  300 mg Oral TID    escitalopram  10 mg Oral Daily    apixaban  5 mg Oral BID    meropenem  500 mg Intravenous Q6H    atorvastatin  40 mg Oral Daily    sodium chloride flush  10 mL Intravenous 2 times per day    lactobacillus  2 capsule Oral BID WC    budesonide-formoterol  2 puff Inhalation BID     PRN Meds: ipratropium-albuterol, traMADol **OR** traMADol, sodium chloride flush, acetaminophen **OR** acetaminophen, ondansetron, bisacodyl, albuterol sulfate HFA, iopamidol      Intake/Output Summary (Last 24 hours) at 9/21/2020 0907  Last data filed at 9/21/2020 0400  Gross per 24 hour   Intake 2213.3 ml   Output 965 ml   Net 1248.3 ml       Physical Exam Performed:    /65   Pulse 105   Temp 98 °F (36.7 °C) (Oral)   Resp 17   Ht 5' 7\" (1.702 m)   Wt 260 lb 9.3 oz (118.2 kg)   SpO2 99%   BMI 40.81 kg/m²     General appearance: No apparent distress  Neck: Supple  Respiratory:  Normal respiratory effort. Clear to auscultation, bilaterally without Rales/Wheezes/Rhonchi. Cardiovascular: irregularly irregular   Abdomen: Soft, non-tender, non-distended   Musculoskeletal: No clubbing, cyanosis  Skin: Skin color, texture, turgor normal.  No rashes or lesions.   Neurologic:  No focal weakness   Psychiatric: Alert and oriented  Capillary Refill: Brisk,< 3 seconds   Peripheral Pulses: +2 palpable, equal bilaterally       Labs:   Recent Labs     09/19/20  0805 09/20/20  0520 09/21/20  0502   WBC 26.7* 11.9* 13.8*   HGB 13.8 12.3 11.7*   HCT 45.0 40.4 38.5    281 230     Recent Labs     09/19/20  0805 09/19/20  0816 09/20/20  0520 09/21/20  0502     --  138 142   K 4.1  --  5.0 3.9     --  109 114*   CO2 21  --  22 22   BUN 47*  --  43* 35*   CREATININE 2.0*  --  1.3* 0.9   CALCIUM 8.3  --  8.2* 8.4   PHOS  --  4.9  --   --      Recent Labs     09/18/20 1951   AST 60*   ALT 24   BILITOT 0.4   ALKPHOS 170*     No results for input(s): INR in the last 72 hours. No results for input(s): Tiney Alaniz in the last 72 hours. Urinalysis:      Lab Results   Component Value Date    NITRU Color Interfer 09/18/2020    WBCUA 6-9 09/18/2020    BACTERIA 4+ 09/18/2020    RBCUA >100 09/18/2020    BLOODU LARGE 09/18/2020    SPECGRAV Color Interfer 09/18/2020    GLUCOSEU Color Interfer 09/18/2020       Radiology:  FL RETROGRADE PYELOGRAM LEFT   Final Result      CT ABDOMEN PELVIS WO CONTRAST Additional Contrast? None   Final Result   There is moderate left hydronephrosis which appears to be secondary to an 8   mm calculus at the UPJ. That said, there is also a suspicious appearing lesion adjacent to or within   the left kidney, with heterogeneous hyperdensity. That could represent a   hematoma from the kidney, but renal neoplasm must be considered as well. Urologic consultation is recommended. XR CHEST PORTABLE   Final Result   No acute cardiopulmonary findings.                  Assessment/Plan:    Active Hospital Problems    Diagnosis    Hyponatremia [E87.1]    Hyperkalemia [E87.5]    Acute renal failure (ARF) (HCC) [N17.9]    Hydronephrosis with left ureteropelvic junction (UPJ) obstruction [Q62.11]    Hematuria [R31.9]    Ureteral obstruction [N13.5]    Hydronephrosis with urinary obstruction due to ureteral calculus [N13.2]    DARSHAN (acute kidney injury) ([address] [N17.9]    Kidney stone [N20.0]    Septic shock ([address] [A41.9, D03.57]    Neutrophilic leukocytosis [B29.2]    Morbid obesity due to excess calories ([address] [E66.01]    Chronic respiratory failure with hypoxia (HCC) [J96.11]    Hyperlipidemia [E78.5]    Kidney lesion, native, left [N28.9]    Paroxysmal atrial fibrillation (HCC) [I48.0]    Chronic obstructive pulmonary disease ([address] [J44.9]    Acute respiratory failure with hypoxia ([address] [J96.01]     1. Septic shock and severe sepsis, POA, due to complicated  UTI/pyelonephritis, S/P stent placement, iv meropenem for now, follow up with urology as outpatient. 2.  Complicated UTI/pyelonephriits, as above. 3. Acute renal failure, improving. 4.  HTN, controlled  5. afib with RVR, changing Cardizem to po.        DVT Prophylaxis: e;iquis   Diet: DIET GENERAL;  Code Status: Limited        Dispo - transfer to PCU     Cynthia Bravo MD

## 2020-09-21 NOTE — PROGRESS NOTES
will require dependent/total A for LE bathing/dressing, min A for UE bathing/dressing and SBA for grooming while seated d/t weakness and ROM observed. Pt would benefit from skilled therapy while in acute care and at discharge to [address] [place] [address] and level of independence. Pt in agreement that she needs to received skilled therapy at discharge, but does not wish to return to Home at [facility]. Prognosis: Fair  Decision Making: High Complexity  OT Education: OT Role;Plan of Care  REQUIRES OT FOLLOW UP: Yes  Activity Tolerance  Activity Tolerance: Patient limited by fatigue  Activity Tolerance: Very weak from immobility. Pt c/o pain in L knee when completing supine > sit. Safety Devices  Safety Devices in place: Yes  Type of devices: Left in bed;Bed alarm in place;Call light within reach;Nurse notified; Patient at risk for falls           Patient Diagnosis(es): The primary encounter diagnosis was Kidney stone on left side. Diagnoses of Gross hematuria, DARSHAN (acute kidney injury) ([facility] [address] Leukocytosis, unspecified type, Elevated procalcitonin, and Abnormal CT scan, kidney were also pertinent to this visit. has a past medical history of Chronic kidney disease, COPD (chronic obstructive pulmonary disease) ([address] Hyperlipidemia, and Hypertension. has a past surgical history that includes Cholecystectomy; Hip fracture surgery (Left, 8/10/2020); and Cystoscopy (Left, 9/19/2020). Restrictions  Restrictions/Precautions  Restrictions/Precautions: Fall Risk  Position Activity Restriction  Other position/activity restrictions: Pt L hip ORIF 8/10/2020. Per Dr. Siria Magallanes (ortho) office visit 8/26 pt may advance to WBAT. -- Grace, 1L O2 via NC    Subjective   General  Chart Reviewed: Yes  Patient assessed for rehabilitation services?: Yes  Additional Pertinent Hx: 77 y/o female admitted 9/19 from nursing home for evaluation of leukocytosis and hematuria. Pt found to have L UPJ stone with pyonephrosis. S/p ureteral stent 9/19.  S/p ORIF L Hip 8/10/2020- Per Dr. Radha Macias (ortho) office visit 8/26 pt may advance to WBAT. Family / Caregiver Present: No  Referring Practitioner: Apoorva Louise DO  Subjective  Subjective: Pt in bed upon arrival and agreeable to OT evaluation/treatment. Pt c/o pain in L knee when completing supine > sit. General Comment  Comments: Per RN, okay for therapy. Social/Functional History  Social/Functional History  Lives With: Spouse  Type of Home: House  Home Layout: One level  Home Access: Stairs to enter with rails  Entrance Stairs - Number of Steps: 5 LUZ ELENA  Bathroom Shower/Tub: (Pt sponge bathes only with assist)  Bathroom Toilet: Bedside commode(primarily uses BSC-  empties)  Bathroom Accessibility: Accessible  Home Equipment: Rolling walker, 4 wheeled walker, Wheelchair-manual, Grab bars, Lift chair  ADL Assistance: Needs assistance( assists with all ADLs)  Homemaking Assistance: (spouse assists with IADLs)  IADL Comments: Pt sleeps in lift chair  Additional Comments: Pt with a fall in August resulting in L hip fracture s/p L hip ORIF 8/10 and DC to 8/18 to ECF (Home at [facility]). Pt unable to elaborate today on functional status at [facility]. Objective   Vision: Within Functional Limits  Hearing: Within functional limits       Balance  Sitting Balance: Contact guard assistance(mod A initially, progressing to CGA with cuing for hand placement.)  Standing Balance  Comment: Pt only able to minimally offload bottom with standing attempts  from EOB using Margarite Lelo support with max A x2 - 5x total.   Functional Mobility  Functional Mobility Comments: Anticipate pt will require Maxi Ricardo for functional mobility and transfers. ADL  Toileting: Dependent/Total(Grace)  Additional Comments: Anticipate pt will require dependent/total A for LE bathing/dressing, min A for UE bathing/dressing and SBA for grooming while seated d/t weakness and ROM observed.      Bed mobility  Rolling to Left: Maximum assistance  Rolling to Right: Maximum assistance  Supine to Sit: Dependent/Total;2 Person assistance  Sit to Supine: Dependent/Total;2 Person assistance  Scooting: Dependent/Total;2 Person assistance  Comment: Pt in bed at start/end of session. Transfers  Transfer Comments: Pt unable to complete transfers d/t weakness. Pt reached forward to cross bar of Clarine Samples and pulled self forward from EOB x5 with min A from therapist; minimal clearance from EOB each trial.     Cognition  Overall Cognitive Status: WFL    LUE AROM (degrees)  LUE AROM : WFL  Left Hand AROM (degrees)  Left Hand AROM: WFL  RUE AROM (degrees)  RUE AROM : WFL  Right Hand AROM (degrees)  Right Hand AROM: WFL     Plan   Plan  Times per week: 3-5  Current Treatment Recommendations: Strengthening, Patient/Caregiver Education & Training, Balance Training, Functional Mobility Training, Endurance Training, Safety Education & Training, Self-Care / ADL    AM-PAC Score  AM-PAC Inpatient Daily Activity Raw Score: 13 (09/21/20 1337)  AM-PAC Inpatient ADL T-Scale Score : 32.03 (09/21/20 1337)  ADL Inpatient CMS 0-100% Score: 63.03 (09/21/20 1337)  ADL Inpatient CMS G-Code Modifier : CL (09/21/20 1337)    Goals  Short term goals  Time Frame for Short term goals: Prior to DC: Short term goal 1: Pt will complete bed mobility mod A. Short term goal 2: Pt will complete functional transfers with yasmine montejo for ADL tasks mod A. Short term goal 3: Pt will tolerate standing > 5 minutes for functional tasks mod A. Short term goal 4: Pt will complete grooming while seated with setup. Short term goal 5: Pt will tolerate UE exercises in all planes to increase strength/endurance for ADLs and transfers  Long term goals  Time Frame for Long term goals : STGS=LTGS  Patient Goals   Patient goals :  To go home       Therapy Time   Individual Concurrent Group Co-treatment   Time In 1125         Time Out 1205         Minutes 40         Timed Code Treatment Minutes: 25 Minutes     This note to serve as OT d/c summary if pt is d/c-ed prior to next therapy session. Lori Espitia, S/OT    I provided direct supervision and agree with note provided by S/OT.   Adebayo Hamm, OTR/L

## 2020-09-21 NOTE — PROGRESS NOTES
Pt taken off 4L NC and placed on RA for overnight oximetry per order. RN notified and aware of changes to pt oxygen. RA 94%. Will monitor pt overnight.      Electronically signed by Anshul Dudley RCP on 9/20/2020 at 8:47 PM

## 2020-09-21 NOTE — PROGRESS NOTES
Pulmonary Progress Note    Date of Admission: 9/18/2020   LOS: 3 days       CC:  sepsis    Subjective:     A fib over night  Started on Dilt drip after push. Continues in A fib. Had overnight pulse ox. ROS:   No nausea  No Vomiting  No chest pain         PHYSICAL EXAM:   Blood pressure 111/65, pulse 105, temperature 98 °F (36.7 °C), temperature source Oral, resp. rate 17, height 5' 7\" (1.702 m), weight 260 lb 9.3 oz (118.2 kg), SpO2 99 %.'  Gen: No distress. ENT:   Resp: No accessory muscle use. No crackles. No wheezes. No rhonchi. CV: Regular rate. Regular rhythm. No murmur or rub. No edema. Skin: Warm, dry, normal texture and turgor. No nodule on exposed extremities. M/S: No cyanosis. No clubbing. No joint deformity. Psych: Oriented x 3. No anxiety. Awake. Alert. Intact judgement and insight. Good Mood / Affect.   Memory appears in tact    Medications:    Scheduled Meds:   apixaban  5 mg Oral BID    meropenem  500 mg Intravenous Q6H    atorvastatin  40 mg Oral Daily    sodium chloride flush  10 mL Intravenous 2 times per day    lactobacillus  2 capsule Oral BID WC    ipratropium-albuterol  1 ampule Inhalation Q4H WA    budesonide-formoterol  2 puff Inhalation BID    gabapentin  300 mg Oral BID       Continuous Infusions:   dilTIAZem 10 mg/hr (09/21/20 0701)       PRN Meds:  traMADol **OR** traMADol, sodium chloride flush, acetaminophen **OR** acetaminophen, ondansetron, bisacodyl, albuterol sulfate HFA, iopamidol    Labs reviewed:  CBC:   Recent Labs     09/19/20  0805 09/20/20  0520 09/21/20  0502   WBC 26.7* 11.9* 13.8*   HGB 13.8 12.3 11.7*   HCT 45.0 40.4 38.5   MCV 83.0 82.3 82.5    281 230     BMP:   Recent Labs     09/19/20  0805 09/19/20  0816 09/20/20  0520 09/21/20  0502     --  138 142   K 4.1  --  5.0 3.9     --  109 114*   CO2 21  --  22 22   PHOS  --  4.9  --   --    BUN 47*  --  43* 35*   CREATININE 2.0*  --  1.3* 0.9     LIVER PROFILE:   Recent Labs 09/18/20 1951   AST 60*   ALT 24   BILITOT 0.4   ALKPHOS 170*     PT/INR: No results for input(s): PROTIME, INR in the last 72 hours. APTT: No results for input(s): APTT in the last 72 hours. UA:  Recent Labs     09/18/20 1951   COLORU RED*   PHUR Color Interfer*   WBCUA 6-9*   RBCUA >100*   BACTERIA 4+*   CLARITYU TURBID*   SPECGRAV Color Interfer   LEUKOCYTESUR LARGE*   UROBILINOGEN Color Interfer*   BILIRUBINUR Color Interfer*   BLOODU LARGE*   GLUCOSEU Color Interfer*     No results for input(s): PH, PCO2, PO2 in the last 72 hours. Cx:      Films:  Radiology Review:  Pertinent images / reports were reviewed as a part of this visit. CT Chest w/ contrast: No results found for this or any previous visit. CT Chest w/o contrast: No results found for this or any previous visit. CTPA: No results found for this or any previous visit. CXR PA/LAT:   Results for orders placed during the hospital encounter of 08/04/15   XR Chest Standard TWO VW    Narrative    INDICATION: COPD. Hypoxia. FINDINGS: Two views. Comparison is made to study dated 5/5/2013. The lungs are hyperexpanded without focal consolidation. Heart  size, mediastinal contours and pulmonary vascularity are within  normal limits. The pleural spaces are clear. Impression IMPRESSION: COPD. CXR portable:   Results for orders placed during the hospital encounter of 09/18/20   XR CHEST PORTABLE    Narrative EXAMINATION:  ONE XRAY VIEW OF THE CHEST    9/18/2020 8:09 pm    COMPARISON:  Chest radiograph performed August 10, 2020. HISTORY:  ORDERING SYSTEM PROVIDED HISTORY: SOB  TECHNOLOGIST PROVIDED HISTORY:  Reason for exam:-> SOB  Reason for Exam: sob  Acuity: Acute  Type of Exam: Initial    FINDINGS:  The lungs are clear without focal consolidation. No pleural effusion or  pneumothorax. The cardiac silhouette is within normal limits. Atherosclerotic changes of the thoracic aorta. No acute osseous abnormality.   Partially

## 2020-09-21 NOTE — PROGRESS NOTES
0730 - Shift report received from [facility]. Four eyes skin assessment completed. 3196 - Rounds completed with Dr. Sabino Garrido and Sobeida Pak NP. See new orders. 0900 - Dr. Virgie Musa at bedside. See new order. Patient PCU status. [address] [place] [address] [place] [address],[address] called for an update. All updates given. 1016 - Cardizem gtt turned off per order. Started on PO cardizem. 1215 - PT/OT at bedside to work with patient - see note. Recommend using lift if need to get patient up out of bed.    1400 - Patient's  at bedside. Updated on patient condition. [address] at bedside to evaluate patient. See new orders. Dressing applied to left heel. 1900 - Shift report given to PENNY ANDERSON [facility]. Dinner at patient's bedside.     Electronically signed by Aniyah No RN on 9/21/2020 at 7:08 PM

## 2020-09-21 NOTE — PROGRESS NOTES
Physical Therapy    Facility/Department: [facility]  Initial Assessment    NAME: Michael Coombs  : 1950  MRN: [id number]    Date of Service: 2020    Discharge Recommendations:  Continue to assess pending progress   Michael Coombs scored a 8/24 on the AM-PAC short mobility form. Current research shows that an AM-PAC score of 17 or less is typically not associated with a discharge to the patient's home setting. Based on the patient's AM-PAC score and their current functional mobility deficits, it is recommended that the patient have 3-5 sessions per week of Physical Therapy at d/c to increase the patient's independence. Please see assessment section for further patient specific details. If patient discharges prior to next session this note will serve as a discharge summary. Please see below for the latest assessment towards goals. Assessment   Body structures, Functions, Activity limitations: Decreased functional mobility ; Decreased strength;Decreased safe awareness;Decreased endurance  Assessment: 79 y/o female admit from SNF setting 2020 with Gross Hematuria, Pyelonephritis, Septic Shock. 2020 S/P Cystoscopy and L Ureteral Stent Placement. PMH as noted including CKD, COPD, L Hip Fx/ORIF (8/10/2020, Dr. Hasmukh Norris). Pt admit from SNF setting; currently pt unable to transfer to chair via Stedy despite assist x 2. At this time, anticipate return to SNF setting with cont PT Services. Prognosis: Fair  Decision Making: Medium Complexity  History: 79 y/o female admit from SNF setting 2020 with Gross Hematuria, Pyelonephritis, Septic Shock. 2020 S/P Cystoscopy and L Ureteral Stent Placement. PMH as noted including CKD, COPD, L Hip Fx/ORIF (8/10/2020, Dr. Hasmukh Norris). Exam: See above. Clinical Presentation: See above. Patient Education: Role of PT, POC, Need to call for assist, OOB via Stedy. Barriers to Learning: Cognitive.   REQUIRES PT FOLLOW UP: Yes  Activity Tolerance  Activity Tolerance: Patient limited by endurance       Patient Diagnosis(es): The primary encounter diagnosis was Kidney stone on left side. Diagnoses of Gross hematuria, DARSHAN (acute kidney injury) ([facility] [address] Leukocytosis, unspecified type, Elevated procalcitonin, and Abnormal CT scan, kidney were also pertinent to this visit. has a past medical history of Chronic kidney disease, COPD (chronic obstructive pulmonary disease) ([address] Hyperlipidemia, and Hypertension. has a past surgical history that includes Cholecystectomy; Hip fracture surgery (Left, 8/10/2020); and Cystoscopy (Left, 9/19/2020). Restrictions  Restrictions/Precautions  Restrictions/Precautions: Fall Risk  Position Activity Restriction  Other position/activity restrictions: Recent L Hip Fx/ORIF 8/10/2020 (Dr Kathi Minor). Office visit 8/26/2020 : pt may advance to WBAT. O2 1L via NC. Vision/Hearing        Subjective  General  Chart Reviewed: Yes  Patient assessed for rehabilitation services?: Yes  Additional Pertinent Hx: 79 y/o female admit from SNF setting 9/19/2020 with Gross Hematuria, Pyelonephritis, Septic Shock. 9/19/2020 S/P Cystoscopy and L Ureteral Stent Placement. PMH as noted including CKD, COPD, L Hip Fx/ORIF (8/10/2020, Dr. Kathi Minor). Family / Caregiver Present: No  Referring Practitioner: Dr. Red Espinal  Diagnosis: Gross Hematuria, Pyelonephritis, Septic Shock. Follows Commands: Within Functional Limits  Other (Comment): Pt follows simple commands. Subjective  Subjective: Pt agreeable to PT Eval/Rx. Pt c/o L knee pain as proceed to EOB.   Pain Screening  Patient Currently in Pain: Denies          Orientation     Social/Functional History  Social/Functional History  Lives With: Spouse  Type of Home: House  Home Layout: One level  Home Access: Stairs to enter with rails  Entrance Stairs - Number of Steps: 5 LUZ ELENA  Bathroom Shower/Tub: (Pt sponge bathes only with assist)  Bathroom Toilet: Bedside commode(Pt primarily uses BSC-  empties it.)  Bathroom Accessibility: Accessible  Home Equipment: Rolling walker, 4 wheeled walker, Wheelchair-manual, Grab bars, Lift chair  ADL Assistance: Needs assistance( assist with daily care needs.)  Homemaking Assistance: ( takes care of homemaking needs.)  Ambulation Assistance: (Pt not amb since hip fx/surg (8/10/2020). )  Transfer Assistance: Needs assistance  IADL Comments: Pt sleeps in lift chair. Additional Comments: Pt with a fall in August resulting in L hip fracture s/p L hip ORIF 8/10 and DC to 8/18/2020  to SNF setting (Home at [facility]). Cognition        Objective          AROM RLE (degrees)  RLE AROM: WFL  AROM LLE (degrees)  LLE AROM : WFL  LLE General AROM: Limited joe hip/knee ROM supine although appears functional at EOB. AROM RUE (degrees)  RUE AROM : WFL  AROM LUE (degrees)  LUE AROM : WFL  Strength Other  Other: LEs appear grossly 3- 3/5 throughout; functionally very weak/debilitated. Bed mobility  Rolling to Left: Maximum assistance  Rolling to Right: Maximum assistance  Supine to Sit: Dependent/Total;2 Person assistance  Sit to Supine: Dependent/Total;2 Person assistance  Transfers  Bed to Chair: Unable to assess  Comment: Attempt Transfer via Stedy from EOB, WBAT although unable despite Max assist x 2. Completed 5 reps : pt attempt transfer pull self up using crossbar of Stedy with assist x 2; very slight clearance off EOB. Ambulation  Ambulation?: No              Plan   Plan  Times per week: 3-5x week while in acute care setting.   Current Treatment Recommendations: Strengthening, Functional Mobility Training, Transfer Training, Gait Training, Safety Education & Training, Patient/Caregiver Education & Training  Safety Devices  Type of devices: Bed alarm in place, Call light within reach, Left in bed, Nurse notified           AM-PAC Score  AM-PAC Inpatient Mobility Raw Score : 8 (09/21/20 1508)  AM-PAC Inpatient T-Scale Score : 28.52 (09/21/20

## 2020-09-21 NOTE — PROGRESS NOTES
4 Eyes Skin Assessment     The patient is being assess for  Shift Handoff    I agree that 2 RN's have performed a thorough Head to Toe Skin Assessment on the patient. ALL assessment sites listed below have been assessed. Areas assessed by both nurses: Marrubinalj Sevin   [x]   Head, Face, and Ears   [x]   Shoulders, Back, and Chest  [x]   Arms, Elbows, and Hands   [x]   Coccyx, Sacrum, and IschIum  [x]   Legs, Feet, and Heels        Does the Patient have Skin Breakdown?   Yes LDA WOUND CARE was Initiated documentation include the Ginny-wound, Wound Assessment, Measurements, Dressing Treatment, Drainage, and Color\",         Nigel Prevention initiated:  Yes   Wound Care Orders initiated:  Yes      [id number] nurse consulted for Pressure Injury (Stage 3,4, Unstageable, DTI, NWPT, and Complex wounds), New and Established Ostomies:  Yes      Nurse 1 eSignature: Electronically signed by Gigi Jennings RN on 9/21/20 at 7:59 AM EDT    **SHARE this note so that the co-signing nurse is able to place an eSignature**    Nurse 2 eSignature: Electronically signed by Tino Christian RN on 9/21/20 at 10:12 AM EDT

## 2020-09-21 NOTE — PROGRESS NOTES
4 Eyes Skin Assessment     The patient is being assess for  Shift Handoff    I agree that 2 RN's have performed a thorough Head to Toe Skin Assessment on the patient. ALL assessment sites listed below have been assessed. Areas assessed by both nurses: Joselin/Uriah  [x]   Head, Face, and Ears   [x]   Shoulders, Back, and Chest  [x]   Arms, Elbows, and Hands   [x]   Coccyx, Sacrum, and IschIum  [x]   Legs, Feet, and Heels        Does the Patient have Skin Breakdown?   Yes LDA WOUND CARE was Initiated documentation include the Ginny-wound, Wound Assessment, Measurements, Dressing Treatment, Drainage, and Color\",         Nigel Prevention initiated:  Yes   Wound Care Orders initiated:  Yes      [id number] nurse consulted for Pressure Injury (Stage 3,4, Unstageable, DTI, NWPT, and Complex wounds), New and Established Ostomies:  Yes      Nurse 1 eSignature: Electronically signed by Augusta Zuniga RN on 9/21/20 at 7:10 PM EDT    **SHARE this note so that the co-signing nurse is able to place an eSignature**    Nurse 2 eSignature: Electronically signed by Sebastián Cooney RN on 9/21/20 at 7:57 PM EDT

## 2020-09-21 NOTE — PROGRESS NOTES
1920- received report from [facility]- patient in bed, drowsy. 0000- patient sleeping     0300- converted from NSR to Afib. Dr Prince Malcolm was messaged, ordered diltiazem IVP and infusion     0600- removed triple lumen fem, held pressure.      0460- switched out patients bed for a speciality bed     0615- right grown site still soft     0630- right grow site soft, no pian     0710- Right groin soft, Report given to [employer]

## 2020-09-22 LAB
ANION GAP SERPL CALCULATED.3IONS-SCNC: 6 MMOL/L (ref 3–16)
BASOPHILS ABSOLUTE: 0.1 K/UL (ref 0–0.2)
BASOPHILS RELATIVE PERCENT: 0.5 %
BLOOD CULTURE, ROUTINE: NORMAL
BUN BLDV-MCNC: 25 MG/DL (ref 7–20)
CALCIUM SERPL-MCNC: 8.4 MG/DL (ref 8.3–10.6)
CHLORIDE BLD-SCNC: 111 MMOL/L (ref 99–110)
CO2: 22 MMOL/L (ref 21–32)
CREAT SERPL-MCNC: 0.8 MG/DL (ref 0.6–1.2)
CULTURE, BLOOD 2: NORMAL
EOSINOPHILS ABSOLUTE: 0.1 K/UL (ref 0–0.6)
EOSINOPHILS RELATIVE PERCENT: 0.5 %
GFR AFRICAN AMERICAN: >60
GFR NON-AFRICAN AMERICAN: >60
GLUCOSE BLD-MCNC: 73 MG/DL (ref 70–99)
HCT VFR BLD CALC: 40.9 % (ref 36–48)
HEMOGLOBIN: 12.7 G/DL (ref 12–16)
LYMPHOCYTES ABSOLUTE: 2.3 K/UL (ref 1–5.1)
LYMPHOCYTES RELATIVE PERCENT: 21.3 %
MAGNESIUM: 2 MG/DL (ref 1.8–2.4)
MCH RBC QN AUTO: 25.4 PG (ref 26–34)
MCHC RBC AUTO-ENTMCNC: 31 G/DL (ref 31–36)
MCV RBC AUTO: 81.9 FL (ref 80–100)
MONOCYTES ABSOLUTE: 0.7 K/UL (ref 0–1.3)
MONOCYTES RELATIVE PERCENT: 6.3 %
NEUTROPHILS ABSOLUTE: 7.7 K/UL (ref 1.7–7.7)
NEUTROPHILS RELATIVE PERCENT: 71.4 %
PDW BLD-RTO: 20.1 % (ref 12.4–15.4)
PHOSPHORUS: 2.3 MG/DL (ref 2.5–4.9)
PLATELET # BLD: 234 K/UL (ref 135–450)
PMV BLD AUTO: 8.1 FL (ref 5–10.5)
POTASSIUM REFLEX MAGNESIUM: 3.6 MMOL/L (ref 3.5–5.1)
PROCALCITONIN: 4.47 NG/ML (ref 0–0.15)
RBC # BLD: 4.99 M/UL (ref 4–5.2)
SODIUM BLD-SCNC: 139 MMOL/L (ref 136–145)
WBC # BLD: 10.8 K/UL (ref 4–11)

## 2020-09-22 PROCEDURE — 6370000000 HC RX 637 (ALT 250 FOR IP): Performed by: INTERNAL MEDICINE

## 2020-09-22 PROCEDURE — 84100 ASSAY OF PHOSPHORUS: CPT

## 2020-09-22 PROCEDURE — 6370000000 HC RX 637 (ALT 250 FOR IP): Performed by: NURSE PRACTITIONER

## 2020-09-22 PROCEDURE — 94640 AIRWAY INHALATION TREATMENT: CPT

## 2020-09-22 PROCEDURE — 85025 COMPLETE CBC W/AUTO DIFF WBC: CPT

## 2020-09-22 PROCEDURE — 84145 PROCALCITONIN (PCT): CPT

## 2020-09-22 PROCEDURE — 36415 COLL VENOUS BLD VENIPUNCTURE: CPT

## 2020-09-22 PROCEDURE — 6360000002 HC RX W HCPCS: Performed by: INTERNAL MEDICINE

## 2020-09-22 PROCEDURE — 2580000003 HC RX 258: Performed by: INTERNAL MEDICINE

## 2020-09-22 PROCEDURE — 80048 BASIC METABOLIC PNL TOTAL CA: CPT

## 2020-09-22 PROCEDURE — 2700000000 HC OXYGEN THERAPY PER DAY

## 2020-09-22 PROCEDURE — 94761 N-INVAS EAR/PLS OXIMETRY MLT: CPT

## 2020-09-22 PROCEDURE — 83735 ASSAY OF MAGNESIUM: CPT

## 2020-09-22 PROCEDURE — 2060000000 HC ICU INTERMEDIATE R&B

## 2020-09-22 RX ORDER — LEVOFLOXACIN 5 MG/ML
500 INJECTION, SOLUTION INTRAVENOUS EVERY 24 HOURS
Status: DISCONTINUED | OUTPATIENT
Start: 2020-09-22 | End: 2020-09-24

## 2020-09-22 RX ADMIN — DILTIAZEM HYDROCHLORIDE 180 MG: 180 CAPSULE, COATED, EXTENDED RELEASE ORAL at 08:19

## 2020-09-22 RX ADMIN — TRAMADOL HYDROCHLORIDE 100 MG: 50 TABLET, FILM COATED ORAL at 18:56

## 2020-09-22 RX ADMIN — SODIUM CHLORIDE, PRESERVATIVE FREE 10 ML: 5 INJECTION INTRAVENOUS at 21:33

## 2020-09-22 RX ADMIN — GABAPENTIN 300 MG: 300 CAPSULE ORAL at 08:18

## 2020-09-22 RX ADMIN — ESCITALOPRAM OXALATE 10 MG: 10 TABLET ORAL at 08:19

## 2020-09-22 RX ADMIN — Medication 2 CAPSULE: at 08:18

## 2020-09-22 RX ADMIN — GABAPENTIN 300 MG: 300 CAPSULE ORAL at 21:32

## 2020-09-22 RX ADMIN — Medication 2 CAPSULE: at 17:32

## 2020-09-22 RX ADMIN — BUDESONIDE AND FORMOTEROL FUMARATE DIHYDRATE 2 PUFF: 160; 4.5 AEROSOL RESPIRATORY (INHALATION) at 07:19

## 2020-09-22 RX ADMIN — LEVOFLOXACIN 500 MG: 5 INJECTION, SOLUTION INTRAVENOUS at 08:18

## 2020-09-22 RX ADMIN — POTASSIUM & SODIUM PHOSPHATES POWDER PACK 280-160-250 MG 250 MG: 280-160-250 PACK at 08:18

## 2020-09-22 RX ADMIN — MEROPENEM 500 MG: 500 INJECTION, POWDER, FOR SOLUTION INTRAVENOUS at 05:08

## 2020-09-22 RX ADMIN — ATORVASTATIN CALCIUM 40 MG: 40 TABLET, FILM COATED ORAL at 08:19

## 2020-09-22 RX ADMIN — APIXABAN 5 MG: 5 TABLET, FILM COATED ORAL at 21:32

## 2020-09-22 RX ADMIN — APIXABAN 5 MG: 5 TABLET, FILM COATED ORAL at 08:18

## 2020-09-22 RX ADMIN — SODIUM CHLORIDE, PRESERVATIVE FREE 10 ML: 5 INJECTION INTRAVENOUS at 08:19

## 2020-09-22 RX ADMIN — GABAPENTIN 300 MG: 300 CAPSULE ORAL at 14:11

## 2020-09-22 ASSESSMENT — PAIN DESCRIPTION - LOCATION: LOCATION: FOOT;LEG

## 2020-09-22 ASSESSMENT — PAIN SCALES - GENERAL
PAINLEVEL_OUTOF10: 10
PAINLEVEL_OUTOF10: 0
PAINLEVEL_OUTOF10: 0

## 2020-09-22 ASSESSMENT — PAIN DESCRIPTION - PAIN TYPE: TYPE: ACUTE PAIN

## 2020-09-22 NOTE — PROGRESS NOTES
3.6   CL  --  109 114* 111*   CO2  --  22 22 22   BUN  --  43* 35* 25*   CREATININE  --  1.3* 0.9 0.8   CALCIUM  --  8.2* 8.4 8.4   PHOS 4.9  --  2.5 2.3*     No results for input(s): AST, ALT, BILIDIR, BILITOT, ALKPHOS in the last 72 hours. No results for input(s): INR in the last 72 hours. No results for input(s): Barry Gibson in the last 72 hours. Urinalysis:      Lab Results   Component Value Date    NITRU Color Interfer 09/18/2020    WBCUA 6-9 09/18/2020    BACTERIA 4+ 09/18/2020    RBCUA >100 09/18/2020    BLOODU LARGE 09/18/2020    SPECGRAV Color Interfer 09/18/2020    GLUCOSEU Color Interfer 09/18/2020       Radiology:  FL RETROGRADE PYELOGRAM LEFT   Final Result      CT ABDOMEN PELVIS WO CONTRAST Additional Contrast? None   Final Result   There is moderate left hydronephrosis which appears to be secondary to an 8   mm calculus at the UPJ. That said, there is also a suspicious appearing lesion adjacent to or within   the left kidney, with heterogeneous hyperdensity. That could represent a   hematoma from the kidney, but renal neoplasm must be considered as well. Urologic consultation is recommended. XR CHEST PORTABLE   Final Result   No acute cardiopulmonary findings.                  Assessment/Plan:    Active Hospital Problems    Diagnosis    Abnormal CT scan, kidney [R93.429]    Hyponatremia [E87.1]    Hyperkalemia [E87.5]    Acute renal failure (ARF) (HCC) [N17.9]    Hydronephrosis with left ureteropelvic junction (UPJ) obstruction [Q62.11]    Hematuria [R31.9]    Ureteral obstruction [N13.5]    Hydronephrosis with urinary obstruction due to ureteral calculus [N13.2]    DARSHAN (acute kidney injury) ([address] [N17.9]    Kidney stone [N20.0]    Septic shock ([address] [A41.9, Q49.34]    Neutrophilic leukocytosis [N60.2]    Morbid obesity due to excess calories ([address] [E66.01]    Chronic respiratory failure with hypoxia ([address] [J96.11]    Hyperlipidemia [E78.5]    Kidney lesion, native, left [N28.9]    Paroxysmal atrial fibrillation (HCC) [I48.0]    Chronic obstructive pulmonary disease ([facility] [address] [J44.9]    Acute respiratory failure with hypoxia ([facility] [address] [J96.01]     1. Septic shock and severe sepsis, POA, due to complicated  UTI/pyelonephritis, S/P stent placement, iv meropenem will be changed to Levaquin and monitor clinically, follow up with urology as outpatient. procalcitonin decreased. 2. Complicated UTI/pyelonephriits, as above. 3. Acute renal failure, improving. 4. HTN, controlled. 5. Afib with RVR, changed Cardizem to po.      Diet: DIET GENERAL;  Dietary Nutrition Supplements: Low Calorie High Protein Supplement  Code Status: Mj Clayton MD

## 2020-09-22 NOTE — PLAN OF CARE
Problem: Skin Integrity:  Goal: Will show no infection signs and symptoms  Description: Will show no infection signs and symptoms  Outcome: Ongoing  Goal: Absence of new skin breakdown  Description: Absence of new skin breakdown  Outcome: Ongoing     Problem: Falls - Risk of:  Goal: Will remain free from falls  Description: Will remain free from falls  Outcome: Ongoing  Goal: Absence of physical injury  Description: Absence of physical injury  Outcome: Ongoing     Problem: Nutrition  Goal: Optimal nutrition therapy  Outcome: Ongoing     Problem: Pain:  Goal: Pain level will decrease  Description: Pain level will decrease  Outcome: Ongoing  Goal: Control of acute pain  Description: Control of acute pain  Outcome: Ongoing  Goal: Control of chronic pain  Description: Control of chronic pain  Outcome: Ongoing

## 2020-09-22 NOTE — PROGRESS NOTES
Physician Progress Note      PATIENTCasimir Kiersten  CSN #:                  [id number]  :                       1950  ADMIT DATE:       2020 7:40 PM  [address] [place] DATE:  RESPONDING  PROVIDER #:        Darian Bolton MD          QUERY TEXT:    Dear Dr. Naveen Henriquez    Patient admitted with Sepsis d/t ureteral stone and UTI. Per Nursing   flowsheet, noted to also have Pressure wound/ulcer-stage 2 to lt dorsal ankle,   POA and DTI to buttocks, POA. If possible, please document in progress notes   and discharge summary the type of ulcer, site of the ulcer and POA status of   the ulcer: The medical record reflects the following:  Risk Factors: decreased mobility d/t recent surgery  Clinical Indicators: documentation in Nursing flowsheet as above  Treatment: ankle wound treated w/silicone pad, buttocks treated w/zinc,   pressure relief and monitoring    Thank you  Zan Caba RN, CCDS  [email or web address]  Options provided:  -- Pressure wound/ulcer-stage 2 to lt dorsal ankle, POA and DTI to buttocks,   POA.  -- Other - I will add my own diagnosis  -- Disagree - Not applicable / Not valid  -- Disagree - Clinically unable to determine / Unknown  -- Refer to Clinical Documentation Reviewer    PROVIDER RESPONSE TEXT:    This patient has a Pressure wound/ulcer-stage 2 to lt dorsal ankle, POA and   DTI to buttocks, POA.     Query created by: Liudmila Corrigan on 2020 8:40 AM      Electronically signed by:  Darian Bolton MD 2020 7:34 AM

## 2020-09-22 NOTE — PROGRESS NOTES
1850 Report called to Rashawn Brewer RN on 5W.      Electronically signed by Samuel Maravilla RN on 9/22/2020 at 7:04 PM

## 2020-09-23 LAB
ANION GAP SERPL CALCULATED.3IONS-SCNC: 6 MMOL/L (ref 3–16)
BASOPHILS ABSOLUTE: 0 K/UL (ref 0–0.2)
BASOPHILS RELATIVE PERCENT: 0.5 %
BUN BLDV-MCNC: 20 MG/DL (ref 7–20)
CALCIUM SERPL-MCNC: 8.6 MG/DL (ref 8.3–10.6)
CHLORIDE BLD-SCNC: 110 MMOL/L (ref 99–110)
CO2: 23 MMOL/L (ref 21–32)
CREAT SERPL-MCNC: 0.7 MG/DL (ref 0.6–1.2)
EOSINOPHILS ABSOLUTE: 0.5 K/UL (ref 0–0.6)
EOSINOPHILS RELATIVE PERCENT: 4.8 %
GFR AFRICAN AMERICAN: >60
GFR NON-AFRICAN AMERICAN: >60
GLUCOSE BLD-MCNC: 77 MG/DL (ref 70–99)
HCT VFR BLD CALC: 41.3 % (ref 36–48)
HCT VFR BLD CALC: 44.2 % (ref 36–48)
HEMOGLOBIN: 12.8 G/DL (ref 12–16)
HEMOGLOBIN: 13.4 G/DL (ref 12–16)
LYMPHOCYTES ABSOLUTE: 2.1 K/UL (ref 1–5.1)
LYMPHOCYTES RELATIVE PERCENT: 21.1 %
MAGNESIUM: 1.7 MG/DL (ref 1.8–2.4)
MCH RBC QN AUTO: 25.1 PG (ref 26–34)
MCHC RBC AUTO-ENTMCNC: 30.4 G/DL (ref 31–36)
MCV RBC AUTO: 82.7 FL (ref 80–100)
MONOCYTES ABSOLUTE: 0.8 K/UL (ref 0–1.3)
MONOCYTES RELATIVE PERCENT: 7.7 %
NEUTROPHILS ABSOLUTE: 6.7 K/UL (ref 1.7–7.7)
NEUTROPHILS RELATIVE PERCENT: 65.9 %
OCCULT BLOOD SCREENING: ABNORMAL
ORGANISM: ABNORMAL
PDW BLD-RTO: 20.4 % (ref 12.4–15.4)
PHOSPHORUS: 3 MG/DL (ref 2.5–4.9)
PLATELET # BLD: 241 K/UL (ref 135–450)
PMV BLD AUTO: 7.9 FL (ref 5–10.5)
POTASSIUM REFLEX MAGNESIUM: 4 MMOL/L (ref 3.5–5.1)
RBC # BLD: 5.35 M/UL (ref 4–5.2)
SODIUM BLD-SCNC: 139 MMOL/L (ref 136–145)
URINE CULTURE, ROUTINE: ABNORMAL
WBC # BLD: 10.2 K/UL (ref 4–11)

## 2020-09-23 PROCEDURE — 2580000003 HC RX 258: Performed by: INTERNAL MEDICINE

## 2020-09-23 PROCEDURE — 2700000000 HC OXYGEN THERAPY PER DAY

## 2020-09-23 PROCEDURE — 97530 THERAPEUTIC ACTIVITIES: CPT

## 2020-09-23 PROCEDURE — 85014 HEMATOCRIT: CPT

## 2020-09-23 PROCEDURE — C9113 INJ PANTOPRAZOLE SODIUM, VIA: HCPCS | Performed by: INTERNAL MEDICINE

## 2020-09-23 PROCEDURE — 36415 COLL VENOUS BLD VENIPUNCTURE: CPT

## 2020-09-23 PROCEDURE — G0328 FECAL BLOOD SCRN IMMUNOASSAY: HCPCS

## 2020-09-23 PROCEDURE — 94761 N-INVAS EAR/PLS OXIMETRY MLT: CPT

## 2020-09-23 PROCEDURE — 83735 ASSAY OF MAGNESIUM: CPT

## 2020-09-23 PROCEDURE — 6370000000 HC RX 637 (ALT 250 FOR IP): Performed by: INTERNAL MEDICINE

## 2020-09-23 PROCEDURE — 80048 BASIC METABOLIC PNL TOTAL CA: CPT

## 2020-09-23 PROCEDURE — 84100 ASSAY OF PHOSPHORUS: CPT

## 2020-09-23 PROCEDURE — 85018 HEMOGLOBIN: CPT

## 2020-09-23 PROCEDURE — 94640 AIRWAY INHALATION TREATMENT: CPT

## 2020-09-23 PROCEDURE — 84145 PROCALCITONIN (PCT): CPT

## 2020-09-23 PROCEDURE — 85025 COMPLETE CBC W/AUTO DIFF WBC: CPT

## 2020-09-23 PROCEDURE — 6370000000 HC RX 637 (ALT 250 FOR IP): Performed by: NURSE PRACTITIONER

## 2020-09-23 PROCEDURE — 6360000002 HC RX W HCPCS: Performed by: INTERNAL MEDICINE

## 2020-09-23 PROCEDURE — 2060000000 HC ICU INTERMEDIATE R&B

## 2020-09-23 RX ORDER — PANTOPRAZOLE SODIUM 40 MG/10ML
40 INJECTION, POWDER, LYOPHILIZED, FOR SOLUTION INTRAVENOUS 2 TIMES DAILY
Status: DISCONTINUED | OUTPATIENT
Start: 2020-09-23 | End: 2020-09-26 | Stop reason: HOSPADM

## 2020-09-23 RX ORDER — SODIUM CHLORIDE 9 MG/ML
10 INJECTION INTRAVENOUS 2 TIMES DAILY
Status: DISCONTINUED | OUTPATIENT
Start: 2020-09-23 | End: 2020-09-26 | Stop reason: HOSPADM

## 2020-09-23 RX ORDER — MAGNESIUM SULFATE 1 G/100ML
1 INJECTION INTRAVENOUS ONCE
Status: COMPLETED | OUTPATIENT
Start: 2020-09-23 | End: 2020-09-23

## 2020-09-23 RX ADMIN — Medication 2 CAPSULE: at 16:53

## 2020-09-23 RX ADMIN — Medication 10 ML: at 20:56

## 2020-09-23 RX ADMIN — ATORVASTATIN CALCIUM 40 MG: 40 TABLET, FILM COATED ORAL at 09:27

## 2020-09-23 RX ADMIN — LEVOFLOXACIN 500 MG: 5 INJECTION, SOLUTION INTRAVENOUS at 09:28

## 2020-09-23 RX ADMIN — ACETAMINOPHEN 650 MG: 325 TABLET ORAL at 20:52

## 2020-09-23 RX ADMIN — Medication 2 CAPSULE: at 09:26

## 2020-09-23 RX ADMIN — BUDESONIDE AND FORMOTEROL FUMARATE DIHYDRATE 2 PUFF: 160; 4.5 AEROSOL RESPIRATORY (INHALATION) at 20:32

## 2020-09-23 RX ADMIN — APIXABAN 5 MG: 5 TABLET, FILM COATED ORAL at 09:27

## 2020-09-23 RX ADMIN — GABAPENTIN 300 MG: 300 CAPSULE ORAL at 13:04

## 2020-09-23 RX ADMIN — Medication 10 ML: at 13:07

## 2020-09-23 RX ADMIN — DILTIAZEM HYDROCHLORIDE 180 MG: 180 CAPSULE, COATED, EXTENDED RELEASE ORAL at 09:27

## 2020-09-23 RX ADMIN — SODIUM CHLORIDE, PRESERVATIVE FREE 10 ML: 5 INJECTION INTRAVENOUS at 09:28

## 2020-09-23 RX ADMIN — MAGNESIUM SULFATE HEPTAHYDRATE 1 G: 1 INJECTION, SOLUTION INTRAVENOUS at 13:26

## 2020-09-23 RX ADMIN — PANTOPRAZOLE SODIUM 40 MG: 40 INJECTION, POWDER, FOR SOLUTION INTRAVENOUS at 13:05

## 2020-09-23 RX ADMIN — TRAMADOL HYDROCHLORIDE 100 MG: 50 TABLET, FILM COATED ORAL at 20:53

## 2020-09-23 RX ADMIN — SODIUM CHLORIDE, PRESERVATIVE FREE 10 ML: 5 INJECTION INTRAVENOUS at 20:53

## 2020-09-23 RX ADMIN — GABAPENTIN 300 MG: 300 CAPSULE ORAL at 20:52

## 2020-09-23 RX ADMIN — PANTOPRAZOLE SODIUM 40 MG: 40 INJECTION, POWDER, FOR SOLUTION INTRAVENOUS at 20:53

## 2020-09-23 RX ADMIN — GABAPENTIN 300 MG: 300 CAPSULE ORAL at 09:27

## 2020-09-23 RX ADMIN — BUDESONIDE AND FORMOTEROL FUMARATE DIHYDRATE 2 PUFF: 160; 4.5 AEROSOL RESPIRATORY (INHALATION) at 08:46

## 2020-09-23 RX ADMIN — ESCITALOPRAM OXALATE 10 MG: 10 TABLET ORAL at 09:27

## 2020-09-23 ASSESSMENT — PAIN SCALES - GENERAL
PAINLEVEL_OUTOF10: 8
PAINLEVEL_OUTOF10: 0
PAINLEVEL_OUTOF10: 8
PAINLEVEL_OUTOF10: 0

## 2020-09-23 NOTE — CARE COORDINATION
Discharge Planning:  SW met with pt to discuss d/c plans. Pt reported that she was at [facility] for skilled care but would like to go to a different facility upon d/c. Pt stated that she would also like for this SW to talk with her spouse, as he has a list of facilities to chose from. SW contacted pts spouse to discuss d/c plans. Misael Haqueemperatrizcali Bee [phone])  SW reviewed the [facility] Dual list with Jelly June. The Plan for Transition of Care is related to the following treatment goals: \"Get my wife the care that she needs. \"    The patient representative, pts spouse was provided with a choice of provider and agrees   with the discharge plan. [x] Yes [] No    Freedom of choice list was provided with basic dialogue that supports the patient's individualized plan of care/goals, treatment preferences and shares the quality data associated with the providers. [x] Yes [] No      Abrahan requested that this SW fax a referral to the following facilities:  · [facility]  · [facility]   · [facility]    Referrals faxed via [employer]. Will await a response. Pt will require a pre cert through [facility]. Plan: pt was at [email or web address] for skilled but is requesting a new SNF. Referrals faxed. Will require a pre cert.    Electronically signed by Candido Starr on 9/23/2020 at 10:33 AM

## 2020-09-23 NOTE — PROGRESS NOTES
09/23/20  0556   WBC 13.8* 10.8 10.2   HGB 11.7* 12.7 13.4   HCT 38.5 40.9 44.2    234 241     Recent Labs     09/21/20  0502 09/22/20  0447 09/23/20  0556    139 139   K 3.9 3.6 4.0   * 111* 110   CO2 22 22 23   BUN 35* 25* 20   CREATININE 0.9 0.8 0.7   CALCIUM 8.4 8.4 8.6   PHOS 2.5 2.3* 3.0     No results for input(s): AST, ALT, BILIDIR, BILITOT, ALKPHOS in the last 72 hours. No results for input(s): INR in the last 72 hours. No results for input(s): Valentino Bun in the last 72 hours. Urinalysis:      Lab Results   Component Value Date    NITRU Color Interfer 09/18/2020    WBCUA 6-9 09/18/2020    BACTERIA 4+ 09/18/2020    RBCUA >100 09/18/2020    BLOODU LARGE 09/18/2020    SPECGRAV Color Interfer 09/18/2020    GLUCOSEU Color Interfer 09/18/2020       Radiology:  FL RETROGRADE PYELOGRAM LEFT   Final Result      CT ABDOMEN PELVIS WO CONTRAST Additional Contrast? None   Final Result   There is moderate left hydronephrosis which appears to be secondary to an 8   mm calculus at the UPJ. That said, there is also a suspicious appearing lesion adjacent to or within   the left kidney, with heterogeneous hyperdensity. That could represent a   hematoma from the kidney, but renal neoplasm must be considered as well. Urologic consultation is recommended. XR CHEST PORTABLE   Final Result   No acute cardiopulmonary findings.                  Assessment/Plan:    Active Hospital Problems    Diagnosis    Abnormal CT scan, kidney [R93.429]    Hyponatremia [E87.1]    Hyperkalemia [E87.5]    Acute renal failure (ARF) (HCC) [N17.9]    Hydronephrosis with left ureteropelvic junction (UPJ) obstruction [Q62.11]    Hematuria [R31.9]    Ureteral obstruction [N13.5]    Hydronephrosis with urinary obstruction due to ureteral calculus [N13.2]    DARSHAN (acute kidney injury) ([facility]) [N17.9]    Kidney stone [N20.0]    Septic shock ([facility] [place]) [A41.9, J01.86]    Neutrophilic leukocytosis [G69.5]    Morbid obesity due to excess calories ([facility] [address] [E66.01]    Chronic respiratory failure with hypoxia (HCC) [J96.11]    Hyperlipidemia [E78.5]    Kidney lesion, native, left [N28.9]    Paroxysmal atrial fibrillation (HCC) [I48.0]    Chronic obstructive pulmonary disease ([facility] [address] [J44.9]    Acute respiratory failure with hypoxia ([facility] [address] [J96.01]     1. Septic shock and severe sepsis, POA, due to complicated  UTI/pyelonephritis, S/P stent placement, iv meropenem  changed to Levaquin, sensitivity noted, follow up with urology as outpatient. procalcitonin decreased. 2. Complicated UTI/pyelonephriits, as above. 3. Acute renal failure, improving. 4. HTN, controlled. 5. Afib with RVR, changed Cardizem to po. hold eliquis  6. Black stool reported and FOBT positive, will hold eliquis, consulted GI, added BID pantoprazole, discussed with nurse.        Diet: DIET GENERAL;  Dietary Nutrition Supplements: Low Calorie High Protein Supplement  Code Status: Kaylan Smyth MD

## 2020-09-23 NOTE — PLAN OF CARE
Problem: Skin Integrity:  Goal: Will show no infection signs and symptoms  Description: Will show no infection signs and symptoms  Outcome: Ongoing  Note: Skin assessment completed every shift. Pt assessed for incontinence, appropriate barrier cream applied prn. Pt encouraged to turn/rotate every 2 hours. Assistance provided if pt unable to do so themselves. Pt  refusing turns this shift pt educated on the importance of repositioning to prevent injury or skin breakdown. Goal: Absence of new skin breakdown  Description: Absence of new skin breakdown  Outcome: Ongoing     Problem: Falls - Risk of:  Goal: Will remain free from falls  Description: Will remain free from falls  Outcome: Ongoing  Note: Fall risk precautions in place. Bed in lowest position with wheels locked,bed alarm in place and activated,non-skid socks on pt, fall risk ID on pt, call light in reach, will continue to monitor. Goal: Absence of physical injury  Description: Absence of physical injury  Outcome: Ongoing     Problem: Nutrition  Goal: Optimal nutrition therapy  Outcome: Ongoing     Problem: Pain:  Goal: Pain level will decrease  Description: Pain level will decrease  Outcome: Ongoing  Note: Pain/discomfort being managed with PRN analgesics per MD orders. Pt able to express presence and absence of pain and rate pain appropriately using numerical scale. Pt states pain is well controlled. Will continue to monitor. Goal: Control of acute pain  Description: Control of acute pain  Outcome: Ongoing  Goal: Control of chronic pain  Description: Control of chronic pain  Outcome: Ongoing     Problem:  Activity:  Goal: Ability to tolerate increased activity will improve  Description: Ability to tolerate increased activity will improve  Outcome: Ongoing   Electronically signed by Mercedes Asencio RN on 9/22/2020 at 11:29 PM

## 2020-09-23 NOTE — PROGRESS NOTES
Occupational Therapy  Facility/Department: [facility] 5W PROGRESSIVE CARE  Daily Treatment Note  NAME: Vijaya Payton  : 1950  MRN: [id number]    Date of Service: 2020    Discharge Recommendations:  Patient would benefit from continued therapy after discharge, 3-5 sessions per week       Assessment   Performance deficits / Impairments: Decreased functional mobility ; Decreased endurance;Decreased ADL status; Decreased balance;Decreased strength  Assessment: Pt limited with therapy due to fear of falling. Pt reporting she would attempt standing if two people present. Discussed need to start standing and getting OOB if she wants to attempt standing. Pt did agreed to exercises. Will attempt back tomorrow as schedule permits to attempt a cotx. Prognosis: Fair  Activity Tolerance  Activity Tolerance: Patient limited by fatigue  Safety Devices  Type of devices: Left in bed;Bed alarm in place;Call light within reach;Nurse notified; Patient at risk for falls         Patient Diagnosis(es): The primary encounter diagnosis was Kidney stone on left side. Diagnoses of Gross hematuria, DARSHAN (acute kidney injury) ([address] Leukocytosis, unspecified type, Elevated procalcitonin, Abnormal CT scan, kidney, and Septicemia ([address] were also pertinent to this visit. has a past medical history of Chronic kidney disease, COPD (chronic obstructive pulmonary disease) ([address] Hyperlipidemia, and Hypertension. has a past surgical history that includes Cholecystectomy; Hip fracture surgery (Left, 8/10/2020); and Cystoscopy (Left, 2020). Restrictions  Restrictions/Precautions  Restrictions/Precautions: Fall Risk  Position Activity Restriction  Other position/activity restrictions: Recent L Hip Fx/ORIF 8/10/2020 (Dr Radha Macias). Office visit 2020 : pt may advance to WBAT. O2 1L via NC.   Subjective   General  Chart Reviewed: Yes  Patient assessed for rehabilitation services?: Yes  Additional Pertinent Hx: 79 y/o female admitted 9/19 from nursing home for evaluation of leukocytosis and hematuria. Pt found to have L UPJ stone with pyonephrosis. S/p ureteral stent 9/19. S/p ORIF L Hip 8/10/2020- Per Dr. Mario Lenz (ortho) office visit 8/26 pt may advance to WBAT. Family / Caregiver Present: No  Referring Practitioner: Lázaro Heller DO  Subjective  Subjective: Pt seen bedside. Pt verbalized fear of falling. Pt declined OOB activity but agreed to UE exercise. General Comment  Comments: Per RN, okay for therapy. Objective       Functional Mobility  Functional Mobility Comments: Pt declined attempts to stand due to fear of falling. Pt agreed to attempt standing at the stedy with two therapists present. Bed mobility  Comment: Declined sitting EOB. Type of ROM/Therapeutic Exercise  Comment: Issued pt HEP to complete with red theraband. Pt agreed to attempt a second set later this date. Pt also completed leg lift with assist, ab/adduction with assist to prevent sliding of left heel on the bed, knee extension isometric exercise. Plan   Plan  Times per week: 3-5  Current Treatment Recommendations: Strengthening, Patient/Caregiver Education & Training, Balance Training, Functional Mobility Training, Endurance Training, Safety Education & Training, Self-Care / ADL    AM-PAC Score        AM-PAC Inpatient Daily Activity Raw Score: 13 (09/23/20 1600)  AM-PAC Inpatient ADL T-Scale Score : 32.03 (09/23/20 1600)  ADL Inpatient CMS 0-100% Score: 63.03 (09/23/20 1600)  ADL Inpatient CMS G-Code Modifier : CL (09/23/20 1600)    Goals  Short term goals  Time Frame for Short term goals: Prior to DC: Short term goal 1: Pt will complete bed mobility mod A. Short term goal 2: Pt will complete functional transfers with yasmine stedy for ADL tasks mod A. Short term goal 3: Pt will tolerate standing > 5 minutes for functional tasks mod A. Short term goal 4: Pt will complete grooming while seated with setup.   Short term goal 5: Pt will tolerate UE exercises in all planes to increase strength/endurance for ADLs and transfers  Long term goals  Time Frame for Long term goals : STGS=LTGS  Patient Goals   Patient goals : To go home       Therapy Time   Individual Concurrent Group Co-treatment   Time In 1520         Time Out 1600         Minutes 40              This note to serve as OT d/c summary if pt is d/c-ed from hospital prior to next OT session.     Anat Card, [address] [place] [address]

## 2020-09-23 NOTE — CARE COORDINATION
Discharge Planning:  BALA received the following responses for SNF placement:  [facility] [address] spoke with Madiha Jaime at [facility], AN [facility]FFILIATE OF [facility] who stated that she is able to accept this pt. BALA spoke with pts spouse re: d/c planning. Pts spouse stated that he would be interested in pt going to [facility], AN [facility]FFIL[facility] OF [facility]. SW spoke with Madiha Jaime who stated that she will begin the pre cert. Pts spouse reported that he will be following up with Home at [facility]. Plan: [facility], AN [facility] OF [facility] pending pre cert. Transfer from Home at [facility].   Electronically signed by Violet Garcia on 9/23/2020 at 12:28 PM

## 2020-09-23 NOTE — CONSULTS
GASTROENTEROLOGY INPATIENT CONSULTATION      IDENTIFYING DATA/REASON FOR CONSULTATION   PATIENT:  Nelly Hodge  MRN:  [id number]  ADMIT DATE: 9/18/2020  TIME OF EVALUATION: 9/23/2020 2:20 PM  HOSPITAL STAY:   LOS: 5 days     REASON FOR CONSULTATION:  Possible UGIB    HISTORY OF PRESENT ILLNESS   Nelly Hodge is a 78 y.o. female with a PMH of HLD, Afib, COPD, morbid obesity, CKD, and HTN who presented on 9/18/2020 with abnormal labs of hematuria and leukocytosis. We have been consulted regarding possible upper GI bleed due to +occult blood and report of melena. Patient states she has been feeling increasingly tired since being in the hospital.  She states she has never experienced any GI bleeding. Denies noticing any black or bloody stools. Patient states she is a current smoker. She states she takes NSAIDs occasionally for pain, but denies chronic use. Reports occasional reflux when she eats certain foods. States she doesn't have a history of ulcers. Not on any PPI therapy at home. Denies ETOH use. Denies taking iron supplements or Pepto Bismol. Patient states she takes Eliquis for Afib. Pt states she has a history of constipation. States she has been having infrequent bowel movements recently. Denies straining to defecate. Denies history of hemorrhoids, hx of fissures, rectal pain, or abdominal pain. Per RN, patient had a black stool last night. Has not had any episodes since. Patient states she has never had a colonoscopy or EGD before.       PAST MEDICAL, SURGICAL, FAMILY, and SOCIAL HISTORY     Past Medical History:   Diagnosis Date    Chronic kidney disease     COPD (chronic obstructive pulmonary disease) [address] [address]     Hyperlipidemia     Hypertension      Past Surgical History:   Procedure Laterality Date    CHOLECYSTECTOMY      CYSTOSCOPY Left 9/19/2020    CYSTOSCOPY URETERAL STENT INSERTION LEFT RETROGRADE PYELOGRAM performed by Lion Wells MD at [facility] FRACTURE SURGERY Left 8/10/2020    OPEN TREATMENT OF LEFT HIP FRACTURE WITH CEPHALOMEDULLARY NAIL performed by Aroldo Cardoza MD at [place] [address]     History reviewed. No pertinent family history.   Social History     Socioeconomic History    Marital status:      Spouse name: None    Number of children: None    Years of education: None    Highest education level: None   Occupational History    None   Social Needs    Financial resource strain: None    Food insecurity     Worry: None     Inability: None    Transportation needs     Medical: None     Non-medical: None   Tobacco Use    Smoking status: Current Every Day Smoker     Packs/day: 1.50     Types: Cigarettes    Smokeless tobacco: Never Used    Tobacco comment: pack n half a day    Substance and Sexual Activity    Alcohol use: Never     Frequency: Never    Drug use: Yes     Types: Marijuana     Comment: occasional     Sexual activity: None   Lifestyle    Physical activity     Days per week: None     Minutes per session: None    Stress: None   Relationships    Social connections     Talks on phone: None     Gets together: None     Attends Yarsanism service: None     Active member of club or organization: None     Attends meetings of clubs or organizations: None     Relationship status: None    Intimate partner violence     Fear of current or ex partner: None     Emotionally abused: None     Physically abused: None     Forced sexual activity: None   Other Topics Concern    None   Social History Narrative    None       MEDICATIONS   SCHEDULED:  pantoprazole, 40 mg, BID    And  sodium chloride (PF), 10 mL, BID  magnesium sulfate, 1 g, Once  levofloxacin, 500 mg, Q24H  dilTIAZem, 180 mg, Daily  gabapentin, 300 mg, TID  escitalopram, 10 mg, Daily  [Held by provider] apixaban, 5 mg, BID  atorvastatin, 40 mg, Daily  sodium chloride flush, 10 mL, 2 times per day  lactobacillus, 2 capsule, BID WC  budesonide-formoterol, 2 puff, BID      FLUIDS/DRIPS: PRNs: ipratropium-albuterol, 1 ampule, Q4H PRN  menthol-zinc oxide, , BID PRN  traMADol, 50 mg, Q6H PRN    Or  traMADol, 100 mg, Q6H PRN  sodium chloride flush, 10 mL, PRN  acetaminophen, 650 mg, Q4H PRN    Or  acetaminophen, 650 mg, Q4H PRN  ondansetron, 4 mg, Q4H PRN  bisacodyl, 10 mg, Daily PRN  albuterol sulfate HFA, 2 puff, Q6H PRN  iopamidol, 75 mL, ONCE PRN      ALLERGIES:  She No Known Allergies    REVIEW OF SYSTEMS   Pertinent ROS noted in HPI    PHYSICAL EXAM     Vitals:    09/23/20 0633 09/23/20 0745 09/23/20 0847 09/23/20 1145   BP:  111/81  111/69   Pulse:  61  112   Resp:  16 18 14   Temp:  97.5 °F (36.4 °C)  97.4 °F (36.3 °C)   TempSrc:  Oral  Oral   SpO2:  94% 95% 96%   Weight: 274 lb 11.2 oz (124.6 kg)      Height:           I/O last 3 completed shifts: In: [address] [P.O.:360; IV Piggyback:100]  Out: 1085 [Urine:1085]      Physical Exam:  General appearance: alert, cooperative, no distress, appears stated age  Eyes: Anicteric  Head: Normocephalic, without obvious abnormality  Lungs: clear to auscultation bilaterally, Normal Effort  Heart: regular rate and rhythm, normal S1 and S2, no murmurs or rubs  Abdomen: soft, non-distended, non-tender. Bowel sounds normal. No masses,  no organomegaly. Extremities: atraumatic, no cyanosis or edema  Skin: warm and dry, no jaundice  Neuro: Grossly intact, A&OX3      LABS AND IMAGING   Laboratory   Recent Labs     09/21/20  0502 09/22/20  0447 09/23/20  0556   WBC 13.8* 10.8 10.2   HGB 11.7* 12.7 13.4   HCT 38.5 40.9 44.2   MCV 82.5 81.9 82.7    234 241     Recent Labs     09/21/20  0502 09/22/20  0447 09/23/20  0556    139 139   K 3.9 3.6 4.0   * 111* 110   CO2 22 22 23   PHOS 2.5 2.3* 3.0   BUN 35* 25* 20   CREATININE 0.9 0.8 0.7     No results for input(s): AST, ALT, ALB, BILIDIR, BILITOT, ALKPHOS in the last 72 hours. No results for input(s): LIPASE, AMYLASE in the last 72 hours.   No results for input(s): PROTIME, INR in the last 72 hours.    Imaging  FL RETROGRADE PYELOGRAM LEFT   Final Result      CT ABDOMEN PELVIS WO CONTRAST Additional Contrast? None   Final Result   There is moderate left hydronephrosis which appears to be secondary to an 8   mm calculus at the UPJ. That said, there is also a suspicious appearing lesion adjacent to or within   the left kidney, with heterogeneous hyperdensity. That could represent a   hematoma from the kidney, but renal neoplasm must be considered as well. Urologic consultation is recommended. XR CHEST PORTABLE   Final Result   No acute cardiopulmonary findings. ASSESSMENT AND RECOMMENDATIONS   78 y.o. female with a PMH of HLD, Afib, COPD, morbid obesity, CKD, and HTN who presented 9/18/2020 with abnormal labs of hematuria and leukocytosis. We have been consulted regarding +occult blood and report of melena. IMPRESSION:    1. Melena, possible UGIB  -Unclear etiology at this time  -Per RN, patient had a black stool last night. Has not had any since.   -+ occult blood  -Denies chronic NSAID use or hx of ulcers  -No prior EGD  -Not on any Iron therapy. No recent use of Pepto Bismol.  -Hgb 13.4 and stable. BUN normal.  2. Afib with RVR  -On PO cardizem   -Typically on Eliquis. Currently on hold. RECOMMENDATIONS:      1) Continue PPI  2) Continue to monitor H/H  3) Diet clear liquids for now  4) Will possibly need EGD to assess for bleed. Hgb stable at this time and no new signs of bleeding. Will discuss case with Dr. Machelle Hou. If you have any questions or need any further information, please feel free to contact our consult team.  Thank you for allowing us to participate in the care of John Serra. The note was completed using Dragon voice recognition transcription. Every effort was made to ensure accuracy; however, inadvertent transcription errors may be present despite my best efforts to edit errors.       Aldo Salinas PA-C

## 2020-09-23 NOTE — PROGRESS NOTES
4 Eyes Skin Assessment     The patient is being assess for  Transfer to New Unit    I agree that 2 RN's have performed a thorough Head to Toe Skin Assessment on the patient. ALL assessment sites listed below have been assessed. Areas assessed by both nurses:   [x]   Head, Face, and Ears   [x]   Shoulders, Back, and Chest  [x]   Arms, Elbows, and Hands   [x]   Coccyx, Sacrum, and IschIum  [x]   Legs, Feet, and Heels        Does the Patient have Skin Breakdown?   Yes LDA WOUND CARE was Initiated documentation include the Ginny-wound, Wound Assessment, Measurements, Dressing Treatment, Drainage, and Color\",  LDA's present        Nigel Prevention initiated:  Yes   Wound Care Orders initiated:  Yes      WOC nurse consulted for Pressure Injury (Stage 3,4, Unstageable, DTI, NWPT, and Complex wounds), New and Established Ostomies:  Yes      Nurse 1 eSignature: Electronically signed by Elvi Gould RN on 9/22/20 at 11:14 PM EDT    **SHARE this note so that the co-signing nurse is able to place an eSignature**    Nurse 2 eSignature: Electronically signed by Christopher Joya RN on 9/22/20 at 11:14 PM EDT

## 2020-09-24 ENCOUNTER — ANESTHESIA EVENT (OUTPATIENT)
Dept: ENDOSCOPY | Age: 70
DRG: 853 | End: 2020-09-24
Payer: MEDICARE

## 2020-09-24 ENCOUNTER — ANESTHESIA (OUTPATIENT)
Dept: ENDOSCOPY | Age: 70
DRG: 853 | End: 2020-09-24
Payer: MEDICARE

## 2020-09-24 VITALS — OXYGEN SATURATION: 100 % | DIASTOLIC BLOOD PRESSURE: 58 MMHG | SYSTOLIC BLOOD PRESSURE: 90 MMHG

## 2020-09-24 LAB
ANION GAP SERPL CALCULATED.3IONS-SCNC: 7 MMOL/L (ref 3–16)
BASOPHILS ABSOLUTE: 0.1 K/UL (ref 0–0.2)
BASOPHILS RELATIVE PERCENT: 0.6 %
BUN BLDV-MCNC: 18 MG/DL (ref 7–20)
CALCIUM SERPL-MCNC: 8.4 MG/DL (ref 8.3–10.6)
CHLORIDE BLD-SCNC: 110 MMOL/L (ref 99–110)
CO2: 23 MMOL/L (ref 21–32)
CREAT SERPL-MCNC: 0.6 MG/DL (ref 0.6–1.2)
EOSINOPHILS ABSOLUTE: 0.7 K/UL (ref 0–0.6)
EOSINOPHILS RELATIVE PERCENT: 5.8 %
GFR AFRICAN AMERICAN: >60
GFR NON-AFRICAN AMERICAN: >60
GLUCOSE BLD-MCNC: 101 MG/DL (ref 70–99)
GLUCOSE BLD-MCNC: 151 MG/DL (ref 70–99)
GLUCOSE BLD-MCNC: 83 MG/DL (ref 70–99)
GLUCOSE BLD-MCNC: 84 MG/DL (ref 70–99)
GLUCOSE BLD-MCNC: 94 MG/DL (ref 70–99)
HCT VFR BLD CALC: 41.6 % (ref 36–48)
HCT VFR BLD CALC: 42.4 % (ref 36–48)
HEMOGLOBIN: 12.8 G/DL (ref 12–16)
HEMOGLOBIN: 13 G/DL (ref 12–16)
LYMPHOCYTES ABSOLUTE: 2.5 K/UL (ref 1–5.1)
LYMPHOCYTES RELATIVE PERCENT: 21.2 %
MAGNESIUM: 1.7 MG/DL (ref 1.8–2.4)
MCH RBC QN AUTO: 25.1 PG (ref 26–34)
MCHC RBC AUTO-ENTMCNC: 30.7 G/DL (ref 31–36)
MCV RBC AUTO: 81.7 FL (ref 80–100)
MONOCYTES ABSOLUTE: 0.8 K/UL (ref 0–1.3)
MONOCYTES RELATIVE PERCENT: 6.9 %
NEUTROPHILS ABSOLUTE: 7.7 K/UL (ref 1.7–7.7)
NEUTROPHILS RELATIVE PERCENT: 65.5 %
PDW BLD-RTO: 20.1 % (ref 12.4–15.4)
PERFORMED ON: ABNORMAL
PERFORMED ON: ABNORMAL
PERFORMED ON: NORMAL
PERFORMED ON: NORMAL
PHOSPHORUS: 3.1 MG/DL (ref 2.5–4.9)
PLATELET # BLD: 255 K/UL (ref 135–450)
PMV BLD AUTO: 8.2 FL (ref 5–10.5)
POTASSIUM REFLEX MAGNESIUM: 3.6 MMOL/L (ref 3.5–5.1)
PROCALCITONIN: 0.87 NG/ML (ref 0–0.15)
RBC # BLD: 5.1 M/UL (ref 4–5.2)
SARS-COV-2, NAAT: NOT DETECTED
SODIUM BLD-SCNC: 140 MMOL/L (ref 136–145)
WBC # BLD: 11.7 K/UL (ref 4–11)

## 2020-09-24 PROCEDURE — 2700000000 HC OXYGEN THERAPY PER DAY

## 2020-09-24 PROCEDURE — 6360000002 HC RX W HCPCS: Performed by: INTERNAL MEDICINE

## 2020-09-24 PROCEDURE — 85018 HEMOGLOBIN: CPT

## 2020-09-24 PROCEDURE — 83735 ASSAY OF MAGNESIUM: CPT

## 2020-09-24 PROCEDURE — 36415 COLL VENOUS BLD VENIPUNCTURE: CPT

## 2020-09-24 PROCEDURE — 6370000000 HC RX 637 (ALT 250 FOR IP): Performed by: INTERNAL MEDICINE

## 2020-09-24 PROCEDURE — 2060000000 HC ICU INTERMEDIATE R&B

## 2020-09-24 PROCEDURE — C9113 INJ PANTOPRAZOLE SODIUM, VIA: HCPCS | Performed by: INTERNAL MEDICINE

## 2020-09-24 PROCEDURE — 6360000002 HC RX W HCPCS: Performed by: NURSE ANESTHETIST, CERTIFIED REGISTERED

## 2020-09-24 PROCEDURE — 80048 BASIC METABOLIC PNL TOTAL CA: CPT

## 2020-09-24 PROCEDURE — 7100000000 HC PACU RECOVERY - FIRST 15 MIN: Performed by: INTERNAL MEDICINE

## 2020-09-24 PROCEDURE — 7100000001 HC PACU RECOVERY - ADDTL 15 MIN: Performed by: INTERNAL MEDICINE

## 2020-09-24 PROCEDURE — 3700000001 HC ADD 15 MINUTES (ANESTHESIA): Performed by: INTERNAL MEDICINE

## 2020-09-24 PROCEDURE — 85014 HEMATOCRIT: CPT

## 2020-09-24 PROCEDURE — 94640 AIRWAY INHALATION TREATMENT: CPT

## 2020-09-24 PROCEDURE — 3700000000 HC ANESTHESIA ATTENDED CARE: Performed by: INTERNAL MEDICINE

## 2020-09-24 PROCEDURE — 97530 THERAPEUTIC ACTIVITIES: CPT | Performed by: PHYSICAL THERAPIST

## 2020-09-24 PROCEDURE — 6370000000 HC RX 637 (ALT 250 FOR IP): Performed by: UROLOGY

## 2020-09-24 PROCEDURE — 2580000003 HC RX 258: Performed by: UROLOGY

## 2020-09-24 PROCEDURE — 2500000003 HC RX 250 WO HCPCS: Performed by: NURSE ANESTHETIST, CERTIFIED REGISTERED

## 2020-09-24 PROCEDURE — 85025 COMPLETE CBC W/AUTO DIFF WBC: CPT

## 2020-09-24 PROCEDURE — 2580000003 HC RX 258: Performed by: INTERNAL MEDICINE

## 2020-09-24 PROCEDURE — 2709999900 HC NON-CHARGEABLE SUPPLY: Performed by: INTERNAL MEDICINE

## 2020-09-24 PROCEDURE — 2580000003 HC RX 258: Performed by: NURSE ANESTHETIST, CERTIFIED REGISTERED

## 2020-09-24 PROCEDURE — 97530 THERAPEUTIC ACTIVITIES: CPT

## 2020-09-24 PROCEDURE — 3609017100 HC EGD: Performed by: INTERNAL MEDICINE

## 2020-09-24 PROCEDURE — 94761 N-INVAS EAR/PLS OXIMETRY MLT: CPT

## 2020-09-24 PROCEDURE — 0DJ08ZZ INSPECTION OF UPPER INTESTINAL TRACT, VIA NATURAL OR ARTIFICIAL OPENING ENDOSCOPIC: ICD-10-PCS | Performed by: INTERNAL MEDICINE

## 2020-09-24 PROCEDURE — 84100 ASSAY OF PHOSPHORUS: CPT

## 2020-09-24 PROCEDURE — U0002 COVID-19 LAB TEST NON-CDC: HCPCS

## 2020-09-24 RX ORDER — SODIUM CHLORIDE 9 MG/ML
INJECTION, SOLUTION INTRAVENOUS CONTINUOUS PRN
Status: DISCONTINUED | OUTPATIENT
Start: 2020-09-24 | End: 2020-09-24 | Stop reason: SDUPTHER

## 2020-09-24 RX ORDER — LIDOCAINE HYDROCHLORIDE 20 MG/ML
INJECTION, SOLUTION EPIDURAL; INFILTRATION; INTRACAUDAL; PERINEURAL PRN
Status: DISCONTINUED | OUTPATIENT
Start: 2020-09-24 | End: 2020-09-24 | Stop reason: SDUPTHER

## 2020-09-24 RX ORDER — LABETALOL HYDROCHLORIDE 5 MG/ML
5 INJECTION, SOLUTION INTRAVENOUS EVERY 10 MIN PRN
Status: DISCONTINUED | OUTPATIENT
Start: 2020-09-24 | End: 2020-09-24

## 2020-09-24 RX ORDER — LEVOFLOXACIN 500 MG/1
500 TABLET, FILM COATED ORAL DAILY
Status: DISCONTINUED | OUTPATIENT
Start: 2020-09-25 | End: 2020-09-26 | Stop reason: HOSPADM

## 2020-09-24 RX ORDER — PROPOFOL 10 MG/ML
INJECTION, EMULSION INTRAVENOUS PRN
Status: DISCONTINUED | OUTPATIENT
Start: 2020-09-24 | End: 2020-09-24 | Stop reason: SDUPTHER

## 2020-09-24 RX ORDER — PROMETHAZINE HYDROCHLORIDE 25 MG/ML
6.25 INJECTION, SOLUTION INTRAMUSCULAR; INTRAVENOUS
Status: DISCONTINUED | OUTPATIENT
Start: 2020-09-24 | End: 2020-09-24

## 2020-09-24 RX ORDER — MAGNESIUM SULFATE 1 G/100ML
1 INJECTION INTRAVENOUS ONCE
Status: COMPLETED | OUTPATIENT
Start: 2020-09-24 | End: 2020-09-24

## 2020-09-24 RX ORDER — ONDANSETRON 2 MG/ML
4 INJECTION INTRAMUSCULAR; INTRAVENOUS
Status: DISCONTINUED | OUTPATIENT
Start: 2020-09-24 | End: 2020-09-24

## 2020-09-24 RX ADMIN — TRAMADOL HYDROCHLORIDE 100 MG: 50 TABLET, FILM COATED ORAL at 22:47

## 2020-09-24 RX ADMIN — MAGNESIUM SULFATE HEPTAHYDRATE 1 G: 1 INJECTION, SOLUTION INTRAVENOUS at 13:59

## 2020-09-24 RX ADMIN — LEVOFLOXACIN 500 MG: 5 INJECTION, SOLUTION INTRAVENOUS at 09:36

## 2020-09-24 RX ADMIN — PANTOPRAZOLE SODIUM 40 MG: 40 INJECTION, POWDER, FOR SOLUTION INTRAVENOUS at 21:36

## 2020-09-24 RX ADMIN — ESCITALOPRAM OXALATE 10 MG: 10 TABLET ORAL at 13:23

## 2020-09-24 RX ADMIN — GABAPENTIN 300 MG: 300 CAPSULE ORAL at 13:24

## 2020-09-24 RX ADMIN — SODIUM CHLORIDE: 9 INJECTION, SOLUTION INTRAVENOUS at 11:21

## 2020-09-24 RX ADMIN — Medication 2 CAPSULE: at 13:23

## 2020-09-24 RX ADMIN — SODIUM CHLORIDE, PRESERVATIVE FREE 10 ML: 5 INJECTION INTRAVENOUS at 10:03

## 2020-09-24 RX ADMIN — APIXABAN 5 MG: 5 TABLET, FILM COATED ORAL at 21:36

## 2020-09-24 RX ADMIN — ATORVASTATIN CALCIUM 40 MG: 40 TABLET, FILM COATED ORAL at 13:24

## 2020-09-24 RX ADMIN — BUDESONIDE AND FORMOTEROL FUMARATE DIHYDRATE 2 PUFF: 160; 4.5 AEROSOL RESPIRATORY (INHALATION) at 20:28

## 2020-09-24 RX ADMIN — Medication 2 CAPSULE: at 16:22

## 2020-09-24 RX ADMIN — PANTOPRAZOLE SODIUM 40 MG: 40 INJECTION, POWDER, FOR SOLUTION INTRAVENOUS at 09:36

## 2020-09-24 RX ADMIN — Medication 10 ML: at 10:02

## 2020-09-24 RX ADMIN — SODIUM CHLORIDE, PRESERVATIVE FREE 10 ML: 5 INJECTION INTRAVENOUS at 21:45

## 2020-09-24 RX ADMIN — Medication 10 ML: at 21:45

## 2020-09-24 RX ADMIN — PROPOFOL 50 MG: 10 INJECTION, EMULSION INTRAVENOUS at 11:26

## 2020-09-24 RX ADMIN — DILTIAZEM HYDROCHLORIDE 180 MG: 180 CAPSULE, COATED, EXTENDED RELEASE ORAL at 13:23

## 2020-09-24 RX ADMIN — TRAMADOL HYDROCHLORIDE 100 MG: 50 TABLET, FILM COATED ORAL at 16:22

## 2020-09-24 RX ADMIN — LIDOCAINE HYDROCHLORIDE 100 MG: 20 INJECTION, SOLUTION EPIDURAL; INFILTRATION; INTRACAUDAL; PERINEURAL at 11:24

## 2020-09-24 RX ADMIN — GABAPENTIN 300 MG: 300 CAPSULE ORAL at 21:36

## 2020-09-24 RX ADMIN — BUDESONIDE AND FORMOTEROL FUMARATE DIHYDRATE 2 PUFF: 160; 4.5 AEROSOL RESPIRATORY (INHALATION) at 08:25

## 2020-09-24 RX ADMIN — PROPOFOL 30 MG: 10 INJECTION, EMULSION INTRAVENOUS at 11:31

## 2020-09-24 ASSESSMENT — PULMONARY FUNCTION TESTS
PIF_VALUE: 0

## 2020-09-24 ASSESSMENT — PAIN - FUNCTIONAL ASSESSMENT
PAIN_FUNCTIONAL_ASSESSMENT: PREVENTS OR INTERFERES SOME ACTIVE ACTIVITIES AND ADLS
PAIN_FUNCTIONAL_ASSESSMENT: 0-10

## 2020-09-24 ASSESSMENT — PAIN SCALES - GENERAL
PAINLEVEL_OUTOF10: 3
PAINLEVEL_OUTOF10: 0
PAINLEVEL_OUTOF10: 0
PAINLEVEL_OUTOF10: 8
PAINLEVEL_OUTOF10: 0
PAINLEVEL_OUTOF10: 0
PAINLEVEL_OUTOF10: 9

## 2020-09-24 ASSESSMENT — PAIN DESCRIPTION - LOCATION
LOCATION: ABDOMEN
LOCATION: GENERALIZED

## 2020-09-24 ASSESSMENT — PAIN DESCRIPTION - DESCRIPTORS
DESCRIPTORS: ACHING
DESCRIPTORS: ACHING

## 2020-09-24 ASSESSMENT — COPD QUESTIONNAIRES: CAT_SEVERITY: MODERATE

## 2020-09-24 ASSESSMENT — PAIN DESCRIPTION - PAIN TYPE
TYPE: CHRONIC PAIN
TYPE: ACUTE PAIN

## 2020-09-24 ASSESSMENT — PAIN DESCRIPTION - FREQUENCY
FREQUENCY: INTERMITTENT
FREQUENCY: CONTINUOUS

## 2020-09-24 ASSESSMENT — PAIN DESCRIPTION - ORIENTATION: ORIENTATION: RIGHT;LOWER

## 2020-09-24 ASSESSMENT — PAIN DESCRIPTION - ONSET
ONSET: SUDDEN
ONSET: ON-GOING

## 2020-09-24 ASSESSMENT — PAIN DESCRIPTION - PROGRESSION: CLINICAL_PROGRESSION: RAPIDLY WORSENING

## 2020-09-24 NOTE — PROGRESS NOTES
Physical Therapy  Shiela Wahl  [phone]  [id number]    PT and OT in to see pt for therapy; pt very irritated when therapist attempted to clarify prior functional level as pt giving conflicting information. Pt reports she was walking up until this hospitalization including after her hip surgery. Information in chart indicated pt had not walked since prior to her hip surgery. Pt then became very irritated with therapist stating \"I just don't remember\". Attempted to educate pt on importance of getting up OOB but pt declined. Reviewed benefits of working with therapy to get her back home but pt stated \"Im not doing anything now\". Pt's lunch arrived and pt said \"Im eating\"; assisted with tray table to allow pt to reach her lunch tray. Offered a towel over her to keep from spilling any lunch on her self but she declined. Recommend return to ECF with or without therapy depending on pt's tolerance.   Luzmaria Covington, PT, [id number]  Total time spent with pt:  11 min

## 2020-09-24 NOTE — PLAN OF CARE
Problem: Nutrition  Goal: Optimal nutrition therapy  Outcome: Ongoing     Nutrition Problem #1: Increased nutrient needs  Intervention: Food and/or Nutrient Delivery: Continue Current Diet, Discontinue Oral Nutrition Supplement  Nutritional Goals: Consume >50% of meals

## 2020-09-24 NOTE — PROGRESS NOTES
Comprehensive Nutrition Assessment    Type and Reason for Visit:  Reassess, Wound    Nutrition Recommendations/Plan:   Continue Current Diet; general   Discontinue ONS    Nutrition Assessment:  Pt reports her intake has increased and is tolerating well. Currently on general diet with high calorie ONS. Does not drink shakes because she does not like the taste. Declined snacks. Will continue to monitor. Malnutrition Assessment:  Malnutrition Status:  No malnutrition    Context:  Acute Illness     Findings of the 6 clinical characteristics of malnutrition:  Energy Intake:  No significant decrease in energy intake  Weight Loss:  No significant weight loss     Body Fat Loss:  No significant body fat loss     Muscle Mass Loss:  No significant muscle mass loss    Fluid Accumulation:  No significant fluid accumulation     Strength:  Not Performed    Estimated Daily Nutrient Needs:  Energy (kcal):  0870-3316 kcal (15-18 kcal/kg CBW); Weight Used for Energy Requirements:        Protein (g):   gm (1.4-2gm/kg IBW); Weight Used for Protein Requirements:           Fluid (ml/day):  1 mL/kcal; Weight Used for Fluid Requirements:         Nutrition Related Findings:  +1 pitting generalized, and BLE edema.       Wounds:  Stage II, Unstageable, Pressure Injury(ankle, buttock)       Current Nutrition Therapies:    DIET GENERAL;  Dietary Nutrition Supplements: Standard High Calorie Oral Supplement    Anthropometric Measures:  · Height: 5' 7\" (170.2 cm)  · Current Body Weight: 267 lb (121.1 kg)   · Admission Body Weight: 248 lb (112.5 kg)      · Ideal Body Weight: 135 lbs; % Ideal Body Weight 180.7 %   · BMI: 41.8  · BMI Categories: Obese Class 2 (BMI 35.0 -39.9)       Nutrition Diagnosis:   · Increased nutrient needs related to increase demand for energy/nutrients as evidenced by wounds      Nutrition Interventions:   Food and/or Nutrient Delivery:  Continue Current Diet, Discontinue Oral Nutrition Supplement  Nutrition Education/Counseling:  No recommendation at this time   Coordination of Nutrition Care:  Continued Inpatient Monitoring    Goals:  Consume >50% of meals       Nutrition Monitoring and Evaluation:    Food/Nutrient Intake Outcomes:  Diet Advancement/Tolerance  Physical Signs/Symptoms Outcomes:  Biochemical Data, Fluid Status or Edema, Weight, Skin, Nutrition Focused Physical Findings     Discharge Planning:     Too soon to determine     Electronically signed by Elsy Mendez on 9/24/20 at 1:46 PM EDT    Contact: [phone]

## 2020-09-24 NOTE — OP NOTE
Endoscopy Note    Patient: Wendy Manuel  : 1950  Acct#:     Procedure: Esophagogastroduodenoscopy                          Date:  2020     Surgeon:   Kristen Gaona MD    Referring Physician:  Octavio Santo MD    Indications: This is a 78 y.o. female  who presents for  EGD due to an episode of melena, and a positive FOBT. Hgb stable. Postoperative Diagnosis:    1. 5 mm inlet patch in the proximal esophagus. 2. Diffuse mild gastric erythema. Given recent eliquis use, biopsies not obtained. 3. Duodenal lymphangiectasia    Anesthesia:  MAC    Consent:  The patient or their legal guardian has signed an informed consent, and is aware of the potential risks, benefits, alternatives, and potential complications of this procedure. These include, but are not limited to hemorrhage, bleeding, post procedural pain, perforation, phlebitis, aspiration, hypotension, hypoxia, cardiovascular events such as arryhthmia, and possibly death. Description of Procedure: The patient was then taken to the endoscopy suite, placed in the left lateral decubitus position and the above IV sedation was administrered. The Olympus video endoscope was placed through the patient's oropharynx without difficulty to the extent of the 2nd portion of the duodenum. Both forward and retroflexed views of the stomach were obtained. Findings:    Esophagus: The esophagus was notable for a 5 mm inlet patch, and otherwise appeared normal without evidence of Campbell's esophagus or reflux esophagitis. The Z line was irregular, located 39 cm from the incisors. Stomach: The stomach was examined on forward and retroflexed views. A small hiatal hernia was present. Diffuse mild gastric erythema was present, suspicious for portal hypertensive gastropathy, although patient has no history of cirrhosis and no imaging of cirrhosis.    Duodenum: The first and 2nd portions of the duodenum were notable for edematous, nodular mucosa with prominent lymphangiectasia. The scope was then withdrawn back into the stomach, it was decompressed, and the scope was completely withdrawn. The patient tolerated the procedure well and was taken to the post anesthesia care unit in good condition. Estimated Blood Loss: None    Impression:   1) See post procedure diagnoses    Recommendations:   - Return to the hospital leach for ongoing medications. - Will obtain an H. Pylori stool Ag. - Resume regular medications, but avoidance of NSAID use. Fan Roth from GI standpoint to resume Eliquis. - Resume diet as tolerated. - Given positive FOBT, recommend outpatient colonoscopy. Will arrange and my office will contact the patient. Thank you for allowing me to participate in this patient's care. No further GI work-up needed at this time. We will sign off, however please contact us with any additional questions at [phone].       BRAEDEN Zamora [address] [facility]

## 2020-09-24 NOTE — H&P
Pre-operative History and Physical    Patient: John Serra  : 1950  Acct#:     Intended Procedure:  Melena    HISTORY OF PRESENT ILLNESS:  The patient is a 78 y.o. female  who presents for  EGD due to an episode of melena, and a positive FOBT. Hgb stable. Past Medical History:        Diagnosis Date    Chronic kidney disease     COPD (chronic obstructive pulmonary disease) ([address]     Hyperlipidemia     Hypertension      Past Surgical History:        Procedure Laterality Date    CHOLECYSTECTOMY      CYSTOSCOPY Left 2020    CYSTOSCOPY URETERAL STENT INSERTION LEFT RETROGRADE PYELOGRAM performed by Modesta Hobbs MD at [address] [place] [address] Left 8/10/2020    OPEN TREATMENT OF LEFT HIP FRACTURE WITH CEPHALOMEDULLARY NAIL performed by Rich Monson MD at [place] [address]     Medications Prior to Admission:   No current facility-administered medications on file prior to encounter. Current Outpatient Medications on File Prior to Encounter   Medication Sig Dispense Refill    acetaZOLAMIDE (DIAMOX) 250 MG tablet Take 250 mg by mouth daily      escitalopram (LEXAPRO) 10 MG tablet Take 10 mg by mouth daily      oxyCODONE (ROXICODONE) 5 MG immediate release tablet Take 5 mg by mouth every 4 hours as needed for Pain.  potassium chloride (KLOR-CON M) 20 MEQ extended release tablet Take 20 mEq by mouth daily      atorvastatin (LIPITOR) 40 MG tablet Take 40 mg by mouth daily      apixaban (ELIQUIS) 5 MG TABS tablet Take 1 tablet by mouth 2 times daily 60 tablet 1    dilTIAZem (CARDIZEM CD) 180 MG extended release capsule Take 1 capsule by mouth daily 30 capsule 3    furosemide (LASIX) 40 MG tablet Take 1 tablet by mouth daily 60 tablet 0    gabapentin (NEURONTIN) 300 MG capsule Take 300 mg by mouth 3 times daily.        fluticasone-salmeterol (ADVAIR) 250-50 MCG/DOSE AEPB Inhale 1 puff into the lungs 2 times daily      ipratropium-albuterol (DUONEB) 0.5-2.5 (3) MG/3ML SOLN nebulizer solution Inhale 3 mLs into the lungs every 2 hours as needed for Shortness of Breath      albuterol sulfate  (90 Base) MCG/ACT inhaler Inhale 2 puffs into the lungs every 4-6 hours as needed for Shortness of Breath          Allergies:  Patient has no known allergies. Social History:   TOBACCO:   reports that she has been smoking cigarettes. She has been smoking about 1.50 packs per day. She has never used smokeless tobacco.  ETOH:   reports no history of alcohol use. DRUGS:   reports current drug use. Drug: Marijuana. PHYSICAL EXAM:      Vital Signs: /65   Pulse 71   Temp 97.2 °F (36.2 °C) (Temporal)   Resp 17   Ht 5' 7\" (1.702 m)   Wt 267 lb (121.1 kg)   SpO2 99%   BMI 41.82 kg/m²    Airway: No stridor or wheezing noted. Good air movement  Pulmonary: without wheezes. Clear to auscultation  Cardiac:regular rate and rhythm without loud murmurs  Abdomen:soft, nontender,  Bowel sounds present    Pre-Procedure Assessment / Plan:  1) EGD    ASA Grade:  ASA 3 - Patient with moderate systemic disease with functional limitations  Mallampati Classification:  Class II    Level of Sedation Plan:MAC    Post Procedure plan: Return to same level of care    I assessed the patient and find that the patient is in satisfactory condition to proceed with the planned procedure and sedation plan. I have explained the risk, benefits, and alternatives to the procedure; the patient understands and agrees to proceed.        Elmer Chiu MD  9/24/2020

## 2020-09-24 NOTE — PROGRESS NOTES
Occupational Therapy  Pt out of room to test. Will follow and re-attempt as schedule permits.   Caren TORIBIO/ALEX,029

## 2020-09-24 NOTE — ANESTHESIA PRE PROCEDURE
[facility] Department of Anesthesiology  Pre-Anesthesia Evaluation/Consultation       Name:  Jeanie Baker  : 1950  Age:  78 y.o.                                            MRN:  [id number]  Date: 2020           Surgeon: Surgeon(s):  Juan Jose Orellana MD    Procedure: Procedure(s):  EGD DIAGNOSTIC ONLY     No Known Allergies  Patient Active Problem List   Diagnosis    [facility] left [facility] fracture[facility]    Morbid obesity with BMI of 45.0-49.9, adult ([address]    Hypoxemia requiring supplemental oxygen    Acute respiratory failure with hypoxia ([address]    Acute on chronic respiratory failure with hypercapnia (HCC)    Acute pulmonary edema (HCC)    Multifocal pneumonia    Rapid atrial fibrillation (HCC)    Chronic obstructive pulmonary disease (HCC)    Paroxysmal atrial fibrillation (HCC)    PAT (paroxysmal atrial tachycardia) ([address]    Kidney stone    Septic shock (HCC)    Neutrophilic leukocytosis    Morbid obesity due to excess calories (HCC)    Chronic respiratory failure with hypoxia (HCC)    Hyperlipidemia    Kidney lesion, native, left    Hyponatremia    Hyperkalemia    Acute renal failure (ARF) ([address]    Moderate protein-calorie malnutrition (HCC)    Hydronephrosis with left ureteropelvic junction (UPJ) obstruction    Hematuria    Ureteral obstruction    Hydronephrosis with urinary obstruction due to ureteral calculus    DARSHAN (acute kidney injury) ([address]    Abnormal CT scan, kidney     Past Medical History:   Diagnosis Date    Chronic kidney disease     COPD (chronic obstructive pulmonary disease) ([address]     Hyperlipidemia     Hypertension      Past Surgical History:   Procedure Laterality Date    CHOLECYSTECTOMY      CYSTOSCOPY Left 2020    CYSTOSCOPY URETERAL STENT INSERTION LEFT RETROGRADE PYELOGRAM performed by Brad Edwards MD at [address] [place] [address] Left 8/10/2020    OPEN TREATMENT OF LEFT HIP FRACTURE WITH CEPHALOMEDULLARY NAIL performed by Santosh Loya MD at . [facility] History     Tobacco Use    Smoking status: Current Every Day Smoker     Packs/day: 1.50     Types: Cigarettes    Smokeless tobacco: Never Used    Tobacco comment: pack n half a day    Substance Use Topics    Alcohol use: Never     Frequency: Never    Drug use: Yes     Types: Marijuana     Comment: occasional      Medications  Current Facility-Administered Medications on File Prior to Visit   Medication Dose Route Frequency Provider Last Rate Last Dose    magnesium sulfate 1 g in dextrose 5% 100 mL IVPB  1 g Intravenous Once Alcides Daley MD        pantoprazole (PROTONIX) injection 40 mg  40 mg Intravenous BID Alcides Daley MD   40 mg at 09/24/20 0936    And    sodium chloride (PF) 0.9 % injection 10 mL  10 mL Intravenous BID Alcides Daley MD   10 mL at 09/24/20 1002    levoFLOXacin (LEVAQUIN) 500 MG/100ML infusion 500 mg  500 mg Intravenous Q24H Alcides Daley MD   Stopped at 09/24/20 1042    dilTIAZem (CARDIZEM CD) extended release capsule 180 mg  180 mg Oral Daily GABRIELA Kline CNP   180 mg at 09/23/20 0927    ipratropium-albuterol (DUONEB) nebulizer solution 1 ampule  1 ampule Inhalation Q4H PRN GABRIELA Kline CNP        gabapentin (NEURONTIN) capsule 300 mg  300 mg Oral TID GABRIELA Kline CNP   300 mg at 09/23/20 2052    escitalopram (LEXAPRO) tablet 10 mg  10 mg Oral Daily GABRIELA Kline CNP   10 mg at 09/23/20 0927    menthol-zinc oxide (CALMOSEPTINE) 0.44-20.6 % ointment   Topical BID PRN Alcides Daley MD        [Held by provider] apixaban (ELIQUIS) tablet 5 mg  5 mg Oral BID Anali Johnson MD   5 mg at 09/23/20 0927    traMADol (ULTRAM) tablet 50 mg  50 mg Oral Q6H PRN Anali Johnson MD        Or    traMADol Nivia Heal) tablet 100 mg  100 mg Oral Q6H PRN Anali Johnson MD   100 mg at 09/23/20 2053    atorvastatin (LIPITOR) tablet 40 mg  40 mg Oral Daily Lorrene Drum, MD   40 mg at 09/23/20 7261    sodium chloride flush 0.9 % injection 10 mL  10 mL Intravenous 2 times per day Christeen Hodgkins, MD   10 mL at 09/24/20 1003    sodium chloride flush 0.9 % injection 10 mL  10 mL Intravenous PRN Christeen Hodgkins, MD        acetaminophen (TYLENOL) tablet 650 mg  650 mg Oral Q4H PRN Christeen Hodgkins, MD   650 mg at 09/23/20 2052    Or    acetaminophen (TYLENOL) suppository 650 mg  650 mg Rectal Q4H PRN Christeen Hodgkins, MD        ondansetron [facility]) injection 4 mg  4 mg Intravenous Q4H PRN Christeen Hodgkins, MD        bisacodyl (DULCOLAX) suppository 10 mg  10 mg Rectal Daily PRN Christeen Hodgkins, MD        albuterol sulfate  (90 Base) MCG/ACT inhaler 2 puff  2 puff Inhalation Q6H PRN Christeen Hodgkins, MD        lactobacillus (CULTURELLE) capsule 2 capsule  2 capsule Oral BID  Elizabeth Talbert, DO   2 capsule at 09/23/20 1653    budesonide-formoterol (SYMBICORT) 160-4.5 MCG/ACT inhaler 2 puff  2 puff Inhalation BID Elizabeth Talbert, DO   2 puff at 09/24/20 0825    iopamidol (ISOVUE-370) 76 % injection 75 mL  75 mL Intravenous ONCE PRN Christeen Hodgkins, MD         Current Outpatient Medications on File Prior to Visit   Medication Sig Dispense Refill    acetaZOLAMIDE (DIAMOX) 250 MG tablet Take 250 mg by mouth daily      escitalopram (LEXAPRO) 10 MG tablet Take 10 mg by mouth daily      oxyCODONE (ROXICODONE) 5 MG immediate release tablet Take 5 mg by mouth every 4 hours as needed for Pain.       potassium chloride (KLOR-CON M) 20 MEQ extended release tablet Take 20 mEq by mouth daily      atorvastatin (LIPITOR) 40 MG tablet Take 40 mg by mouth daily      apixaban (ELIQUIS) 5 MG TABS tablet Take 1 tablet by mouth 2 times daily 60 tablet 1    dilTIAZem (CARDIZEM CD) 180 MG extended release capsule Take 1 capsule by mouth daily 30 capsule 3    furosemide (LASIX) 40 MG tablet Take 1 tablet by mouth daily 60 tablet 0    gabapentin (NEURONTIN) 300 MG capsule Take 300 mg by mouth 3 times daily.  fluticasone-salmeterol (ADVAIR) 250-50 MCG/DOSE AEPB Inhale 1 puff into the lungs 2 times daily      ipratropium-albuterol (DUONEB) 0.5-2.5 (3) MG/3ML SOLN nebulizer solution Inhale 3 mLs into the lungs every 2 hours as needed for Shortness of Breath      albuterol sulfate  (90 Base) MCG/ACT inhaler Inhale 2 puffs into the lungs every 4-6 hours as needed for Shortness of Breath       No current facility-administered medications for this visit. No current outpatient medications on file.      Facility-Administered Medications Ordered in Other Visits   Medication Dose Route Frequency Provider Last Rate Last Dose    magnesium sulfate 1 g in dextrose 5% 100 mL IVPB  1 g Intravenous Once Alcides Daley MD        pantoprazole (PROTONIX) injection 40 mg  40 mg Intravenous BID Alcides Daley MD   40 mg at 09/24/20 0936    And    sodium chloride (PF) 0.9 % injection 10 mL  10 mL Intravenous BID Alcides Daley MD   10 mL at 09/24/20 1002    levoFLOXacin (LEVAQUIN) 500 MG/100ML infusion 500 mg  500 mg Intravenous Q24H Alcides Daley MD   Stopped at 09/24/20 1042    dilTIAZem (CARDIZEM CD) extended release capsule 180 mg  180 mg Oral Daily [facility], APRN - CNP   180 mg at 09/23/20 0927    ipratropium-albuterol (DUONEB) nebulizer solution 1 ampule  1 ampule Inhalation Q4H PRN [facility] APRN - CNP        gabapentin (NEURONTIN) capsule 300 mg  300 mg Oral TID [facility], APRN - CNP   300 mg at 09/23/20 2052    escitalopram (LEXAPRO) tablet 10 mg  10 mg Oral Daily [facility], APRN - CNP   10 mg at 09/23/20 0927    menthol-zinc oxide (CALMOSEPTINE) 0.44-20.6 % ointment   Topical BID PRN Alcides Daley MD        [Held by provider] apixaban (ELIQUIS) tablet 5 mg  5 mg Oral BID Mary Herrera MD   5 mg at 09/23/20 0927    traMADol (ULTRAM) tablet 50 mg  50 mg Oral Q6H PRN Mary Herrera MD        Or    traMADol [facility]) tablet 100 mg  100 mg Oral Q6H PRN Sehreen Bello MD   100 mg at 20    atorvastatin (LIPITOR) tablet 40 mg  40 mg Oral Daily Ludin Shell MD   40 mg at 20 4422    sodium chloride flush 0.9 % injection 10 mL  10 mL Intravenous 2 times per day Ludin Shell MD   10 mL at 20 1003    sodium chloride flush 0.9 % injection 10 mL  10 mL Intravenous PRN Ludin Shell MD        acetaminophen (TYLENOL) tablet 650 mg  650 mg Oral Q4H PRN Ludin Shell MD   650 mg at 20    Or    acetaminophen (TYLENOL) suppository 650 mg  650 mg Rectal Q4H PRN Ludin Shell MD        [facility]ron [facility]) injection 4 mg  4 mg Intravenous Q4H PRN Ludin Shell MD        bisacodyl (DULCOLAX) suppository 10 mg  10 mg Rectal Daily PRN Ludin Shell MD        albuterol sulfate  (90 Base) MCG/ACT inhaler 2 puff  2 puff Inhalation Q6H PRN Ludin Shell MD        lactobacillus (CULTURELLE) capsule 2 capsule  2 capsule Oral BID WC Mercer Lesch, DO   2 capsule at 20 1653    budesonide-formoterol (SYMBICORT) 160-4.5 MCG/ACT inhaler 2 puff  2 puff Inhalation BID Mercer Lesch, DO   2 puff at 20 0825    iopamidol (ISOVUE-370) 76 % injection 75 mL  75 mL Intravenous ONCE PRN Ludin Shell MD         Vital Signs (Current)   There were no vitals filed for this visit.   Vital Signs Statistics (for past 48 hrs)     Temp  Av.8 °F (36 °C)  Min: 93.2 °F (34 °C)   Min taken time: 20 0523  Max: 98.1 °F (36.7 °C)   Max taken time: 20  Pulse  Av.9  Min: 64   Min taken time: 20 0745  Max: 128   Max taken time: 20  Resp  Av.9  Min: 14   Min taken time: 20 1145  Max: 23   Max taken time: 20 1735  BP  Min: 88/52   Min taken time: 20 1513  Max: 128/65   Max taken time: 20 1032  MAP (mmHg)  Av.2  Min: 59   Min taken time: 20 1513  Max: 99   Max taken time: 20 2000  SpO2  Av.4 %  Min: 85 %   Min taken time: 20 0458  Max: 100 %   Max taken time: 20 0825    BP Readings from Last 3 Encounters:   20 128/65   20 (!) 148/81   20 108/64     BMI  There is no height or weight on file to calculate BMI. Estimated body mass index is 41.82 kg/m² as calculated from the following:    Height as of an earlier encounter on 20: 5' 7\" (1.702 m). Weight as of an earlier encounter on 20: 267 lb (121.1 kg).     CBC   Lab Results   Component Value Date    WBC 11.7 2020    RBC 5.10 2020    HGB 12.8 2020    HCT 41.6 2020    MCV 81.7 2020    RDW 20.1 2020     2020     CMP    Lab Results   Component Value Date     2020    K 3.6 2020     2020    CO2 23 2020    BUN 18 2020    CREATININE 0.6 2020    GFRAA >60 2020    GFRAA >60 2013    AGRATIO 0.6 2020    LABGLOM >60 2020    GLUCOSE 84 2020    PROT 6.9 2020    CALCIUM 8.4 2020    BILITOT 0.4 2020    ALKPHOS 170 2020    AST 60 2020    ALT 24 2020     BMP    Lab Results   Component Value Date     2020    K 3.6 2020     2020    CO2 23 2020    BUN 18 2020    CREATININE 0.6 2020    CALCIUM 8.4 2020    GFRAA >60 2020    GFRAA >60 2013    LABGLOM >60 2020    GLUCOSE 84 2020     POCGlucose  Recent Labs     20  0447 20  0556 20  0504   GLUCOSE 73 77 84      Coags    Lab Results   Component Value Date    PROTIME 11.8 2020    INR 1.02 2020    APTT 31.7      HCG (If Applicable) No results found for: PREGTESTUR, PREGSERUM, HCG, HCGQUANT   ABGs   Lab Results   Component Value Date    PHART 7.540 08/10/2020    PO2ART 83.2 08/10/2020    DFG8JKU 46.0 08/10/2020    WEQ6URW 39.3 08/10/2020    BEART 14.8 08/10/2020    W5KTLOIN 98.4 08/10/2020 Type & Screen (If Applicable)  No results found for: LABABO, LABRH                         BMI: Wt Readings from Last 3 Encounters:       NPO Status:8 hours                          Anesthesia Evaluation  Patient summary reviewed no history of anesthetic complications:   Airway: Mallampati: III  TM distance: >3 FB   Neck ROM: full   Dental:    (+) partials      Pulmonary:normal exam    (+) COPD: moderate,                             Cardiovascular:  Exercise tolerance: poor (<4 METS),   (+) hypertension:, dysrhythmias: atrial fibrillation,       ECG reviewed  Rhythm: regular  Rate: normal  Echocardiogram reviewed         Beta Blocker:  Not on Beta Blocker         Neuro/Psych:   Negative Neuro/Psych ROS              GI/Hepatic/Renal:   (+) renal disease: kidney stones,           Endo/Other:    (+) blood dyscrasia (Eliquis): anticoagulation therapy:., .                 Abdominal:   (+) obese,         Vascular: negative vascular ROS. Anesthesia Plan      MAC     ASA 4       Induction: intravenous. Anesthetic plan and risks discussed with patient. Plan discussed with CRNA. This pre-anesthesia assessment may be used as a history and physical.    DOS STAFF ADDENDUM:    Pt seen and examined, chart reviewed (including anesthesia, drug and allergy history). No interval changes to history and physical examination. Anesthetic plan, risks, benefits, alternatives, and personnel involved discussed with patient. Questions and concerns addressed. Patient(family) verbalized an understanding and agrees to proceed.       Tawana Spangler MD  September 24, 2020  10:46 AM

## 2020-09-24 NOTE — PROGRESS NOTES
Hospitalist Progress Note      PCP: Elyssa Rios MD    Date of Admission: 9/18/2020        Subjective: feels ok, no fever, chills, nausea or vomiting, no abdominal pain. Medications:  Reviewed    Infusion Medications   Scheduled Medications    magnesium sulfate  1 g Intravenous Once    pantoprazole  40 mg Intravenous BID    And    sodium chloride (PF)  10 mL Intravenous BID    levofloxacin  500 mg Intravenous Q24H    dilTIAZem  180 mg Oral Daily    gabapentin  300 mg Oral TID    escitalopram  10 mg Oral Daily    [Held by provider] apixaban  5 mg Oral BID    atorvastatin  40 mg Oral Daily    sodium chloride flush  10 mL Intravenous 2 times per day    lactobacillus  2 capsule Oral BID WC    budesonide-formoterol  2 puff Inhalation BID     PRN Meds: ipratropium-albuterol, menthol-zinc oxide, traMADol **OR** traMADol, sodium chloride flush, acetaminophen **OR** acetaminophen, ondansetron, bisacodyl, albuterol sulfate HFA, iopamidol      Intake/Output Summary (Last 24 hours) at 9/24/2020 0850  Last data filed at 9/24/2020 0026  Gross per 24 hour   Intake 480 ml   Output 650 ml   Net -170 ml       Physical Exam Performed:    BP (!) 90/58   Pulse 73   Temp 97.4 °F (36.3 °C) (Oral)   Resp 18   Ht 5' 7\" (1.702 m)   Wt 267 lb 6.4 oz (121.3 kg)   SpO2 100%   BMI 41.88 kg/m²     General appearance: No apparent distress  Neck: Supple  Respiratory:  Normal respiratory effort. Clear to auscultation, bilaterally without Rales/Wheezes/Rhonchi. Cardiovascular: Regular rate and rhythm with normal S1/S2 without murmurs, rubs or gallops. Abdomen: Soft, non-tender, non-distended with normal bowel sounds. Musculoskeletal: No clubbing, cyanosis   Skin: Skin color, texture, turgor normal.  No rashes or lesions.   Neurologic:  No focal weakness   Psychiatric: Alert and oriented  Capillary Refill: Brisk,< 3 seconds   Peripheral Pulses: +2 palpable, equal bilaterally       Labs:   Recent Labs 09/22/20  0447 09/23/20  0556 09/23/20  1803 09/24/20  0504   WBC 10.8 10.2  --  11.7*   HGB 12.7 13.4 12.8 12.8   HCT 40.9 44.2 41.3 41.6    241  --  255     Recent Labs     09/22/20  0447 09/23/20  0556 09/24/20  0504    139 140   K 3.6 4.0 3.6   * 110 110   CO2 22 23 23   BUN 25* 20 18   CREATININE 0.8 0.7 0.6   CALCIUM 8.4 8.6 8.4   PHOS 2.3* 3.0 3.1     No results for input(s): AST, ALT, BILIDIR, BILITOT, ALKPHOS in the last 72 hours. No results for input(s): INR in the last 72 hours. No results for input(s): Gerardo Service in the last 72 hours. Urinalysis:      Lab Results   Component Value Date    NITRU Color Interfer 09/18/2020    WBCUA 6-9 09/18/2020    BACTERIA 4+ 09/18/2020    RBCUA >100 09/18/2020    BLOODU LARGE 09/18/2020    SPECGRAV Color Interfer 09/18/2020    GLUCOSEU Color Interfer 09/18/2020       Radiology:  FL RETROGRADE PYELOGRAM LEFT   Final Result      CT ABDOMEN PELVIS WO CONTRAST Additional Contrast? None   Final Result   There is moderate left hydronephrosis which appears to be secondary to an 8   mm calculus at the UPJ. That said, there is also a suspicious appearing lesion adjacent to or within   the left kidney, with heterogeneous hyperdensity. That could represent a   hematoma from the kidney, but renal neoplasm must be considered as well. Urologic consultation is recommended. XR CHEST PORTABLE   Final Result   No acute cardiopulmonary findings.                  Assessment/Plan:    Active Hospital Problems    Diagnosis    Abnormal CT scan, kidney [R93.429]    Hyponatremia [E87.1]    Hyperkalemia [E87.5]    Acute renal failure (ARF) (HCC) [N17.9]    Hydronephrosis with left ureteropelvic junction (UPJ) obstruction [Q62.11]    Hematuria [R31.9]    Ureteral obstruction [N13.5]    Hydronephrosis with urinary obstruction due to ureteral calculus [N13.2]    DARSHAN (acute kidney injury) ([address] [N17.9]    Kidney stone [N20.0]    Septic shock ([facility] [A41.9, A74.06]    Neutrophilic leukocytosis [D50.3]    Morbid obesity due to excess calories ([facility] [E66.01]    Chronic respiratory failure with hypoxia (HCC) [J96.11]    Hyperlipidemia [E78.5]    Kidney lesion, native, left [N28.9]    Paroxysmal atrial fibrillation (HCC) [I48.0]    Chronic obstructive pulmonary disease ([facility] [J44.9]    Acute respiratory failure with hypoxia ([facility] [J96.01]     1. Septic shock and severe sepsis, POA, due to complicated  UTI/pyelonephritis, S/P stent placement, iv meropenem  changed to Levaquin, sensitivity noted, follow up with urology as outpatient. procalcitonin decreased.   2. Complicated UTI/pyelonephriits, as above. 3. Acute renal failure, improving. 4. HTN, controlled. 5. Afib with RVR, changed Cardizem to po. hold eliquis  6. Black stool reported and FOBT positive, eliquis on hols, consulted GI, added BID pantoprazole, for EGD today.      DVT Prophylaxis: SCD  Diet: Diet NPO, After Midnight Exceptions are: Ice Chips, Sips with Meds  Code Status: Limited        Dispo - Ongoing management     Adriana Gottron, MD

## 2020-09-24 NOTE — PROGRESS NOTES
Occupational Therapy  Returned to room for attempted co-tx with PT. Pt in bed, refusing OOB or sitting up to edge of bed, or EU ex's, as lunch tray also arrived during attempt. Pt education on importance of participating in therapy to increase strength, endurance to increase independence in ADL's, I ADL's. Pt continued to refuse. Pt providing further information of PTA status, stating she does not have a w/c at home and that she was ambulating at the nursing home just prior to this admission. Refer to the last note for status and d/c recommendations.   Caren TORIBIO/L,515  Therapy Time     Individual Co-treatment   Time In 0349     Time Out 1318     Minutes 11

## 2020-09-25 LAB
ALBUMIN SERPL-MCNC: 2.7 G/DL (ref 3.4–5)
ALP BLD-CCNC: 120 U/L (ref 40–129)
ALT SERPL-CCNC: 16 U/L (ref 10–40)
ANION GAP SERPL CALCULATED.3IONS-SCNC: 8 MMOL/L (ref 3–16)
AST SERPL-CCNC: 22 U/L (ref 15–37)
BASOPHILS ABSOLUTE: 0.1 K/UL (ref 0–0.2)
BASOPHILS RELATIVE PERCENT: 0.5 %
BILIRUB SERPL-MCNC: 0.3 MG/DL (ref 0–1)
BILIRUBIN DIRECT: <0.2 MG/DL (ref 0–0.3)
BILIRUBIN, INDIRECT: ABNORMAL MG/DL (ref 0–1)
BUN BLDV-MCNC: 18 MG/DL (ref 7–20)
CALCIUM SERPL-MCNC: 8.4 MG/DL (ref 8.3–10.6)
CHLORIDE BLD-SCNC: 109 MMOL/L (ref 99–110)
CO2: 24 MMOL/L (ref 21–32)
CREAT SERPL-MCNC: 0.7 MG/DL (ref 0.6–1.2)
EOSINOPHILS ABSOLUTE: 0.9 K/UL (ref 0–0.6)
EOSINOPHILS RELATIVE PERCENT: 7.4 %
GFR AFRICAN AMERICAN: >60
GFR NON-AFRICAN AMERICAN: >60
GLUCOSE BLD-MCNC: 111 MG/DL (ref 70–99)
GLUCOSE BLD-MCNC: 112 MG/DL (ref 70–99)
GLUCOSE BLD-MCNC: 112 MG/DL (ref 70–99)
GLUCOSE BLD-MCNC: 125 MG/DL (ref 70–99)
GLUCOSE BLD-MCNC: 89 MG/DL (ref 70–99)
HCT VFR BLD CALC: 41.8 % (ref 36–48)
HCT VFR BLD CALC: 43 % (ref 36–48)
HEMOGLOBIN: 12.7 G/DL (ref 12–16)
HEMOGLOBIN: 13.3 G/DL (ref 12–16)
LYMPHOCYTES ABSOLUTE: 2 K/UL (ref 1–5.1)
LYMPHOCYTES RELATIVE PERCENT: 17.3 %
MAGNESIUM: 1.7 MG/DL (ref 1.8–2.4)
MCH RBC QN AUTO: 24.9 PG (ref 26–34)
MCHC RBC AUTO-ENTMCNC: 30.5 G/DL (ref 31–36)
MCV RBC AUTO: 81.7 FL (ref 80–100)
MONOCYTES ABSOLUTE: 0.7 K/UL (ref 0–1.3)
MONOCYTES RELATIVE PERCENT: 5.9 %
NEUTROPHILS ABSOLUTE: 8.1 K/UL (ref 1.7–7.7)
NEUTROPHILS RELATIVE PERCENT: 68.9 %
PDW BLD-RTO: 20.2 % (ref 12.4–15.4)
PERFORMED ON: ABNORMAL
PERFORMED ON: NORMAL
PHOSPHORUS: 3.6 MG/DL (ref 2.5–4.9)
PLATELET # BLD: 255 K/UL (ref 135–450)
PMV BLD AUTO: 8.2 FL (ref 5–10.5)
POTASSIUM REFLEX MAGNESIUM: 3.6 MMOL/L (ref 3.5–5.1)
RBC # BLD: 5.11 M/UL (ref 4–5.2)
SODIUM BLD-SCNC: 141 MMOL/L (ref 136–145)
TOTAL PROTEIN: 4.8 G/DL (ref 6.4–8.2)
WBC # BLD: 11.7 K/UL (ref 4–11)

## 2020-09-25 PROCEDURE — 2580000003 HC RX 258: Performed by: UROLOGY

## 2020-09-25 PROCEDURE — 85025 COMPLETE CBC W/AUTO DIFF WBC: CPT

## 2020-09-25 PROCEDURE — 85018 HEMOGLOBIN: CPT

## 2020-09-25 PROCEDURE — 80076 HEPATIC FUNCTION PANEL: CPT

## 2020-09-25 PROCEDURE — 6370000000 HC RX 637 (ALT 250 FOR IP): Performed by: INTERNAL MEDICINE

## 2020-09-25 PROCEDURE — 36415 COLL VENOUS BLD VENIPUNCTURE: CPT

## 2020-09-25 PROCEDURE — 85014 HEMATOCRIT: CPT

## 2020-09-25 PROCEDURE — 94640 AIRWAY INHALATION TREATMENT: CPT

## 2020-09-25 PROCEDURE — 97530 THERAPEUTIC ACTIVITIES: CPT

## 2020-09-25 PROCEDURE — C9113 INJ PANTOPRAZOLE SODIUM, VIA: HCPCS | Performed by: INTERNAL MEDICINE

## 2020-09-25 PROCEDURE — 6360000002 HC RX W HCPCS: Performed by: INTERNAL MEDICINE

## 2020-09-25 PROCEDURE — 84100 ASSAY OF PHOSPHORUS: CPT

## 2020-09-25 PROCEDURE — 94761 N-INVAS EAR/PLS OXIMETRY MLT: CPT

## 2020-09-25 PROCEDURE — 2580000003 HC RX 258: Performed by: INTERNAL MEDICINE

## 2020-09-25 PROCEDURE — 83735 ASSAY OF MAGNESIUM: CPT

## 2020-09-25 PROCEDURE — 6370000000 HC RX 637 (ALT 250 FOR IP): Performed by: UROLOGY

## 2020-09-25 PROCEDURE — 2700000000 HC OXYGEN THERAPY PER DAY

## 2020-09-25 PROCEDURE — 80048 BASIC METABOLIC PNL TOTAL CA: CPT

## 2020-09-25 PROCEDURE — 84145 PROCALCITONIN (PCT): CPT

## 2020-09-25 PROCEDURE — 2060000000 HC ICU INTERMEDIATE R&B

## 2020-09-25 RX ORDER — AMMONIUM LACTATE 12 G/100G
LOTION TOPICAL PRN
Status: DISCONTINUED | OUTPATIENT
Start: 2020-09-25 | End: 2020-09-26 | Stop reason: HOSPADM

## 2020-09-25 RX ORDER — MAGNESIUM SULFATE 1 G/100ML
1 INJECTION INTRAVENOUS ONCE
Status: COMPLETED | OUTPATIENT
Start: 2020-09-25 | End: 2020-09-25

## 2020-09-25 RX ADMIN — TRAMADOL HYDROCHLORIDE 100 MG: 50 TABLET, FILM COATED ORAL at 18:12

## 2020-09-25 RX ADMIN — Medication 10 ML: at 08:48

## 2020-09-25 RX ADMIN — DILTIAZEM HYDROCHLORIDE 180 MG: 180 CAPSULE, COATED, EXTENDED RELEASE ORAL at 08:48

## 2020-09-25 RX ADMIN — GABAPENTIN 300 MG: 300 CAPSULE ORAL at 20:38

## 2020-09-25 RX ADMIN — PANTOPRAZOLE SODIUM 40 MG: 40 INJECTION, POWDER, FOR SOLUTION INTRAVENOUS at 08:48

## 2020-09-25 RX ADMIN — Medication 10 ML: at 20:41

## 2020-09-25 RX ADMIN — MAGNESIUM SULFATE HEPTAHYDRATE 1 G: 1 INJECTION, SOLUTION INTRAVENOUS at 11:00

## 2020-09-25 RX ADMIN — GABAPENTIN 300 MG: 300 CAPSULE ORAL at 08:48

## 2020-09-25 RX ADMIN — PANTOPRAZOLE SODIUM 40 MG: 40 INJECTION, POWDER, FOR SOLUTION INTRAVENOUS at 20:38

## 2020-09-25 RX ADMIN — BUDESONIDE AND FORMOTEROL FUMARATE DIHYDRATE 2 PUFF: 160; 4.5 AEROSOL RESPIRATORY (INHALATION) at 20:11

## 2020-09-25 RX ADMIN — APIXABAN 5 MG: 5 TABLET, FILM COATED ORAL at 20:38

## 2020-09-25 RX ADMIN — ATORVASTATIN CALCIUM 40 MG: 40 TABLET, FILM COATED ORAL at 08:48

## 2020-09-25 RX ADMIN — ESCITALOPRAM OXALATE 10 MG: 10 TABLET ORAL at 08:48

## 2020-09-25 RX ADMIN — GABAPENTIN 300 MG: 300 CAPSULE ORAL at 13:57

## 2020-09-25 RX ADMIN — SODIUM CHLORIDE, PRESERVATIVE FREE 10 ML: 5 INJECTION INTRAVENOUS at 20:45

## 2020-09-25 RX ADMIN — BUDESONIDE AND FORMOTEROL FUMARATE DIHYDRATE 2 PUFF: 160; 4.5 AEROSOL RESPIRATORY (INHALATION) at 08:57

## 2020-09-25 RX ADMIN — SODIUM CHLORIDE, PRESERVATIVE FREE 10 ML: 5 INJECTION INTRAVENOUS at 08:49

## 2020-09-25 RX ADMIN — APIXABAN 5 MG: 5 TABLET, FILM COATED ORAL at 08:48

## 2020-09-25 RX ADMIN — ANORECTAL OINTMENT: 15.7; .44; 24; 20.6 OINTMENT TOPICAL at 11:23

## 2020-09-25 RX ADMIN — LEVOFLOXACIN 500 MG: 500 TABLET, FILM COATED ORAL at 08:48

## 2020-09-25 RX ADMIN — TRAMADOL HYDROCHLORIDE 100 MG: 50 TABLET, FILM COATED ORAL at 10:59

## 2020-09-25 RX ADMIN — Medication 2 CAPSULE: at 08:48

## 2020-09-25 ASSESSMENT — PAIN DESCRIPTION - ORIENTATION
ORIENTATION: RIGHT
ORIENTATION: LEFT

## 2020-09-25 ASSESSMENT — PAIN SCALES - GENERAL
PAINLEVEL_OUTOF10: 9
PAINLEVEL_OUTOF10: 0
PAINLEVEL_OUTOF10: 10
PAINLEVEL_OUTOF10: 0

## 2020-09-25 ASSESSMENT — PAIN DESCRIPTION - PAIN TYPE
TYPE: ACUTE PAIN
TYPE: CHRONIC PAIN

## 2020-09-25 ASSESSMENT — PAIN DESCRIPTION - PROGRESSION
CLINICAL_PROGRESSION: GRADUALLY WORSENING
CLINICAL_PROGRESSION: NOT CHANGED

## 2020-09-25 ASSESSMENT — PAIN DESCRIPTION - FREQUENCY
FREQUENCY: CONTINUOUS
FREQUENCY: CONTINUOUS

## 2020-09-25 ASSESSMENT — PAIN DESCRIPTION - DESCRIPTORS
DESCRIPTORS: DISCOMFORT
DESCRIPTORS: SHARP;SHOOTING

## 2020-09-25 ASSESSMENT — PAIN DESCRIPTION - ONSET
ONSET: ON-GOING
ONSET: SUDDEN

## 2020-09-25 ASSESSMENT — PAIN DESCRIPTION - LOCATION
LOCATION: ABDOMEN
LOCATION: KNEE

## 2020-09-25 NOTE — PLAN OF CARE
Problem: Skin Integrity:  Goal: Will show no infection signs and symptoms  Description: Will show no infection signs and symptoms  Outcome: Ongoing  Goal: Absence of new skin breakdown  Description: Absence of new skin breakdown  Outcome: Ongoing     Problem: Falls - Risk of:  Goal: Will remain free from falls  Description: Will remain free from falls  Outcome: Ongoing  Goal: Absence of physical injury  Description: Absence of physical injury  Outcome: Ongoing     Problem: Nutrition  Goal: Optimal nutrition therapy  Outcome: Ongoing     Problem: Pain:  Goal: Pain level will decrease  Description: Pain level will decrease  Outcome: Ongoing  Goal: Control of acute pain  Description: Control of acute pain  Outcome: Ongoing  Goal: Control of chronic pain  Description: Control of chronic pain  Outcome: Ongoing     Problem:  Activity:  Goal: Ability to tolerate increased activity will improve  Description: Ability to tolerate increased activity will improve  Outcome: Ongoing

## 2020-09-25 NOTE — CARE COORDINATION
Discharge Planning:  BALA contacted Gavin Byrd at [facility]. Message left requesting an update on the status of the pre cert for this pt. Awaiting a return call.   Electronically signed by Sandrita Birmingham on 9/25/2020 at 11:50 AM

## 2020-09-25 NOTE — PROGRESS NOTES
Physical Therapy  Facility/Department: [facility] PROGRESSIVE CARE  Daily Treatment Note  NAME: Barry Gregg  : 1950  MRN: [id number]    Date of Service: 2020    Discharge Recommendations:  Continue to assess pending progress     Barry Gregg scored a  on the AM-PAC short mobility form. Current research shows that an AM-PAC score of 17 or less is typically not associated with a discharge to the patient's home setting. Based on the patient's AM-PAC score and their current functional mobility deficits, it is recommended that the patient have 3-5 sessions per week of Physical Therapy at d/c to increase the patient's independence. Please see assessment section for further patient specific details. If patient discharges prior to next session this note will serve as a discharge summary. Please see below for the latest assessment towards goals. Assessment   Body structures, Functions, Activity limitations: Decreased functional mobility ; Decreased strength;Decreased safe awareness;Decreased endurance  Assessment: 79 y/o female admit from SNF setting 2020 with Gross Hematuria, Pyelonephritis, Septic Shock. 2020 S/P Cystoscopy and L Ureteral Stent Placement. PMH as noted including CKD, COPD, L Hip Fx/ORIF (8/10/2020, Dr. Hoa Brambila). Pt admit from SNF setting; currently pt unable to transfer to chair via Stedy despite assist x 2. At this time, anticipate return to SNF setting with cont PT Services. Prognosis: Fair  Activity Tolerance  Activity Tolerance: Patient limited by endurance     Patient Diagnosis(es): The primary encounter diagnosis was Kidney stone on left side. Diagnoses of Gross hematuria, DARSHAN (acute kidney injury) ([address] Leukocytosis, unspecified type, Elevated procalcitonin, Abnormal CT scan, kidney, and Septicemia ([address] were also pertinent to this visit.      has a past medical history of Chronic kidney disease, COPD (chronic obstructive pulmonary disease) ([address] Hyperlipidemia, and Hypertension. has a past surgical history that includes Cholecystectomy; Hip fracture surgery (Left, 8/10/2020); Cystoscopy (Left, 9/19/2020); and Upper gastrointestinal endoscopy (N/A, 9/24/2020). Restrictions  Restrictions/Precautions  Restrictions/Precautions: Fall Risk  Position Activity Restriction  Other position/activity restrictions: Recent L Hip Fx/ORIF 8/10/2020 (Dr Hasmukh Norris). Office visit 8/26/2020 : pt may advance to WBAT. O2 1L via NC. Subjective   General  Chart Reviewed: Yes  Additional Pertinent Hx: 79 y/o female admit from SNF setting 9/19/2020 with Gross Hematuria, Pyelonephritis, Septic Shock. 9/19/2020 S/P Cystoscopy and L Ureteral Stent Placement. PMH as noted including CKD, COPD, L Hip Fx/ORIF (8/10/2020, Dr. Hasmukh Norris). Response To Previous Treatment: Patient with no complaints from previous session.   Family / Caregiver Present: No  Referring Practitioner: Dr. Jonelle Demarco  Subjective  Subjective: Pt agreeable to getting up to chair, had been strongly encouraged by Dr. Chacon Dickinson  Comments: Pt did not want to use lift initially, tried standing but did not feel legs could hold her, tried Mobile but could not stand tall enough for seat, so maximove used  Pain Screening  Patient Currently in Pain: Denies  Vital Signs  Patient Currently in Pain: Denies       Orientation     Cognition      Objective   Bed mobility  Rolling to Left: Moderate assistance;2 Person assistance  Rolling to Right: Moderate assistance;2 Person assistance  Supine to Sit: Maximum assistance;2 Person assistance  Sit to Supine: Maximum assistance;2 Person assistance  Transfers  Sit to Stand: 2 Person Assistance;Maximum Assistance  Stand to sit: 2 Person Assistance;Maximum Assistance  Bed to Chair: Dependent/Total  Ambulation  Ambulation?: No  Stairs/Curb  Stairs?: No     Balance  Posture: Fair  Sitting - Static: Good  Sitting - Dynamic: Good  Standing - Static: Fair  Standing - Dynamic: Poor Comment: Pt sit to stand at wh walker max a x2, unable to take any steps. Unable to stand at [place], used [employer] transfer. AM-PAC Score  AM-PAC Inpatient Mobility Raw Score : 9 (09/25/20 1257)  AM-PAC Inpatient T-Scale Score : 30.55 (09/25/20 1257)  Mobility Inpatient CMS 0-100% Score: 81.38 (09/25/20 1257)  Mobility Inpatient CMS G-Code Modifier : CM (09/25/20 1257)     Goals  Short term goals  Time Frame for Short term goals: Upon d/c acute care setting. Short term goal 1: Bed Mob Mod assist x 2. Short term goal 2: Transfers via Stedy Min/Mod assist x 2. Short term goal 3: Attempt Stand/Amb with Walker, WBAT assist x 2 as approp. Short term goal 4: Pt participating in approp ROM/Strength Exs. Patient Goals   Patient goals : Be able to walk, get home to . Plan    Plan  Times per week: 3-5x week while in acute care setting. Current Treatment Recommendations: Strengthening, Functional Mobility Training, Transfer Training, Gait Training, Safety Education & Training, Patient/Caregiver Education & Training  Safety Devices  Type of devices: All fall risk precautions in place, Call light within reach, Chair alarm in place, Gait belt, Left in chair, Nurse notified(Pt with maximove pad under her, explained to RN positioning of maximove for return to bed)     Therapy Time   Individual Concurrent Group Co-treatment   Time In 1210         Time Out 1255         Minutes 45         Timed Code Treatment Minutes: 39 Minutes     If pt is discharged before subsequent therapy sessions, this note will serve as the discharge summary.     Aicha Simmons, [id number]

## 2020-09-25 NOTE — CARE COORDINATION
Discharge Planning:  LOC request faxed to [employer] at [phone] on this date. Awaiting a response. Electronically signed by Shannan Hoskins on 9/25/2020 at 3:29 PM      ADDENDUM:  LOC received and faxed to [facility], AN AFFILIATE OF [facility] on this date. Silas Shi at [facility], AN [facility]FFILIATE OF [facility] informed.    Electronically signed by Shannan Hoskins on 9/25/2020 at 4:15 PM

## 2020-09-25 NOTE — DISCHARGE INSTR - COC
Continuity of Care Form    Patient Name: Jesse Walton   :  1950  MRN:  [id number]    Admit date:  2020  Discharge date:      Code Status Order: Limited   Advance Directives:   [address] [facility] Documentation       Date/Time Healthcare Directive Type of Healthcare Directive Copy in [address] Agent's Name Healthcare Agent's Phone Number    20 1037  No, patient does not have an advance directive for healthcare treatment -- -- -- -- --    20 0411  No, patient does not have an advance directive for healthcare treatment -- -- -- -- --    20 0409  No, patient does not have an advance directive for healthcare treatment -- -- -- -- --            Admitting Physician:  Larisa Prater MD  PCP: Karime Novak MD    Discharging Nurse: [facility] Unit/Room#: V7H-6775/1507-21  Discharging Unit Phone Number: [phone]    Emergency Contact:   Extended Emergency Contact Information  Primary Emergency Contact: Abrahan Ewing  Address: [address], [address] [place] [address],[address] Phone: [phone]  Work Phone: [phone]  Relation: Spouse    Past Surgical History:  Past Surgical History:   Procedure Laterality Date    CHOLECYSTECTOMY      CYSTOSCOPY Left 2020    CYSTOSCOPY URETERAL STENT INSERTION LEFT RETROGRADE PYELOGRAM performed by Morgan Lopez MD at [facility] Left 8/10/2020    OPEN TREATMENT OF LEFT HIP FRACTURE WITH CEPHALOMEDULLARY NAIL performed by Mo Maria MD at [facility] 2020    EGD DIAGNOSTIC ONLY performed by Jori Gomez MD at [facility] ENDOSCOPY       Immunization History: There is no immunization history on file for this patient.     Active Problems:  Patient Active Problem List   Diagnosis Code    Closed left hip fracture, initial encounter ([facility] S72.002A    Morbid obesity with BMI of 45.0-49.9, adult ([facility] E66.01, Z68.42    Hypoxemia requiring supplemental oxygen R09.02, Z99.81    Acute respiratory failure with hypoxia (HCC) J96.01    Acute on chronic respiratory failure with hypercapnia (HCC) J96.22    Acute pulmonary edema (HCC) J81.0    Multifocal pneumonia J18.9    Rapid atrial fibrillation (HCC) I48.91    Chronic obstructive pulmonary disease (HCC) J44.9    Paroxysmal atrial fibrillation (HCC) I48.0    PAT (paroxysmal atrial tachycardia) (HCC) I47.1    Kidney stone N20.0    Septic shock (HCC) A41.9, C50.54    Neutrophilic leukocytosis Y61.5    Morbid obesity due to excess calories (HCC) E66.01    Chronic respiratory failure with hypoxia (HCC) J96.11    Hyperlipidemia E78.5    Kidney lesion, native, left N28.9    Hyponatremia E87.1    Hyperkalemia E87.5    Acute renal failure (ARF) (HCC) N17.9    Moderate protein-calorie malnutrition (HCC) E44.0    Hydronephrosis with left ureteropelvic junction (UPJ) obstruction Q62.11    Hematuria R31.9    Ureteral obstruction N13.5    Hydronephrosis with urinary obstruction due to ureteral calculus N13.2    DARSHAN (acute kidney injury) ([facility] N17.9    Abnormal CT scan, kidney R93.429       Isolation/Infection:   Isolation            No Isolation          Patient Infection Status       Infection Onset Added Last Indicated Last Indicated By Review Planned Expiration Resolved Resolved By    None active    Resolved    COVID-19 Rule Out 09/24/20 09/24/20 09/24/20 COVID-19 (Ordered)   09/24/20 Rule-Out Test Resulted    COVID-19 Rule Out 09/18/20 09/18/20 09/18/20 COVID-19 (Ordered)   09/18/20 Rule-Out Test Resulted    COVID-19 Rule Out 08/14/20 08/14/20 08/14/20 COVID-19 (Ordered)   08/15/20 Rule-Out Test Resulted    COVID-19 Rule Out 08/06/20 08/06/20 08/06/20 COVID-19 (Ordered)   08/06/20 Rule-Out Test Resulted            Nurse Assessment:  Last Vital Signs: /72   Pulse 71   Temp 98.2 °F (36.8 °C) (Axillary)   Resp 18   Ht 5' 7\" (1.702 m)   Wt 266 lb 11.2 oz (121 kg)   SpO2 96%   BMI 41.77 kg/m²     Last documented pain score (0-10 scale): Pain Level: 0  Last Weight:   Wt Readings from Last 1 Encounters:   09/25/20 266 lb 11.2 oz (121 kg)     Mental Status:  oriented and alert    IV Access:  - None    Nursing Mobility/ADLs:  Walking   Dependent  Transfer  Dependent  Bathing  Assisted  Dressing  Dependent  Toileting  Dependent  Feeding  Assisted  Med Admin  Assisted  Med Delivery   whole    Wound Care Documentation and Therapy:  Wound 08/15/20 Heel (Active)   Number of days: 41       Wound 09/19/20 Heel Left (Active)   Wound Image   09/21/20 1511   Wound Pressure Unstageable 09/22/20 0800   Dressing Status Clean;Dry; Intact 09/23/20 2020   Dressing Changed Changed/New 09/21/20 1511   Dressing/Treatment Other (comment) 09/23/20 2020   Wound Cleansed Rinsed/Irrigated with saline 09/19/20 0425   Dressing Change Due 09/24/20 09/23/20 2020   Wound Length (cm) 1 cm 09/25/20 1248   Wound Width (cm) 2.5 cm 09/25/20 1248   Wound Surface Area (cm^2) 2.5 cm^2 09/25/20 1248   Change in Wound Size % (l*w) 82.14 09/25/20 1248   Wound Assessment Slough;[place] 09/21/20 1511   Drainage Amount None 09/21/20 1511   Odor None 09/21/20 1511   Ginny-wound Assessment Red 09/21/20 1511   Pink%Wound Bed 10 09/21/20 1511   Red%Wound Bed 10 09/19/20 0425   Yellow%Wound Bed 90 09/21/20 1511   Culture Taken No 09/19/20 0425   Number of days: 6        Elimination:  Continence:   · Bowel: No  · Bladder: No  Urinary Catheter: Insertion Date: 09/19/2020   Colostomy/Ileostomy/Ileal Conduit: No       Date of Last BM: 09/23/2020    Intake/Output Summary (Last 24 hours) at 9/25/2020 1253  Last data filed at 9/25/2020 1248  Gross per 24 hour   Intake 790 ml   Output 1050 ml   Net -260 ml     I/O last 3 completed shifts: In: 56 [P.O.:790; I.V.:100]  Out: 575 [Urine:575]    Safety Concerns:      At Risk for Falls    Impairments/Disabilities:      None    Nutrition Therapy:  Current Nutrition Therapy: - Oral Diet:  General    Routes of Feeding: Oral  Liquids: No Restrictions  Daily Fluid Restriction: no  Last Modified Barium Swallow with Video (Video Swallowing Test): not done    Treatments at the Time of Hospital Discharge:   Respiratory Treatments: duonebs, inhalers, oxygen  Oxygen Therapy:  is not on home oxygen therapy. Ventilator:    - No ventilator support    Rehab Therapies: Physical Therapy and Occupational Therapy  Weight Bearing Status/Restrictions: Touchdown weight bearing (10-25 lbs) only on right leg  Other Medical Equipment (for information only, NOT a DME order):  hospital bed and maximove  Other Treatments: n/a    Patient's personal belongings (please select all that are sent with patient):  None    RN SIGNATURE:  Electronically signed by Autumn Santoyo RN on 9/25/20 at 3:14 PM EDT    CASE MANAGEMENT/SOCIAL WORK SECTION    Inpatient Status Date: 09/18/2020    Readmission Risk Assessment Score:  Readmission Risk              Risk of Unplanned Readmission:        21           Discharging to Facility/ Agency   · Name: [facility]  · Address:  · Phone:[phone]  · MZR:[phone]  ·     / signature:Electronically signed by Ceci Hamlin on 9/25/2020 at 12:53 PM      PHYSICIAN SECTION    Prognosis: Good    Condition at Discharge: Stable    Rehab Potential (if transferring to Rehab): Good    Recommended Labs or Other Treatments After Discharge: PT, OT, follow up with GI in 1 week, follow up with urology in 1 week, follow up with cardiology in 2 weeks, follow up with pulmonary in 2 weeks    Physician Certification: I certify the above information and transfer of Daryn Bronson  is necessary for the continuing treatment of the diagnosis listed and that she requires [facility] for less 30 days.      Update Admission H&P: No change in H&P    PHYSICIAN SIGNATURE:  Electronically signed by Lyna Dakins, MD on 9/25/20 at 1:39 PM EDT

## 2020-09-25 NOTE — PLAN OF CARE
Problem: Skin Integrity:  Goal: Will show no infection signs and symptoms  Description: Will show no infection signs and symptoms  9/25/2020 1648 by Rasheeda Lucas RN  Outcome: Ongoing  9/25/2020 0406 by Olena Villareal RN  Outcome: Ongoing  Goal: Absence of new skin breakdown  Description: Absence of new skin breakdown  9/25/2020 1648 by Rasheeda Lucas RN  Outcome: Ongoing  9/25/2020 0406 by Olena Villareal RN  Outcome: Ongoing     Problem: Falls - Risk of:  Goal: Will remain free from falls  Description: Will remain free from falls  9/25/2020 1648 by Rasheeda Lucas RN  Outcome: Ongoing  9/25/2020 0406 by Olena Villareal RN  Outcome: Ongoing  Goal: Absence of physical injury  Description: Absence of physical injury  9/25/2020 1648 by Rasheeda Lucas RN  Outcome: Ongoing  9/25/2020 0406 by Olena Villareal RN  Outcome: Ongoing     Problem: Nutrition  Goal: Optimal nutrition therapy  9/25/2020 1648 by Raseheda Lucas RN  Outcome: Ongoing  9/25/2020 0406 by Olena Villareal RN  Outcome: Ongoing     Problem: Pain:  Goal: Pain level will decrease  Description: Pain level will decrease  9/25/2020 1648 by Rasheeda Lucas RN  Outcome: Ongoing  9/25/2020 0406 by Olena Villareal RN  Outcome: Ongoing  Goal: Control of acute pain  Description: Control of acute pain  9/25/2020 1648 by Rasheeda Lucas RN  Outcome: Ongoing  9/25/2020 0406 by Olena Villareal RN  Outcome: Ongoing  Goal: Control of chronic pain  Description: Control of chronic pain  9/25/2020 1648 by Rasheeda Lucas RN  Outcome: Ongoing  9/25/2020 0406 by Olena Villareal RN  Outcome: Ongoing     Problem:  Activity:  Goal: Ability to tolerate increased activity will improve  Description: Ability to tolerate increased activity will improve  9/25/2020 1648 by Rsaheeda Lucas RN  Outcome: Ongoing  9/25/2020 0406 by Olena Villareal RN  Outcome: Ongoing

## 2020-09-25 NOTE — CARE COORDINATION
[facility] Wound Ostomy Continence Nurse  Follow-up Progress Note       NAME:  Wendy Manuel  MEDICAL RECORD NUMBER:  [id number]  AGE:  78 y.o. GENDER:  female  :  1950  TODAY'S DATE:  2020    Subjective:   Wound Identification:  Wound Type: pressure  Contributing Factors: chronic pressure and decreased mobility        Patient Goal of Care:  [x] Wound Healing  [] Odor Control  [] Palliative Care  [] Pain Control   [] Other:     Objective:    /72   Pulse 71   Temp 98.2 °F (36.8 °C) (Axillary)   Resp 18   Ht 5' 7\" (1.702 m)   Wt 266 lb 11.2 oz (121 kg)   SpO2 96%   BMI 41.77 kg/m²   Nigel Risk Score: Nigel Scale Score: 14  Assessment:   Measurements:  Wound 08/15/20 Heel (Active)   Number of days: 41       Wound 20 Heel Left (Active)   Wound Image   20 1248   Wound Pressure Unstageable 20 1248   Dressing Status Changed 20 1248   Dressing Changed Changed/New 20 1248   Dressing/Treatment Alginate with Ag;Moist to dry; Roll gauze 20 1248   Wound Cleansed Rinsed/Irrigated with saline 20 0425   Dressing Change Due 20 1248   Wound Length (cm) 1 cm 20 1248   Wound Width (cm) 1 cm 20 1248   Wound Surface Area (cm^2) 1 cm^2 20 1248   Change in Wound Size % (l*w) 92.86 20 1248   Wound Assessment Red;Pink 20 1248       Drainage Amount None  1248   Odor None 20 1248   Ginny-wound Assessment Dry;Pink; Intact 20 1248   Pink%Wound Bed 10 20 1511   Red%Wound Bed 10 20 0425   Yellow%Wound Bed 90 20 1511   Culture Taken No 20 0425   Number of days: 6           Patient sitting up in chair, alert and oriented. L heel wound reassessed from  (see above images). Wound has improved. Smaller in size with small to minimal slough present, more dry and intact. Redness improved. Aquacel AG, moistened 2x2, junior redressed. Change q3 days. BLE dry and scaly. Patient describes them as \"alligator legs. \" No open wounds present on BLE, would recommend lac-hydrin lotion. Pt MASD on buttock improved. Buttock less reddened with less open areas. Continue with calmoseptine. Response to treatment:  Well tolerated by patient. Plan:   Plan of Care:   L heel wound- aquacel AG, moistened 2x2, junior; change q3 days. Next dressing change due 9/28.   MASD- calmoseptine    Specialty Bed Required : Yes   [x] Low Air Loss   [] Pressure Redistribution  [] Fluid Immersion  [x] Bariatric  [] Total Pressure Relief  [] Other:     Current Diet: DIET GENERAL;  Dietician consult:  No    Discharge Plan:  Placement for patient upon discharge: TBD  Patient appropriate for Outpatient [address]: No    Referrals:  []   [] [facility]  [] Supplies  [] Other    Patient/Caregiver Teaching:  Level of patient/caregiver understanding able to:   [] Indicates understanding       [] Needs reinforcement  [] Unsuccessful      [x] Verbal Understanding  [] Demonstrated understanding       [] No evidence of learning  [] Refused teaching         [] N/A       Electronically signed by Kimberly Nina RN, on 9/25/2020 at 1:10 PM

## 2020-09-25 NOTE — CARE COORDINATION
Discharge Planning:  SW received a call from [facility], AN [facility]FFILIATE OF [facility] stating that pts Medicare denied pts skilled stay, but this facility is able to accept this pt under her Medicaid. [facility] is requesting that this SW obtain a LOC for this pt. SW contacted pts spouse to inform him that pts medicare denied the skilled stay but will still accept this pt under pts Medicaid. [facility] will provide therapy to this pt under pts Medicare part B. Pts spouse verbalized understanding and stated that he is agreeable to this plan. SW explained the medicare appeal rights to pts spouse. Pts spouse verbalized understanding and agreed to a verbal signature. Plan: Awaiting a completed CHARITY so that this SW can send [facility] a request to obtain a new LOC for this pt. Dr. Mono Nicole and RN notified. Pt to go to [facility] ECF upon d/c. No PASRR needed as pt is a ECF transfer.    Electronically signed by Brandon Pradhan on 9/25/2020 at 1:11 PM

## 2020-09-26 VITALS
RESPIRATION RATE: 16 BRPM | DIASTOLIC BLOOD PRESSURE: 59 MMHG | WEIGHT: 269.1 LBS | OXYGEN SATURATION: 94 % | HEART RATE: 106 BPM | BODY MASS INDEX: 42.24 KG/M2 | TEMPERATURE: 97.4 F | SYSTOLIC BLOOD PRESSURE: 101 MMHG | HEIGHT: 67 IN

## 2020-09-26 LAB
ANION GAP SERPL CALCULATED.3IONS-SCNC: 9 MMOL/L (ref 3–16)
BASOPHILS ABSOLUTE: 0 K/UL (ref 0–0.2)
BASOPHILS RELATIVE PERCENT: 0.2 %
BUN BLDV-MCNC: 17 MG/DL (ref 7–20)
CALCIUM SERPL-MCNC: 8.3 MG/DL (ref 8.3–10.6)
CHLORIDE BLD-SCNC: 108 MMOL/L (ref 99–110)
CO2: 24 MMOL/L (ref 21–32)
CREAT SERPL-MCNC: 0.7 MG/DL (ref 0.6–1.2)
EOSINOPHILS ABSOLUTE: 0.7 K/UL (ref 0–0.6)
EOSINOPHILS RELATIVE PERCENT: 6.7 %
GFR AFRICAN AMERICAN: >60
GFR NON-AFRICAN AMERICAN: >60
GLUCOSE BLD-MCNC: 103 MG/DL (ref 70–99)
GLUCOSE BLD-MCNC: 106 MG/DL (ref 70–99)
GLUCOSE BLD-MCNC: 116 MG/DL (ref 70–99)
GLUCOSE BLD-MCNC: 148 MG/DL (ref 70–99)
HCT VFR BLD CALC: 39.4 % (ref 36–48)
HEMOGLOBIN: 12 G/DL (ref 12–16)
LYMPHOCYTES ABSOLUTE: 1.4 K/UL (ref 1–5.1)
LYMPHOCYTES RELATIVE PERCENT: 13.2 %
MAGNESIUM: 1.6 MG/DL (ref 1.8–2.4)
MCH RBC QN AUTO: 24.8 PG (ref 26–34)
MCHC RBC AUTO-ENTMCNC: 30.5 G/DL (ref 31–36)
MCV RBC AUTO: 81.4 FL (ref 80–100)
MONOCYTES ABSOLUTE: 0.8 K/UL (ref 0–1.3)
MONOCYTES RELATIVE PERCENT: 6.9 %
NEUTROPHILS ABSOLUTE: 8 K/UL (ref 1.7–7.7)
NEUTROPHILS RELATIVE PERCENT: 73 %
PDW BLD-RTO: 20.1 % (ref 12.4–15.4)
PERFORMED ON: ABNORMAL
PHOSPHORUS: 3.8 MG/DL (ref 2.5–4.9)
PLATELET # BLD: 237 K/UL (ref 135–450)
PMV BLD AUTO: 8.3 FL (ref 5–10.5)
POTASSIUM REFLEX MAGNESIUM: 3.7 MMOL/L (ref 3.5–5.1)
PROCALCITONIN: 0.28 NG/ML (ref 0–0.15)
RBC # BLD: 4.84 M/UL (ref 4–5.2)
SODIUM BLD-SCNC: 141 MMOL/L (ref 136–145)
WBC # BLD: 11 K/UL (ref 4–11)

## 2020-09-26 PROCEDURE — 6360000002 HC RX W HCPCS: Performed by: INTERNAL MEDICINE

## 2020-09-26 PROCEDURE — 6370000000 HC RX 637 (ALT 250 FOR IP): Performed by: UROLOGY

## 2020-09-26 PROCEDURE — 2580000003 HC RX 258: Performed by: UROLOGY

## 2020-09-26 PROCEDURE — C9113 INJ PANTOPRAZOLE SODIUM, VIA: HCPCS | Performed by: INTERNAL MEDICINE

## 2020-09-26 PROCEDURE — 6370000000 HC RX 637 (ALT 250 FOR IP): Performed by: INTERNAL MEDICINE

## 2020-09-26 PROCEDURE — 83735 ASSAY OF MAGNESIUM: CPT

## 2020-09-26 PROCEDURE — 84100 ASSAY OF PHOSPHORUS: CPT

## 2020-09-26 PROCEDURE — 2580000003 HC RX 258: Performed by: INTERNAL MEDICINE

## 2020-09-26 PROCEDURE — 85025 COMPLETE CBC W/AUTO DIFF WBC: CPT

## 2020-09-26 PROCEDURE — 80048 BASIC METABOLIC PNL TOTAL CA: CPT

## 2020-09-26 PROCEDURE — 94760 N-INVAS EAR/PLS OXIMETRY 1: CPT

## 2020-09-26 PROCEDURE — 94640 AIRWAY INHALATION TREATMENT: CPT

## 2020-09-26 PROCEDURE — 36415 COLL VENOUS BLD VENIPUNCTURE: CPT

## 2020-09-26 PROCEDURE — 2700000000 HC OXYGEN THERAPY PER DAY

## 2020-09-26 RX ORDER — TRAMADOL HYDROCHLORIDE 50 MG/1
50 TABLET ORAL EVERY 6 HOURS PRN
Qty: 4 TABLET | Refills: 0 | Status: SHIPPED | OUTPATIENT
Start: 2020-09-26 | End: 2020-09-29

## 2020-09-26 RX ORDER — LACTOBACILLUS RHAMNOSUS GG 10B CELL
2 CAPSULE ORAL 2 TIMES DAILY WITH MEALS
Qty: 30 CAPSULE | Refills: 0 | Status: SHIPPED | OUTPATIENT
Start: 2020-09-26

## 2020-09-26 RX ORDER — PANTOPRAZOLE SODIUM 40 MG/1
40 TABLET, DELAYED RELEASE ORAL DAILY
Qty: 30 TABLET | Refills: 0 | Status: SHIPPED | OUTPATIENT
Start: 2020-09-26

## 2020-09-26 RX ORDER — LEVOFLOXACIN 500 MG/1
500 TABLET, FILM COATED ORAL DAILY
Qty: 4 TABLET | Refills: 0 | Status: SHIPPED | OUTPATIENT
Start: 2020-09-27 | End: 2020-10-01

## 2020-09-26 RX ORDER — MAGNESIUM SULFATE IN WATER 40 MG/ML
2 INJECTION, SOLUTION INTRAVENOUS ONCE
Status: COMPLETED | OUTPATIENT
Start: 2020-09-26 | End: 2020-09-26

## 2020-09-26 RX ADMIN — ATORVASTATIN CALCIUM 40 MG: 40 TABLET, FILM COATED ORAL at 08:48

## 2020-09-26 RX ADMIN — GABAPENTIN 300 MG: 300 CAPSULE ORAL at 08:48

## 2020-09-26 RX ADMIN — Medication 2 CAPSULE: at 16:33

## 2020-09-26 RX ADMIN — GABAPENTIN 300 MG: 300 CAPSULE ORAL at 12:38

## 2020-09-26 RX ADMIN — LEVOFLOXACIN 500 MG: 500 TABLET, FILM COATED ORAL at 08:48

## 2020-09-26 RX ADMIN — PANTOPRAZOLE SODIUM 40 MG: 40 INJECTION, POWDER, FOR SOLUTION INTRAVENOUS at 08:48

## 2020-09-26 RX ADMIN — Medication 2 CAPSULE: at 08:48

## 2020-09-26 RX ADMIN — TRAMADOL HYDROCHLORIDE 100 MG: 50 TABLET, FILM COATED ORAL at 13:48

## 2020-09-26 RX ADMIN — ESCITALOPRAM OXALATE 10 MG: 10 TABLET ORAL at 08:48

## 2020-09-26 RX ADMIN — SODIUM CHLORIDE, PRESERVATIVE FREE 10 ML: 5 INJECTION INTRAVENOUS at 08:52

## 2020-09-26 RX ADMIN — TRAMADOL HYDROCHLORIDE 100 MG: 50 TABLET, FILM COATED ORAL at 03:30

## 2020-09-26 RX ADMIN — DILTIAZEM HYDROCHLORIDE 180 MG: 180 CAPSULE, COATED, EXTENDED RELEASE ORAL at 08:48

## 2020-09-26 RX ADMIN — BUDESONIDE AND FORMOTEROL FUMARATE DIHYDRATE 2 PUFF: 160; 4.5 AEROSOL RESPIRATORY (INHALATION) at 12:19

## 2020-09-26 RX ADMIN — MAGNESIUM SULFATE IN WATER 2 G: 40 INJECTION, SOLUTION INTRAVENOUS at 12:39

## 2020-09-26 RX ADMIN — APIXABAN 5 MG: 5 TABLET, FILM COATED ORAL at 08:48

## 2020-09-26 RX ADMIN — Medication 10 ML: at 08:49

## 2020-09-26 ASSESSMENT — PAIN - FUNCTIONAL ASSESSMENT
PAIN_FUNCTIONAL_ASSESSMENT: ACTIVITIES ARE NOT PREVENTED
PAIN_FUNCTIONAL_ASSESSMENT: ACTIVITIES ARE NOT PREVENTED

## 2020-09-26 ASSESSMENT — PAIN SCALES - GENERAL
PAINLEVEL_OUTOF10: 9
PAINLEVEL_OUTOF10: 8
PAINLEVEL_OUTOF10: 0

## 2020-09-26 ASSESSMENT — PAIN DESCRIPTION - PAIN TYPE
TYPE: ACUTE PAIN;CHRONIC PAIN
TYPE: ACUTE PAIN

## 2020-09-26 ASSESSMENT — PAIN DESCRIPTION - PROGRESSION
CLINICAL_PROGRESSION: GRADUALLY WORSENING
CLINICAL_PROGRESSION: NOT CHANGED

## 2020-09-26 ASSESSMENT — PAIN DESCRIPTION - FREQUENCY
FREQUENCY: CONTINUOUS
FREQUENCY: CONTINUOUS

## 2020-09-26 ASSESSMENT — PAIN DESCRIPTION - LOCATION
LOCATION: ABDOMEN
LOCATION: ABDOMEN;FLANK

## 2020-09-26 ASSESSMENT — PAIN DESCRIPTION - ORIENTATION: ORIENTATION: RIGHT

## 2020-09-26 ASSESSMENT — PAIN DESCRIPTION - DESCRIPTORS
DESCRIPTORS: ACHING;CONSTANT
DESCRIPTORS: ACHING;CONSTANT

## 2020-09-26 ASSESSMENT — PAIN DESCRIPTION - ONSET
ONSET: ON-GOING
ONSET: ON-GOING

## 2020-09-26 NOTE — PLAN OF CARE
Skin assessment completed every shift. Pt assessed for incontinence, appropriate barrier cream applied prn. Pt encouraged to turn/rotate every 2 hours. Assistance provided if pt unable to do so themselves. Fall risk assessment completed every shift. All precautions in place. Pt has call light within reach at all times. Room clear of clutter. Pt aware to call for assistance when getting up. Patient assessed for fall risk; fall precautions initiated. Patient and family instructed about safety devices. Environment kept free of clutter and adequate lighting provided. Bed locked and in lowest position. Call light within reach. Will continue to monitor. Pain/discomfort being managed with PRN analgesics per MD orders. Pt able to express presence and absence of pain and rate pain appropriately using numerical scale.

## 2020-09-26 NOTE — CARE COORDINATION
DISCHARGE SUMMARY     DATE OF DISCHARGE: 09/26/2020    DISCHARGE DESTINATION: [facility], AN AFFILIATE OF [facility]    FACILITY    Level of Care: Intermediate-Medicaid paying for stay with therapy provided under pts Medicare B    Report Number: [phone]  Fax Number:  [phone]    LOC completed and faxed to [facility], AN AFFILIATE OF [facility]. [place]: [place] [address] Name:  [facility] [address] up Time: 1900    Phone Number: [phone]    COMMENTS: BALA contacted Gianluca Felix at [facility] [phone]. Gianluca Felix stated that this facility is able to accept this pt today. BALA faxed the AVS and script to [facility] and notified pt and RN of the d/c. Pt stated that she will be calling her spouse.    Electronically signed by Parviz Escobar on 9/26/2020 at 1:19 PM

## 2020-09-26 NOTE — DISCHARGE SUMMARY
Hospital Medicine Discharge Summary    Patient ID: Wendy Manuel      Patient's PCP: Octavio Santo MD    Admit Date: 9/18/2020     Discharge Date:   09/26/2020    Admitting Physician: Chelle Patel MD     Discharge Physician: Sal Weinstein MD     Discharge Diagnoses: Active Hospital Problems    Diagnosis    Abnormal CT scan, kidney [R93.429]    Hyponatremia [E87.1]    Hyperkalemia [E87.5]    Acute renal failure (ARF) (HCC) [N17.9]    Hydronephrosis with left ureteropelvic junction (UPJ) obstruction [Q62.11]    Hematuria [R31.9]    Ureteral obstruction [N13.5]    Hydronephrosis with urinary obstruction due to ureteral calculus [N13.2]    DARSHAN (acute kidney injury) ([address] [N17.9]    Kidney stone [N20.0]    Septic shock ([address] [A41.9, Q17.68]    Neutrophilic leukocytosis [Z07.8]    Morbid obesity due to excess calories ([address] [E66.01]    Chronic respiratory failure with hypoxia (HCC) [J96.11]    Hyperlipidemia [E78.5]    Kidney lesion, native, left [N28.9]    Paroxysmal atrial fibrillation (HCC) [I48.0]    Chronic obstructive pulmonary disease ([address] [J44.9]    Acute respiratory failure with hypoxia ([address] [J96.01]       The patient was seen and examined on day of discharge and this discharge summary is in conjunction with any daily progress note from day of discharge. Hospital Course:     From HPI:\"Patient is a 72-year-old woman with a history of hyperlipidemia, atrial fibrillation, COPD with chronic hypoxic respiratory failure and morbid obesity. She was sent from her care facility for evaluation after finding abnormal labs. She had an elevated white blood cell count and hematuria. Patient denies any symptoms. On evaluation she was found to have Hydronephrosis with left UPJ obstruction thought to be a kidney stone. After her arrival in the ER she became hypotensive requiring pressers to maintain her blood pressure. She is being admitted for further evaluation and treatment. Associated signs and symptoms do not include fever or chills, nausea, vomiting, abdominal pain, hemoptysis, hematochezia, diarrhea, constipation or urinary symptoms. \"      1. Septic shock and severe sepsis, POA, due to complicated  UTI/pyelonephritis, S/P stent placement, iv meropenem  changed to Levaquin, sensitivity noted, follow up with urology as outpatient. procalcitonin decreased. Will discharge on po for 4 more days. 2. Complicated UTI/pyelonephriits, as above. 3. Acute renal failure, improving. 4. HTN, controlled. 5. Afib with RVR, changed Cardizem to po. eliquis restarted. 6. Black stool reported and FOBT positive, consulted GI, added BID pantoprazole, EGD done,( 1. 5 mm inlet patch in the proximal esophagus. Diffuse mild gastric erythema. Given recent eliquis use, biopsies not obtained. Duodenal lymphangiectasia)  Follow up GI as outpatient for colonoscopy. 7. Nocturnal worsening of hypoxia, likely patient with KANIKA, follow up with pulmonary as outpatient for sleep study. 8. Mild abdominal pain, resolved, LFT WNL. 8. Morbid obesity, counseled on weight loss. Physical Exam Performed:     /60   Pulse 59   Temp 98.1 °F (36.7 °C) (Oral)   Resp 18   Ht 5' 7\" (1.702 m)   Wt 269 lb 1.6 oz (122.1 kg)   SpO2 96%   BMI 42.15 kg/m²       General appearance:  No apparent distress  HEENT:  Normal cephalic  Neck: Supple  Respiratory:  Normal respiratory effort. Clear to auscultation, bilaterally without Rales/Wheezes/Rhonchi. Cardiovascular:  Regular rate and rhythm with normal S1/S2 without murmurs, rubs or gallops. Abdomen: Soft, non-tender, obese  Musculoskeletal:  No clubbing  Skin: Skin color, texture, turgor normal.  No rashes or lesions. Neurologic:  No focal weakness   Psychiatric:  Alert and oriented  Capillary Refill: Brisk,< 3 seconds   Peripheral Pulses: +2 palpable, equal bilaterally       Labs:  For convenience and continuity at follow-up the following most recent labs are provided:      CBC:    Lab Results   Component Value Date    WBC 11.0 09/26/2020    HGB 12.0 09/26/2020    HCT 39.4 09/26/2020     09/26/2020       Renal:    Lab Results   Component Value Date     09/26/2020    K 3.7 09/26/2020     09/26/2020    CO2 24 09/26/2020    BUN 17 09/26/2020    CREATININE 0.7 09/26/2020    CALCIUM 8.3 09/26/2020    PHOS 3.8 09/26/2020         Significant Diagnostic Studies    Radiology:   FL RETROGRADE PYELOGRAM LEFT   Final Result      CT ABDOMEN PELVIS WO CONTRAST Additional Contrast? None   Final Result   There is moderate left hydronephrosis which appears to be secondary to an 8   mm calculus at the UPJ. That said, there is also a suspicious appearing lesion adjacent to or within   the left kidney, with heterogeneous hyperdensity. That could represent a   hematoma from the kidney, but renal neoplasm must be considered as well. Urologic consultation is recommended. XR CHEST PORTABLE   Final Result   No acute cardiopulmonary findings. Consults:     IP CONSULT TO HOSPITALIST  IP CONSULT TO UROLOGY  IP CONSULT TO DIETITIAN  IP CONSULT TO CRITICAL CARE  IP CONSULT TO GI    Disposition:  SNF     Condition at Discharge: Stable    Discharge Instructions/Follow-up:  GI, CARDIOLOGY, UROLOGY, PULMONOLOGY     Code Status:  Limited     Activity: activity as tolerated    Diet: cardiac diet      Discharge Medications:     Current Discharge Medication List           Details   traMADol (ULTRAM) 50 MG tablet Take 1 tablet by mouth every 6 hours as needed for Pain for up to 3 days.   Qty: 4 tablet, Refills: 0    Comments: Reduce doses taken as pain becomes manageable  Associated Diagnoses: Kidney stone      lactobacillus (CULTURELLE) capsule Take 2 capsules by mouth 2 times daily (with meals)  Qty: 30 capsule, Refills: 0      levoFLOXacin (LEVAQUIN) 500 MG tablet Take 1 tablet by mouth daily for 4 days  Qty: 4 tablet, Refills: 0      pantoprazole (PROTONIX) 40 MG tablet Take 1 tablet by mouth daily  Qty: 30 tablet, Refills: 0              Details   acetaZOLAMIDE (DIAMOX) 250 MG tablet Take 250 mg by mouth daily      escitalopram (LEXAPRO) 10 MG tablet Take 10 mg by mouth daily      potassium chloride (KLOR-CON M) 20 MEQ extended release tablet Take 20 mEq by mouth daily      atorvastatin (LIPITOR) 40 MG tablet Take 40 mg by mouth daily      apixaban (ELIQUIS) 5 MG TABS tablet Take 1 tablet by mouth 2 times daily  Qty: 60 tablet, Refills: 1      dilTIAZem (CARDIZEM CD) 180 MG extended release capsule Take 1 capsule by mouth daily  Qty: 30 capsule, Refills: 3      furosemide (LASIX) 40 MG tablet Take 1 tablet by mouth daily  Qty: 60 tablet, Refills: 0      gabapentin (NEURONTIN) 300 MG capsule Take 300 mg by mouth 3 times daily. fluticasone-salmeterol (ADVAIR) 250-50 MCG/DOSE AEPB Inhale 1 puff into the lungs 2 times daily      ipratropium-albuterol (DUONEB) 0.5-2.5 (3) MG/3ML SOLN nebulizer solution Inhale 3 mLs into the lungs every 2 hours as needed for Shortness of Breath      albuterol sulfate  (90 Base) MCG/ACT inhaler Inhale 2 puffs into the lungs every 4-6 hours as needed for Shortness of Breath             Time Spent on discharge is more than 1 hour in the examination, evaluation, counseling and review of medications and discharge plan. Signed:    Adriana Gottron, MD   9/26/2020      Thank you Johnnie May MD for the opportunity to be involved in this patient's care. If you have any questions or concerns please feel free to contact me at [phone].

## 2020-09-26 NOTE — PROGRESS NOTES
Pt calm and cooperative throughout the night. A+Ox4. Originally on 2L, pt's sat dropped into high 80s, so this RN increased to 3L. Pt sat in mid to high 90s remaining of the night.      Electronically signed by Tonia Sheets RN on 9/26/2020 at 6:37 AM

## 2020-09-26 NOTE — PLAN OF CARE
Problem: Skin Integrity:  Goal: Will show no infection signs and symptoms  Description: Will show no infection signs and symptoms  9/26/2020 0010 by Marco Gardiner RN  Outcome: Ongoing  9/25/2020 1648 by Moy Vergara RN  Outcome: Ongoing  Goal: Absence of new skin breakdown  Description: Absence of new skin breakdown  9/26/2020 0010 by Marco Gardiner RN  Outcome: Ongoing  9/25/2020 1648 by Moy Vergara RN  Outcome: Ongoing     Problem: Falls - Risk of:  Goal: Will remain free from falls  Description: Will remain free from falls  9/26/2020 0010 by Marco Gardiner RN  Outcome: Ongoing  9/25/2020 1648 by Moy Vergara RN  Outcome: Ongoing  Goal: Absence of physical injury  Description: Absence of physical injury  9/26/2020 0010 by Marco Gardiner RN  Outcome: Ongoing  9/25/2020 1648 by Moy Vergara RN  Outcome: Ongoing     Problem: Nutrition  Goal: Optimal nutrition therapy  9/26/2020 0010 by Marco Gardiner RN  Outcome: Ongoing  9/25/2020 1648 by Moy Vergara RN  Outcome: Ongoing     Problem: Pain:  Goal: Pain level will decrease  Description: Pain level will decrease  9/26/2020 0010 by Marco Gardiner RN  Outcome: Ongoing  9/25/2020 1648 by Moy Vergara RN  Outcome: Ongoing  Goal: Control of acute pain  Description: Control of acute pain  9/26/2020 0010 by Marco Gardiner RN  Outcome: Ongoing  9/25/2020 1648 by Moy Vergara RN  Outcome: Ongoing  Goal: Control of chronic pain  Description: Control of chronic pain  9/26/2020 0010 by Marco Gardiner RN  Outcome: Ongoing  9/25/2020 1648 by Moy Vergara RN  Outcome: Ongoing     Problem:  Activity:  Goal: Ability to tolerate increased activity will improve  Description: Ability to tolerate increased activity will improve  9/26/2020 0010 by Marco Gardiner RN  Outcome: Ongoing  9/25/2020 1648 by Moy Vergara RN  Outcome: Ongoing

## 2020-10-02 ENCOUNTER — TELEPHONE (OUTPATIENT)
Dept: PULMONOLOGY | Age: 70
End: 2020-10-02

## 2020-10-02 PROBLEM — I10 ESSENTIAL HYPERTENSION: Status: ACTIVE | Noted: 2020-10-02

## 2020-10-02 NOTE — PROGRESS NOTES
[facility]   [facility]      Date: 10/7/2020    Chief Complaint:   Chief Complaint   Patient presents with    Follow-up     afib - no cardiac complaints     History of Present Illness:    I saw Mauricio Aaron in the office for electrophysiology follow up today. She is a 78 y.o. female with a past medical history of  paroxsymal atrial fibrillation, COPD, CKD, HTN and HLD. Atrial fibrillation was diagnosed while hospitalized with a hip fracture and respiratory failure in August 2020. She was hospitalized in September 2020 with sepsis secondary to UTI/pyelonephritis and a kidney stone. She did have paroxysmal atrial fibrillation while hospitalized per noting and rhythm strips (no EKG done). She also had black stool and was guaiac positive. EGD showed a 5mm inlet patch, diffuse mild gastric erythema and duodenal lymphangiectasia. Her Eliquis was continued at discharge. Outpatient colonoscopy was recommended. She has been overall feeling much better although she says she is not getting any PT at the nursing home. She has not been getting out of bed. Her bleeding issues have resolved. She was unaware of the atrial fibrillation while in the hospital. Denies any worsened SOB. No CP or palpitations. No increased edema. Allergies:  No Known Allergies  Home Medications:  Prior to Visit Medications    Medication Sig Taking?  Authorizing Provider   lactobacillus (CULTURELLE) capsule Take 2 capsules by mouth 2 times daily (with meals) Yes Alcides Mcclain MD   pantoprazole (PROTONIX) 40 MG tablet Take 1 tablet by mouth daily Yes Alcides Mcclain MD   acetaZOLAMIDE (DIAMOX) 250 MG tablet Take 250 mg by mouth daily Yes Historical Provider, MD   escitalopram (LEXAPRO) 10 MG tablet Take 10 mg by mouth daily Yes Historical Provider, MD   potassium chloride (KLOR-CON M) 20 MEQ extended release tablet Take 20 mEq by mouth daily Yes Historical Provider, MD   atorvastatin (LIPITOR) 40 MG tablet Take 40 mg by mouth daily Yes Historical Provider, MD   apixaban (ELIQUIS) 5 MG TABS tablet Take 1 tablet by mouth 2 times daily Yes Luzmaria Carrion DO   dilTIAZem (CARDIZEM CD) 180 MG extended release capsule Take 1 capsule by mouth daily Yes Luzmaria Carrion,    furosemide (LASIX) 40 MG tablet Take 1 tablet by mouth daily Yes Alcides Daley MD   gabapentin (NEURONTIN) 300 MG capsule Take 300 mg by mouth 3 times daily. Yes Historical Provider, MD   fluticasone-salmeterol (ADVAIR) 250-50 MCG/DOSE AEPB Inhale 1 puff into the lungs 2 times daily Yes Historical Provider, MD   ipratropium-albuterol (Crystal Nayla) 0.5-2.5 (3) MG/3ML SOLN nebulizer solution Inhale 3 mLs into the lungs every 2 hours as needed for Shortness of Breath Yes Historical Provider, MD   albuterol sulfate  (90 Base) MCG/ACT inhaler Inhale 2 puffs into the lungs every 4-6 hours as needed for Shortness of Breath Yes Historical Provider, MD        Past Medical History:  Past Medical History:   Diagnosis Date    Chronic kidney disease     COPD (chronic obstructive pulmonary disease) ([address]     Hyperlipidemia     Hypertension        Past Surgical History:   Past Surgical History:   Procedure Laterality Date    CHOLECYSTECTOMY      CYSTOSCOPY Left 9/19/2020    CYSTOSCOPY URETERAL STENT INSERTION LEFT RETROGRADE PYELOGRAM performed by Falguni Jamil MD at [address] [place] [address] Left 8/10/2020    OPEN TREATMENT OF LEFT HIP FRACTURE WITH CEPHALOMEDULLARY NAIL performed by Talon Martinez MD at [address] [place] [address] 9/24/2020    EGD DIAGNOSTIC ONLY performed by Alisha Nava MD at [address] [place] [address] History:   reports that she has been smoking cigarettes. She has been smoking about 1.50 packs per day. She has never used smokeless tobacco. She reports current drug use. Drug: Marijuana. She reports that she does not drink alcohol. Family History:  History reviewed. No pertinent family history.     Review of Systems Constitutional: Negative for chills, fatigue, fever and unexpected weight change. HENT: Negative for congestion, hearing loss, sinus pressure, sore throat and trouble swallowing. Respiratory: Negative for cough, shortness of breath and wheezing. Cardiovascular: Negative for chest pain, palpitations and leg swelling. Gastrointestinal: Negative for abdominal pain, blood in stool, constipation, diarrhea, nausea and vomiting. Genitourinary: Negative for hematuria. Musculoskeletal: Positive for gait problem. Negative for arthralgias, back pain and myalgias. Skin: Negative for color change, rash and wound. Neurological: Negative for dizziness, seizures, syncope, speech difficulty, weakness and light-headedness. Hematological: Does not bruise/bleed easily. Physical Examination:  Vitals:    10/07/20 1305   BP: 90/60   Pulse: 74   Temp: 96 °F (35.6 °C)   SpO2: 95%      Wt Readings from Last 3 Encounters:   09/26/20 269 lb 1.6 oz (122.1 kg)   08/18/20 178 lb 9.6 oz (81 kg)       Physical Exam  Vitals signs reviewed. Constitutional:       General: She is not in acute distress. Appearance: Normal appearance. HENT:      Head: Normocephalic and atraumatic. Nose: Nose normal.      Mouth/Throat:      Mouth: Mucous membranes are moist.   Eyes:      Conjunctiva/sclera: Conjunctivae normal.      Pupils: Pupils are equal, round, and reactive to light. Cardiovascular:      Rate and Rhythm: Normal rate and regular rhythm. Heart sounds: No murmur. No friction rub. No gallop. Comments: Trace BLE edema. Pulmonary:      Effort: No respiratory distress. Breath sounds: No wheezing, rhonchi or rales. Abdominal:      General: Abdomen is flat. Bowel sounds are normal.      Palpations: Abdomen is soft. Musculoskeletal: Normal range of motion. Right lower leg: No edema. Left lower leg: No edema. Skin:     General: Skin is warm and dry. Findings: No bruising. respiratory failure - with recurrence during hospital stay in September with sepsis, kidney stone, UTI              - on Cardizem 180mg QD   - discussed adding antiarrhythmic and pt would prefer to avoid for now    - instructed to monitor for symptoms (unexplained FAUSTIN, fatigue) and to notify us if increasing in frequency   - likely not an ablation candidate              - CHADS2-VASc 3 (age, gender, HTN) on Eliquis 5mg BID with recent guaiac positive stool, EGD noted above - plans for colonoscopy              Paroxysmal atrial tachycardia              - also seen during recent hospitalization               - continue rate control     Benign HTN              - BP controlled     Mixed HLD              - on statin     Thank you for allowing to us to participate in the care of Alcides Holt. Return in about 1 year (around 10/7/2021) for an appointment with Dr. Calli Denney.      GABRIELA Castro  [facility], [address] [place] [address], [address] [place] [address]  Phone: [phone]  Fax: [phone]    Electronically signed by GABRIELA Booth - CNP on 10/7/2020 at 1:34 PM

## 2020-10-02 NOTE — TELEPHONE ENCOUNTER
Nica Chaidez with [facility], AN AFFILIATE OF [facility] calls. Pt d/c from [facility] 10/1. Dr. Langston Asp saw pt in hospital on 9/19/20. Requesting  hospital follow up. You do have avaiilability week of 10/19. Please advise date/time. Thanks.

## 2020-10-05 NOTE — TELEPHONE ENCOUNTER
matthew did initial consult in August.  This would be Matthew's follow up.   If she does not want to see him then she can be fit in on my schedule

## 2020-10-05 NOTE — TELEPHONE ENCOUNTER
REYM with Nata Hartley at [facility], AN AFFILIATE OF [facility] to call back to schedule HFU VV with Dr Iker Holcomb

## 2020-10-07 ENCOUNTER — OFFICE VISIT (OUTPATIENT)
Dept: CARDIOLOGY CLINIC | Age: 70
End: 2020-10-07
Payer: COMMERCIAL

## 2020-10-07 VITALS
DIASTOLIC BLOOD PRESSURE: 60 MMHG | TEMPERATURE: 96 F | HEART RATE: 74 BPM | BODY MASS INDEX: 42.15 KG/M2 | OXYGEN SATURATION: 95 % | HEIGHT: 67 IN | SYSTOLIC BLOOD PRESSURE: 90 MMHG

## 2020-10-07 PROCEDURE — 4040F PNEUMOC VAC/ADMIN/RCVD: CPT | Performed by: NURSE PRACTITIONER

## 2020-10-07 PROCEDURE — 1123F ACP DISCUSS/DSCN MKR DOCD: CPT | Performed by: NURSE PRACTITIONER

## 2020-10-07 PROCEDURE — 99214 OFFICE O/P EST MOD 30 MIN: CPT | Performed by: NURSE PRACTITIONER

## 2020-10-07 PROCEDURE — 4004F PT TOBACCO SCREEN RCVD TLK: CPT | Performed by: NURSE PRACTITIONER

## 2020-10-07 PROCEDURE — 1090F PRES/ABSN URINE INCON ASSESS: CPT | Performed by: NURSE PRACTITIONER

## 2020-10-07 PROCEDURE — G8484 FLU IMMUNIZE NO ADMIN: HCPCS | Performed by: NURSE PRACTITIONER

## 2020-10-07 PROCEDURE — 3017F COLORECTAL CA SCREEN DOC REV: CPT | Performed by: NURSE PRACTITIONER

## 2020-10-07 PROCEDURE — 1111F DSCHRG MED/CURRENT MED MERGE: CPT | Performed by: NURSE PRACTITIONER

## 2020-10-07 PROCEDURE — G8400 PT W/DXA NO RESULTS DOC: HCPCS | Performed by: NURSE PRACTITIONER

## 2020-10-07 PROCEDURE — 93000 ELECTROCARDIOGRAM COMPLETE: CPT | Performed by: NURSE PRACTITIONER

## 2020-10-07 PROCEDURE — G8417 CALC BMI ABV UP PARAM F/U: HCPCS | Performed by: NURSE PRACTITIONER

## 2020-10-07 PROCEDURE — G8427 DOCREV CUR MEDS BY ELIG CLIN: HCPCS | Performed by: NURSE PRACTITIONER

## 2020-10-07 ASSESSMENT — ENCOUNTER SYMPTOMS
TROUBLE SWALLOWING: 0
ABDOMINAL PAIN: 0
SORE THROAT: 0
CONSTIPATION: 0
BLOOD IN STOOL: 0
SHORTNESS OF BREATH: 0
WHEEZING: 0
SINUS PRESSURE: 0
BACK PAIN: 0
VOMITING: 0
COLOR CHANGE: 0
DIARRHEA: 0
NAUSEA: 0
COUGH: 0

## 2020-10-20 ENCOUNTER — VIRTUAL VISIT (OUTPATIENT)
Dept: PULMONOLOGY | Age: 70
End: 2020-10-20
Payer: MEDICARE

## 2020-10-20 PROCEDURE — 3017F COLORECTAL CA SCREEN DOC REV: CPT | Performed by: INTERNAL MEDICINE

## 2020-10-20 PROCEDURE — 1111F DSCHRG MED/CURRENT MED MERGE: CPT | Performed by: INTERNAL MEDICINE

## 2020-10-20 PROCEDURE — G8400 PT W/DXA NO RESULTS DOC: HCPCS | Performed by: INTERNAL MEDICINE

## 2020-10-20 PROCEDURE — G8484 FLU IMMUNIZE NO ADMIN: HCPCS | Performed by: INTERNAL MEDICINE

## 2020-10-20 PROCEDURE — 99213 OFFICE O/P EST LOW 20 MIN: CPT | Performed by: INTERNAL MEDICINE

## 2020-10-20 PROCEDURE — G8428 CUR MEDS NOT DOCUMENT: HCPCS | Performed by: INTERNAL MEDICINE

## 2020-10-20 PROCEDURE — G8926 SPIRO NO PERF OR DOC: HCPCS | Performed by: INTERNAL MEDICINE

## 2020-10-20 PROCEDURE — G8417 CALC BMI ABV UP PARAM F/U: HCPCS | Performed by: INTERNAL MEDICINE

## 2020-10-20 PROCEDURE — 1123F ACP DISCUSS/DSCN MKR DOCD: CPT | Performed by: INTERNAL MEDICINE

## 2020-10-20 PROCEDURE — 4004F PT TOBACCO SCREEN RCVD TLK: CPT | Performed by: INTERNAL MEDICINE

## 2020-10-20 PROCEDURE — 3023F SPIROM DOC REV: CPT | Performed by: INTERNAL MEDICINE

## 2020-10-20 PROCEDURE — 4040F PNEUMOC VAC/ADMIN/RCVD: CPT | Performed by: INTERNAL MEDICINE

## 2020-10-20 PROCEDURE — 1090F PRES/ABSN URINE INCON ASSESS: CPT | Performed by: INTERNAL MEDICINE

## 2020-10-20 NOTE — PROGRESS NOTES
REASON FOR CONSULTATION/CC:    No chief complaint on file. PCP: Magi Dinh MD    HISTORY OF PRESENT ILLNESS: Samuel Hare is a 78y.o. year old female with a history of copd? who presents :            She is in rehab for hip / leg fracture. Copd   She is currently requiring 3L NC. She will take breaks from it. She is currently on Advair. Her physical activity is limited secondary to orthopedic issues and nonpulmonary issues. No complaints of cough or wheeze. complains of chronic lower back. This is chronic. PAST MEDICAL HISTORY:  Past Medical History:   Diagnosis Date    Chronic kidney disease     COPD (chronic obstructive pulmonary disease) ([facility] [address]     Hyperlipidemia     Hypertension        PAST SURGICAL HISTORY:  Past Surgical History:   Procedure Laterality Date    CHOLECYSTECTOMY      CYSTOSCOPY Left 9/19/2020    CYSTOSCOPY URETERAL STENT INSERTION LEFT RETROGRADE PYELOGRAM performed by Delfino Kowalski MD at [address] [facility] [address] Left 8/10/2020    OPEN TREATMENT OF LEFT HIP FRACTURE WITH CEPHALOMEDULLARY NAIL performed by Chris Burroughs MD at [address] [facility] 9/24/2020    EGD DIAGNOSTIC ONLY performed by Jayashree Martino MD at [address] [facility] [address]:  family history is not on file. Noncontributory    SOCIAL HISTORY:   reports that she has been smoking cigarettes. She has been smoking about 1.50 packs per day. She has never used smokeless tobacco.      ALLERGIES:  Patient has No Known Allergies.     REVIEW OF SYSTEMS:  Constitutional: Negative for fever    HENT: Negative for sore throat  Eyes: Negative for redness   Respiratory: Negative for dyspnea, cough   Cardiovascular: Negative for chest pain  Gastrointestinal: Negative for vomiting, diarrhea   Genitourinary: Negative for hematuria   Musculoskeletal: Negative for arthralgias   Skin: Negative for rash  Neurological: Negative for syncope  Hematological: Negative for adenopathy  Psychiatric/Behavorial: Negative for anxiety    Objective:   PHYSICAL EXAM:  There were no vitals taken for this visit.'  Gen: No distress. Eyes:  No sclera icterus. No conjunctival injection. ENT: No discharge. Pharynx clear. External appearance of ears and nose normal.  Neck: Trachea midline. No obvious mass. Resp: No accessory muscle use. No clear wheezing. Speaking full sentences. CV:     GI:    Skin: Warm, dry, normal texture and turgor. No nodule on exposed extremities. Lymph:    M/S: No cyanosis. Neuro: Moves all four extremities. Psych: Oriented x 3. No anxiety. Awake. Alert. Intact judgement and insight. Current Outpatient Medications   Medication Sig Dispense Refill    lactobacillus (CULTURELLE) capsule Take 2 capsules by mouth 2 times daily (with meals) 30 capsule 0    pantoprazole (PROTONIX) 40 MG tablet Take 1 tablet by mouth daily 30 tablet 0    acetaZOLAMIDE (DIAMOX) 250 MG tablet Take 250 mg by mouth daily      escitalopram (LEXAPRO) 10 MG tablet Take 10 mg by mouth daily      potassium chloride (KLOR-CON M) 20 MEQ extended release tablet Take 20 mEq by mouth daily      atorvastatin (LIPITOR) 40 MG tablet Take 40 mg by mouth daily      apixaban (ELIQUIS) 5 MG TABS tablet Take 1 tablet by mouth 2 times daily 60 tablet 1    dilTIAZem (CARDIZEM CD) 180 MG extended release capsule Take 1 capsule by mouth daily 30 capsule 3    furosemide (LASIX) 40 MG tablet Take 1 tablet by mouth daily 60 tablet 0    gabapentin (NEURONTIN) 300 MG capsule Take 300 mg by mouth 3 times daily.        fluticasone-salmeterol (ADVAIR) 250-50 MCG/DOSE AEPB Inhale 1 puff into the lungs 2 times daily      ipratropium-albuterol (DUONEB) 0.5-2.5 (3) MG/3ML SOLN nebulizer solution Inhale 3 mLs into the lungs every 2 hours as needed for Shortness of Breath      albuterol sulfate  (90 Base) MCG/ACT inhaler Inhale 2 puffs into the lungs every 4-6 hours as needed for Shortness of Breath       No current facility-administered medications for this visit. Data Reviewed:   CBC and Renal reviewed  Last CBC  Lab Results   Component Value Date    WBC 11.0 09/26/2020    RBC 4.84 09/26/2020    HGB 12.0 09/26/2020    MCV 81.4 09/26/2020     09/26/2020     Last Renal  Lab Results   Component Value Date     09/26/2020    K 3.7 09/26/2020     09/26/2020    CO2 24 09/26/2020    CO2 24 09/25/2020    CO2 23 09/24/2020    BUN 17 09/26/2020    CREATININE 0.7 09/26/2020    GLUCOSE 116 09/26/2020    CALCIUM 8.3 09/26/2020       Last ABG  POC Blood Gas: No results found for: POCPH, POCPCO2, POCPO2, POCHCO3, NBEA, [id number]  No results for input(s): PH, PCO2, PO2, HCO3, BE, O2SAT in the last 72 hours. Radiology Review:  Pertinent images / reports were reviewed as a part of this visit. CT Chest w/ contrast: No results found for this or any previous visit. CT Chest w/o contrast: No results found for this or any previous visit. CTPA: No results found for this or any previous visit. CXR PA/LAT:   Results for orders placed during the hospital encounter of 08/04/15   XR Chest Standard TWO VW    Narrative    INDICATION: COPD. Hypoxia. FINDINGS: Two views. Comparison is made to study dated 5/5/2013. The lungs are hyperexpanded without focal consolidation. Heart  size, mediastinal contours and pulmonary vascularity are within  normal limits. The pleural spaces are clear. Impression IMPRESSION: COPD. CXR portable:   Results for orders placed during the hospital encounter of 09/18/20   XR CHEST PORTABLE    Narrative EXAMINATION:  ONE XRAY VIEW OF THE CHEST    9/18/2020 8:09 pm    COMPARISON:  Chest radiograph performed August 10, 2020.     HISTORY:  ORDERING SYSTEM PROVIDED HISTORY: SOB  TECHNOLOGIST PROVIDED HISTORY:  Reason for exam:-> SOB  Reason for Exam: sob  Acuity: Acute  Type of Exam: Initial    FINDINGS:  The lungs are clear without focal consolidation. No pleural effusion or  pneumothorax. The cardiac silhouette is within normal limits. Atherosclerotic changes of the thoracic aorta. No acute osseous abnormality. Partially visualized cervicothoracic spinal fusion hardware. Impression No acute cardiopulmonary findings. Assessment:     Chart history of copd  Renal stone  Chronic hypoxemia  Hx hip fractures    Plan:      Problem List Items Addressed This Visit     Chronic respiratory failure with hypoxia ([address]     She is currently on 3 L nasal cannula. She will take it off with a slow desaturation. Therefore, she can likely be weaned down. We discussed this. She can be weaned and understand saturations and was advised to decrease to 210 or no liters nasal cannula if able. She expressed understanding. Healthcare worker in the room did not have further questions. This visit was completed virtually using Doxy. me and consent was given    Provider: Basia Odom  Location of patient: Rehab [facility]  Location of provider: Work    Consent: given  Other parties on phone call: Nurse at rehab                Chronic obstructive pulmonary disease [facility])     Patient has a chart history of COPD. Last pulmonary function test does not demonstrate obstruction. She is currently on Advair. She is currently limited from orthopedic standpoint not pulmonary standpoint. Therefore, continue Advair for now but is not clear that she needs this. This note was transcribed using [id number] [employer]. Please disregard any translational errors.     Sherlyn Mane [facility] [phone]

## 2020-10-20 NOTE — ASSESSMENT & PLAN NOTE
Patient has a chart history of COPD. Last pulmonary function test does not demonstrate obstruction. She is currently on Advair. She is currently limited from orthopedic standpoint not pulmonary standpoint. Therefore, continue Advair for now but is not clear that she needs this.

## 2020-11-03 PROBLEM — N17.9 ACUTE RENAL FAILURE (ARF) (HCC): Status: RESOLVED | Noted: 2020-09-19 | Resolved: 2020-11-03

## 2021-02-24 RX ORDER — NYSTATIN 100000 [USP'U]/G
POWDER TOPICAL 4 TIMES DAILY
COMMUNITY

## 2021-02-24 RX ORDER — SENNA AND DOCUSATE SODIUM 50; 8.6 MG/1; MG/1
2 TABLET, FILM COATED ORAL EVERY 12 HOURS PRN
COMMUNITY

## 2021-02-24 RX ORDER — BISACODYL 10 MG
10 SUPPOSITORY, RECTAL RECTAL DAILY PRN
COMMUNITY

## 2021-02-24 RX ORDER — POLYETHYLENE GLYCOL 3350 17 G/17G
17 POWDER, FOR SOLUTION ORAL DAILY PRN
COMMUNITY

## 2021-02-24 RX ORDER — HYDROCORTISONE ACETATE 25 MG/1
25 SUPPOSITORY RECTAL EVERY 6 HOURS PRN
COMMUNITY

## 2021-02-24 RX ORDER — ACETAMINOPHEN 325 MG/1
650 TABLET ORAL EVERY 6 HOURS PRN
COMMUNITY

## 2021-03-01 ENCOUNTER — ANESTHESIA EVENT (OUTPATIENT)
Dept: ENDOSCOPY | Age: 71
End: 2021-03-01
Payer: MEDICARE

## 2021-03-02 ENCOUNTER — HOSPITAL ENCOUNTER (OUTPATIENT)
Age: 71
Setting detail: OUTPATIENT SURGERY
Discharge: HOME | End: 2021-03-02
Attending: INTERNAL MEDICINE | Admitting: INTERNAL MEDICINE
Payer: MEDICARE

## 2021-03-02 ENCOUNTER — ANESTHESIA (OUTPATIENT)
Dept: ENDOSCOPY | Age: 71
End: 2021-03-02
Payer: MEDICARE

## 2021-03-02 VITALS
HEART RATE: 70 BPM | BODY MASS INDEX: 47.98 KG/M2 | SYSTOLIC BLOOD PRESSURE: 105 MMHG | WEIGHT: 288 LBS | HEIGHT: 65 IN | TEMPERATURE: 98.5 F | DIASTOLIC BLOOD PRESSURE: 64 MMHG | OXYGEN SATURATION: 98 % | RESPIRATION RATE: 14 BRPM

## 2021-03-02 VITALS — OXYGEN SATURATION: 90 % | SYSTOLIC BLOOD PRESSURE: 85 MMHG | DIASTOLIC BLOOD PRESSURE: 59 MMHG

## 2021-03-02 DIAGNOSIS — R19.5 FECAL OCCULT BLOOD TEST POSITIVE: ICD-10-CM

## 2021-03-02 PROCEDURE — 7100000001 HC PACU RECOVERY - ADDTL 15 MIN: Performed by: INTERNAL MEDICINE

## 2021-03-02 PROCEDURE — 2580000003 HC RX 258: Performed by: ANESTHESIOLOGY

## 2021-03-02 PROCEDURE — 2709999900 HC NON-CHARGEABLE SUPPLY: Performed by: INTERNAL MEDICINE

## 2021-03-02 PROCEDURE — 88305 TISSUE EXAM BY PATHOLOGIST: CPT

## 2021-03-02 PROCEDURE — 3700000000 HC ANESTHESIA ATTENDED CARE: Performed by: INTERNAL MEDICINE

## 2021-03-02 PROCEDURE — 3609008300 HC SIGMOIDOSCOPY W/BIOPSY SINGLE/MULTIPLE: Performed by: INTERNAL MEDICINE

## 2021-03-02 PROCEDURE — 6360000002 HC RX W HCPCS: Performed by: NURSE ANESTHETIST, CERTIFIED REGISTERED

## 2021-03-02 PROCEDURE — 7100000011 HC PHASE II RECOVERY - ADDTL 15 MIN: Performed by: INTERNAL MEDICINE

## 2021-03-02 PROCEDURE — 2720000010 HC SURG SUPPLY STERILE: Performed by: INTERNAL MEDICINE

## 2021-03-02 PROCEDURE — 3700000001 HC ADD 15 MINUTES (ANESTHESIA): Performed by: INTERNAL MEDICINE

## 2021-03-02 PROCEDURE — 2500000003 HC RX 250 WO HCPCS: Performed by: NURSE ANESTHETIST, CERTIFIED REGISTERED

## 2021-03-02 PROCEDURE — 7100000000 HC PACU RECOVERY - FIRST 15 MIN: Performed by: INTERNAL MEDICINE

## 2021-03-02 PROCEDURE — 7100000010 HC PHASE II RECOVERY - FIRST 15 MIN: Performed by: INTERNAL MEDICINE

## 2021-03-02 RX ORDER — SODIUM CHLORIDE 0.9 % (FLUSH) 0.9 %
10 SYRINGE (ML) INJECTION PRN
Status: DISCONTINUED | OUTPATIENT
Start: 2021-03-02 | End: 2021-03-02 | Stop reason: HOSPADM

## 2021-03-02 RX ORDER — PROPOFOL 10 MG/ML
INJECTION, EMULSION INTRAVENOUS CONTINUOUS PRN
Status: DISCONTINUED | OUTPATIENT
Start: 2021-03-02 | End: 2021-03-02 | Stop reason: SDUPTHER

## 2021-03-02 RX ORDER — PROPOFOL 10 MG/ML
INJECTION, EMULSION INTRAVENOUS PRN
Status: DISCONTINUED | OUTPATIENT
Start: 2021-03-02 | End: 2021-03-02 | Stop reason: SDUPTHER

## 2021-03-02 RX ORDER — SODIUM CHLORIDE 0.9 % (FLUSH) 0.9 %
10 SYRINGE (ML) INJECTION EVERY 12 HOURS SCHEDULED
Status: DISCONTINUED | OUTPATIENT
Start: 2021-03-02 | End: 2021-03-02 | Stop reason: HOSPADM

## 2021-03-02 RX ORDER — LIDOCAINE HYDROCHLORIDE 20 MG/ML
INJECTION, SOLUTION EPIDURAL; INFILTRATION; INTRACAUDAL; PERINEURAL PRN
Status: DISCONTINUED | OUTPATIENT
Start: 2021-03-02 | End: 2021-03-02 | Stop reason: SDUPTHER

## 2021-03-02 RX ORDER — SODIUM CHLORIDE 9 MG/ML
INJECTION, SOLUTION INTRAVENOUS CONTINUOUS
Status: DISCONTINUED | OUTPATIENT
Start: 2021-03-02 | End: 2021-03-02 | Stop reason: HOSPADM

## 2021-03-02 RX ADMIN — LIDOCAINE HYDROCHLORIDE 50 MG: 20 INJECTION, SOLUTION EPIDURAL; INFILTRATION; INTRACAUDAL; PERINEURAL at 08:03

## 2021-03-02 RX ADMIN — PROPOFOL 140 MCG/KG/MIN: 10 INJECTION, EMULSION INTRAVENOUS at 08:03

## 2021-03-02 RX ADMIN — PROPOFOL 50 MG: 10 INJECTION, EMULSION INTRAVENOUS at 08:03

## 2021-03-02 RX ADMIN — SODIUM CHLORIDE: 9 INJECTION, SOLUTION INTRAVENOUS at 07:57

## 2021-03-02 ASSESSMENT — PULMONARY FUNCTION TESTS
PIF_VALUE: 1
PIF_VALUE: 0

## 2021-03-02 ASSESSMENT — PAIN - FUNCTIONAL ASSESSMENT: PAIN_FUNCTIONAL_ASSESSMENT: 0-10

## 2021-03-02 ASSESSMENT — ENCOUNTER SYMPTOMS: SHORTNESS OF BREATH: 0

## 2021-03-02 ASSESSMENT — PAIN SCALES - GENERAL
PAINLEVEL_OUTOF10: 0

## 2021-03-02 NOTE — ANESTHESIA PRE PROCEDURE
MD at [address] Left 8/10/2020    OPEN TREATMENT OF LEFT HIP FRACTURE WITH CEPHALOMEDULLARY NAIL performed by Hyacinth Martinez MD at [address] [facility] N/A 9/24/2020    EGD DIAGNOSTIC ONLY performed by Benson Gardiner MD at [address] [facility] History     Tobacco Use    Smoking status: Current Every Day Smoker     Packs/day: 1.50     Types: Cigarettes    Smokeless tobacco: Never Used    Tobacco comment: pack n half a day    Substance Use Topics    Alcohol use: Never     Frequency: Never    Drug use: Yes     Types: Marijuana     Comment: occasional      Medications  No current facility-administered medications on file prior to encounter. Current Outpatient Medications on File Prior to Encounter   Medication Sig Dispense Refill    acetaminophen (TYLENOL) 325 MG tablet Take 650 mg by mouth every 6 hours as needed for Pain      hydrocortisone (ANUSOL-HC) 25 MG suppository Place 25 mg rectally 2 times daily      bisacodyl (DULCOLAX) 10 MG suppository Place 10 mg rectally daily      magnesium hydroxide (MILK OF MAGNESIA) 400 MG/5ML suspension Take by mouth daily as needed for Constipation      polyethylene glycol (GLYCOLAX) 17 g packet Take 17 g by mouth daily as needed for Constipation      nystatin (MYCOSTATIN) 250887 UNIT/GM powder Apply topically 4 times daily Apply topically 4 times daily.       sennosides-docusate sodium (SENOKOT-S) 8.6-50 MG tablet Take 2 tablets by mouth as needed for Constipation      lactobacillus (CULTURELLE) capsule Take 2 capsules by mouth 2 times daily (with meals) 30 capsule 0    pantoprazole (PROTONIX) 40 MG tablet Take 1 tablet by mouth daily 30 tablet 0    acetaZOLAMIDE (DIAMOX) 250 MG tablet Take 250 mg by mouth daily      escitalopram (LEXAPRO) 10 MG tablet Take 20 mg by mouth daily       potassium chloride (KLOR-CON M) 20 MEQ extended release tablet Take 20 mEq by mouth daily      atorvastatin (LIPITOR) 40 MG tablet Take 40 mg by mouth daily      apixaban (ELIQUIS) 5 MG TABS tablet Take 1 tablet by mouth 2 times daily 60 tablet 1    dilTIAZem (CARDIZEM CD) 180 MG extended release capsule Take 1 capsule by mouth daily 30 capsule 3    furosemide (LASIX) 40 MG tablet Take 1 tablet by mouth daily 60 tablet 0    gabapentin (NEURONTIN) 300 MG capsule Take 300 mg by mouth 3 times daily.        fluticasone-salmeterol (ADVAIR) 250-50 MCG/DOSE AEPB Inhale 1 puff into the lungs 2 times daily      ipratropium-albuterol (DUONEB) 0.5-2.5 (3) MG/3ML SOLN nebulizer solution Inhale 3 mLs into the lungs every 2 hours as needed for Shortness of Breath      albuterol sulfate  (90 Base) MCG/ACT inhaler Inhale 2 puffs into the lungs every 4-6 hours as needed for Shortness of Breath       Current Facility-Administered Medications   Medication Dose Route Frequency Provider Last Rate Last Admin    0.9 % sodium chloride infusion   Intravenous Continuous Iris Jaramillo MD        sodium chloride flush 0.9 % injection 10 mL  10 mL Intravenous 2 times per day Iris Jaramillo MD        sodium chloride flush 0.9 % injection 10 mL  10 mL Intravenous PRN Iris Jaramillo MD         Vital Signs (Current)   Vitals:    21 1126 21   BP:  115/66   Pulse:  141   Resp:  20   Temp:  97.7 °F (36.5 °C)   TempSrc:  Temporal   SpO2:  94%   Weight: 288 lb 0.2 oz (130.6 kg) 288 lb (130.6 kg)   Height: 5' 5\" (1.651 m) 5' 5\" (1.651 m)                                          BP Readings from Last 3 Encounters:   21 115/66   10/07/20 90/60   20 (!) 101/59     Vital Signs Statistics (for past 48 hrs)     Temp  Av.7 °F (36.5 °C)  Min: 97.7 °F (36.5 °C)   Min taken time: 21  Max: 97.7 °F (36.5 °C)   Max taken time: 21  Pulse  Av  Min: 141   Min taken time: 21  Max: 141   Max taken time: 21  Resp  Av  Min: 20   Min taken time: 21 3955  Max: 20   Max [id number] 98.4 08/10/2020      Type & Screen (If Applicable)  No results found for: LABABO, LABRH                         BMI: Wt Readings from Last 3 Encounters:       NPO Status:   Date of last liquid consumption: 03/01/21   Time of last liquid consumption: 1830   Date of last solid food consumption: 02/28/21      Time of last solid consumption: 1830       Anesthesia Evaluation  Patient summary reviewed  Airway: Mallampati: III  TM distance: >3 FB   Neck ROM: full  Mouth opening: > = 3 FB Dental:    (+) edentulous      Pulmonary:   (+) pneumonia:  COPD:      (-) asthma and shortness of breath                           Cardiovascular:    (+) hypertension:, dysrhythmias: atrial fibrillation,     (-) valvular problems/murmurs, past MI, CAD, CABG/stent and  angina                Neuro/Psych:      (-) seizures, TIA and CVA           GI/Hepatic/Renal:   (+) renal disease: kidney stones,      (-) GERD, PUD and liver disease       Endo/Other:        (-) diabetes mellitus               Abdominal:           Vascular: negative vascular ROS. Anesthesia Plan      MAC     ASA 3     (I discussed intravenous sedation to the patient's satisfaction including risks and alternatives. The patient agreed with the plan and has no further questions. YEYO SORIA )  Induction: intravenous. Anesthetic plan and risks discussed with patient. Plan discussed with CRNA. This pre-anesthesia assessment may be used as a history and physical.    DOS STAFF ADDENDUM:    Pt seen and examined, chart reviewed (including anesthesia, drug and allergy history). No interval changes to history and physical examination. Anesthetic plan, risks, benefits, alternatives, and personnel involved discussed with patient. Patient verbalized an understanding and agrees to proceed.       Zee Quick MD  March 2, 2021  7:44 AM

## 2021-03-02 NOTE — ANESTHESIA POSTPROCEDURE EVALUATION
Department of Anesthesiology  Postprocedure Note    Patient: Tex Brewer  MRN: [id number]  YOB: 1950  Date of evaluation: 3/2/2021  Time:  8:57 AM     Procedure Summary     Date: 03/02/21 Room / Location: [address] [place] [address]    Anesthesia Start: 8607 Anesthesia Stop: Gumaro Saver    Procedure: SIGMOIDOSCOPY FLEXIBLE POLYPECTOMY with clip placement (N/A ) Diagnosis:       Fecal occult blood test positive      (FECAL OCCULT BLOOD TEST POSITIVE)    Surgeons: Yen Shah MD Responsible Provider: Megan Strauss MD    Anesthesia Type: MAC ASA Status: 3          Anesthesia Type: MAC    Blanche Phase I: Blanche Score: 9    Blanche Phase II:      Last vitals: Reviewed and per EMR flowsheets.        Anesthesia Post Evaluation    Patient location during evaluation: PACU  Level of consciousness: awake and alert  Airway patency: patent  Nausea & Vomiting: no nausea and no vomiting  Complications: no  Cardiovascular status: blood pressure returned to baseline  Respiratory status: acceptable  Hydration status: euvolemic  Comments: Postoperative Anesthesia Note    Name:    Tex Brewer  MRN:      [id number]    Patient Vitals in the past 12 hrs:  03/02/21 0850, Temp:98.7 °F (37.1 °C), Pulse:72, Resp:14, SpO2:98 %  03/02/21 0848, BP:116/88, Pulse:71, Resp:15, SpO2:98 %  03/02/21 0842, BP:108/69, Pulse:71, Resp:16, SpO2:97 %  03/02/21 0839, Pulse:74  03/02/21 0833, BP:(!) 141/67, Temp:98.4 °F (36.9 °C), Temp src:Temporal, Pulse:78, Resp:15, SpO2:93 %  03/02/21 0706, BP:115/66, Temp:97.7 °F (36.5 °C), Temp src:Temporal, Pulse:141, Resp:20, SpO2:94 %, Height:5' 5\" (1.651 m), Weight:288 lb (130.6 kg)     LABS:    CBC  Lab Results       Component                Value               Date/Time                  WBC                      11.0                09/26/2020 05:18 AM        HGB                      12.0                09/26/2020 05:18 AM        HCT                      39.4 09/26/2020 05:18 AM        PLT                      237                 09/26/2020 05:18 AM   RENAL  Lab Results       Component                Value               Date/Time                  NA                       141                 09/26/2020 05:18 AM        K                        3.7                 09/26/2020 05:18 AM        CL                       108                 09/26/2020 05:18 AM        CO2                      24                  09/26/2020 05:18 AM        BUN                      17                  09/26/2020 05:18 AM        CREATININE               0.7                 09/26/2020 05:18 AM        GLUCOSE                  116 (H)             09/26/2020 05:18 AM   COAGS  Lab Results       Component                Value               Date/Time                  PROTIME                  11.8                08/08/2020 06:23 AM        INR                      1.02                08/08/2020 06:23 AM        APTT                     31.7                08/08/2020 06:23 AM     Intake & Output:  @[id number]@    Nausea & Vomiting:  No    Level of Consciousness:  Awake    Pain Assessment:  Adequate analgesia    Anesthesia Complications:  No apparent anesthetic complications    SUMMARY      Vital signs stable  OK to discharge from Stage I post anesthesia care.   Care transferred from Anesthesiology department on discharge from perioperative area

## 2021-03-02 NOTE — PROGRESS NOTES
[address] to confirm fax # , [phone] nurse on floor not answering phone. Faxed Nursing Home Pre-operative interview form with confirmation. Await return of information.
[address] [facility] spoke with Marianne Reeder the patients nurse , labs and COVID testing done today will fax to us on Monday when completed.
C-Difficile admission screening and protocol:     * Admitted with diarrhea? *Prior history of C-Diff. In last 3 months? no     *Antibiotic use in the past 6-8 weeks? *Prior hospitalization or nursing home in the last month? From [facility]     Preoperative Screening for Elective Surgery/Invasive Procedures While COVID-19 present in the community     Have you tested positive or have been told to self-isolate for COVID-19 like symptoms within the past 28 days? no   Do you currently have any of the following symptoms?no  o Fever >100.0 F or 99.9 F in immunocompromised patients? o New onset cough, shortness of breath or difficulty breathing?  o New onset sore throat, myalgia (muscle aches and pains), headache, loss of taste/smell or diarrhea?  Have you had a potential exposure to COVID-19 within the past 14 days by:  o Close contact with a confirmed case? o Close contact with a healthcare worker,  or essential infrastructure worker (grocery store, [employer], gas station, public utilities or transportation)? o Do you reside in a congregate setting such as; skilled nursing facility, adult home, correctional facility, homeless shelter or other institutional setting?lives in NH  o Have you had recent travel to a known COVID-19 hotspot? Indicate if the patient has a positive screen by answering yes to one or more of the above questions. Patients who test positive or screen positive prior to surgery or on the day of surgery should be evaluated in conjunction with the surgeon/proceduralist/anesthesiologist to determine the urgency of the procedure. Remember. Sandeep Marsh Safety First! Call before you Fall Remember
Called Dr Mirlande Walker office spoke with Anna Luo her that patient has not received prep or any instructions especially regarding ELiquis . Uday Castillo states she will tell them now and NH will be contacted.
Discharge instructions reviewed over telephone with Dawna Spangler (nurse) at [facility], AN AFFILIATE OF [facility]. Don Bee an understanding of instructions. Verbally reviewed instructions with pt and pt's aid.
First care arrived.
Placed call to [address] [place] [address] for transportation. ETA within 1 hour.
Pre-operative instructions faxed with confirmation, to nursing home and nurse aware . Interview complete await Covid result and labs to be done on 2/25/21.
Pt awake. Denies pain. Abd remains soft. To phase 2 care.
Pt awake. Denies pain. Abdomen soft. Given Pepsi and cookies and call light. Called for Crystal (pt's aid). Will call for transport.
Pt dozing at intervals. Monitor in sinus rhythm at present.
Pt incontinent of small liquid mucous. Changed pt and applied an adult diaper and new chucks pad and gown. Pt resting.
Pt tolerates PO. Gamboa Pacini (pt's aid) to room.
RePlay                                                                             Hemostasis Clip    1 clip placed to sigmoid polypectomy site       MRI SAFETY INFORMATION   MR Conditional   Non-clinical testing demonstrated that the hemostasis clip is MR conditional. A patient with this device can be scanned safely in an MR system under the following conditions:   1. Static magnetic field of 1.5 T or 3.0 T   2. Maximum special field gradient of 2,000 gauss/cm (20 T/m)   3. Maximum MR system reported, whole body averaged specific absorption rate (SIA) of 2 W/kg (normal operating mode). Under the scan conditions defined, the hemostasis clip is expected to produce a maximum temperature rise of 2oC after 15 minutes of continuous scanning. In non-clinical testing, the image artifact caused by the hemostasis clip extends approximately 30 mm from the hemostasis clip when imaged using a gradient echo pulse sequence and a 3.0 T MRI system. Clerance Sandifer is a trademark of [id number]    Shauna Cali is a trademark of [employer].
Spoke with  states patient lives at [facility], called and spoke with her nurse Yamilet Nam, unable to send fax states unavailable. Tried calling back several times to confirm fax number but a male patient keeps answering the phone will call again tomorrow.
To pacu from endo. Pt awake. Denies pain. Abd soft. IV infusing. Monitor in a fib.
Óscar Cabezas the nurse taking care of patient called informing me that an EKG is not covered by her insurance . I informed her ,if needed DOP it will be ordered and that arrival time is 0630 and start of procedure will be 0800.
toes      For your safety, please do not wear any jewelry or body piercing's on the day of surgery. All jewelry must be removed. If you have dentures, they will be removed before going to operating room. For your convenience, we will provide you with a container. If you wear contact lenses or glasses, they will be removed, please bring a case for them. If you have a living will and a durable power of  for healthcare, please bring in a copy. As part of our patient safety program to minimize surgical site infections, we ask you to do the following:    · Please notify your surgeon if you develop any illness between         now and the  day of your surgery. · This includes a cough, cold, fever, sore throat, nausea,         or vomiting, and diarrhea, etc.  ·  Please notify your surgeon if you experience dizziness, shortness         of breath or blurred vision between now and the time of your surgery. Do not shave your operative site 96 hours prior to surgery. For face and neck surgery, men may use an electric razor 48 hours   prior to surgery. You may shower the night before surgery or the morning of   your surgery with an antibacterial soap. You will need to bring a photo ID and insurance card    [facility] has an onsite pharmacy, would you like to utilize our pharmacy     If you will be staying overnight and use a C-pap machine, please bring   your C-pap to hospital     Our goal is to provide you with excellent care, therefore, visitors will be limited to two(2) in the room at a time so that we may focus on providing this care for you. Please contact pre-admission testing if you have any further questions. [facility] phone number:  [phone] PAT fax number:  [phone]  Please note these are generalized instructions for all surgical cases, you may be provided with more specific instructions according to your surgery.

## 2021-03-02 NOTE — H&P
Pre-operative History and Physical    Patient: Janee Canales  : 1950  Acct#:     Intended Procedure:  colonoscopy     HISTORY OF PRESENT ILLNESS:  The patient is a 78 y.o. female  who presents for colonoscopy due to positive FOBT. Past Medical History:        Diagnosis Date    Chronic kidney disease     COPD (chronic obstructive pulmonary disease) (HCC)     Hyperkalemia     Hyperlipidemia     Hypertension     Hyponatremia     Kidney stone     Melena     Obesity     [facility]) [facility]     Sepsis ([facility]      Past Surgical History:        Procedure Laterality Date    CHOLECYSTECTOMY      CYSTOSCOPY Left 2020    CYSTOSCOPY URETERAL STENT INSERTION LEFT RETROGRADE PYELOGRAM performed by Paolo Michelle MD at [address] [facility] Left 8/10/2020    OPEN TREATMENT OF LEFT HIP FRACTURE WITH CEPHALOMEDULLARY NAIL performed by Jess Turner MD at [address] 2020    EGD DIAGNOSTIC ONLY performed by Natali Kearney MD at [address]     Medications Prior to Admission:   No current facility-administered medications on file prior to encounter. Current Outpatient Medications on File Prior to Encounter   Medication Sig Dispense Refill    acetaminophen (TYLENOL) 325 MG tablet Take 650 mg by mouth every 6 hours as needed for Pain      hydrocortisone (ANUSOL-HC) 25 MG suppository Place 25 mg rectally 2 times daily      bisacodyl (DULCOLAX) 10 MG suppository Place 10 mg rectally daily      magnesium hydroxide (MILK OF MAGNESIA) 400 MG/5ML suspension Take by mouth daily as needed for Constipation      polyethylene glycol (GLYCOLAX) 17 g packet Take 17 g by mouth daily as needed for Constipation      nystatin (MYCOSTATIN) 268355 UNIT/GM powder Apply topically 4 times daily Apply topically 4 times daily.       sennosides-docusate sodium (SENOKOT-S) 8.6-50 MG tablet Take 2 tablets by mouth as needed for Constipation  lactobacillus (CULTURELLE) capsule Take 2 capsules by mouth 2 times daily (with meals) 30 capsule 0    pantoprazole (PROTONIX) 40 MG tablet Take 1 tablet by mouth daily 30 tablet 0    acetaZOLAMIDE (DIAMOX) 250 MG tablet Take 250 mg by mouth daily      escitalopram (LEXAPRO) 10 MG tablet Take 20 mg by mouth daily       potassium chloride (KLOR-CON M) 20 MEQ extended release tablet Take 20 mEq by mouth daily      atorvastatin (LIPITOR) 40 MG tablet Take 40 mg by mouth daily      apixaban (ELIQUIS) 5 MG TABS tablet Take 1 tablet by mouth 2 times daily 60 tablet 1    dilTIAZem (CARDIZEM CD) 180 MG extended release capsule Take 1 capsule by mouth daily 30 capsule 3    furosemide (LASIX) 40 MG tablet Take 1 tablet by mouth daily 60 tablet 0    gabapentin (NEURONTIN) 300 MG capsule Take 300 mg by mouth 3 times daily.  fluticasone-salmeterol (ADVAIR) 250-50 MCG/DOSE AEPB Inhale 1 puff into the lungs 2 times daily      ipratropium-albuterol (DUONEB) 0.5-2.5 (3) MG/3ML SOLN nebulizer solution Inhale 3 mLs into the lungs every 2 hours as needed for Shortness of Breath      albuterol sulfate  (90 Base) MCG/ACT inhaler Inhale 2 puffs into the lungs every 4-6 hours as needed for Shortness of Breath          Allergies:  Patient has no known allergies. Social History:   TOBACCO:   reports that she has been smoking cigarettes. She has been smoking about 1.50 packs per day. She has never used smokeless tobacco.  ETOH:   reports no history of alcohol use. DRUGS:   reports current drug use. Drug: Marijuana. PHYSICAL EXAM:      Vital Signs: /66   Pulse 141   Temp 97.7 °F (36.5 °C) (Temporal)   Resp 20   Ht 5' 5\" (1.651 m)   Wt 288 lb (130.6 kg)   SpO2 94%   BMI 47.93 kg/m²    Airway: No stridor or wheezing noted. Good air movement  Pulmonary: without wheezes.   Clear to auscultation  Cardiac:regular rate and rhythm without loud murmurs  Abdomen:soft, nontender,  Bowel sounds present    Pre-Procedure Assessment / Plan:  1) colonoscopy     ASA Grade:  ASA 3 - Patient with moderate systemic disease with functional limitations  Mallampati Classification:  Class II    Level of Sedation Plan:MAC    Post Procedure plan: Return to same level of care    I assessed the patient and find that the patient is in satisfactory condition to proceed with the planned procedure and sedation plan. I have explained the risk, benefits, and alternatives to the procedure; the patient understands and agrees to proceed.        Emerson Heck MD  3/2/2021

## 2021-03-02 NOTE — OP NOTE
Flexible sigmoidoscopy Procedure Note      Patient: Mady Carranza  : 1950  Acct#:     Procedure: Flexible sigmoidoscopy  with polypectomy (cold snare), control of bleeding with hemoclip placement    Date:  3/2/2021    Surgeon:  Naldo Moore MD    Referring Physician:  Lesvia Billy MD    Preoperative Diagnosis:  The patient is a 78 y.o. female  who presents for colonoscopy due to positive FOBT. Postoperative Diagnosis:    1. Poor preparation precluding colonoscopy. Colonoscope advanced to 40 cm from the anorectal verge with limited mucosal views due to poor preparation. 2. Four sigmoid colon polyps, 4-9 mm in size, resected with cold snare. A hemoclip was placed over the most proximal site to control mild post-polypectomy bleeding. 3. Sigmoid colon diverticulosis    Consent:  The patient or their legal guardian has signed a consent, and is aware of the potential risks, benefits, alternatives, and potential complications of this procedure. These include, but are not limited to hemorrhage, bleeding, post procedural pain, perforation, phlebitis, aspiration, hypotension, hypoxia, cardiovascular events such as arryhthmia, and possibly death. Additionally, the possibility of missed colonic polyps and interval colon cancer was discussed in the consent. Anesthesia:  MAC    Procedure: An informed consent was obtained from the patient after explanation of indications, benefits, possible risks and complications of the procedure. The patient was then taken to the endoscopy suite, placed in the left lateral decubitus position, and the above IV anesthesia was administered. A digital rectal examination was performed and revealed negative without mass, lesions or tenderness. The sphincter tone was relaxed. The Olympus video colonoscope was placed in the patient's rectum under digital direction and advanced to the sigmoid colon.  Poor preparation with stool precluded visualization, causing the procedure to be aborted. The descending and sigmoid colon visualization was significantly impaired by poor preparation. The preparation was poor. The scope was then withdrawn back through the cecum, ascending, transverse, descending, sigmoid colon, and rectum. Careful circumferential examination of the mucosa in these areas demonstrated:    1. Poor preparation precluding colonoscopy. Colonoscope advanced to 40 cm from the anorectal verge with limited mucosal views due to poor preparation. 2. Four sigmoid colon polyps, 4-9 mm in size, resected with cold snare. A hemoclip was placed over the most proximal site to control mild post-polypectomy bleeding. 3. Sigmoid colon diverticulosis    The scope was then withdrawn into the rectum and retroflexed. The retroflexed view of the anal verge and rectum demonstrates prominent rectal vasculature and internal hemorrhoids. The scope was straightened, the colon was decompressed and the scope was withdrawn from the patient. The patient tolerated the procedure well and was taken to the PACU in good condition. Estimated blood loss: Minimal     Impression:  See post-procedure diagnoses. Recommendations:   - Follow-up pathology results in 7 days, by calling the office at [phone].  - Resume regular medications. - Resume diet as tolerated. - Repeat colonoscopy in 1-2 months with a two day preparation.     BRAEDEN [address]  3/2/2021  [phone]

## 2021-03-30 RX ORDER — TRAMADOL HYDROCHLORIDE 50 MG/1
100 TABLET ORAL EVERY 6 HOURS PRN
Status: ON HOLD | COMMUNITY
End: 2021-09-16 | Stop reason: SDUPTHER

## 2021-03-30 RX ORDER — AMMONIUM LACTATE 12 G/100G
CREAM TOPICAL PRN
COMMUNITY
End: 2021-09-10

## 2021-03-30 NOTE — PROGRESS NOTES
[facility] time__0800__________        Surgery time___0930_________    Take the following medications with a sip of water: per MD    Do not eat or drink anything after 12:00 midnight prior to your surgery. Except prep  This includes water chewing gum, mints and ice chips. You may brush your teeth and gargle the morning of your surgery, but do not swallow the water     Please see your family doctor/pediatrician for a history and physical and/or concerning medications. Per Md   Bring any test results/reports from your physicians office. If you are under the care of a heart doctor or specialist doctor, please be aware that you may be asked to them for clearance    You may be asked to stop blood thinners such as Coumadin, Plavix, Fragmin, Lovenox, etc., or any anti-inflammatories such as:  Aspirin, Ibuprofen, Advil, Naproxen prior to your surgery. We also ask that you stop any OTC medications such as fish oil, vitamin E, glucosamine, garlic, Multivitamins, COQ 10, etc. STOP SUPPLEMENTS PER MD INSTRUCTIONS     We ask that you do not smoke 24 hours prior to surgery  We ask that you do not  drink any alcoholic beverages 24 hours prior to surgery     You must make arrangements for a responsible adult to take you home after your surgery. For your safety you will not be allowed to leave alone or drive yourself home. Your surgery will be cancelled if you do not have a ride home. Also for your safety, it is strongly suggested that someone stay with you the first 24 hours after your surgery. A parent or legal guardian must accompany a child scheduled for surgery and plan to stay at the hospital until the child is discharged. Please do not bring other children with you. For your comfort, please wear simple loose fitting clothing to the hospital.  Please do not bring valuables.     Do not wear any make-up or nail polish on your fingers or toes      For your provided with more specific instructions according to your surgery.

## 2021-03-30 NOTE — PROGRESS NOTES
Preoperative Screening for Elective Surgery/Invasive Procedures While COVID-19 present in the community     Have you tested positive or have been told to self-isolate for COVID-19 like symptoms within the past 28 days? no   Do you currently have any of the following symptoms? no  o Fever >100.0 F or 99.9 F in immunocompromised patients? no  o New onset cough, shortness of breath or difficulty breathing? no  o New onset sore throat, myalgia (muscle aches and pains), headache, loss of taste/smell or diarrhea? no   Have you had a potential exposure to COVID-19 within the past 14 days by:  o Close contact with a confirmed case? no  o Close contact with a healthcare worker,  or essential infrastructure worker (grocery store, [employer], gas station, public utilities or transportation)? Yes Lives at [facility] No cases of [place] at patient's floor   o Do you reside in a congregate setting such as; skilled nursing facility, adult home, correctional facility, homeless shelter or other institutional setting? yes  o Have you had recent travel to a known COVID-19 hotspot? no    Indicate if the patient has a positive screen by answering yes to one or more of the above questions. Patients who test positive or screen positive prior to surgery or on the day of surgery should be evaluated in conjunction with the surgeon/proceduralist/anesthesiologist to determine the urgency of the procedure.

## 2021-03-30 NOTE — PROGRESS NOTES
C-Difficile admission screening and protocol:     * Admitted with diarrhea? no     *Prior history of C-Diff. In last 3 months? no     *Antibiotic use in the past 6-8 weeks? Respirator infection      *Prior hospitalization or nursing home in the last month?     Yes Nursing home

## 2021-03-30 NOTE — PROGRESS NOTES
Patient is in [facility] [phone]. I spoke to her nurse Sue Wiseman. Patient is in a wheelchair will be transported by stretcher. She is alert and oriented per her nurse Sue Wiseman. On 2 liters of oxygen 24/7. Patient is a slade lift. I faxed the nursing home  Pre-op interview sheet to [phone] on 3/30/21 at 8170.

## 2021-04-05 ENCOUNTER — ANESTHESIA EVENT (OUTPATIENT)
Dept: ENDOSCOPY | Age: 71
End: 2021-04-05
Payer: MEDICARE

## 2021-04-06 ENCOUNTER — HOSPITAL ENCOUNTER (OUTPATIENT)
Age: 71
Setting detail: OUTPATIENT SURGERY
Discharge: OTHER INSTITUTION | End: 2021-04-06
Attending: INTERNAL MEDICINE | Admitting: INTERNAL MEDICINE
Payer: MEDICARE

## 2021-04-06 ENCOUNTER — ANESTHESIA (OUTPATIENT)
Dept: ENDOSCOPY | Age: 71
End: 2021-04-06
Payer: MEDICARE

## 2021-04-06 VITALS
WEIGHT: 283 LBS | RESPIRATION RATE: 18 BRPM | HEIGHT: 65 IN | TEMPERATURE: 97 F | HEART RATE: 74 BPM | OXYGEN SATURATION: 97 % | SYSTOLIC BLOOD PRESSURE: 128 MMHG | BODY MASS INDEX: 47.15 KG/M2 | DIASTOLIC BLOOD PRESSURE: 63 MMHG

## 2021-04-06 VITALS — OXYGEN SATURATION: 88 % | SYSTOLIC BLOOD PRESSURE: 124 MMHG | DIASTOLIC BLOOD PRESSURE: 72 MMHG

## 2021-04-06 DIAGNOSIS — R19.5 FECAL OCCULT BLOOD TEST POSITIVE: ICD-10-CM

## 2021-04-06 LAB — SARS-COV-2, NAAT: NOT DETECTED

## 2021-04-06 PROCEDURE — 2580000003 HC RX 258: Performed by: ANESTHESIOLOGY

## 2021-04-06 PROCEDURE — 7100000011 HC PHASE II RECOVERY - ADDTL 15 MIN: Performed by: INTERNAL MEDICINE

## 2021-04-06 PROCEDURE — 7100000001 HC PACU RECOVERY - ADDTL 15 MIN: Performed by: INTERNAL MEDICINE

## 2021-04-06 PROCEDURE — 2709999900 HC NON-CHARGEABLE SUPPLY: Performed by: INTERNAL MEDICINE

## 2021-04-06 PROCEDURE — 7100000010 HC PHASE II RECOVERY - FIRST 15 MIN: Performed by: INTERNAL MEDICINE

## 2021-04-06 PROCEDURE — 3609010200 HC COLONOSCOPY ABLATION TUMOR POLYP/OTHER LES: Performed by: INTERNAL MEDICINE

## 2021-04-06 PROCEDURE — 2720000010 HC SURG SUPPLY STERILE: Performed by: INTERNAL MEDICINE

## 2021-04-06 PROCEDURE — 2500000003 HC RX 250 WO HCPCS: Performed by: NURSE ANESTHETIST, CERTIFIED REGISTERED

## 2021-04-06 PROCEDURE — 7100000000 HC PACU RECOVERY - FIRST 15 MIN: Performed by: INTERNAL MEDICINE

## 2021-04-06 PROCEDURE — 3700000001 HC ADD 15 MINUTES (ANESTHESIA): Performed by: INTERNAL MEDICINE

## 2021-04-06 PROCEDURE — 3700000000 HC ANESTHESIA ATTENDED CARE: Performed by: INTERNAL MEDICINE

## 2021-04-06 PROCEDURE — 6360000002 HC RX W HCPCS: Performed by: NURSE ANESTHETIST, CERTIFIED REGISTERED

## 2021-04-06 PROCEDURE — 87635 SARS-COV-2 COVID-19 AMP PRB: CPT

## 2021-04-06 RX ORDER — SODIUM CHLORIDE 9 MG/ML
INJECTION, SOLUTION INTRAVENOUS CONTINUOUS
Status: DISCONTINUED | OUTPATIENT
Start: 2021-04-06 | End: 2021-04-06 | Stop reason: HOSPADM

## 2021-04-06 RX ORDER — PHENYLEPHRINE HCL IN 0.9% NACL 1 MG/10 ML
SYRINGE (ML) INTRAVENOUS PRN
Status: DISCONTINUED | OUTPATIENT
Start: 2021-04-06 | End: 2021-04-06 | Stop reason: SDUPTHER

## 2021-04-06 RX ORDER — SODIUM CHLORIDE 0.9 % (FLUSH) 0.9 %
10 SYRINGE (ML) INJECTION EVERY 12 HOURS SCHEDULED
Status: DISCONTINUED | OUTPATIENT
Start: 2021-04-06 | End: 2021-04-06 | Stop reason: HOSPADM

## 2021-04-06 RX ORDER — PROPOFOL 10 MG/ML
INJECTION, EMULSION INTRAVENOUS CONTINUOUS PRN
Status: DISCONTINUED | OUTPATIENT
Start: 2021-04-06 | End: 2021-04-06 | Stop reason: SDUPTHER

## 2021-04-06 RX ORDER — LIDOCAINE HYDROCHLORIDE 20 MG/ML
INJECTION, SOLUTION EPIDURAL; INFILTRATION; INTRACAUDAL; PERINEURAL PRN
Status: DISCONTINUED | OUTPATIENT
Start: 2021-04-06 | End: 2021-04-06 | Stop reason: SDUPTHER

## 2021-04-06 RX ORDER — PROPOFOL 10 MG/ML
INJECTION, EMULSION INTRAVENOUS PRN
Status: DISCONTINUED | OUTPATIENT
Start: 2021-04-06 | End: 2021-04-06 | Stop reason: SDUPTHER

## 2021-04-06 RX ORDER — SODIUM CHLORIDE 0.9 % (FLUSH) 0.9 %
10 SYRINGE (ML) INJECTION PRN
Status: DISCONTINUED | OUTPATIENT
Start: 2021-04-06 | End: 2021-04-06 | Stop reason: HOSPADM

## 2021-04-06 RX ADMIN — PROPOFOL 30 MG: 10 INJECTION, EMULSION INTRAVENOUS at 10:20

## 2021-04-06 RX ADMIN — PROPOFOL 50 MG: 10 INJECTION, EMULSION INTRAVENOUS at 09:58

## 2021-04-06 RX ADMIN — PROPOFOL 50 MG: 10 INJECTION, EMULSION INTRAVENOUS at 09:45

## 2021-04-06 RX ADMIN — PROPOFOL 20 MG: 10 INJECTION, EMULSION INTRAVENOUS at 09:47

## 2021-04-06 RX ADMIN — Medication 100 MCG: at 09:59

## 2021-04-06 RX ADMIN — PROPOFOL 140 MCG/KG/MIN: 10 INJECTION, EMULSION INTRAVENOUS at 09:45

## 2021-04-06 RX ADMIN — PROPOFOL 50 MG: 10 INJECTION, EMULSION INTRAVENOUS at 10:03

## 2021-04-06 RX ADMIN — LIDOCAINE HYDROCHLORIDE 100 MG: 20 INJECTION, SOLUTION EPIDURAL; INFILTRATION; INTRACAUDAL; PERINEURAL at 09:45

## 2021-04-06 RX ADMIN — Medication 50 MCG: at 10:05

## 2021-04-06 RX ADMIN — SODIUM CHLORIDE: 9 INJECTION, SOLUTION INTRAVENOUS at 10:53

## 2021-04-06 RX ADMIN — SODIUM CHLORIDE: 9 INJECTION, SOLUTION INTRAVENOUS at 08:44

## 2021-04-06 ASSESSMENT — PULMONARY FUNCTION TESTS
PIF_VALUE: 1
PIF_VALUE: 0
PIF_VALUE: 1
PIF_VALUE: 0
PIF_VALUE: 1

## 2021-04-06 ASSESSMENT — ENCOUNTER SYMPTOMS: SHORTNESS OF BREATH: 0

## 2021-04-06 ASSESSMENT — PAIN SCALES - GENERAL: PAINLEVEL_OUTOF10: 0

## 2021-04-06 NOTE — PROGRESS NOTES
PACU Transfer Note    Vitals:    04/06/21 1130   BP: 126/63   Pulse: 75   Resp: 20   Temp: 96.9 °F (36.1 °C)   SpO2: 98%       In: 1100 [P.O.:100; I.V.:1000]  Out: -     Pain assessment:  None; still occassionally anxious and tearful, states just wants to go home.   Pain Level: 0    Report given to Receiving unit RN.    4/6/2021 11:33 AM

## 2021-04-06 NOTE — ANESTHESIA POSTPROCEDURE EVALUATION
Department of Anesthesiology  Postprocedure Note    Patient: Antoine Mixon  MRN: [id number]  YOB: 1950  Date of evaluation: 4/6/2021  Time:  1:33 PM     Procedure Summary     Date: 04/06/21 Room / Location: [address] [place] [address]    Anesthesia Start: 3954 Anesthesia Stop: 1104    Procedure: COLONOSCOPY POLYPECTOMY ABLATION with clip placement (N/A ) Diagnosis:       Fecal occult blood test positive      (FECAL OCCULT BLOOD TEST POSITIVE)    Surgeons: Romario Pollock MD Responsible Provider: Allison Bowden MD    Anesthesia Type: MAC ASA Status: 3          Anesthesia Type: MAC    Blanche Phase I: Blanche Score: 8    Blanche Phase II: Blanche Score: 9    Last vitals: Reviewed and per EMR flowsheets.        Anesthesia Post Evaluation    Patient location during evaluation: PACU  Patient participation: complete - patient participated  Level of consciousness: awake and alert  Airway patency: patent  Nausea & Vomiting: no nausea and no vomiting  Complications: no  Cardiovascular status: hemodynamically stable  Respiratory status: acceptable  Hydration status: stable

## 2021-04-06 NOTE — PROGRESS NOTES
Patient admitted to PACU # 4 from [facility] at 1105 post COLONOSCOPY POLYPECTOMY ABLATION with clip placement  per Dr. Ana Magdaleno. Attached to PACU monitoring system and report received from anesthesia provider. Patient was reported to be hemodynamically stable during procedure. Patient woke up crying. States she don't know why. Anxious and stating \"I want to get outta here\". Reassured. Wears home O2. Continues with O 2 at 3L per nasal cannula.

## 2021-04-06 NOTE — OP NOTE
endoscopy suite, placed in the left lateral decubitus position, and the above IV anesthesia was administered. A digital rectal examination was performed and revealed negative without mass, lesions or tenderness. The Olympus video colonoscope was placed in the patient's rectum under digital direction and advanced to the cecum. The cecum was identified by characteristic anatomy and ballottment. The ileocecal valve was identified. The preparation was fair. Extensive lavage of nearly 1L of fluid was performed to achieve adequate mucosal views to identify sessile polyps >6 mm in size. The terminal ileum was not inspected. The scope was then withdrawn back through the cecum, ascending, transverse, descending, sigmoid colon, and rectum. Careful circumferential examination of the mucosa in these areas demonstrated:    1. A total of 5 transverse colon polyps, 3-10 mm in size, resected with cold snare. 2. 14 mm in proximal transverse polyp, resected with snare cautery, and 2 hemoclips were placed to achieve hemostasis following polypectomy. 3.  1 cm mid transverse colon polyp, resected with snare cautery. Hemoclip was placed to achieve hemostasis. 4. Three descending colon polyps, 3-8 mm in size, resected with cold snare and snare cautery. 5.  4 mm sigmoid colon polyp, resected with cold snare. Hemoclip was placed at the polypectomy site to achieve hemostasis. 6.  1.5 cm descending colon lipoma  7. Sigmoid colon diverticulosis  8. Innumerable flat, hyperplastic appearing rectosigmoid colon polyps 2-6 mm in size, not resected    The scope was then withdrawn into the rectum and retroflexed. The retroflexed view of the anal verge and rectum demonstrates internal hemorrhoids. The scope was straightened, the colon was decompressed and the scope was withdrawn from the patient. The patient tolerated the procedure well and was taken to the PACU in good condition.     Estimated blood loss: Minimal    Impression:  See post-procedure diagnoses. Recommendations:   - Follow-up pathology results in 7 days, by calling the office at [phone].  - Resume regular medications. - Resume diet as tolerated. - Repeat colonoscopy in 6 months due to large polypectomies, extensive polyps and quality of preparation. Patient will again need a two day bowel preparation.      BRAEDEN [address]  4/6/2021  [phone]

## 2021-04-06 NOTE — H&P
Pre-operative History and Physical    Patient: Rob Smith  : 1950  Acct#:     Intended Procedure:  colonoscopy     HISTORY OF PRESENT ILLNESS:  The patient is a 78 y.o. female  who presents for colonoscopy due to positive FOBT. Patient with prior incomplete colonoscopy due to poor bowel preparation 3/2021. Past Medical History:        Diagnosis Date    Acute kidney failure (HCC)     Chronic kidney disease     COPD (chronic obstructive pulmonary disease) (HCC)     Hyperkalemia     Hyperlipidemia     Hypertension     Hyponatremia     Kidney stone     Major depression     Melena     Obesity     Oxygen deficit     2 liters     [facility]ox[facility] [address]     Sepsis ([facility] [address]      Past Surgical History:        Procedure Laterality Date    CHOLECYSTECTOMY      CYSTOSCOPY Left 2020    CYSTOSCOPY URETERAL STENT INSERTION LEFT RETROGRADE PYELOGRAM performed by Josiah Greenwood MD at [address] [place] [address] Left 8/10/2020    OPEN TREATMENT OF LEFT HIP FRACTURE WITH CEPHALOMEDULLARY NAIL performed by Cuauhtemoc Obregon MD at [address] [place] [address] 3/2/2021    SIGMOIDOSCOPY FLEXIBLE POLYPECTOMY with clip placement performed by Jacobo Heart MD at [place] [address] 2020    EGD DIAGNOSTIC ONLY performed by Jacobo Heart MD at [address] [place] [address]     Medications Prior to Admission:   No current facility-administered medications on file prior to encounter. Current Outpatient Medications on File Prior to Encounter   Medication Sig Dispense Refill    ammonium lactate (AMLACTIN) 12 % cream Apply topically as needed for Dry Skin Indications: TO FEET Apply topically as needed.  traMADol (ULTRAM) 50 MG tablet Take 50 mg by mouth every 6 hours as needed for Pain.       acetaminophen (TYLENOL) 325 MG tablet Take 650 mg by mouth every 6 hours as needed for Pain      hydrocortisone (ANUSOL-HC) 25 MG suppository Place 25 mg rectally 2 times daily      bisacodyl (DULCOLAX) 10 MG suppository Place 10 mg rectally daily      magnesium hydroxide (MILK OF MAGNESIA) 400 MG/5ML suspension Take by mouth daily as needed for Constipation      polyethylene glycol (GLYCOLAX) 17 g packet Take 17 g by mouth daily as needed for Constipation      nystatin (MYCOSTATIN) 599703 UNIT/GM powder Apply topically 4 times daily Apply topically 4 times daily.  sennosides-docusate sodium (SENOKOT-S) 8.6-50 MG tablet Take 2 tablets by mouth as needed for Constipation      lactobacillus (CULTURELLE) capsule Take 2 capsules by mouth 2 times daily (with meals) 30 capsule 0    pantoprazole (PROTONIX) 40 MG tablet Take 1 tablet by mouth daily 30 tablet 0    acetaZOLAMIDE (DIAMOX) 250 MG tablet Take 250 mg by mouth daily      escitalopram (LEXAPRO) 10 MG tablet Take 20 mg by mouth daily       potassium chloride (KLOR-CON M) 20 MEQ extended release tablet Take 20 mEq by mouth daily      atorvastatin (LIPITOR) 40 MG tablet Take 40 mg by mouth daily      dilTIAZem (CARDIZEM CD) 180 MG extended release capsule Take 1 capsule by mouth daily 30 capsule 3    furosemide (LASIX) 40 MG tablet Take 1 tablet by mouth daily 60 tablet 0    gabapentin (NEURONTIN) 300 MG capsule Take 300 mg by mouth 3 times daily.  fluticasone-salmeterol (ADVAIR) 250-50 MCG/DOSE AEPB Inhale 1 puff into the lungs 2 times daily      ipratropium-albuterol (DUONEB) 0.5-2.5 (3) MG/3ML SOLN nebulizer solution Inhale 3 mLs into the lungs every 2 hours as needed for Shortness of Breath      albuterol sulfate  (90 Base) MCG/ACT inhaler Inhale 2 puffs into the lungs every 4-6 hours as needed for Shortness of Breath          Allergies:  Patient has no known allergies. Social History:   TOBACCO:   reports that she has quit smoking. Her smoking use included cigarettes. She smoked 1.50 packs per day.  She has never used smokeless tobacco.  ETOH:   reports no history of alcohol use.  DRUGS:   reports current drug use. Drug: Marijuana. PHYSICAL EXAM:      Vital Signs: Ht 5' 5\" (1.651 m)   Wt 283 lb (128.4 kg)   BMI 47.09 kg/m²    Airway: No stridor or wheezing noted. Good air movement  Pulmonary: without wheezes. Clear to auscultation  Cardiac:regular rate and rhythm without loud murmurs  Abdomen:soft, nontender,  Bowel sounds present    Pre-Procedure Assessment / Plan:  1) Colonoscopy     ASA Grade:  ASA 3 - Patient with moderate systemic disease with functional limitations  Mallampati Classification:  Class II    Level of Sedation Plan:MAC    Post Procedure plan: Return to same level of care    I assessed the patient and find that the patient is in satisfactory condition to proceed with the planned procedure and sedation plan. I have explained the risk, benefits, and alternatives to the procedure; the patient understands and agrees to proceed.        Luis Solis MD  4/6/2021

## 2021-04-06 NOTE — ANESTHESIA PRE PROCEDURE
CYSTOSCOPY URETERAL STENT INSERTION LEFT RETROGRADE PYELOGRAM performed by Socorro Mederos MD at [address] Left 8/10/2020    OPEN TREATMENT OF LEFT HIP FRACTURE WITH CEPHALOMEDULLARY NAIL performed by Berenice Dahl MD at [facility] N/A 3/2/2021    SIGMOIDOSCOPY FLEXIBLE POLYPECTOMY with clip placement performed by Cassy Watkins MD at [facility] N/A 9/24/2020    EGD DIAGNOSTIC ONLY performed by Cassy Watkins MD at [facility] History     Tobacco Use    Smoking status: Former Smoker     Packs/day: 1.50     Types: Cigarettes    Smokeless tobacco: Never Used    Tobacco comment: pack n half a day    Substance Use Topics    Alcohol use: Never     Frequency: Never    Drug use: Yes     Types: Marijuana     Comment: occasional      Medications  Current Facility-Administered Medications on File Prior to Visit   Medication Dose Route Frequency Provider Last Rate Last Admin    0.9 % sodium chloride infusion   Intravenous Continuous Amna Mckeon  mL/hr at 04/06/21 0844 New Bag at 04/06/21 0844    sodium chloride flush 0.9 % injection 10 mL  10 mL Intravenous 2 times per day Amna Mckeon MD        sodium chloride flush 0.9 % injection 10 mL  10 mL Intravenous PRN Amna Mckeon MD         Current Outpatient Medications on File Prior to Visit   Medication Sig Dispense Refill    ammonium lactate (AMLACTIN) 12 % cream Apply topically as needed for Dry Skin Indications: TO FEET Apply topically as needed.  traMADol (ULTRAM) 50 MG tablet Take 50 mg by mouth every 6 hours as needed for Pain.       acetaminophen (TYLENOL) 325 MG tablet Take 650 mg by mouth every 6 hours as needed for Pain      hydrocortisone (ANUSOL-HC) 25 MG suppository Place 25 mg rectally 2 times daily      bisacodyl (DULCOLAX) 10 MG suppository Place 10 mg rectally daily      magnesium hydroxide (MILK OF MAGNESIA) 400 MG/5ML suspension Take by mouth daily as needed for Constipation      polyethylene glycol (GLYCOLAX) 17 g packet Take 17 g by mouth daily as needed for Constipation      nystatin (MYCOSTATIN) 362091 UNIT/GM powder Apply topically 4 times daily Apply topically 4 times daily.  sennosides-docusate sodium (SENOKOT-S) 8.6-50 MG tablet Take 2 tablets by mouth as needed for Constipation      lactobacillus (CULTURELLE) capsule Take 2 capsules by mouth 2 times daily (with meals) 30 capsule 0    pantoprazole (PROTONIX) 40 MG tablet Take 1 tablet by mouth daily 30 tablet 0    acetaZOLAMIDE (DIAMOX) 250 MG tablet Take 250 mg by mouth daily      escitalopram (LEXAPRO) 10 MG tablet Take 20 mg by mouth daily       potassium chloride (KLOR-CON M) 20 MEQ extended release tablet Take 20 mEq by mouth daily      atorvastatin (LIPITOR) 40 MG tablet Take 40 mg by mouth daily      dilTIAZem (CARDIZEM CD) 180 MG extended release capsule Take 1 capsule by mouth daily 30 capsule 3    furosemide (LASIX) 40 MG tablet Take 1 tablet by mouth daily 60 tablet 0    gabapentin (NEURONTIN) 300 MG capsule Take 300 mg by mouth 3 times daily.  fluticasone-salmeterol (ADVAIR) 250-50 MCG/DOSE AEPB Inhale 1 puff into the lungs 2 times daily      ipratropium-albuterol (DUONEB) 0.5-2.5 (3) MG/3ML SOLN nebulizer solution Inhale 3 mLs into the lungs every 2 hours as needed for Shortness of Breath      albuterol sulfate  (90 Base) MCG/ACT inhaler Inhale 2 puffs into the lungs every 4-6 hours as needed for Shortness of Breath       No current facility-administered medications for this visit. No current outpatient medications on file.      Facility-Administered Medications Ordered in Other Visits   Medication Dose Route Frequency Provider Last Rate Last Admin    0.9 % sodium chloride infusion   Intravenous Continuous Chel Haile  mL/hr at 04/06/21 0844 New Bag at 04/06/21 0844    sodium chloride flush 0.9 % injection 10 mL  10 mL Intravenous 2 times Component Value Date     09/26/2020    K 3.7 09/26/2020     09/26/2020    CO2 24 09/26/2020    BUN 17 09/26/2020    CREATININE 0.7 09/26/2020    CALCIUM 8.3 09/26/2020    GFRAA >60 09/26/2020    GFRAA >60 05/06/2013    LABGLOM >60 09/26/2020    GLUCOSE 116 09/26/2020     POCGlucose  No results for input(s): GLUCOSE in the last 72 hours. Coags    Lab Results   Component Value Date    PROTIME 11.8 08/08/2020    INR 1.02 08/08/2020    APTT 31.7 56/58/6703     HCG (If Applicable) No results found for: PREGTESTUR, PREGSERUM, HCG, HCGQUANT   ABGs   Lab Results   Component Value Date    PHART 7.540 08/10/2020    PO2ART 83.2 08/10/2020    YBC3QQP 46.0 08/10/2020    QQB3DYB 39.3 08/10/2020    BEART 14.8 08/10/2020    I6TYLGJE 98.4 08/10/2020      Type & Screen (If Applicable)  No results found for: LABABO, LABRH                         BMI: Wt Readings from Last 3 Encounters:       NPO Status:  >8h                          Anesthesia Evaluation  Patient summary reviewed  Airway: Mallampati: III  TM distance: >3 FB   Neck ROM: full  Mouth opening: > = 3 FB Dental:    (+) edentulous      Pulmonary:   (+) pneumonia:  COPD:      (-) asthma and shortness of breath                           Cardiovascular:    (+) hypertension:, dysrhythmias: atrial fibrillation,     (-) valvular problems/murmurs, past MI, CAD, CABG/stent and  angina                Neuro/Psych:      (-) seizures, TIA and CVA           GI/Hepatic/Renal:   (+) renal disease: kidney stones, bowel prep,      (-) GERD, PUD and liver disease       Endo/Other:        (-) diabetes mellitus               Abdominal:           Vascular: negative vascular ROS. Anesthesia Plan      MAC     ASA 3       Induction: intravenous. Anesthetic plan and risks discussed with patient. Plan discussed with CRNA.                   This pre-anesthesia assessment may be used as a history and physical.    DOS STAFF ADDENDUM:    Pt seen and examined, chart reviewed (including anesthesia, drug and allergy history). No interval changes to history and physical examination. Anesthetic plan, risks, benefits, alternatives, and personnel involved discussed with patient. Patient verbalized an understanding and agrees to proceed.       Marguerite Gupta MD  April 6, 2021  9:13 AM

## 2021-06-02 ENCOUNTER — CLINICAL DOCUMENTATION (OUTPATIENT)
Dept: OTHER | Age: 71
End: 2021-06-02

## 2021-09-10 ENCOUNTER — HOSPITAL ENCOUNTER (INPATIENT)
Age: 71
LOS: 6 days | Discharge: NURSING, REHAB OR LONG-TERM CARE | DRG: 871 | End: 2021-09-16
Attending: EMERGENCY MEDICINE | Admitting: INTERNAL MEDICINE
Payer: MEDICARE

## 2021-09-10 ENCOUNTER — APPOINTMENT (OUTPATIENT)
Dept: CT IMAGING | Age: 71
DRG: 871 | End: 2021-09-10
Payer: MEDICARE

## 2021-09-10 ENCOUNTER — APPOINTMENT (OUTPATIENT)
Dept: GENERAL RADIOLOGY | Age: 71
DRG: 871 | End: 2021-09-10
Payer: MEDICARE

## 2021-09-10 DIAGNOSIS — N39.0 URINARY TRACT INFECTION WITHOUT HEMATURIA, SITE UNSPECIFIED: ICD-10-CM

## 2021-09-10 DIAGNOSIS — G89.29 OTHER CHRONIC PAIN: ICD-10-CM

## 2021-09-10 DIAGNOSIS — J96.02 ACUTE RESPIRATORY FAILURE WITH HYPOXIA AND HYPERCAPNIA (HCC): Primary | ICD-10-CM

## 2021-09-10 DIAGNOSIS — J44.1 ACUTE EXACERBATION OF CHRONIC OBSTRUCTIVE PULMONARY DISEASE (COPD) (HCC): ICD-10-CM

## 2021-09-10 DIAGNOSIS — J96.01 ACUTE RESPIRATORY FAILURE WITH HYPOXIA AND HYPERCAPNIA (HCC): Primary | ICD-10-CM

## 2021-09-10 LAB
A/G RATIO: 0.8 (ref 1.1–2.2)
ALBUMIN SERPL-MCNC: 3.3 G/DL (ref 3.4–5)
ALP BLD-CCNC: 203 U/L (ref 40–129)
ALT SERPL-CCNC: 9 U/L (ref 10–40)
ANION GAP SERPL CALCULATED.3IONS-SCNC: 7 MMOL/L (ref 3–16)
AST SERPL-CCNC: 13 U/L (ref 15–37)
BACTERIA: ABNORMAL /HPF
BASE EXCESS ARTERIAL: -4.1 MMOL/L (ref -3–3)
BASE EXCESS ARTERIAL: -4.4 MMOL/L (ref -3–3)
BASE EXCESS VENOUS: -4 MMOL/L
BASOPHILS ABSOLUTE: 0.1 K/UL (ref 0–0.2)
BASOPHILS RELATIVE PERCENT: 0.3 %
BILIRUB SERPL-MCNC: <0.2 MG/DL (ref 0–1)
BILIRUBIN URINE: NEGATIVE
BLOOD, URINE: ABNORMAL
BUN BLDV-MCNC: 20 MG/DL (ref 7–20)
CALCIUM SERPL-MCNC: 9 MG/DL (ref 8.3–10.6)
CARBOXYHEMOGLOBIN ARTERIAL: 1.5 % (ref 0–1.5)
CARBOXYHEMOGLOBIN ARTERIAL: 1.8 % (ref 0–1.5)
CARBOXYHEMOGLOBIN: 1.2 %
CHLORIDE BLD-SCNC: 103 MMOL/L (ref 99–110)
CLARITY: ABNORMAL
CO2: 26 MMOL/L (ref 21–32)
COLOR: YELLOW
CREAT SERPL-MCNC: 1 MG/DL (ref 0.6–1.2)
EKG ATRIAL RATE: 69 BPM
EKG DIAGNOSIS: NORMAL
EKG P AXIS: 60 DEGREES
EKG P-R INTERVAL: 134 MS
EKG Q-T INTERVAL: 428 MS
EKG QRS DURATION: 94 MS
EKG QTC CALCULATION (BAZETT): 458 MS
EKG R AXIS: 19 DEGREES
EKG T AXIS: 49 DEGREES
EKG VENTRICULAR RATE: 69 BPM
EOSINOPHILS ABSOLUTE: 0.6 K/UL (ref 0–0.6)
EOSINOPHILS RELATIVE PERCENT: 3.7 %
EPITHELIAL CELLS, UA: 0 /HPF (ref 0–5)
GFR AFRICAN AMERICAN: >60
GFR NON-AFRICAN AMERICAN: 55
GLOBULIN: 4.4 G/DL
GLUCOSE BLD-MCNC: 136 MG/DL (ref 70–99)
GLUCOSE URINE: NEGATIVE MG/DL
HCO3 ARTERIAL: 24.2 MMOL/L (ref 21–29)
HCO3 ARTERIAL: 26.1 MMOL/L (ref 21–29)
HCO3 VENOUS: 26 MMOL/L (ref 23–29)
HCT VFR BLD CALC: 40.8 % (ref 36–48)
HEMOGLOBIN, ART, EXTENDED: 12.8 G/DL (ref 12–16)
HEMOGLOBIN, ART, EXTENDED: 13.2 G/DL (ref 12–16)
HEMOGLOBIN: 12 G/DL (ref 12–16)
HYALINE CASTS: 1 /LPF (ref 0–8)
KETONES, URINE: NEGATIVE MG/DL
LACTIC ACID, SEPSIS: 1 MMOL/L (ref 0.4–1.9)
LACTIC ACID, SEPSIS: 1.9 MMOL/L (ref 0.4–1.9)
LEUKOCYTE ESTERASE, URINE: ABNORMAL
LYMPHOCYTES ABSOLUTE: 1.6 K/UL (ref 1–5.1)
LYMPHOCYTES RELATIVE PERCENT: 10.6 %
MCH RBC QN AUTO: 21.8 PG (ref 26–34)
MCHC RBC AUTO-ENTMCNC: 29.4 G/DL (ref 31–36)
MCV RBC AUTO: 74 FL (ref 80–100)
METHEMOGLOBIN ARTERIAL: 0 %
METHEMOGLOBIN ARTERIAL: 0 %
METHEMOGLOBIN VENOUS: 0.5 %
MICROSCOPIC EXAMINATION: YES
MONOCYTES ABSOLUTE: 1.1 K/UL (ref 0–1.3)
MONOCYTES RELATIVE PERCENT: 7.4 %
NEUTROPHILS ABSOLUTE: 12 K/UL (ref 1.7–7.7)
NEUTROPHILS RELATIVE PERCENT: 78 %
NITRITE, URINE: POSITIVE
O2 SAT, ARTERIAL: 96.8 %
O2 SAT, ARTERIAL: 97.1 %
O2 SAT, VEN: 81 %
O2 THERAPY: ABNORMAL
PCO2 ARTERIAL: 57.4 MMHG (ref 35–45)
PCO2 ARTERIAL: 75.9 MMHG (ref 35–45)
PCO2, VEN: 71.1 MMHG (ref 40–50)
PDW BLD-RTO: 19.4 % (ref 12.4–15.4)
PH ARTERIAL: 7.14 (ref 7.35–7.45)
PH ARTERIAL: 7.23 (ref 7.35–7.45)
PH UA: 5.5 (ref 5–8)
PH VENOUS: 7.17 (ref 7.35–7.45)
PLATELET # BLD: 361 K/UL (ref 135–450)
PMV BLD AUTO: 8.2 FL (ref 5–10.5)
PO2 ARTERIAL: 108 MMHG (ref 75–108)
PO2 ARTERIAL: 90.6 MMHG (ref 75–108)
PO2, VEN: 54 MMHG
POTASSIUM REFLEX MAGNESIUM: 4.5 MMOL/L (ref 3.5–5.1)
PRO-BNP: 491 PG/ML (ref 0–124)
PROTEIN UA: NEGATIVE MG/DL
RBC # BLD: 5.52 M/UL (ref 4–5.2)
RBC UA: 17 /HPF (ref 0–4)
SARS-COV-2, NAAT: NOT DETECTED
SODIUM BLD-SCNC: 136 MMOL/L (ref 136–145)
SPECIFIC GRAVITY UA: 1.01 (ref 1–1.03)
TCO2 ARTERIAL: 26 MMOL/L
TCO2 ARTERIAL: 28.4 MMOL/L
TCO2 CALC VENOUS: 28 MMOL/L
TOTAL PROTEIN: 7.7 G/DL (ref 6.4–8.2)
TROPONIN: <0.01 NG/ML
URINE REFLEX TO CULTURE: YES
URINE TYPE: ABNORMAL
UROBILINOGEN, URINE: 0.2 E.U./DL
WBC # BLD: 15.4 K/UL (ref 4–11)
WBC UA: 64 /HPF (ref 0–5)

## 2021-09-10 PROCEDURE — 36415 COLL VENOUS BLD VENIPUNCTURE: CPT

## 2021-09-10 PROCEDURE — 93005 ELECTROCARDIOGRAM TRACING: CPT | Performed by: EMERGENCY MEDICINE

## 2021-09-10 PROCEDURE — 71260 CT THORAX DX C+: CPT

## 2021-09-10 PROCEDURE — 96367 TX/PROPH/DG ADDL SEQ IV INF: CPT

## 2021-09-10 PROCEDURE — 83605 ASSAY OF LACTIC ACID: CPT

## 2021-09-10 PROCEDURE — 87186 SC STD MICRODIL/AGAR DIL: CPT

## 2021-09-10 PROCEDURE — 6360000002 HC RX W HCPCS: Performed by: EMERGENCY MEDICINE

## 2021-09-10 PROCEDURE — 2580000003 HC RX 258: Performed by: EMERGENCY MEDICINE

## 2021-09-10 PROCEDURE — 87086 URINE CULTURE/COLONY COUNT: CPT

## 2021-09-10 PROCEDURE — 94660 CPAP INITIATION&MGMT: CPT

## 2021-09-10 PROCEDURE — 87641 MR-STAPH DNA AMP PROBE: CPT

## 2021-09-10 PROCEDURE — 6370000000 HC RX 637 (ALT 250 FOR IP): Performed by: EMERGENCY MEDICINE

## 2021-09-10 PROCEDURE — 36600 WITHDRAWAL OF ARTERIAL BLOOD: CPT

## 2021-09-10 PROCEDURE — 87635 SARS-COV-2 COVID-19 AMP PRB: CPT

## 2021-09-10 PROCEDURE — 83880 ASSAY OF NATRIURETIC PEPTIDE: CPT

## 2021-09-10 PROCEDURE — 2700000000 HC OXYGEN THERAPY PER DAY

## 2021-09-10 PROCEDURE — 80053 COMPREHEN METABOLIC PANEL: CPT

## 2021-09-10 PROCEDURE — 6370000000 HC RX 637 (ALT 250 FOR IP): Performed by: INTERNAL MEDICINE

## 2021-09-10 PROCEDURE — 6360000004 HC RX CONTRAST MEDICATION: Performed by: EMERGENCY MEDICINE

## 2021-09-10 PROCEDURE — 87088 URINE BACTERIA CULTURE: CPT

## 2021-09-10 PROCEDURE — 94761 N-INVAS EAR/PLS OXIMETRY MLT: CPT

## 2021-09-10 PROCEDURE — XW033N5 INTRODUCTION OF MEROPENEM-VABORBACTAM ANTI-INFECTIVE INTO PERIPHERAL VEIN, PERCUTANEOUS APPROACH, NEW TECHNOLOGY GROUP 5: ICD-10-PCS | Performed by: INTERNAL MEDICINE

## 2021-09-10 PROCEDURE — 87040 BLOOD CULTURE FOR BACTERIA: CPT

## 2021-09-10 PROCEDURE — 84484 ASSAY OF TROPONIN QUANT: CPT

## 2021-09-10 PROCEDURE — 94640 AIRWAY INHALATION TREATMENT: CPT

## 2021-09-10 PROCEDURE — 82803 BLOOD GASES ANY COMBINATION: CPT

## 2021-09-10 PROCEDURE — 96365 THER/PROPH/DIAG IV INF INIT: CPT

## 2021-09-10 PROCEDURE — 71045 X-RAY EXAM CHEST 1 VIEW: CPT

## 2021-09-10 PROCEDURE — 96375 TX/PRO/DX INJ NEW DRUG ADDON: CPT

## 2021-09-10 PROCEDURE — 2060000000 HC ICU INTERMEDIATE R&B

## 2021-09-10 PROCEDURE — 99285 EMERGENCY DEPT VISIT HI MDM: CPT

## 2021-09-10 PROCEDURE — 81001 URINALYSIS AUTO W/SCOPE: CPT

## 2021-09-10 PROCEDURE — 93010 ELECTROCARDIOGRAM REPORT: CPT | Performed by: INTERNAL MEDICINE

## 2021-09-10 PROCEDURE — 6360000002 HC RX W HCPCS: Performed by: INTERNAL MEDICINE

## 2021-09-10 PROCEDURE — 85025 COMPLETE CBC W/AUTO DIFF WBC: CPT

## 2021-09-10 PROCEDURE — 2580000003 HC RX 258: Performed by: INTERNAL MEDICINE

## 2021-09-10 RX ORDER — METHYLPREDNISOLONE SODIUM SUCCINATE 125 MG/2ML
125 INJECTION, POWDER, LYOPHILIZED, FOR SOLUTION INTRAMUSCULAR; INTRAVENOUS ONCE
Status: COMPLETED | OUTPATIENT
Start: 2021-09-10 | End: 2021-09-10

## 2021-09-10 RX ORDER — IBUPROFEN 600 MG/1
600 TABLET ORAL EVERY 6 HOURS PRN
Status: ON HOLD | COMMUNITY
End: 2021-09-16 | Stop reason: HOSPADM

## 2021-09-10 RX ORDER — SODIUM CHLORIDE 0.9 % (FLUSH) 0.9 %
5-40 SYRINGE (ML) INJECTION EVERY 12 HOURS SCHEDULED
Status: DISCONTINUED | OUTPATIENT
Start: 2021-09-10 | End: 2021-09-16 | Stop reason: HOSPADM

## 2021-09-10 RX ORDER — POLYETHYLENE GLYCOL 3350 17 G/17G
17 POWDER, FOR SOLUTION ORAL DAILY PRN
Status: DISCONTINUED | OUTPATIENT
Start: 2021-09-10 | End: 2021-09-16 | Stop reason: HOSPADM

## 2021-09-10 RX ORDER — IPRATROPIUM BROMIDE AND ALBUTEROL SULFATE 2.5; .5 MG/3ML; MG/3ML
1 SOLUTION RESPIRATORY (INHALATION) ONCE
Status: COMPLETED | OUTPATIENT
Start: 2021-09-10 | End: 2021-09-10

## 2021-09-10 RX ORDER — DIPHENHYDRAMINE HCL 25 MG
25 TABLET ORAL EVERY 6 HOURS PRN
Status: ON HOLD | COMMUNITY
End: 2021-09-16 | Stop reason: HOSPADM

## 2021-09-10 RX ORDER — ACETAMINOPHEN 650 MG/1
650 SUPPOSITORY RECTAL EVERY 6 HOURS PRN
Status: DISCONTINUED | OUTPATIENT
Start: 2021-09-10 | End: 2021-09-16 | Stop reason: HOSPADM

## 2021-09-10 RX ORDER — METHYLPREDNISOLONE SODIUM SUCCINATE 40 MG/ML
40 INJECTION, POWDER, LYOPHILIZED, FOR SOLUTION INTRAMUSCULAR; INTRAVENOUS EVERY 8 HOURS
Status: DISCONTINUED | OUTPATIENT
Start: 2021-09-10 | End: 2021-09-12

## 2021-09-10 RX ORDER — SODIUM CHLORIDE 9 MG/ML
25 INJECTION, SOLUTION INTRAVENOUS PRN
Status: DISCONTINUED | OUTPATIENT
Start: 2021-09-10 | End: 2021-09-16 | Stop reason: HOSPADM

## 2021-09-10 RX ORDER — IPRATROPIUM BROMIDE AND ALBUTEROL SULFATE 2.5; .5 MG/3ML; MG/3ML
3 SOLUTION RESPIRATORY (INHALATION)
Status: DISCONTINUED | OUTPATIENT
Start: 2021-09-10 | End: 2021-09-16 | Stop reason: HOSPADM

## 2021-09-10 RX ORDER — ACETAMINOPHEN 325 MG/1
650 TABLET ORAL EVERY 6 HOURS PRN
Status: DISCONTINUED | OUTPATIENT
Start: 2021-09-10 | End: 2021-09-16 | Stop reason: HOSPADM

## 2021-09-10 RX ORDER — BISACODYL 10 MG
10 SUPPOSITORY, RECTAL RECTAL DAILY
Status: DISCONTINUED | OUTPATIENT
Start: 2021-09-10 | End: 2021-09-11

## 2021-09-10 RX ORDER — ATORVASTATIN CALCIUM 40 MG/1
40 TABLET, FILM COATED ORAL DAILY
Status: DISCONTINUED | OUTPATIENT
Start: 2021-09-11 | End: 2021-09-16 | Stop reason: HOSPADM

## 2021-09-10 RX ORDER — LACTOBACILLUS RHAMNOSUS GG 10B CELL
2 CAPSULE ORAL 2 TIMES DAILY WITH MEALS
Status: DISCONTINUED | OUTPATIENT
Start: 2021-09-10 | End: 2021-09-16 | Stop reason: HOSPADM

## 2021-09-10 RX ORDER — SENNA AND DOCUSATE SODIUM 50; 8.6 MG/1; MG/1
2 TABLET, FILM COATED ORAL PRN
Status: DISCONTINUED | OUTPATIENT
Start: 2021-09-10 | End: 2021-09-10 | Stop reason: SDUPTHER

## 2021-09-10 RX ORDER — ONDANSETRON 4 MG/1
4 TABLET, ORALLY DISINTEGRATING ORAL EVERY 8 HOURS PRN
Status: DISCONTINUED | OUTPATIENT
Start: 2021-09-10 | End: 2021-09-16 | Stop reason: HOSPADM

## 2021-09-10 RX ORDER — PANTOPRAZOLE SODIUM 40 MG/1
40 TABLET, DELAYED RELEASE ORAL DAILY
Status: DISCONTINUED | OUTPATIENT
Start: 2021-09-10 | End: 2021-09-16 | Stop reason: HOSPADM

## 2021-09-10 RX ORDER — POLYETHYLENE GLYCOL 3350 17 G/17G
17 POWDER, FOR SOLUTION ORAL DAILY PRN
Status: DISCONTINUED | OUTPATIENT
Start: 2021-09-10 | End: 2021-09-10 | Stop reason: SDUPTHER

## 2021-09-10 RX ORDER — HYDROCORTISONE ACETATE 25 MG/1
25 SUPPOSITORY RECTAL 2 TIMES DAILY
Status: DISCONTINUED | OUTPATIENT
Start: 2021-09-10 | End: 2021-09-11

## 2021-09-10 RX ORDER — ALBUTEROL SULFATE 90 UG/1
2 AEROSOL, METERED RESPIRATORY (INHALATION) EVERY 4 HOURS PRN
Status: DISCONTINUED | OUTPATIENT
Start: 2021-09-10 | End: 2021-09-16 | Stop reason: HOSPADM

## 2021-09-10 RX ORDER — CYCLOBENZAPRINE HCL 10 MG
10 TABLET ORAL EVERY 6 HOURS PRN
COMMUNITY

## 2021-09-10 RX ORDER — SODIUM CHLORIDE 0.9 % (FLUSH) 0.9 %
5-40 SYRINGE (ML) INJECTION PRN
Status: DISCONTINUED | OUTPATIENT
Start: 2021-09-10 | End: 2021-09-16 | Stop reason: HOSPADM

## 2021-09-10 RX ORDER — SENNA AND DOCUSATE SODIUM 50; 8.6 MG/1; MG/1
2 TABLET, FILM COATED ORAL DAILY PRN
Status: DISCONTINUED | OUTPATIENT
Start: 2021-09-10 | End: 2021-09-16 | Stop reason: HOSPADM

## 2021-09-10 RX ORDER — BUDESONIDE AND FORMOTEROL FUMARATE DIHYDRATE 160; 4.5 UG/1; UG/1
2 AEROSOL RESPIRATORY (INHALATION) 2 TIMES DAILY
Status: DISCONTINUED | OUTPATIENT
Start: 2021-09-10 | End: 2021-09-16 | Stop reason: HOSPADM

## 2021-09-10 RX ORDER — TRAMADOL HYDROCHLORIDE 50 MG/1
50 TABLET ORAL EVERY 6 HOURS PRN
Status: DISCONTINUED | OUTPATIENT
Start: 2021-09-10 | End: 2021-09-16 | Stop reason: HOSPADM

## 2021-09-10 RX ORDER — ESCITALOPRAM OXALATE 10 MG/1
20 TABLET ORAL DAILY
Status: DISCONTINUED | OUTPATIENT
Start: 2021-09-11 | End: 2021-09-16 | Stop reason: HOSPADM

## 2021-09-10 RX ORDER — ONDANSETRON 2 MG/ML
4 INJECTION INTRAMUSCULAR; INTRAVENOUS EVERY 6 HOURS PRN
Status: DISCONTINUED | OUTPATIENT
Start: 2021-09-10 | End: 2021-09-16 | Stop reason: HOSPADM

## 2021-09-10 RX ORDER — DULOXETIN HYDROCHLORIDE 60 MG/1
60 CAPSULE, DELAYED RELEASE ORAL DAILY
COMMUNITY

## 2021-09-10 RX ADMIN — CEFEPIME HYDROCHLORIDE 2000 MG: 2 INJECTION, POWDER, FOR SOLUTION INTRAVENOUS at 12:40

## 2021-09-10 RX ADMIN — IPRATROPIUM BROMIDE AND ALBUTEROL SULFATE 1 AMPULE: .5; 3 SOLUTION RESPIRATORY (INHALATION) at 10:30

## 2021-09-10 RX ADMIN — BUDESONIDE AND FORMOTEROL FUMARATE DIHYDRATE 2 PUFF: 160; 4.5 AEROSOL RESPIRATORY (INHALATION) at 20:03

## 2021-09-10 RX ADMIN — VANCOMYCIN HYDROCHLORIDE 1000 MG: 1 INJECTION, POWDER, LYOPHILIZED, FOR SOLUTION INTRAVENOUS at 20:49

## 2021-09-10 RX ADMIN — SODIUM CHLORIDE, PRESERVATIVE FREE 10 ML: 5 INJECTION INTRAVENOUS at 20:48

## 2021-09-10 RX ADMIN — ENOXAPARIN SODIUM 40 MG: 40 INJECTION SUBCUTANEOUS at 20:47

## 2021-09-10 RX ADMIN — TRAMADOL HYDROCHLORIDE 50 MG: 50 TABLET, FILM COATED ORAL at 22:38

## 2021-09-10 RX ADMIN — PANTOPRAZOLE SODIUM 40 MG: 40 TABLET, DELAYED RELEASE ORAL at 18:42

## 2021-09-10 RX ADMIN — Medication 2 CAPSULE: at 18:42

## 2021-09-10 RX ADMIN — VANCOMYCIN HYDROCHLORIDE 1000 MG: 1 INJECTION, POWDER, LYOPHILIZED, FOR SOLUTION INTRAVENOUS at 13:26

## 2021-09-10 RX ADMIN — METHYLPREDNISOLONE SODIUM SUCCINATE 125 MG: 125 INJECTION, POWDER, FOR SOLUTION INTRAMUSCULAR; INTRAVENOUS at 09:56

## 2021-09-10 RX ADMIN — METHYLPREDNISOLONE SODIUM SUCCINATE 40 MG: 40 INJECTION, POWDER, FOR SOLUTION INTRAMUSCULAR; INTRAVENOUS at 18:42

## 2021-09-10 RX ADMIN — IOPAMIDOL 75 ML: 755 INJECTION, SOLUTION INTRAVENOUS at 13:13

## 2021-09-10 ASSESSMENT — PAIN SCALES - GENERAL
PAINLEVEL_OUTOF10: 0
PAINLEVEL_OUTOF10: 0
PAINLEVEL_OUTOF10: 8
PAINLEVEL_OUTOF10: 0
PAINLEVEL_OUTOF10: 0

## 2021-09-10 NOTE — ED PROVIDER NOTES
[address] [facility]      Pt Name: Alicja Lockwood  MRN: [id number]  [place] 1950  Date of evaluation: 9/10/2021   Provider: Elliot Wilkinson, [address] [place] [address]       Chief Complaint   Patient presents with    Respiratory Distress         HISTORY OF PRESENT ILLNESS   (Location/Symptom, Timing/Onset, Context/Setting, Quality, Duration, Modifying Factors, Severity)  Note limiting factors. Alicja Lockwood is a 73 y.o. female who presents to the emergency department complaint of respiratory distress. The patient is known to have a history of congestive heart failure, COPD, atrial fibrillation, chronic respiratory failure. She normally does not wear oxygen according to her . She currently lives in an extended care facility and has been a long-term resident for at least a year. She was reported to be in respiratory distress this morning and unresponsive. She was placed on CPAP in route to the hospital and awakened in route according to paramedics. She did not receive any treatments. Patient states that she has been short of breath for the last few days. She does not recall the exact onset. She denies any chest pain heaviness pressure or tightness. She denies any fevers chills. She does report a cough but denies any productive sputum. She is fully vaccinated for Covid. She denies any Covid exposure. She gets tested frequently at the extended care facility. Only minimal historical information is available. The patient is in respiratory distress and answers mostly yes and no questions on a limited basis. I did talk with her  by telephone who reports that she has been doing pretty well recently. He states that this is a sudden change. Patient is DNR CC arrest.    Nursing Notes were reviewed.     HPI        REVIEW OF SYSTEMS    (2-9 systems for level 4, 10 or more for level 5)     View of systems are significantly limited due to the patient's current respiratory distress and mental status. Constitutional: Negative for fever or chills. Gastrointestinal: Negative for abdominal pain. Negative for vomiting or diarrhea. Genitourinary: Negative for flank pain. Negative for dysuria. Negative for hematuria. Neurological: Negative for headache. All systems are reviewed and are negative except for those listed above in the history of present illness and ROS.         PAST MEDICAL HISTORY     Past Medical History:   Diagnosis Date    Acute kidney failure (HCC)     Chronic kidney disease     COPD (chronic obstructive pulmonary disease) (HCC)     Hyperkalemia     Hyperlipidemia     Hypertension     Hyponatremia     Kidney stone     Major depression     Melena     Obesity     Oxygen deficit     2 liters     [facility]) [facility]     Sepsis ([facility]          SURGICAL HISTORY       Past Surgical History:   Procedure Laterality Date    CHOLECYSTECTOMY      COLONOSCOPY N/A 4/6/2021    COLONOSCOPY POLYPECTOMY ABLATION with clip placement performed by Sophie Blanco MD at [address] [facility] Left 9/19/2020    CYSTOSCOPY URETERAL STENT INSERTION LEFT RETROGRADE PYELOGRAM performed by Benigno Hernandez MD at [address] [place] [address] Left 8/10/2020    OPEN TREATMENT OF LEFT HIP FRACTURE WITH CEPHALOMEDULLARY NAIL performed by Marie Meraz MD at [address] [place] [address] 3/2/2021    SIGMOIDOSCOPY FLEXIBLE POLYPECTOMY with clip placement performed by Sophie Blanco MD at [facility] 9/24/2020    EGD DIAGNOSTIC ONLY performed by Sophie Blanco MD at [address] [facility] [address]       Previous Medications    ACETAMINOPHEN (TYLENOL) 325 MG TABLET    Take 650 mg by mouth every 6 hours as needed for Pain    ACETAZOLAMIDE (DIAMOX) 250 MG TABLET    Take 250 mg by mouth daily    ALBUTEROL SULFATE  (90 BASE) MCG/ACT INHALER Inhale 2 puffs into the lungs every 4-6 hours as needed for Shortness of Breath    AMMONIUM LACTATE (AMLACTIN) 12 % CREAM    Apply topically as needed for Dry Skin Indications: TO FEET Apply topically as needed. ATORVASTATIN (LIPITOR) 40 MG TABLET    Take 40 mg by mouth daily    BISACODYL (DULCOLAX) 10 MG SUPPOSITORY    Place 10 mg rectally daily    DILTIAZEM (CARDIZEM CD) 180 MG EXTENDED RELEASE CAPSULE    Take 1 capsule by mouth daily    ESCITALOPRAM (LEXAPRO) 10 MG TABLET    Take 20 mg by mouth daily     FLUTICASONE-SALMETEROL (ADVAIR) 250-50 MCG/DOSE AEPB    Inhale 1 puff into the lungs 2 times daily    FUROSEMIDE (LASIX) 40 MG TABLET    Take 1 tablet by mouth daily    GABAPENTIN (NEURONTIN) 300 MG CAPSULE    Take 300 mg by mouth 3 times daily. HYDROCORTISONE (ANUSOL-HC) 25 MG SUPPOSITORY    Place 25 mg rectally 2 times daily    IPRATROPIUM-ALBUTEROL (DUONEB) 0.5-2.5 (3) MG/3ML SOLN NEBULIZER SOLUTION    Inhale 3 mLs into the lungs every 2 hours as needed for Shortness of Breath    LACTOBACILLUS (CULTURELLE) CAPSULE    Take 2 capsules by mouth 2 times daily (with meals)    MAGNESIUM HYDROXIDE (MILK OF MAGNESIA) 400 MG/5ML SUSPENSION    Take by mouth daily as needed for Constipation    NYSTATIN (MYCOSTATIN) 177153 UNIT/GM POWDER    Apply topically 4 times daily Apply topically 4 times daily. PANTOPRAZOLE (PROTONIX) 40 MG TABLET    Take 1 tablet by mouth daily    POLYETHYLENE GLYCOL (GLYCOLAX) 17 G PACKET    Take 17 g by mouth daily as needed for Constipation    POTASSIUM CHLORIDE (KLOR-CON M) 20 MEQ EXTENDED RELEASE TABLET    Take 20 mEq by mouth daily    SENNOSIDES-DOCUSATE SODIUM (SENOKOT-S) 8.6-50 MG TABLET    Take 2 tablets by mouth as needed for Constipation    TRAMADOL (ULTRAM) 50 MG TABLET    Take 50 mg by mouth every 6 hours as needed for Pain. ALLERGIES     Patient has no known allergies. FAMILY HISTORY     No family history on file.        SOCIAL HISTORY       Social History Socioeconomic History    Marital status:      Spouse name: Not on file    Number of children: Not on file    Years of education: Not on file    Highest education level: Not on file   Occupational History    Not on file   Tobacco Use    Smoking status: Former Smoker     Packs/day: 1.50     Types: Cigarettes    Smokeless tobacco: Never Used    Tobacco comment: pack n half a day    Vaping Use    Vaping Use: Never used   Substance and Sexual Activity    Alcohol use: Never    Drug use: Yes     Types: Marijuana     Comment: occasional     Sexual activity: Not on file   Other Topics Concern    Not on file   Social History Narrative    Not on file     Social Determinants of Health     Financial Resource Strain:     Difficulty of Paying Living Expenses:    Food Insecurity:     Worried About Running Out of Food in the Last Year:     Ran Out of Food in the Last Year:    Transportation Needs:     Lack of Transportation (Medical):  Lack of Transportation (Non-Medical):    Physical Activity:     Days of Exercise per Week:     Minutes of Exercise per Session:    Stress:     Feeling of Stress :    Social Connections:     Frequency of Communication with Friends and Family:     Frequency of Social Gatherings with Friends and Family:     Attends Judaism Services:     Active Member of Clubs or Organizations:     Attends Club or Organization Meetings:     Marital Status:    Intimate Partner Violence:     Fear of Current or Ex-Partner:     Emotionally Abused:     Physically Abused:     Sexually Abused:        SCREENINGS    [place] Coma Scale  Eye Opening:  To speech  Best Verbal Response: Oriented  Best Motor Response: Obeys commands  Sara Coma Scale Score: 14        PHYSICAL EXAM    (up to 7 for level 4, 8 or more for level 5)     ED Triage Vitals [09/10/21 0926]   BP Temp Temp src Pulse Resp SpO2 Height Weight   126/62 -- -- 77 18 100 % 5' 5\" (1.651 m) (!) 383 lb 2.6 oz (173.8 kg) Physical Exam   Constitutional: Drowsy but easily arousable with conversation. Head: No visible evidence of trauma. Normocephalic. Eyes: Pupils equal and reactive. No photophobia. Conjunctiva normal.    HENT: Oral mucosa moist.  Airway patent. Pharynx without erythema. Nares were clear. Neck:  Soft and supple. Nontender. Heart: Irregular rhythm on the monitor. No murmur. Lungs: Stational dyspnea was noted with accessory muscle use. Diffuse wheezes and crackles noted in the bases. No rhonchi. Occasional cough was noted. Chest: Chest wall non-tender. No evidence of trauma. Abdomen:  Soft, nondistended, Tammie obese, BMI 63.7. Bowel sounds present. Nontender. No guarding rigidity or rebound. No masses. Musculoskeletal: Limited range of motion of the lower extremities secondary to body habitus and edema. Mild diffuse tenderness to palpation in the lower extremities below the knees with 2+ to 3+ bilateral lower extremity edema. Pitting edema noted to the feet and pretibial surfaces. Dorsalis pedis and posterior tibial pulses were equal laterally. No erythema. No palpable or visible venous cords. Neurological: Alert and oriented to person and place. She was aware that she was in the hospital.  She follows simple commands. She answers yes and no to questions but has difficulty with complex questions. She does require frequent prompting to maintain alertness.  strength equal bilaterally. Able to move all extremities equally well. Speech clear. Cranial nerves II-XII intact. No facial droop. No acute focal motor or sensory deficits. Skin: Skin is warm and dry. No rash. Lymphatic:  No lympadenopathy. Psychiatric: Normal mood and affect. Behavior is normal.         DIAGNOSTIC RESULTS     EKG: All EKG's are interpreted by the Emergency Department Physician who either signs or Co-signs this chart in the absence of a cardiologist.    Normal sinus rhythm. Rate 69.   AK interval 134 ms. QRS duration 94 ms. QTc 458 ms. R Axis XIX degrees. No ST elevation. Normal EKG. EKG was unchanged from October 7, 2020. RADIOLOGY:   Non-plain film images such as CT, Ultrasound and MRI are read by the radiologist. Plain radiographic images are visualized and preliminarily interpreted by the emergency physician with the below findings:        Interpretation per the Radiologist below, if available at the time of this note:    CT CHEST PULMONARY EMBOLISM W CONTRAST   Final Result   1. Negative for pulmonary embolus. 2. Bibasilar bronchopneumonia.          XR CHEST PORTABLE   Final Result   Stable chest demonstrating bibasilar hypoaeration               ED BEDSIDE ULTRASOUND:   Performed by ED Physician - none    LABS:  Labs Reviewed   BLOOD GAS, ARTERIAL - Abnormal; Notable for the following components:       Result Value    pH, Arterial 7.145 (*)     pCO2, Arterial 75.9 (*)     Base Excess, Arterial -4.4 (*)     Carboxyhgb, Arterial 1.8 (*)     All other components within normal limits    Narrative:     Norm Pardon tel. [phone],  Chemistry results called to and read back by Chayo Cain RN, 09/10/2021  10:01, by [facility]  Performed at:  [address] [address]   Phone [phone]   BRAIN NATRIURETIC PEPTIDE - Abnormal; Notable for the following components:    Pro- (*)     All other components within normal limits    Narrative:     Performed at:  [facility]  [address] [facility] [address]   Phone [phone]   CBC WITH AUTO DIFFERENTIAL - Abnormal; Notable for the following components:    WBC 15.4 (*)     RBC 5.52 (*)     MCV 74.0 (*)     MCH 21.8 (*)     MCHC 29.4 (*)     RDW 19.4 (*)     Neutrophils Absolute 12.0 (*)     All other components within normal limits    Narrative:     Performed at:  [facility]  [address] [facility] [address] [address] [place] [address] [employer]   Phone [phone]   COMPREHENSIVE METABOLIC PANEL W/ REFLEX TO MG FOR LOW K - Abnormal; Notable for the following components:    Glucose 136 (*)     GFR Non- 55 (*)     Albumin 3.3 (*)     Albumin/Globulin Ratio 0.8 (*)     Alkaline Phosphatase 203 (*)     ALT 9 (*)     AST 13 (*)     All other components within normal limits    Narrative:     Performed at:  [address] [employer] [address]   Phone [phone]   URINE RT REFLEX TO CULTURE - Abnormal; Notable for the following components:    Clarity, UA CLOUDY (*)     Blood, Urine MODERATE (*)     Nitrite, Urine POSITIVE (*)     Leukocyte Esterase, Urine MODERATE (*)     All other components within normal limits    Narrative:     Performed at:  [address] [employer]   Phone [phone]   MICROSCOPIC URINALYSIS - Abnormal; Notable for the following components:    Bacteria, UA 4+ (*)     WBC, UA 64 (*)     RBC, UA 17 (*)     All other components within normal limits    Narrative:     Performed at:  [address] [employer]   Phone [phone]   BLOOD GAS, VENOUS - Abnormal; Notable for the following components:    pH, Roberto 7.170 (*)     pCO2, Roberto 71.1 (*)     All other components within normal limits    Narrative:     Sandi Brody tel. [phone],  Chemistry results called to and read back by Renee Beltre, 09/10/2021  14:01, by Candi De La Cruz  Performed at:  [address] [facility] [employer] [address]   Phone [phone] RAPID    Narrative:     Performed at:  [address] [employer]   Phone [phone]   CULTURE, BLOOD 1   CULTURE, BLOOD 2   CULTURE, URINE   TROPONIN    Narrative:     Performed at:  [facility]  [address] [place], [place] [address]   Phone [phone]   LACTATE, SEPSIS    Narrative:     Performed at:  [facility]  [address] [place], [place] [address]   Phone [phone]   LACTATE, SEPSIS       All other labs were within normal range or not returned as of this dictation. EMERGENCY DEPARTMENT COURSE and DIFFERENTIAL DIAGNOSIS/MDM:   Vitals:    Vitals:    09/10/21 0954 09/10/21 1124 09/10/21 1139 09/10/21 1223   BP: 103/61  (!) 103/49 (!) 100/43   Pulse: 68  67 66   Resp: 16 20 19 20   Temp:       TempSrc:       SpO2: 96% 100%  100%   Weight:       Height:             MDM      Patient presents with respiratory distress as noted above. Exact time of onset is unknown. She was noted to be unresponsive prior to arrival.  Metabolic encephalopathy secondary to hypercapnia is suspected given her prior history. On arrival, the patient was on CPAP with an oxygen saturation of 100%. She was drowsy but was arousable with conversation. She follows commands appropriately and answers yes and no to questions. She does have diminished breath sounds bilaterally with expiratory wheezes throughout and bibasilar crackles. She is afebrile. She is fully vaccinated for Covid but we did obtain a rapid Covid due to the critical nature of her condition. She was given DuoNeb treatments and was given Solu-Medrol 125 mg IV. She was placed on BiPAP on arrival, 12/6, with FiO2 of 50%. ABG was obtained. EKG was obtained. Normal sinus rhythm. Rate 69. No acute changes. 9:40 AM: I spoke with the patient's  by telephone. He confirms that she is a DNR CC arrest.  In addition, the patient has had multiple conversations with him over the fact that if her condition deteriorates, she does not want to be placed on a ventilator/respirator again. He states that she was very emphatic about this.   I also lucien this with the patient at the bedside and she verifies that she does not want to be intubated if her condition deteriorates. She is maintaining well now on BiPAP. She was advised that if her condition deteriorates, intubation may be required and without intubation, she could die. She verbalizes understanding. Her  is also aware of this. He states Brian Gibbons has said, if she goes, she just wants to go\". REASSESSMENT          10:15 AM: pH is 7.145 with a PCO2 of 75 and a PO2 of 108. ABG was reviewed. Findings are consistent with acute respiratory failure with hypercapnia and hypoxia. 1:05 PM: Urinalysis is nitrite positive with 4+ bacteria and 64 white cells. She does have evidence of urinary tract infection. She was given cefepime 2 g IV. Covid test is negative. BNP is mildly elevated at 491. White blood cell count is elevated at 15.4. Troponin is normal.  Hemoglobin is 12.0. Lactate is normal at 1.0. Troponin is normal at less than 0.01. Chest x-ray is unremarkable. CT chest PE protocol was obtained for further clarification and to rule out occult pneumonia. CTA reveals no evidence of pulmonary embolus but there is evidence of bibasilar pneumonia. Vancomycin was added. Blood cultures are pending. She will be admitted for further treatment and evaluation. A call was placed to the hospitalist on-call for admission. CRITICAL CARE TIME   Total Critical Care time was 35 minutes, excluding separately reportable procedures. There was a high probability of clinically significant/life threatening deterioration in the patient's condition which required my urgent intervention. CONSULTS:  None    PROCEDURES:  Unless otherwise noted below, none     Procedures        FINAL IMPRESSION      1. Acute respiratory failure with hypoxia and hypercapnia (HCC)    2. Urinary tract infection without hematuria, site unspecified    3.  Acute exacerbation of chronic obstructive pulmonary disease (COPD) ([facility] DISPOSITION/PLAN   DISPOSITION        PATIENT REFERRED TO:  No follow-up provider specified. DISCHARGE MEDICATIONS:  New Prescriptions    No medications on file     Controlled Substances Monitoring:     No flowsheet data found. (Please note that portions of this note were completed with a voice recognition program.  Efforts were made to edit the dictations but occasionally words are mis-transcribed. )    1859 Ant Sierra DO (electronically signed)  Attending Emergency Physician          Yusra Kumar DO  09/10/21 3967

## 2021-09-10 NOTE — PROGRESS NOTES
Estimated Creatinine Clearance: 67 mL/min (based on SCr of 1 mg/dL). Assessment/Plan:  Vancomycin 1000 mg IV every 12 hours ordered. Regimen projects a trough level of 15-20 mg/L. Level ordered for 1900 9/11/21. Thank you for the consult.    Electronically signed by Jason Barbour [facility] on 9/10/2021 at 5:49 PM

## 2021-09-10 NOTE — PROGRESS NOTES
Call placed to [address] [facility] with Nurse Leni Mena- to get a brief report- attempting to gather information for admission, little information provided

## 2021-09-10 NOTE — CARE COORDINATION
SW tried to meet with patient to conduct initial assessment but was unable to have conversation with patient as patient was on bi-pap.

## 2021-09-10 NOTE — H&P
Hospital Medicine History & Physical      PCP: Grady Mora MD    Date of Admission: 9/10/2021    Date of Service: Pt seen/examined on 9/10/21 and Admitted to Inpatient     Chief Complaint: SOB    History Of Present Illness: The patient is a 73 y.o. female who presents to [facility] with SOB. Pt lives in a NH, does not use O2 at baseline. Was found to have worsening SOB, associated with cough and hence sent to the ER. Found to have acute hypercapnic resp failure. Pt agreeable to be placed on BIPAP but refused intubation. CT showed bronchopneumonia, started on IV abx. Also has h/o COPD.      Past Medical History:        Diagnosis Date    Acute kidney failure (HCC)     Chronic kidney disease     COPD (chronic obstructive pulmonary disease) (HCC)     Hyperkalemia     Hyperlipidemia     Hypertension     Hyponatremia     Kidney stone     Major depression     Melena     Obesity     Oxygen deficit     2 liters     PAF (paroxysmal atrial fibrillation) ([address]     Sepsis ([facility]        Past Surgical History:        Procedure Laterality Date    CHOLECYSTECTOMY      COLONOSCOPY N/A 4/6/2021    COLONOSCOPY POLYPECTOMY ABLATION with clip placement performed by Rosa Solorzano MD at [address] [facility] Left 9/19/2020    CYSTOSCOPY URETERAL STENT INSERTION LEFT RETROGRADE PYELOGRAM performed by John Paredes MD at [address] [facility] Left 8/10/2020    OPEN TREATMENT OF LEFT HIP FRACTURE WITH CEPHALOMEDULLARY NAIL performed by Violeta Gibson MD at [address] [place] [address] 3/2/2021    SIGMOIDOSCOPY FLEXIBLE POLYPECTOMY with clip placement performed by Rosa Solorzano MD at [address] [place] [address] 9/24/2020    EGD DIAGNOSTIC ONLY performed by Rosa Solorzano MD at [address] [place] [address]       Medications Prior to 250 MG tablet Take 250 mg by mouth daily   Yes Historical Provider, MD   potassium chloride (KLOR-CON M) 20 MEQ extended release tablet Take 20 mEq by mouth daily   Yes Historical Provider, MD   atorvastatin (LIPITOR) 40 MG tablet Take 40 mg by mouth daily   Yes Historical Provider, MD   dilTIAZem (CARDIZEM CD) 180 MG extended release capsule Take 1 capsule by mouth daily 8/19/20  Yes Luzmaria Carrion DO   furosemide (LASIX) 40 MG tablet Take 1 tablet by mouth daily 8/15/20  Yes Albina Schreiber MD   gabapentin (NEURONTIN) 300 MG capsule Take 300 mg by mouth 3 times daily. Yes Historical Provider, MD   fluticasone-salmeterol (ADVAIR) 500-50 MCG/DOSE diskus inhaler Inhale 1 puff into the lungs 2 times daily  4/24/19  Yes Historical Provider, MD   ipratropium-albuterol (Josepha Ramming) 0.5-2.5 (3) MG/3ML SOLN nebulizer solution Inhale 3 mLs into the lungs every 2 hours as needed for Shortness of Breath 5/23/19  Yes Historical Provider, MD   albuterol sulfate  (90 Base) MCG/ACT inhaler Inhale 2 puffs into the lungs every 4 hours as needed for Shortness of Breath    Yes Historical Provider, MD       Allergies:  Patient has no known allergies. Social History:  The patient currently lives in [address] [place] [address]:   reports that she has quit smoking. Her smoking use included cigarettes. She smoked 1.50 packs per day. She has never used smokeless tobacco.  ETOH:   reports no history of alcohol use. Family History:  Reviewed in detail and negative for DM, Early CAD, Cancer, CVA. Positive as follows:    No family history on file. REVIEW OF SYSTEMS:   Positive for SOB and as noted in the HPI. All other systems reviewed and negative. PHYSICAL EXAM:    BP (!) 148/75   Pulse 65   Temp 97.3 °F (36.3 °C) (Oral)   Resp 20   Ht 5' 5\" (1.651 m)   Wt 250 lb 14.1 oz (113.8 kg)   SpO2 98%   BMI 41.75 kg/m²     General appearance: slightly lethargic on BIPAP  HEENT Normal cephalic, atraumatic without obvious deformity. Pupils equal, round, and reactive to light. Extra ocular muscles intact. Conjunctivae/corneas clear. Neck: Supple, No jugular venous distention/bruits. Trachea midline without thyromegaly or adenopathy with full range of motion. Lungs: diminished b/l, exp wheeze +  Heart: Regular rate and rhythm with Normal S1/S2  Abdomen: Soft, non-tender or non-distended without rigidity or guarding and positive bowel sounds   Extremities: No clubbing, cyanosis, or edema bilaterally. Skin: Skin color, texture, turgor normal.  No rashes or lesions. Neurologic: lethargic, neurovascularly intact with sensory/motor intact upper extremities/lower extremities, bilaterally. Cranial nerves: II-XII intact, grossly non-focal.  Mental status: lethargic but arousable, on BIPAP  Capillary Refill: Acceptable  < 3 seconds  Peripheral Pulses: +3 Easily felt, not easily obliterated with pressure      CBC   Recent Labs     09/10/21  0930   WBC 15.4*   HGB 12.0   HCT 40.8         RENAL  Recent Labs     09/10/21  0930      K 4.5      CO2 26   BUN 20   CREATININE 1.0     LFT'S  Recent Labs     09/10/21  0930   AST 13*   ALT 9*   BILITOT <0.2   ALKPHOS 203*     COAG  No results for input(s): INR in the last 72 hours.   CARDIAC ENZYMES  Recent Labs     09/10/21  0930   TROPONINI <0.01       U/A:    Lab Results   Component Value Date    COLORU YELLOW 09/10/2021    WBCUA 64 09/10/2021    RBCUA 17 09/10/2021    MUCUS 1+ 05/03/2013    BACTERIA 4+ 09/10/2021    CLARITYU CLOUDY 09/10/2021    SPECGRAV 1.013 09/10/2021    LEUKOCYTESUR MODERATE 09/10/2021    BLOODU MODERATE 09/10/2021    GLUCOSEU Negative 09/10/2021       ABG    Lab Results   Component Value Date    LMT6GLQ 26.1 09/10/2021    BEART -4.4 09/10/2021    P4FWFHKW 97.1 09/10/2021    PHART 7.145 09/10/2021    CWR4RWH 75.9 09/10/2021    PO2ART 108.0 09/10/2021    XYM3SJQ 28.4 09/10/2021           Active Hospital Problems    Diagnosis Date Noted    Acute hypercapnic respiratory failure [facility]) [J96.02] 09/10/2021         PHYSICIANS CERTIFICATION:    I certify that Steven Thornton is expected to be hospitalized for > than 2 midnights based on the following assessment and plan:      ASSESSMENT/PLAN:    Acute hypercapnic resp failure - ABG reviewed, pt agreeable to BIPAP, does not want intubation. Cont BIPAP, recheck ABG, pulm consulted    Bronchopneumonia - suspect HCAP given NH resident, probable gram neg PNA, cannot r/o MRSA PNA. Cont IV cefepime/vanco. Check COVID neg, check strep ag/legionella/sputum cx if possible. pulm consulted    Acute COPD exacerbation - cont neb/inhaler/solumedrol/abx    UTI - UA abnormal, cont IV abx, await urine cx      DVT Prophylaxis: lovenox  Diet: Diet NPO  Code Status: Limited  PT/OT Eval Status: ordered    Shannon Harrison MD    Thank you Oralkatherine Jones MD for the opportunity to be involved in this patient's care. If you have any questions or concerns please feel free to contact me at [phone].

## 2021-09-10 NOTE — PROGRESS NOTES
Pt placed on bipap 12/6 50% upon arrival to ED. Pt tolerating well. Slowly increased IPAP to 18 to get TV to 500ml, & MV 11.   Abg drawn and sent to lab

## 2021-09-11 LAB
ANION GAP SERPL CALCULATED.3IONS-SCNC: 11 MMOL/L (ref 3–16)
BASOPHILS ABSOLUTE: 0 K/UL (ref 0–0.2)
BASOPHILS RELATIVE PERCENT: 0 %
BUN BLDV-MCNC: 27 MG/DL (ref 7–20)
CALCIUM SERPL-MCNC: 8.9 MG/DL (ref 8.3–10.6)
CHLORIDE BLD-SCNC: 102 MMOL/L (ref 99–110)
CO2: 23 MMOL/L (ref 21–32)
CREAT SERPL-MCNC: 0.8 MG/DL (ref 0.6–1.2)
EOSINOPHILS ABSOLUTE: 0 K/UL (ref 0–0.6)
EOSINOPHILS RELATIVE PERCENT: 0 %
GFR AFRICAN AMERICAN: >60
GFR NON-AFRICAN AMERICAN: >60
GLUCOSE BLD-MCNC: 127 MG/DL (ref 70–99)
HCT VFR BLD CALC: 39.6 % (ref 36–48)
HEMOGLOBIN: 12.1 G/DL (ref 12–16)
LACTIC ACID: 1.9 MMOL/L (ref 0.4–2)
LACTIC ACID: 2.3 MMOL/L (ref 0.4–2)
LYMPHOCYTES ABSOLUTE: 0.5 K/UL (ref 1–5.1)
LYMPHOCYTES RELATIVE PERCENT: 5.4 %
MCH RBC QN AUTO: 22.1 PG (ref 26–34)
MCHC RBC AUTO-ENTMCNC: 30.5 G/DL (ref 31–36)
MCV RBC AUTO: 72.5 FL (ref 80–100)
MONOCYTES ABSOLUTE: 0.1 K/UL (ref 0–1.3)
MONOCYTES RELATIVE PERCENT: 1 %
MRSA SCREEN RT-PCR: ABNORMAL
NEUTROPHILS ABSOLUTE: 9.3 K/UL (ref 1.7–7.7)
NEUTROPHILS RELATIVE PERCENT: 93.6 %
ORGANISM: ABNORMAL
PDW BLD-RTO: 19.3 % (ref 12.4–15.4)
PLATELET # BLD: 326 K/UL (ref 135–450)
PMV BLD AUTO: 8.2 FL (ref 5–10.5)
POTASSIUM REFLEX MAGNESIUM: 4.3 MMOL/L (ref 3.5–5.1)
RBC # BLD: 5.45 M/UL (ref 4–5.2)
SODIUM BLD-SCNC: 136 MMOL/L (ref 136–145)
VANCOMYCIN RANDOM: 24.5 UG/ML
WBC # BLD: 9.9 K/UL (ref 4–11)

## 2021-09-11 PROCEDURE — 83605 ASSAY OF LACTIC ACID: CPT

## 2021-09-11 PROCEDURE — 80202 ASSAY OF VANCOMYCIN: CPT

## 2021-09-11 PROCEDURE — 97530 THERAPEUTIC ACTIVITIES: CPT

## 2021-09-11 PROCEDURE — 1200000000 HC SEMI PRIVATE

## 2021-09-11 PROCEDURE — 94640 AIRWAY INHALATION TREATMENT: CPT

## 2021-09-11 PROCEDURE — 97162 PT EVAL MOD COMPLEX 30 MIN: CPT

## 2021-09-11 PROCEDURE — 2500000003 HC RX 250 WO HCPCS: Performed by: INTERNAL MEDICINE

## 2021-09-11 PROCEDURE — 36415 COLL VENOUS BLD VENIPUNCTURE: CPT

## 2021-09-11 PROCEDURE — 97535 SELF CARE MNGMENT TRAINING: CPT

## 2021-09-11 PROCEDURE — 2700000000 HC OXYGEN THERAPY PER DAY

## 2021-09-11 PROCEDURE — 2580000003 HC RX 258: Performed by: INTERNAL MEDICINE

## 2021-09-11 PROCEDURE — 97166 OT EVAL MOD COMPLEX 45 MIN: CPT

## 2021-09-11 PROCEDURE — 6360000002 HC RX W HCPCS: Performed by: INTERNAL MEDICINE

## 2021-09-11 PROCEDURE — 99223 1ST HOSP IP/OBS HIGH 75: CPT | Performed by: INTERNAL MEDICINE

## 2021-09-11 PROCEDURE — 85025 COMPLETE CBC W/AUTO DIFF WBC: CPT

## 2021-09-11 PROCEDURE — 6370000000 HC RX 637 (ALT 250 FOR IP): Performed by: INTERNAL MEDICINE

## 2021-09-11 PROCEDURE — 80048 BASIC METABOLIC PNL TOTAL CA: CPT

## 2021-09-11 PROCEDURE — 94761 N-INVAS EAR/PLS OXIMETRY MLT: CPT

## 2021-09-11 RX ORDER — HYDROCORTISONE ACETATE 25 MG/1
25 SUPPOSITORY RECTAL 2 TIMES DAILY PRN
Status: DISCONTINUED | OUTPATIENT
Start: 2021-09-11 | End: 2021-09-16 | Stop reason: HOSPADM

## 2021-09-11 RX ORDER — BISACODYL 10 MG
10 SUPPOSITORY, RECTAL RECTAL DAILY PRN
Status: DISCONTINUED | OUTPATIENT
Start: 2021-09-11 | End: 2021-09-16 | Stop reason: HOSPADM

## 2021-09-11 RX ORDER — METOPROLOL TARTRATE 5 MG/5ML
5 INJECTION INTRAVENOUS EVERY 6 HOURS PRN
Status: DISCONTINUED | OUTPATIENT
Start: 2021-09-11 | End: 2021-09-16 | Stop reason: HOSPADM

## 2021-09-11 RX ORDER — IPRATROPIUM BROMIDE AND ALBUTEROL SULFATE 2.5; .5 MG/3ML; MG/3ML
1 SOLUTION RESPIRATORY (INHALATION) 2 TIMES DAILY
Status: DISCONTINUED | OUTPATIENT
Start: 2021-09-11 | End: 2021-09-14

## 2021-09-11 RX ADMIN — METOPROLOL TARTRATE 25 MG: 25 TABLET, FILM COATED ORAL at 21:39

## 2021-09-11 RX ADMIN — METHYLPREDNISOLONE SODIUM SUCCINATE 40 MG: 40 INJECTION, POWDER, FOR SOLUTION INTRAMUSCULAR; INTRAVENOUS at 17:18

## 2021-09-11 RX ADMIN — SODIUM CHLORIDE, PRESERVATIVE FREE 10 ML: 5 INJECTION INTRAVENOUS at 21:40

## 2021-09-11 RX ADMIN — ENOXAPARIN SODIUM 40 MG: 40 INJECTION SUBCUTANEOUS at 21:39

## 2021-09-11 RX ADMIN — VANCOMYCIN HYDROCHLORIDE 1000 MG: 1 INJECTION, POWDER, LYOPHILIZED, FOR SOLUTION INTRAVENOUS at 08:33

## 2021-09-11 RX ADMIN — Medication 2 CAPSULE: at 17:19

## 2021-09-11 RX ADMIN — CEFEPIME HYDROCHLORIDE 2000 MG: 2 INJECTION, POWDER, FOR SOLUTION INTRAVENOUS at 12:05

## 2021-09-11 RX ADMIN — BUDESONIDE AND FORMOTEROL FUMARATE DIHYDRATE 2 PUFF: 160; 4.5 AEROSOL RESPIRATORY (INHALATION) at 08:42

## 2021-09-11 RX ADMIN — TRAMADOL HYDROCHLORIDE 50 MG: 50 TABLET, FILM COATED ORAL at 19:48

## 2021-09-11 RX ADMIN — IPRATROPIUM BROMIDE AND ALBUTEROL SULFATE 1 AMPULE: .5; 3 SOLUTION RESPIRATORY (INHALATION) at 12:45

## 2021-09-11 RX ADMIN — ACETAMINOPHEN 650 MG: 325 TABLET ORAL at 12:02

## 2021-09-11 RX ADMIN — TRAMADOL HYDROCHLORIDE 50 MG: 50 TABLET, FILM COATED ORAL at 12:02

## 2021-09-11 RX ADMIN — METOPROLOL TARTRATE 25 MG: 25 TABLET, FILM COATED ORAL at 14:33

## 2021-09-11 RX ADMIN — Medication 2 CAPSULE: at 08:29

## 2021-09-11 RX ADMIN — BUDESONIDE AND FORMOTEROL FUMARATE DIHYDRATE 2 PUFF: 160; 4.5 AEROSOL RESPIRATORY (INHALATION) at 19:30

## 2021-09-11 RX ADMIN — IPRATROPIUM BROMIDE AND ALBUTEROL SULFATE 1 AMPULE: .5; 3 SOLUTION RESPIRATORY (INHALATION) at 19:30

## 2021-09-11 RX ADMIN — METOPROLOL TARTRATE 5 MG: 1 INJECTION, SOLUTION INTRAVENOUS at 14:33

## 2021-09-11 RX ADMIN — ATORVASTATIN CALCIUM 40 MG: 40 TABLET, FILM COATED ORAL at 08:29

## 2021-09-11 RX ADMIN — ESCITALOPRAM OXALATE 20 MG: 10 TABLET ORAL at 08:29

## 2021-09-11 RX ADMIN — PANTOPRAZOLE SODIUM 40 MG: 40 TABLET, DELAYED RELEASE ORAL at 06:27

## 2021-09-11 RX ADMIN — METHYLPREDNISOLONE SODIUM SUCCINATE 40 MG: 40 INJECTION, POWDER, FOR SOLUTION INTRAMUSCULAR; INTRAVENOUS at 00:32

## 2021-09-11 RX ADMIN — CEFEPIME HYDROCHLORIDE 2000 MG: 2 INJECTION, POWDER, FOR SOLUTION INTRAVENOUS at 00:31

## 2021-09-11 RX ADMIN — SODIUM CHLORIDE, PRESERVATIVE FREE 10 ML: 5 INJECTION INTRAVENOUS at 08:30

## 2021-09-11 RX ADMIN — VANCOMYCIN HYDROCHLORIDE 1000 MG: 1 INJECTION, POWDER, LYOPHILIZED, FOR SOLUTION INTRAVENOUS at 19:51

## 2021-09-11 RX ADMIN — METHYLPREDNISOLONE SODIUM SUCCINATE 40 MG: 40 INJECTION, POWDER, FOR SOLUTION INTRAMUSCULAR; INTRAVENOUS at 09:48

## 2021-09-11 ASSESSMENT — PAIN DESCRIPTION - FREQUENCY
FREQUENCY: CONTINUOUS
FREQUENCY: CONTINUOUS

## 2021-09-11 ASSESSMENT — PAIN SCALES - GENERAL
PAINLEVEL_OUTOF10: 0
PAINLEVEL_OUTOF10: 0
PAINLEVEL_OUTOF10: 8
PAINLEVEL_OUTOF10: 2
PAINLEVEL_OUTOF10: 8
PAINLEVEL_OUTOF10: 0
PAINLEVEL_OUTOF10: 6

## 2021-09-11 ASSESSMENT — PAIN DESCRIPTION - ONSET
ONSET: ON-GOING
ONSET: ON-GOING

## 2021-09-11 ASSESSMENT — PAIN DESCRIPTION - PAIN TYPE
TYPE: CHRONIC PAIN
TYPE: CHRONIC PAIN

## 2021-09-11 ASSESSMENT — PAIN DESCRIPTION - DESCRIPTORS
DESCRIPTORS: ACHING;DISCOMFORT
DESCRIPTORS: ACHING

## 2021-09-11 ASSESSMENT — PAIN DESCRIPTION - ORIENTATION: ORIENTATION: LEFT

## 2021-09-11 ASSESSMENT — PAIN DESCRIPTION - PROGRESSION
CLINICAL_PROGRESSION: GRADUALLY WORSENING
CLINICAL_PROGRESSION: GRADUALLY WORSENING

## 2021-09-11 ASSESSMENT — PAIN DESCRIPTION - LOCATION
LOCATION: LEG
LOCATION: BACK

## 2021-09-11 NOTE — PROGRESS NOTES
Pt has h/o COPD - here with COPD exacerbation. She continues to smoke. Pt educated on importance of smoking cessation, and pt refused teaching. She stated \"I don't care I will continue to smoke even if it kills me. \" She has continuously refused BiPap all morning. Pt educated on the role of BiPap with her high CO2 level caused by COPD - she stated \"I will not wear that thing. I would rather die than have to wear that mask. \" Risks of non-adherence to medical treatment provided. Pt refused noon vitals. The importance of obtaining vitals every 4 hours provided to patient, but she continues to refuse. Risks of refusing reassessment on BP & temp provided to pt. Pt is on continuous pulse ox and telemetry. Will continue to monitor, wean O2 as tolerated, and educate pt as needed.      Electronically signed by Hussein Maki RN on 9/11/2021 at 1:44 PM

## 2021-09-11 NOTE — FLOWSHEET NOTE
09/11/21 1745   Vital Signs   Pulse 144   Heart Rate Source Telemetry     Dr. Dragan Will notified that pt remains in a-fib RVR despite PRN IV Metoprolol and scheduled PO metoprolol. Pt unable to receive PRN Metoprolol at this time.  Awaiting response   Electronically signed by Av Courtney RN on 9/11/2021 at 5:58 PM

## 2021-09-11 NOTE — FLOWSHEET NOTE
09/11/21 1400   Vital Signs   Pulse 141   Heart Rate Source Telemetry     Pt went into a-fib RVR (this AM she was in a-fib w/ HR between 90-100s). She denies CP, palpitations, or SOB. STAT EKG ordered. Last EKG on 9/9 was NSR. Dr. Darian Vasquez notified of the above. Awaiting response.     Electronically signed by Karlee Baltazar RN on 9/11/2021 at 2:10 PM

## 2021-09-11 NOTE — PLAN OF CARE
Problem: Infection:  Goal: Will remain free from infection  Description: Will remain free from infection  Outcome: Ongoing   Alert and oriented x4 Dyspnic at times but is refusing BIPAP O2 at 6L per n/c Lungs with rhonchi t/o Cough and deep breathing exercises encouraged. Medicated x1 for c/o pain with good effect. F/c patent draining clear yellow urine Cath.  Care php Turns and repositions self with encouragement Free from fall/injury

## 2021-09-11 NOTE — PROGRESS NOTES
Occupational Therapy   Occupational Therapy Initial Assessment  Date: 2021   Patient Name: Augusta Osorio  MRN: [id number]     : 1950    Date of Service: 2021    Discharge Recommendations:  ECF with OT, 3-5 sessions per week ([facility] with [facility] as prior to admit)     Augusta Osorio scored a 12/24 on the AM-PAC ADL Inpatient form. Current research shows that an AM-PAC score of 17 or less is typically not associated with a discharge to the patient's home setting. Based on the patient's AM-PAC score and their current ADL deficits, it is recommended that the patient have 3-5 sessions per week of Occupational Therapy at d/c to increase the patient's independence. Please see assessment section for further patient specific details. If patient discharges prior to next session this note will serve as a discharge summary. Please see below for the latest assessment towards goals. Assessment   Performance deficits / Impairments: Decreased functional mobility ; Decreased endurance;Decreased ADL status; Decreased ROM; Decreased balance;Decreased strength  Assessment: Pt admitted with  acute hypercapnic resp failure and bronchopneumonia. PTA, pt resided in nursing [facility]. She reports she has been there 1 year since [facility] 2020. At facility, pt reports completing stand-pivot transfers with therapy staff to get to w/c for a few hours a day, otherwise she uses slade lift for transfers. She currently performs all ADLS in bed and requires staff assist to complete ADLs. This date, pt performed bed mobility at mod/max A x 2, and tolerated sitting EOB x 10 min with CGA to complete seated grooming task. Pt is currently most limited by decreased strength, balance, and activity tolerance. She will benefit from continued therapy while in the hospital. [facility] with [facility] therapy. Prognosis: Guarded; Fair  Decision Making: Medium Complexity  History: pt is from facility. admitted with bronchopneumonia,  acute hypercapnic resp failure  Exam: decreased balance, ROM, strength, endurance  Assistance / Modification: mod/max A x 2 bed mobility, CGA sitting EOB, CGA grooming with setup  OT Education: OT Role;Plan of Care  REQUIRES OT FOLLOW UP: Yes  Activity Tolerance  Activity Tolerance: Patient Tolerated treatment well  Safety Devices  Safety Devices in place: Yes  Type of devices: Call light within reach; Bed alarm in place; Left in bed;Nurse notified           Patient Diagnosis(es): The primary encounter diagnosis was Acute respiratory failure with hypoxia and hypercapnia ([address] Diagnoses of Urinary tract infection without hematuria, site unspecified and Acute exacerbation of chronic obstructive pulmonary disease (COPD) ([address] were also pertinent to this visit. has a past medical history of Acute kidney failure ([address] Chronic kidney disease, COPD (chronic obstructive pulmonary disease) ([address] Hyperkalemia, Hyperlipidemia, Hypertension, Hyponatremia, Kidney stone, Major depression, Melena, Obesity, Oxygen deficit, PAF (paroxysmal atrial fibrillation) ([address] and Sepsis ([address] has a past surgical history that includes Cholecystectomy; Hip fracture surgery (Left, 8/10/2020); Cystoscopy (Left, 9/19/2020); Upper gastrointestinal endoscopy (N/A, 9/24/2020); sigmoidoscopy (N/A, 3/2/2021); and Colonoscopy (N/A, 4/6/2021). Restrictions  Restrictions/Precautions  Restrictions/Precautions: Fall Risk  Position Activity Restriction  Other position/activity restrictions: goff, 3L O2 via nc    Subjective   General  Chart Reviewed: Yes  Patient assessed for rehabilitation services?: Yes  Additional Pertinent Hx: Per Dr Jaye Dooley H&P: \"The patient is a 73 y.o. female who presents to [facility] with SOB. Pt lives in a NH, does not use O2 at baseline. Was found to have worsening SOB, associated with cough and hence sent to the ER.  Found to have acute hypercapnic resp failure. Pt agreeable to be placed on BIPAP but refused intubation. CT showed bronchopneumonia, started on IV abx. Also has h/o COPD. \"  Family / Caregiver Present: No  Referring Practitioner: Ronni Cuellar MD  Diagnosis: Acute hypercapnic resp failure , bronchopneumonia  Subjective  Subjective: Pt met bedside for OT eval/tx with PT. Pt in bed upon entering, agreeable to therapy. Denies pain. Social/Functional History  Social/Functional History  [facility] of [facility]: [facility]  ADL Assistance: Needs assistance (Bed baths at facility, toilet A for toileting)  Transfer Assistance: Needs assistance (stand-pivot transfers to/from w/c)  Additional Comments: Receives therapy at [facility] - reports was ambulating with therapy, sits in w/c for 2 hrs/day       Objective   Vision: Within Functional Limits  Hearing: Within functional limits    Orientation  Overall Orientation Status: Within Functional Limits     Balance  Sitting Balance: Contact guard assistance (10 minutes sitting EOB)  Functional Mobility  Functional Mobility Comments: Not tested this date - pt w/c bound at facility. ADL  Grooming: Setup (seated EOB to wash face with prepared cloth)  Toileting: Dependent/Total (goff catheter)  Additional Comments: Anticipate pt to be setup for feeding, max A bathing and dressing based on ROM, balance and endurance. Transfers  Transfer Comments: not attempted this date  - pt reports only transferring with PT at facility for stand-pivots, otherwise uses slade lift.  Recommend [employer] while in hospital.     Sensation  Overall Sensation Status: WFL      LUE AROM (degrees)  LUE AROM : WFL  RUE AROM (degrees)  RUE AROM : Exceptions  RUE General AROM: elbow > distal WFL  R Shoulder Flexion 0-180: ~20*  LUE Strength  Gross LUE Strength: WFL  RUE Strength  Gross RUE Strength: Exceptions to [facility] [place]  R Shoulder Flex: 2/5  RUE Strength Comment: elbow > distal [facility] [place]        Plan   Plan  Times per week: 3-5  Times per day: Daily  Current Treatment Recommendations: Strengthening, ROM, Safety Education & Training, Endurance Training, Self-Care / ADL, Equipment Evaluation, Education, & procurement, Patient/Caregiver Education & Training    AM-PAC Score  AM-PAC Inpatient Daily Activity Raw Score: 12 (09/11/21 1058)  AM-PAC Inpatient ADL T-Scale Score : 30.6 (09/11/21 1058)  ADL Inpatient CMS 0-100% Score: 66.57 (09/11/21 1058)  ADL Inpatient CMS G-Code Modifier : CL (09/11/21 1058)    Goals  Short term goals  Time Frame for Short term goals: By d/c:  Short term goal 1: Pt will tolerate sitting EOB 10+ min with supv to complete grooming/feeding task  Short term goal 2: Pt will tolerate 10-15 minutes of UE therex to improve independence and strength with transfers  Short term goal 3: Pt will complete stand-pivot transfer to/from ADL surfaces with min A x 2 with AD  Patient Goals   Patient goals : \"to get stronger\"       Therapy Time   Individual Concurrent Group Co-treatment   Time In 6369         Time Out 1100         Minutes 45         Timed Code Treatment Minutes: 600 Blanca Sierra, OTR/L 6128

## 2021-09-11 NOTE — PROGRESS NOTES
Physical Therapy  Facility/Department: [address] PROGRESSIVE CARE  Initial Assessment    NAME: Imani Bacon  : 1950  MRN: [id number]    Date of Service: 2021  Discharge Recommendations:  ECF with PT, 3-5 sessions per week   PT Equipment Recommendations  Other: defer to facility    Assessment   Body structures, Functions, Activity limitations: Decreased functional mobility   Assessment: Pt presents with decreased functional mobility after admission and diagnosed with Acute Hypercapnic Resp failure and Bronchopneumonia. Prior to admit, pt is a resident at [address] [place] [address] x 1 year duration. Pt reported getting therapy and working on stand-pivots with therapist, but also using a Lary lift at facility to sit in W/C only x 2 hours time. Today, pt was Mod-Max A x 2 for supine<>sit bed mobility and sat eob x 10 minutes with CGA. Nursing requested no out of bed at this time. Anticipate pt returning to ECF once medically stable with cont therapy as pt received prior to admit. Will follow. Imani Bacon scored a  on the AM-PAC short mobility form. Current research shows that an AM-PAC score of 17 or less is typically not associated with a discharge to the patient's home setting. Based on the patient's AM-PAC score and their current functional mobility deficits, it is recommended that the patient have 3-5 sessions per week of Physical Therapy at d/c to increase the patient's independence. Please see assessment section for further patient specific details. If patient discharges prior to next session this note will serve as a discharge summary. Please see below for the latest assessment towards goals.      Specific instructions for Next Treatment: cotx with OT  Prognosis: Fair;Guarded  Decision Making: Medium Complexity  History: as noted  Exam: as above  Clinical Presentation: evolving  PT Education: PT Role;Goals;Plan of Care;General Safety  Barriers to Learning: none  REQUIRES PT FOLLOW UP: Yes  Activity Tolerance  Activity Tolerance: Patient Tolerated treatment well;Patient limited by endurance       Patient Diagnosis(es): The primary encounter diagnosis was Acute respiratory failure with hypoxia and hypercapnia ([facility] Diagnoses of Urinary tract infection without hematuria, site unspecified and Acute exacerbation of chronic obstructive pulmonary disease (COPD) ([address] were also pertinent to this visit. has a past medical history of Acute kidney failure ([facility] Chronic kidney disease, COPD (chronic obstructive pulmonary disease) ([address] Hyperkalemia, Hyperlipidemia, Hypertension, Hyponatremia, Kidney stone, Major depression, Melena, Obesity, Oxygen deficit, PAF (paroxysmal atrial fibrillation) ([address] and Sepsis ([address] has a past surgical history that includes Cholecystectomy; Hip fracture surgery (Left, 8/10/2020); Cystoscopy (Left, 9/19/2020); Upper gastrointestinal endoscopy (N/A, 9/24/2020); sigmoidoscopy (N/A, 3/2/2021); and Colonoscopy (N/A, 4/6/2021). Restrictions  Restrictions/Precautions  Restrictions/Precautions: Fall Risk  Position Activity Restriction  Other position/activity restrictions: goff, 3L O2 via nc  Vision/Hearing  Vision: Within Functional Limits  Hearing: Within functional limits     Subjective  General  Chart Reviewed: Yes  Patient assessed for rehabilitation services?: Yes  Additional Pertinent Hx: The patient is a 73 y.o. female who presents to [facility] with SOB. Pt lives in a NH, does not use O2 at baseline. Was found to have worsening SOB, associated with cough and hence sent to the ER. Found to have acute hypercapnic resp failure. Pt agreeable to be placed on BIPAP but refused intubation. CT showed bronchopneumonia, started on IV abx. Also has h/o COPD. \"  Diagnosed with Acute Hypercapnic Resp failure and Bronchopneumonia  Family / Caregiver Present: No  Subjective  Subjective: Pt in supine, denied pain.   Aggervated and wanting to know who sent her to the hospital?? Pt alert, cooperative after explaination of doing some therapy while hospitalized. Orientation  Orientation  Overall Orientation Status: Within Functional Limits (?insight into real deficits, slightly disoriented to time, but reported not keeping track of it)  Orientation Level: Oriented to person;Oriented to situation;Oriented to place  Social/Functional History  Social/Functional History  Type of Home: [facility]  ADL Assistance: Needs assistance (Bed baths at facility, toilet A for toileting)  Transfer Assistance: Needs assistance (stand-pivot transfers to/from w/c)  Additional Comments: Receives therapy at [facility] - reports was ambulating with therapy, sits in w/c for 2 hrs/day     Objective  AROM RLE (degrees)  RLE AROM: WFL  AROM LLE (degrees)  LLE General AROM: decreased hip/knee flexion due to old hip fx/sx, ankle WFL. Strength RLE  Comment: grossly 4/5 throughout. Strength LLE  Comment: functionall fair to poor, decreased hip/knee strength, longstanding x 1 yr per pt. Bed mobility  Rolling to Left: Minimal assistance  Rolling to Right: Unable to assess  Supine to Sit: Moderate assistance;2 Person assistance  Sit to Supine: Maximum assistance;2 Person assistance  Scooting: Dependent/Total;2 Person assistance (up into bed to comfort at end of session.)  Transfers  Sit to Stand: Unable to assess  Stand to sit: Unable to assess  Bed to Chair: Unable to assess (anticipate need for Maxi Move, vs Stedy--as pt reported doing stand pivot with PT at [facility] only--otherwise would use Lary at [place] for out of bed.)  Ambulation  Ambulation?: No     Balance  Sitting - Static: Good  Sitting - Dynamic: Good  Standing - Static:  (selvin)  Standing - Dynamic:  (selvin)  Comments: Pt sat eob with CGA once upright x 10 minutes duration.   OOB nt, nursing request.      Plan   Plan  Times per week: 3-5 x wk in acute setting  Specific instructions for Next Treatment: cotx with OT  Current Treatment Recommendations: Functional Mobility Training  Safety Devices  Type of devices: All fall risk precautions in place, Bed alarm in place, Call light within reach, Nurse notified, Left in bed         AM-PAC Score  AM-PAC Inpatient Mobility Raw Score : 8 (09/11/21 1106)  AM-PAC Inpatient T-Scale Score : 28.52 (09/11/21 1106)  Mobility Inpatient CMS 0-100% Score: 86.62 (09/11/21 1106)  Mobility Inpatient CMS G-Code Modifier : CM (09/11/21 1106)     Goals  Short term goals  Time Frame for Short term goals: by acute d/c--anticipate return to facility with cont therapy. Short term goal 1: bed mobility Min A x 1-2  Short term goal 2: sit<>stand pivot transfers with Min A x 2  Short term goal 3: assess ambulation if/as able. Short term goal 4: tolerate LE rom/ther ex x 10 reps for rom/strength gains. Long term goals  Time Frame for Long term goals : tbd at facility as needed.   Patient Goals   Patient goals : pt's goal is to get stronger  Therapy Time   Individual Concurrent Group Co-treatment   Time In 3409         Time Out 1100         Minutes 45          1 eval, 2 act charges   Mandie Hernandez PT Electronically signed by Mandie Hernandez PT on 9/11/2021 at 11:09 AM

## 2021-09-11 NOTE — CONSULTS
REASON FOR CONSULTATION/CC: copd exacerbation / pna       Consult at request of Eileen Cornejo MD for     PCP: Alexis Bonilla MD  Established Pulmonologist:  None    HISTORY OF PRESENT ILLNESS: Alcides Holt is a 73y.o. year old female with a history of copd who presents with       Increase shortness of breath and dyspnea on exertion with worsening hypoxemia and hypercapnia. Now improving refusing bipap          Assessment:        Acute hypoxemic respiratory failure with saturation < 90% on room air   acute hypercapnic respiratory failure ph < 7.35  Copd   a fib  chf    Plan:      Hospital Day 1     Acute hypoxemia with acute hypercapnic respiratory failure   * currently 3L NC   * refusing to wear bipap. Pt DNI . She now states she does not want bipap even if she were to die   * pH/ co2 improve with some bipap use. * d/c bipap     CODE status   * current code status does not match last 2 admissions. Pt was DNI and DNR on prior admission instead of just DNI   * we discuss the illogical code status of no intubation or bipap but chest compression. She agreed  * change to dnr dni      Since improving and refusing bipap, transfer to med surg. UTI with  pneumonia  * E. Coli  * Vanc + cefepime      copd  * wheezing  * Solumedrol q8  * Symbicort   * start scheduled Duoneb's bid        Nutrition  - ADULT DIET; Regular           This note was transcribed using [id number] [address]. Please disregard any translational errors.     Thank you for the consult    Sherlyn Mane [facility]  [phone]             Data:     PAST MEDICAL HISTORY:  Past Medical History:   Diagnosis Date    [facility]     Chronic kidney disease     COPD (chronic obstructive pulmonary disease) ([facility]     Hyperkalemia     Hyperlipidemia     Hypertension     Hyponatremia     Kidney stone     Major depression     Melena     Obesity     Oxygen deficit     2 liters     PAF (paroxysmal [facility] fibrillation) [facility]     Sepsis ([facility]        PAST SURGICAL HISTORY:  Past Surgical History:   Procedure Laterality Date    CHOLECYSTECTOMY      COLONOSCOPY N/A 4/6/2021    COLONOSCOPY POLYPECTOMY ABLATION with clip placement performed by Sonali Stoner MD at [address] [facility] Left 9/19/2020    CYSTOSCOPY URETERAL STENT INSERTION LEFT RETROGRADE PYELOGRAM performed by Kayli Cary MD at [address] [place] [address] Left 8/10/2020    OPEN TREATMENT OF LEFT HIP FRACTURE WITH CEPHALOMEDULLARY NAIL performed by Fredi Ansari MD at [address] [place] [address] 3/2/2021    SIGMOIDOSCOPY FLEXIBLE POLYPECTOMY with clip placement performed by Sonali Stoner MD at [facility] 9/24/2020    EGD DIAGNOSTIC ONLY performed by Sonali Stoner MD at [address] [place] [address] St:  family history is not on file. SOCIAL HISTORY:   reports that she has quit smoking. Her smoking use included cigarettes. She smoked 1.50 packs per day.  She has never used smokeless tobacco.    Scheduled Meds:   sodium chloride flush  5-40 mL IntraVENous 2 times per day    enoxaparin  40 mg SubCUTAneous Daily    cefepime  2,000 mg IntraVENous Q12H    escitalopram  20 mg Oral Daily    budesonide-formoterol  2 puff Inhalation BID    lactobacillus  2 capsule Oral BID WC    pantoprazole  40 mg Oral Daily    atorvastatin  40 mg Oral Daily    methylPREDNISolone  40 mg IntraVENous Q8H    vancomycin  1,000 mg IntraVENous Q12H    vancomycin (VANCOCIN) intermittent dosing (placeholder)   Other RX Placeholder       Continuous Infusions:   sodium chloride         PRN Meds:  bisacodyl, hydrocortisone, sodium chloride flush, sodium chloride, ondansetron **OR** ondansetron, polyethylene glycol, acetaminophen **OR** acetaminophen, albuterol sulfate HFA, ipratropium-albuterol, traMADol, sennosides-docusate sodium    ALLERGIES:  Patient has No Known Allergies. REVIEW OF SYSTEMS:  Constitutional: Negative for fever    HENT: Negative for sore throat  Eyes: Negative for redness   Respiratory: ++ for dyspnea, cough  Cardiovascular: Negative for chest pain  Gastrointestinal: Negative for vomiting, diarrhea    Genitourinary: Negative for hematuria   Musculoskeletal: Negative for arthralgias   Skin: Negative for rash  Neurological: Negative for syncope  Hematological: Negative for adenopathy  Psychiatric/Behavorial: Negative for anxiety    Objective:   PHYSICAL EXAM:  Blood pressure 115/62, pulse 97, temperature 98.2 °F (36.8 °C), temperature source Oral, resp. rate 18, height 5' 5\" (1.651 m), weight 245 lb 9.5 oz (111.4 kg), SpO2 96 %.'  Gen: No distress. Eyes: PERRL. No sclera icterus. No conjunctival injection. ENT: No discharge. Pharynx clear. External appearance of ears and nose normal.  Neck: Trachea midline. No obvious mass. Resp: No accessory muscle use. No crackles. + wheezes. + rhonchi. CV: Regular rate. Regular rhythm. No murmur or rub. No edema. GI: Non-tender. Non-distended. No hernia. Skin: Warm, dry, normal texture and turgor. No nodule on exposed extremities. Lymph: No cervical LAD. No supraclavicular LAD. M/S: No cyanosis. No clubbing. No joint deformity. Neuro: Moves all four extremities. Psych: Oriented x 3. No anxiety. Awake. Alert. Intact judgement and insight. Data Reviewed:   LABS:  CBC:   Recent Labs     09/10/21  0930 09/11/21 0436   WBC 15.4* 9.9   HGB 12.0 12.1   HCT 40.8 39.6   MCV 74.0* 72.5*    326     BMP:   Recent Labs     09/10/21  0930 09/11/21  0436    136   K 4.5 4.3    102   CO2 26 23   BUN 20 27*   CREATININE 1.0 0.8     LIVER PROFILE:   Recent Labs     09/10/21  0930   AST 13*   ALT 9*   BILITOT <0.2   ALKPHOS 203*     PT/INR: No results for input(s): PROTIME, INR in the last 72 hours. APTT: No results for input(s): APTT in the last 72 hours.   UA:  Recent Labs 09/10/21  1125   COLORU YELLOW   PHUR 5.5   WBCUA 64*   RBCUA 17*   BACTERIA 4+*   CLARITYU CLOUDY*   SPECGRAV 1.013   LEUKOCYTESUR MODERATE*   UROBILINOGEN 0.2   BILIRUBINUR Negative   BLOODU MODERATE*   GLUCOSEU Negative     Recent Labs     09/10/21  0949 09/10/21  1744   PHART 7.145* 7.234*   [id number] 75.9* 57.4*   PO2ART 108.0 90.6       Vent Information  Skin Assessment: (S)  (Pt refuses to wear)  FiO2 : 35 %  SpO2: 96 %  SpO2/FiO2 ratio: 280  I Time/ I Time %: 0.9 s  Mask Type: Full face mask  Mask Size: Large    Radiology Review:  Pertinent images / reports were reviewed as a part of this visit. CT Chest w/ contrast: No results found for this or any previous visit. CT Chest w/o contrast: No results found for this or any previous visit. CTPA: Results for orders placed during the hospital encounter of 09/10/21    CT CHEST PULMONARY EMBOLISM W CONTRAST    Narrative  EXAMINATION:  CTA OF THE CHEST 9/10/2021 12:37 pm    TECHNIQUE:  CTA of the chest was performed after the administration of intravenous  contrast.  Multiplanar reformatted images are provided for review. MIP  images are provided for review. Dose modulation, iterative reconstruction,  and/or weight based adjustment of the mA/kV was utilized to reduce the  radiation dose to as low as reasonably achievable. COMPARISON:  None. HISTORY:  ORDERING SYSTEM PROVIDED HISTORY: dyspena resp failure  TECHNOLOGIST PROVIDED HISTORY:  Reason for exam:->dyspena resp failure  Decision Support Exception - unselect if not a suspected or confirmed  emergency medical condition->Emergency Medical Condition (MA)    FINDINGS:  Pulmonary Arteries: Pulmonary arteries are adequately opacified for  evaluation. No evidence of intraluminal filling defect to suggest pulmonary  embolism. Main pulmonary artery is normal in caliber. Mediastinum: No evidence of mediastinal lymphadenopathy. The heart and  pericardium demonstrate no acute abnormality.   There is no acute abnormality  of the thoracic aorta. Lungs/pleura: There is patchy bibasilar consolidation along with thickening  of central airways and scattered bibasilar endobronchial mucous plugging. There is no pneumothorax or effusion. Upper Abdomen: Small amount of pneumobilia is visualized within the a patent  dome. The remainder of visualized solid abdominal organs are unremarkable. Soft Tissues/Bones: No acute bone or soft tissue abnormality. Impression  1. Negative for pulmonary embolus. 2. Bibasilar bronchopneumonia. CXR PA/LAT: Results for orders placed during the hospital encounter of 08/04/15    XR Chest Standard TWO VW    Narrative  INDICATION: COPD. Hypoxia. FINDINGS: Two views. Comparison is made to study dated 5/5/2013. The lungs are hyperexpanded without focal consolidation. Heart  size, mediastinal contours and pulmonary vascularity are within  normal limits. The pleural spaces are clear. Impression  COPD. CXR portable: Results for orders placed during the hospital encounter of 09/10/21    XR CHEST PORTABLE    Narrative  EXAMINATION:  ONE XRAY VIEW OF THE CHEST    9/10/2021 10:26 am    COMPARISON:  09/18/2020    HISTORY:  ORDERING SYSTEM PROVIDED HISTORY: chf  TECHNOLOGIST PROVIDED HISTORY:  Reason for exam:->chf  Reason for Exam: Respiratory Distress  Acuity: Chronic  Type of Exam: Ongoing    FINDINGS:  Cardiomediastinal silhouette stable. Bibasilar hypoaeration. No pulmonary  vascular congestion or edema. No pneumothorax. No pleural effusion.     Impression  Stable chest demonstrating bibasilar hypoaeration

## 2021-09-11 NOTE — RT PROTOCOL NOTE
other Inpatient aerosolized bronchodilator medication orders and enter DuoNeb Nebulizer order with QID frequency and an Albuterol Nebulizer order with frequency of every 2 hours PRN wheezing or increased work of breathing using Per Protocol order mode. Repeat RT Therapy Protocol Assessment with second treatment then QID and as needed. Greater than 13 - discontinue any other Inpatient aerosolized bronchodilator medication orders and enter a DuoNeb Nebulizer order with every 4 hours frequency and an Albuterol Nebulizer order with frequency of every 2 hours PRN wheezing or increased work of breathing using Per Protocol order mode. Repeat RT Therapy Protocol Assessment with second treatment then every 4 hours and as needed. RT to enter RT Home Evaluation for COPD & MDI Assessment order using Per Protocol order mode.     Electronically signed by Gokul Mcclelland RCP on 9/11/2021 at 12:56 PM

## 2021-09-11 NOTE — PROGRESS NOTES
Bipap removed to give mouth care and to allow pt to take her medications. Pt awake and alert, placed on 6L per NC Os sats mid 90s with continuous pulse ox on. Dinner tray arrived and pt requested to eat dinner. RT updated.    Electronically signed by Daisy Goldstein RN on 9/10/2021 at 8:02 PM

## 2021-09-11 NOTE — PROGRESS NOTES
Was notified by pt's RN that pt removed bipap. Myself asked pt if she wanted to go back on the bipap and she stated no. Pt does not seem to be in respirator distress at this time.  Pt is currently 5l/m nasal cannula

## 2021-09-11 NOTE — PLAN OF CARE
Problem: Infection:  Goal: Will remain free from infection  Description: Will remain free from infection  9/11/2021 1006 by Tristin Morales RN  Outcome: Ongoing  9/11/2021 0207 by Cecelia Shay RN  Outcome: Ongoing     Problem: Safety:  Goal: Free from accidental physical injury  Description: Free from accidental physical injury  9/11/2021 1006 by Tristin Morales RN  Outcome: Ongoing  9/11/2021 0207 by Cecelia Shay RN  Outcome: Met This Shift  Goal: Free from intentional harm  Description: Free from intentional harm  9/11/2021 1006 by Tristin Morales RN  Outcome: Ongoing  9/11/2021 0207 by Cecelia Shay RN  Outcome: Met This Shift     Problem: Daily Care:  Goal: Daily care needs are met  Description: Daily care needs are met  9/11/2021 1006 by Tristin Morales RN  Outcome: Ongoing  9/11/2021 0207 by Cecelia Shay RN  Outcome: Ongoing     Problem: Pain:  Goal: Patient's pain/discomfort is manageable  Description: Patient's pain/discomfort is manageable  9/11/2021 1006 by Tristin Morales RN  Outcome: Ongoing  9/11/2021 0207 by Cecelia Shay RN  Outcome: Ongoing     Problem: Skin Integrity:  Goal: Skin integrity will stabilize  Description: Skin integrity will stabilize  9/11/2021 1006 by Tristin Morales RN  Outcome: Ongoing  9/11/2021 0207 by Cecelia Shay RN  Outcome: Ongoing     Problem: Discharge Planning:  Goal: Patients continuum of care needs are met  Description: Patients continuum of care needs are met  9/11/2021 1006 by Tristin Morales RN  Outcome: Ongoing  9/11/2021 0207 by Cecelia Shay RN  Outcome: Ongoing     Problem: Falls - Risk of:  Goal: Will remain free from falls  Description: Will remain free from falls  9/11/2021 1006 by Tristin Morales RN  Outcome: Ongoing  9/11/2021 0207 by Cecelia Shay RN  Outcome: Met This Shift  Goal: Absence of physical injury  Description: Absence of physical injury  9/11/2021 1006 by Tristin Morales RN  Outcome: Ongoing  9/11/2021 0207 by Cecelia Shay RN  Outcome: Met This Shift     Problem: Skin Integrity:  Goal: Will show no infection signs and symptoms  Description: Will show no infection signs and symptoms  9/11/2021 1006 by Eliot Garcia RN  Outcome: Ongoing  9/11/2021 0207 by Jhoana Romo RN  Outcome: Ongoing  Goal: Absence of new skin breakdown  Description: Absence of new skin breakdown  9/11/2021 1006 by Eliot Garcia RN  Outcome: Ongoing  9/11/2021 0207 by Jhoana Romo RN  Outcome: Ongoing

## 2021-09-11 NOTE — PROGRESS NOTES
Patient has removed BIPAP 6L O2 placed on and she refuses to go back on BIPAP I tried to encourage it but she cordell.  To refuse

## 2021-09-12 PROCEDURE — 2700000000 HC OXYGEN THERAPY PER DAY

## 2021-09-12 PROCEDURE — 1200000000 HC SEMI PRIVATE

## 2021-09-12 PROCEDURE — 99233 SBSQ HOSP IP/OBS HIGH 50: CPT | Performed by: INTERNAL MEDICINE

## 2021-09-12 PROCEDURE — 6370000000 HC RX 637 (ALT 250 FOR IP): Performed by: INTERNAL MEDICINE

## 2021-09-12 PROCEDURE — 2580000003 HC RX 258: Performed by: INTERNAL MEDICINE

## 2021-09-12 PROCEDURE — 6360000002 HC RX W HCPCS: Performed by: INTERNAL MEDICINE

## 2021-09-12 PROCEDURE — 94761 N-INVAS EAR/PLS OXIMETRY MLT: CPT

## 2021-09-12 PROCEDURE — 94640 AIRWAY INHALATION TREATMENT: CPT

## 2021-09-12 RX ORDER — PREDNISONE 20 MG/1
40 TABLET ORAL DAILY
Status: DISCONTINUED | OUTPATIENT
Start: 2021-09-12 | End: 2021-09-13

## 2021-09-12 RX ADMIN — Medication 2 CAPSULE: at 18:36

## 2021-09-12 RX ADMIN — BUDESONIDE AND FORMOTEROL FUMARATE DIHYDRATE 2 PUFF: 160; 4.5 AEROSOL RESPIRATORY (INHALATION) at 09:12

## 2021-09-12 RX ADMIN — Medication 2 CAPSULE: at 08:29

## 2021-09-12 RX ADMIN — TRAMADOL HYDROCHLORIDE 50 MG: 50 TABLET, FILM COATED ORAL at 20:59

## 2021-09-12 RX ADMIN — ENOXAPARIN SODIUM 40 MG: 40 INJECTION SUBCUTANEOUS at 20:59

## 2021-09-12 RX ADMIN — BUDESONIDE AND FORMOTEROL FUMARATE DIHYDRATE 2 PUFF: 160; 4.5 AEROSOL RESPIRATORY (INHALATION) at 19:33

## 2021-09-12 RX ADMIN — CEFEPIME HYDROCHLORIDE 2000 MG: 2 INJECTION, POWDER, FOR SOLUTION INTRAVENOUS at 14:00

## 2021-09-12 RX ADMIN — PANTOPRAZOLE SODIUM 40 MG: 40 TABLET, DELAYED RELEASE ORAL at 06:05

## 2021-09-12 RX ADMIN — ATORVASTATIN CALCIUM 40 MG: 40 TABLET, FILM COATED ORAL at 08:29

## 2021-09-12 RX ADMIN — SODIUM CHLORIDE, PRESERVATIVE FREE 10 ML: 5 INJECTION INTRAVENOUS at 08:29

## 2021-09-12 RX ADMIN — IPRATROPIUM BROMIDE AND ALBUTEROL SULFATE 1 AMPULE: .5; 3 SOLUTION RESPIRATORY (INHALATION) at 09:02

## 2021-09-12 RX ADMIN — CEFEPIME HYDROCHLORIDE 2000 MG: 2 INJECTION, POWDER, FOR SOLUTION INTRAVENOUS at 00:18

## 2021-09-12 RX ADMIN — PREDNISONE 40 MG: 20 TABLET ORAL at 15:39

## 2021-09-12 RX ADMIN — METHYLPREDNISOLONE SODIUM SUCCINATE 40 MG: 40 INJECTION, POWDER, FOR SOLUTION INTRAMUSCULAR; INTRAVENOUS at 03:15

## 2021-09-12 RX ADMIN — Medication 1250 MG: at 20:59

## 2021-09-12 RX ADMIN — ESCITALOPRAM OXALATE 20 MG: 10 TABLET ORAL at 08:29

## 2021-09-12 RX ADMIN — METOPROLOL TARTRATE 25 MG: 25 TABLET, FILM COATED ORAL at 08:29

## 2021-09-12 RX ADMIN — METOPROLOL TARTRATE 25 MG: 25 TABLET, FILM COATED ORAL at 20:59

## 2021-09-12 RX ADMIN — IPRATROPIUM BROMIDE AND ALBUTEROL SULFATE 1 AMPULE: .5; 3 SOLUTION RESPIRATORY (INHALATION) at 19:33

## 2021-09-12 ASSESSMENT — PAIN SCALES - GENERAL
PAINLEVEL_OUTOF10: 4
PAINLEVEL_OUTOF10: 0

## 2021-09-12 ASSESSMENT — PAIN - FUNCTIONAL ASSESSMENT: PAIN_FUNCTIONAL_ASSESSMENT: PREVENTS OR INTERFERES SOME ACTIVE ACTIVITIES AND ADLS

## 2021-09-12 NOTE — PLAN OF CARE
Problem: Infection:  Goal: Will remain free from infection  Description: Will remain free from infection  9/12/2021 0952 by Paolo Olguin RN  Outcome: Ongoing  9/12/2021 0109 by Rosalinda Gonzáles RN  Outcome: Ongoing     Problem: Safety:  Goal: Free from accidental physical injury  Description: Free from accidental physical injury  9/12/2021 0952 by Paolo Olguin RN  Outcome: Ongoing  9/12/2021 0109 by Rosalinda Gonzáles RN  Outcome: Met This Shift  Goal: Free from intentional harm  Description: Free from intentional harm  9/12/2021 0952 by Paolo Olguin RN  Outcome: Ongoing  9/12/2021 0109 by Rosalinda Gonzáles RN  Outcome: Met This Shift     Problem: Daily Care:  Goal: Daily care needs are met  Description: Daily care needs are met  9/12/2021 0952 by Paolo Olguin RN  Outcome: Ongoing  9/12/2021 0109 by Rosalinda Gonzáles RN  Outcome: Ongoing     Problem: Pain:  Goal: Patient's pain/discomfort is manageable  Description: Patient's pain/discomfort is manageable  9/12/2021 0952 by Paolo Olguin RN  Outcome: Ongoing  9/12/2021 0109 by Rosalinda Gonzáles RN  Outcome: Ongoing  Goal: Pain level will decrease  Description: Pain level will decrease  9/12/2021 0952 by Paolo Olguin RN  Outcome: Ongoing  9/12/2021 0109 by Rosalinda Gonzáles RN  Outcome: Ongoing  Goal: Control of acute pain  Description: Control of acute pain  9/12/2021 0952 by Paolo Olguin RN  Outcome: Ongoing  9/12/2021 0109 by Rosalinda Gonzáles RN  Outcome: Ongoing  Goal: Control of chronic pain  Description: Control of chronic pain  9/12/2021 0952 by Paolo Olguin RN  Outcome: Ongoing  9/12/2021 0109 by Rosalinda Gonzáles RN  Outcome: Ongoing     Problem: Skin Integrity:  Goal: Skin integrity will stabilize  Description: Skin integrity will stabilize  9/12/2021 0952 by Paolo Olguin RN  Outcome: Ongoing  9/12/2021 0109 by Rosalinda Gonzáles RN  Outcome: Ongoing     Problem: Discharge Planning:  Goal: Patients continuum of care needs are met  Description: Patients continuum of care needs are met  9/12/2021 0952 by Kimberly Chen RN  Outcome: Ongoing  9/12/2021 0109 by Odalys Angela RN  Outcome: Ongoing     Problem: Falls - Risk of:  Goal: Will remain free from falls  Description: Will remain free from falls  9/12/2021 0952 by Kimberly Chen RN  Outcome: Ongoing  9/12/2021 0109 by Odalys Angela RN  Outcome: Met This Shift  Goal: Absence of physical injury  Description: Absence of physical injury  9/12/2021 0952 by Kimberly Chen RN  Outcome: Ongoing  9/12/2021 0109 by Odalys Angela RN  Outcome: Met This Shift     Problem: Skin Integrity:  Goal: Will show no infection signs and symptoms  Description: Will show no infection signs and symptoms  9/12/2021 0952 by Kimberly Chen RN  Outcome: Ongoing  9/12/2021 0109 by Odalys Angela RN  Outcome: Ongoing  Goal: Absence of new skin breakdown  Description: Absence of new skin breakdown  9/12/2021 0952 by Kimberly Chen RN  Outcome: Ongoing  9/12/2021 0109 by Odalys Angela RN  Outcome: Ongoing

## 2021-09-12 NOTE — PROGRESS NOTES
rigidity or guarding and positive bowel sounds   Extremities: No clubbing, cyanosis, or edema bilaterally. Skin: Skin color, texture, turgor normal.  No rashes or lesions. Neurologic: awake, neurovascularly intact with sensory/motor intact upper extremities/lower extremities, bilaterally. Cranial nerves: II-XII intact, grossly non-focal.  Mental status: awake  Capillary Refill: Acceptable  < 3 seconds  Peripheral Pulses: +3 Easily felt, not easily obliterated with pressure       Labs:   Recent Labs     09/10/21  0930 09/11/21  0436   WBC 15.4* 9.9   HGB 12.0 12.1   HCT 40.8 39.6    326     Recent Labs     09/10/21  0930 09/11/21  0436    136   K 4.5 4.3    102   CO2 26 23   BUN 20 27*   CREATININE 1.0 0.8   CALCIUM 9.0 8.9     Recent Labs     09/10/21  0930   AST 13*   ALT 9*   BILITOT <0.2   ALKPHOS 203*     No results for input(s): INR in the last 72 hours. Recent Labs     09/10/21  0930   TROPONINI <0.01       Urinalysis:      Lab Results   Component Value Date    NITRU POSITIVE 09/10/2021    WBCUA 64 09/10/2021    BACTERIA 4+ 09/10/2021    RBCUA 17 09/10/2021    BLOODU MODERATE 09/10/2021    SPECGRAV 1.013 09/10/2021    GLUCOSEU Negative 09/10/2021       Radiology:  CT CHEST PULMONARY EMBOLISM W CONTRAST   Final Result   1. Negative for pulmonary embolus. 2. Bibasilar bronchopneumonia. XR CHEST PORTABLE   Final Result   Stable chest demonstrating bibasilar hypoaeration                 Assessment/Plan:    Active Hospital Problems    Diagnosis     Acute hypercapnic respiratory failure (HCC) [J96.02]          Acute hypercapnic resp failure - ABG reviewed, pt agreeable to BIPAP, does not want intubation. Cont BIPAP, recheck ABG, pulm consulted     Bronchopneumonia - suspect HCAP given NH resident, probable gram neg PNA, cannot r/o MRSA PNA. Cont IV cefepime/vanco. Check COVID neg, check strep ag/legionella/sputum cx if possible.  pulm consulted    A fib with RVR - started on metoprolol PO, add IV prn BB     Acute COPD exacerbation - cont neb/inhaler/solumedrol/abx, pulm consulted     UTI - UA abnormal, cont IV abx, await urine cx with e coli        DVT Prophylaxis: lovenox  Diet: ADULT DIET;  Regular  Code Status: Limited    PT/OT Eval Status: ordered    Dispo - pt refused BIPAP too, agreed to be DNR/DNI    Forrest Rizo MD

## 2021-09-12 NOTE — PROGRESS NOTES
Clinical Pharmacy Note  Vancomycin Consult    Pham Bo is a 73 y.o. female ordered Vancomycin for HCAP; consult received from Dr. Rdaames Hernandez to manage therapy. Also receiving cefepime. Patient Active Problem List   Diagnosis    Closed left hip fracture, initial encounter ([facility]    Morbid obesity with BMI of 45.0-49.9, adult ([facility]    Hypoxemia requiring supplemental oxygen    Acute respiratory failure with hypoxia (HCC)    Acute on chronic respiratory failure with hypercapnia (HCC)    Acute pulmonary edema (HCC)    Multifocal pneumonia    Rapid atrial fibrillation (HCC)    Chronic obstructive pulmonary disease (HCC)    Paroxysmal atrial fibrillation (HCC)    PAT (paroxysmal atrial tachycardia) (HCC)    Kidney stone    Septic shock (HCC)    Neutrophilic leukocytosis    Morbid obesity due to excess calories (HCC)    Chronic respiratory failure with hypoxia (HCC)    Hyperlipidemia    Kidney lesion, native, left    Hyponatremia    Hyperkalemia    Moderate protein-calorie malnutrition (HCC)    Hydronephrosis with left ureteropelvic junction (UPJ) obstruction    Hematuria    Ureteral obstruction    Hydronephrosis with urinary obstruction due to ureteral calculus    DARSHAN (acute kidney injury) ([facility]    Abnormal CT scan, kidney    Essential hypertension    Acute hypercapnic respiratory failure (HCC)       Allergies:  Patient has no known allergies.      Temp max:  Temp (24hrs), Av °F (36.7 °C), Min:97.8 °F (36.6 °C), Max:98.2 °F (36.8 °C)      Recent Labs     09/10/21  0930 21  0436   WBC 15.4* 9.9       Recent Labs     09/10/21  0930 21  0436   BUN 20 27*   CREATININE 1.0 0.8         Intake/Output Summary (Last 24 hours) at 2021  Last data filed at 2021 1524  Gross per 24 hour   Intake 1161.52 ml   Output 1250 ml   Net -88.48 ml       Culture Results:  Last culture of record:     Blood Culture: Recent Labs     09/10/21  1018 09/10/21  1125 09/10/21  1841   BC No Growth to date. Any change in status will be called. --   --    BLOODCULT2 No Growth to date. Any change in status will be called. --   --    ORG  --    < > Staph aureus MRSA*    < > = values in this interval not displayed. GRAM STAIN  Recent Labs     09/10/21  1841   ORG Staph aureus MRSA*     Resp Culture Brief :   Lab Results   Component Value Date    CULTRESP Normal respiratory gloria 08/15/2020     Body Fluid : No results found for: BFCX    MRSA : No results found for: [facility]    Urine Culture Brief :   Lab Results   Component Value Date    LABURIN >100,000 CFU/ml  Sensitivity to follow   09/10/2021      Organism Brief :   Lab Results   Component Value Date    ORG Staph aureus MRSA 09/10/2021         Ht Readings from Last 1 Encounters:   09/10/21 5' 5\" (1.651 m)        Wt Readings from Last 1 Encounters:   09/11/21 245 lb 9.5 oz (111.4 kg)         Estimated Creatinine Clearance: 83 mL/min (based on SCr of 0.8 mg/dL). Assessment/Plan:  Vancomycin 1000 mg IV every 12 hours ordered. Trough today resulted supratherapeutic at 24.5 mg/L. Goal 15-20 mg/L for pneumonia. Accumulation likely due to age and BMI. Patient's renal function appears to be stable. Therefore, will adjust current regimen versus dosing on levels. Tonight's dose already started. Change to vancomycin 1250 mg IV every 24 hours. This regimen predicts a trough of 15.3 mg/L. Trough ordered for 9/13/21 at 1900. Thank you for the consult.      Ann Eng [facility], PharmD   9/11/2021 8:50 PM

## 2021-09-12 NOTE — PROGRESS NOTES
Hospitalist Progress Note      PCP: Kaye Rojo MD    Date of Admission: 9/10/2021    Subjective: still in a fib, HR in 100s, O2 requirement improved to 1L    Medications:  Reviewed    Infusion Medications    sodium chloride       Scheduled Medications    predniSONE  40 mg Oral Daily    ipratropium-albuterol  1 ampule Inhalation BID    metoprolol tartrate  25 mg Oral BID    vancomycin  1,250 mg IntraVENous Q24H    sodium chloride flush  5-40 mL IntraVENous 2 times per day    enoxaparin  40 mg SubCUTAneous Daily    cefepime  2,000 mg IntraVENous Q12H    escitalopram  20 mg Oral Daily    budesonide-formoterol  2 puff Inhalation BID    lactobacillus  2 capsule Oral BID WC    pantoprazole  40 mg Oral Daily    atorvastatin  40 mg Oral Daily    vancomycin (VANCOCIN) intermittent dosing (placeholder)   Other RX Placeholder     PRN Meds: bisacodyl, hydrocortisone, metoprolol, sodium chloride flush, sodium chloride, ondansetron **OR** ondansetron, polyethylene glycol, acetaminophen **OR** acetaminophen, albuterol sulfate HFA, ipratropium-albuterol, traMADol, sennosides-docusate sodium      Intake/Output Summary (Last 24 hours) at 9/12/2021 1543  Last data filed at 9/12/2021 1115  Gross per 24 hour   Intake --   Output 850 ml   Net -850 ml       Physical Exam Performed:    /64   Pulse 100   Temp 98.2 °F (36.8 °C) (Oral)   Resp 20   Ht 5' 5\" (1.651 m)   Wt 249 lb 1.9 oz (113 kg)   SpO2 93%   BMI 41.46 kg/m²     General appearance: awake  HEENT Normal cephalic, atraumatic without obvious deformity.  Pupils equal, round, and reactive to light.  Extra ocular muscles intact.  Conjunctivae/corneas clear. Neck: Supple, No jugular venous distention/bruits.  Trachea midline without thyromegaly or adenopathy with full range of motion.   Lungs: diminished b/l  Heart: irregular rate and rhythm with Normal S1/S2  Abdomen: Soft, non-tender or non-distended without rigidity or guarding and positive bowel sounds   Extremities: No clubbing, cyanosis, or edema bilaterally. Skin: Skin color, texture, turgor normal.  No rashes or lesions. Neurologic: awake, neurovascularly intact with sensory/motor intact upper extremities/lower extremities, bilaterally.  Cranial nerves: II-XII intact, grossly non-focal.  Mental status: awake  Capillary Refill: Acceptable  < 3 seconds  Peripheral Pulses: +3 Easily felt, not easily obliterated with pressure       Labs:   Recent Labs     09/10/21  0930 09/11/21  0436   WBC 15.4* 9.9   HGB 12.0 12.1   HCT 40.8 39.6    326     Recent Labs     09/10/21  0930 09/11/21  0436    136   K 4.5 4.3    102   CO2 26 23   BUN 20 27*   CREATININE 1.0 0.8   CALCIUM 9.0 8.9     Recent Labs     09/10/21  0930   AST 13*   ALT 9*   BILITOT <0.2   ALKPHOS 203*     No results for input(s): INR in the last 72 hours. Recent Labs     09/10/21  0930   TROPONINI <0.01       Urinalysis:      Lab Results   Component Value Date    NITRU POSITIVE 09/10/2021    WBCUA 64 09/10/2021    BACTERIA 4+ 09/10/2021    RBCUA 17 09/10/2021    BLOODU MODERATE 09/10/2021    SPECGRAV 1.013 09/10/2021    GLUCOSEU Negative 09/10/2021       Radiology:  CT CHEST PULMONARY EMBOLISM W CONTRAST   Final Result   1. Negative for pulmonary embolus. 2. Bibasilar bronchopneumonia. XR CHEST PORTABLE   Final Result   Stable chest demonstrating bibasilar hypoaeration                 Assessment/Plan:    Active Hospital Problems    Diagnosis     Acute hypercapnic respiratory failure (HCC) [J96.02]           Acute hypoxic/hypercapnic resp failure - ABG reviewed, pt on admit agreeable to BIPAP, did not want intubation. Treated with BIPAP on admit - but later pt refused BIPAP, pulm following - pt currently DNR/DNI. Placed on O2, wean as tolerated     Bronchopneumonia - suspect HCAP given NH resident, probable gram neg PNA, cannot r/o MRSA PNA.  Cont IV cefepime/vanco. Check COVID neg, check strep ag/legionella/sputum cx. pulm consulted     A fib with RVR - started on metoprolol PO-->increase dose, added IV prn BB     Acute COPD exacerbation - started on neb/inhaler/solumedrol/abx, pulm consulted. Switch steroids to PO pred     UTI - UA abnormal, cont IV abx, await urine cx with e coli        DVT Prophylaxis: lovenox  Diet: ADULT DIET;  Regular  Code Status: Limited    PT/OT Eval Status: ordered    Sarah Whitfield MD

## 2021-09-12 NOTE — PROGRESS NOTES
Pulmonary Progress Note    Date of Admission: 9/10/2021   LOS: 2 days       CC:  Copd exacerbation     Subjective:  Feeling better   Less wheezing       ROS:   No nausea  No Vomiting  No chest pain       Assessment:     Acute hypoxemic respiratory failure with saturation < 90% on room air   acute hypercapnic respiratory failure ph < 7.35  Copd   a fib  chf  DNR / DNI / no bipap     Plan:        Hospital Day: 2     Acute hypoxemia with acute hypercapnic respiratory failure   * weaned to 1 L Nc         UTI with  pneumonia  * E. Coli  * Vanc + cefepime        copd  * no wheezing  * Solumedrol q8, wean to prednisone   * Symbicort   *  scheduled Duoneb's bid           Data:        PHYSICAL EXAM:   Blood pressure 109/70, pulse 100, temperature 98.2 °F (36.8 °C), temperature source Oral, resp. rate 24, height 5' 5\" (1.651 m), weight 249 lb 1.9 oz (113 kg), SpO2 92 %.'  Body mass index is 41.46 kg/m². Gen: No distress. ENT:   Resp: No accessory muscle use. No crackles. No wheezes. No rhonchi. CV: Regular rate. Regular rhythm. No murmur or rub. No edema. Skin: Warm, dry, normal texture and turgor. No nodule on exposed extremities. M/S: No cyanosis. No clubbing. No joint deformity. Psych: Oriented x 3. No anxiety. Awake. Alert. Intact judgement and insight. Good Mood / Affect.   Memory appears in tact       Medications:    Scheduled Meds:   ipratropium-albuterol  1 ampule Inhalation BID    metoprolol tartrate  25 mg Oral BID    vancomycin  1,250 mg IntraVENous Q24H    sodium chloride flush  5-40 mL IntraVENous 2 times per day    enoxaparin  40 mg SubCUTAneous Daily    cefepime  2,000 mg IntraVENous Q12H    escitalopram  20 mg Oral Daily    budesonide-formoterol  2 puff Inhalation BID    lactobacillus  2 capsule Oral BID WC    pantoprazole  40 mg Oral Daily    atorvastatin  40 mg Oral Daily    methylPREDNISolone  40 mg IntraVENous Q8H    vancomycin (VANCOCIN) intermittent dosing (placeholder)   Other reasonably achievable. COMPARISON:  None. HISTORY:  ORDERING SYSTEM PROVIDED HISTORY: dyspena resp failure  TECHNOLOGIST PROVIDED HISTORY:  Reason for exam:->dyspena resp failure  Decision Support Exception - unselect if not a suspected or confirmed  emergency medical condition->Emergency Medical Condition (MA)    FINDINGS:  Pulmonary Arteries: Pulmonary arteries are adequately opacified for  evaluation. No evidence of intraluminal filling defect to suggest pulmonary  embolism. Main pulmonary artery is normal in caliber. Mediastinum: No evidence of mediastinal lymphadenopathy. The heart and  pericardium demonstrate no acute abnormality. There is no acute abnormality  of the thoracic aorta. Lungs/pleura: There is patchy bibasilar consolidation along with thickening  of central airways and scattered bibasilar endobronchial mucous plugging. There is no pneumothorax or effusion. Upper Abdomen: Small amount of pneumobilia is visualized within the a patent  dome. The remainder of visualized solid abdominal organs are unremarkable. Soft Tissues/Bones: No acute bone or soft tissue abnormality. Impression  1. Negative for pulmonary embolus. 2. Bibasilar bronchopneumonia. CXR PA/LAT: Results for orders placed during the hospital encounter of 08/04/15    XR Chest Standard TWO VW    Narrative  INDICATION: COPD. Hypoxia. FINDINGS: Two views. Comparison is made to study dated 5/5/2013. The lungs are hyperexpanded without focal consolidation. Heart  size, mediastinal contours and pulmonary vascularity are within  normal limits. The pleural spaces are clear. Impression  COPD.       CXR portable: Results for orders placed during the hospital encounter of 09/10/21    XR CHEST PORTABLE    Narrative  EXAMINATION:  ONE XRAY VIEW OF THE CHEST    9/10/2021 10:26 am    COMPARISON:  09/18/2020    HISTORY:  ORDERING SYSTEM PROVIDED HISTORY: chf  TECHNOLOGIST PROVIDED HISTORY:  Reason for exam:->chf  Reason for Exam: Respiratory Distress  Acuity: Chronic  Type of Exam: Ongoing    FINDINGS:  Cardiomediastinal silhouette stable. Bibasilar hypoaeration. No pulmonary  vascular congestion or edema. No pneumothorax. No pleural effusion. Impression  Stable chest demonstrating bibasilar hypoaeration             This note was transcribed using [id number] [employer]. Please disregard any translational errors.       Sherlyn Mane Pul[facility], [facility] [phone]

## 2021-09-12 NOTE — PLAN OF CARE
Problem: Infection:  Goal: Will remain free from infection  Description: Will remain free from infection  Outcome: Ongoing   Alert and oriented x4 with occasional forgetfulness but easily redirected. Dyspnic with min. Exertion Sats. WNL on 3L O2 per n/c Lungs with Rhonchi t/o Cough and deep breathing exercises encouraged. Medicated x1 for c/o pain with good effect. F/c patent draining clear yellow urine Cath. Care php Frequently turned and repositioned.  Free from fall/injury

## 2021-09-13 LAB
ANION GAP SERPL CALCULATED.3IONS-SCNC: 10 MMOL/L (ref 3–16)
BASOPHILS ABSOLUTE: 0 K/UL (ref 0–0.2)
BASOPHILS RELATIVE PERCENT: 0 %
BUN BLDV-MCNC: 35 MG/DL (ref 7–20)
CALCIUM SERPL-MCNC: 9.2 MG/DL (ref 8.3–10.6)
CHLORIDE BLD-SCNC: 106 MMOL/L (ref 99–110)
CO2: 24 MMOL/L (ref 21–32)
CREAT SERPL-MCNC: 0.9 MG/DL (ref 0.6–1.2)
CREAT SERPL-MCNC: 1 MG/DL (ref 0.6–1.2)
EOSINOPHILS ABSOLUTE: 0 K/UL (ref 0–0.6)
EOSINOPHILS RELATIVE PERCENT: 0 %
GFR AFRICAN AMERICAN: >60
GFR AFRICAN AMERICAN: >60
GFR NON-AFRICAN AMERICAN: 55
GFR NON-AFRICAN AMERICAN: >60
GLUCOSE BLD-MCNC: 84 MG/DL (ref 70–99)
HCT VFR BLD CALC: 42 % (ref 36–48)
HEMOGLOBIN: 12.6 G/DL (ref 12–16)
LYMPHOCYTES ABSOLUTE: 1 K/UL (ref 1–5.1)
LYMPHOCYTES RELATIVE PERCENT: 5.2 %
MCH RBC QN AUTO: 21.5 PG (ref 26–34)
MCHC RBC AUTO-ENTMCNC: 30 G/DL (ref 31–36)
MCV RBC AUTO: 71.9 FL (ref 80–100)
MONOCYTES ABSOLUTE: 1.5 K/UL (ref 0–1.3)
MONOCYTES RELATIVE PERCENT: 7.7 %
NEUTROPHILS ABSOLUTE: 16.6 K/UL (ref 1.7–7.7)
NEUTROPHILS RELATIVE PERCENT: 87.1 %
ORGANISM: ABNORMAL
PDW BLD-RTO: 19.2 % (ref 12.4–15.4)
PLATELET # BLD: 361 K/UL (ref 135–450)
PMV BLD AUTO: 7.6 FL (ref 5–10.5)
POTASSIUM SERPL-SCNC: 3.4 MMOL/L (ref 3.5–5.1)
RBC # BLD: 5.84 M/UL (ref 4–5.2)
SARS-COV-2: NOT DETECTED
SODIUM BLD-SCNC: 140 MMOL/L (ref 136–145)
URINE CULTURE, ROUTINE: ABNORMAL
VANCOMYCIN TROUGH: 19 UG/ML (ref 10–20)
WBC # BLD: 19 K/UL (ref 4–11)

## 2021-09-13 PROCEDURE — 1200000000 HC SEMI PRIVATE

## 2021-09-13 PROCEDURE — 6360000002 HC RX W HCPCS: Performed by: INTERNAL MEDICINE

## 2021-09-13 PROCEDURE — U0005 INFEC AGEN DETEC AMPLI PROBE: HCPCS

## 2021-09-13 PROCEDURE — 94640 AIRWAY INHALATION TREATMENT: CPT

## 2021-09-13 PROCEDURE — 99223 1ST HOSP IP/OBS HIGH 75: CPT | Performed by: INTERNAL MEDICINE

## 2021-09-13 PROCEDURE — 2700000000 HC OXYGEN THERAPY PER DAY

## 2021-09-13 PROCEDURE — 6370000000 HC RX 637 (ALT 250 FOR IP): Performed by: INTERNAL MEDICINE

## 2021-09-13 PROCEDURE — 2580000003 HC RX 258: Performed by: INTERNAL MEDICINE

## 2021-09-13 PROCEDURE — 36415 COLL VENOUS BLD VENIPUNCTURE: CPT

## 2021-09-13 PROCEDURE — 97530 THERAPEUTIC ACTIVITIES: CPT

## 2021-09-13 PROCEDURE — 80202 ASSAY OF VANCOMYCIN: CPT

## 2021-09-13 PROCEDURE — 82565 ASSAY OF CREATININE: CPT

## 2021-09-13 PROCEDURE — 94761 N-INVAS EAR/PLS OXIMETRY MLT: CPT

## 2021-09-13 PROCEDURE — U0003 INFECTIOUS AGENT DETECTION BY NUCLEIC ACID (DNA OR RNA); SEVERE ACUTE RESPIRATORY SYNDROME CORONAVIRUS 2 (SARS-COV-2) (CORONAVIRUS DISEASE [COVID-19]), AMPLIFIED PROBE TECHNIQUE, MAKING USE OF HIGH THROUGHPUT TECHNOLOGIES AS DESCRIBED BY CMS-2020-01-R: HCPCS

## 2021-09-13 PROCEDURE — 2500000003 HC RX 250 WO HCPCS: Performed by: INTERNAL MEDICINE

## 2021-09-13 PROCEDURE — 99231 SBSQ HOSP IP/OBS SF/LOW 25: CPT | Performed by: INTERNAL MEDICINE

## 2021-09-13 PROCEDURE — 85025 COMPLETE CBC W/AUTO DIFF WBC: CPT

## 2021-09-13 PROCEDURE — 80048 BASIC METABOLIC PNL TOTAL CA: CPT

## 2021-09-13 RX ORDER — SODIUM CHLORIDE 0.9 % (FLUSH) 0.9 %
5-40 SYRINGE (ML) INJECTION EVERY 12 HOURS SCHEDULED
Status: DISCONTINUED | OUTPATIENT
Start: 2021-09-13 | End: 2021-09-16 | Stop reason: HOSPADM

## 2021-09-13 RX ORDER — LIDOCAINE HYDROCHLORIDE 10 MG/ML
5 INJECTION, SOLUTION EPIDURAL; INFILTRATION; INTRACAUDAL; PERINEURAL ONCE
Status: DISCONTINUED | OUTPATIENT
Start: 2021-09-13 | End: 2021-09-16 | Stop reason: HOSPADM

## 2021-09-13 RX ORDER — PREDNISONE 10 MG/1
10 TABLET ORAL DAILY
Status: DISCONTINUED | OUTPATIENT
Start: 2021-09-19 | End: 2021-09-13

## 2021-09-13 RX ORDER — SODIUM CHLORIDE 0.9 % (FLUSH) 0.9 %
5-40 SYRINGE (ML) INJECTION PRN
Status: DISCONTINUED | OUTPATIENT
Start: 2021-09-13 | End: 2021-09-16 | Stop reason: HOSPADM

## 2021-09-13 RX ORDER — PREDNISONE 10 MG/1
10 TABLET ORAL DAILY
Status: DISCONTINUED | OUTPATIENT
Start: 2021-09-20 | End: 2021-09-16 | Stop reason: HOSPADM

## 2021-09-13 RX ORDER — PREDNISONE 1 MG/1
5 TABLET ORAL DAILY
Status: DISCONTINUED | OUTPATIENT
Start: 2021-09-22 | End: 2021-09-13

## 2021-09-13 RX ORDER — PREDNISONE 1 MG/1
5 TABLET ORAL DAILY
Status: DISCONTINUED | OUTPATIENT
Start: 2021-09-23 | End: 2021-09-16 | Stop reason: HOSPADM

## 2021-09-13 RX ORDER — POTASSIUM CHLORIDE 20 MEQ/1
40 TABLET, EXTENDED RELEASE ORAL ONCE
Status: COMPLETED | OUTPATIENT
Start: 2021-09-13 | End: 2021-09-13

## 2021-09-13 RX ORDER — PREDNISONE 20 MG/1
20 TABLET ORAL DAILY
Status: DISCONTINUED | OUTPATIENT
Start: 2021-09-17 | End: 2021-09-16 | Stop reason: HOSPADM

## 2021-09-13 RX ORDER — SODIUM CHLORIDE 9 MG/ML
25 INJECTION, SOLUTION INTRAVENOUS PRN
Status: DISCONTINUED | OUTPATIENT
Start: 2021-09-13 | End: 2021-09-16 | Stop reason: HOSPADM

## 2021-09-13 RX ORDER — PREDNISONE 20 MG/1
20 TABLET ORAL DAILY
Status: DISCONTINUED | OUTPATIENT
Start: 2021-09-16 | End: 2021-09-13

## 2021-09-13 RX ORDER — METOPROLOL TARTRATE 50 MG/1
50 TABLET, FILM COATED ORAL 2 TIMES DAILY
Status: DISCONTINUED | OUTPATIENT
Start: 2021-09-13 | End: 2021-09-16 | Stop reason: HOSPADM

## 2021-09-13 RX ADMIN — MEROPENEM 500 MG: 500 INJECTION, POWDER, FOR SOLUTION INTRAVENOUS at 22:56

## 2021-09-13 RX ADMIN — ESCITALOPRAM OXALATE 20 MG: 10 TABLET ORAL at 09:09

## 2021-09-13 RX ADMIN — TRAMADOL HYDROCHLORIDE 50 MG: 50 TABLET, FILM COATED ORAL at 16:06

## 2021-09-13 RX ADMIN — METOPROLOL TARTRATE 25 MG: 50 TABLET, FILM COATED ORAL at 13:45

## 2021-09-13 RX ADMIN — SODIUM CHLORIDE, PRESERVATIVE FREE 10 ML: 5 INJECTION INTRAVENOUS at 09:09

## 2021-09-13 RX ADMIN — Medication 1250 MG: at 21:09

## 2021-09-13 RX ADMIN — MEROPENEM 500 MG: 500 INJECTION, POWDER, FOR SOLUTION INTRAVENOUS at 13:42

## 2021-09-13 RX ADMIN — ENOXAPARIN SODIUM 40 MG: 40 INJECTION SUBCUTANEOUS at 21:12

## 2021-09-13 RX ADMIN — BUDESONIDE AND FORMOTEROL FUMARATE DIHYDRATE 2 PUFF: 160; 4.5 AEROSOL RESPIRATORY (INHALATION) at 20:12

## 2021-09-13 RX ADMIN — BUDESONIDE AND FORMOTEROL FUMARATE DIHYDRATE 2 PUFF: 160; 4.5 AEROSOL RESPIRATORY (INHALATION) at 08:50

## 2021-09-13 RX ADMIN — SODIUM CHLORIDE 25 ML: 9 INJECTION, SOLUTION INTRAVENOUS at 21:04

## 2021-09-13 RX ADMIN — ATORVASTATIN CALCIUM 40 MG: 40 TABLET, FILM COATED ORAL at 09:09

## 2021-09-13 RX ADMIN — CEFEPIME HYDROCHLORIDE 2000 MG: 2 INJECTION, POWDER, FOR SOLUTION INTRAVENOUS at 00:18

## 2021-09-13 RX ADMIN — PANTOPRAZOLE SODIUM 40 MG: 40 TABLET, DELAYED RELEASE ORAL at 05:58

## 2021-09-13 RX ADMIN — IPRATROPIUM BROMIDE AND ALBUTEROL SULFATE 1 AMPULE: .5; 3 SOLUTION RESPIRATORY (INHALATION) at 08:40

## 2021-09-13 RX ADMIN — Medication 2 CAPSULE: at 09:09

## 2021-09-13 RX ADMIN — SODIUM CHLORIDE, PRESERVATIVE FREE 10 ML: 5 INJECTION INTRAVENOUS at 21:16

## 2021-09-13 RX ADMIN — PREDNISONE 40 MG: 20 TABLET ORAL at 09:09

## 2021-09-13 RX ADMIN — IPRATROPIUM BROMIDE AND ALBUTEROL SULFATE 1 AMPULE: .5; 3 SOLUTION RESPIRATORY (INHALATION) at 20:12

## 2021-09-13 RX ADMIN — Medication 2 CAPSULE: at 16:06

## 2021-09-13 RX ADMIN — POTASSIUM CHLORIDE 40 MEQ: 20 TABLET, EXTENDED RELEASE ORAL at 14:14

## 2021-09-13 RX ADMIN — METOPROLOL TARTRATE 25 MG: 25 TABLET, FILM COATED ORAL at 09:08

## 2021-09-13 RX ADMIN — METOPROLOL TARTRATE 50 MG: 50 TABLET, FILM COATED ORAL at 21:12

## 2021-09-13 RX ADMIN — CEFEPIME HYDROCHLORIDE 2000 MG: 2 INJECTION, POWDER, FOR SOLUTION INTRAVENOUS at 11:34

## 2021-09-13 ASSESSMENT — PAIN SCALES - GENERAL
PAINLEVEL_OUTOF10: 7
PAINLEVEL_OUTOF10: 4
PAINLEVEL_OUTOF10: 0
PAINLEVEL_OUTOF10: 0
PAINLEVEL_OUTOF10: 7
PAINLEVEL_OUTOF10: 8

## 2021-09-13 ASSESSMENT — PAIN DESCRIPTION - PROGRESSION
CLINICAL_PROGRESSION: NOT CHANGED
CLINICAL_PROGRESSION: GRADUALLY WORSENING
CLINICAL_PROGRESSION: NOT CHANGED

## 2021-09-13 ASSESSMENT — ENCOUNTER SYMPTOMS
CHEST TIGHTNESS: 0
EYES NEGATIVE: 1
SHORTNESS OF BREATH: 1
GASTROINTESTINAL NEGATIVE: 1

## 2021-09-13 ASSESSMENT — PAIN DESCRIPTION - DESCRIPTORS
DESCRIPTORS: ACHING

## 2021-09-13 ASSESSMENT — PAIN DESCRIPTION - ONSET
ONSET: ON-GOING

## 2021-09-13 ASSESSMENT — PAIN DESCRIPTION - FREQUENCY
FREQUENCY: CONTINUOUS

## 2021-09-13 ASSESSMENT — PAIN DESCRIPTION - LOCATION
LOCATION: KNEE
LOCATION: LEG
LOCATION: KNEE

## 2021-09-13 ASSESSMENT — PAIN - FUNCTIONAL ASSESSMENT
PAIN_FUNCTIONAL_ASSESSMENT: PREVENTS OR INTERFERES SOME ACTIVE ACTIVITIES AND ADLS

## 2021-09-13 ASSESSMENT — PAIN DESCRIPTION - PAIN TYPE
TYPE: CHRONIC PAIN

## 2021-09-13 ASSESSMENT — PAIN DESCRIPTION - ORIENTATION
ORIENTATION: LEFT

## 2021-09-13 NOTE — PROGRESS NOTES
Objective   Bed mobility  Rolling to Left: Minimal assistance; Moderate assistance  Supine to Sit: Minimal assistance; Moderate assistance  Sit to Supine: Unable to assess  Scooting: Minimal assistance; Moderate assistance  Transfers  Stand to sit: Moderate Assistance;2 Person Assistance  Bed to Chair: Moderate assistance;2 Person Assistance  Comment: used stedy up to chair  Ambulation  Ambulation?: No     Balance  Posture: Fair  Sitting - Static: Good  Sitting - Dynamic: Good  Exercises  Comments: pt anxious and with R knee pain after transfer and did not want to participate in leg ex    Comment: washed face with OT   AM-PAC Score  AM-PAC Inpatient Mobility Raw Score : 11 (09/13/21 1005)  AM-PAC Inpatient T-Scale Score : 33.86 (09/13/21 1005)  Mobility Inpatient CMS 0-100% Score: 72.57 (09/13/21 1005)  Mobility Inpatient CMS G-Code Modifier : CL (09/13/21 1005)     Goals  Short term goals  Time Frame for Short term goals: by acute d/c--anticipate return to facility with cont therapy. All goals ongoing 9-13. Short term goal 1: bed mobility Min A x 1-2  Short term goal 2: sit<>stand pivot transfers with Min A x 2  Short term goal 3: assess ambulation if/as able. Short term goal 4: tolerate LE rom/ther ex x 10 reps for rom/strength gains. Long term goals  Time Frame for Long term goals : tbd at facility as needed. Patient Goals   Patient goals : pt's goal is to get stronger    Plan    Plan  Times per week: 3-5 x wk in acute setting  Specific instructions for Next Treatment: cotx with OT  Current Treatment Recommendations: Functional Mobility Training  Safety Devices  Type of devices:  All fall risk precautions in place, Call light within reach, Chair alarm in place, Gait belt, Left in chair, Nurse notified     Therapy Time   Individual Concurrent Group Co-treatment   Time In       0935   Time Out       1013   Minutes       38   Timed Code Treatment Minutes: 38 Minutes   charges = theract 38 min  Basia Beck, PT [id number]

## 2021-09-13 NOTE — PROGRESS NOTES
Physician Progress Note      Justine Nagy  CSN #:                  [id number]  :                       1950  ADMIT DATE:       9/10/2021 9:18 AM  DISCH DATE:  RESPONDING  PROVIDER #:        Jyoti Dooley MD          QUERY TEXT:    Pt admitted with Bronchopneumonia, likely gram negative Pneumonia and UTI . Pt   noted to have elevated WBC, Lactic acid and acute respiratory failure. If   possible, please document in the progress notes and discharge summary if you   are evaluating and /or treating any of the following: The medical record reflects the following:  Risk Factors: Pneumonia from NH COPD  UTI  Clinical Indicators: WBC 15.4, Lactic acid 2.3, Acute respiratory failure  Treatment: IV Cefepime, HHN, Solumedrol, Vanco  Options provided:  -- Sepsis, present on admission  -- Sepsis, present on admission, now resolved  -- Likely Gram negative  pneumonia and UTI without Sepsis  -- Other - I will add my own diagnosis  -- Disagree - Not applicable / Not valid  -- Disagree - Clinically unable to determine / Unknown  -- Refer to Clinical Documentation Reviewer    PROVIDER RESPONSE TEXT:    This patient has sepsis which was present on admission.     Query created by: Regina Saldana on 2021 11:36 AM      Electronically signed by:  Jyoti Dooley MD 2021 11:16 PM

## 2021-09-13 NOTE — PLAN OF CARE
Problem: Infection:  Goal: Will remain free from infection  Description: Will remain free from infection  Outcome: Ongoing     Problem: Safety:  Goal: Free from accidental physical injury  Description: Free from accidental physical injury  Outcome: Ongoing     Problem: Pain:  Goal: Patient's pain/discomfort is manageable  Description: Patient's pain/discomfort is manageable  Outcome: Ongoing     Problem: Skin Integrity:  Goal: Skin integrity will stabilize  Description: Skin integrity will stabilize  Outcome: Ongoing     Problem: Falls - Risk of:  Goal: Will remain free from falls  Description: Will remain free from falls  Outcome: Ongoing

## 2021-09-13 NOTE — CARE COORDINATION
Per chart review, Patient from [facility]. Spoke with Liz in admissions. Confirmed patient is a long term resident at facility, and can return. Notified of updated PT/OT notes. Liz to submit for approval.   Priority COVID requested from August Colin, [address] [place] [address]. Sticker given.    TRACEY Mreaz, MIC, Social Work/Case Management   [phone]  Electronically signed by TRACEY Meraz, MIC on 9/13/2021 at 11:18 AM

## 2021-09-13 NOTE — PROGRESS NOTES
Hospitalist Progress Note      PCP: Neeru Dean MD    Date of Admission: 9/10/2021    Subjective: off O2, HR slightly better, urine cx with ESBL    Medications:  Reviewed    Infusion Medications    sodium chloride       Scheduled Medications    [START ON 9/14/2021] predniSONE  30 mg Oral Daily    Followed by   [place] ON 9/17/2021] predniSONE  20 mg Oral Daily    Followed by   [place] ON 9/20/2021] predniSONE  10 mg Oral Daily    Followed by   [place] ON 9/23/2021] predniSONE  5 mg Oral Daily    metoprolol tartrate  50 mg Oral BID    meropenem  500 mg IntraVENous Q6H    ipratropium-albuterol  1 ampule Inhalation BID    vancomycin  1,250 mg IntraVENous Q24H    sodium chloride flush  5-40 mL IntraVENous 2 times per day    enoxaparin  40 mg SubCUTAneous Daily    escitalopram  20 mg Oral Daily    budesonide-formoterol  2 puff Inhalation BID    lactobacillus  2 capsule Oral BID WC    pantoprazole  40 mg Oral Daily    atorvastatin  40 mg Oral Daily    vancomycin (VANCOCIN) intermittent dosing (placeholder)   Other RX Placeholder     PRN Meds: bisacodyl, hydrocortisone, metoprolol, sodium chloride flush, sodium chloride, ondansetron **OR** ondansetron, polyethylene glycol, acetaminophen **OR** acetaminophen, albuterol sulfate HFA, ipratropium-albuterol, traMADol, sennosides-docusate sodium      Intake/Output Summary (Last 24 hours) at 9/13/2021 1829  Last data filed at 9/13/2021 1601  Gross per 24 hour   Intake 1159.26 ml   Output 1500 ml   Net -340.74 ml       Physical Exam Performed:    BP (!) 160/93   Pulse 64   Temp 98.1 °F (36.7 °C) (Oral)   Resp 20   Ht 5' 5\" (1.651 m)   Wt 248 lb 14.4 oz (112.9 kg)   SpO2 94%   BMI 41.42 kg/m²        General appearance: awake  HEENT Normal cephalic, atraumatic without obvious deformity.  Pupils equal, round, and reactive to light.  Extra ocular muscles intact.  Conjunctivae/corneas clear. Neck: Supple, No jugular venous distention/bruits.  Trachea midline without thyromegaly or adenopathy with full range of motion. Lungs: diminished b/l  Heart: irregular rate and rhythm with Normal S1/S2  Abdomen: Soft, non-tender or non-distended without rigidity or guarding and positive bowel sounds   Extremities: No clubbing, cyanosis, or edema bilaterally. Skin: Skin color, texture, turgor normal.  No rashes or lesions. Neurologic: awake, neurovascularly intact with sensory/motor intact upper extremities/lower extremities, bilaterally.  Cranial nerves: II-XII intact, grossly non-focal.  Mental status: awake  Capillary Refill: Acceptable  < 3 seconds  Peripheral Pulses: +3 Easily felt, not easily obliterated with pressure       Labs:   Recent Labs     09/11/21  0436 09/13/21  1247   WBC 9.9 19.0*   HGB 12.1 12.6   HCT 39.6 42.0    361     Recent Labs     09/11/21  0436 09/13/21  1247    140   K 4.3 3.4*    106   CO2 23 24   BUN 27* 35*   CREATININE 0.8 0.9   CALCIUM 8.9 9.2     No results for input(s): AST, ALT, BILIDIR, BILITOT, ALKPHOS in the last 72 hours. No results for input(s): INR in the last 72 hours. No results for input(s): Verlena Jose A in the last 72 hours. Urinalysis:      Lab Results   Component Value Date    NITRU POSITIVE 09/10/2021    WBCUA 64 09/10/2021    BACTERIA 4+ 09/10/2021    RBCUA 17 09/10/2021    BLOODU MODERATE 09/10/2021    SPECGRAV 1.013 09/10/2021    GLUCOSEU Negative 09/10/2021       Radiology:  CT CHEST PULMONARY EMBOLISM W CONTRAST   Final Result   1. Negative for pulmonary embolus. 2. Bibasilar bronchopneumonia. XR CHEST PORTABLE   Final Result   Stable chest demonstrating bibasilar hypoaeration                 Assessment/Plan:    Active Hospital Problems    Diagnosis     Acute hypercapnic respiratory failure (HCC) [J96.02]          Acute hypoxic/hypercapnic resp failure - ABG reviewed, pt on admit agreeable to BIPAP, did not want intubation.  Treated with BIPAP on admit - but later pt refused BIPAP, pulm following - pt currently DNR/DNI. Placed on O2, now off O2     Bronchopneumonia - suspect HCAP given NH resident, probable gram neg PNA, cannot r/o MRSA PNA. Started on IV cefepime/vanco-->switched cefepime to merrem. Check COVID neg, check strep ag/legionella/sputum cx. pulm consulted. MRSA swab positive. ID consulted     A fib with RVR - started on metoprolol PO-->increase dose, added IV prn BB     Acute COPD exacerbation - started on neb/inhaler/solumedrol/abx, pulm consulted. Switch steroids to PO pred     UTI - UA abnormal, cont IV abx, await urine cx with e coli - ESBL. Will consult ID, switched cefepime to merrem    Hypokalemia - replete        DVT Prophylaxis: lovenox  Diet: ADULT DIET;  Regular  Code Status: DNR-CC    PT/OT Eval Status: ordered    Nando Hansen MD

## 2021-09-13 NOTE — PROGRESS NOTES
Occupational Therapy  Facility/Department: [facility]  Daily Treatment Note  NAME: Ruben Johnson  : 1950  MRN: [id number]    Date of Service: 2021    Discharge Recommendations:  ECF with OT, 3-5 sessions per week, Patient would benefit from continued therapy after discharge    Ruben Johnson scored a 12/24 on the AM-PAC ADL Inpatient form. Current research shows that an AM-PAC score of 17 or less is typically not associated with a discharge to the patient's home setting. Based on the patient's AM-PAC score and their current ADL deficits, it is recommended that the patient have 3-5 sessions per week of Occupational Therapy at d/c to increase the patient's independence. Please see assessment section for further patient specific details. If patient discharges prior to next session this note will serve as a discharge summary. Please see below for the latest assessment towards goals. Assessment   Performance deficits / Impairments: Decreased functional mobility ; Decreased endurance;Decreased ADL status; Decreased ROM; Decreased balance;Decreased strength  Assessment: Discussed with OTR: Pt tolerated session fair, limited by the above deficits. Pt required Mod/Min A x1 for supine to sit to EOB today and sat with SBA. Pt able to stand onto yasmine stedy lift with Mod A x2 for sit>stand and to sit onto recliner, (c/o severe knee pain however with stand to sitting). Pt needing up to total assist for toileting needs and anticipate would require overall Mod/Max A for ADL's. Cont poc and recommendation for cont theraapy at low-mod frequency at d/c prior to returning home. Prognosis: Fair  History: pt is from facility. admitted with bronchopneumonia,  acute hypercapnic resp failure  OT Education: OT Role;Plan of Care;Transfer Training  REQUIRES OT FOLLOW UP: Yes  Activity Tolerance  Activity Tolerance: Patient limited by pain  Activity Tolerance: c/o L knee pain with stand to sit.   Safety Devices  Safety Devices in place: Yes  Type of devices: Left in chair;Call light within reach;Nurse notified; Chair alarm in place;Gait belt (Maxi Move lift pad placed under pt in recliner for nursing to transfer back to bed.)         Patient Diagnosis(es): The primary encounter diagnosis was Acute respiratory failure with hypoxia and hypercapnia ([facility] Diagnoses of Urinary tract infection without hematuria, site unspecified and Acute exacerbation of chronic obstructive pulmonary disease (COPD) ([facility] were also pertinent to this visit. has a past medical history of Acute kidney failure ([facility] [facility] Chronic kidney disease, COPD (chronic obstructive pulmonary disease) ([facility] [facility] Hyperkalemia, Hyperlipidemia, Hypertension, Hyponatremia, Kidney stone, Major depression, Melena, Obesity, Oxygen deficit, PAF (paroxysmal atrial fibrillation) ([facility] [address] [facility] and Sepsis ([facility] [facility] has a past surgical history that includes Cholecystectomy; Hip fracture surgery (Left, 8/10/2020); Cystoscopy (Left, 9/19/2020); Upper gastrointestinal endoscopy (N/A, 9/24/2020); sigmoidoscopy (N/A, 3/2/2021); and Colonoscopy (N/A, 4/6/2021). Restrictions  Restrictions/Precautions  Restrictions/Precautions: Fall Risk  Position Activity Restriction  Other position/activity restrictions: goff; IV  Subjective   General  Chart Reviewed: Yes  Patient assessed for rehabilitation services?: Yes  Additional Pertinent Hx: Per Dr Bridgette Salvador H&P: \"The patient is a 73 y.o. female who presents to [facility] with SOB. Pt lives in a NH, does not use O2 at baseline. Was found to have worsening SOB, associated with cough and hence sent to the ER. Found to have acute hypercapnic resp failure. Pt agreeable to be placed on BIPAP but refused intubation. CT showed bronchopneumonia, started on IV abx. Also has h/o COPD.  \"  Family / Caregiver Present: No  Referring Practitioner: Jh Moreno MD  Diagnosis: Acute hypercapnic resp failure , bronchopneumonia  Subjective  Subjective: Pt met bedside for OT eval/tx with PT. Pt in bed upon entering, agreeable to therapy. Denies pain. Orientation  Orientation  Overall Orientation Status: Within Functional Limits  Objective    ADL  Grooming: Setup (washed face with set up)  Toileting: Dependent/Total (goff. Pt with noted BM smears and dep for hygiene needs while standing on yasmine stedy, with assist of another  (CGA) for balance.)  Additional Comments: Reports nursing completed bathing already. Anticipate pt would require overall Mod/Max A for ADL's, based on ROM, endurance balance observed. Balance  Sitting Balance: Stand by assistance (at EOB)  Standing Balance  Time: 30-40 sec or less  Activity: static stance on yasmine stedy with CGA x1 for ADL's. Bed mobility  Supine to Sit: Minimal assistance; Moderate assistance (x1; HOB raised)  Sit to Supine: Unable to assess  Transfers  Sit to stand: Moderate assistance;2 Person assistance  Stand to sit: Moderate assistance;2 Person assistance  Transfer Comments: from elevated bed, sit>stand onto yasmine stedy x2 assist. Stand to sit onto recliner, with pt crying out in pain, at L knee. Jamila Carp stedy used for transfer bed to recliner. Maxi Move lift pad placed under pt in recliner for nursing to transfer back to bed.          Cognition  Overall Cognitive Status: WFL  Cognition Comment: easily anxious                Plan   Plan  Times per week: 3-5  Times per day: Daily  Current Treatment Recommendations: Strengthening, ROM, Safety Education & Training, Endurance Training, Self-Care / ADL, Equipment Evaluation, Education, & procurement, Patient/Caregiver Education & Training    AM-PAC Score        AM-PAC Inpatient Daily Activity Raw Score: 12 (09/13/21 1008)  AM-PAC Inpatient ADL T-Scale Score : 30.6 (09/13/21 1008)  ADL Inpatient CMS 0-100% Score: 66.57 (09/13/21 1008)  ADL Inpatient CMS G-Code Modifier : CL (09/13/21 1008)    Goals  Short term goals  Time Frame for Short term goals: By d/c:  9-13-21-goals ongoing  Short term goal 1: Pt will tolerate sitting EOB 10+ min with supv to complete grooming/feeding task  Short term goal 2: Pt will tolerate 10-15 minutes of UE therex to improve independence and strength with transfers  Short term goal 3: Pt will complete stand-pivot transfer to/from ADL surfaces with min A x 2 with AD  Patient Goals   Patient goals : \"to get stronger\"       Therapy Time   Individual Concurrent Group Co-treatment   Time In 0935         Time Out 1017         Minutes [facility] [address]/L,515

## 2021-09-13 NOTE — CONSULTS
CEPHALOMEDULLARY NAIL performed by Siobhan Prater MD at [address] [place], [place] [address] 3/2/2021    SIGMOIDOSCOPY FLEXIBLE POLYPECTOMY with clip placement [facility] [address] [facility]    EGD DIAGNOSTIC ONLY performed by Claudia Montilla MD at [address]       Current Medications:    Outpatient Medications Marked as Taking for the 9/10/21 encounter [facility] Encounter)   Medication Sig Dispense Refill    diphenhydrAMINE (BENADRYL) 25 MG tablet Take 25 mg by mouth every 6 hours as needed for Itching      cyclobenzaprine (FLEXERIL) 10 MG tablet Take 10 mg by mouth every 6 hours as needed for Muscle spasms      DULoxetine (CYMBALTA) 60 MG extended release capsule Take 60 mg by mouth daily      ibuprofen (ADVIL;MOTRIN) 600 MG tablet Take 600 mg by mouth every 6 hours as needed for Pain      traMADol (ULTRAM) 50 MG tablet Take 100 mg by mouth every 6 hours as needed for Pain.  acetaminophen (TYLENOL) 325 MG tablet Take 650 mg by mouth every 6 hours as needed for Pain or Fever       hydrocortisone (ANUSOL-HC) 25 MG suppository Place 25 mg rectally every 6 hours as needed for Hemorrhoids       bisacodyl (DULCOLAX) 10 MG suppository Place 10 mg rectally daily as needed for Constipation       magnesium hydroxide (MILK OF MAGNESIA) 400 MG/5ML suspension Take 30 mLs by mouth daily as needed for Constipation       polyethylene glycol (GLYCOLAX) 17 g packet Take 17 g by mouth daily as needed for Constipation      nystatin (MYCOSTATIN) 791986 UNIT/GM powder Apply topically 4 times daily Apply topically 4 times daily.       sennosides-docusate sodium (SENOKOT-S) 8.6-50 MG tablet Take 2 tablets by mouth every 12 hours as needed for Constipation       lactobacillus (CULTURELLE) capsule Take 2 capsules by mouth 2 times daily (with meals) 30 capsule 0    pantoprazole (PROTONIX) 40 MG tablet Take 1 tablet by mouth daily 30 tablet 0    acetaZOLAMIDE (DIAMOX) 250 MG tablet Take 250 mg by mouth daily      potassium chloride (KLOR-CON M) 20 MEQ extended release tablet Take 20 mEq by mouth daily      atorvastatin (LIPITOR) 40 MG tablet Take 40 mg by mouth daily      dilTIAZem (CARDIZEM CD) 180 MG extended release capsule Take 1 capsule by mouth daily 30 capsule 3    furosemide (LASIX) 40 MG tablet Take 1 tablet by mouth daily 60 tablet 0    gabapentin (NEURONTIN) 300 MG capsule Take 300 mg by mouth 3 times daily.  fluticasone-salmeterol (ADVAIR) 500-50 MCG/DOSE diskus inhaler Inhale 1 puff into the lungs 2 times daily       ipratropium-albuterol (DUONEB) 0.5-2.5 (3) MG/3ML SOLN nebulizer solution Inhale 3 mLs into the lungs every 2 hours as needed for Shortness of Breath      albuterol sulfate  (90 Base) MCG/ACT inhaler Inhale 2 puffs into the lungs every 4 hours as needed for Shortness of Breath          Allergies:  Patient has no known allergies. Immunizations : There is no immunization history on file for this patient.       Social History:   Social History     Tobacco Use    Smoking status: Former Smoker     Packs/day: 1.50     Types: Cigarettes    Smokeless tobacco: Never Used    Tobacco comment: pack n half a day    Vaping Use    Vaping Use: Never used   Substance Use Topics    Alcohol use: Never    Drug use: Yes     Types: Marijuana     Comment: occasional      Social History     Tobacco Use   Smoking Status Former Smoker    Packs/day: 1.50    Types: Cigarettes   Smokeless Tobacco Never Used   Tobacco Comment    pack n half a day       Family History : No DVT no COPD        REVIEW OF SYSTEMS:     Constitutional:  negative for fevers, chills, night sweats  Eyes:  negative for blurred vision, eye discharge, visual disturbance   HEENT:  negative for hearing loss, ear drainage,nasal congestion  Respiratory: cough+ , shortness of breath+ or hemoptysis   Cardiovascular:  negative for chest pain, palpitations, syncope  Gastrointestinal:  negative for nausea, vomiting, diarrhea, constipation, abdominal pain  Genitourinary:  negative for frequency, dysuria, urinary incontinence, hematuria  Hematologic/Lymphatic:  negative for easy bruising, bleeding and lymphadenopathy  Allergic/Immunologic:  negative for recurrent infections, angioedema, anaphylaxis   Endocrine:  negative for weight changes, polyuria, polydipsia and polyphagia  Musculoskeletal:  negative for joint  pain, swelling, decreased range of motion  Integumentary: No rashes, skin lesions  Neurological:  negative for headaches, slurred speech, unilateral weakness  Psychiatric: negative for hallucinations,confusion,agitation.      PHYSICAL EXAM:      Vitals:    /70   Pulse 95   Temp 98.3 °F (36.8 °C) (Oral)   Resp 20   Ht 5' 5\" (1.651 m)   Wt 248 lb 14.4 oz (112.9 kg)   SpO2 93%   BMI 41.42 kg/m²     General Appearance: alert,in some acute distress, no pallor, no icterus   Skin: warm and dry, no rash or erythema  Head: normocephalic and atraumatic  Eyes: pupils equal, round, and reactive to light, conjunctivae normal  ENT: tympanic membrane, external ear and ear canal normal bilaterally, nose without deformity, nasal mucosa and turbinates normal without polyps  Neck: supple and non-tender without mass, no thyromegaly  no cervical lymphadenopathy  Pulmonary/Chest: Bi basal crepts++ wheezes, rales or rhonchi, normal air movement, no respiratory distress  Cardiovascular: normal rate, regular rhythm, normal S1 and S2, no murmurs, rubs, clicks, or gallops, no carotid bruits  Abdomen: soft, non-tender, non-distended, normal bowel sounds, no masses or organomegaly  Extremities: no cyanosis, clubbing or edema  Musculoskeletal: normal range of motion, no joint swelling, deformity or tenderness  Integumentary: No rashes, no abnormal skin lesions, no petechiae  Neurologic: reflexes normal and symmetric, no cranial nerve deficit  Psych:  Orientation, sensorium, mood normal   Lines: IV    DATA:    CBC:   Lab Results   Component Value Date    WBC 19.0 (H) 09/13/2021    HGB 12.6 09/13/2021    HCT 42.0 09/13/2021    MCV 71.9 (L) 09/13/2021     09/13/2021     RENAL:   Lab Results   Component Value Date    CREATININE 0.9 09/13/2021    BUN 35 (H) 09/13/2021     09/13/2021    K 3.4 (L) 09/13/2021     09/13/2021    CO2 24 09/13/2021     SED RATE: No results found for: SEDRATE  CK: No results found for: CKTOTAL  CRP: No results found for: CRP  Hepatic Function Panel:   Lab Results   Component Value Date    ALKPHOS 203 09/10/2021    ALT 9 09/10/2021    AST 13 09/10/2021    PROT 7.7 09/10/2021    BILITOT <0.2 09/10/2021    BILIDIR <0.2 09/25/2020    IBILI see below 09/25/2020    LABALBU 3.3 09/10/2021     UA:  Lab Results   Component Value Date    COLORU YELLOW 09/10/2021    CLARITYU CLOUDY 09/10/2021    GLUCOSEU Negative 09/10/2021    BILIRUBINUR Negative 09/10/2021    KETUA Negative 09/10/2021    SPECGRAV 1.013 09/10/2021    BLOODU MODERATE 09/10/2021    PHUR 5.5 09/10/2021    PROTEINU Negative 09/10/2021    UROBILINOGEN 0.2 09/10/2021    NITRU POSITIVE 09/10/2021    LEUKOCYTESUR MODERATE 09/10/2021    LABMICR YES 09/10/2021    URINETYPE NotGiven 09/10/2021      Urine Microscopic:   Lab Results   Component Value Date    LABCAST 1-3 Cellular 05/03/2013    BACTERIA 4+ 09/10/2021    HYALCAST 1 09/10/2021    WBCUA 64 09/10/2021    RBCUA 17 09/10/2021    EPIU 0 09/10/2021     Urine Reflex to Culture:   Lab Results   Component Value Date    URRFLXCULT Yes 09/10/2021         MICRO: cultures reviewed and updated by me   1125       Order Status: Completed Specimen: Urine, clean catch Updated: 09/13/21 1126    Organism Escherichia coli ESBLAbnormal     Urine Culture, Routine --Abnormal     >100,000 CFU/ml   CONTACT PRECAUTIONS INDICATED   Carbapenem antibiotics are the best option for infections caused   by ESBL producing organisms.  Other antibiotic classes are   likely to result in treatment failure, even for organisms   demonstrating in vitro susceptibility. Abnormal    Narrative:     ORDER#: [id number]                          ORDERED BY: YOJANA ENCINAS   SOURCE: Urine Clean Catch                  COLLECTED:  09/10/21 11:25   ANTIBIOTICS AT CARIN. :                      RECEIVED :  09/10/21 12:41   Performed at:   [address] [facility] [address]   Phone [phone]   Culture, Blood 1 [[id number]] Collected: 09/10/21 1018   Order Status: Completed Specimen: Blood Updated: 09/11/21 1215    Blood Culture, Routine No Growth to date.  Any change in status will be called. Narrative:     ORDER#: [id number]                          ORDERED BY: YOJANA ENCINAS   SOURCE: Blood                              COLLECTED:  09/10/21 10:18   ANTIBIOTICS AT CARIN. :                      RECEIVED :  09/10/21 10:33   If child <=2 yrs old please draw pediatric bottle. ~Blood Culture 1   Performed at:   [address] [facility] [address]   Phone [phone]   Culture, Blood 2 [[id number]] Collected: 09/10/21 1018   Order Status: Completed Specimen: Blood Updated: 09/11/21 1215    Culture, Blood 2 No Growth to date.  Any change in status will be called. Narrative:     ORDER#: [id number]                          ORDERED BY: YOJANA ENCINAS   SOURCE: Blood                              COLLECTED:  09/10/21 10:18   ANTIBIOTICS AT CARIN. :                      RECEIVED :  09/10/21 10:33   If child <=2 yrs old please draw pediatric bottle. ~Blood Culture #2   Performed at:   [address] [facility] [address]   Phone [phone]   MRSA DNA Probe, Nasal [[id number]] (Abnormal) Collected: 09/10/21 1841   Order Status: Completed Specimen: Nares Updated: 09/11/21 0432    Organism Staph aureus MRSAAbnormal     MRSA SCREEN RT-PCR --Abnormal     POSITIVE for Normal Range: Not detected   CONTACT PRECAUTIONS INDICATED   Abnormal    Narrative:     ORDER#: [id number]                          ORDERED BY: David Manzo   SOURCE: Nares                              COLLECTED:  09/10/21 18:41   ANTIBIOTICS AT CARIN. :                      RECEIVED :  09/10/21 18:52   CALL  [place]  [facility] tel. [phone],   Microbiology results called to and read back by ronn viera, 09/11/2021   04:35, by [facility]   Performed at:   [facility]   [address] [place] [facility] [employer] [address]   Phone [phone] Rapid [[id number]] Collected: 09/10/21 1010   Order Status: Completed Specimen: Nasopharyngeal Swab Updated: 09/10/21 1109    SARS-CoV-2, NAAT Not Detected    Comment: Rapid NAAT:   Negative results should be treated as presumptive and,   if inconsistent with clinical signs and symptoms or necessary for   patient management, should be tested with an alternative molecular   assay. Negative results do not preclude SARS-CoV-2 infection and   should not be used as the sole basis for patient management decisions. This test has been authorized by the FDA under an Emergency Use   Authorization (EUA) for use by authorized laboratories. Fact sheet for Healthcare Providers:   [employer]Her[employer]es   Fact sheet for Patients: BuildHer.es     METHODOLOGY: Isothermal Nucleic Acid Amplification       Narrative:     Performed at:   [facility]   [address] [place] [facility] [employer] [address]   Phone [phone]       ORDER#: [id number]                          ORDERED BY: YOJANA Pyle   SOURCE: Urine Clean Catch                  COLLECTED:  09/10/21 11:25   ANTIBIOTICS AT CARIN. :                      RECEIVED :  09/10/21 12:41   Performed at:   [facility]   [address] [place] [facility] [employer] [address]   Phone [phone] sodium chloride, ondansetron **OR** ondansetron, polyethylene glycol, acetaminophen **OR** acetaminophen, albuterol sulfate HFA, ipratropium-albuterol, traMADol, sennosides-docusate sodium    Imaging:   CT CHEST PULMONARY EMBOLISM W CONTRAST   Final Result   1. Negative for pulmonary embolus. 2. Bibasilar bronchopneumonia. XR CHEST PORTABLE   Final Result   Stable chest demonstrating bibasilar hypoaeration             All pertinent images and reports for the current Hospitalization were reviewed by me.     IMPRESSION:    Patient Active Problem List   Diagnosis    [facility]    Morbid obesity with BMI of 45.0-49.9, adult ([facility]    Hypoxemia requiring supplemental oxygen    Acute respiratory failure with hypoxia (HCC)    Acute on chronic respiratory failure with hypercapnia (HCC)    Acute pulmonary edema (HCC)    Multifocal pneumonia    Rapid atrial fibrillation (HCC)    Chronic obstructive pulmonary disease (HCC)    Paroxysmal atrial fibrillation (HCC)    PAT (paroxysmal atrial tachycardia) (HCC)    Kidney stone    Septic shock (HCC)    Neutrophilic leukocytosis    Morbid obesity due to excess calories (HCC)    Chronic respiratory failure with hypoxia (HCC)    Hyperlipidemia    Kidney lesion, native, left    Hyponatremia    Hyperkalemia    Moderate protein-calorie malnutrition (HCC)    Hydronephrosis with left ureteropelvic junction (UPJ) obstruction    Hematuria    Ureteral obstruction    Hydronephrosis with urinary obstruction due to ureteral calculus    DARSHAN (acute kidney injury) ([facility]    Abnormal CT scan, kidney    Essential hypertension    Acute hypercapnic respiratory failure (HCC)     Acute Hypercapnic resp failure  COPD  Was on BIPAP now weaned of  Morbid obesity BMI at 41  WBC elevation  UTI E coli ESBL  H/o Left ureteral stone and stent in Sep 2020  NH resident  CT chest with bronchopneumonia  MRSA colonization     Given MDRO infection and colonization with current presentation will need IV abx at d/c  Will check CT abd/pelvis given prior history of stones and stent placement in 2020       Labs, Microbiology, Radiology and pertinent results from current hospitalization and care every where were reviewed by me as a part of the consultation. PLAN :  1. Cont IV Vancomycin x 1250 mg Q 24 hrs  2. Cont IV Meropenem x 500 mG q 6 hrs  3. PICC line   4. Check CT ABD/PELVIS she has h/o Left hydronephrosis and stones in 2020 required Left ureteral stent placement   5. Trend WBC   6. Contact isolation        Discussed with patient/Family and Nursing   Risk of Complications/Morbidity: High      · Illness(es)/ Infection present that pose threat to bodily function. · There is potential for severe exacerbation of infection/side effects of treatment. · Therapy requires intensive monitoring for antimicrobial agent toxicity. Thanks for allowing me to participate in your patient's care please call me with any questions or concerns.     Dr. Lily Newton MD  [facility] Physician  Phone: [phone]   Fax : [phone]

## 2021-09-13 NOTE — PROGRESS NOTES
Pulmonary Progress Note     Patient's name:  [facility] Record Number: [id number]  Patient's account/billing number: [de-identified]  Patient's YOB: 1950  Age: 73 y.o. Date of Admission: 9/10/2021  9:18 AM  Date of Consult: 9/13/2021      Primary Care Physician: Grady Mora MD      Code Status: Limited    Chief complaint: acute respiratory failure with hypoxia     Assessment and Plan     1. Acute respiratory failure with hypoxia and hypercapnia  2. Copd  3. Obesity         Plan:  IS  Wean O2 if sat > 88%  Wean steroid gradually  Bronchodilators       Overnight:  No acute events overnight      REVIEW OF SYSTEMS:  Review of Systems -   General ROS: negative  Psychological ROS: negative  Ophthalmic ROS: negative  ENT ROS: negative  Allergy and Immunology ROS: negative  Hematological and Lymphatic ROS: negative  Endocrine ROS: negative  Breast ROS: negative  Respiratory ROS: sob    Cardiovascular ROS: no chest pain or dyspnea on exertion  Gastrointestinal ROS:negative  Genito-Urinary ROS: negative  Musculoskeletal ROS: negative  Neurological ROS: negative  Dermatological ROS: negative        Physical Exam:    Vitals: /87   Pulse 88   Temp 98.1 °F (36.7 °C) (Oral)   Resp 20   Ht 5' 5\" (1.651 m)   Wt 248 lb 14.4 oz (112.9 kg)   SpO2 96%   BMI 41.42 kg/m²     Last Body weight:   Wt Readings from Last 3 Encounters:   09/13/21 248 lb 14.4 oz (112.9 kg)   04/06/21 283 lb (128.4 kg)   03/02/21 288 lb (130.6 kg)       Body Mass Index : Body mass index is 41.42 kg/m². Intake and Output summary:     Intake/Output Summary (Last 24 hours) at 9/13/2021 1047  Last data filed at 9/13/2021 0600  Gross per 24 hour   Intake 504 ml   Output 1350 ml   Net -846 ml       Physical Examination:     Gen:  No acute distress. Eyes: PERRL. Anicteric sclera. No conjunctival injection. ENT: No discharge. Posterior oropharynx clear.  External appearance of ears and nose normal.  Neck: Trachea midline. No mass   Resp:  Diminished at the bases no wheezing no rhonchi   CV: Regular rate. Regular rhythm. No murmur or rub. + edema. GI: Soft, Non-tender. Non-distended. +BS  Skin: Warm, dry, w/o erythema. Lymph: No cervical or supraclavicular LAD. M/S: No cyanosis. No clubbing. Neuro:  CN 2-12 tested, no focal neurologic deficit. Moves all extremities  Psych: Awake and alert, Oriented x 3. Judgement and insight appropriate. Mood stable. Laboratory findings:-    CBC:   Recent Labs     09/11/21 0436   WBC 9.9   HGB 12.1        BMP:    Recent Labs     09/11/21 0436      K 4.3      CO2 23   BUN 27*   CREATININE 0.8   GLUCOSE 127*     S. Calcium:  Recent Labs     09/11/21  0436   CALCIUM 8.9     Pancreatic functions:  Recent Labs     09/11/21  0436   LACTA 1.9     S.  Lactic Acid:   Recent Labs     09/11/21  0436   LACTA 1.9                    Gutierrez Carnes MD, M.EVER.            9/13/2021, 10:47 AM

## 2021-09-13 NOTE — PLAN OF CARE
Problem: Infection:  Goal: Will remain free from infection  Description: Will remain free from infection  9/13/2021 0951 by Jere Cerda RN  Outcome: Ongoing     Problem: Safety:  Goal: Free from accidental physical injury  Description: Free from accidental physical injury  9/13/2021 0951 by Jere Cerda RN  Outcome: Ongoing     Problem: Safety:  Goal: Free from intentional harm  Description: Free from intentional harm  9/13/2021 0951 by Jere Cerda RN  Outcome: Ongoing     Problem: Daily Care:  Goal: Daily care needs are met  Description: Daily care needs are met  9/13/2021 0951 by Jere Cerda RN  Outcome: Ongoing     Problem: Pain:  Goal: Patient's pain/discomfort is manageable  Description: Patient's pain/discomfort is manageable  9/13/2021 0951 by Jere Cerda RN  Outcome: Ongoing     Problem: Pain:  Goal: Pain level will decrease  Description: Pain level will decrease  9/13/2021 0951 by Jere Cerda RN  Outcome: Ongoing     Problem: Pain:  Goal: Control of acute pain  Description: Control of acute pain  9/13/2021 0951 by Jere Cerda RN  Outcome: Ongoing     Problem: Pain:  Goal: Control of chronic pain  Description: Control of chronic pain  9/13/2021 0951 by Jere Cerda RN  Outcome: Ongoing     Problem: Skin Integrity:  Goal: Skin integrity will stabilize  Description: Skin integrity will stabilize  9/13/2021 0951 by Jere Cerda RN  Outcome: Ongoing     Problem: Discharge Planning:  Goal: Patients continuum of care needs are met  Description: Patients continuum of care needs are met  9/13/2021 0951 by Jere Cerda RN  Outcome: Ongoing     Problem: Falls - Risk of:  Goal: Will remain free from falls  Description: Will remain free from falls  9/13/2021 0951 by Jere Cerda RN  Outcome: Ongoing     Problem: Falls - Risk of:  Goal: Absence of physical injury  Description: Absence of physical injury  9/13/2021 0951 by Jere Cerda RN  Outcome: Ongoing     Problem: Skin Integrity:  Goal: Will show no infection signs and symptoms  Description: Will show no infection signs and symptoms  9/13/2021 0951 by Saundra Mosie, RN  Outcome: Ongoing     Problem: Skin Integrity:  Goal: Absence of new skin breakdown  Description: Absence of new skin breakdown  9/13/2021 0951 by Saundra Moise, RN  Outcome: Ongoing

## 2021-09-13 NOTE — CONSULTS
patient is a 73 y.o. female who presented to [facility] with SOB. Pt lives in a NH, does not use O2 at baseline. Was found to have worsening SOB, associated with cough and hence sent to the ER. Found to have acute hypercapnic resp failure. Palliative Medicine SymptomScreening/ROS:    Review of Systems   Constitutional: Positive for fatigue. HENT: Negative. Eyes: Negative. Respiratory: Positive for shortness of breath. Negative for chest tightness. Cardiovascular: Positive for leg swelling. Gastrointestinal: Negative. Musculoskeletal: Negative. Skin: Positive for pallor. Neurological: Positive for weakness. Psychiatric/Behavioral: Negative. All other systems reviewed and are negative. A complete 10 count ROS was obtained. Pertinent positives mentioned above in HPI/ROS. All others if not mentioned are negative. Pain:  Patient denied having any pain during my visit today. Home med list and hospital medications reviewed in chart as of 9/13/2021     EXAM     Vitals:    09/13/21 1123   BP: 123/70   Pulse: 95   Resp: 20   Temp: 98.3 °F (36.8 °C)   SpO2: 93%       Physical Exam  Vitals reviewed. Constitutional:       General: She is not in acute distress. HENT:      Head: Normocephalic. Nose: Nose normal.   Eyes:      Pupils: Pupils are equal, round, and reactive to light. Cardiovascular:      Rate and Rhythm: Normal rate. Pulses: Normal pulses. Pulmonary:      Effort: Pulmonary effort is normal.      Breath sounds: Normal breath sounds. Abdominal:      General: Bowel sounds are normal.      Palpations: Abdomen is soft. Musculoskeletal:      Cervical back: Neck supple. Right lower leg: Edema (1+) present. Left lower leg: Edema (1+) present. Skin:     General: Skin is warm and dry. Coloration: Skin is pale. Neurological:      Mental Status: She is alert and oriented to person, place, and time.    Psychiatric:         Mood and Affect: Mood normal.         Behavior: Behavior normal.         Judgment: Judgment normal.            Current labs in the epic chart reviewed as of 9/13/2021   Review of previous notes, admits, labs, radiology and testing relevant to this consult done in this chart today 9/13/2021      Total time: 83 minutes  >50% of time spent counseling patient at bedside or POA/family member if applicable , reviewing information and discussing care, coordinating with care team  Signed By: Electronically signed by GABRIELA Bennett CNP on 9/13/2021 at 12:07 PM  Palliative Medicine   [phone]    September 13, 2021

## 2021-09-14 ENCOUNTER — APPOINTMENT (OUTPATIENT)
Dept: GENERAL RADIOLOGY | Age: 71
DRG: 871 | End: 2021-09-14
Payer: MEDICARE

## 2021-09-14 LAB
ANION GAP SERPL CALCULATED.3IONS-SCNC: 12 MMOL/L (ref 3–16)
BASOPHILS ABSOLUTE: 0 K/UL (ref 0–0.2)
BASOPHILS RELATIVE PERCENT: 0.1 %
BLOOD CULTURE, ROUTINE: NORMAL
BUN BLDV-MCNC: 29 MG/DL (ref 7–20)
CALCIUM SERPL-MCNC: 8.8 MG/DL (ref 8.3–10.6)
CHLORIDE BLD-SCNC: 107 MMOL/L (ref 99–110)
CO2: 20 MMOL/L (ref 21–32)
CREAT SERPL-MCNC: 0.9 MG/DL (ref 0.6–1.2)
CULTURE, BLOOD 2: NORMAL
EOSINOPHILS ABSOLUTE: 0.2 K/UL (ref 0–0.6)
EOSINOPHILS RELATIVE PERCENT: 1.1 %
GFR AFRICAN AMERICAN: >60
GFR NON-AFRICAN AMERICAN: >60
GLUCOSE BLD-MCNC: 77 MG/DL (ref 70–99)
HCT VFR BLD CALC: 41.2 % (ref 36–48)
HEMOGLOBIN: 12.7 G/DL (ref 12–16)
LYMPHOCYTES ABSOLUTE: 2.3 K/UL (ref 1–5.1)
LYMPHOCYTES RELATIVE PERCENT: 15.6 %
MCH RBC QN AUTO: 21.9 PG (ref 26–34)
MCHC RBC AUTO-ENTMCNC: 30.7 G/DL (ref 31–36)
MCV RBC AUTO: 71.2 FL (ref 80–100)
MONOCYTES ABSOLUTE: 1.4 K/UL (ref 0–1.3)
MONOCYTES RELATIVE PERCENT: 9.6 %
NEUTROPHILS ABSOLUTE: 10.8 K/UL (ref 1.7–7.7)
NEUTROPHILS RELATIVE PERCENT: 73.6 %
PDW BLD-RTO: 19.4 % (ref 12.4–15.4)
PLATELET # BLD: 342 K/UL (ref 135–450)
PMV BLD AUTO: 7.8 FL (ref 5–10.5)
POTASSIUM SERPL-SCNC: 3.6 MMOL/L (ref 3.5–5.1)
RBC # BLD: 5.79 M/UL (ref 4–5.2)
SODIUM BLD-SCNC: 139 MMOL/L (ref 136–145)
VANCOMYCIN TROUGH: 15.8 UG/ML (ref 10–20)
WBC # BLD: 14.7 K/UL (ref 4–11)

## 2021-09-14 PROCEDURE — 80048 BASIC METABOLIC PNL TOTAL CA: CPT

## 2021-09-14 PROCEDURE — 6370000000 HC RX 637 (ALT 250 FOR IP): Performed by: INTERNAL MEDICINE

## 2021-09-14 PROCEDURE — 94640 AIRWAY INHALATION TREATMENT: CPT

## 2021-09-14 PROCEDURE — 2580000003 HC RX 258: Performed by: INTERNAL MEDICINE

## 2021-09-14 PROCEDURE — 6360000002 HC RX W HCPCS: Performed by: INTERNAL MEDICINE

## 2021-09-14 PROCEDURE — 94761 N-INVAS EAR/PLS OXIMETRY MLT: CPT

## 2021-09-14 PROCEDURE — 80202 ASSAY OF VANCOMYCIN: CPT

## 2021-09-14 PROCEDURE — 1200000000 HC SEMI PRIVATE

## 2021-09-14 PROCEDURE — 36415 COLL VENOUS BLD VENIPUNCTURE: CPT

## 2021-09-14 PROCEDURE — 85025 COMPLETE CBC W/AUTO DIFF WBC: CPT

## 2021-09-14 PROCEDURE — 71045 X-RAY EXAM CHEST 1 VIEW: CPT

## 2021-09-14 PROCEDURE — 99233 SBSQ HOSP IP/OBS HIGH 50: CPT | Performed by: INTERNAL MEDICINE

## 2021-09-14 PROCEDURE — 2700000000 HC OXYGEN THERAPY PER DAY

## 2021-09-14 RX ADMIN — TRAMADOL HYDROCHLORIDE 50 MG: 50 TABLET, FILM COATED ORAL at 09:51

## 2021-09-14 RX ADMIN — PANTOPRAZOLE SODIUM 40 MG: 40 TABLET, DELAYED RELEASE ORAL at 05:47

## 2021-09-14 RX ADMIN — BUDESONIDE AND FORMOTEROL FUMARATE DIHYDRATE 2 PUFF: 160; 4.5 AEROSOL RESPIRATORY (INHALATION) at 09:22

## 2021-09-14 RX ADMIN — SODIUM CHLORIDE 25 ML: 900 INJECTION INTRAVENOUS at 09:58

## 2021-09-14 RX ADMIN — SODIUM CHLORIDE, PRESERVATIVE FREE 10 ML: 5 INJECTION INTRAVENOUS at 09:59

## 2021-09-14 RX ADMIN — METOPROLOL TARTRATE 50 MG: 50 TABLET, FILM COATED ORAL at 21:06

## 2021-09-14 RX ADMIN — BUDESONIDE AND FORMOTEROL FUMARATE DIHYDRATE 2 PUFF: 160; 4.5 AEROSOL RESPIRATORY (INHALATION) at 21:05

## 2021-09-14 RX ADMIN — SODIUM CHLORIDE 25 ML: 900 INJECTION INTRAVENOUS at 20:57

## 2021-09-14 RX ADMIN — IPRATROPIUM BROMIDE AND ALBUTEROL SULFATE 1 AMPULE: .5; 3 SOLUTION RESPIRATORY (INHALATION) at 09:22

## 2021-09-14 RX ADMIN — Medication 2 CAPSULE: at 09:51

## 2021-09-14 RX ADMIN — MEROPENEM 500 MG: 500 INJECTION, POWDER, FOR SOLUTION INTRAVENOUS at 09:59

## 2021-09-14 RX ADMIN — TRAMADOL HYDROCHLORIDE 50 MG: 50 TABLET, FILM COATED ORAL at 02:55

## 2021-09-14 RX ADMIN — SODIUM CHLORIDE, PRESERVATIVE FREE 10 ML: 5 INJECTION INTRAVENOUS at 21:06

## 2021-09-14 RX ADMIN — MEROPENEM 500 MG: 500 INJECTION, POWDER, FOR SOLUTION INTRAVENOUS at 14:52

## 2021-09-14 RX ADMIN — ESCITALOPRAM OXALATE 20 MG: 10 TABLET ORAL at 09:52

## 2021-09-14 RX ADMIN — MEROPENEM 500 MG: 500 INJECTION, POWDER, FOR SOLUTION INTRAVENOUS at 01:08

## 2021-09-14 RX ADMIN — SODIUM CHLORIDE 25 ML: 900 INJECTION INTRAVENOUS at 14:52

## 2021-09-14 RX ADMIN — ATORVASTATIN CALCIUM 40 MG: 40 TABLET, FILM COATED ORAL at 09:51

## 2021-09-14 RX ADMIN — METOPROLOL TARTRATE 50 MG: 50 TABLET, FILM COATED ORAL at 09:52

## 2021-09-14 RX ADMIN — PREDNISONE 30 MG: 20 TABLET ORAL at 09:52

## 2021-09-14 RX ADMIN — Medication 1250 MG: at 22:48

## 2021-09-14 RX ADMIN — SODIUM CHLORIDE 25 ML: 9 INJECTION, SOLUTION INTRAVENOUS at 22:47

## 2021-09-14 RX ADMIN — MEROPENEM 500 MG: 500 INJECTION, POWDER, FOR SOLUTION INTRAVENOUS at 20:58

## 2021-09-14 RX ADMIN — ENOXAPARIN SODIUM 40 MG: 40 INJECTION SUBCUTANEOUS at 21:06

## 2021-09-14 RX ADMIN — SODIUM CHLORIDE 25 ML: 900 INJECTION INTRAVENOUS at 01:04

## 2021-09-14 ASSESSMENT — PAIN DESCRIPTION - FREQUENCY
FREQUENCY: INTERMITTENT
FREQUENCY: CONTINUOUS

## 2021-09-14 ASSESSMENT — PAIN SCALES - GENERAL
PAINLEVEL_OUTOF10: 8
PAINLEVEL_OUTOF10: 0
PAINLEVEL_OUTOF10: 0
PAINLEVEL_OUTOF10: 10

## 2021-09-14 ASSESSMENT — PAIN DESCRIPTION - LOCATION
LOCATION: BACK
LOCATION: SHOULDER

## 2021-09-14 ASSESSMENT — PAIN DESCRIPTION - ONSET
ONSET: SUDDEN
ONSET: ON-GOING

## 2021-09-14 ASSESSMENT — PAIN DESCRIPTION - PAIN TYPE
TYPE: CHRONIC PAIN
TYPE: ACUTE PAIN

## 2021-09-14 ASSESSMENT — PAIN DESCRIPTION - PROGRESSION
CLINICAL_PROGRESSION: NOT CHANGED
CLINICAL_PROGRESSION: GRADUALLY IMPROVING

## 2021-09-14 ASSESSMENT — PAIN - FUNCTIONAL ASSESSMENT
PAIN_FUNCTIONAL_ASSESSMENT: ACTIVITIES ARE NOT PREVENTED
PAIN_FUNCTIONAL_ASSESSMENT: PREVENTS OR INTERFERES SOME ACTIVE ACTIVITIES AND ADLS

## 2021-09-14 ASSESSMENT — PAIN DESCRIPTION - DESCRIPTORS
DESCRIPTORS: SHARP
DESCRIPTORS: ACHING

## 2021-09-14 ASSESSMENT — PAIN DESCRIPTION - ORIENTATION: ORIENTATION: RIGHT

## 2021-09-14 NOTE — ACP (ADVANCE CARE PLANNING)
Patient has an Advanced Directive in the  Chart naming her spouse as the Decision Maker. Met with patient today to verify. She reports, \"He knows what I want. \"  Haylee Head, [facility]     Case Management   [phone]    9/14/2021  11:24 AM

## 2021-09-14 NOTE — CARE COORDINATION
Chart Reviewed. Gowned and gloved to meet with patient to discuss DC plan. She confirmed to desire to return to [facility] at discharge. Acknowledged her Adv Directive naming her spouse as decision maker if she cannot make decision. She reports, \"He knows what I want. \"  Following for DC needs.   [place], [place]     Case Management   [phone]    9/14/2021  11:23 AM

## 2021-09-14 NOTE — PROGRESS NOTES
Hospitalist Progress Note      PCP: Sandeep Mccray MD    Date of Admission: 9/10/2021    Subjective: pt states she wants to go home, feels better but still needing 3L O2    Medications:  Reviewed    Infusion Medications    sodium chloride 25 mL (09/14/21 1452)    sodium chloride Stopped (09/14/21 0008)     Scheduled Medications    predniSONE  30 mg Oral Daily    Followed by   Phoenix Tom ON 9/17/2021] predniSONE  20 mg Oral Daily    Followed by   Phoenix Tom ON 9/20/2021] predniSONE  10 mg Oral Daily    Followed by   Phoenix Tom ON 9/23/2021] predniSONE  5 mg Oral Daily    metoprolol tartrate  50 mg Oral BID    meropenem  500 mg IntraVENous Q6H    lidocaine 1 % injection  5 mL IntraDERmal Once    sodium chloride flush  5-40 mL IntraVENous 2 times per day    vancomycin  1,250 mg IntraVENous Q24H    sodium chloride flush  5-40 mL IntraVENous 2 times per day    enoxaparin  40 mg SubCUTAneous Daily    escitalopram  20 mg Oral Daily    budesonide-formoterol  2 puff Inhalation BID    lactobacillus  2 capsule Oral BID WC    pantoprazole  40 mg Oral Daily    atorvastatin  40 mg Oral Daily    vancomycin (VANCOCIN) intermittent dosing (placeholder)   Other RX Placeholder     PRN Meds: sodium chloride flush, sodium chloride, bisacodyl, hydrocortisone, metoprolol, sodium chloride flush, sodium chloride, ondansetron **OR** ondansetron, polyethylene glycol, acetaminophen **OR** acetaminophen, albuterol sulfate HFA, ipratropium-albuterol, traMADol, sennosides-docusate sodium      Intake/Output Summary (Last 24 hours) at 9/14/2021 1737  Last data filed at 9/14/2021 1315  Gross per 24 hour   Intake 758.73 ml   Output 800 ml   Net -41.27 ml       Physical Exam Performed:    /79   Pulse 104   Temp 97.5 °F (36.4 °C) (Oral)   Resp 18   Ht 5' 5\" (1.651 m)   Wt 250 lb 3.6 oz (113.5 kg)   SpO2 95%   BMI 41.64 kg/m²       General appearance: awake  HEENT Normal cephalic, atraumatic without obvious deformity.  Pupils equal, round, and reactive to light.  Extra ocular muscles intact.  Conjunctivae/corneas clear. Neck: Supple, No jugular venous distention/bruits.  Trachea midline without thyromegaly or adenopathy with full range of motion. Lungs: diminished b/l  Heart: irregular rate and rhythm with Normal S1/S2  Abdomen: Soft, non-tender or non-distended without rigidity or guarding and positive bowel sounds   Extremities: No clubbing, cyanosis, or edema bilaterally. Skin: Skin color, texture, turgor normal.  No rashes or lesions. Neurologic: awake, neurovascularly intact with sensory/motor intact upper extremities/lower extremities, bilaterally.  Cranial nerves: II-XII intact, grossly non-focal.  Mental status: awake  Capillary Refill: Acceptable  < 3 seconds  Peripheral Pulses: +3 Easily felt, not easily obliterated with pressure       Labs:   Recent Labs     09/13/21 1247 09/14/21  0721   WBC 19.0* 14.7*   HGB 12.6 12.7   HCT 42.0 41.2    342     Recent Labs     09/13/21 1247 09/13/21  1915 09/14/21  0721     --  139   K 3.4*  --  3.6     --  107   CO2 24  --  20*   BUN 35*  --  29*   CREATININE 0.9 1.0 0.9   CALCIUM 9.2  --  8.8     No results for input(s): AST, ALT, BILIDIR, BILITOT, ALKPHOS in the last 72 hours. No results for input(s): INR in the last 72 hours. No results for input(s): [place] in the last 72 hours. Urinalysis:      Lab Results   Component Value Date    NITRU POSITIVE 09/10/2021    WBCUA 64 09/10/2021    BACTERIA 4+ 09/10/2021    RBCUA 17 09/10/2021    BLOODU MODERATE 09/10/2021    SPECGRAV 1.013 09/10/2021    GLUCOSEU Negative 09/10/2021       Radiology:  CT CHEST PULMONARY EMBOLISM W CONTRAST   Final Result   1. Negative for pulmonary embolus. 2. Bibasilar bronchopneumonia.          XR CHEST PORTABLE   Final Result   Stable chest demonstrating bibasilar hypoaeration         CT ABDOMEN PELVIS WO CONTRAST Additional Contrast? None    (Results Pending)           Assessment/Plan:    Active Hospital Problems    Diagnosis     Acute hypercapnic respiratory failure (HCC) [J96.02]             Acute hypoxic/hypercapnic resp failure - ABG reviewed, pt on admit agreeable to BIPAP, did not want intubation. Treated with BIPAP on admit - but later pt refused BIPAP, pulm following - pt currently DNR/DNI. Placed on 3L O2, wean as tolerated     Bronchopneumonia - suspect HCAP given NH resident, probable gram neg PNA, cannot r/o MRSA PNA. Started on IV cefepime/vanco-->switched cefepime to merrem. Check COVID neg, check strep ag/legionella/sputum cx. pulm consulted. MRSA swab positive. ID consulted     A fib with RVR - started on metoprolol PO-->increased dose, added IV prn BB     Acute COPD exacerbation - started on neb/inhaler/solumedrol/abx, pulm consulted. Switched steroids to PO pred     UTI - UA abnormal, cont IV abx, await urine cx with e coli - ESBL. Consulted ID, switched cefepime to merrem. Getting PICC Line placed     Hypokalemia - replete          DVT Prophylaxis: lovenox  Diet: ADULT DIET;  Regular  Code Status: DNR-CC    PT/OT Eval Status: ordered    Dispo - cont care, PICC line to be placed, await ID recs for abx for Karl Rosario MD

## 2021-09-14 NOTE — PROGRESS NOTES
Infectious Disease Follow up Notes  Admit Date: 9/10/2021  Hospital Day: 5    Antibiotics :   IV Meropen  IV Vancomycin      CHIEF  COMPLAINT:     UTI  ESBL  Pneumonia    Subjective interval History :  73 y.o. woman from [place] morbid obesity, poor mobility admitted with SOB for 2-3 days, cough +  to hospital was in resp distress required BIPAP support for hypercapnic resp failure and she has h/o COPD on admit WBC at  15.4 and Lactic acid 1.9, MRSA probe +Ve and UA  Very abnormal urine cx ESBL E coli isolated, Blood cx negative. CT chest with Broncho pnuemonia on admit. She was placed on IV Vancomycin and IV Cefepime but now with ESBL it was changed to IV Meropenem and we are consulted for recommendations. She is on prednisone taper as well. Interval History : cough + back on 2 lts nasal cannula had questions about PICC line and We discussed about ESBL and need for IV antibiotics     Past Medical History:    Past Medical History:   Diagnosis Date    [facility]     Chronic kidney disease     COPD (chronic obstructive pulmonary disease) ([facility]     ESBL (extended spectrum beta-lactamase) producing bacteria infection 09/10/2021    Escherichia coli ESBL.  Urine    Hyperkalemia     Hyperlipidemia     Hypertension     Hyponatremia     Kidney stone     Major depression     Melena     MRSA nasal colonization 09/10/2021    Obesity     Oxygen deficit     2 liters     PAF (paroxysmal atrial fibrillation) (HCC)     Sepsis ([facility]        Past Surgical History:    Past Surgical History:   Procedure Laterality Date    CHOLECYSTECTOMY      COLONOSCOPY N/A 4/6/2021    COLONOSCOPY POLYPECTOMY ABLATION with clip placement performed by Pepito Williamson MD at [address] [facility] Left 9/19/2020    CYSTOSCOPY URETERAL STENT INSERTION LEFT RETROGRADE PYELOGRAM performed by Madelene Riedel, MD at [address] [place] [address] tablet Take 1 tablet by mouth daily 30 tablet 0    acetaZOLAMIDE (DIAMOX) 250 MG tablet Take 250 mg by mouth daily      potassium chloride (KLOR-CON M) 20 MEQ extended release tablet Take 20 mEq by mouth daily      atorvastatin (LIPITOR) 40 MG tablet Take 40 mg by mouth daily      dilTIAZem (CARDIZEM CD) 180 MG extended release capsule Take 1 capsule by mouth daily 30 capsule 3    furosemide (LASIX) 40 MG tablet Take 1 tablet by mouth daily 60 tablet 0    gabapentin (NEURONTIN) 300 MG capsule Take 300 mg by mouth 3 times daily.  fluticasone-salmeterol (ADVAIR) 500-50 MCG/DOSE diskus inhaler Inhale 1 puff into the lungs 2 times daily       ipratropium-albuterol (DUONEB) 0.5-2.5 (3) MG/3ML SOLN nebulizer solution Inhale 3 mLs into the lungs every 2 hours as needed for Shortness of Breath      albuterol sulfate  (90 Base) MCG/ACT inhaler Inhale 2 puffs into the lungs every 4 hours as needed for Shortness of Breath          Allergies:  Patient has no known allergies. Immunizations : There is no immunization history on file for this patient.     Social History:  Social History     Tobacco Use    Smoking status: Former Smoker     Packs/day: 1.50     Types: Cigarettes    Smokeless tobacco: Never Used    Tobacco comment: pack n half a day    Vaping Use    Vaping Use: Never used   Substance Use Topics    Alcohol use: Never    Drug use: Yes     Types: Marijuana     Comment: occasional      Social History     Tobacco Use   Smoking Status Former Smoker    Packs/day: 1.50    Types: Cigarettes   Smokeless Tobacco Never Used   Tobacco Comment    pack n half a day       Family History : no dvt NO copd        REVIEW OF SYSTEMS:     Constitutional:  negative for fevers, chills, night sweats  Eyes:  negative for blurred vision, eye discharge, visual disturbance   HEENT:  negative for hearing loss, ear drainage,nasal congestion  Respiratory: cough+  shortness of breath +  or hemoptysis   Cardiovascular: negative for chest pain, palpitations, syncope  Gastrointestinal:  negative for nausea, vomiting, diarrhea, constipation, abdominal pain  Genitourinary:  negative for frequency, dysuria, urinary incontinence, hematuria  Hematologic/Lymphatic:  negative for easy bruising, bleeding and lymphadenopathy  Allergic/Immunologic:  negative for recurrent infections, angioedema, anaphylaxis   Endocrine:  negative for weight changes, polyuria, polydipsia and polyphagia  Musculoskeletal:  negative for joint  pain, swelling, decreased range of motion  Integumentary: No rashes, skin lesions  Neurological:  negative for headaches, slurred speech, unilateral weakness  Psychiatric: negative for hallucinations,confusion,agitation.                 PHYSICAL EXAM:      Vitals:    /72   Pulse 106   Temp 97.8 °F (36.6 °C) (Oral)   Resp 16   Ht 5' 5\" (1.651 m)   Wt 250 lb 3.6 oz (113.5 kg)   SpO2 92%   BMI 41.64 kg/m²     General Appearance: alert,in some acute distress, no pallor, no icterus   Skin: warm and dry, no rash or erythema  Head: normocephalic and atraumatic  Eyes: pupils equal, round, and reactive to light, conjunctivae normal  ENT: tympanic membrane, external ear and ear canal normal bilaterally, nose without deformity, nasal mucosa and turbinates normal without polyps  Neck: supple and non-tender without mass, no thyromegaly  no cervical lymphadenopathy  Pulmonary/Chest: Bi basal crepts++ wheezes, rales or rhonchi, normal air movement, no respiratory distress  Cardiovascular: normal rate, regular rhythm, normal S1 and S2, no murmurs, rubs, clicks, or gallops, no carotid bruits  Abdomen: soft, non-tender, non-distended, normal bowel sounds, no masses or organomegaly  Extremities: no cyanosis, clubbing or edema  Musculoskeletal: normal range of motion, no joint swelling, deformity or tenderness  Integumentary: No rashes, no abnormal skin lesions, no petechiae  Neurologic: reflexes normal and symmetric, no cranial nerve deficit  Psych:  Orientation, sensorium, mood normal            Lines: IV    Data Review:    CBC:   Lab Results   Component Value Date    WBC 14.7 (H) 09/14/2021    HGB 12.7 09/14/2021    HCT 41.2 09/14/2021    MCV 71.2 (L) 09/14/2021     09/14/2021     RENAL:   Lab Results   Component Value Date    CREATININE 0.9 09/14/2021    BUN 29 (H) 09/14/2021     09/14/2021    K 3.6 09/14/2021     09/14/2021    CO2 20 (L) 09/14/2021     SED RATE: No results found for: SEDRATE  CK: No results found for: CKTOTAL  CRP: No results found for: CRP  Hepatic Function Panel:   Lab Results   Component Value Date    ALKPHOS 203 09/10/2021    ALT 9 09/10/2021    AST 13 09/10/2021    PROT 7.7 09/10/2021    BILITOT <0.2 09/10/2021    BILIDIR <0.2 09/25/2020    IBILI see below 09/25/2020    LABALBU 3.3 09/10/2021     UA:  Lab Results   Component Value Date    COLORU YELLOW 09/10/2021    CLARITYU CLOUDY 09/10/2021    GLUCOSEU Negative 09/10/2021    BILIRUBINUR Negative 09/10/2021    KETUA Negative 09/10/2021    SPECGRAV 1.013 09/10/2021    BLOODU MODERATE 09/10/2021    PHUR 5.5 09/10/2021    PROTEINU Negative 09/10/2021    UROBILINOGEN 0.2 09/10/2021    NITRU POSITIVE 09/10/2021    LEUKOCYTESUR MODERATE 09/10/2021    LABMICR YES 09/10/2021    URINETYPE NotGiven 09/10/2021      Urine Microscopic:   Lab Results   Component Value Date    LABCAST 1-3 Cellular 05/03/2013    BACTERIA 4+ 09/10/2021    HYALCAST 1 09/10/2021    WBCUA 64 09/10/2021    RBCUA 17 09/10/2021    EPIU 0 09/10/2021     Urine Reflex to Culture:   Lab Results   Component Value Date    URRFLXCULT Yes 09/10/2021         MICRO: cultures reviewed and updated by me   Blood Culture:   Lab Results   Component Value Date    [facility]  09/10/2021     No Growth to date. Any change in status will be called. BLOODCULT2  09/10/2021     No Growth to date. Any change in status will be called.        Respiratory Culture:  Lab Results   Component Value Date    CULTRESP Normal respiratory gloria 08/15/2020    LABGRAM  08/15/2020     3+ WBC's (Polymorphonuclear)  1+ Epithelial Cells  1+ Gram positive cocci       AFB:No results found for: AFBSMEAR  Viral Culture:  Lab Results   Component Value Date    COVID19 Not Detected 09/13/2021    COVID19 NOT DETECTED 08/14/2020     Urine Culture: No results for input(s): Peter Almaguer in the last 72 hours. Urine Culture, Routine --Abnormal     >100,000 CFU/ml   CONTACT PRECAUTIONS INDICATED   Carbapenem antibiotics are the best option for infections caused   by ESBL producing organisms.  Other antibiotic classes are   likely to result in treatment failure, even for organisms   demonstrating in vitro susceptibility. Abnormal    Narrative:     ORDER#: [id number]                          ORDERED BY: YOJANA ENCINAS   SOURCE: Urine Clean Catch                  COLLECTED:  09/10/21 11:25   ANTIBIOTICS AT CARIN. :                      RECEIVED :  09/10/21 12:41   Performed at:   [facility]   [address]   Phone [phone]   Culture, Blood 1 [[id number]] Collected: 09/10/21 1018   Order Status: Completed Specimen: Blood Updated: 09/11/21 1215    Blood Culture, Routine No Growth to date.  Any change in status will be called. Narrative:     ORDER#: [id number]                          ORDERED BY: YOJANA ENCINAS   SOURCE: Blood                              COLLECTED:  09/10/21 10:18   ANTIBIOTICS AT CARIN. :                      RECEIVED :  09/10/21 10:33   If child <=2 yrs old please draw pediatric bottle. ~Blood Culture 1   Performed at:   [facility]   [address]   Phone [phone]   Culture, Blood 2 [[id number]] Collected: 09/10/21 1018   Order Status: Completed Specimen: Blood Updated: 09/11/21 1215    Culture, Blood 2 No Growth to date.  Any change in status will be called.    Narrative:     ORDER#: [id number]                          ORDERED BY: YOJANA ENCINAS   SOURCE: Blood                              COLLECTED:  09/10/21 10:18   ANTIBIOTICS AT CARIN. :                      RECEIVED :  09/10/21 10:33   If child <=2 yrs old please draw pediatric bottle. ~Blood Culture #2   Performed at:   [address] [facility] [address]   Phone [phone]   MRSA DNA Probe, Nasal [[id number]] (Abnormal) Collected: 09/10/21 1841   Order Status: Completed Specimen: Nares Updated: 09/11/21 0435    Organism Staph aureus MRSAAbnormal     MRSA SCREEN RT-PCR --Abnormal     POSITIVE for   Normal Range: Not detected   CONTACT PRECAUTIONS INDICATED   Abnormal    Narrative:     ORDER#: [id number]                          ORDERED BY: Judy Mason   SOURCE: Nares                              COLLECTED:  09/10/21 18:41   ANTIBIOTICS AT CARIN. :                      RECEIVED :  09/10/21 18:52   CALL  [place]  [id number] tel. [phone],   Microbiology results called to and read back by ronn viera, 09/11/2021   04:35, by [facility]   Performed at:   [address]   Phone [phone], Rapid [[id number]] Collected: 09/10/21 1010   Order Status: Completed Specimen: Nasopharyngeal Swab Updated: 09/10/21 1109    SARS-CoV-2, NAAT Not Detected    Comment: Rapid NAAT:   Negative results should be treated as presumptive and,   if inconsistent with clinical signs and symptoms or necessary for   patient management, should be tested with an alternative molecular   assay. Negative results do not preclude SARS-CoV-2 infection and   should not be used as the sole basis for patient management decisions. This test has been authorized by the FDA under an Emergency Use   Authorization (EUA) for use by authorized laboratories.      Fact sheet for Healthcare Providers:   BuildHer.es   Fact sheet for Patients: GigPark.Retewi.br     METHODOLOGY: Isothermal Nucleic Acid Amplification       Narrative:     Performed at:   [facility]   [address] [place] [facility] [address]   Phone [phone]         IMAGING:    CT CHEST PULMONARY EMBOLISM W CONTRAST   Final Result   1. Negative for pulmonary embolus. 2. Bibasilar bronchopneumonia.          XR CHEST PORTABLE   Final Result   Stable chest demonstrating bibasilar hypoaeration               All the pertinent images and reports for the current Hospitalization were reviewed by me     Scheduled Meds:   predniSONE  30 mg Oral Daily    Followed by   Pedro Holcomb ON 9/17/2021] predniSONE  20 mg Oral Daily    Followed by   Pedro Holcomb ON 9/20/2021] predniSONE  10 mg Oral Daily    Followed by   Pedro Holcomb ON 9/23/2021] predniSONE  5 mg Oral Daily    metoprolol tartrate  50 mg Oral BID    meropenem  500 mg IntraVENous Q6H    lidocaine 1 % injection  5 mL IntraDERmal Once    sodium chloride flush  5-40 mL IntraVENous 2 times per day    vancomycin  1,250 mg IntraVENous Q24H    sodium chloride flush  5-40 mL IntraVENous 2 times per day    enoxaparin  40 mg SubCUTAneous Daily    escitalopram  20 mg Oral Daily    budesonide-formoterol  2 puff Inhalation BID    lactobacillus  2 capsule Oral BID WC    pantoprazole  40 mg Oral Daily    atorvastatin  40 mg Oral Daily    vancomycin (VANCOCIN) intermittent dosing (placeholder)   Other RX Placeholder       Continuous Infusions:   sodium chloride 25 mL (09/14/21 0958)    sodium chloride Stopped (09/14/21 0008)       PRN Meds:  sodium chloride flush, sodium chloride, bisacodyl, hydrocortisone, metoprolol, sodium chloride flush, sodium chloride, ondansetron **OR** ondansetron, polyethylene glycol, acetaminophen **OR** acetaminophen, albuterol sulfate HFA, ipratropium-albuterol, traMADol, sennosides-docusate sodium      Assessment:     Patient Active Problem List   Diagnosis    [facility]osed left [facility] fracture, initial [facility]    Morbid obesity with BMI of 45.0-49.9, adult (HCC)    Hypoxemia requiring supplemental oxygen    Acute respiratory failure with hypoxia (HCC)    Acute on chronic respiratory failure with hypercapnia (HCC)    Acute pulmonary edema (HCC)    Multifocal pneumonia    Rapid atrial fibrillation (HCC)    Chronic obstructive pulmonary disease (HCC)    Paroxysmal atrial fibrillation (HCC)    PAT (paroxysmal atrial tachycardia) (HCC)    Kidney stone    Septic shock (HCC)    Neutrophilic leukocytosis    Morbid obesity due to excess calories (HCC)    Chronic respiratory failure with hypoxia (HCC)    Hyperlipidemia    Kidney lesion, native, left    Hyponatremia    Hyperkalemia    Moderate protein-calorie malnutrition (HCC)    Hydronephrosis with left ureteropelvic junction (UPJ) obstruction    Hematuria    Ureteral obstruction    Hydronephrosis with urinary obstruction due to ureteral calculus    DARSHAN (acute kidney injury) ([facility] [facility]    Abnormal CT scan, kidney    Essential hypertension    Acute hypercapnic respiratory failure (HCC)     Acute Hypercapnic resp failure  COPD  Was on BIPAP now weaned of  Morbid obesity BMI at 41  WBC elevation  UTI E coli ESBL  H/o Left ureteral stone and stent in Sep 2020  NH resident  CT chest with bronchopneumonia  MRSA colonization      Given MDRO infection and colonization with current presentation will need IV abx at d/c  Will check CT abd/pelvis given prior history of stones and stent placement in 2020    Labs, Microbiology, Radiology and all the pertinent results from current hospitalization and  care every where were reviewed  by me as a part of the evaluation   Plan:   1. Cont IV Vancomycin x 1250 mg Q 24 hrs  2. Cont IV Meropenem x 500 mG q 6 hrs  3. PICC line orders in place   4. Check CT ABD/PELVIS she has h/o Left hydronephrosis and stones in 2020 required Left ureteral stent placement   5.  Trend WBC now high from Prednisone use  6. Contact isolation      Discussed with patient/Family and Nursing staff   Risk of Complications/Morbidity: High      · Illness(es)/ Infection present that pose threat to bodily function. · There is potential for severe exacerbation of infection/side effects of treatment. · Therapy requires intensive monitoring for antimicrobial agent toxicity. Thanks for allowing me to participate in your patient's care and please call me with any questions or concerns.     Ayaz Reese MD  Infectious Disease  [facility]) Physician  Phone: [phone]   Fax : [phone]

## 2021-09-14 NOTE — RT PROTOCOL NOTE
RT Inhaler-Nebulizer Bronchodilator Protocol Note    There is a bronchodilator order in the chart from a provider indicating to follow the RT Bronchodilator Protocol and there is an Initiate RT Bronchodilator Protocol order as well (see protocol at bottom of note). The findings from the last RT Protocol Assessment were as follows:  Smoking: >15 Pack years  Surgical Status: No surgery  Xray: Chronic changes  Respiratory Pattern: RR 12-20  Mental Status: Alert and Oriented  Breath Sounds: Diminished and/or crackles  Cough: Strong, spontaneous, non-productive  Activity Level: Non weight bearing- transfers bed to chair only  Oxygen Requirement: Room Air - 2LNC/28% or home setting  Indication for Bronchodilator Therapy: Decreased or absent breath sounds  Bronchodilator Assessment Score: 4    Aerosolized bronchodilator medication orders have been revised according to the RT Bronchodilator Protocol. RT Inhaler-Nebulizer Bronchodilator Protocol:    Respiratory Therapist to perform RT Therapy Protocol Assessment then follow the protocol. No Indications - adjust the frequency to every 6 hours PRN wheezing or bronchospasm, if no treatments needed after 48 hours then discontinue using Per Protocol order mode. If indication present, adjust the RT bronchodilator orders based on the Bronchodilator Assessment Score as follows:    0-6 - enter or revise RT bronchodilator order to Albuterol Inhaler order with frequency of every 2 hours PRN for wheezing or increased work of breathing using Per Protocol order mode. If Albuterol Inhaler not tolerated or not effective, then discontinue the Albuterol Inhaler order and enter Albuterol Nebulizer order with same frequency and PRN reasons. Repeat RT Therapy Protocol Assessment as needed.     7-10 - discontinue any other Inpatient aerosolized bronchodilator medication orders and enter or revise two Albuterol Inhaler orders, one with BID frequency and one with frequency of every 2 hours PRN wheezing or increased work of breathing using Per Protocol order mode. Repeat RT Therapy Protocol Assessment with second treatment then BID and as needed. If Albuterol Inhaler not tolerated or not effective, then discontinue the Albuterol Inhaler orders and enter two Albuterol Nebulizer orders with same frequencies and PRN reasons. 11-13 - discontinue any other Inpatient aerosolized bronchodilator medication orders and enter DuoNeb Nebulizer orders QID frequency and an Albuterol Nebulizer order every 2 hours PRN wheezing or increased work of breathing using Per Protocol order mode. Repeat RT Therapy Protocol Assessment with second treatment then QID and as needed. Greater than 13 - discontinue any other Inpatient bronchodilator aerosolized medication orders and enter DuoNeb Nebulizer order every 4 hours frequency and Albuterol Nebulizer every 2 hours PRN wheezing or increased work of breathing using Per Protocol order mode. Repeat RT Therapy Protocol Assessment with second treatment then every 4 hours and as needed. RT to enter RT Home Evaluation for COPD & MDI Assessment order using Per Protocol order mode. Electronically signed by Sharath Kirk RCP on 9/14/2021 at 10:57 AM  RT Nebulizer Bronchodilator Protocol Note    There is a bronchodilator order in the chart from a provider indicating to follow the RT Bronchodilator Protocol and there is an Initiate RT Bronchodilator Protocol order as well (see protocol at bottom of note).     The findings from the last RT Protocol Assessment were as follows:  Smoking: >15 Pack years  Surgical Status: No surgery  Xray: Chronic changes  Respiratory Pattern: RR 12-20  Mental Status: Alert and Oriented  Breath Sounds: Diminished and/or crackles  Cough: Strong, spontaneous, non-productive  Activity Level: Non weight bearing- transfers bed to chair only  Oxygen Requirement: Room Air - 2LNC/28% or home setting  Indication for Bronchodilator Therapy: Decreased or absent breath sounds  Bronchodilator Assessment Score: 4     Bronchodilator changed to PRN-no home use. Aerosolized bronchodilator medication orders have been revised according to the RT Bronchodilator Protocol. RT Bronchodilator Protocol:    Respiratory Therapist to perform RT Therapy Protocol Assessment then follow the protocol. No Indications - adjust the frequency to every 6 hours PRN wheezing or bronchospasm, if no treatments needed after 48 hours then discontinue using Per Protocol order mode. If indication present, adjust the RT bronchodilator orders based on the Bronchodilator Assessment Score as follows:    0-6 - enter or revise RT Bronchodilator order to Albuterol Nebulizer order with frequency of every 2 hours PRN for wheezing or increased work of breathing using Per Protocol order mode. Repeat RT Therapy Protocol Assessment as needed. 7-10 - discontinue any other Inpatient aerosolized bronchodilator medication orders and enter or revise two Albuterol Nebulizer orders, one with BID frequency and one with frequency of every 2 hours PRN wheezing or increased work of breathing using Per Protocol order mode. Repeat RT Therapy Protocol Assessment with second treatment then BID and as needed. 11-13 - discontinue any other Inpatient aerosolized bronchodilator medication orders and enter DuoNeb Nebulizer order with QID frequency and an Albuterol Nebulizer order with frequency of every 2 hours PRN wheezing or increased work of breathing using Per Protocol order mode. Repeat RT Therapy Protocol Assessment with second treatment then QID and as needed. Greater than 13 - discontinue any other Inpatient aerosolized bronchodilator medication orders and enter a DuoNeb Nebulizer order with every 4 hours frequency and an Albuterol Nebulizer order with frequency of every 2 hours PRN wheezing or increased work of breathing using Per Protocol order mode.   Repeat RT Therapy Protocol Assessment with second treatment then every 4 hours and as needed. RT to enter RT Home Evaluation for COPD & MDI Assessment order using Per Protocol order mode.     Electronically signed by Bibiana Etienne RCP on 9/14/2021 at 10:57 AM

## 2021-09-14 NOTE — CARE COORDINATION
[facility] authorization received via portal:    Payor approval ID:  [id number]    Jamin Approval ID:  [id number]    Service - Location: [facility], AN AFFILIATE OF [facility]    Dates Approved:  9/13-9/15/2021    NRD:  9/15/21    Jens Randall Sr.  Administrative Assist, Case Management  [phone]  Electronically signed by Jens Randall on 9/14/2021 at 6:40 AM

## 2021-09-14 NOTE — PROGRESS NOTES
Upon arrival to place PICC line assessed chart for issues related to picc placement, check for consent, and did time out with RN Suzy Rubinstein. Pt. Tolerated PICC placement well, no difficulty accessing basilic vein and Xray technology used to verify PICC tip placement due to Afib.  Reported off to [employer]

## 2021-09-14 NOTE — PROGRESS NOTES
MIDLINE:    Upon arrival to place peripheral IV. Pt did not have anything to venipuncture so midline order received and placed. Check chart for issues related to mid line placement, check for consent, and did time out with FEMI Gilman. Pt. Tolerated placement well, no difficulty accessing left basilic vein and verified with blood return and flushed without resistance. Pt did not c/o of any numbness or tingling to extremity. RN educated on midline use and proper maintenance as well as patient. Reported off to FEMI Gilman ok to use line. Spine appears normal, range of motion is not limited, no muscle or joint tenderness

## 2021-09-14 NOTE — PROGRESS NOTES
4 Eyes Skin Assessment     NAME:  Helder Hale  YOB: 1950  MEDICAL RECORD NUMBER:  [id number]    The patient is being assess for  Admission    I agree that 2 RN's have performed a thorough Head to Toe Skin Assessment on the patient. ALL assessment sites listed below have been assessed. Areas assessed by both nurses:    Head, Face, Ears, Shoulders, Back, Chest, Arms, Elbows, Hands, Sacrum. Buttock, Coccyx, Ischium and Legs. Feet and Heels        Does the Patient have a Wound?  No noted wound(s)       Nigel Prevention initiated:  NA   Wound Care Orders initiated:  NA    Pressure Injury (Stage 3,4, Unstageable, DTI, NWPT, and Complex wounds) if present place consult order under [de-identified] NA    New and Established Ostomies if present place consult order under : No      Nurse 1 eSignature: Electronically signed by Jonelle Holm RN on 9/14/21 at 1:52 AM EDT    **SHARE this note so that the co-signing nurse is able to place an eSignature**    Nurse 2 eSignature: Electronically signed by Sam Hough RN on 9/14/21 at 2:02 AM EDT

## 2021-09-14 NOTE — PLAN OF CARE
Problem: Infection:  Goal: Will remain free from infection  Description: Will remain free from infection  9/13/2021 2243 by Inga Felder RN  Outcome: Ongoing     Problem: Safety:  Goal: Free from accidental physical injury  Description: Free from accidental physical injury  9/13/2021 2243 by Inga Felder RN  Outcome: Ongoing     Problem: Pain:  Goal: Patient's pain/discomfort is manageable  Description: Patient's pain/discomfort is manageable  9/13/2021 2243 by Inga Felder RN  Outcome: Ongoing   Pain assessed using 0-10 scale. Offer PRN medication as ordered by MD. Rafy Mccullough 30 minutes after giving. Problem: Skin Integrity:  Goal: Skin integrity will stabilize  Description: Skin integrity will stabilize  9/13/2021 2243 by Inga Felder RN  Outcome: Ongoing   Patient is able to shift weight without assistance. Reposition every two hours. Skin assessed every shift. No new area of breakdown. Skin warm and dry to touch. Waffle cushion in chair. Problem: Falls - Risk of:  Goal: Will remain free from falls  Description: Will remain free from falls  9/13/2021 2243 by Inga Felder RN  Outcome: Ongoing   Patient remained free of any falls this shift. Call light in reach at all times. Non skid footwear on. Patient encouraged to call for help when needed. Room free of clutter. Alarms on.

## 2021-09-15 ENCOUNTER — APPOINTMENT (OUTPATIENT)
Dept: CT IMAGING | Age: 71
DRG: 871 | End: 2021-09-15
Payer: MEDICARE

## 2021-09-15 LAB
ANION GAP SERPL CALCULATED.3IONS-SCNC: 8 MMOL/L (ref 3–16)
BASOPHILS ABSOLUTE: 0 K/UL (ref 0–0.2)
BASOPHILS RELATIVE PERCENT: 0.1 %
BUN BLDV-MCNC: 24 MG/DL (ref 7–20)
CALCIUM SERPL-MCNC: 8.8 MG/DL (ref 8.3–10.6)
CHLORIDE BLD-SCNC: 109 MMOL/L (ref 99–110)
CO2: 23 MMOL/L (ref 21–32)
CREAT SERPL-MCNC: 0.7 MG/DL (ref 0.6–1.2)
EOSINOPHILS ABSOLUTE: 0.3 K/UL (ref 0–0.6)
EOSINOPHILS RELATIVE PERCENT: 1.9 %
GFR AFRICAN AMERICAN: >60
GFR NON-AFRICAN AMERICAN: >60
GLUCOSE BLD-MCNC: 70 MG/DL (ref 70–99)
HCT VFR BLD CALC: 40.4 % (ref 36–48)
HEMOGLOBIN: 12.2 G/DL (ref 12–16)
LYMPHOCYTES ABSOLUTE: 1.8 K/UL (ref 1–5.1)
LYMPHOCYTES RELATIVE PERCENT: 11.9 %
MCH RBC QN AUTO: 21.7 PG (ref 26–34)
MCHC RBC AUTO-ENTMCNC: 30.2 G/DL (ref 31–36)
MCV RBC AUTO: 72 FL (ref 80–100)
MONOCYTES ABSOLUTE: 1.2 K/UL (ref 0–1.3)
MONOCYTES RELATIVE PERCENT: 8 %
NEUTROPHILS ABSOLUTE: 12 K/UL (ref 1.7–7.7)
NEUTROPHILS RELATIVE PERCENT: 78.1 %
PDW BLD-RTO: 19.4 % (ref 12.4–15.4)
PLATELET # BLD: 330 K/UL (ref 135–450)
PMV BLD AUTO: 7.7 FL (ref 5–10.5)
POTASSIUM SERPL-SCNC: 3.6 MMOL/L (ref 3.5–5.1)
RBC # BLD: 5.61 M/UL (ref 4–5.2)
SODIUM BLD-SCNC: 140 MMOL/L (ref 136–145)
WBC # BLD: 15.4 K/UL (ref 4–11)

## 2021-09-15 PROCEDURE — 97535 SELF CARE MNGMENT TRAINING: CPT

## 2021-09-15 PROCEDURE — 80048 BASIC METABOLIC PNL TOTAL CA: CPT

## 2021-09-15 PROCEDURE — 6360000002 HC RX W HCPCS: Performed by: INTERNAL MEDICINE

## 2021-09-15 PROCEDURE — 99233 SBSQ HOSP IP/OBS HIGH 50: CPT | Performed by: INTERNAL MEDICINE

## 2021-09-15 PROCEDURE — 6370000000 HC RX 637 (ALT 250 FOR IP): Performed by: INTERNAL MEDICINE

## 2021-09-15 PROCEDURE — 97530 THERAPEUTIC ACTIVITIES: CPT

## 2021-09-15 PROCEDURE — 1200000000 HC SEMI PRIVATE

## 2021-09-15 PROCEDURE — 94640 AIRWAY INHALATION TREATMENT: CPT

## 2021-09-15 PROCEDURE — 2580000003 HC RX 258: Performed by: INTERNAL MEDICINE

## 2021-09-15 PROCEDURE — 94761 N-INVAS EAR/PLS OXIMETRY MLT: CPT

## 2021-09-15 PROCEDURE — 99232 SBSQ HOSP IP/OBS MODERATE 35: CPT | Performed by: INTERNAL MEDICINE

## 2021-09-15 PROCEDURE — 85025 COMPLETE CBC W/AUTO DIFF WBC: CPT

## 2021-09-15 PROCEDURE — 2700000000 HC OXYGEN THERAPY PER DAY

## 2021-09-15 PROCEDURE — 36415 COLL VENOUS BLD VENIPUNCTURE: CPT

## 2021-09-15 PROCEDURE — 74176 CT ABD & PELVIS W/O CONTRAST: CPT

## 2021-09-15 RX ADMIN — ALBUTEROL SULFATE 2 PUFF: 90 AEROSOL, METERED RESPIRATORY (INHALATION) at 09:17

## 2021-09-15 RX ADMIN — ESCITALOPRAM OXALATE 20 MG: 10 TABLET ORAL at 08:36

## 2021-09-15 RX ADMIN — Medication 2 CAPSULE: at 08:37

## 2021-09-15 RX ADMIN — MEROPENEM 500 MG: 500 INJECTION, POWDER, FOR SOLUTION INTRAVENOUS at 15:35

## 2021-09-15 RX ADMIN — ENOXAPARIN SODIUM 40 MG: 40 INJECTION SUBCUTANEOUS at 20:32

## 2021-09-15 RX ADMIN — BUDESONIDE AND FORMOTEROL FUMARATE DIHYDRATE 2 PUFF: 160; 4.5 AEROSOL RESPIRATORY (INHALATION) at 09:15

## 2021-09-15 RX ADMIN — TRAMADOL HYDROCHLORIDE 50 MG: 50 TABLET, FILM COATED ORAL at 04:37

## 2021-09-15 RX ADMIN — SODIUM CHLORIDE, PRESERVATIVE FREE 10 ML: 5 INJECTION INTRAVENOUS at 10:30

## 2021-09-15 RX ADMIN — Medication 1250 MG: at 20:51

## 2021-09-15 RX ADMIN — SODIUM CHLORIDE 25 ML: 9 INJECTION, SOLUTION INTRAVENOUS at 20:49

## 2021-09-15 RX ADMIN — PANTOPRAZOLE SODIUM 40 MG: 40 TABLET, DELAYED RELEASE ORAL at 05:52

## 2021-09-15 RX ADMIN — MEROPENEM 500 MG: 500 INJECTION, POWDER, FOR SOLUTION INTRAVENOUS at 20:31

## 2021-09-15 RX ADMIN — Medication 10 ML: at 20:52

## 2021-09-15 RX ADMIN — MEROPENEM 500 MG: 500 INJECTION, POWDER, FOR SOLUTION INTRAVENOUS at 01:10

## 2021-09-15 RX ADMIN — BUDESONIDE AND FORMOTEROL FUMARATE DIHYDRATE 2 PUFF: 160; 4.5 AEROSOL RESPIRATORY (INHALATION) at 21:17

## 2021-09-15 RX ADMIN — METOPROLOL TARTRATE 50 MG: 50 TABLET, FILM COATED ORAL at 20:53

## 2021-09-15 RX ADMIN — SODIUM CHLORIDE 25 ML: 9 INJECTION, SOLUTION INTRAVENOUS at 01:09

## 2021-09-15 RX ADMIN — METOPROLOL TARTRATE 50 MG: 50 TABLET, FILM COATED ORAL at 08:36

## 2021-09-15 RX ADMIN — MEROPENEM 500 MG: 500 INJECTION, POWDER, FOR SOLUTION INTRAVENOUS at 10:36

## 2021-09-15 RX ADMIN — ATORVASTATIN CALCIUM 40 MG: 40 TABLET, FILM COATED ORAL at 08:36

## 2021-09-15 RX ADMIN — Medication 2 CAPSULE: at 17:45

## 2021-09-15 RX ADMIN — PREDNISONE 30 MG: 20 TABLET ORAL at 08:36

## 2021-09-15 RX ADMIN — SODIUM CHLORIDE 25 ML: 900 INJECTION INTRAVENOUS at 20:28

## 2021-09-15 RX ADMIN — SODIUM CHLORIDE, PRESERVATIVE FREE 10 ML: 5 INJECTION INTRAVENOUS at 20:51

## 2021-09-15 ASSESSMENT — PAIN DESCRIPTION - LOCATION: LOCATION: BACK

## 2021-09-15 ASSESSMENT — PAIN SCALES - GENERAL
PAINLEVEL_OUTOF10: 0
PAINLEVEL_OUTOF10: 8

## 2021-09-15 ASSESSMENT — PAIN DESCRIPTION - DIRECTION: RADIATING_TOWARDS: SHOULDER

## 2021-09-15 ASSESSMENT — PAIN DESCRIPTION - ONSET: ONSET: ON-GOING

## 2021-09-15 ASSESSMENT — PAIN DESCRIPTION - FREQUENCY: FREQUENCY: CONTINUOUS

## 2021-09-15 ASSESSMENT — PAIN DESCRIPTION - DESCRIPTORS: DESCRIPTORS: ACHING;DISCOMFORT

## 2021-09-15 ASSESSMENT — PAIN DESCRIPTION - PAIN TYPE: TYPE: CHRONIC PAIN

## 2021-09-15 NOTE — PROGRESS NOTES
Occupational Therapy  Facility/Department: [address] [facility]  Daily Treatment Note  NAME: Dejuan Agudelo  : 1950  MRN: [id number]    Date of Service: 9/15/2021    Discharge Recommendations:  ECF with OT, 3-5 sessions per week, Patient would benefit from continued therapy after discharge     Dejuan Agudelo scored a 12/24 on the AM-PAC ADL Inpatient form. Current research shows that an AM-PAC score of 17 or less is typically not associated with a discharge to the patient's home setting. Based on the patient's AM-PAC score and their current ADL deficits, it is recommended that the patient have 3-5 sessions per week of Occupational Therapy at d/c to increase the patient's independence. Please see assessment section for further patient specific details. If patient discharges prior to next session this note will serve as a discharge summary. Please see below for the latest assessment towards goals. Assessment   Performance deficits / Impairments: Decreased functional mobility ; Decreased endurance;Decreased ADL status; Decreased ROM; Decreased balance;Decreased strength  Assessment: Pt tolerated tx session fair. She continues to require mod A for bed mobility, mod Ax2 for sit<>stand to yasmine montejo from elevated bed, and unable to tolerate further activity after bed > chair transfer. Pt completed seated grooming w/ setup. No complaints of pain this date but limited by decreased endurance. Continue to recommend low-mod frequency therapy upon d/c to increase pt's safety and independence and decrease burden of care. Prognosis: Fair  OT Education: OT Role;Plan of Care;Transfer Training;Energy Conservation  REQUIRES OT FOLLOW UP: Yes  Activity Tolerance  Activity Tolerance: Patient Tolerated treatment well;Patient limited by fatigue  Safety Devices  Safety Devices in place: Yes  Type of devices: Left in chair;Call light within reach;Nurse notified; Chair alarm in place;Gait belt         Patient Diagnosis(es): The Limits  Objective    ADL  Grooming: Setup (pt washed face w/ prepared cloth seated in recliner)  Toileting: Dependent/Total (goff catheter)        Balance  Sitting Balance: Stand by assistance  Standing Balance: Moderate assistance (mod Ax2 in yasmine stedy)  Functional Mobility  Functional - Mobility Device:  (yasmine stedy)  Activity: Other  Assist Level: Dependent/Total  Functional Mobility Comments: Pt required assist x2 w/ use of yasmine stedy for safe mobility from bed > chair  Bed mobility  Supine to Sit: Moderate assistance  Sit to Supine: Unable to assess (nt--pt up in recliner at end of session.)  Scooting: Moderate assistance  Transfers  Sit to stand: Moderate assistance;2 Person assistance  Stand to sit: Moderate assistance;2 Person assistance  Transfer Comments: from elevated bed <> yasmine stedy.  Maxi move lift pad under pt in the chair                       Cognition  Overall Cognitive Status: [facility]  Cognition Comment: easily anxious            Plan   Plan  Times per week: 3-5  Times per day: Daily  Current Treatment Recommendations: Strengthening, ROM, Safety Education & Training, Endurance Training, Self-Care / ADL, Equipment Evaluation, Education, & procurement, Patient/Caregiver Education & Training    AM-PAC Score  AM-[facility] Inpatient Daily Activity Raw Score: 12 (09/15/21 0948)  -PAC Inpatient ADL T-Scale Score : 30.6 (09/15/21 0948)  ADL Inpatient CMS 0-100% Score: 66.57 (09/15/21 0948)  ADL Inpatient CMS G-Code Modifier : CL (09/15/21 7273)    Goals  Short term goals  Time Frame for Short term goals: By d/c:  9-13-21-goals ongoing  Short term goal 1: Pt will tolerate sitting EOB 10+ min with supv to complete grooming/feeding task  Short term goal 2: Pt will tolerate 10-15 minutes of UE therex to improve independence and strength with transfers  Short term goal 3: Pt will complete stand-pivot transfer to/from ADL surfaces with min A x 2 with AD  Patient Goals   Patient goals : \"to get stronger\"       Therapy Time   Individual Concurrent Group Co-treatment   Time In [address]         Time Out 0948         Minutes [address] [facility], [address] [place] [address]

## 2021-09-15 NOTE — PROGRESS NOTES
Hospitalist Progress Note      PCP: Grady Mora MD    Date of Admission: 9/10/2021    Subjective: denies any complaints    Medications:  Reviewed    Infusion Medications    sodium chloride 25 mL (09/14/21 2057)    sodium chloride 25 mL (09/15/21 0109)     Scheduled Medications    predniSONE  30 mg Oral Daily    Followed by   Key Contreras ON 9/17/2021] predniSONE  20 mg Oral Daily    Followed by   Key Contreras ON 9/20/2021] predniSONE  10 mg Oral Daily    Followed by   Key Contreras ON 9/23/2021] predniSONE  5 mg Oral Daily    metoprolol tartrate  50 mg Oral BID    meropenem  500 mg IntraVENous Q6H    lidocaine 1 % injection  5 mL IntraDERmal Once    sodium chloride flush  5-40 mL IntraVENous 2 times per day    vancomycin  1,250 mg IntraVENous Q24H    sodium chloride flush  5-40 mL IntraVENous 2 times per day    enoxaparin  40 mg SubCUTAneous Daily    escitalopram  20 mg Oral Daily    budesonide-formoterol  2 puff Inhalation BID    lactobacillus  2 capsule Oral BID WC    pantoprazole  40 mg Oral Daily    atorvastatin  40 mg Oral Daily    vancomycin (VANCOCIN) intermittent dosing (placeholder)   Other RX Placeholder     PRN Meds: sodium chloride flush, sodium chloride, bisacodyl, hydrocortisone, metoprolol, sodium chloride flush, sodium chloride, ondansetron **OR** ondansetron, polyethylene glycol, acetaminophen **OR** acetaminophen, albuterol sulfate HFA, ipratropium-albuterol, traMADol, sennosides-docusate sodium      Intake/Output Summary (Last 24 hours) at 9/15/2021 1638  Last data filed at 9/15/2021 1247  Gross per 24 hour   Intake 240 ml   Output 725 ml   Net -485 ml       Physical Exam Performed:    /79   Pulse 103   Temp 97.9 °F (36.6 °C) (Oral)   Resp 16   Ht 5' 5\" (1.651 m)   Wt 247 lb 2.2 oz (112.1 kg)   SpO2 95%   BMI 41.13 kg/m²        General appearance: awake  HEENT Normal cephalic, atraumatic without obvious deformity.  Pupils equal, round, and reactive to light. lobe consolidation-postobstructive change. Trace   pleural fluid is seen on the left and right. Persistent but decreased left-sided hydronephrosis. Masslike hyperdensity   remains in the left renal pelvis which could represent proteinaceous debris,   hemorrhage or possibly mass. Obstructing stone in the proximal left ureter   is unchanged. Subtle injection of the fat is seen surrounding the bladder. Recommend   correlate with urinalysis to exclude underlying infection      Stranding in the medial subcutaneous fat of the left gluteal region. Correlate for any overlying cellulitis or history of trauma         XR CHEST PORTABLE   Final Result   No acute process. CT CHEST PULMONARY EMBOLISM W CONTRAST   Final Result   1. Negative for pulmonary embolus. 2. Bibasilar bronchopneumonia. XR CHEST PORTABLE   Final Result   Stable chest demonstrating bibasilar hypoaeration                 Assessment/Plan:    Active Hospital Problems    Diagnosis     Acute hypercapnic respiratory failure (HCC) [J96.02]          Acute hypoxic/hypercapnic resp failure - ABG reviewed, pt on admit agreeable to BIPAP, did not want intubation. Treated with BIPAP on admit - but later pt refused BIPAP, pulm following - pt currently DNR/DNI. Placed on 3L O2, wean as tolerated     Bronchopneumonia - suspect HCAP given [facility] resident, probable gram neg PNA, cannot r/o MRSA PNA. Started on IV cefepime/vanco-->switched cefepime to merrem. Check COVID neg, check strep ag/legionella/sputum cx. pulm consulted. MRSA swab positive. ID consulted     A fib with RVR - started on metoprolol PO-->increased dose, added IV prn BB    Left renal calculi - CT abd reviewed, urology consulted - appr recs. outpt f/u with urology at [facility]     Acute COPD exacerbation - started on neb/inhaler/solumedrol/abx, pulm consulted. Switched steroids to PO pred     UTI - UA abnormal, cont IV abx, await urine cx with e coli - ESBL.  Consulted ID, switched cefepime to merrem. Getting PICC Line placed     Hypokalemia - replete           DVT Prophylaxis: lovenox  Diet: ADULT DIET;  Regular  Code Status: DNR-CC    PT/OT Eval Status: ordered    Dispo - dc in am with IV abx    Joann Breen MD

## 2021-09-15 NOTE — DISCHARGE INSTR - COC
Continuity of Care Form    Patient Name: Murtaza Ruvalcaba   :  1950  MRN:  [id number]    Admit date:  9/10/2021  Discharge date:  2021    Code Status Order: DNR-CC   Advance Directives:      Admitting Physician:  Natalee Herrera MD  PCP: Chance Acosta MD    Discharging Nurse: Daljit Unit/Room#: A1W-1005/6792-57  Discharging Unit Phone Number: [phone]    Emergency Contact:   Extended Emergency Contact Information  Primary Emergency Contact: Abrahan Ewing  Address: [address], [address] [place] [address],[address] Phone: [phone]  Work Phone: [phone]  Relation: Spouse    Past Surgical History:  Past Surgical History:   Procedure Laterality Date    CHOLECYSTECTOMY      COLONOSCOPY N/A 2021    COLONOSCOPY POLYPECTOMY ABLATION with clip placement performed by Tiara Moreno MD at [address] [facility] [facility] Left 2020    CYSTOSCOPY URETERAL STENT INSERTION LEFT RETROGRADE PYELOGRAM performed by Amrik Murillo MD at [address] [place] [address] Left 8/10/2020    OPEN TREATMENT OF LEFT HIP FRACTURE WITH CEPHALOMEDULLARY NAIL performed by Philemon Felty, MD at [address] [place] [address] 3/2/2021    SIGMOIDOSCOPY FLEXIBLE POLYPECTOMY with clip placement performed by Tiara Moreno MD at [address] [facility] 2020    EGD DIAGNOSTIC ONLY performed by Tiara Moreno MD at [facility] ENDOSCOPY       Immunization History: There is no immunization history on file for this patient.     Active Problems:  Patient Active Problem List   Diagnosis Code    Closed left hip fracture, initial encounter ([facility] S72.002A    Morbid obesity with BMI of 45.0-49.9, adult ([facility]) E66.01, Z68.42    Hypoxemia requiring supplemental oxygen R09.02, Z99.81    Acute respiratory failure with hypoxia ([facility]) J96.01    Acute on chronic respiratory failure with hypercapnia ([facility]) J96.22    Acute pulmonary edema ([facility] J81.0    Multifocal pneumonia J18.9    Rapid atrial fibrillation (HCC) I48.91    Chronic obstructive pulmonary disease ([facility]) J44.9    Paroxysmal atrial fibrillation ([facility]) I48.0    PAT (paroxysmal atrial tachycardia) ([facility]) I47.1    Kidney stone N20.0    Septic shock ([facility]) A41.9, W53.18    Neutrophilic leukocytosis O12.3    Morbid obesity due to excess calories ([facility]) E66.01    Chronic respiratory failure with hypoxia ([facility]) J96.11    Hyperlipidemia E78.5    Kidney lesion, native, left N28.9    Hyponatremia E87.1    Hyperkalemia E87.5    Moderate protein-calorie malnutrition ([facility]) E44.0    Hydronephrosis with left ureteropelvic junction (UPJ) obstruction Q62.11    Hematuria R31.9    Ureteral obstruction N13.5    Hydronephrosis with urinary obstruction due to ureteral calculus N13.2    DARSHAN (acute kidney injury) ([facility] N17.9    Abnormal CT scan, kidney R93.429    Essential hypertension I10    Acute hypercapnic respiratory failure ([facility]) J96.02       Isolation/Infection:   Isolation            Contact          Patient Infection Status       Infection Onset Added Last Indicated Last Indicated By Review Planned Expiration Resolved Resolved By    ESBL (Extended Spectrum Beta Lactamase) 09/10/21 09/13/21 09/10/21 Culture, Urine        MRSA 09/10/21 09/11/21 09/10/21 MRSA DNA Probe, Nasal        Resolved    COVID-19 Rule Out 09/13/21 09/13/21 09/13/21 COVID-19 (Ordered)   09/13/21 Rule-Out Test Resulted    COVID-19 Rule Out 09/10/21 09/10/21 09/10/21 COVID-19, Rapid (Ordered)   09/10/21 Rule-Out Test Resulted    COVID-19 Rule Out 09/24/20 09/24/20 09/24/20 COVID-19 (Ordered)   09/24/20 Rule-Out Test Resulted    COVID-19 Rule Out 09/18/20 09/18/20 09/18/20 COVID-19 (Ordered)   09/18/20 Rule-Out Test Resulted    COVID-19 Rule Out 08/14/20 08/14/20 08/14/20 COVID-19 (Ordered)   08/15/20 Rule-Out Test Resulted    COVID-19 Rule Out 08/06/20 08/06/20 08/06/20 COVID-19 (Ordered)   08/06/20 Rule-Out Test Resulted            Nurse Assessment:  Last Vital Signs: /69   Pulse 96   Temp 97.9 °F (36.6 °C) (Oral)   Resp 20   Ht 5' 5\" (1.651 m)   Wt 247 lb 2.2 oz (112.1 kg)   SpO2 95%   BMI 41.13 kg/m²     Last documented pain score (0-10 scale): Pain Level: 8  Last Weight:   Wt Readings from Last 1 Encounters:   09/15/21 247 lb 2.2 oz (112.1 kg)     Mental Status:  oriented and alert    IV Access:  - PICC - site  R Cephalic, insertion date: 9/14/2021    Nursing Mobility/ADLs:  Walking   Dependent  Transfer  Dependent  Bathing  Dependent  Dressing  Dependent  Toileting  Dependent  Feeding  Independent  Med Admin  Dependent  Med Delivery   whole    Wound Care Documentation and Therapy:        Elimination:  Continence:   · Bowel: No  · Bladder: N/A  Urinary Catheter: Insertion Date: 9/10/2021   Colostomy/Ileostomy/Ileal Conduit: No       Date of Last BM: 9/14/2021    Intake/Output Summary (Last 24 hours) at 9/15/2021 1338  Last data filed at 9/15/2021 1247  Gross per 24 hour   Intake 240 ml   Output 725 ml   Net -485 ml     I/O last 3 completed shifts: In: 0   Out: 875 [Urine:875]    Safety Concerns: At Risk for Falls    Impairments/Disabilities:      None    Nutrition Therapy:  Current Nutrition Therapy:   - Oral Diet:  General    Routes of Feeding: Oral  Liquids: Thin Liquids  Daily Fluid Restriction: no  Last Modified Barium Swallow with Video (Video Swallowing Test): not done    Treatments at the Time of Hospital Discharge:   Respiratory Treatments: inhalers  Oxygen Therapy:  is on oxygen at 1 L/min per nasal cannula. Ventilator:    - No ventilator support    Rehab Therapies: N/A  Weight Bearing Status/Restrictions: No weight bearing restirctions  Other Medical Equipment (for information only, NOT a DME order):  wheelchair  Other Treatments: N/A    Patient's personal belongings (please select all that are sent with patient): All personal belongings.      RN SIGNATURE:  Electronically signed by Stephen Alves [facility], RN on 9/16/21 at 12:15 PM EDT    CASE MANAGEMENT/SOCIAL WORK SECTION    Inpatient Status Date: 9/10/21    Readmission Risk Assessment Score:  Readmission Risk              Risk of Unplanned Readmission:  15           Discharging to Facility/ Agency   · Name: [facility]  · Address:  · Phone:[phone]  · Fax:    Dialysis Facility (if applicable)   · Name:  · Address:  · Dialysis Schedule:  · Phone:  · Fax:    / signature: Electronically signed by MIC Steele on 9/16/21 at 12:41 PM EDT    PHYSICIAN SECTION    Prognosis: Fair    Condition at Discharge: Stable    Rehab Potential (if transferring to Rehab): Fair    Recommended Labs or Other Treatments After Discharge:   IV Ertapenem x 1 gm xq 24 hr x stop date x 9/24  CBC with diff, BMP weekly  Fax results to  [phone]  PICC line in place  No ID follow up   First dose given in hospital     [address] [facility]  Phone: [phone]   Fax : [phone]       Physician Certification: I certify the above information and transfer of Isacc Hickey  is necessary for the continuing treatment of the diagnosis listed and that she requires Home Care for less 30 days.      Update Admission H&P: Changes in H&P as follows - refer to dc summary    PHYSICIAN SIGNATURE:  Electronically signed by Rick Zaragoza MD on 9/16/21 at 11:25 AM EDT

## 2021-09-15 NOTE — PROGRESS NOTES
Patient admitted with SOB and PNA. Alert/oriented. Takes medications whole with thins with no complications. Grace catheter in place draining clear yellow urine. She has been bed bound. In isolation for MRSA in nares. Removed peripheral IV as PICC line in right arm has been verified. Incontinent of stool. Patient is on tele with continuous pulse ox. During this shift patient's 02 has been dropping to the 70's while she is sleeping. Easily comes back up to the 90's when calling her name or arousing her. This has happened several times. Applied 02 at 2L however patient is a mouth breather. Refused to be repositioned at first, but was able to convince her to let us reposition her to try and aid with her breathing. Reapplied continuous pulse ox to forehead; currently at 96%. Contacted hospitalist; no new orders. Call light within reach, safety precautions in place.

## 2021-09-15 NOTE — PROGRESS NOTES
Pulmonary Progress Note     Patient's name:  [facility] Record Number: [id number]  Patient's account/billing number: [de-identified]  Patient's YOB: 1950  Age: 73 y.o. Date of Admission: 9/10/2021  9:18 AM  Date of Consult: 9/15/2021      Primary Care Physician: Steven Rene MD      Code Status: [facility]    Chief complaint: acute respiratory failure with hypoxia     Assessment and Plan     1. Acute respiratory failure with hypoxia and hypercapnia  2. Copd  3. Obesity   4. UTI  5. Tobacco abuse        Plan:  IS  Wean O2 if sat > 88%, home O2 evaluation  Refused bipap   Advised to quit smoking   Bronchodilators       Overnight:  No acute events overnight  O2 At 2L    REVIEW OF SYSTEMS:  Review of Systems -   General ROS: negative  Psychological ROS: negative  Ophthalmic ROS: negative  ENT ROS: negative  Allergy and Immunology ROS: negative  Hematological and Lymphatic ROS: negative  Endocrine ROS: negative  Breast ROS: negative  Respiratory ROS: sob    Cardiovascular ROS: no chest pain or dyspnea on exertion  Gastrointestinal ROS:negative  Genito-Urinary ROS: negative  Musculoskeletal ROS: negative  Neurological ROS: negative  Dermatological ROS: negative        Physical Exam:    Vitals: /69   Pulse 96   Temp 97.9 °F (36.6 °C) (Oral)   Resp 20   Ht 5' 5\" (1.651 m)   Wt 247 lb 2.2 oz (112.1 kg)   SpO2 95%   BMI 41.13 kg/m²     Last Body weight:   Wt Readings from Last 3 Encounters:   09/15/21 247 lb 2.2 oz (112.1 kg)   04/06/21 283 lb (128.4 kg)   03/02/21 288 lb (130.6 kg)       Body Mass Index : Body mass index is 41.13 kg/m². Intake and Output summary:     Intake/Output Summary (Last 24 hours) at 9/15/2021 1124  Last data filed at 9/15/2021 0810  Gross per 24 hour   Intake 120 ml   Output 1075 ml   Net -955 ml       Physical Examination:     Gen:  No acute distress. Eyes: PERRL. Anicteric sclera. No conjunctival injection. ENT: No discharge.  Posterior oropharynx clear. External appearance of ears and nose normal.  Neck: Trachea midline. No mass   Resp:  Diminished at the bases no wheezing no rhonchi   CV: Regular rate. Regular rhythm. No murmur or rub. + edema. GI: Soft, Non-tender. Non-distended. +BS  Skin: Warm, dry, w/o erythema. Lymph: No cervical or supraclavicular LAD. M/S: No cyanosis. No clubbing. Neuro:  CN 2-12 tested, no focal neurologic deficit. Moves all extremities  Psych: Awake and alert, Oriented x 3. Judgement and insight appropriate. Mood stable. Laboratory findings:-    CBC:   Recent Labs     09/15/21  0610   WBC 15.4*   HGB 12.2        BMP:    Recent Labs     09/13/21  1247 09/13/21  1915 09/14/21  0721 09/14/21  0721 09/15/21  0610   NA   < >  --  139   < > 140   K   < >  --  3.6   < > 3.6   CL   < >  --  107   < > 109   CO2   < >  --  20*   < > 23   BUN   < >  --  29*   < > 24*   CREATININE  --  1.0 0.9  --  0.7   GLUCOSE   < >  --  77   < > 70    < > = values in this interval not displayed. S. Calcium:  Recent Labs     09/15/21  0610   CALCIUM 8.8     Pancreatic functions:  No results for input(s): LACTA, AMYLASE in the last 72 hours. S. Lactic Acid:   No results for input(s): LACTA in the last 72 hours.                  Jordon Reyna MD, M.D.            9/15/2021, 11:24 AM

## 2021-09-15 NOTE — CONSULTS
LEFT HIP FRACTURE WITH CEPHALOMEDULLARY NAIL performed by Blu Weaver MD at [address] N/A 3/2/2021    SIGMOIDOSCOPY FLEXIBLE POLYPECTOMY with clip placement performed by Loly Juárez MD at [facility] N/A 9/24/2020    EGD DIAGNOSTIC ONLY performed by Loly Juárez MD at [facility] History:  Social History     Socioeconomic History    Marital status:      Spouse name: Not on file    Number of children: Not on file    Years of education: Not on file    Highest education level: Not on file   Occupational History    Not on file   Tobacco Use    Smoking status: Former Smoker     Packs/day: 1.50     Types: Cigarettes    Smokeless tobacco: Never Used    Tobacco comment: pack n half a day    Vaping Use    Vaping Use: Never used   Substance and Sexual Activity    Alcohol use: Never    Drug use: Yes     Types: Marijuana     Comment: occasional     Sexual activity: Not on file   Other Topics Concern    Not on file   Social History Narrative    Not on file     Social Determinants of Health     Financial Resource Strain:     Difficulty of Paying Living Expenses:    Food Insecurity:     Worried About Running Out of Food in the Last Year:     Ran Out of Food in the Last Year:    Transportation Needs:     Lack of Transportation (Medical):      Lack of Transportation (Non-Medical):    Physical Activity:     Days of Exercise per Week:     Minutes of Exercise per Session:    Stress:     Feeling of Stress :    Social Connections:     Frequency of Communication with Friends and Family:     Frequency of Social Gatherings with Friends and Family:     Attends Scientologist Services:     Active Member of Clubs or Organizations:     Attends Club or Organization Meetings:     Marital Status:    Intimate Partner Violence:     Fear of Current or Ex-Partner:     Emotionally Abused:     Physically Abused:     Sexually Abused: 160-4.5 MCG/ACT inhaler 2 puff, 2 puff, Inhalation, BID  ipratropium-albuterol (DUONEB) nebulizer solution 3 mL, 3 mL, Inhalation, Q2H PRN  lactobacillus (CULTURELLE) capsule 2 capsule, 2 capsule, Oral, BID WC  pantoprazole (PROTONIX) tablet 40 mg, 40 mg, Oral, Daily  traMADol (ULTRAM) tablet 50 mg, 50 mg, Oral, Q6H PRN  atorvastatin (LIPITOR) tablet 40 mg, 40 mg, Oral, Daily  vancomycin (VANCOCIN) intermittent dosing (placeholder), , Other, RX Placeholder  sennosides-docusate sodium (SENOKOT-S) 8.6-50 MG tablet 2 tablet, 2 tablet, Oral, Daily PRN    Vitals:  /69   Pulse 96   Temp 97.9 °F (36.6 °C) (Oral)   Resp 20   Ht 5' 5\" (1.651 m)   Wt 247 lb 2.2 oz (112.1 kg)   SpO2 95%   BMI 41.13 kg/m²     Intake/Output Summary (Last 24 hours) at 9/15/2021 1243  Last data filed at 9/15/2021 0810  Gross per 24 hour   Intake 120 ml   Output 1075 ml   Net -955 ml       Review of Systems:  10 Systems were reviewed and negative except as in HPI      Physical Exam:  General Appearance: Alert and oriented, cooperative, no distress, appears stated age  Head: Normocephalic, without obvious abnormality, atraumatic  Back: no CVA tenderness  Lungs: respirations unlabored, no wheezing, NC  Heart: Regular rate and rhythm, no lower extremity edema noted  Abdomen: Soft, non-tender, non-distended, no masses  Skin: Skin color, texture, turgor normal, no rashes or lesions  Neurologic: no gross deficits  Female :    Bladder is non tender   No CVA tenderness   Grace catheter intact with clear/yellow output   Pelvic Exam Not Indicated    Labs:  CBC   Lab Results   Component Value Date    WBC 15.4 09/15/2021    RBC 5.61 09/15/2021    HGB 12.2 09/15/2021    HCT 40.4 09/15/2021    MCV 72.0 09/15/2021    MCH 21.7 09/15/2021    MCHC 30.2 09/15/2021    RDW 19.4 09/15/2021     09/15/2021    MPV 7.7 09/15/2021     BMP   Lab Results   Component Value Date     09/15/2021    K 3.6 09/15/2021    K 4.3 09/11/2021     09/15/2021    CO2 23 09/15/2021    BUN 24 09/15/2021    CREATININE 0.7 09/15/2021    GLUCOSE 70 09/15/2021    CALCIUM 8.8 09/15/2021       Urinalysis:   Lab Results   Component Value Date    COLORU YELLOW 09/10/2021    GLUCOSEU Negative 09/10/2021    BLOODU MODERATE 09/10/2021    NITRU POSITIVE 09/10/2021    LEUKOCYTESUR MODERATE 09/10/2021       Imaging: Pertinent images and radiologist's report were reviewed independently  CT abdomen/pelvis 9/15/21  Impression   Filling defects left lower lobe airways, most commonly mucous plugging, with   increased left lower lobe consolidation-postobstructive change.  Trace   pleural fluid is seen on the left and right.       Persistent but decreased left-sided hydronephrosis.  Masslike hyperdensity   remains in the left renal pelvis which could represent proteinaceous debris,   hemorrhage or possibly mass.  Obstructing stone in the proximal left ureter   is unchanged.       Subtle injection of the fat is seen surrounding the bladder.  Recommend   correlate with urinalysis to exclude underlying infection       Stranding in the medial subcutaneous fat of the left gluteal region. Correlate for any overlying cellulitis or history of trauma       Impression/Plan:   - 71y. o. female admitted with shortness of breath, cough. Consulted for left obstructing proximal ureteral stone which has been present since 9/2020. Left retrograde pyelogram makes no mention of obstruction to the left. - Kidney function normal, no pain. No  intervention necessary at this time. - Follow up with  urology for further intervention in 1-2 weeks. - UTI- ESBL E. Coli, continue antibiotics per ID.  - Exchange goff catheter monthly.     GABRIELA Chenug - CNP 9/30/015730:70 PM

## 2021-09-15 NOTE — PLAN OF CARE
Problem: Skin Integrity:  Goal: Skin integrity will stabilize  Description: Skin integrity will stabilize  9/15/2021 0243 by Muriel Hare RN  Outcome: Ongoing   Patient is able to shift weight without assistance. Reposition every two hours. Skin assessed every shift. No new area of breakdown. Skin warm and dry to touch. Waffle cushion in chair. Problem: Safety:  Goal: Free from accidental physical injury  Description: Free from accidental physical injury  9/15/2021 0243 by Muriel Hare RN  Outcome: Ongoing     Problem: Pain:  Goal: Patient's pain/discomfort is manageable  Description: Patient's pain/discomfort is manageable  9/15/2021 0317 by Muriel Hare RN  Outcome: Ongoing   Pain assessed using 0-10 scale. Offer PRN medication as ordered by MD.  Sharan Mess 30 minutes after giving.

## 2021-09-15 NOTE — PLAN OF CARE
Problem: Infection:  Goal: Will remain free from infection  Description: Will remain free from infection  9/15/2021 0243 by Jerone Cushing, RN  Outcome: Ongoing     Problem: Safety:  Goal: Free from accidental physical injury  Description: Free from accidental physical injury  9/15/2021 0243 by Jerone Cushing, RN  Outcome: Ongoing     Problem: Pain:  Goal: Patient's pain/discomfort is manageable  Description: Patient's pain/discomfort is manageable  9/15/2021 0243 by Jerone Cushing, RN  Outcome: Ongoing   Pain assessed using 0-10 scale. Offer PRN medication as ordered by MD. Ary Matson 30 minutes after giving. Problem: Skin Integrity:  Goal: Will show no infection signs and symptoms  Description: Will show no infection signs and symptoms  9/15/2021 0243 by Jerone Cushing, RN  Outcome: Ongoing   Patient is able to shift weight without assistance. Reposition every two hours. Skin assessed every shift. No new area of breakdown. Skin warm and dry to touch. Waffle cushion in chair. Problem: Falls - Risk of:  Goal: Will remain free from falls  Description: Will remain free from falls  9/15/2021 0243 by Jerone Cushing, RN  Outcome: Ongoing   Patient remained free of any falls this shift. Call light in reach at all times. Non skid footwear on. Patient encouraged to call for help when needed. Room free of clutter. Alarms on.

## 2021-09-15 NOTE — PROGRESS NOTES
Infectious Disease Follow up Notes  Admit Date: 9/10/2021  Hospital Day: 6    Antibiotics :   IV Meropen  IV Vancomycin      CHIEF  COMPLAINT:     UTI  ESBL  Pneumonia  Left hydronephrosis      Subjective interval History :  73 y.o. woman from [place] morbid obesity, poor mobility admitted with SOB for 2-3 days, cough +  to hospital was in resp distress required BIPAP support for hypercapnic resp failure and she has h/o COPD on admit WBC at  15.4 and Lactic acid 1.9, MRSA probe +Ve and UA  Very abnormal urine cx ESBL E coli isolated, Blood cx negative. CT chest with Broncho pnuemonia on admit. She was placed on IV Vancomycin and IV Cefepime but now with ESBL it was changed to IV Meropenem and we are consulted for recommendations. She is on prednisone taper as well. Interval History : cough + sob + using nasal cannula intermittently seen by Urology and no intervention planned she had Cysto and stent removal at  per care every where - PICC placed for IV abx       Past Medical History:    Past Medical History:   Diagnosis Date    Acute [facility] failure ([facility]     Chronic kidney disease     COPD (chronic obstructive pulmonary disease) ([facility]     ESBL (extended spectrum beta-lactamase) producing bacteria infection 09/10/2021    Escherichia coli ESBL.  Urine    Hyperkalemia     Hyperlipidemia     Hypertension     Hyponatremia     Kidney stone     Major depression     Melena     MRSA nasal colonization 09/10/2021    Obesity     Oxygen deficit     2 liters     PAF (paroxysmal atrial fibrillation) (HCC)     Sepsis ([facility]        Past Surgical History:    Past Surgical History:   Procedure Laterality Date    CHOLECYSTECTOMY      COLONOSCOPY N/A 4/6/2021    COLONOSCOPY POLYPECTOMY ABLATION with clip placement performed by Isiah Jaramillo MD at [address] [facility] Left 9/19/2020    CYSTOSCOPY URETERAL STENT INSERTION LEFT RETROGRADE PYELOGRAM performed by Jesse Sanchez MD at [address] Left 8/10/2020    OPEN TREATMENT OF LEFT HIP FRACTURE WITH CEPHALOMEDULLARY NAIL performed by Alice Renteria MD at [address] [place] [address] 3/2/2021    SIGMOIDOSCOPY FLEXIBLE POLYPECTOMY with clip placement performed by Guy Love MD at [address] [facility] 9/24/2020    EGD DIAGNOSTIC ONLY performed by Guy Love MD at [address] [facility]       Current Medications:    Outpatient Medications Marked as Taking for the 9/10/21 encounter [facility] Encounter)   Medication Sig Dispense Refill    diphenhydrAMINE (BENADRYL) 25 MG tablet Take 25 mg by mouth every 6 hours as needed for Itching      cyclobenzaprine (FLEXERIL) 10 MG tablet Take 10 mg by mouth every 6 hours as needed for Muscle spasms      DULoxetine (CYMBALTA) 60 MG extended release capsule Take 60 mg by mouth daily      ibuprofen (ADVIL;MOTRIN) 600 MG tablet Take 600 mg by mouth every 6 hours as needed for Pain      traMADol (ULTRAM) 50 MG tablet Take 100 mg by mouth every 6 hours as needed for Pain.  acetaminophen (TYLENOL) 325 MG tablet Take 650 mg by mouth every 6 hours as needed for Pain or Fever       hydrocortisone (ANUSOL-HC) 25 MG suppository Place 25 mg rectally every 6 hours as needed for Hemorrhoids       bisacodyl (DULCOLAX) 10 MG suppository Place 10 mg rectally daily as needed for Constipation       magnesium hydroxide (MILK OF MAGNESIA) 400 MG/5ML suspension Take 30 mLs by mouth daily as needed for Constipation       polyethylene glycol (GLYCOLAX) 17 g packet Take 17 g by mouth daily as needed for Constipation      nystatin (MYCOSTATIN) 162802 UNIT/GM powder Apply topically 4 times daily Apply topically 4 times daily.       sennosides-docusate sodium (SENOKOT-S) 8.6-50 MG tablet Take 2 tablets by mouth every 12 hours as needed for Constipation       lactobacillus (CULTURELLE) capsule Take 2 capsules by mouth 2 times daily (with meals) 30 capsule 0    pantoprazole (PROTONIX) 40 MG tablet Take 1 tablet by mouth daily 30 tablet 0    acetaZOLAMIDE (DIAMOX) 250 MG tablet Take 250 mg by mouth daily      potassium chloride (KLOR-CON M) 20 MEQ extended release tablet Take 20 mEq by mouth daily      atorvastatin (LIPITOR) 40 MG tablet Take 40 mg by mouth daily      dilTIAZem (CARDIZEM CD) 180 MG extended release capsule Take 1 capsule by mouth daily 30 capsule 3    furosemide (LASIX) 40 MG tablet Take 1 tablet by mouth daily 60 tablet 0    gabapentin (NEURONTIN) 300 MG capsule Take 300 mg by mouth 3 times daily.  fluticasone-salmeterol (ADVAIR) 500-50 MCG/DOSE diskus inhaler Inhale 1 puff into the lungs 2 times daily       ipratropium-albuterol (DUONEB) 0.5-2.5 (3) MG/3ML SOLN nebulizer solution Inhale 3 mLs into the lungs every 2 hours as needed for Shortness of Breath      albuterol sulfate  (90 Base) MCG/ACT inhaler Inhale 2 puffs into the lungs every 4 hours as needed for Shortness of Breath          Allergies:  Patient has no known allergies. Immunizations : There is no immunization history on file for this patient.     Social History:  Social History     Tobacco Use    Smoking status: Former Smoker     Packs/day: 1.50     Types: Cigarettes    Smokeless tobacco: Never Used    Tobacco comment: pack n half a day    Vaping Use    Vaping Use: Never used   Substance Use Topics    Alcohol use: Never    Drug use: Yes     Types: Marijuana     Comment: occasional      Social History     Tobacco Use   Smoking Status Former Smoker    Packs/day: 1.50    Types: Cigarettes   Smokeless Tobacco Never Used   Tobacco Comment    pack n half a day       Family History : no dvt NO copd        REVIEW OF SYSTEMS:     Constitutional:  negative for fevers, chills, night sweats  Eyes:  negative for blurred vision, eye discharge, visual disturbance   HEENT:  negative for hearing loss, ear drainage,nasal congestion  Respiratory: cough+  shortness of breath +  or hemoptysis   Cardiovascular:  negative for chest pain, palpitations, syncope  Gastrointestinal:  negative for nausea, vomiting, diarrhea, constipation, abdominal pain  Genitourinary:  negative for frequency, dysuria, urinary incontinence, hematuria  Hematologic/Lymphatic:  negative for easy bruising, bleeding and lymphadenopathy  Allergic/Immunologic:  negative for recurrent infections, angioedema, anaphylaxis   Endocrine:  negative for weight changes, polyuria, polydipsia and polyphagia  Musculoskeletal:  negative for joint  pain, swelling, decreased range of motion  Integumentary: No rashes, skin lesions  Neurological:  negative for headaches, slurred speech, unilateral weakness  Psychiatric: negative for hallucinations,confusion,agitation.                 PHYSICAL EXAM:      Vitals:    /69   Pulse 96   Temp 97.9 °F (36.6 °C) (Oral)   Resp 20   Ht 5' 5\" (1.651 m)   Wt 247 lb 2.2 oz (112.1 kg)   SpO2 95%   BMI 41.13 kg/m²     General Appearance: alert,in some acute distress, +  pallor, no icterus   Skin: warm and dry, no rash or erythema  Head: normocephalic and atraumatic  Eyes: pupils equal, round, and reactive to light, conjunctivae normal  ENT: tympanic membrane, external ear and ear canal normal bilaterally, nose without deformity, nasal mucosa and turbinates normal without polyps  Neck: supple and non-tender without mass, no thyromegaly  no cervical lymphadenopathy  Pulmonary/Chest: Bi basal crepts++ wheezes, rales or rhonchi, normal air movement, no respiratory distress  Cardiovascular: normal rate, regular rhythm, normal S1 and S2, no murmurs, rubs, clicks, or gallops, no carotid bruits  Abdomen: soft, non-tender, non-distended, normal bowel sounds, no masses or organomegaly  Extremities: no cyanosis, clubbing or edema  Musculoskeletal: normal range of motion, no joint swelling, deformity or tenderness  Integumentary: No rashes, no abnormal skin lesions, no petechiae  Neurologic: reflexes normal and symmetric, no cranial nerve deficit  Psych:  Orientation, sensorium, mood normal            Lines: PICC    Data Review:    CBC:   Lab Results   Component Value Date    WBC 15.4 (H) 09/15/2021    HGB 12.2 09/15/2021    HCT 40.4 09/15/2021    MCV 72.0 (L) 09/15/2021     09/15/2021     RENAL:   Lab Results   Component Value Date    CREATININE 0.7 09/15/2021    BUN 24 (H) 09/15/2021     09/15/2021    K 3.6 09/15/2021     09/15/2021    CO2 23 09/15/2021     SED RATE: No results found for: SEDRATE  CK: No results found for: CKTOTAL  CRP: No results found for: CRP  Hepatic Function Panel:   Lab Results   Component Value Date    ALKPHOS 203 09/10/2021    ALT 9 09/10/2021    AST 13 09/10/2021    PROT 7.7 09/10/2021    BILITOT <0.2 09/10/2021    BILIDIR <0.2 09/25/2020    IBILI see below 09/25/2020    LABALBU 3.3 09/10/2021     UA:  Lab Results   Component Value Date    COLORU YELLOW 09/10/2021    CLARITYU CLOUDY 09/10/2021    GLUCOSEU Negative 09/10/2021    BILIRUBINUR Negative 09/10/2021    KETUA Negative 09/10/2021    SPECGRAV 1.013 09/10/2021    BLOODU MODERATE 09/10/2021    PHUR 5.5 09/10/2021    PROTEINU Negative 09/10/2021    UROBILINOGEN 0.2 09/10/2021    NITRU POSITIVE 09/10/2021    LEUKOCYTESUR MODERATE 09/10/2021    LABMICR YES 09/10/2021    URINETYPE NotGiven 09/10/2021      Urine Microscopic:   Lab Results   Component Value Date    LABCAST 1-3 Cellular 05/03/2013    BACTERIA 4+ 09/10/2021    HYALCAST 1 09/10/2021    WBCUA 64 09/10/2021    RBCUA 17 09/10/2021    EPIU 0 09/10/2021     Urine Reflex to Culture:   Lab Results   Component Value Date    URRFLXCULT Yes 09/10/2021         MICRO: cultures reviewed and updated by me   Blood Culture:   Lab Results   Component Value Date    BC No Growth after 4 days of incubation. 09/10/2021    BLOODCULT2 No Growth after 4 days of incubation.  09/10/2021       Respiratory Culture:  Lab Results   Component Value Date    CULTRESP Normal respiratory gloria 08/15/2020    LABGRAM  08/15/2020     3+ WBC's (Polymorphonuclear)  1+ Epithelial Cells  1+ Gram positive cocci       AFB:No results found for: AFBSMEAR  Viral Culture:  Lab Results   Component Value Date    COVID19 Not Detected 09/13/2021    COVID19 NOT DETECTED 08/14/2020     Urine Culture: No results for input(s): Marian Beauchamp in the last 72 hours. Urine Culture, Routine --Abnormal     >100,000 CFU/ml   CONTACT PRECAUTIONS INDICATED   Carbapenem antibiotics are the best option for infections caused   by ESBL producing organisms.  Other antibiotic classes are   likely to result in treatment failure, even for organisms   demonstrating in vitro susceptibility. Abnormal    Narrative:     ORDER#: [id number]                          ORDERED BY: YOJANA ENCINAS   SOURCE: Urine Clean Catch                  COLLECTED:  09/10/21 11:25   ANTIBIOTICS AT CARIN. :                      RECEIVED :  09/10/21 12:41   Performed at:   [facility]   [employer] [facility] [address]   Phone [phone]   Culture, Blood 1 [[id number]] Collected: 09/10/21 1018   Order Status: Completed Specimen: Blood Updated: 09/11/21 1215    Blood Culture, Routine No Growth to date.  Any change in status will be called. Narrative:     ORDER#: [id number]                          ORDERED BY: YOJANA ENCINAS   SOURCE: Blood                              COLLECTED:  09/10/21 10:18   ANTIBIOTICS AT CARIN. :                      RECEIVED :  09/10/21 10:33   If child <=2 yrs old please draw pediatric bottle. ~Blood Culture 1   Performed at:   [facility]   [employer] [facility]   Phone [phone]   Culture, Blood 2 [[id number]] Collected: 09/10/21 1018   Order Status: Completed Specimen: Blood Updated: 09/11/21 1215    Culture, Blood 2 No Growth to date.  Any change in status will be called. Narrative:     ORDER#: [id number]                          ORDERED BY: YOJANA ENCINAS   SOURCE: Blood                              COLLECTED:  09/10/21 10:18   ANTIBIOTICS AT CARIN. :                      RECEIVED :  09/10/21 10:33   If child <=2 yrs old please draw pediatric bottle. ~Blood Culture #2   Performed at:   [address]   Phone [phone]   MRSA DNA Probe, Nasal [[id number]] (Abnormal) Collected: 09/10/21 1841   Order Status: Completed Specimen: Nares Updated: 09/11/21 0435    Organism Staph aureus MRSAAbnormal     MRSA SCREEN RT-PCR --Abnormal     POSITIVE for   Normal Range: Not detected   CONTACT PRECAUTIONS INDICATED   Abnormal    Narrative:     ORDER#: [id number]                          ORDERED BY: oRe Pena   SOURCE: Nares                              COLLECTED:  09/10/21 18:41   ANTIBIOTICS AT CARIN. :                      RECEIVED :  09/10/21 18:52   CALL  [place]  [id number] tel. [phone],   Microbiology results called to and read back by ronn viera, 09/11/2021   04:35, by [facility]   Performed at:   [address] [address]   Phone [phone], Rapid [[id number]] Collected: 09/10/21 1010   Order Status: Completed Specimen: Nasopharyngeal Swab Updated: 09/10/21 1109    SARS-CoV-2, NAAT Not Detected    Comment: Rapid NAAT:   Negative results should be treated as presumptive and,   if inconsistent with clinical signs and symptoms or necessary for   patient management, should be tested with an alternative molecular   assay. Negative results do not preclude SARS-CoV-2 infection and   should not be used as the sole basis for patient management decisions. This test has been authorized by the FDA under an Emergency Use   Authorization (EUA) for use by authorized laboratories.      Fact sheet for Healthcare Providers: Brant   Fact sheet for Patients: Brant     METHODOLOGY: Isothermal Nucleic Acid Amplification       Narrative:     Performed at:   [address] [place] [address]   Phone [phone]         IMAGING:    CT ABDOMEN PELVIS WO CONTRAST Additional Contrast? None   Final Result   Filling defects left lower lobe airways, most commonly mucous plugging, with   increased left lower lobe consolidation-postobstructive change. Trace   pleural fluid is seen on the left and right. Persistent but decreased left-sided hydronephrosis. Masslike hyperdensity   remains in the left renal pelvis which could represent proteinaceous debris,   hemorrhage or possibly mass. Obstructing stone in the proximal left ureter   is unchanged. Subtle injection of the fat is seen surrounding the bladder. Recommend   correlate with urinalysis to exclude underlying infection      Stranding in the medial subcutaneous fat of the left gluteal region. Correlate for any overlying cellulitis or history of trauma         XR CHEST PORTABLE   Final Result   No acute process. CT CHEST PULMONARY EMBOLISM W CONTRAST   Final Result   1. Negative for pulmonary embolus. 2. Bibasilar bronchopneumonia.          XR CHEST PORTABLE   Final Result   Stable chest demonstrating bibasilar hypoaeration               All the pertinent images and reports for the current Hospitalization were reviewed by me     Scheduled Meds:   predniSONE  30 mg Oral Daily    Followed by   Dario Gillespie ON 9/17/2021] predniSONE  20 mg Oral Daily    Followed by   Dario Gillespie ON 9/20/2021] predniSONE  10 mg Oral Daily    Followed by   Dario Gillespie ON 9/23/2021] predniSONE  5 mg Oral Daily    metoprolol tartrate  50 mg Oral BID    meropenem  500 mg IntraVENous Q6H    lidocaine 1 % injection  5 mL IntraDERmal Once    sodium chloride flush  5-40 mL IntraVENous 2 times per day    vancomycin  1,250 mg IntraVENous Q24H    sodium chloride flush  5-40 mL IntraVENous 2 times per day    enoxaparin  40 mg SubCUTAneous Daily    escitalopram  20 mg Oral Daily    budesonide-formoterol  2 puff Inhalation BID    lactobacillus  2 capsule Oral BID WC    pantoprazole  40 mg Oral Daily    atorvastatin  40 mg Oral Daily    vancomycin (VANCOCIN) intermittent dosing (placeholder)   Other RX Placeholder       Continuous Infusions:   sodium chloride 25 mL (09/14/21 2057)    sodium chloride 25 mL (09/15/21 0109)       PRN Meds:  sodium chloride flush, sodium chloride, bisacodyl, hydrocortisone, metoprolol, sodium chloride flush, sodium chloride, ondansetron **OR** ondansetron, polyethylene glycol, acetaminophen **OR** acetaminophen, albuterol sulfate HFA, ipratropium-albuterol, traMADol, sennosides-docusate sodium      Assessment:     Patient Active Problem List   Diagnosis    [facility]osed [facility] fracture[facility] ([facility]    Morbid obesity with BMI of 45.0-49.9, adult ([facility]    Hypoxemia requiring supplemental oxygen    Acute respiratory failure with hypoxia (HCC)    Acute on chronic respiratory failure with hypercapnia (HCC)    Acute pulmonary edema (HCC)    Multifocal pneumonia    Rapid atrial fibrillation (HCC)    Chronic obstructive pulmonary disease (HCC)    Paroxysmal atrial fibrillation (HCC)    PAT (paroxysmal atrial tachycardia) (HCC)    Kidney stone    Septic shock (HCC)    Neutrophilic leukocytosis    Morbid obesity due to excess calories (HCC)    Chronic respiratory failure with hypoxia (HCC)    Hyperlipidemia    Kidney lesion, native, left    Hyponatremia    Hyperkalemia    Moderate protein-calorie malnutrition (HCC)    Hydronephrosis with left ureteropelvic junction (UPJ) obstruction    Hematuria    Ureteral obstruction    Hydronephrosis with urinary obstruction due to ureteral calculus    DARSHAN (acute kidney injury) ([facility]    Abnormal CT scan, kidney    Essential hypertension    Acute hypercapnic respiratory failure (HCC)     Acute Hypercapnic resp failure  COPD  Was on BIPAP now weaned of  Morbid obesity BMI at 41  WBC elevation  UTI E coli ESBL  H/o Left ureteral stone and stent in Sep 2020  [facility]  CT chest with bronchopneumonia  MRSA colonization   A fib history      Given MDRO infection and colonization with current presentation will need IV abx at d/c  Will check CT abd/pelvis given prior history of stones and stent placement in 2020    CT abd/pelvis repeat Left hydronephrosis some improvement and Left ureter stone noted seen by Urology no intervention planned she had Cysto and stent removal per care every where at [facility] in MAY 2021,     Labs, Microbiology, Radiology and all the pertinent results from current hospitalization and  care every where were reviewed  by me as a part of the evaluation   Plan:   1. Cont IV Vancomycin x 1250 mg Q 24 hrs as in patient   2. Cont IV Meropenem x 500 mG q 6 hrs change to IV Ertapenem x 1 gm at d/c for x 10 days   3. PICC line placed  4. Check CT ABD/PELVIS results noted   5. Trend WBC now high from Prednisone use  6. Contact isolation    7. CHARITY done for d/c planning      Discussed with patient/Family and Nursing staff   Risk of Complications/Morbidity: High      · Illness(es)/ Infection present that pose threat to bodily function. · There is potential for severe exacerbation of infection/side effects of treatment. · Therapy requires intensive monitoring for antimicrobial agent toxicity. Thanks for allowing me to participate in your patient's care and please call me with any questions or concerns.     Nelida Rothman MD  Infectious Disease  [address] Physician  Phone: [phone]   Fax : [phone]

## 2021-09-15 NOTE — PROGRESS NOTES
Physical Therapy  Facility/Department: [address] REHAB  Daily Treatment Note/Cotx with OT   NAME: Steven Thornton  : 1950  MRN: [id number]    Date of Service: 9/15/2021  Discharge Recommendations:  ECF with PT, 3-5 sessions per week   PT Equipment Recommendations  Other: defer to facility    Assessment   Body structures, Functions, Activity limitations: Decreased functional mobility   Assessment: Pt presents with decreased functional mobility after admission and diagnosed with Acute Hypercapnic Resp failure and Bronchopneumonia. Prior to admit, pt is a resident at [address] [place] [address] x 1 year duration. Pt reported getting therapy and working on stand-pivots with therapist, but also using a [place] lift at facility to sit in W/C only x 2 hours time. Today, pt was min to mod A x 1 for supine<>sit bed mobility needed Mod A x 2 persons for stance (very limited) in [employer] lift for transfers. Cont to anticipate pt returning to ECF once medically stable with cont therapy as pt received prior to admit. Will follow. Steven Thornton scored a 10/ on the AM-PAC short mobility form. Current research shows that an AM-PAC score of 17 or less is typically not associated with a discharge to the patient's home setting. Based on the patient's AM-PAC score and their current functional mobility deficits, it is recommended that the patient have 3-5 sessions per week of Physical Therapy at d/c to increase the patient's independence. Please see assessment section for further patient specific details. If patient discharges prior to next session this note will serve as a discharge summary. Please see below for the latest assessment towards goals.      Specific instructions for Next Treatment: cotx with OT  Prognosis: Fair;Guarded  PT Education: PT Role;Goals;Plan of Care;General Safety  Barriers to Learning: none  REQUIRES PT FOLLOW UP: Yes  Activity Tolerance  Activity Tolerance: Patient limited by endurance     Patient Diagnosis(es): The primary encounter diagnosis was Acute respiratory failure with hypoxia and hypercapnia ([facility] [address] Diagnoses of Urinary tract infection without hematuria, site unspecified and Acute exacerbation of chronic obstructive pulmonary disease (COPD) ([address] were also pertinent to this visit. has a past medical history of Acute kidney failure ([facility] [address] Chronic kidney disease, COPD (chronic obstructive pulmonary disease) ([facility] [address] [facility] ESBL (extended spectrum beta-lactamase) producing bacteria infection, Hyperkalemia, Hyperlipidemia, Hypertension, Hyponatremia, Kidney stone, Major depression, Melena, MRSA nasal colonization, Obesity, Oxygen deficit, PAF (paroxysmal atrial fibrillation) ([facility] [address] and Sepsis ([facility] [address] has a past surgical history that includes Cholecystectomy; Hip fracture surgery (Left, 8/10/2020); Cystoscopy (Left, 9/19/2020); Upper gastrointestinal endoscopy (N/A, 9/24/2020); sigmoidoscopy (N/A, 3/2/2021); and Colonoscopy (N/A, 4/6/2021). Restrictions  Restrictions/Precautions  Restrictions/Precautions: Fall Risk  Position Activity Restriction  Other position/activity restrictions: goff; IV, O2  Subjective   General  Chart Reviewed: Yes  Additional Pertinent Hx: The patient is a 73 y.o. female who presents to [facility] with SOB. Pt lives in a NH, does not use O2 at baseline. Was found to have worsening SOB, associated with cough and hence sent to the ER. Found to have acute hypercapnic resp failure. Pt agreeable to be placed on BIPAP but refused intubation. CT showed bronchopneumonia, started on IV abx. Also has h/o COPD. \"  Diagnosed with Acute Hypercapnic Resp failure and Bronchopneumonia  Family / Caregiver Present: No  Subjective  Subjective: Pt in supine and agreed to therapy session. Pt just returned from CT scanning prior to therapy session. Pt denied c/o pain or other.        Orientation  Orientation  Overall Orientation Status: Within Functional Limits  Orientation Level: Oriented to person;Oriented to situation;Oriented to place;Oriented to time     Objective   Bed mobility  Supine to Sit: Moderate assistance  Sit to Supine: Unable to assess (nt--pt up in recliner at end of session.)  Scooting: Moderate assistance  Transfers  Sit to Stand: Moderate Assistance;2 Person Assistance (to/from [place] lift device/equipment.)  Stand to sit: Moderate Assistance;2 Person Assistance  Bed to Chair: Dependent/Total (via [employer] lift.)  Comment: used stedy up to chair, maxi pad left in recliner for ease of return to bed at later time, nursing aware. Ambulation  Ambulation?: No (essentially non-ambulatory at baseline)     Balance  Sitting - Static: Good  Sitting - Dynamic: Good  Standing - Static: Poor; Fair  Standing - Dynamic:  (selvin)  Comments: Pt sat eob with CGA stance in [place] with Mod A x 2 persons. Comment: declined stance attempts in [place] lift this date. AM-PAC Score  AM-PAC Inpatient Mobility Raw Score : 10 (09/15/21 0947)  AM-PAC Inpatient T-Scale Score : 32.29 (09/15/21 0947)  Mobility Inpatient CMS 0-100% Score: 76.75 (09/15/21 0947)  Mobility Inpatient CMS G-Code Modifier : CL (09/15/21 5708)     Goals  Short term goals  Time Frame for Short term goals: by acute d/c--anticipate return to facility with cont therapy. All goals ongoing 9-13. Short term goal 1: bed mobility Min A x 1-2  Short term goal 2: sit<>stand pivot transfers with Min A x 2  Short term goal 3: assess ambulation if/as able. Short term goal 4: tolerate LE rom/ther ex x 10 reps for rom/strength gains. Long term goals  Time Frame for Long term goals : tbd at facility as needed.   Patient Goals   Patient goals : pt's goal is to get stronger    Plan    Plan  Times per week: 3-5 x wk in acute setting  Specific instructions for Next Treatment: cotx with OT  Current Treatment Recommendations: Functional Mobility Training  Safety Devices  Type of devices: Left in chair, Call light within reach, Chair alarm in place, Gait belt, Patient at risk for falls, All fall risk precautions in place, Nurse notified   Therapy Time   Individual Concurrent Group Co-treatment   Time In 0910         Time Out 0948         Minutes 38          3 act charges   Halley Rahman PT Electronically signed by Halley Rahman PT on 9/15/2021 at 9:50 AM

## 2021-09-16 VITALS
SYSTOLIC BLOOD PRESSURE: 116 MMHG | WEIGHT: 254.85 LBS | DIASTOLIC BLOOD PRESSURE: 72 MMHG | HEIGHT: 65 IN | TEMPERATURE: 98.2 F | OXYGEN SATURATION: 95 % | BODY MASS INDEX: 42.46 KG/M2 | RESPIRATION RATE: 17 BRPM | HEART RATE: 90 BPM

## 2021-09-16 LAB
ANION GAP SERPL CALCULATED.3IONS-SCNC: 9 MMOL/L (ref 3–16)
BASOPHILS ABSOLUTE: 0.1 K/UL (ref 0–0.2)
BASOPHILS RELATIVE PERCENT: 0.3 %
BUN BLDV-MCNC: 22 MG/DL (ref 7–20)
CALCIUM SERPL-MCNC: 8.7 MG/DL (ref 8.3–10.6)
CHLORIDE BLD-SCNC: 106 MMOL/L (ref 99–110)
CO2: 23 MMOL/L (ref 21–32)
CREAT SERPL-MCNC: 0.7 MG/DL (ref 0.6–1.2)
EOSINOPHILS ABSOLUTE: 0.4 K/UL (ref 0–0.6)
EOSINOPHILS RELATIVE PERCENT: 2.4 %
GFR AFRICAN AMERICAN: >60
GFR NON-AFRICAN AMERICAN: >60
GLUCOSE BLD-MCNC: 81 MG/DL (ref 70–99)
HCT VFR BLD CALC: 41.6 % (ref 36–48)
HEMOGLOBIN: 12.5 G/DL (ref 12–16)
LYMPHOCYTES ABSOLUTE: 2 K/UL (ref 1–5.1)
LYMPHOCYTES RELATIVE PERCENT: 11.9 %
MCH RBC QN AUTO: 21.6 PG (ref 26–34)
MCHC RBC AUTO-ENTMCNC: 30 G/DL (ref 31–36)
MCV RBC AUTO: 72.1 FL (ref 80–100)
MONOCYTES ABSOLUTE: 1.3 K/UL (ref 0–1.3)
MONOCYTES RELATIVE PERCENT: 7.9 %
NEUTROPHILS ABSOLUTE: 12.9 K/UL (ref 1.7–7.7)
NEUTROPHILS RELATIVE PERCENT: 77.5 %
PDW BLD-RTO: 19.3 % (ref 12.4–15.4)
PLATELET # BLD: 323 K/UL (ref 135–450)
PMV BLD AUTO: 7.7 FL (ref 5–10.5)
POTASSIUM SERPL-SCNC: 3.6 MMOL/L (ref 3.5–5.1)
RBC # BLD: 5.77 M/UL (ref 4–5.2)
SODIUM BLD-SCNC: 138 MMOL/L (ref 136–145)
WBC # BLD: 16.7 K/UL (ref 4–11)

## 2021-09-16 PROCEDURE — 94640 AIRWAY INHALATION TREATMENT: CPT

## 2021-09-16 PROCEDURE — 85025 COMPLETE CBC W/AUTO DIFF WBC: CPT

## 2021-09-16 PROCEDURE — 6360000002 HC RX W HCPCS: Performed by: INTERNAL MEDICINE

## 2021-09-16 PROCEDURE — 2700000000 HC OXYGEN THERAPY PER DAY

## 2021-09-16 PROCEDURE — 2580000003 HC RX 258: Performed by: INTERNAL MEDICINE

## 2021-09-16 PROCEDURE — 99232 SBSQ HOSP IP/OBS MODERATE 35: CPT | Performed by: INTERNAL MEDICINE

## 2021-09-16 PROCEDURE — 80048 BASIC METABOLIC PNL TOTAL CA: CPT

## 2021-09-16 PROCEDURE — 94761 N-INVAS EAR/PLS OXIMETRY MLT: CPT

## 2021-09-16 PROCEDURE — 6370000000 HC RX 637 (ALT 250 FOR IP): Performed by: INTERNAL MEDICINE

## 2021-09-16 RX ORDER — TRAMADOL HYDROCHLORIDE 50 MG/1
50 TABLET ORAL EVERY 6 HOURS PRN
Qty: 100 TABLET | Refills: 3 | Status: SHIPPED | OUTPATIENT
Start: 2021-09-16 | End: 2021-10-16

## 2021-09-16 RX ORDER — PREDNISONE 10 MG/1
TABLET ORAL
Qty: 11 TABLET | Refills: 0 | Status: ON HOLD | OUTPATIENT
Start: 2021-09-16 | End: 2021-12-23

## 2021-09-16 RX ORDER — TRAMADOL HYDROCHLORIDE 50 MG/1
50 TABLET ORAL EVERY 6 HOURS PRN
Qty: 12 TABLET | Refills: 0 | Status: SHIPPED | OUTPATIENT
Start: 2021-09-16 | End: 2021-09-19

## 2021-09-16 RX ADMIN — METOPROLOL TARTRATE 50 MG: 50 TABLET, FILM COATED ORAL at 08:47

## 2021-09-16 RX ADMIN — ERTAPENEM SODIUM 1000 MG: 1 INJECTION, POWDER, LYOPHILIZED, FOR SOLUTION INTRAMUSCULAR; INTRAVENOUS at 14:39

## 2021-09-16 RX ADMIN — MEROPENEM 500 MG: 500 INJECTION, POWDER, FOR SOLUTION INTRAVENOUS at 02:02

## 2021-09-16 RX ADMIN — SODIUM CHLORIDE 25 ML: 900 INJECTION INTRAVENOUS at 08:56

## 2021-09-16 RX ADMIN — SODIUM CHLORIDE, PRESERVATIVE FREE 10 ML: 5 INJECTION INTRAVENOUS at 09:58

## 2021-09-16 RX ADMIN — Medication 2 CAPSULE: at 08:47

## 2021-09-16 RX ADMIN — SODIUM CHLORIDE, PRESERVATIVE FREE 10 ML: 5 INJECTION INTRAVENOUS at 08:55

## 2021-09-16 RX ADMIN — MEROPENEM 500 MG: 500 INJECTION, POWDER, FOR SOLUTION INTRAVENOUS at 08:57

## 2021-09-16 RX ADMIN — BUDESONIDE AND FORMOTEROL FUMARATE DIHYDRATE 2 PUFF: 160; 4.5 AEROSOL RESPIRATORY (INHALATION) at 07:51

## 2021-09-16 RX ADMIN — SODIUM CHLORIDE 25 ML: 900 INJECTION INTRAVENOUS at 14:37

## 2021-09-16 RX ADMIN — TRAMADOL HYDROCHLORIDE 50 MG: 50 TABLET, FILM COATED ORAL at 08:47

## 2021-09-16 RX ADMIN — SODIUM CHLORIDE 25 ML: 9 INJECTION, SOLUTION INTRAVENOUS at 02:00

## 2021-09-16 RX ADMIN — ALBUTEROL SULFATE 2 PUFF: 90 AEROSOL, METERED RESPIRATORY (INHALATION) at 07:51

## 2021-09-16 RX ADMIN — ATORVASTATIN CALCIUM 40 MG: 40 TABLET, FILM COATED ORAL at 08:47

## 2021-09-16 RX ADMIN — PANTOPRAZOLE SODIUM 40 MG: 40 TABLET, DELAYED RELEASE ORAL at 05:24

## 2021-09-16 RX ADMIN — ESCITALOPRAM OXALATE 20 MG: 10 TABLET ORAL at 08:47

## 2021-09-16 RX ADMIN — MEROPENEM 500 MG: 500 INJECTION, POWDER, FOR SOLUTION INTRAVENOUS at 13:28

## 2021-09-16 RX ADMIN — PREDNISONE 30 MG: 20 TABLET ORAL at 08:47

## 2021-09-16 ASSESSMENT — PAIN DESCRIPTION - ONSET: ONSET: ON-GOING

## 2021-09-16 ASSESSMENT — PAIN - FUNCTIONAL ASSESSMENT: PAIN_FUNCTIONAL_ASSESSMENT: ACTIVITIES ARE NOT PREVENTED

## 2021-09-16 ASSESSMENT — PAIN DESCRIPTION - PROGRESSION
CLINICAL_PROGRESSION: GRADUALLY WORSENING
CLINICAL_PROGRESSION: RESOLVED

## 2021-09-16 ASSESSMENT — PAIN DESCRIPTION - LOCATION: LOCATION: BACK

## 2021-09-16 ASSESSMENT — PAIN SCALES - GENERAL
PAINLEVEL_OUTOF10: 0
PAINLEVEL_OUTOF10: 8

## 2021-09-16 ASSESSMENT — PAIN DESCRIPTION - ORIENTATION: ORIENTATION: LOWER

## 2021-09-16 ASSESSMENT — PAIN DESCRIPTION - DESCRIPTORS: DESCRIPTORS: ACHING;DISCOMFORT

## 2021-09-16 ASSESSMENT — PAIN DESCRIPTION - FREQUENCY: FREQUENCY: CONTINUOUS

## 2021-09-16 ASSESSMENT — PAIN DESCRIPTION - PAIN TYPE: TYPE: CHRONIC PAIN

## 2021-09-16 NOTE — PROGRESS NOTES
Infectious Disease Follow up Notes  Admit Date: 9/10/2021  Hospital Day: 7    Antibiotics :   IV Meropen  IV Vancomycin      CHIEF  COMPLAINT:     UTI  ESBL  Pneumonia  Left hydronephrosis      Subjective interval History :  73 y.o. woman from NH morbid obesity, poor mobility admitted with SOB for 2-3 days, cough +  to hospital was in resp distress required BIPAP support for hypercapnic resp failure and she has h/o COPD on admit WBC at  15.4 and Lactic acid 1.9, MRSA probe +Ve and UA  Very abnormal urine cx ESBL E coli isolated, Blood cx negative. CT chest with Broncho pnuemonia on admit. She was placed on IV Vancomycin and IV Cefepime but now with ESBL it was changed to IV Meropenem and we are consulted for recommendations. She is on prednisone taper as well. Interval History :  Cough improving and using 2 l ts nasal cannula no chills PICC working ok no nausea no vomiting     Past Medical History:    Past Medical History:   Diagnosis Date    Acute kidney failure (HCC)     Chronic kidney disease     [facility]     ESBL (extended spectrum beta-lactamase) producing bacteria infection 09/10/2021    Escherichia coli ESBL.  Urine    Hyperkalemia     Hyperlipidemia     Hypertension     Hyponatremia     Kidney stone     Major depression     Melena     MRSA nasal colonization 09/10/2021    Obesity     Oxygen deficit     2 liters     PAF (paroxysmal atrial fibrillation) (HCC)     Sepsis ([address]        Past Surgical History:    Past Surgical History:   Procedure Laterality Date    CHOLECYSTECTOMY      COLONOSCOPY N/A 4/6/2021    COLONOSCOPY POLYPECTOMY ABLATION with clip placement performed by Sandra Beck MD at [address] Left 9/19/2020    CYSTOSCOPY URETERAL STENT INSERTION LEFT RETROGRADE PYELOGRAM performed by Lewis De La Cruz MD at [address] [place] [address] Left 8/10/2020    OPEN TREATMENT OF LEFT HIP FRACTURE WITH CEPHALOMEDULLARY NAIL performed by Alice Renteria MD at [facility] 3/2/2021    SIGMOIDOSCOPY FLEXIBLE POLYPECTOMY with clip placement performed by Guy Love MD at [facility] 9/24/2020    EGD DIAGNOSTIC ONLY performed by Guy Love MD at [facility]       Current Medications:    Outpatient Medications Marked as Taking for the 9/10/21 encounter [facility] Encounter)   Medication Sig Dispense Refill    diphenhydrAMINE (BENADRYL) 25 MG tablet Take 25 mg by mouth every 6 hours as needed for Itching      cyclobenzaprine (FLEXERIL) 10 MG tablet Take 10 mg by mouth every 6 hours as needed for Muscle spasms      DULoxetine (CYMBALTA) 60 MG extended release capsule Take 60 mg by mouth daily      ibuprofen (ADVIL;MOTRIN) 600 MG tablet Take 600 mg by mouth every 6 hours as needed for Pain      traMADol (ULTRAM) 50 MG tablet Take 100 mg by mouth every 6 hours as needed for Pain.  acetaminophen (TYLENOL) 325 MG tablet Take 650 mg by mouth every 6 hours as needed for Pain or Fever       hydrocortisone (ANUSOL-HC) 25 MG suppository Place 25 mg rectally every 6 hours as needed for Hemorrhoids       bisacodyl (DULCOLAX) 10 MG suppository Place 10 mg rectally daily as needed for Constipation       magnesium hydroxide (MILK OF MAGNESIA) 400 MG/5ML suspension Take 30 mLs by mouth daily as needed for Constipation       polyethylene glycol (GLYCOLAX) 17 g packet Take 17 g by mouth daily as needed for Constipation      nystatin (MYCOSTATIN) 222376 UNIT/GM powder Apply topically 4 times daily Apply topically 4 times daily.       sennosides-docusate sodium (SENOKOT-S) 8.6-50 MG tablet Take 2 tablets by mouth every 12 hours as needed for Constipation       lactobacillus (CULTURELLE) capsule Take 2 capsules by mouth 2 times daily (with meals) 30 capsule 0    pantoprazole (PROTONIX) 40 MG tablet negative for chest pain, palpitations, syncope  Gastrointestinal:  negative for nausea, vomiting, diarrhea, constipation, abdominal pain  Genitourinary:  negative for frequency, dysuria, urinary incontinence, hematuria  Hematologic/Lymphatic:  negative for easy bruising, bleeding and lymphadenopathy  Allergic/Immunologic:  negative for recurrent infections, angioedema, anaphylaxis   Endocrine:  negative for weight changes, polyuria, polydipsia and polyphagia  Musculoskeletal:  negative for joint  pain, swelling, decreased range of motion  Integumentary: No rashes, skin lesions  Neurological:  negative for headaches, slurred speech, unilateral weakness  Psychiatric: negative for hallucinations,confusion,agitation.                 PHYSICAL EXAM:      Vitals:    /83   Pulse 92   Temp 98 °F (36.7 °C) (Oral)   Resp 18   Ht 5' 5\" (1.651 m)   Wt 254 lb 13.6 oz (115.6 kg)   SpO2 91%   BMI 42.41 kg/m²     General Appearance: alert,in some acute distress, +  pallor, no icterus   Skin: warm and dry, no rash or erythema  Head: normocephalic and atraumatic  Eyes: pupils equal, round, and reactive to light, conjunctivae normal  ENT: tympanic membrane, external ear and ear canal normal bilaterally, nose without deformity, nasal mucosa and turbinates normal without polyps  Neck: supple and non-tender without mass, no thyromegaly  no cervical lymphadenopathy  Pulmonary/Chest: Bi basal crepts++ wheezes, rales or rhonchi, normal air movement, no respiratory distress  Cardiovascular: normal rate, regular rhythm, normal S1 and S2, no murmurs, rubs, clicks, or gallops, no carotid bruits  Abdomen: soft, non-tender, non-distended, normal bowel sounds, no masses or organomegaly  Extremities: no cyanosis, clubbing or edema  Musculoskeletal: normal range of motion, no joint swelling, deformity or tenderness  Integumentary: No rashes, no abnormal skin lesions, no petechiae  Neurologic: reflexes normal and symmetric, no cranial nerve deficit  Psych:  Orientation, sensorium, mood normal            Lines: PICC    Data Review:    CBC:   Lab Results   Component Value Date    WBC 16.7 (H) 09/16/2021    HGB 12.5 09/16/2021    HCT 41.6 09/16/2021    MCV 72.1 (L) 09/16/2021     09/16/2021     RENAL:   Lab Results   Component Value Date    CREATININE 0.7 09/16/2021    BUN 22 (H) 09/16/2021     09/16/2021    K 3.6 09/16/2021     09/16/2021    CO2 23 09/16/2021     SED RATE: No results found for: SEDRATE  CK: No results found for: CKTOTAL  CRP: No results found for: CRP  Hepatic Function Panel:   Lab Results   Component Value Date    ALKPHOS 203 09/10/2021    ALT 9 09/10/2021    AST 13 09/10/2021    PROT 7.7 09/10/2021    BILITOT <0.2 09/10/2021    BILIDIR <0.2 09/25/2020    IBILI see below 09/25/2020    LABALBU 3.3 09/10/2021     UA:  Lab Results   Component Value Date    COLORU YELLOW 09/10/2021    CLARITYU CLOUDY 09/10/2021    GLUCOSEU Negative 09/10/2021    BILIRUBINUR Negative 09/10/2021    KETUA Negative 09/10/2021    SPECGRAV 1.013 09/10/2021    BLOODU MODERATE 09/10/2021    PHUR 5.5 09/10/2021    PROTEINU Negative 09/10/2021    UROBILINOGEN 0.2 09/10/2021    NITRU POSITIVE 09/10/2021    LEUKOCYTESUR MODERATE 09/10/2021    LABMICR YES 09/10/2021    URINETYPE NotGiven 09/10/2021      Urine Microscopic:   Lab Results   Component Value Date    LABCAST 1-3 Cellular 05/03/2013    BACTERIA 4+ 09/10/2021    HYALCAST 1 09/10/2021    WBCUA 64 09/10/2021    RBCUA 17 09/10/2021    EPIU 0 09/10/2021     Urine Reflex to Culture:   Lab Results   Component Value Date    URRFLXCULT Yes 09/10/2021         MICRO: cultures reviewed and updated by me   Blood Culture:   Lab Results   Component Value Date    BC No Growth after 4 days of incubation. 09/10/2021    BLOODCULT2 No Growth after 4 days of incubation.  09/10/2021       Respiratory Culture:  Lab Results   Component Value Date    CULTRESP Normal respiratory gloria 08/15/2020    LABGRAM 08/15/2020     3+ WBC's (Polymorphonuclear)  1+ Epithelial Cells  1+ Gram positive cocci       AFB:No results found for: AFBSMEAR  Viral Culture:  Lab Results   Component Value Date    COVID19 Not Detected 09/13/2021    COVID19 NOT DETECTED 08/14/2020     Urine Culture: No results for input(s): LABURIN in the last 72 hours. Urine Culture, Routine --Abnormal     >100,000 CFU/ml   CONTACT PRECAUTIONS INDICATED   Carbapenem antibiotics are the best option for infections caused   by ESBL producing organisms.  Other antibiotic classes are   likely to result in treatment failure, even for organisms   demonstrating in vitro susceptibility. Abnormal    Narrative:     ORDER#: [id number]                          ORDERED BY: YOJANA ENCINAS   SOURCE: Urine Clean Catch                  COLLECTED:  09/10/21 11:25   ANTIBIOTICS AT CARIN. :                      RECEIVED :  09/10/21 12:41   Performed at:   [address] [place] [address]   Phone [phone]   Culture, Blood 1 [[id number]] Collected: 09/10/21 1018   Order Status: Completed Specimen: Blood Updated: 09/11/21 1215    Blood Culture, Routine No Growth to date.  Any change in status will be called. Narrative:     ORDER#: [id number]                          ORDERED BY: YOJANA ENCINAS   SOURCE: Blood                              COLLECTED:  09/10/21 10:18   ANTIBIOTICS AT CARIN. :                      RECEIVED :  09/10/21 10:33   If child <=2 yrs old please draw pediatric bottle. ~Blood Culture 1   Performed at:   [facility]   [address] [place], [place] [facility] [facility] [place] [address]   Phone [phone]   Culture, Blood 2 [[id number]] Collected: 09/10/21 1018   Order Status: Completed Specimen: Blood Updated: 09/11/21 1215    Culture, Blood 2 No Growth to date.  Any change in status will be called.    Narrative:     ORDER#: [id number]                          ORDERED BY: YOJANA ENCINAS   SOURCE: Blood                              COLLECTED:  09/10/21 10:18   ANTIBIOTICS AT CARIN. :                      RECEIVED :  09/10/21 10:33   If child <=2 yrs old please draw pediatric bottle. ~Blood Culture #2   Performed at:   [facility] [address] [place], [place] [address]   Phone [phone]   MRSA DNA Probe, Nasal [[id number]] (Abnormal) Collected: 09/10/21 1841   Order Status: Completed Specimen: Nares Updated: 09/11/21 0435    Organism Staph aureus MRSAAbnormal     MRSA SCREEN RT-PCR --Abnormal     POSITIVE for   Normal Range: Not detected   CONTACT PRECAUTIONS INDICATED   Abnormal    Narrative:     ORDER#: [id number]                          ORDERED BY: Vaishali Lazo   SOURCE: Nares                              COLLECTED:  09/10/21 18:41   ANTIBIOTICS AT CARIN. :                      RECEIVED :  09/10/21 18:52   CALL  [place]  [facility] tel. [phone],   Microbiology results called to and read back by ronn viera, 09/11/2021   04:35, by [facility]   Performed at:   [facility] [address] [place], [place] [address]   Phone [phone], Rapid [[id number]] Collected: 09/10/21 1010   Order Status: Completed Specimen: Nasopharyngeal Swab Updated: 09/10/21 1109    SARS-CoV-2, NAAT Not Detected    Comment: Rapid NAAT:   Negative results should be treated as presumptive and,   if inconsistent with clinical signs and symptoms or necessary for   patient management, should be tested with an alternative molecular   assay. Negative results do not preclude SARS-CoV-2 infection and   should not be used as the sole basis for patient management decisions. This test has been authorized by the FDA under an Emergency Use   Authorization (EUA) for use by authorized laboratories.      Fact sheet for Healthcare Providers:   BuildHer.es   Fact sheet for Patients: BuildHer.es METHODOLOGY: Isothermal Nucleic Acid Amplification       Narrative:     Performed at:   [facility]   [address] [place] [address]   Phone [phone]         IMAGING:    CT ABDOMEN PELVIS WO CONTRAST Additional Contrast? None   Final Result   Filling defects left lower lobe airways, most commonly mucous plugging, with   increased left lower lobe consolidation-postobstructive change. Trace   pleural fluid is seen on the left and right. Persistent but decreased left-sided hydronephrosis. Masslike hyperdensity   remains in the left renal pelvis which could represent proteinaceous debris,   hemorrhage or possibly mass. Obstructing stone in the proximal left ureter   is unchanged. Subtle injection of the fat is seen surrounding the bladder. Recommend   correlate with urinalysis to exclude underlying infection      Stranding in the medial subcutaneous fat of the left gluteal region. Correlate for any overlying cellulitis or history of trauma         XR CHEST PORTABLE   Final Result   No acute process. CT CHEST PULMONARY EMBOLISM W CONTRAST   Final Result   1. Negative for pulmonary embolus. 2. Bibasilar bronchopneumonia.          XR CHEST PORTABLE   Final Result   Stable chest demonstrating bibasilar hypoaeration               All the pertinent images and reports for the current Hospitalization were reviewed by me     Scheduled Meds:   [START ON 9/17/2021] predniSONE  20 mg Oral Daily    Followed by   Mainor Damon ON 9/20/2021] predniSONE  10 mg Oral Daily    Followed by   Mainor Damon ON 9/23/2021] predniSONE  5 mg Oral Daily    metoprolol tartrate  50 mg Oral BID    meropenem  500 mg IntraVENous Q6H    lidocaine 1 % injection  5 mL IntraDERmal Once    sodium chloride flush  5-40 mL IntraVENous 2 times per day    vancomycin  1,250 mg IntraVENous Q24H    sodium chloride flush  5-40 mL IntraVENous 2 times per day    enoxaparin  40 mg SubCUTAneous Daily    escitalopram  20 mg Oral Daily    budesonide-formoterol  2 puff Inhalation BID    lactobacillus  2 capsule Oral BID WC    pantoprazole  40 mg Oral Daily    atorvastatin  40 mg Oral Daily    vancomycin (VANCOCIN) intermittent dosing (placeholder)   Other RX Placeholder       Continuous Infusions:   sodium chloride 25 mL (09/16/21 0856)    sodium chloride Stopped (09/16/21 0336)       PRN Meds:  sodium chloride flush, sodium chloride, bisacodyl, hydrocortisone, metoprolol, sodium chloride flush, sodium chloride, ondansetron **OR** ondansetron, polyethylene glycol, acetaminophen **OR** acetaminophen, albuterol sulfate HFA, ipratropium-albuterol, traMADol, sennosides-docusate sodium      Assessment:     Patient Active Problem List   Diagnosis    Closed left hip fracture, initial encounter ([facility]    Morbid obesity with BMI of 45.0-49.9, adult (HCC)    Hypoxemia requiring supplemental oxygen    Acute respiratory failure with hypoxia (HCC)    Acute on chronic respiratory failure with hypercapnia (HCC)    Acute pulmonary edema (HCC)    Multifocal pneumonia    Rapid atrial fibrillation (HCC)    Chronic obstructive pulmonary disease (HCC)    Paroxysmal atrial fibrillation (HCC)    PAT (paroxysmal atrial tachycardia) (HCC)    Kidney stone    Septic shock (HCC)    Neutrophilic leukocytosis    Morbid obesity due to excess calories (HCC)    Chronic respiratory failure with hypoxia (HCC)    Hyperlipidemia    Kidney lesion, native, left    Hyponatremia    Hyperkalemia    Moderate protein-calorie malnutrition (HCC)    Hydronephrosis with left ureteropelvic junction (UPJ) obstruction    Hematuria    Ureteral obstruction    Hydronephrosis with urinary obstruction due to ureteral calculus    DARSHAN (acute kidney injury) ([facility]    Abnormal CT scan, kidney    Essential hypertension    Acute hypercapnic respiratory failure (HCC)     Acute Hypercapnic resp failure  COPD  Was on BIPAP now weaned of  Morbid obesity BMI at 41  WBC elevation  UTI E coli ESBL  H/o Left ureteral stone and stent in Sep 2020  NH resident  CT chest with bronchopneumonia  MRSA colonization   A fib history      Given MDRO infection and colonization with current presentation will need IV abx at d/c  Will check CT abd/pelvis given prior history of stones and stent placement in 2020    CT abd/pelvis repeat Left hydronephrosis some improvement and Left ureter stone noted seen by Urology no intervention planned she had Cysto and stent removal per care every where at [facility] in MAY 2021,     Labs, Microbiology, Radiology and all the pertinent results from current hospitalization and  care every where were reviewed  by me as a part of the evaluation   Plan:   1. D/c  IV Vancomycin x 1250 mg Q 24 hrs as in patient   2. change to IV Ertapenem x 1 gm at stop 9/24  3. PICC line placed  4. CT ABD/PELVIS results noted d/w pt   5. Trend WBC now high from Prednisone use  6. Contact isolation    7. CHARITY done for d/c planning  8. Seen by Urology already notes reviewed        Discussed with patient/Family and Nursing staff     Thanks for allowing me to participate in your patient's care and please call me with any questions or concerns.     Inderjit Gonzalez MD  Infectious Disease  [facility] ([facility]) Physician  Phone: [phone]   Fax : [phone]

## 2021-09-16 NOTE — PROGRESS NOTES
Data- discharge order received, pt verbalized agreement to discharge, [facility] needs for [facility]/D[facility]. Action- RN called report to Cliff from [facility], AN AFFILIATE [facility] at [phone]. Discharge packet/scripts/follow-up appointments given to transporter. Diet- general, Activity- WBAT with maxi-move. VSS, pt on cont O2 at 1L via NC. Pt denies pain/discomfort. Telemetry removed. No further questions or concerns at this time. Response- Pt belongings gathered, pt d/c with [facility]. Disposition to [facility], AN [facility]FFIL[facility]ATE OF [facility], no needs for HHC/DME, transported with [facility] via stretcher, no complications.

## 2021-09-16 NOTE — PROGRESS NOTES
Patient admitted to 3N d/t acute hypercapnic respiratory failure. Pt currently on contact isolation d/t MRSA and ESBL. A&Ox4. On regular diet, tolerating well. Medications taken whole with thin liquids. On Lovenox for DVT prophylaxis. On cont O2 at 1L via NC. Has been continent of bowel. LBM 9/14/2021. Pt has goff, draining clear yellow urine. Pt rates pain/discomfort 8/10, pain meds given as ordered and per pt request. Pt on telemetry. Chair/bed alarms in use and call light in reach. Will continue to monitor.

## 2021-09-16 NOTE — PLAN OF CARE
Problem: Skin Integrity:  Goal: Will show no infection signs and symptoms  Description: Will show no infection signs and symptoms  Outcome: Ongoing  Note: Skin assessment completed on admission and every shift. Barrier wipes used in the event of incontinence. Pressure relief techniques used as needed while in chair and in bed. Position changes encouraged at least every two hours while in bed.

## 2021-09-16 NOTE — DISCHARGE SUMMARY
Hospital Medicine Discharge Summary    Patient ID: Celina William      Patient's PCP: Kaye Rojo MD    Admit Date: 9/10/2021     Discharge Date: 9/16/21    Admitting Physician: India Syed MD     Discharge Physician: India Syed MD     Discharge Diagnoses: Active Hospital Problems    Diagnosis     Acute hypercapnic respiratory failure ([facility] [J96.02]        The patient was seen and examined on day of discharge and this discharge summary is in conjunction with any daily progress note from day of discharge. Hospital Course: The patient is a 73 y.o. female who presents to [facility] with SOB. Pt lives in a NH, does not use O2 at baseline. Was found to have worsening SOB, associated with cough and hence sent to the ER. Found to have acute hypercapnic resp failure. Pt agreeable to be placed on BIPAP but refused intubation. CT showed bronchopneumonia, started on IV abx. Also has h/o COPD. Acute hypoxic/hypercapnic resp failure - ABG reviewed, pt on admit agreeable to BIPAP, did not want intubation. Treated with BIPAP on admit - but later pt refused BIPAP, pulm following - pt currently DNR/DNI. Placed on 3-->1L O2, wean as tolerated     Bronchopneumonia - suspect HCAP given NH resident, probable gram neg PNA, cannot r/o MRSA PNA. Started on IV cefepime/vanco-->switched cefepime to merrem. Check COVID neg, check strep ag/legionella/sputum cx. pulm consulted. MRSA swab positive. ID consulted. Dc on IV Invanz     A fib with RVR - started on metoprolol PO-->increased dose, added IV prn BB     Left renal calculi - CT abd reviewed, urology consulted - appr recs. outpt f/u with urology at [facility]     Acute COPD exacerbation - started on neb/inhaler/solumedrol/abx, pulm consulted.  Switched steroids to PO pred, taper on dc     UTI - UA abnormal, cont IV abx, await urine cx with e coli - ESBL. Consulted ID, switched cefepime to merrem. have PICC Line placed, dc on IV invanz     Hypokalemia - repleted       Physical Exam Performed:     /72   Pulse 90   Temp 98.2 °F (36.8 °C) (Oral)   Resp 17   Ht 5' 5\" (1.651 m)   Wt 254 lb 13.6 oz (115.6 kg)   SpO2 95%   BMI 42.41 kg/m²        General appearance: awake  HEENT Normal cephalic, atraumatic without obvious deformity.  Pupils equal, round, and reactive to light.  Extra ocular muscles intact.  Conjunctivae/corneas clear. Neck: Supple, No jugular venous distention/bruits.  Trachea midline without thyromegaly or adenopathy with full range of motion. Lungs: diminished b/l  Heart: irregular rate and rhythm with Normal S1/S2  Abdomen: Soft, non-tender or non-distended without rigidity or guarding and positive bowel sounds   Extremities: No clubbing, cyanosis, or edema bilaterally. Skin: Skin color, texture, turgor normal.  No rashes or lesions. Neurologic: awake, neurovascularly intact with sensory/motor intact upper extremities/lower extremities, bilaterally.  Cranial nerves: II-XII intact, grossly non-focal.  Mental status: awake  Capillary Refill: Acceptable  < 3 seconds  Peripheral Pulses: +3 Easily felt, not easily obliterated with pressure       Labs: For convenience and continuity at follow-up the following most recent labs are provided:      CBC:    Lab Results   Component Value Date    WBC 16.7 09/16/2021    HGB 12.5 09/16/2021    HCT 41.6 09/16/2021     09/16/2021       Renal:    Lab Results   Component Value Date     09/16/2021    K 3.6 09/16/2021    K 4.3 09/11/2021     09/16/2021    CO2 23 09/16/2021    BUN 22 09/16/2021    CREATININE 0.7 09/16/2021    CALCIUM 8.7 09/16/2021    PHOS 3.8 09/26/2020         Significant Diagnostic Studies    Radiology:   CT ABDOMEN PELVIS WO CONTRAST Additional Contrast? None   Final Result   Filling defects left lower lobe airways, most commonly mucous plugging, with   increased left lower lobe consolidation-postobstructive change.   Trace   pleural fluid is seen on the left and right. Persistent but decreased left-sided hydronephrosis. Masslike hyperdensity   remains in the left renal pelvis which could represent proteinaceous debris,   hemorrhage or possibly mass. Obstructing stone in the proximal left ureter   is unchanged. Subtle injection of the fat is seen surrounding the bladder. Recommend   correlate with urinalysis to exclude underlying infection      Stranding in the medial subcutaneous fat of the left gluteal region. Correlate for any overlying cellulitis or history of trauma         XR CHEST PORTABLE   Final Result   No acute process. CT CHEST PULMONARY EMBOLISM W CONTRAST   Final Result   1. Negative for pulmonary embolus. 2. Bibasilar bronchopneumonia. XR CHEST PORTABLE   Final Result   Stable chest demonstrating bibasilar hypoaeration                Consults:     IP CONSULT TO PULMONOLOGY  PHARMACY TO DOSE VANCOMYCIN  PALLIATIVE CARE EVAL  IP CONSULT TO INFECTIOUS DISEASES  IP CONSULT TO UROLOGY  IP CONSULT TO PHARMACY    Disposition:  NH     Condition at Discharge: Stable    Discharge Instructions/Follow-up:  PCP in 1 week    Code Status:  DNR-CC     Activity: activity as tolerated    Diet: regular diet      Discharge Medications:     Current Discharge Medication List           Details   predniSONE (DELTASONE) 10 MG tablet Take 2 tablets by mouth once a day for 3 days, then take 1 tablet by mouth once a day for 3 days, then take half a tablet by mouth once a day for 4 days and then stop  Qty: 11 tablet, Refills: 0      ertapenem (INVANZ) infusion Infuse 1,000 mg intravenously every 24 hours for 8 days Compound per protocol. Qty: 8 g, Refills: 0              Details   !! traMADol (ULTRAM) 50 MG tablet Take 1 tablet by mouth every 6 hours as needed for Pain for up to 30 days.   Qty: 100 tablet, Refills: 3    Comments: Reduce doses taken as pain becomes manageable  Associated Diagnoses: Other chronic pain      !! traMADol (ULTRAM) 50 MG tablet Take 1 tablet by mouth every 6 hours as needed for Pain for up to 3 days. Qty: 12 tablet, Refills: 0    Comments: Reduce doses taken as pain becomes manageable  Associated Diagnoses: Other chronic pain       !! - Potential duplicate medications found. Please discuss with provider. Details   cyclobenzaprine (FLEXERIL) 10 MG tablet Take 10 mg by mouth every 6 hours as needed for Muscle spasms      DULoxetine (CYMBALTA) 60 MG extended release capsule Take 60 mg by mouth daily      acetaminophen (TYLENOL) 325 MG tablet Take 650 mg by mouth every 6 hours as needed for Pain or Fever       hydrocortisone (ANUSOL-HC) 25 MG suppository Place 25 mg rectally every 6 hours as needed for Hemorrhoids       bisacodyl (DULCOLAX) 10 MG suppository Place 10 mg rectally daily as needed for Constipation       magnesium hydroxide (MILK OF MAGNESIA) 400 MG/5ML suspension Take 30 mLs by mouth daily as needed for Constipation       polyethylene glycol (GLYCOLAX) 17 g packet Take 17 g by mouth daily as needed for Constipation      nystatin (MYCOSTATIN) 821732 UNIT/GM powder Apply topically 4 times daily Apply topically 4 times daily.       sennosides-docusate sodium (SENOKOT-S) 8.6-50 MG tablet Take 2 tablets by mouth every 12 hours as needed for Constipation       lactobacillus (CULTURELLE) capsule Take 2 capsules by mouth 2 times daily (with meals)  Qty: 30 capsule, Refills: 0      pantoprazole (PROTONIX) 40 MG tablet Take 1 tablet by mouth daily  Qty: 30 tablet, Refills: 0      acetaZOLAMIDE (DIAMOX) 250 MG tablet Take 250 mg by mouth daily      potassium chloride (KLOR-CON M) 20 MEQ extended release tablet Take 20 mEq by mouth daily      atorvastatin (LIPITOR) 40 MG tablet Take 40 mg by mouth daily      dilTIAZem (CARDIZEM CD) 180 MG extended release capsule Take 1 capsule by mouth daily  Qty: 30 capsule, Refills: 3      furosemide (LASIX) 40 MG tablet Take 1 tablet by mouth daily  Qty: 60 tablet, Refills: 0      gabapentin (NEURONTIN) 300 MG capsule Take 300 mg by mouth 3 times daily. fluticasone-salmeterol (ADVAIR) 500-50 MCG/DOSE diskus inhaler Inhale 1 puff into the lungs 2 times daily       ipratropium-albuterol (DUONEB) 0.5-2.5 (3) MG/3ML SOLN nebulizer solution Inhale 3 mLs into the lungs every 2 hours as needed for Shortness of Breath      albuterol sulfate  (90 Base) MCG/ACT inhaler Inhale 2 puffs into the lungs every 4 hours as needed for Shortness of Breath              Time Spent on discharge is more than 30 minutes in the examination, evaluation, counseling and review of medications and discharge plan. Signed:    Billy Ramos MD   9/16/2021      Thank you Gary Herrera MD for the opportunity to be involved in this patient's care. If you have any questions or concerns please feel free to contact me at [phone].

## 2021-09-16 NOTE — CARE COORDINATION
DISCHARGE SUMMARY     DATE OF DISCHARGE: 9/16/21    DISCHARGE DESTINATION: [facility], AN AFFILIATE OF [facility]    Phone Number: [phone]    FACILITY      Report Number: [id number]    Precert Obtained: by facility    Hens Completed: not needed    Covid test: Not needed.     Notified:         TRANSPORTATION: [facility]      Time: 4pm    Phone Number: [phone]

## 2021-09-24 NOTE — PROGRESS NOTES
[facility]   Cardiac Consultation    Referring Provider:  Venita Hooper MD     Chief Complaint   Patient presents with    Atrial Fibrillation     afib - no cardiac complaints     Other     respiratory issues     HPI:  Imani Bacon is a 73 y.o. femalefor COPD, CKD, HTN, and HLD who presented to the ED following a mechanical fall. The patient was intubated and sedated so history was obtained through the chart. She was admitted over the weekend after sustaining a fall at home without any mention of syncope. She was found to be hypoxic, BiPap was ordered but the patient apparently took it off. She was found unresponsive with agonal breathing, a rapid response was called and the patient was intubated. She did go into atrial fibrillation with RVR on 8/9 for about 6 hours. She was on a Cardizem drip briefly and did convert to SR. She again had atrial fibrillation on 8/14. I saw when intubated 8/20. She has been followed by others since. She was hospitalized at [facility] AT [place] from 9/10/2021-9/16/2021 for respiratory failure again. Afib mentioned . She presents to office today as routine follow up in a wheelchair wearing oxygen nc. EKG today shows SR. She denies having any known reoccurrence of the atrial fibrillation. Could not find any clear indication of afib this last hospitalization, but would not be surprised. She had been on [address] but developed GI bleeding and multiple polyps removed her physicians months( Dr. Holley Lopez). The OACs have apparently not been restarted even though the nursing home did not send meds list. She is currently residing at [facility]. She states she is just there temporarily and not long term and the plan is for her to return home to live with her . Patient denies exertional chest pain/pressure, dyspnea at rest,, PND, orthopnea, palpitations, lightheadedness, weight changes, changes in LE edema, and syncope.          Past Medical History:   has a past medical history of Acute kidney failure ([facility] [address] Chronic kidney disease, COPD (chronic obstructive pulmonary disease) ([facility] [address] ESBL (extended spectrum beta-lactamase) producing bacteria infection, Hyperkalemia, Hyperlipidemia, Hypertension, Hyponatremia, Kidney stone, Major depression, Melena, MRSA nasal colonization, Obesity, Oxygen deficit, PAF (paroxysmal atrial fibrillation) ([facility] [address] and Sepsis ([facility] [address] Surgical History:   has a past surgical history that includes Cholecystectomy; Hip fracture surgery (Left, 8/10/2020); Cystoscopy (Left, 2020); Upper gastrointestinal endoscopy (N/A, 2020); sigmoidoscopy (N/A, 3/2/2021); and Colonoscopy (N/A, 2021). Social History:   reports that she has been smoking cigarettes. She has been smoking about 1.50 packs per day. She has never used smokeless tobacco. She reports current drug use. Drug: Marijuana. She reports that she does not drink alcohol. Family History:  family history is not on file. Home Medications:  Outpatient Encounter Medications as of 2021   Medication Sig Dispense Refill    traMADol (ULTRAM) 50 MG tablet Take 1 tablet by mouth every 6 hours as needed for Pain for up to 30 days. 100 tablet 3    predniSONE (DELTASONE) 10 MG tablet Take 2 tablets by mouth once a day for 3 days, then take 1 tablet by mouth once a day for 3 days, then take half a tablet by mouth once a day for 4 days and then stop 11 tablet 0    [] ertapenem (INVANZ) infusion Infuse 1,000 mg intravenously every 24 hours for 8 days Compound per protocol.  8 g 0    cyclobenzaprine (FLEXERIL) 10 MG tablet Take 10 mg by mouth every 6 hours as needed for Muscle spasms      DULoxetine (CYMBALTA) 60 MG extended release capsule Take 60 mg by mouth daily      acetaminophen (TYLENOL) 325 MG tablet Take 650 mg by mouth every 6 hours as needed for Pain or Fever       hydrocortisone (ANUSOL-HC) 25 MG suppository Place 25 mg rectally every 6 hours as needed for Hemorrhoids       bisacodyl (DULCOLAX) 10 MG suppository Place 10 mg rectally daily as needed for Constipation       magnesium hydroxide (MILK OF MAGNESIA) 400 MG/5ML suspension Take 30 mLs by mouth daily as needed for Constipation       polyethylene glycol (GLYCOLAX) 17 g packet Take 17 g by mouth daily as needed for Constipation      nystatin (MYCOSTATIN) 521838 UNIT/GM powder Apply topically 4 times daily Apply topically 4 times daily.  sennosides-docusate sodium (SENOKOT-S) 8.6-50 MG tablet Take 2 tablets by mouth every 12 hours as needed for Constipation       lactobacillus (CULTURELLE) capsule Take 2 capsules by mouth 2 times daily (with meals) 30 capsule 0    pantoprazole (PROTONIX) 40 MG tablet Take 1 tablet by mouth daily 30 tablet 0    acetaZOLAMIDE (DIAMOX) 250 MG tablet Take 250 mg by mouth daily      potassium chloride (KLOR-CON M) 20 MEQ extended release tablet Take 20 mEq by mouth daily      atorvastatin (LIPITOR) 40 MG tablet Take 40 mg by mouth daily      dilTIAZem (CARDIZEM CD) 180 MG extended release capsule Take 1 capsule by mouth daily 30 capsule 3    furosemide (LASIX) 40 MG tablet Take 1 tablet by mouth daily 60 tablet 0    gabapentin (NEURONTIN) 300 MG capsule Take 300 mg by mouth 3 times daily.  fluticasone-salmeterol (ADVAIR) 500-50 MCG/DOSE diskus inhaler Inhale 1 puff into the lungs 2 times daily       ipratropium-albuterol (DUONEB) 0.5-2.5 (3) MG/3ML SOLN nebulizer solution Inhale 3 mLs into the lungs every 2 hours as needed for Shortness of Breath      albuterol sulfate  (90 Base) MCG/ACT inhaler Inhale 2 puffs into the lungs every 4 hours as needed for Shortness of Breath        No facility-administered encounter medications on file as of 9/28/2021. Allergies:  Patient has no known allergies. Review of Systems   Constitutional: Negative for activity change and appetite change. HENT: Negative for facial swelling and neck pain. Eyes: Negative for discharge and itching. Respiratory: chronic shortness of breath     Cardiovascular: Negative for palpitations. Negative for chest pain. Negative for leg swelling. Gastrointestinal: Negative for abdominal pain and abdominal distention. Genitourinary: Negative. Musculoskeletal: Negative. Skin: Negative for color change and pallor. Neurological: Negative for dizziness, syncope and light-headedness. Hematological: Negative. Psychiatric/Behavioral: Negative for behavioral problems and agitation. /68 (Site: Left Lower Arm, Position: Sitting)   Pulse 71   Ht 5' 5\" (1.651 m)   SpO2 96%   BMI 42.41 kg/m²     Objective:  Physical Exam   Nursing note and vitals reviewed. Constitutional: She appears well-developed and well-nourished. obese  Head: atraumatic. Eyes: Right eye exhibits no discharge. Left eye exhibits no discharge. Neck: Neck supple. Cardiovascular: Normal rate, regular rhythm and normal heart sounds. Pulmonary/Chest: Effort normal and breath sounds normal.   Abdominal: Soft. Musculoskeletal: She exhibits no edema. in Wheelchair with NC O2  Neurological: She is alert without any gross abnormalities. Skin: Skin is warm and dry. Psychiatric: She has a normal mood and affect. Diagnostics:    Echo 5/5/2013   IMPRESSION:  Concentric left ventricular hypertrophy with normal systolic function. Ejection fraction is 60%. Mild tricuspid regurgitation and mild pulmonary hypertension. EKG 9/28/2021 reviewed, sinus rhythm with v-rate of 69 bpm with QRS duration 100 ms.      5/22/2019  Negative ECG for ischemia with pharmocologic stress. Procedure: 34766 Pharmacologic stress with Isotope. Study Protocol:   One day rest/stress acquisition. Summary: 1/19/2020 [facility]  Overall left ventricular ejection fraction is estimated to be 55-60%. The left ventricular wall motion is normal.   There is mild concentric left ventricular hypertrophy. The diastolic function is impaired and classified as Grade 1 (impaired   relaxation). The Aortic Valve is mildly calcified. The systolic pulmonary artery pressure cannot be estimated due to insufficient   tricuspid regurgitation. The left atrium is mildly dilated. Assessment:  1. Paroxysmal atrial fibrillation (HCC)    2. Chronic respiratory failure with hypoxia (HCC)         1) Paroxysmal atrial fibrillation  EKG today shows SR. She has a CHADS2-VASc at least 3 (gender,hypertension, age). Not currently on [address] due to history of GI bleed. Asked patient to review with GI, Dr. Evelyn Riddle noted in chart. According to the ACC/HRS guidelines, with a [id number] score of 3  she  indicated for [address] at this time. However, given her history of GI bleed we would need to get clearance from gastroenterologist before starting on [address]. Saw patient over a year ago when in the hospital so sick. Her atrial fib is likely on basis of severe respiratory disease and to my knowledge asymptomatic  Not sure of any recently. Wrote note to nursing home asking that GI be asked about [address]. If her physicians wish for us to pursue, will do so. Note she has follow up with Dr. Karishma Nuñez for pulmonary process. Plan:    1) Siobhan Tolentino to get list of her current medications and encourage gi follow up.   2) Follow up in one year otherwise    Xochitl Black. Chemo DC Cardiac Electrophysiology  [address] [facility]  [address] [facility], [address] [place] [address]  [phone]    This note was scribed in the presence of Felisa Moura MD by Kyung Healy RN. The scribes documentation has been prepared under my direction and personally reviewed by me in its entirety. I confirm that the note above accurately reflects all work, treatment, procedures, and medical decision making performed by me.

## 2021-09-28 ENCOUNTER — OFFICE VISIT (OUTPATIENT)
Dept: CARDIOLOGY CLINIC | Age: 71
End: 2021-09-28
Payer: MEDICARE

## 2021-09-28 VITALS
HEIGHT: 65 IN | HEART RATE: 71 BPM | SYSTOLIC BLOOD PRESSURE: 124 MMHG | OXYGEN SATURATION: 96 % | DIASTOLIC BLOOD PRESSURE: 68 MMHG | BODY MASS INDEX: 42.41 KG/M2

## 2021-09-28 DIAGNOSIS — J96.11 CHRONIC RESPIRATORY FAILURE WITH HYPOXIA (HCC): ICD-10-CM

## 2021-09-28 DIAGNOSIS — I48.0 PAROXYSMAL ATRIAL FIBRILLATION (HCC): Primary | ICD-10-CM

## 2021-09-28 PROCEDURE — G8428 CUR MEDS NOT DOCUMENT: HCPCS | Performed by: INTERNAL MEDICINE

## 2021-09-28 PROCEDURE — 3017F COLORECTAL CA SCREEN DOC REV: CPT | Performed by: INTERNAL MEDICINE

## 2021-09-28 PROCEDURE — 1123F ACP DISCUSS/DSCN MKR DOCD: CPT | Performed by: INTERNAL MEDICINE

## 2021-09-28 PROCEDURE — 4004F PT TOBACCO SCREEN RCVD TLK: CPT | Performed by: INTERNAL MEDICINE

## 2021-09-28 PROCEDURE — G8417 CALC BMI ABV UP PARAM F/U: HCPCS | Performed by: INTERNAL MEDICINE

## 2021-09-28 PROCEDURE — 1111F DSCHRG MED/CURRENT MED MERGE: CPT | Performed by: INTERNAL MEDICINE

## 2021-09-28 PROCEDURE — 93000 ELECTROCARDIOGRAM COMPLETE: CPT | Performed by: INTERNAL MEDICINE

## 2021-09-28 PROCEDURE — 4040F PNEUMOC VAC/ADMIN/RCVD: CPT | Performed by: INTERNAL MEDICINE

## 2021-09-28 PROCEDURE — 99214 OFFICE O/P EST MOD 30 MIN: CPT | Performed by: INTERNAL MEDICINE

## 2021-09-28 PROCEDURE — G8400 PT W/DXA NO RESULTS DOC: HCPCS | Performed by: INTERNAL MEDICINE

## 2021-09-28 PROCEDURE — 1090F PRES/ABSN URINE INCON ASSESS: CPT | Performed by: INTERNAL MEDICINE

## 2021-12-17 ENCOUNTER — APPOINTMENT (OUTPATIENT)
Dept: GENERAL RADIOLOGY | Age: 71
DRG: 871 | End: 2021-12-17
Payer: MEDICARE

## 2021-12-17 ENCOUNTER — HOSPITAL ENCOUNTER (INPATIENT)
Age: 71
LOS: 6 days | Discharge: NURSING, REHAB OR LONG-TERM CARE | DRG: 871 | End: 2021-12-23
Attending: EMERGENCY MEDICINE | Admitting: STUDENT IN AN ORGANIZED HEALTH CARE EDUCATION/TRAINING PROGRAM
Payer: MEDICARE

## 2021-12-17 ENCOUNTER — APPOINTMENT (OUTPATIENT)
Dept: CT IMAGING | Age: 71
DRG: 871 | End: 2021-12-17
Payer: MEDICARE

## 2021-12-17 DIAGNOSIS — R06.03 RESPIRATORY DISTRESS: ICD-10-CM

## 2021-12-17 DIAGNOSIS — N39.0 URINARY TRACT INFECTION WITHOUT HEMATURIA, SITE UNSPECIFIED: ICD-10-CM

## 2021-12-17 DIAGNOSIS — G89.29 OTHER CHRONIC PAIN: ICD-10-CM

## 2021-12-17 DIAGNOSIS — A41.9 SEPTICEMIA (HCC): Primary | ICD-10-CM

## 2021-12-17 PROBLEM — J69.0 ASPIRATION PNEUMONIA (HCC): Status: ACTIVE | Noted: 2021-12-17

## 2021-12-17 PROBLEM — J96.00 ACUTE RESPIRATORY FAILURE (HCC): Status: ACTIVE | Noted: 2021-12-17

## 2021-12-17 PROBLEM — I50.30 DIASTOLIC CHF (HCC): Status: ACTIVE | Noted: 2021-12-17

## 2021-12-17 LAB
A/G RATIO: 0.6 (ref 1.1–2.2)
ALBUMIN SERPL-MCNC: 2.8 G/DL (ref 3.4–5)
ALP BLD-CCNC: 187 U/L (ref 40–129)
ALT SERPL-CCNC: 7 U/L (ref 10–40)
ANION GAP SERPL CALCULATED.3IONS-SCNC: 14 MMOL/L (ref 3–16)
AST SERPL-CCNC: 12 U/L (ref 15–37)
BACTERIA: ABNORMAL /HPF
BASE EXCESS ARTERIAL: -3.4 MMOL/L (ref -3–3)
BASOPHILS ABSOLUTE: 0.1 K/UL (ref 0–0.2)
BASOPHILS RELATIVE PERCENT: 0.2 %
BILIRUB SERPL-MCNC: 0.4 MG/DL (ref 0–1)
BILIRUBIN URINE: ABNORMAL
BLOOD, URINE: ABNORMAL
BUN BLDV-MCNC: 19 MG/DL (ref 7–20)
CALCIUM SERPL-MCNC: 9 MG/DL (ref 8.3–10.6)
CARBOXYHEMOGLOBIN ARTERIAL: 1.1 % (ref 0–1.5)
CHLORIDE BLD-SCNC: 97 MMOL/L (ref 99–110)
CLARITY: ABNORMAL
CO2: 25 MMOL/L (ref 21–32)
COLOR: ABNORMAL
COMMENT UA: ABNORMAL
CREAT SERPL-MCNC: 0.9 MG/DL (ref 0.6–1.2)
EOSINOPHILS ABSOLUTE: 0 K/UL (ref 0–0.6)
EOSINOPHILS RELATIVE PERCENT: 0 %
EPITHELIAL CELLS, UA: 33 /HPF (ref 0–5)
GFR AFRICAN AMERICAN: >60
GFR NON-AFRICAN AMERICAN: >60
GLUCOSE BLD-MCNC: 100 MG/DL (ref 70–99)
GLUCOSE URINE: NEGATIVE MG/DL
HCO3 ARTERIAL: 23.9 MMOL/L (ref 21–29)
HCT VFR BLD CALC: 43.9 % (ref 36–48)
HEMOGLOBIN, ART, EXTENDED: 13.6 G/DL (ref 12–16)
HEMOGLOBIN: 12.8 G/DL (ref 12–16)
KETONES, URINE: ABNORMAL MG/DL
LACTIC ACID: 4.9 MMOL/L (ref 0.4–2)
LEUKOCYTE ESTERASE, URINE: ABNORMAL
LYMPHOCYTES ABSOLUTE: 0.4 K/UL (ref 1–5.1)
LYMPHOCYTES RELATIVE PERCENT: 1.2 %
MCH RBC QN AUTO: 22.1 PG (ref 26–34)
MCHC RBC AUTO-ENTMCNC: 29.1 G/DL (ref 31–36)
MCV RBC AUTO: 76 FL (ref 80–100)
METHEMOGLOBIN ARTERIAL: 0.7 %
MICROSCOPIC EXAMINATION: YES
MONOCYTES ABSOLUTE: 0.3 K/UL (ref 0–1.3)
MONOCYTES RELATIVE PERCENT: 1 %
NEUTROPHILS ABSOLUTE: 33.3 K/UL (ref 1.7–7.7)
NEUTROPHILS RELATIVE PERCENT: 97.6 %
NITRITE, URINE: POSITIVE
O2 SAT, ARTERIAL: 97.2 %
O2 THERAPY: ABNORMAL
PCO2 ARTERIAL: 51.5 MMHG (ref 35–45)
PDW BLD-RTO: 19.4 % (ref 12.4–15.4)
PH ARTERIAL: 7.28 (ref 7.35–7.45)
PH UA: 8 (ref 5–8)
PLATELET # BLD: 471 K/UL (ref 135–450)
PMV BLD AUTO: 7.6 FL (ref 5–10.5)
PO2 ARTERIAL: 101 MMHG (ref 75–108)
POTASSIUM SERPL-SCNC: 4.2 MMOL/L (ref 3.5–5.1)
PRO-BNP: 1527 PG/ML (ref 0–124)
PROTEIN UA: >=300 MG/DL
RAPID INFLUENZA  B AGN: NEGATIVE
RAPID INFLUENZA A AGN: NEGATIVE
RBC # BLD: 5.78 M/UL (ref 4–5.2)
RBC UA: >100 /HPF (ref 0–4)
SARS-COV-2, NAAT: NOT DETECTED
SODIUM BLD-SCNC: 136 MMOL/L (ref 136–145)
SPECIFIC GRAVITY UA: 1.01 (ref 1–1.03)
TCO2 ARTERIAL: 25.5 MMOL/L
TOTAL PROTEIN: 7.6 G/DL (ref 6.4–8.2)
TROPONIN: <0.01 NG/ML
URINE REFLEX TO CULTURE: YES
URINE TYPE: ABNORMAL
UROBILINOGEN, URINE: 1 E.U./DL
WBC # BLD: 34.1 K/UL (ref 4–11)
WBC UA: >900 /HPF (ref 0–5)

## 2021-12-17 PROCEDURE — 87040 BLOOD CULTURE FOR BACTERIA: CPT

## 2021-12-17 PROCEDURE — 6370000000 HC RX 637 (ALT 250 FOR IP): Performed by: PHYSICIAN ASSISTANT

## 2021-12-17 PROCEDURE — 84484 ASSAY OF TROPONIN QUANT: CPT

## 2021-12-17 PROCEDURE — 87086 URINE CULTURE/COLONY COUNT: CPT

## 2021-12-17 PROCEDURE — 80053 COMPREHEN METABOLIC PANEL: CPT

## 2021-12-17 PROCEDURE — 74176 CT ABD & PELVIS W/O CONTRAST: CPT

## 2021-12-17 PROCEDURE — 87635 SARS-COV-2 COVID-19 AMP PRB: CPT

## 2021-12-17 PROCEDURE — 87804 INFLUENZA ASSAY W/OPTIC: CPT

## 2021-12-17 PROCEDURE — 2700000000 HC OXYGEN THERAPY PER DAY

## 2021-12-17 PROCEDURE — 83605 ASSAY OF LACTIC ACID: CPT

## 2021-12-17 PROCEDURE — 94761 N-INVAS EAR/PLS OXIMETRY MLT: CPT

## 2021-12-17 PROCEDURE — 94640 AIRWAY INHALATION TREATMENT: CPT

## 2021-12-17 PROCEDURE — 85025 COMPLETE CBC W/AUTO DIFF WBC: CPT

## 2021-12-17 PROCEDURE — 96365 THER/PROPH/DIAG IV INF INIT: CPT

## 2021-12-17 PROCEDURE — 96367 TX/PROPH/DG ADDL SEQ IV INF: CPT

## 2021-12-17 PROCEDURE — 93005 ELECTROCARDIOGRAM TRACING: CPT | Performed by: PHYSICIAN ASSISTANT

## 2021-12-17 PROCEDURE — XW033N5 INTRODUCTION OF MEROPENEM-VABORBACTAM ANTI-INFECTIVE INTO PERIPHERAL VEIN, PERCUTANEOUS APPROACH, NEW TECHNOLOGY GROUP 5: ICD-10-PCS | Performed by: EMERGENCY MEDICINE

## 2021-12-17 PROCEDURE — 2580000003 HC RX 258: Performed by: PHYSICIAN ASSISTANT

## 2021-12-17 PROCEDURE — 36415 COLL VENOUS BLD VENIPUNCTURE: CPT

## 2021-12-17 PROCEDURE — 81001 URINALYSIS AUTO W/SCOPE: CPT

## 2021-12-17 PROCEDURE — 96375 TX/PRO/DX INJ NEW DRUG ADDON: CPT

## 2021-12-17 PROCEDURE — 71045 X-RAY EXAM CHEST 1 VIEW: CPT

## 2021-12-17 PROCEDURE — 99285 EMERGENCY DEPT VISIT HI MDM: CPT

## 2021-12-17 PROCEDURE — 94660 CPAP INITIATION&MGMT: CPT

## 2021-12-17 PROCEDURE — 2060000000 HC ICU INTERMEDIATE R&B

## 2021-12-17 PROCEDURE — 82803 BLOOD GASES ANY COMBINATION: CPT

## 2021-12-17 PROCEDURE — 96366 THER/PROPH/DIAG IV INF ADDON: CPT

## 2021-12-17 PROCEDURE — 83880 ASSAY OF NATRIURETIC PEPTIDE: CPT

## 2021-12-17 PROCEDURE — 6360000002 HC RX W HCPCS: Performed by: PHYSICIAN ASSISTANT

## 2021-12-17 RX ORDER — SODIUM CHLORIDE 9 MG/ML
INJECTION, SOLUTION INTRAVENOUS CONTINUOUS
Status: ACTIVE | OUTPATIENT
Start: 2021-12-18 | End: 2021-12-18

## 2021-12-17 RX ORDER — ACETAMINOPHEN 325 MG/1
650 TABLET ORAL ONCE
Status: COMPLETED | OUTPATIENT
Start: 2021-12-17 | End: 2021-12-17

## 2021-12-17 RX ORDER — 0.9 % SODIUM CHLORIDE 0.9 %
30 INTRAVENOUS SOLUTION INTRAVENOUS ONCE
Status: DISCONTINUED | OUTPATIENT
Start: 2021-12-17 | End: 2021-12-17

## 2021-12-17 RX ORDER — IPRATROPIUM BROMIDE AND ALBUTEROL SULFATE 2.5; .5 MG/3ML; MG/3ML
2 SOLUTION RESPIRATORY (INHALATION) ONCE
Status: COMPLETED | OUTPATIENT
Start: 2021-12-17 | End: 2021-12-17

## 2021-12-17 RX ORDER — METHYLPREDNISOLONE SODIUM SUCCINATE 125 MG/2ML
125 INJECTION, POWDER, LYOPHILIZED, FOR SOLUTION INTRAMUSCULAR; INTRAVENOUS ONCE
Status: COMPLETED | OUTPATIENT
Start: 2021-12-17 | End: 2021-12-17

## 2021-12-17 RX ORDER — 0.9 % SODIUM CHLORIDE 0.9 %
30 INTRAVENOUS SOLUTION INTRAVENOUS ONCE
Status: COMPLETED | OUTPATIENT
Start: 2021-12-17 | End: 2021-12-18

## 2021-12-17 RX ADMIN — MEROPENEM 1000 MG: 1 INJECTION, POWDER, FOR SOLUTION INTRAVENOUS at 21:23

## 2021-12-17 RX ADMIN — IPRATROPIUM BROMIDE AND ALBUTEROL SULFATE 2 AMPULE: .5; 2.5 SOLUTION RESPIRATORY (INHALATION) at 19:52

## 2021-12-17 RX ADMIN — ACETAMINOPHEN 650 MG: 325 TABLET ORAL at 21:53

## 2021-12-17 RX ADMIN — SODIUM CHLORIDE 1710 ML: 9 INJECTION, SOLUTION INTRAVENOUS at 21:53

## 2021-12-17 RX ADMIN — METHYLPREDNISOLONE SODIUM SUCCINATE 125 MG: 125 INJECTION, POWDER, FOR SOLUTION INTRAMUSCULAR; INTRAVENOUS at 20:05

## 2021-12-17 RX ADMIN — VANCOMYCIN HYDROCHLORIDE 1500 MG: 10 INJECTION, POWDER, LYOPHILIZED, FOR SOLUTION INTRAVENOUS at 21:57

## 2021-12-17 ASSESSMENT — PAIN SCALES - GENERAL: PAINLEVEL_OUTOF10: 0

## 2021-12-17 ASSESSMENT — ENCOUNTER SYMPTOMS
SHORTNESS OF BREATH: 1
COLOR CHANGE: 0
VOMITING: 0
COUGH: 1

## 2021-12-18 LAB
ANION GAP SERPL CALCULATED.3IONS-SCNC: 9 MMOL/L (ref 3–16)
BILIRUBIN URINE: ABNORMAL
BLOOD, URINE: ABNORMAL
BUN BLDV-MCNC: 18 MG/DL (ref 7–20)
CALCIUM SERPL-MCNC: 8.4 MG/DL (ref 8.3–10.6)
CHLORIDE BLD-SCNC: 105 MMOL/L (ref 99–110)
CLARITY: ABNORMAL
CO2: 24 MMOL/L (ref 21–32)
COLOR: ABNORMAL
CREAT SERPL-MCNC: 0.9 MG/DL (ref 0.6–1.2)
EKG ATRIAL RATE: 111 BPM
EKG DIAGNOSIS: NORMAL
EKG P AXIS: 77 DEGREES
EKG P-R INTERVAL: 122 MS
EKG Q-T INTERVAL: 330 MS
EKG QRS DURATION: 82 MS
EKG QTC CALCULATION (BAZETT): 446 MS
EKG R AXIS: 33 DEGREES
EKG T AXIS: 50 DEGREES
EKG VENTRICULAR RATE: 110 BPM
EPITHELIAL CELLS, UA: 2 /HPF (ref 0–5)
GFR AFRICAN AMERICAN: >60
GFR NON-AFRICAN AMERICAN: >60
GLUCOSE BLD-MCNC: 179 MG/DL (ref 70–99)
GLUCOSE URINE: NEGATIVE MG/DL
HYALINE CASTS: 8 /LPF (ref 0–8)
KETONES, URINE: ABNORMAL MG/DL
LACTIC ACID: 1 MMOL/L (ref 0.4–2)
LEUKOCYTE ESTERASE, URINE: ABNORMAL
MAGNESIUM: 1.8 MG/DL (ref 1.8–2.4)
MICROSCOPIC EXAMINATION: YES
NITRITE, URINE: POSITIVE
PH UA: 6 (ref 5–8)
POTASSIUM SERPL-SCNC: 4.4 MMOL/L (ref 3.5–5.1)
PROCALCITONIN: 34.17 NG/ML (ref 0–0.15)
PROTEIN UA: 30 MG/DL
RBC UA: 639 /HPF (ref 0–4)
SODIUM BLD-SCNC: 138 MMOL/L (ref 136–145)
SPECIFIC GRAVITY UA: 1.02 (ref 1–1.03)
URINE TYPE: ABNORMAL
UROBILINOGEN, URINE: 1 E.U./DL
WBC UA: 136 /HPF (ref 0–5)

## 2021-12-18 PROCEDURE — 81001 URINALYSIS AUTO W/SCOPE: CPT

## 2021-12-18 PROCEDURE — 51702 INSERT TEMP BLADDER CATH: CPT

## 2021-12-18 PROCEDURE — 83735 ASSAY OF MAGNESIUM: CPT

## 2021-12-18 PROCEDURE — 6360000002 HC RX W HCPCS: Performed by: STUDENT IN AN ORGANIZED HEALTH CARE EDUCATION/TRAINING PROGRAM

## 2021-12-18 PROCEDURE — 92610 EVALUATE SWALLOWING FUNCTION: CPT

## 2021-12-18 PROCEDURE — 93005 ELECTROCARDIOGRAM TRACING: CPT | Performed by: NURSE PRACTITIONER

## 2021-12-18 PROCEDURE — 85025 COMPLETE CBC W/AUTO DIFF WBC: CPT

## 2021-12-18 PROCEDURE — 93010 ELECTROCARDIOGRAM REPORT: CPT | Performed by: INTERNAL MEDICINE

## 2021-12-18 PROCEDURE — 36415 COLL VENOUS BLD VENIPUNCTURE: CPT

## 2021-12-18 PROCEDURE — 94640 AIRWAY INHALATION TREATMENT: CPT

## 2021-12-18 PROCEDURE — 02HV33Z INSERTION OF INFUSION DEVICE INTO SUPERIOR VENA CAVA, PERCUTANEOUS APPROACH: ICD-10-PCS | Performed by: STUDENT IN AN ORGANIZED HEALTH CARE EDUCATION/TRAINING PROGRAM

## 2021-12-18 PROCEDURE — 87040 BLOOD CULTURE FOR BACTERIA: CPT

## 2021-12-18 PROCEDURE — 2580000003 HC RX 258: Performed by: STUDENT IN AN ORGANIZED HEALTH CARE EDUCATION/TRAINING PROGRAM

## 2021-12-18 PROCEDURE — 99223 1ST HOSP IP/OBS HIGH 75: CPT | Performed by: INTERNAL MEDICINE

## 2021-12-18 PROCEDURE — 2580000003 HC RX 258: Performed by: INTERNAL MEDICINE

## 2021-12-18 PROCEDURE — 2500000003 HC RX 250 WO HCPCS: Performed by: NURSE PRACTITIONER

## 2021-12-18 PROCEDURE — 6370000000 HC RX 637 (ALT 250 FOR IP): Performed by: STUDENT IN AN ORGANIZED HEALTH CARE EDUCATION/TRAINING PROGRAM

## 2021-12-18 PROCEDURE — 83605 ASSAY OF LACTIC ACID: CPT

## 2021-12-18 PROCEDURE — 2700000000 HC OXYGEN THERAPY PER DAY

## 2021-12-18 PROCEDURE — 84145 PROCALCITONIN (PCT): CPT

## 2021-12-18 PROCEDURE — 94761 N-INVAS EAR/PLS OXIMETRY MLT: CPT

## 2021-12-18 PROCEDURE — 2060000000 HC ICU INTERMEDIATE R&B

## 2021-12-18 PROCEDURE — 87641 MR-STAPH DNA AMP PROBE: CPT

## 2021-12-18 PROCEDURE — 87449 NOS EACH ORGANISM AG IA: CPT

## 2021-12-18 PROCEDURE — 6360000002 HC RX W HCPCS: Performed by: INTERNAL MEDICINE

## 2021-12-18 PROCEDURE — 80048 BASIC METABOLIC PNL TOTAL CA: CPT

## 2021-12-18 PROCEDURE — B548ZZA ULTRASONOGRAPHY OF SUPERIOR VENA CAVA, GUIDANCE: ICD-10-PCS | Performed by: STUDENT IN AN ORGANIZED HEALTH CARE EDUCATION/TRAINING PROGRAM

## 2021-12-18 RX ORDER — SODIUM CHLORIDE 9 MG/ML
INJECTION, SOLUTION INTRAVENOUS CONTINUOUS
Status: DISCONTINUED | OUTPATIENT
Start: 2021-12-18 | End: 2021-12-23 | Stop reason: HOSPADM

## 2021-12-18 RX ORDER — IPRATROPIUM BROMIDE AND ALBUTEROL SULFATE 2.5; .5 MG/3ML; MG/3ML
3 SOLUTION RESPIRATORY (INHALATION) EVERY 4 HOURS PRN
Status: DISCONTINUED | OUTPATIENT
Start: 2021-12-18 | End: 2021-12-23 | Stop reason: HOSPADM

## 2021-12-18 RX ORDER — ONDANSETRON 2 MG/ML
4 INJECTION INTRAMUSCULAR; INTRAVENOUS EVERY 6 HOURS PRN
Status: DISCONTINUED | OUTPATIENT
Start: 2021-12-18 | End: 2021-12-23 | Stop reason: HOSPADM

## 2021-12-18 RX ORDER — SODIUM CHLORIDE 9 MG/ML
25 INJECTION, SOLUTION INTRAVENOUS PRN
Status: DISCONTINUED | OUTPATIENT
Start: 2021-12-18 | End: 2021-12-23 | Stop reason: HOSPADM

## 2021-12-18 RX ORDER — SODIUM CHLORIDE 0.9 % (FLUSH) 0.9 %
5-40 SYRINGE (ML) INJECTION PRN
Status: DISCONTINUED | OUTPATIENT
Start: 2021-12-18 | End: 2021-12-23 | Stop reason: HOSPADM

## 2021-12-18 RX ORDER — SODIUM CHLORIDE 0.9 % (FLUSH) 0.9 %
5-40 SYRINGE (ML) INJECTION EVERY 12 HOURS SCHEDULED
Status: DISCONTINUED | OUTPATIENT
Start: 2021-12-18 | End: 2021-12-23 | Stop reason: HOSPADM

## 2021-12-18 RX ORDER — DULOXETIN HYDROCHLORIDE 60 MG/1
60 CAPSULE, DELAYED RELEASE ORAL DAILY
Status: DISCONTINUED | OUTPATIENT
Start: 2021-12-18 | End: 2021-12-23 | Stop reason: HOSPADM

## 2021-12-18 RX ORDER — GABAPENTIN 300 MG/1
300 CAPSULE ORAL 3 TIMES DAILY
Status: DISCONTINUED | OUTPATIENT
Start: 2021-12-18 | End: 2021-12-23 | Stop reason: HOSPADM

## 2021-12-18 RX ORDER — LIDOCAINE HYDROCHLORIDE 10 MG/ML
5 INJECTION, SOLUTION EPIDURAL; INFILTRATION; INTRACAUDAL; PERINEURAL ONCE
Status: DISCONTINUED | OUTPATIENT
Start: 2021-12-18 | End: 2021-12-23 | Stop reason: HOSPADM

## 2021-12-18 RX ORDER — ATORVASTATIN CALCIUM 40 MG/1
40 TABLET, FILM COATED ORAL DAILY
Status: DISCONTINUED | OUTPATIENT
Start: 2021-12-18 | End: 2021-12-23 | Stop reason: HOSPADM

## 2021-12-18 RX ORDER — ACETAMINOPHEN 650 MG/1
650 SUPPOSITORY RECTAL EVERY 6 HOURS PRN
Status: DISCONTINUED | OUTPATIENT
Start: 2021-12-18 | End: 2021-12-23 | Stop reason: HOSPADM

## 2021-12-18 RX ORDER — PANTOPRAZOLE SODIUM 40 MG/1
40 TABLET, DELAYED RELEASE ORAL DAILY
Status: DISCONTINUED | OUTPATIENT
Start: 2021-12-18 | End: 2021-12-23 | Stop reason: HOSPADM

## 2021-12-18 RX ORDER — BUDESONIDE AND FORMOTEROL FUMARATE DIHYDRATE 160; 4.5 UG/1; UG/1
2 AEROSOL RESPIRATORY (INHALATION) 2 TIMES DAILY
Status: DISCONTINUED | OUTPATIENT
Start: 2021-12-18 | End: 2021-12-23 | Stop reason: HOSPADM

## 2021-12-18 RX ORDER — METOPROLOL TARTRATE 5 MG/5ML
5 INJECTION INTRAVENOUS EVERY 6 HOURS PRN
Status: DISCONTINUED | OUTPATIENT
Start: 2021-12-18 | End: 2021-12-23 | Stop reason: HOSPADM

## 2021-12-18 RX ORDER — ACETAMINOPHEN 325 MG/1
650 TABLET ORAL EVERY 6 HOURS PRN
Status: DISCONTINUED | OUTPATIENT
Start: 2021-12-18 | End: 2021-12-23 | Stop reason: HOSPADM

## 2021-12-18 RX ORDER — SENNA AND DOCUSATE SODIUM 50; 8.6 MG/1; MG/1
2 TABLET, FILM COATED ORAL EVERY 12 HOURS PRN
Status: DISCONTINUED | OUTPATIENT
Start: 2021-12-18 | End: 2021-12-23 | Stop reason: HOSPADM

## 2021-12-18 RX ORDER — LACTOBACILLUS RHAMNOSUS GG 10B CELL
1 CAPSULE ORAL
Status: DISCONTINUED | OUTPATIENT
Start: 2021-12-19 | End: 2021-12-23 | Stop reason: HOSPADM

## 2021-12-18 RX ORDER — POLYETHYLENE GLYCOL 3350 17 G/17G
17 POWDER, FOR SOLUTION ORAL DAILY PRN
Status: DISCONTINUED | OUTPATIENT
Start: 2021-12-18 | End: 2021-12-23 | Stop reason: HOSPADM

## 2021-12-18 RX ADMIN — SODIUM CHLORIDE, PRESERVATIVE FREE 20 ML: 5 INJECTION INTRAVENOUS at 14:31

## 2021-12-18 RX ADMIN — GABAPENTIN 300 MG: 300 CAPSULE ORAL at 08:45

## 2021-12-18 RX ADMIN — ENOXAPARIN SODIUM 40 MG: 100 INJECTION SUBCUTANEOUS at 10:04

## 2021-12-18 RX ADMIN — SODIUM CHLORIDE, PRESERVATIVE FREE 10 ML: 5 INJECTION INTRAVENOUS at 10:05

## 2021-12-18 RX ADMIN — GABAPENTIN 300 MG: 300 CAPSULE ORAL at 20:29

## 2021-12-18 RX ADMIN — VANCOMYCIN HYDROCHLORIDE 1500 MG: 10 INJECTION, POWDER, LYOPHILIZED, FOR SOLUTION INTRAVENOUS at 16:06

## 2021-12-18 RX ADMIN — MEROPENEM 500 MG: 500 INJECTION, POWDER, FOR SOLUTION INTRAVENOUS at 20:33

## 2021-12-18 RX ADMIN — SODIUM CHLORIDE: 9 INJECTION, SOLUTION INTRAVENOUS at 14:30

## 2021-12-18 RX ADMIN — PANTOPRAZOLE SODIUM 40 MG: 40 TABLET, DELAYED RELEASE ORAL at 07:29

## 2021-12-18 RX ADMIN — BUDESONIDE AND FORMOTEROL FUMARATE DIHYDRATE 2 PUFF: 160; 4.5 AEROSOL RESPIRATORY (INHALATION) at 20:41

## 2021-12-18 RX ADMIN — MEROPENEM 500 MG: 500 INJECTION, POWDER, FOR SOLUTION INTRAVENOUS at 14:45

## 2021-12-18 RX ADMIN — MEROPENEM 500 MG: 500 INJECTION, POWDER, FOR SOLUTION INTRAVENOUS at 07:29

## 2021-12-18 RX ADMIN — SODIUM CHLORIDE, PRESERVATIVE FREE 10 ML: 5 INJECTION INTRAVENOUS at 20:29

## 2021-12-18 RX ADMIN — SODIUM CHLORIDE: 9 INJECTION, SOLUTION INTRAVENOUS at 00:19

## 2021-12-18 RX ADMIN — BUDESONIDE AND FORMOTEROL FUMARATE DIHYDRATE 2 PUFF: 160; 4.5 AEROSOL RESPIRATORY (INHALATION) at 07:57

## 2021-12-18 RX ADMIN — AZITHROMYCIN MONOHYDRATE 500 MG: 500 INJECTION, POWDER, LYOPHILIZED, FOR SOLUTION INTRAVENOUS at 05:41

## 2021-12-18 RX ADMIN — GABAPENTIN 300 MG: 300 CAPSULE ORAL at 14:43

## 2021-12-18 RX ADMIN — ATORVASTATIN CALCIUM 40 MG: 40 TABLET, FILM COATED ORAL at 10:08

## 2021-12-18 RX ADMIN — METOPROLOL TARTRATE 5 MG: 1 INJECTION, SOLUTION INTRAVENOUS at 23:49

## 2021-12-18 RX ADMIN — DULOXETINE HYDROCHLORIDE 60 MG: 60 CAPSULE, DELAYED RELEASE ORAL at 10:08

## 2021-12-18 ASSESSMENT — PAIN SCALES - GENERAL
PAINLEVEL_OUTOF10: 0

## 2021-12-18 NOTE — PROGRESS NOTES
Speech Language Pathology  Facility/Department: [facility] 5W PROGRESSIVE CARE   CLINICAL BEDSIDE SWALLOW EVALUATION    NAME: Leonor Jimenez  : 1950  MRN: [id number]    ADMISSION DATE: 2021  ADMITTING DIAGNOSIS: has Closed left hip fracture, initial encounter ([address] Morbid obesity with BMI of 45.0-49.9, adult ([address] Hypoxemia requiring supplemental oxygen; Acute respiratory failure with hypoxia ([address] Acute on chronic respiratory failure with hypercapnia ([address] Acute pulmonary edema ([address] Multifocal pneumonia; Rapid atrial fibrillation ([address] Chronic obstructive pulmonary disease ([address] Paroxysmal atrial fibrillation (HCC); PAT (paroxysmal atrial tachycardia) ([address] Kidney stone; Septic shock ([address] Neutrophilic leukocytosis; Morbid obesity due to excess calories ([address] Chronic respiratory failure with hypoxia ([address] Hyperlipidemia; Kidney lesion, native, left; Hyponatremia; Hyperkalemia; Moderate protein-calorie malnutrition ([address] Hydronephrosis with left ureteropelvic junction (UPJ) obstruction; Hematuria; Ureteral obstruction; Hydronephrosis with urinary obstruction due to ureteral calculus; DARSHAN (acute kidney injury) ([address] Abnormal CT scan, kidney; Essential hypertension; Acute hypercapnic respiratory failure ([address] Sepsis ([address] Acute respiratory failure ([address] Diastolic CHF ([address] Aspiration pneumonia ([address] and UTI (urinary tract infection) on their problem list.   · Has a past medical history of Acute kidney failure ([address] Chronic kidney disease, COPD (chronic obstructive pulmonary disease) ([address] ESBL (extended spectrum beta-lactamase) producing bacteria infection, Hyperkalemia, Hyperlipidemia, Hypertension, Hyponatremia, Kidney stone, Major depression, Melena, MRSA nasal colonization, Obesity, Oxygen deficit, PAF (paroxysmal atrial fibrillation) ([address] and Sepsis ([address] ONSET DATE: 2021    Recent Chest Xray 2021  Impression   No acute cardiopulmonary disease.      HPI:Patient is a 79-year-old female who is a resident of a skilled nursing facility, with a past medical history of chronic kidney disease, COPD, ESBL UTI, hyperkalemia, hypertension, chronic respiratory, A. fib presented to the ED with altered mental status. She was admitted with sepsis secondary to pneumonia with acute on chronic respiratory failure. Patient was initially placed on BiPAP and then weaned to Vapotherm patient has had multiple admissions in the past for respiratory failure with pneumonia with ESBL UTI. Date of Eval: 12/18/2021  Evaluating Therapist: Yahir Green SLP    Current Diet level:  Current Diet : Regular  Current Liquid Diet : Thin    Primary Complaint  Patient Complaint: med team referral to r/o aspiration; pt denies dysphagia s/s or history    Pain:  Pain Assessment  Pain Assessment: 0-10  Pain Level: 0    Reason for Referral  Esme Avitia was referred for a bedside swallow evaluation to assess the efficiency of her swallow function, identify signs and symptoms of aspiration and make recommendations regarding safe dietary consistencies, effective compensatory strategies, and safe eating environment. Impression  Dysphagia Diagnosis: Mild oral stage dysphagia;Mild pharyngeal stage dysphagia  Dysphagia Impression : Pt alert, pleasant, oriented, follows commands. On 6L O2 NC (baseline is 3 per report). Oral motor exam unremarkable. Presents with slight congested cough prior to PO trials. · Minimal oropharyngeal dysphagia characerized by prolonged but effective mastication r/t edentulism, decreased bolus formation, delayed swallow initiation, suspected reduced laryngeal elevation upon palpation. No immediate overt s/s of aspiration. Occasional congested cough unchanged in frequency or quality which is suspected as unrelated to PO.    · Recommend regular/thin liquids. · If MD is concerned re: aspiration, a MBS may be completed with MD order.   Otherwise ST will f/u 1-2x to confirm PO tolerance. Dysphagia Outcome Severity Scale: Level 5: Mild dysphagia- Distant supervision. May need one diet consistency restricted     Treatment Plan  Requires SLP Intervention: Yes  Duration/Frequency of Treatment: 1-2x  D/C Recommendations: SNF     Recommended Diet and Intervention  Diet Solids Recommendation: Regular  Liquid Consistency Recommendation: Thin  Recommended Form of Meds: PO     Therapeutic Interventions: Diet tolerance monitoring    Compensatory Swallowing Strategies  Compensatory Swallowing Strategies: Upright as possible for all oral intake;Remain upright for 30-45 minutes after meals    Treatment/Goals  Dysphagia Goals: The patient will tolerate recommended diet without observed clinical signs of aspiration    General  Chart Reviewed: Yes  Behavior/Cognition: Alert; Cooperative;Pleasant mood  Respiratory Status: O2 via nasual cannula (6L)  Communication Observation: Functional  Follows Directions: Complex  Dentition: Some missing teeth (pt reports she eats without uppers at baseline)  Baseline Vocal Quality: Normal  Volitional Cough: Strong  Consistencies Administered: Thin - straw;Reg solid; Dysphagia Soft and Bite-Sized (Dysphagia III); Dysphagia Pureed (Dysphagia I)     Vision/Hearing  Vision  Vision: Within Functional Limits  Hearing  Hearing: Within functional limits    Oral Motor Deficits  Oral/Motor  Oral Motor: Within functional limits    Oral Phase Dysfunction  Oral Phase  Oral Phase: Exceptions  Oral Phase Dysfunction  Impaired Mastication: Soft Solid; Reg Solid (prolonged but effective r/t edentulism)     Indicators of Pharyngeal Phase Dysfunction   Pharyngeal Phase  Pharyngeal Phase: Exceptions  Indicators of Pharyngeal Phase Dysfunction  Delayed Swallow: All  Decreased Laryngeal Elevation: All    Prognosis  Prognosis  Prognosis for safe diet advancement: good  Individuals consulted  Consulted and agree with results and recommendations: Patient; Family member  Family member consulted: , son    Education  Patient Education: results, recommendations  Patient Education Response: Verbalizes understanding        Therapy Time  SLP Individual Minutes  Time In: 4063  Time Out: 5703  Minutes: 35     This note serves as a D/C Summary in the event that this patient is discharged prior to the next therapy session.     Shaista Irizarry MS, CCC-SLP #[id number]  Speech Language Pathologist  12/18/2021 4:45 PM

## 2021-12-18 NOTE — H&P
Hospital Medicine History & Physical      PCP: Elisabet Saba MD    Date of Admission: 12/17/2021    Date of Service: Pt seen/examined on 12/17/2021 and Admitted to Inpatient     Chief Complaint: Altered mental status, short of breath      History Of Present Illness: The patient is a 73 y.o. female with past medical history as below who presents to [facility] and is on chronic 2 to 3 L of home oxygen from nursing home for acute altered mental status and hypoxia that apparently started this evening when patient was found by the nursing aide at the nursing home. Uncertain as to how long patient has been feeling ill but patient while more alert and oriented this time does seem to indicate that she has been not feeling well for at least a few days. She admits to some intermittent fevers and chills at the nursing home but not able to confirm this without any bystanders at bedside. She admits to feeling more short of breath especially today and apparently was noted to be lethargic per ED provider on initial presentation. Patient denies other symptoms of nausea/vomiting/diarrhea, abdominal pain, chest pain, blood in urine/stool/sputum. Patient's mental status is still somewhat questionable but seems to be getting better and so somewhat trustworthy. Past Medical History:        Diagnosis Date    Acute kidney failure (HCC)     Chronic kidney disease     COPD (chronic obstructive pulmonary disease) (HCC)     ESBL (extended spectrum beta-lactamase) producing bacteria infection 09/10/2021    Escherichia coli ESBL.  Urine    Hyperkalemia     Hyperlipidemia     Hypertension     Hyponatremia     Kidney stone     Major depression     Melena     MRSA nasal colonization 09/10/2021    Obesity     Oxygen deficit     2 liters     PAF (paroxysmal atrial MD   polyethylene glycol (GLYCOLAX) 17 g packet Take 17 g by mouth daily as needed for Constipation    Historical Provider, MD   nystatin (MYCOSTATIN) 115356 UNIT/GM powder Apply topically 4 times daily Apply topically 4 times daily. Historical Provider, MD   sennosides-docusate sodium (SENOKOT-S) 8.6-50 MG tablet Take 2 tablets by mouth every 12 hours as needed for Constipation     Historical Provider, MD   lactobacillus (CULTURELLE) capsule Take 2 capsules by mouth 2 times daily (with meals) 9/26/20   Alcides Rothman MD   pantoprazole (PROTONIX) 40 MG tablet Take 1 tablet by mouth daily 9/26/20   Alcides Rothman MD   acetaZOLAMIDE (DIAMOX) 250 MG tablet Take 250 mg by mouth daily    Historical Provider, MD   potassium chloride (KLOR-CON M) 20 MEQ extended release tablet Take 20 mEq by mouth daily    Historical Provider, MD   atorvastatin (LIPITOR) 40 MG tablet Take 40 mg by mouth daily    Historical Provider, MD   dilTIAZem (CARDIZEM CD) 180 MG extended release capsule Take 1 capsule by mouth daily 8/19/20   Luzmaria Carrion DO   furosemide (LASIX) 40 MG tablet Take 1 tablet by mouth daily 8/15/20   Alcides Daley MD   gabapentin (NEURONTIN) 300 MG capsule Take 300 mg by mouth 3 times daily. Historical Provider, MD   fluticasone-salmeterol (ADVAIR) 500-50 MCG/DOSE diskus inhaler Inhale 1 puff into the lungs 2 times daily  4/24/19   Historical Provider, MD   ipratropium-albuterol (DUONEB) 0.5-2.5 (3) MG/3ML SOLN nebulizer solution Inhale 3 mLs into the lungs every 2 hours as needed for Shortness of Breath 5/23/19   Historical Provider, MD   albuterol sulfate  (90 Base) MCG/ACT inhaler Inhale 2 puffs into the lungs every 4 hours as needed for Shortness of Breath     Historical Provider, MD       Allergies:  Patient has no known allergies. Social History:  The patient currently lives home    TOBACCO:   reports that she has been smoking cigarettes. She has been smoking about 1.50 packs per day. She has never used smokeless tobacco.  ETOH:   reports no history of alcohol use. Family History:  Reviewed in detail and negative for DM, Early CAD, Cancer, CVA. Positive as follows:    History reviewed. No pertinent family history. REVIEW OF SYSTEMS:   as noted in the HPI. All other systems reviewed and negative. PHYSICAL EXAM:    BP 98/75   Pulse 72   Temp 101.8 °F (38.8 °C) (Rectal)   Resp 16   Wt 236 lb 8.9 oz (107.3 kg)   SpO2 99%   BMI 39.36 kg/m²     General appearance: More alert and oriented compared to initial presentation, wearing BiPAP at this time but seems to be improving, chronically ill-appearing  HEENT Normal cephalic, atraumatic without obvious deformity. Pupils equal, round, and reactive to light. Extra ocular muscles intact. Conjunctivae/corneas clear. Dry mucous membranes  Neck: Supple, no JVD  Lungs: Bibasilar crackles with left greater than right  Heart: Regular rate and rhythm with Normal S1/S2 without murmurs, rubs or gallops, point of maximum impulse non-displaced  Abdomen: Soft, nontender, nausea, active bowel sound  Extremities: Trace to 1+ bilateral lower extremity pitting symmetric edema  Skin: No rashes  Neurologic: Difficult to evaluate at this time  Mental status: initially lethargic but seems to be more alert and oriented at this time  Capillary Refill: Acceptable  < 3 seconds  Peripheral Pulses: +3 Easily felt, not easily obliterated with pressure    CT abdomen pelvis without contrast:  FINDINGS:   Lower Chest: Left lower lobe consolidation.  Right lower lobe dependent   atelectasis.       Organs: Few dystrophic calcifications in the right hepatic lobe. Pneumobilia.  The liver is otherwise unremarkable.  Status post   cholecystectomy.  The pancreas is unremarkable.  Few nonobstructing right   renal calculi measuring up to 0.5 cm.  No right hydronephrosis.  Severe   atrophy of the left kidney with a calcification in the proximal ureter.    Findings are stable compared to prior study.       GI/Bowel: Colonic diverticula without evidence for acute diverticulitis.  No   bowel obstruction.  The appendix is unremarkable.  Moderate stool burden with   a large amount of stool in the rectal vault.       Pelvis: Under distended bladder secondary to Grace catheter.  The uterus and   adnexa are without acute abnormality.       Peritoneum/Retroperitoneum: Atherosclerotic disease of the abdominal aorta. No retroperitoneal lymphadenopathy.  No evidence of ascites or free air.       Bones/Soft Tissues:  Age related degenerative changes of the visualized   osseous structures without focal destructive lesion.  The visualized soft   tissues are unremarkable.           Impression   No acute pathology in the abdomen and pelvis. Left lower lobe consolidation likely representing pneumonia, consider CT chest for further evaluation         CBC   Recent Labs     12/17/21 1940 12/18/21  0538   WBC 34.1* 63.2*   HGB 12.8 11.2*   HCT 43.9 39.5   * 369      RENAL  Recent Labs     12/17/21 1940 12/18/21  0538    138   K 4.2 4.4   CL 97* 105   CO2 25 24   BUN 19 18   CREATININE 0.9 0.9     LFT'S  Recent Labs     12/17/21 1940   AST 12*   ALT 7*   BILITOT 0.4   ALKPHOS 187*     COAG  No results for input(s): INR in the last 72 hours.   CARDIAC ENZYMES  Recent Labs     12/17/21 1940   TROPONINI <0.01       U/A:    Lab Results   Component Value Date    COLORU BROWN 12/17/2021    WBCUA >900 12/17/2021    RBCUA >100 12/17/2021    MUCUS 1+ 05/03/2013    BACTERIA 4+ 12/17/2021    CLARITYU TURBID 12/17/2021    SPECGRAV 1.011 12/17/2021    LEUKOCYTESUR LARGE 12/17/2021    BLOODU LARGE 12/17/2021    GLUCOSEU Negative 12/17/2021       ABG    Lab Results   Component Value Date    DWW9GTQ 23.9 12/17/2021    BEART -3.4 12/17/2021    G3FXQEPN 97.2 12/17/2021    PHART 7.276 12/17/2021    LGC6BND 51.5 12/17/2021    PO2ART 101.0 12/17/2021    ZDM5YVZ 25.5 12/17/2021           Active Hospital Problems Diagnosis Date Noted    Sepsis ([facility] [A41.9] 12/17/2021    Acute respiratory failure ([facility] [J96.00] 75/48/3582    Diastolic CHF (HCC) [U29.50] 12/17/2021    Aspiration pneumonia ([facility] [J69.0] 12/17/2021    UTI (urinary tract infection) [N39.0] 12/17/2021    Paroxysmal atrial fibrillation (HCC) [I48.0] 08/16/2020    Chronic obstructive pulmonary disease ([facility] [J44.9]          PHYSICIANS CERTIFICATION:    I certify that Filomena Costa is expected to be hospitalized for greater than 2 midnights based on the following assessment and plan:      ASSESSMENT/PLAN:  · Sepsis  · Aspiration ammonia  · UTI  · Acute respiratory failure  · Paroxysmal A. fib  · COPD  · Diastolic CHF    Plan:  · Patient initially on BiPAP and was decreased down to nonrebreather and seems to be doing well, she was more lethargic but seems to be improving her mental status at this time  · Suspect more likely patient has a significant UTI but may have developed aspiration pneumonia due to lethargy  · Start patient on broad-spectrum antibiotics with meropenem and Zithromax, patient was given vancomycin in the ED but do not feel as though she needs MRSA coverage at this time  · Start patient on normal saline 100/h for gentle IV fluid hydration at 10 hours  · Repeat labs in the morning  · Restart patient's home medications  · Per discussion with patient while she was more awake, patient confirms that she is DNR/DNI, DNR CCA status explained     DVT Prophylaxis: Lovenox  Diet: ADULT DIET; Regular; 4 carb choices (60 gm/meal); Low Sodium (2 gm)  Code Status: DNR-CCA  PT/OT Eval Status: Uncertain    Dispo -pending clinical course       Jani Harris,     Thank you Sonjia Cooks, MD for the opportunity to be involved in this patient's care. If you have any questions or concerns please feel free to contact me at [phone].

## 2021-12-18 NOTE — ED NOTES
Patient taken on BIPAP and placed on 6L/NC, will continue to monitor.       Ange Meraz RN  12/17/21 2233

## 2021-12-18 NOTE — ED PROVIDER NOTES
[facility]      Pt Name: Harika Orta  MRN: [id number]  [place] 1950  Date of evaluation: 12/17/2021  Provider: BETHANY Deshpande    This patient was seen and evaluated by the attending physician Sandra Downing MD.    Terrence Canela       Chief Complaint   Patient presents with    Shortness of Breath     o2 77% on 3L (baseline)       CRITICAL CARE TIME   I performed a total Critical Care time of  35 minutes, excluding separately reportable procedures. There was a high probability of clinically significant/life threatening deterioration in the patient's condition which required my urgent intervention. Not limited to multiple reexaminations, discussions with attending physician and consultants. HISTORY OF PRESENT ILLNESS  (Location/Symptom, Timing/Onset, Context/Setting, Quality, Duration, Modifying Factors, Severity.)   Harika Orta is a 73 y.o. female who presents to the emergency department with respiratory distress from the nursing home. Per the nursing home report the aide found her hypoxic in respiratory distress this evening. She does 3 L of oxygen via baseline was found to be 77% on 3 L on EMS arrival.  They gave her 1 DuoNeb. She has history of COPD. Smokes. History of A. fib, MRSA and chronic respiratory failure. Nursing Notes were reviewed and I agree. REVIEW OF SYSTEMS    (2-9 systems for level 4, 10 or more for level 5)     Review of Systems   Constitutional: Positive for fatigue. Negative for fever. Respiratory: Positive for cough and shortness of breath. Gastrointestinal: Negative for vomiting. Skin: Negative for color change and wound. Psychiatric/Behavioral: Negative for agitation and behavioral problems. Unable to perform complete review of systems due to the patient's respiratory distress.     PAST MEDICAL HISTORY         Diagnosis Date    Acute kidney failure (HCC)     Chronic mouth daily    DULOXETINE (CYMBALTA) 60 MG EXTENDED RELEASE CAPSULE    Take 60 mg by mouth daily    FLUTICASONE-SALMETEROL (ADVAIR) 500-50 MCG/DOSE DISKUS INHALER    Inhale 1 puff into the lungs 2 times daily     FUROSEMIDE (LASIX) 40 MG TABLET    Take 1 tablet by mouth daily    GABAPENTIN (NEURONTIN) 300 MG CAPSULE    Take 300 mg by mouth 3 times daily. HYDROCORTISONE (ANUSOL-HC) 25 MG SUPPOSITORY    Place 25 mg rectally every 6 hours as needed for Hemorrhoids     IPRATROPIUM-ALBUTEROL (DUONEB) 0.5-2.5 (3) MG/3ML SOLN NEBULIZER SOLUTION    Inhale 3 mLs into the lungs every 2 hours as needed for Shortness of Breath    LACTOBACILLUS (CULTURELLE) CAPSULE    Take 2 capsules by mouth 2 times daily (with meals)    MAGNESIUM HYDROXIDE (MILK OF MAGNESIA) 400 MG/5ML SUSPENSION    Take 30 mLs by mouth daily as needed for Constipation     NYSTATIN (MYCOSTATIN) 391501 UNIT/GM POWDER    Apply topically 4 times daily Apply topically 4 times daily. PANTOPRAZOLE (PROTONIX) 40 MG TABLET    Take 1 tablet by mouth daily    POLYETHYLENE GLYCOL (GLYCOLAX) 17 G PACKET    Take 17 g by mouth daily as needed for Constipation    POTASSIUM CHLORIDE (KLOR-CON M) 20 MEQ EXTENDED RELEASE TABLET    Take 20 mEq by mouth daily    PREDNISONE (DELTASONE) 10 MG TABLET    Take 2 tablets by mouth once a day for 3 days, then take 1 tablet by mouth once a day for 3 days, then take half a tablet by mouth once a day for 4 days and then stop    SENNOSIDES-DOCUSATE SODIUM (SENOKOT-S) 8.6-50 MG TABLET    Take 2 tablets by mouth every 12 hours as needed for Constipation        ALLERGIES     Patient has no known allergies. FAMILY HISTORY     History reviewed. No pertinent family history. No family status information on file. SOCIAL HISTORY      reports that she has been smoking cigarettes. She has been smoking about 1.50 packs per day. She has never used smokeless tobacco. She reports current drug use. Drug: Marijuana Opal Eglin).  She reports that she does not drink alcohol. PHYSICAL EXAM    (up to 7 for level 4, 8 or more for level 5)     ED Triage Vitals   BP Temp Temp src Pulse Resp SpO2 Height Weight   -- -- -- -- -- -- -- --       Physical Exam  Vitals reviewed. Constitutional:       General: She is in acute distress. Appearance: She is ill-appearing. HENT:      Head: Normocephalic and atraumatic. Eyes:      Pupils: Pupils are equal, round, and reactive to light. Cardiovascular:      Rate and Rhythm: Normal rate. Pulmonary:      Effort: Tachypnea and respiratory distress present. Breath sounds: Decreased breath sounds and wheezing present. Musculoskeletal:      Right lower leg: No edema. Left lower leg: No edema. Skin:     General: Skin is warm. Neurological:      General: No focal deficit present. Mental Status: She is oriented to person, place, and time. DIAGNOSTIC RESULTS     EKG: All EKG's are interpreted by BETHANY Moser in the absence of a cardiologist.    EKG interpreted by myself - please refer to attending physician's note for complete EKG interpretation:    No evidence of acute ischemia or injury. RADIOLOGY:   Non-plain film images such as CT, Ultrasound and MRI are read by the radiologist. Plain radiographic images are visualized and preliminarily interpreted by BETHANY Moser with the below findings:    Reviewed radiologist's interpretation. Interpretation per the Radiologist below, if available at the time of this note:    CT ABDOMEN PELVIS WO CONTRAST   Final Result   Addendum 1 of 1   ADDENDUM:   Left lower lobe consolidation, likely representing pneumonia. Chest CT is   recommended for further evaluation. Final   No acute pathology in the abdomen and pelvis. RECOMMENDATIONS:   Unavailable            XR CHEST PORTABLE   Final Result   No acute cardiopulmonary disease.                LABS:  Labs Reviewed   CBC WITH AUTO DIFFERENTIAL - Abnormal; Notable for the following components:       Result Value    WBC 34.1 (*)     RBC 5.78 (*)     MCV 76.0 (*)     MCH 22.1 (*)     MCHC 29.1 (*)     RDW 19.4 (*)     Platelets 451 (*)     Neutrophils Absolute 33.3 (*)     Lymphocytes Absolute 0.4 (*)     All other components within normal limits    Narrative:     Performed at:  [address] [employer] [address]   Phone [phone]   COMPREHENSIVE METABOLIC PANEL - Abnormal; Notable for the following components:    Chloride 97 (*)     Glucose 100 (*)     Albumin 2.8 (*)     Albumin/Globulin Ratio 0.6 (*)     Alkaline Phosphatase 187 (*)     ALT 7 (*)     AST 12 (*)     All other components within normal limits    Narrative:     Performed at:  [address] [employer] [address]   Phone [phone]   BRAIN NATRIURETIC PEPTIDE - Abnormal; Notable for the following components:    Pro-BNP 1,527 (*)     All other components within normal limits    Narrative:     Performed at:  [address] [employer] [address]   Phone [phone]   LACTIC ACID, PLASMA - Abnormal; Notable for the following components:    Lactic Acid 4.9 (*)     All other components within normal limits    Narrative:     Mendez Mares tel. [phone],  Chemistry results called to and read back by Vernell Baptiste, 12/17/2021 21:43, by  Vikram Cunningham  Performed at:  [address] [facility] [employer] [address]   Phone [phone]   BLOOD GAS, ARTERIAL - Abnormal; Notable for the following components:    pH, Arterial 7.276 (*)     pCO2, Arterial 51.5 (*)     Base Excess, Arterial -3.4 (*)     All other components within normal limits    Narrative:     Performed at:  [address] [employer] [address]   Phone [phone]   URINE RT REFLEX TO CULTURE - Abnormal; Notable for the following components:    Color, UA BROWN (*)     Clarity, UA TURBID (*)     Bilirubin Urine MODERATE (*)     Ketones, Urine TRACE (*)     Blood, Urine LARGE (*)     Protein, UA >=300 (*)     Nitrite, Urine POSITIVE (*)     Leukocyte Esterase, Urine LARGE (*)     All other components within normal limits    Narrative:     Performed at:  [place] [address] [address] [facility] [employer] [address]   Phone [phone]   MICROSCOPIC URINALYSIS - Abnormal; Notable for the following components:    RBC, UA >100 (*)     Bacteria, UA 4+ (*)     WBC, UA >900 (*)     Epithelial Cells, UA 33 (*)     All other components within normal limits    Narrative:     Performed at:  [address] [address]   Phone [phone] RAPID    Narrative:     Performed at:  [facility]   Phone [phone]   RAPID INFLUENZA A/B ANTIGENS    Narrative:     Performed at:  [address] [address]   Phone [phone]   CULTURE, BLOOD 2   CULTURE, BLOOD 2   CULTURE, URINE   TROPONIN    Narrative:     Performed at:  [address] [address]   Phone [phone]   LACTIC ACID, PLASMA       All other labs were within normal range or not returned as of this dictation. EMERGENCY DEPARTMENT COURSE and DIFFERENTIAL DIAGNOSIS/MDM:   Vitals:    Vitals:    12/17/21 2120 12/17/21 2212 12/17/21 2235 12/17/21 2240   BP:    (!) 123/90   Pulse:   95 97   Resp:   23 21   Temp: 100.6 °F (38.1 °C) 101.8 °F (38.8 °C)     TempSrc: Axillary Rectal     SpO2:   94% 90%   Weight: 236 lb 8.9 oz (107.3 kg)        Patient presents tachycardic. Temperature of 101.8 rectally. 72% on home oxygen of 3 L initially. Placed on BiPAP. Tolerating well.   She has possible pneumonia on CT in the left lung base also has a urinary tract infection no evidence of obstructing kidney stones. She is septic with an elevated lactic and white count. Given the ideal body weight fluid sepsis bolus and will recheck lactic. Agreeable to admission. CONSULTS:  None    PROCEDURES:  Procedures      FINAL IMPRESSION      1. Septicemia ([address]    2. Respiratory distress    3. Urinary tract infection without hematuria, site unspecified          DISPOSITION/PLAN   DISPOSITION        PATIENT REFERRED TO:  No follow-up provider specified.     DISCHARGE MEDICATIONS:  New Prescriptions    No medications on file       (Please note that portions of this note were completed with a voice recognition program.  Efforts were made to edit the dictations but occasionally words are mis-transcribed.)    Nataliia Jasso, [address], [place]  12/17/21 9718

## 2021-12-18 NOTE — ED PROVIDER NOTES
study. GI/Bowel: Colonic diverticula without evidence for acute diverticulitis. No bowel obstruction. The appendix is unremarkable. Moderate stool burden with a large amount of stool in the rectal vault. Pelvis: Under distended bladder secondary to Grace catheter. The uterus and adnexa are without acute abnormality. Peritoneum/Retroperitoneum: Atherosclerotic disease of the abdominal aorta. No retroperitoneal lymphadenopathy. No evidence of ascites or free air. Bones/Soft Tissues:  Age related degenerative changes of the visualized osseous structures without focal destructive lesion. The visualized soft tissues are unremarkable. No acute pathology in the abdomen and pelvis. RECOMMENDATIONS: Unavailable     XR CHEST PORTABLE    Result Date: 12/17/2021  EXAMINATION: ONE XRAY VIEW OF THE CHEST 12/17/2021 7:45 pm COMPARISON: 09/14/2021 HISTORY: ORDERING SYSTEM PROVIDED HISTORY: sob TECHNOLOGIST PROVIDED HISTORY: Reason for exam:->sob Reason for Exam: sob FINDINGS: Transpedicular fusion of the cervicothoracic spine is noted. The cardiac silhouette is at the upper limits of normal.  Thoracic aorta is tortuous. Central pulmonary artery shadows remain mildly prominent. There is chronic bronchial wall thickening. No consolidation or pleural effusion is appreciated. No acute cardiopulmonary disease. 11:17 PM EST  Patient actually improved very significantly. Much more alert and we were able to wean her off BiPAP. She is currently sleeping so the nurse feels more comfortable restarting it but she has made a dramatic turnaround. We used her ideal body weight for the fluid bolus just because she also has a history of CHF. She was given IV antibiotics. She has a history of stone so we did look for an obstructing septic stone but did not find one. She obviously will need to be admitted. 1. Septicemia ([address]    2. Respiratory distress    3.  Urinary tract infection without hematuria, site unspecified DISPOSITION/PLAN  PATIENT REFERRED TO:  No follow-up provider specified.   DISCHARGE MEDICATIONS:  New Prescriptions    No medications on file         MD Christiano Morley MD  12/17/21 1772

## 2021-12-18 NOTE — ED NOTES
[facility]  Expected date: 12/17/21  Expected time: 6:53 PM  Means of arrival: [facility]  Comments:  ANKITA Kim RN  12/17/21 1914

## 2021-12-18 NOTE — ED NOTES
Patien's o2 sat noted to be in the mid-high 80's due to patient sleeping and mouth breathing. Dr. Fay Wilson at bedside, stated to place on NRB instead of bipap.       Isabel Pulido RN  12/18/21 0758

## 2021-12-18 NOTE — PROGRESS NOTES
4 Eyes Skin Assessment     NAME:  Kaylan Velazquez  YOB: 1950  MEDICAL RECORD NUMBER:  [id number]    The patient is being assess for  Admission    I agree that 2 RN's have performed a thorough Head to Toe Skin Assessment on the patient. ALL assessment sites listed below have been assessed. Areas assessed by both nurses:    Head, Face, Ears, Shoulders, Back, Chest, Arms, Elbows, Hands, Sacrum. Buttock, Coccyx, Ischium and Legs. Feet and Heels        Does the Patient have a Wound? Yes wound(s) were present on assessment.  LDA wound assessment was Initiated and completed        Nigel Prevention initiated:  Yes   Wound Care Orders initiated:  Yes    Pressure Injury (Stage 3,4, Unstageable, DTI, NWPT, and Complex wounds) if present place consult order under [de-identified] Yes    New and Established Ostomies if present place consult order under : NA      Nurse 1 eSignature: Electronically signed by Abigail Franco RN on 12/18/21 at 5:12 PM EST    **SHARE this note so that the co-signing nurse is able to place an eSignature**    Nurse 2 eSignature: Electronically signed by Henrietta Soliman RN on 12/18/21 at 5:24 PM EST

## 2021-12-18 NOTE — ED NOTES
Patient's  Trisha Root updated via telephone, all questions answered.      Summer Odonnell RN  12/17/21 7328

## 2021-12-18 NOTE — PROGRESS NOTES
Order for PICC line for per Dr. Mika Renner. Pre procedure and timeout done with pt's RN. Successful insertion of a double lumen PICC line into pt's left cephalic vein. No issues gaining access or advancing guidewire/introducer/PICC line. PICC tip terminates in the SVC according to [employer] 3CG tip confirmation system. PICC was seen dropping into SVC with tip tracking technology and discernable peaked p waves were noted without negative deflection. A printout will be in pt's chart. PICC is cut at 52 cm and out externally 1 cm. All lumens flush without resistance and draw back brisk blood return. PICC site CDI with hemostasis maintained and a biopatch applied to site. Pt instructed to stay in bed and keep arm flat and still for 30 minutes  to promote hemostasis.      Luzmaria KAHN given handoff report

## 2021-12-18 NOTE — ED TRIAGE NOTES
Patient presents to ED via EMS for c/o o2 sat 77% on 3L which is patient's baseline. Upon arrival to ED, patient on bipap and receiving duoneb. Patient alert and talking. Per patient's nursing home paperwork, patient is a DNR-CC. Dr. Ad Berger and Key SIMS at bedside.

## 2021-12-18 NOTE — CONSULTS
Infectious Diseases Inpatient Consult Note    Reason for Consult:   Fever, leukocytosis, UTI, possible pneumonia  Requesting Physician:   Dr Kailee Brooks  Primary Care Physician:  Salma Gray MD  History Obtained From:   Pt, EPIC    Admit Date: 12/17/2021  Hospital Day: 2    CHIEF COMPLAINT:       Chief Complaint   Patient presents with    Shortness of Breath     o2 77% on 3L (baseline)       HISTORY OF PRESENT ILLNESS:      72 yo woman with xx COPD, HTN, pAF, CKD, obesity (BMI 37), nephrolithiasis, depression  COPD - basline O2 2-3L (usually 3 per pt), smokes 1 cig a day, chronic cough, morning sputum  Lives in a nursing facility, non-ambulatory  Hx Urine cult 9/10/21 - E coli ESBL    Pt reports she was ok / usual self until Fri  She was eating, choked on food and then became SOB  Per ED not, pt found to be lethargic and confused today and brought to [facility] by EMS. In ED 12/17, T 101.8, O2 sat 72%. WBC 34.1, UA large leuk esterase / micro >900 WBC  Abd / Pelvic CT with LEFT base consolidation  Admit, started on vancomycin, meropenem, azithromycin    Today 12/18, afeb, awake / alert / conversant, on 6L      Past Medical History:    Past Medical History:   Diagnosis Date    Acute kidney failure ([address]     Chronic kidney disease     COPD (chronic obstructive pulmonary disease) ([address]     ESBL (extended spectrum beta-lactamase) producing bacteria infection 09/10/2021    Escherichia coli ESBL.  Urine    Hyperkalemia     Hyperlipidemia     Hypertension     Hyponatremia     Kidney stone     Major depression     Melena     MRSA nasal colonization 09/10/2021    Obesity     Oxygen deficit     2 liters     PAF (paroxysmal atrial fibrillation) (HCC)     Sepsis ([address]        Past Surgical History:    Past Surgical History:   Procedure Laterality Date    CHOLECYSTECTOMY      COLONOSCOPY N/A 4/6/2021    COLONOSCOPY POLYPECTOMY ABLATION with clip placement performed by Iban Lanza MD at [facility]  CYSTOSCOPY Left 9/19/2020    CYSTOSCOPY URETERAL STENT INSERTION LEFT RETROGRADE PYELOGRAM performed by Otis Faustin MD at [facility] Left 8/10/2020    OPEN TREATMENT OF LEFT HIP FRACTURE WITH CEPHALOMEDULLARY NAIL performed by Lashell Wallace MD at [address] [place] [address] 3/2/2021    SIGMOIDOSCOPY FLEXIBLE POLYPECTOMY with clip placement performed by Adriano Porter MD at [place] [address] 9/24/2020    EGD DIAGNOSTIC ONLY performed by Adriano Porter MD at [address] [place] [address]       Current Medications:     sodium chloride flush  5-40 mL IntraVENous 2 times per day    enoxaparin  40 mg SubCUTAneous Daily    atorvastatin  40 mg Oral Daily    DULoxetine  60 mg Oral Daily    budesonide-formoterol  2 puff Inhalation BID    gabapentin  300 mg Oral TID    pantoprazole  40 mg Oral Daily    azithromycin  500 mg IntraVENous Q24H    meropenem  500 mg IntraVENous Q6H    lidocaine 1 % injection  5 mL IntraDERmal Once    sodium chloride flush  5-40 mL IntraVENous 2 times per day    [START ON 12/19/2021] lactobacillus  1 capsule Oral Daily with breakfast    vancomycin  1,500 mg IntraVENous Q18H    vancomycin (VANCOCIN) intermittent dosing (placeholder)   Other RX Placeholder       Allergies:  Patient has no known allergies. Social History:    TOBACCO:    + cig use  ETOH:    None   DRUGS:   Hx marijuana use  MARITAL STATUS:      OCCUPATION:   Retired     Family History:   No immunodeficiency    REVIEW OF SYSTEMS:    No fever / chills / sweats. No weight loss. No visual change, eye pain, eye discharge. No oral lesion, sore throat, dysphagia. Denies cough / sputum. Denies chest pain, palpitations. Denies n / v / abd pain. No diarrhea. Denies dysuria or change in urinary function. Denies joint swelling or pain. No myalgia, arthralgia.   Denies skin changes, itching  Denies focal weakness, sensory change or other neurologic symptom Denies new / worse depression, psychiatric symptoms    PHYSICAL EXAM:      Vitals:    /76   Pulse 82   Temp 98.9 °F (37.2 °C) (Temporal)   Resp 18   Ht 5' 7\" (1.702 m)   Wt 239 lb 6.7 oz (108.6 kg)   SpO2 96%   BMI 37.50 kg/m²     GENERAL: No apparent distress. 6L  HEENT: Membranes moist, no oral lesion, PERRL  NECK:  Supple, no lymphadenopathy  LUNGS: Clear b/l, no rales, no dullness  CARDIAC: RRR, no murmur appreciated  ABD:  + BS, soft / NT  EXT:  No rash, no edema, no lesions  NEURO: No focal neurologic findings  PSYCH: Orientation, sensorium, mood normal  LINES:  Peripheral iv    DATA:    Lab Results   Component Value Date    WBC 63.2 (HH) 2021    HGB 11.2 (L) 2021    HCT 39.5 2021    MCV 76.7 (L) 2021     2021     Lab Results   Component Value Date    CREATININE 0.9 2021    BUN 18 2021     2021    K 4.4 2021     2021    CO2 24 2021       Hepatic Function Panel:   Lab Results   Component Value Date    ALKPHOS 187 2021    ALT 7 2021    AST 12 2021    PROT 7.6 2021    BILITOT 0.4 2021    BILIDIR <0.2 2020    IBILI see below 2020    LABALBU 2.8 2021 Procalcitonin 34.17    Micro:   BC sent   Urine cult in process   SARS CoV-2 NAAT neg   Influenza A/B ag neg    Imagin/17 Abd / pelvic CT - No acute pathology in the abdomen and pelvis.    Addendum - LLL consolidation          CXR neg      IMPRESSION:      Patient Active Problem List   Diagnosis    Cl[facility] left [facility], [facility]    Morbid obesity with BMI of 45.0-49.9, adult ([facility]    Hypoxemia requiring supplemental oxygen    Acute respiratory failure with hypoxia (HCC)    Acute on chronic respiratory failure with hypercapnia (HCC)    Acute pulmonary edema (HCC)    Multifocal pneumonia    Rapid atrial fibrillation (HCC)    Chronic obstructive pulmonary is potential for severe exacerbation of infection/side effects of treatment.   Therapy requires intensive monitoring for antimicrobial agent toxicity    Discussed with nessa Gustafson MD

## 2021-12-18 NOTE — ED NOTES
Patient's  Read Escobar called this RN for update, update given and all questions asked.  asking to be updated with room number when patient gets one.       Eve Bond RN  12/18/21 4815

## 2021-12-18 NOTE — PROGRESS NOTES
Progress Note  Admit Date: 12/17/2021      PCP: Ivet Nuñez MD     CC: F/U for altered mental status    Days in hospital:  1    SUBJECTIVE / Interval History:  Patient is awake and alert. No complaints at present        Allergies  Patient has no known allergies. Medications    Scheduled Meds:   sodium chloride flush  5-40 mL IntraVENous 2 times per day    enoxaparin  40 mg SubCUTAneous Daily    atorvastatin  40 mg Oral Daily    DULoxetine  60 mg Oral Daily    budesonide-formoterol  2 puff Inhalation BID    gabapentin  300 mg Oral TID    pantoprazole  40 mg Oral Daily    azithromycin  500 mg IntraVENous Q24H    meropenem  500 mg IntraVENous Q6H     Continuous Infusions:   sodium chloride         PRN Meds:  sodium chloride flush, sodium chloride, acetaminophen **OR** acetaminophen, ipratropium-albuterol, sennosides-docusate sodium, polyethylene glycol, ondansetron    Vitals    BP (!) 84/51 Comment: Admitting MD, dr. Octavia Ness notified of BP  Pulse 77   Temp 101.8 °F (38.8 °C) (Rectal)   Resp 16   Wt 236 lb 8.9 oz (107.3 kg)   SpO2 100%   BMI 39.36 kg/m²     Exam:    Gen: No distress. Ill looking  Eyes: PERRL. No sclera icterus. No conjunctival injection. ENT: No discharge. Pharynx clear. External appearance of ears and nose normal.  Neck: Trachea midline. No obvious mass. Resp: No accessory muscle use. No crackles. No wheezes. + rhonchi. No dullness on percussion. CV: Regular rate. Regular rhythm. No murmur or rub. No edema. GI: Non-tender. Non-distended. No hernia. Skin: Warm, dry, normal texture and turgor. No nodule on exposed extremities. Lymph: No cervical LAD. No supraclavicular LAD. M/S: No cyanosis. No clubbing. No joint deformity. Neuro: Moves all four extremities. CN 2-12 tested, no defect noted. Psych: Oriented x 3. No anxiety. Awake. Alert. Intact judgement and insight.     Data    LABS  CBC:   Recent Labs     12/17/21  1940 12/18/21  0538   WBC 34.1* 63.2*   HGB 12.8 11.2*   HCT 43.9 39.5   MCV 76.0* 76.7*   * 369     BMP:   Recent Labs     12/17/21 1940 12/18/21  0538    138   K 4.2 4.4   CL 97* 105   CO2 25 24   BUN 19 18   CREATININE 0.9 0.9   GLUCOSE 100* 179*     POC GLUCOSE:  No results for input(s): POCGLU in the last 72 hours. LIVER PROFILE:   Recent Labs     12/17/21 1940   AST 12*   ALT 7*   LABALBU 2.8*   BILITOT 0.4   ALKPHOS 187*     PT/INR: No results for input(s): PROTIME, INR in the last 72 hours. APTT: No results for input(s): APTT in the last 72 hours. UA:  Recent Labs     12/17/21 2052 12/17/21 2126   COLORU BROWN*  --    PHUR 8.0  --    WBCUA  --  >900*   RBCUA  --  >100*   BACTERIA  --  4+*   CLARITYU TURBID*  --    SPECGRAV 1.011  --    LEUKOCYTESUR LARGE*  --    UROBILINOGEN 1.0  --    BILIRUBINUR MODERATE*  --    BLOODU LARGE*  --    GLUCOSEU Negative  --    KETUA TRACE*  --      Microbiology:  Wound Culture: No results for input(s): WNDABS, ORG in the last 72 hours. Invalid input(s):  LABGRAM  Nasal Culture: No results for input(s): ORG, MRSAPCR in the last 72 hours. Blood Culture: No results for input(s): BC, BLOODCULT2 in the last 72 hours. Fungal Culture:   No results for input(s): FUNGSM in the last 72 hours. No results for input(s): FUNCXBLD in the last 72 hours. CSF Culture:  No results for input(s): COLORCSF, APPEARCSF, CFTUBE, CLOTCSF, WBCCSF, RBCCSF, NEUTCSF, NUMCELLSCSF, LYMPHSCSF, MONOCSF, GLUCCSF, VOLCSF in the last 72 hours. Respiratory Culture:  No results for input(s): Elda Anabelle in the last 72 hours. AFB:No results for input(s): AFBSMEAR in the last 72 hours. Urine Culture  No results for input(s): LABURIN in the last 72 hours. RADIOLOGY:    CT ABDOMEN PELVIS WO CONTRAST   Final Result   Addendum 1 of 1   ADDENDUM:   Left lower lobe consolidation, likely representing pneumonia. Chest CT is   recommended for further evaluation.          Final   No acute pathology in the abdomen and pelvis. RECOMMENDATIONS:   Unavailable            XR CHEST PORTABLE   Final Result   No acute cardiopulmonary disease. CONSULTS:    None    ASSESSMENT AND PLAN:      Active Problems:    Chronic obstructive pulmonary disease (HCC)    Paroxysmal atrial fibrillation (HCC)    Sepsis (HCC)    Acute respiratory failure (HCC)    Diastolic CHF ([address]    Aspiration pneumonia (HCC)    UTI (urinary tract infection)  Resolved Problems:    * No resolved hospital problems. *    Patient is a 68-year-old female who is a resident of a skilled nursing facility, with a past medical history of chronic kidney disease, COPD, ESBL UTI, hyperkalemia, hypertension, chronic respiratory, A. fib presented to the ED with altered mental status. She was admitted with sepsis secondary to pneumonia with acute on chronic respiratory failure. Patient was initially placed on BiPAP and then weaned to Vapotherm patient has had multiple admissions in the past for respiratory failure with pneumonia with ESBL UTI. Acute on chronic hypoxic respiratory failure  -Patient needing 15 L of oxygen to maintain sats greater than 90%  -Wean oxygen as tolerated  -Uses 2 L oxygen at home    Pneumonia  -Continue broad-spectrum antibiotics. Treat as MRSA/gram-negative pneumonia  -Has had multiple admissions for pneumonia  -Add procalcitonin  -Last admission patient had a positive MRSA probe  -CT which covers the lower lobe base shows consolidation    UTI in the setting of renal calculi and chronic Grace  -With a past history of ESBL on broad-spectrum antibiotics  -Patient has a remote history of ureteral stents. Has a left obstructing ureteral stone which is present since 9/20. Needs outpatient follow-up with  urology  -Tissue severe atrophy of left kidney with chronic ureteral calcification    Sepsis  -With fever and leukocytosis  -Continue IV fluids  -Worsening white count. Leukemoid reaction    Hypotension  -Blood pressure trending down. Start IV fluids. Blood pressure does not improve patient will need pressors    A. Fib  -Cardizem as blood pressures running low    History of COPD  -Continue inhalers    Acute metabolic encephalopathy-improving    Morbid obesity  -Getting current management and treatment    Goals of care discussed with the previous provider. Patient is a DNR. Does not want intubation or resuscitation. She is okay with pressors        DVT Prophylaxis: Lovenox  Diet: ADULT DIET; Regular; 4 carb choices (60 gm/meal); Low Sodium (2 gm)  Code Status: DNR-CCA    PT/OT Eval Status:    Discharge plan -continue care    The patient and / or the family were informed of the results of any tests, a time was given to answer questions, a plan was proposed and they agreed with plan. Discussed with consulting physicians, nursing and social work     The note was completed using EMR. Every effort was made to ensure accuracy; however, inadvertent computerized transcription errors may be present.        Tiana Blum MD

## 2021-12-18 NOTE — PROGRESS NOTES
Grace catheter replaced per Dr Sara Gamino via sterile technique. Pt tolerated well. Yellow urine returned.     Electronically signed by Sara Titus RN on 12/18/2021 at 3:44 PM

## 2021-12-19 LAB
ANION GAP SERPL CALCULATED.3IONS-SCNC: 13 MMOL/L (ref 3–16)
BASOPHILS ABSOLUTE: 0.1 K/UL (ref 0–0.2)
BASOPHILS RELATIVE PERCENT: 0.2 %
BUN BLDV-MCNC: 22 MG/DL (ref 7–20)
CALCIUM SERPL-MCNC: 8.7 MG/DL (ref 8.3–10.6)
CHLORIDE BLD-SCNC: 103 MMOL/L (ref 99–110)
CO2: 22 MMOL/L (ref 21–32)
CREAT SERPL-MCNC: 0.7 MG/DL (ref 0.6–1.2)
EKG DIAGNOSIS: NORMAL
EKG Q-T INTERVAL: 326 MS
EKG QRS DURATION: 102 MS
EKG QTC CALCULATION (BAZETT): 441 MS
EKG R AXIS: 26 DEGREES
EKG T AXIS: 80 DEGREES
EKG VENTRICULAR RATE: 110 BPM
EOSINOPHILS ABSOLUTE: 0 K/UL (ref 0–0.6)
EOSINOPHILS RELATIVE PERCENT: 0.1 %
GFR AFRICAN AMERICAN: >60
GFR NON-AFRICAN AMERICAN: >60
GLUCOSE BLD-MCNC: 123 MG/DL (ref 70–99)
HCT VFR BLD CALC: 35 % (ref 36–48)
HEMOGLOBIN: 10.1 G/DL (ref 12–16)
L. PNEUMOPHILA SEROGP 1 UR AG: NORMAL
LYMPHOCYTES ABSOLUTE: 0.6 K/UL (ref 1–5.1)
LYMPHOCYTES RELATIVE PERCENT: 1.6 %
MAGNESIUM: 1.8 MG/DL (ref 1.8–2.4)
MCH RBC QN AUTO: 21.5 PG (ref 26–34)
MCHC RBC AUTO-ENTMCNC: 28.8 G/DL (ref 31–36)
MCV RBC AUTO: 74.6 FL (ref 80–100)
MONOCYTES ABSOLUTE: 0.9 K/UL (ref 0–1.3)
MONOCYTES RELATIVE PERCENT: 2.3 %
MRSA SCREEN RT-PCR: ABNORMAL
NEUTROPHILS ABSOLUTE: 36.2 K/UL (ref 1.7–7.7)
NEUTROPHILS RELATIVE PERCENT: 95.8 %
ORGANISM: ABNORMAL
PDW BLD-RTO: 19.1 % (ref 12.4–15.4)
PLATELET # BLD: 309 K/UL (ref 135–450)
PMV BLD AUTO: 7.4 FL (ref 5–10.5)
POTASSIUM SERPL-SCNC: 4 MMOL/L (ref 3.5–5.1)
PROCALCITONIN: 28.69 NG/ML (ref 0–0.15)
RBC # BLD: 4.69 M/UL (ref 4–5.2)
SODIUM BLD-SCNC: 138 MMOL/L (ref 136–145)
STREP PNEUMONIAE ANTIGEN, URINE: ABNORMAL
URINE CULTURE, ROUTINE: NORMAL
VANCOMYCIN TROUGH: 21.2 UG/ML (ref 10–20)
WBC # BLD: 37.8 K/UL (ref 4–11)

## 2021-12-19 PROCEDURE — 84145 PROCALCITONIN (PCT): CPT

## 2021-12-19 PROCEDURE — 2060000000 HC ICU INTERMEDIATE R&B

## 2021-12-19 PROCEDURE — 6370000000 HC RX 637 (ALT 250 FOR IP): Performed by: STUDENT IN AN ORGANIZED HEALTH CARE EDUCATION/TRAINING PROGRAM

## 2021-12-19 PROCEDURE — 80048 BASIC METABOLIC PNL TOTAL CA: CPT

## 2021-12-19 PROCEDURE — 6360000002 HC RX W HCPCS: Performed by: STUDENT IN AN ORGANIZED HEALTH CARE EDUCATION/TRAINING PROGRAM

## 2021-12-19 PROCEDURE — 94640 AIRWAY INHALATION TREATMENT: CPT

## 2021-12-19 PROCEDURE — 6370000000 HC RX 637 (ALT 250 FOR IP): Performed by: INTERNAL MEDICINE

## 2021-12-19 PROCEDURE — 93010 ELECTROCARDIOGRAM REPORT: CPT | Performed by: INTERNAL MEDICINE

## 2021-12-19 PROCEDURE — 2500000003 HC RX 250 WO HCPCS: Performed by: NURSE PRACTITIONER

## 2021-12-19 PROCEDURE — 83735 ASSAY OF MAGNESIUM: CPT

## 2021-12-19 PROCEDURE — 80202 ASSAY OF VANCOMYCIN: CPT

## 2021-12-19 PROCEDURE — 2580000003 HC RX 258: Performed by: INTERNAL MEDICINE

## 2021-12-19 PROCEDURE — 85025 COMPLETE CBC W/AUTO DIFF WBC: CPT

## 2021-12-19 PROCEDURE — 6360000002 HC RX W HCPCS: Performed by: INTERNAL MEDICINE

## 2021-12-19 PROCEDURE — 2580000003 HC RX 258: Performed by: STUDENT IN AN ORGANIZED HEALTH CARE EDUCATION/TRAINING PROGRAM

## 2021-12-19 PROCEDURE — 2700000000 HC OXYGEN THERAPY PER DAY

## 2021-12-19 RX ORDER — DILTIAZEM HYDROCHLORIDE 120 MG/1
120 CAPSULE, COATED, EXTENDED RELEASE ORAL DAILY
Status: DISCONTINUED | OUTPATIENT
Start: 2021-12-19 | End: 2021-12-23 | Stop reason: HOSPADM

## 2021-12-19 RX ADMIN — GABAPENTIN 300 MG: 300 CAPSULE ORAL at 08:22

## 2021-12-19 RX ADMIN — AZITHROMYCIN MONOHYDRATE 500 MG: 500 INJECTION, POWDER, LYOPHILIZED, FOR SOLUTION INTRAVENOUS at 04:47

## 2021-12-19 RX ADMIN — SODIUM CHLORIDE, PRESERVATIVE FREE 10 ML: 5 INJECTION INTRAVENOUS at 20:31

## 2021-12-19 RX ADMIN — SODIUM CHLORIDE: 9 INJECTION, SOLUTION INTRAVENOUS at 09:46

## 2021-12-19 RX ADMIN — GABAPENTIN 300 MG: 300 CAPSULE ORAL at 20:30

## 2021-12-19 RX ADMIN — METOPROLOL TARTRATE 5 MG: 1 INJECTION, SOLUTION INTRAVENOUS at 09:50

## 2021-12-19 RX ADMIN — POLYETHYLENE GLYCOL 3350 17 G: 17 POWDER, FOR SOLUTION ORAL at 14:21

## 2021-12-19 RX ADMIN — SODIUM CHLORIDE, PRESERVATIVE FREE 10 ML: 5 INJECTION INTRAVENOUS at 08:22

## 2021-12-19 RX ADMIN — Medication 1500 MG: at 16:22

## 2021-12-19 RX ADMIN — DULOXETINE HYDROCHLORIDE 60 MG: 60 CAPSULE, DELAYED RELEASE ORAL at 08:22

## 2021-12-19 RX ADMIN — PANTOPRAZOLE SODIUM 40 MG: 40 TABLET, DELAYED RELEASE ORAL at 06:03

## 2021-12-19 RX ADMIN — MEROPENEM 500 MG: 500 INJECTION, POWDER, FOR SOLUTION INTRAVENOUS at 03:07

## 2021-12-19 RX ADMIN — GABAPENTIN 300 MG: 300 CAPSULE ORAL at 14:21

## 2021-12-19 RX ADMIN — DILTIAZEM HYDROCHLORIDE 120 MG: 120 CAPSULE, COATED, EXTENDED RELEASE ORAL at 09:45

## 2021-12-19 RX ADMIN — BUDESONIDE AND FORMOTEROL FUMARATE DIHYDRATE 2 PUFF: 160; 4.5 AEROSOL RESPIRATORY (INHALATION) at 08:33

## 2021-12-19 RX ADMIN — Medication 1 CAPSULE: at 08:22

## 2021-12-19 RX ADMIN — BUDESONIDE AND FORMOTEROL FUMARATE DIHYDRATE 2 PUFF: 160; 4.5 AEROSOL RESPIRATORY (INHALATION) at 20:47

## 2021-12-19 RX ADMIN — MEROPENEM 500 MG: 500 INJECTION, POWDER, FOR SOLUTION INTRAVENOUS at 08:22

## 2021-12-19 RX ADMIN — MEROPENEM 500 MG: 500 INJECTION, POWDER, FOR SOLUTION INTRAVENOUS at 14:23

## 2021-12-19 RX ADMIN — MEROPENEM 500 MG: 500 INJECTION, POWDER, FOR SOLUTION INTRAVENOUS at 20:35

## 2021-12-19 RX ADMIN — ENOXAPARIN SODIUM 40 MG: 100 INJECTION SUBCUTANEOUS at 08:22

## 2021-12-19 RX ADMIN — ATORVASTATIN CALCIUM 40 MG: 40 TABLET, FILM COATED ORAL at 08:22

## 2021-12-19 ASSESSMENT — PAIN SCALES - GENERAL
PAINLEVEL_OUTOF10: 0

## 2021-12-19 NOTE — PLAN OF CARE
Problem: Skin Integrity:  Goal: Will show no infection signs and symptoms  Description: Will show no infection signs and symptoms  Outcome: Ongoing  Note: Assessing mata scale Q shift. Performing skin assessments every shift. Maintaining dry and clean skin. Promoting adequate nutritional intake. Heels elevated off bed. Performing skin care q shift. Utilizing lift equipment when indicated. Problem: Falls - Risk of:  Goal: Will remain free from falls  Description: Will remain free from falls  Outcome: Ongoing  Note: Bed alarm on, bed in lowest position, wheels locked. Fall risk assessment completed every shift. Nonskid socks on, fall sign posted. Pt educated on use of call light.

## 2021-12-19 NOTE — PROGRESS NOTES
[facility] notified RN that pt appeared to have converted to Afib. NP messaged. EKG ordered. PRN orders placed for tachycardia, see MAR. Will continue with plan of care.   Electronically signed by Jonnie Acevedo RN on 12/19/2021 at 4:30 AM

## 2021-12-19 NOTE — PROGRESS NOTES
24 hour   Intake 3153.93 ml   Output 1125 ml   Net 2028.93 ml         Ht Readings from Last 1 Encounters:   12/18/21 5' 7\" (1.702 m)        Wt Readings from Last 1 Encounters:   12/19/21 239 lb 3.2 oz (108.5 kg)         Estimated Creatinine Clearance: 96 mL/min (based on SCr of 0.7 mg/dL). Assessment/Plan:  Vancomycin day 3  Vancomycin trough 21.2mg/L after 2 doses of 1500mg q18h (timing accurate)  Will reduce to Vancomycin 1500 mg IV every 24 hours today. Predicted trough 14.6mg/L and  mg/L.hr.    Will order next level based on AM labs. Following MRSA nasal probe as well. Thank you for the consult.      Jaya Carpio, [facility], [address] 12/19/2021 12:43 PM

## 2021-12-19 NOTE — PROGRESS NOTES
Progress Note  Admit Date: 12/17/2021      PCP: Keerthi Serrano MD     CC: F/U for altered mental status    Days in hospital:  2    SUBJECTIVE / Interval History:  Patient is awake and alert. No complaints at present  Blood pressure better. Heart rate elevated  Now needing 4 to 6 L      Allergies  Patient has no known allergies. Medications    Scheduled Meds:   sodium chloride flush  5-40 mL IntraVENous 2 times per day    enoxaparin  40 mg SubCUTAneous Daily    atorvastatin  40 mg Oral Daily    DULoxetine  60 mg Oral Daily    budesonide-formoterol  2 puff Inhalation BID    gabapentin  300 mg Oral TID    pantoprazole  40 mg Oral Daily    azithromycin  500 mg IntraVENous Q24H    meropenem  500 mg IntraVENous Q6H    lidocaine 1 % injection  5 mL IntraDERmal Once    sodium chloride flush  5-40 mL IntraVENous 2 times per day    lactobacillus  1 capsule Oral Daily with breakfast    vancomycin  1,500 mg IntraVENous Q18H    vancomycin (VANCOCIN) intermittent dosing (placeholder)   Other RX Placeholder     Continuous Infusions:   sodium chloride      sodium chloride      sodium chloride 100 mL/hr at 12/19/21 0710       PRN Meds:  sodium chloride flush, sodium chloride, acetaminophen **OR** acetaminophen, ipratropium-albuterol, sennosides-docusate sodium, polyethylene glycol, ondansetron, sodium chloride flush, sodium chloride, metoprolol    Vitals    /71   Pulse 116   Temp 98 °F (36.7 °C) (Axillary)   Resp 18   Ht 5' 7\" (1.702 m)   Wt 239 lb 3.2 oz (108.5 kg)   SpO2 94%   BMI 37.46 kg/m²     Exam:    Gen: No distress. Ill looking  Eyes: PERRL. No sclera icterus. No conjunctival injection. ENT: No discharge. Pharynx clear. External appearance of ears and nose normal.  Neck: Trachea midline. No obvious mass. Resp: No accessory muscle use. No crackles. No wheezes. + rhonchi. No dullness on percussion. CV: Regular rate. Regular rhythm. No murmur or rub. No edema.    GI: Non-tender. Non-distended. No hernia. Skin: Warm, dry, normal texture and turgor. No nodule on exposed extremities. Lymph: No cervical LAD. No supraclavicular LAD. M/S: No cyanosis. No clubbing. No joint deformity. Neuro: Moves all four extremities. CN 2-12 tested, no defect noted. Psych: Oriented x 3. No anxiety. Awake. Alert. Intact judgement and insight. Data    LABS  CBC:   Recent Labs     12/17/21 1940 12/18/21  0538 12/19/21  0600   WBC 34.1* 63.2* 37.8*   HGB 12.8 11.2* 10.1*   HCT 43.9 39.5 35.0*   MCV 76.0* 76.7* 74.6*   * 369 309     BMP:   Recent Labs     12/17/21 1940 12/18/21  0538 12/19/21  0600    138 138   K 4.2 4.4 4.0   CL 97* 105 103   CO2 25 24 22   BUN 19 18 22*   CREATININE 0.9 0.9 0.7   GLUCOSE 100* 179* 123*     POC GLUCOSE:  No results for input(s): POCGLU in the last 72 hours. LIVER PROFILE:   Recent Labs     12/17/21 1940   AST 12*   ALT 7*   LABALBU 2.8*   BILITOT 0.4   ALKPHOS 187*     PT/INR: No results for input(s): PROTIME, INR in the last 72 hours. APTT: No results for input(s): APTT in the last 72 hours. UA:  Recent Labs     12/17/21 2052 12/17/21 2126 12/18/21  1735   COLORU   < >  --  DK YELLOW   PHUR   < >  --  6.0   WBCUA   < > >900* 136*   RBCUA   < > >100* 639*   BACTERIA  --  4+*  --    CLARITYU   < >  --  CLOUDY*   SPECGRAV   < >  --  1.023   LEUKOCYTESUR   < >  --  MODERATE*   UROBILINOGEN   < >  --  1.0   BILIRUBINUR   < >  --  SMALL*   BLOODU   < >  --  LARGE*   GLUCOSEU   < >  --  Negative   KETUA   < >  --  TRACE*    < > = values in this interval not displayed. Microbiology:  Wound Culture: No results for input(s): WNDABS, ORG in the last 72 hours. Invalid input(s):  LABGRAM  Nasal Culture: No results for input(s): ORG, MRSAPCR in the last 72 hours. Blood Culture:   Recent Labs     12/17/21 2023   BLOODCULT2 No Growth to date. Any change in status will be called.      Fungal Culture:   No results for input(s): FUNGSM in the last 72 hours. No results for input(s): FUNCXBLD in the last 72 hours. CSF Culture:  No results for input(s): COLORCSF, APPEARCSF, CFTUBE, CLOTCSF, WBCCSF, RBCCSF, NEUTCSF, NUMCELLSCSF, LYMPHSCSF, MONOCSF, GLUCCSF, VOLCSF in the last 72 hours. Respiratory Culture:  No results for input(s): Cary Levi in the last 72 hours. AFB:No results for input(s): AFBSMEAR in the last 72 hours. Urine Culture  Recent Labs     12/17/21 2126   LABURIN >50,000 CFU/ml Mixed pathogens  Multiple organisms isolated, no predominance. Culture  indicates probable contamination. Please review colony count  and clinical indications to determine if a repeat culture is  necessary. No further workup to be done. RADIOLOGY:    CT ABDOMEN PELVIS WO CONTRAST   Final Result   Addendum 1 of 1   ADDENDUM:   Left lower lobe consolidation, likely representing pneumonia. Chest CT is   recommended for further evaluation. Final   No acute pathology in the abdomen and pelvis. RECOMMENDATIONS:   Unavailable            XR CHEST PORTABLE   Final Result   No acute cardiopulmonary disease. CONSULTS:    PHARMACY TO DOSE VANCOMYCIN  IP CONSULT TO INFECTIOUS DISEASES    ASSESSMENT AND PLAN:      Active Problems:    Chronic obstructive pulmonary disease (HCC)    Paroxysmal atrial fibrillation (HCC)    Sepsis (HCC)    Acute respiratory failure (HCC)    Diastolic CHF ([address]    Aspiration pneumonia (HCC)    UTI (urinary tract infection)  Resolved Problems:    * No resolved hospital problems. *    Patient is a 62-year-old female who is a resident of a skilled nursing facility, with a past medical history of chronic kidney disease, COPD, ESBL UTI, hyperkalemia, hypertension, chronic respiratory, A. fib presented to the ED with altered mental status. She was admitted with sepsis secondary to pneumonia with acute on chronic respiratory failure.   Patient was initially placed on BiPAP and then weaned to Vapotherm patient has had multiple admissions in the past for respiratory failure with pneumonia with ESBL UTI. Acute on chronic hypoxic respiratory failure  -Patient needing 4-5  L of oxygen to maintain sats greater than 90%  -Wean oxygen as tolerated  -Uses 2 L oxygen at home    Pneumonia  -Continue broad-spectrum antibiotics. Treat as MRSA/gram-negative pneumonia  -Has had multiple admissions for pneumonia  - procalcitonin markedly elevated, trending down  -Last admission patient had a positive MRSA probe  -CT which covers the lower lobe base shows consolidation  -Pneumococcal antigen positive in urine    UTI in the setting of renal calculi and chronic Grace  -With a past history of ESBL on broad-spectrum antibiotics  -Patient has a remote history of ureteral stents. Has a left obstructing ureteral stone which is present since 9/20. Needs outpatient follow-up with  urology  -Tissue severe atrophy of left kidney with chronic ureteral calcification  -Culture no growth to date    Sepsis-White count improving  -With fever and leukocytosis  -Continue IV fluids, will cut back  -  Leukemoid reaction on admission    Hypotension  -Blood pressure trending down. Start IV fluids. Blood pressure does not improve patient will need pressors    A. Fib  -Restart low-dose Cardizem as blood pressure is better    History of COPD  -Continue inhalers    Acute metabolic encephalopathy-improving    Morbid obesity  -Getting current management and treatment    Goals of care discussed with the previous provider. Patient is a DNR. Does not want intubation or resuscitation. She is okay with pressors        DVT Prophylaxis: Lovenox  Diet: ADULT DIET; Regular; 4 carb choices (60 gm/meal); Low Sodium (2 gm)  Code Status: DNR-CCA    PT/OT Eval Status:    Discharge plan -continue care    The patient and / or the family were informed of the results of any tests, a time was given to answer questions, a plan was proposed and they agreed with plan.     Discussed with consulting physicians, nursing and social work     The note was completed using EMR. Every effort was made to ensure accuracy; however, inadvertent computerized transcription errors may be present.        Crys Plummer MD

## 2021-12-19 NOTE — PLAN OF CARE
Problem: Skin Integrity:  Goal: Will show no infection signs and symptoms  Description: Will show no infection signs and symptoms  12/19/2021 0956 by Wolfgang Butler RN  Outcome: Ongoing  12/19/2021 0310 by Penny Vogel RN  Outcome: Ongoing  Note: Assessing mata scale Q shift. Performing skin assessments every shift. Maintaining dry and clean skin. Promoting adequate nutritional intake. Heels elevated off bed. Performing skin care q shift. Utilizing lift equipment when indicated. Goal: Absence of new skin breakdown  Description: Absence of new skin breakdown  Outcome: Ongoing     Problem: Falls - Risk of:  Goal: Will remain free from falls  Description: Will remain free from falls  12/19/2021 0956 by Wolfgang Butler RN  Outcome: Ongoing  12/19/2021 0310 by Penny Vogel RN  Outcome: Ongoing  Note: Bed alarm on, bed in lowest position, wheels locked. Fall risk assessment completed every shift. Nonskid socks on, fall sign posted. Pt educated on use of call light.     Goal: Absence of physical injury  Description: Absence of physical injury  Outcome: Ongoing     Problem: Discharge Planning:  Goal: Discharged to appropriate level of care  Description: Discharged to appropriate level of care  Outcome: Ongoing     Problem: Gas Exchange - Impaired:  Goal: Levels of oxygenation will improve  Description: Levels of oxygenation will improve  Outcome: Ongoing     Problem: Infection, Septic Shock:  Goal: Will show no infection signs and symptoms  Description: Will show no infection signs and symptoms  Outcome: Ongoing     Problem: Infection - Ventilator-Associated Pneumonia:  Goal: Absence of pulmonary infection  Description: Absence of pulmonary infection  Outcome: Ongoing     Problem: Serum Glucose Level - Abnormal:  Goal: Ability to maintain appropriate glucose levels will improve  Description: Ability to maintain appropriate glucose levels will improve  Outcome: Ongoing     Problem: Tissue Perfusion, Altered:  Goal: Circulatory function within specified parameters  Description: Circulatory function within specified parameters  Outcome: Ongoing     Problem: Venous Thromboembolism:  Goal: Will show no signs or symptoms of venous thromboembolism  Description: Will show no signs or symptoms of venous thromboembolism  Outcome: Ongoing  Goal: Absence of signs or symptoms of impaired coagulation  Description: Absence of signs or symptoms of impaired coagulation  Outcome: Ongoing

## 2021-12-20 LAB
ANION GAP SERPL CALCULATED.3IONS-SCNC: 9 MMOL/L (ref 3–16)
ANISOCYTOSIS: ABNORMAL
BANDED NEUTROPHILS RELATIVE PERCENT: 8 % (ref 0–7)
BASOPHILS ABSOLUTE: 0 K/UL (ref 0–0.2)
BASOPHILS ABSOLUTE: 0 K/UL (ref 0–0.2)
BASOPHILS RELATIVE PERCENT: 0 %
BASOPHILS RELATIVE PERCENT: 0.1 %
BUN BLDV-MCNC: 23 MG/DL (ref 7–20)
CALCIUM SERPL-MCNC: 8.1 MG/DL (ref 8.3–10.6)
CHLORIDE BLD-SCNC: 108 MMOL/L (ref 99–110)
CO2: 24 MMOL/L (ref 21–32)
CREAT SERPL-MCNC: 0.6 MG/DL (ref 0.6–1.2)
EOSINOPHILS ABSOLUTE: 0 K/UL (ref 0–0.6)
EOSINOPHILS ABSOLUTE: 0 K/UL (ref 0–0.6)
EOSINOPHILS RELATIVE PERCENT: 0 %
EOSINOPHILS RELATIVE PERCENT: 0.2 %
GFR AFRICAN AMERICAN: >60
GFR NON-AFRICAN AMERICAN: >60
GLUCOSE BLD-MCNC: 97 MG/DL (ref 70–99)
HCT VFR BLD CALC: 34.2 % (ref 36–48)
HCT VFR BLD CALC: 39.5 % (ref 36–48)
HEMATOLOGY PATH CONSULT: NO
HEMOGLOBIN: 11.2 G/DL (ref 12–16)
HEMOGLOBIN: 9.9 G/DL (ref 12–16)
LYMPHOCYTES ABSOLUTE: 0.6 K/UL (ref 1–5.1)
LYMPHOCYTES ABSOLUTE: 1.6 K/UL (ref 1–5.1)
LYMPHOCYTES RELATIVE PERCENT: 1 %
LYMPHOCYTES RELATIVE PERCENT: 6.9 %
MAGNESIUM: 1.7 MG/DL (ref 1.8–2.4)
MCH RBC QN AUTO: 21.6 PG (ref 26–34)
MCH RBC QN AUTO: 21.7 PG (ref 26–34)
MCHC RBC AUTO-ENTMCNC: 28.3 G/DL (ref 31–36)
MCHC RBC AUTO-ENTMCNC: 29 G/DL (ref 31–36)
MCV RBC AUTO: 74.4 FL (ref 80–100)
MCV RBC AUTO: 76.7 FL (ref 80–100)
MICROCYTES: ABNORMAL
MONOCYTES ABSOLUTE: 0.6 K/UL (ref 0–1.3)
MONOCYTES ABSOLUTE: 0.8 K/UL (ref 0–1.3)
MONOCYTES RELATIVE PERCENT: 1 %
MONOCYTES RELATIVE PERCENT: 3.6 %
NEUTROPHILS ABSOLUTE: 20.4 K/UL (ref 1.7–7.7)
NEUTROPHILS ABSOLUTE: 61.9 K/UL (ref 1.7–7.7)
NEUTROPHILS RELATIVE PERCENT: 89.2 %
NEUTROPHILS RELATIVE PERCENT: 90 %
OVALOCYTES: ABNORMAL
PDW BLD-RTO: 19.2 % (ref 12.4–15.4)
PDW BLD-RTO: 19.6 % (ref 12.4–15.4)
PLATELET # BLD: 336 K/UL (ref 135–450)
PLATELET # BLD: 369 K/UL (ref 135–450)
PLATELET SLIDE REVIEW: ADEQUATE
PMV BLD AUTO: 7.3 FL (ref 5–10.5)
PMV BLD AUTO: 7.6 FL (ref 5–10.5)
POIKILOCYTES: ABNORMAL
POLYCHROMASIA: ABNORMAL
POTASSIUM SERPL-SCNC: 3.6 MMOL/L (ref 3.5–5.1)
RBC # BLD: 4.6 M/UL (ref 4–5.2)
RBC # BLD: 5.15 M/UL (ref 4–5.2)
SLIDE REVIEW: ABNORMAL
SODIUM BLD-SCNC: 141 MMOL/L (ref 136–145)
VACUOLATED NEUTROPHILS: PRESENT
WBC # BLD: 22.8 K/UL (ref 4–11)
WBC # BLD: 63.2 K/UL (ref 4–11)

## 2021-12-20 PROCEDURE — 6370000000 HC RX 637 (ALT 250 FOR IP): Performed by: STUDENT IN AN ORGANIZED HEALTH CARE EDUCATION/TRAINING PROGRAM

## 2021-12-20 PROCEDURE — 83735 ASSAY OF MAGNESIUM: CPT

## 2021-12-20 PROCEDURE — 2580000003 HC RX 258: Performed by: STUDENT IN AN ORGANIZED HEALTH CARE EDUCATION/TRAINING PROGRAM

## 2021-12-20 PROCEDURE — 2060000000 HC ICU INTERMEDIATE R&B

## 2021-12-20 PROCEDURE — 6370000000 HC RX 637 (ALT 250 FOR IP): Performed by: INTERNAL MEDICINE

## 2021-12-20 PROCEDURE — 6360000002 HC RX W HCPCS: Performed by: INTERNAL MEDICINE

## 2021-12-20 PROCEDURE — 6360000002 HC RX W HCPCS: Performed by: STUDENT IN AN ORGANIZED HEALTH CARE EDUCATION/TRAINING PROGRAM

## 2021-12-20 PROCEDURE — 2580000003 HC RX 258: Performed by: INTERNAL MEDICINE

## 2021-12-20 PROCEDURE — 92526 ORAL FUNCTION THERAPY: CPT

## 2021-12-20 PROCEDURE — 85025 COMPLETE CBC W/AUTO DIFF WBC: CPT

## 2021-12-20 PROCEDURE — 94640 AIRWAY INHALATION TREATMENT: CPT

## 2021-12-20 PROCEDURE — 80048 BASIC METABOLIC PNL TOTAL CA: CPT

## 2021-12-20 PROCEDURE — 2700000000 HC OXYGEN THERAPY PER DAY

## 2021-12-20 PROCEDURE — 94761 N-INVAS EAR/PLS OXIMETRY MLT: CPT

## 2021-12-20 RX ADMIN — MEROPENEM 500 MG: 500 INJECTION, POWDER, FOR SOLUTION INTRAVENOUS at 15:21

## 2021-12-20 RX ADMIN — AZITHROMYCIN MONOHYDRATE 500 MG: 500 INJECTION, POWDER, LYOPHILIZED, FOR SOLUTION INTRAVENOUS at 04:23

## 2021-12-20 RX ADMIN — GABAPENTIN 300 MG: 300 CAPSULE ORAL at 21:33

## 2021-12-20 RX ADMIN — PANTOPRAZOLE SODIUM 40 MG: 40 TABLET, DELAYED RELEASE ORAL at 05:36

## 2021-12-20 RX ADMIN — ENOXAPARIN SODIUM 40 MG: 100 INJECTION SUBCUTANEOUS at 08:08

## 2021-12-20 RX ADMIN — GABAPENTIN 300 MG: 300 CAPSULE ORAL at 15:21

## 2021-12-20 RX ADMIN — MEROPENEM 500 MG: 500 INJECTION, POWDER, FOR SOLUTION INTRAVENOUS at 03:02

## 2021-12-20 RX ADMIN — BUDESONIDE AND FORMOTEROL FUMARATE DIHYDRATE 2 PUFF: 160; 4.5 AEROSOL RESPIRATORY (INHALATION) at 08:37

## 2021-12-20 RX ADMIN — MEROPENEM 500 MG: 500 INJECTION, POWDER, FOR SOLUTION INTRAVENOUS at 21:32

## 2021-12-20 RX ADMIN — MEROPENEM 500 MG: 500 INJECTION, POWDER, FOR SOLUTION INTRAVENOUS at 08:13

## 2021-12-20 RX ADMIN — SODIUM CHLORIDE: 9 INJECTION, SOLUTION INTRAVENOUS at 03:01

## 2021-12-20 RX ADMIN — DULOXETINE HYDROCHLORIDE 60 MG: 60 CAPSULE, DELAYED RELEASE ORAL at 08:08

## 2021-12-20 RX ADMIN — Medication 1 CAPSULE: at 08:08

## 2021-12-20 RX ADMIN — BUDESONIDE AND FORMOTEROL FUMARATE DIHYDRATE 2 PUFF: 160; 4.5 AEROSOL RESPIRATORY (INHALATION) at 21:12

## 2021-12-20 RX ADMIN — Medication 1500 MG: at 16:46

## 2021-12-20 RX ADMIN — ATORVASTATIN CALCIUM 40 MG: 40 TABLET, FILM COATED ORAL at 08:08

## 2021-12-20 RX ADMIN — SODIUM CHLORIDE: 9 INJECTION, SOLUTION INTRAVENOUS at 21:30

## 2021-12-20 RX ADMIN — GABAPENTIN 300 MG: 300 CAPSULE ORAL at 08:08

## 2021-12-20 RX ADMIN — DILTIAZEM HYDROCHLORIDE 120 MG: 120 CAPSULE, COATED, EXTENDED RELEASE ORAL at 08:08

## 2021-12-20 RX ADMIN — SODIUM CHLORIDE, PRESERVATIVE FREE 10 ML: 5 INJECTION INTRAVENOUS at 08:08

## 2021-12-20 ASSESSMENT — PAIN SCALES - GENERAL
PAINLEVEL_OUTOF10: 0

## 2021-12-20 NOTE — PLAN OF CARE
Problem: Skin Integrity:  Goal: Will show no infection signs and symptoms  Description: Will show no infection signs and symptoms  12/20/2021 0826 by Syeda Schwartz RN  Outcome: Ongoing  12/20/2021 0050 by Trinh Killian RN  Outcome: Ongoing  Note: Assessing mata scale Q shift. Performing skin assessments every shift. Maintaining dry and clean skin. Promoting adequate nutritional intake. Heels elevated off bed. Performing skin care q shift. Utilizing lift equipment when indicated. Goal: Absence of new skin breakdown  Description: Absence of new skin breakdown  Outcome: Ongoing     Problem: Falls - Risk of:  Goal: Will remain free from falls  Description: Will remain free from falls  12/20/2021 0826 by Syeda Schwartz RN  Outcome: Ongoing  12/20/2021 0050 by Trinh Killian RN  Outcome: Ongoing  Note: Bed alarm on, bed in lowest position, wheels locked. Fall risk assessment completed every shift. Nonskid socks on, fall sign posted. Pt educated on use of call light.     Goal: Absence of physical injury  Description: Absence of physical injury  Outcome: Ongoing     Problem: Discharge Planning:  Goal: Discharged to appropriate level of care  Description: Discharged to appropriate level of care  Outcome: Ongoing     Problem: Gas Exchange - Impaired:  Goal: Levels of oxygenation will improve  Description: Levels of oxygenation will improve  Outcome: Ongoing     Problem: Infection, Septic Shock:  Goal: Will show no infection signs and symptoms  Description: Will show no infection signs and symptoms  Outcome: Ongoing     Problem: Infection - Ventilator-Associated Pneumonia:  Goal: Absence of pulmonary infection  Description: Absence of pulmonary infection  Outcome: Ongoing     Problem: Serum Glucose Level - Abnormal:  Goal: Ability to maintain appropriate glucose levels will improve  Description: Ability to maintain appropriate glucose levels will improve  Outcome: Ongoing     Problem: Tissue Perfusion, Altered:  Goal: Circulatory function within specified parameters  Description: Circulatory function within specified parameters  Outcome: Ongoing     Problem: Venous Thromboembolism:  Goal: Will show no signs or symptoms of venous thromboembolism  Description: Will show no signs or symptoms of venous thromboembolism  Outcome: Ongoing  Goal: Absence of signs or symptoms of impaired coagulation  Description: Absence of signs or symptoms of impaired coagulation  Outcome: Ongoing

## 2021-12-20 NOTE — PROGRESS NOTES
sepsis secondary to pneumonia with acute on chronic respiratory failure.  Patient was initially placed on BiPAP and then weaned to Vapotherm patient has had multiple admissions in the past for respiratory failure with pneumonia with ESBL UTI. Subjective:     Current diet  Regular  Thin    Comments regarding tolerating Current Diet:   Pt and RN both report good PO toleration, no concerns    Objective:     Pain   Patient Currently in Pain: Denies    Cognitive/Behavior   Behavior/Cognition: Alert,Cooperative,Pleasant mood    Positioning    leaning to right which pt reports is preference    PO Trials: consuming breakfast food (eggs, sausage mary ellen, fresh fruit) and thin via cup  · Thin Liquids cup 4oz: delayed swallow initiation, suspected reduced laryngeal elevation upon palpation, no immediate overt s/s  · Regular food (sausage and fresh fruit):prolonged but effective mastication r/t edentulism, decreased bolus formation, delayed swallow initiation, suspected reduced laryngeal elevation upon palpation, no immediate overt s/s    Dysphagia Tx:   Direct Dysphagia tx: seen consuming breakfast food in bed; tray at pt's left side, pt leaning left to eat and declines offer to reposition upright, stating \"this is how I like it. \"  Appeared to tolerate regular textures and thin liquids via cup without immediate overt s/s. Pt does present with occasional congested cough, suspected r/t med status; less likely suspicion for related to PO. Goals:   Dysphagia Goals: The patient will tolerate recommended diet without observed clinical signs of aspiration met    Assessment:   Impressions:   Dysphagia Diagnosis: Mild oral stage dysphagia,Mild pharyngeal stage dysphagia   · Minimal oropharyngeal dysphagia characerized by prolonged but effective mastication r/t edentulism, decreased bolus formation, delayed swallow initiation, suspected reduced laryngeal elevation upon palpation. No immediate overt s/s of aspiration.   Occasional congested cough unchanged in frequency or quality which is suspected as unrelated to PO.    · Recommend regular/thin liquids. · If MD is concerned re: aspiration, a MBS may be completed with MD order. · ST will sign off at this time    Diet Recommendations:  Regular  Thin  Recommended Form of Meds: PO    Strategies:   Compensatory Swallowing Strategies: Upright as possible for all oral intake,Remain upright for 30-45 minutes after meals    Education:  Consulted and agree with results and recommendations: Patient,Family member  Patient Education: results, recommendations  Patient Education Response: Verbalizes understanding    Prognosis:   Good for PO tolerance    Plan:     Continue Dysphagia Therapy: no    This note serves as a D/C Summary.     Coded treatment time:0  Total treatment time: 15    Electronically signed by  Leila aGllardo MS, CCC-SLP #[id number]  Speech Language Pathologist   on 12/20/2021 at 12:32 PM

## 2021-12-20 NOTE — PROGRESS NOTES
Progress Note  Admit Date: 12/17/2021      PCP: Alexey Craig MD     CC: F/U for altered mental status    Days in hospital:  3    SUBJECTIVE / Interval History:  Patient is awake and alert. No complaints at present  Blood pressure better, still on lower side. Heart rate better  Now needing 4 L      Allergies  Patient has no known allergies. Medications    Scheduled Meds:   dilTIAZem  120 mg Oral Daily    vancomycin  1,500 mg IntraVENous Q24H    sodium chloride flush  5-40 mL IntraVENous 2 times per day    enoxaparin  40 mg SubCUTAneous Daily    atorvastatin  40 mg Oral Daily    DULoxetine  60 mg Oral Daily    budesonide-formoterol  2 puff Inhalation BID    gabapentin  300 mg Oral TID    pantoprazole  40 mg Oral Daily    azithromycin  500 mg IntraVENous Q24H    meropenem  500 mg IntraVENous Q6H    lidocaine 1 % injection  5 mL IntraDERmal Once    sodium chloride flush  5-40 mL IntraVENous 2 times per day    lactobacillus  1 capsule Oral Daily with breakfast    vancomycin (VANCOCIN) intermittent dosing (placeholder)   Other RX Placeholder     Continuous Infusions:   sodium chloride      sodium chloride      sodium chloride 100 mL/hr at 12/20/21 0301       PRN Meds:  sodium chloride flush, sodium chloride, acetaminophen **OR** acetaminophen, ipratropium-albuterol, sennosides-docusate sodium, polyethylene glycol, ondansetron, sodium chloride flush, sodium chloride, metoprolol    Vitals    /68   Pulse 85   Temp 98.2 °F (36.8 °C) (Oral)   Resp 18   Ht 5' 7\" (1.702 m)   Wt 243 lb (110.2 kg)   SpO2 93%   BMI 38.06 kg/m²     Exam:    Gen: No distress. Ill looking  Eyes: PERRL. No sclera icterus. No conjunctival injection. ENT: No discharge. Pharynx clear. External appearance of ears and nose normal.  Neck: Trachea midline. No obvious mass. Resp: No accessory muscle use. No crackles. No wheezes. + rhonchi. No dullness on percussion. CV: Regular rate. Regular rhythm.  No murmur or rub. No edema. GI: Non-tender. Non-distended. No hernia. Skin: Warm, dry, normal texture and turgor. No nodule on exposed extremities. Lymph: No cervical LAD. No supraclavicular LAD. M/S: No cyanosis. No clubbing. No joint deformity. Neuro: Moves all four extremities. CN 2-12 tested, no defect noted. Psych: Oriented x 3. No anxiety. Awake. Alert. Intact judgement and insight. Data    LABS  CBC:   Recent Labs     12/18/21  0538 12/19/21  0600 12/20/21  0535   WBC 63.2* 37.8* 22.8*   HGB 11.2* 10.1* 9.9*   HCT 39.5 35.0* 34.2*   MCV 76.7* 74.6* 74.4*    309 336     BMP:   Recent Labs     12/18/21  0538 12/19/21  0600 12/20/21  0535    138 141   K 4.4 4.0 3.6    103 108   CO2 24 22 24   BUN 18 22* 23*   CREATININE 0.9 0.7 0.6   GLUCOSE 179* 123* 97     POC GLUCOSE:  No results for input(s): POCGLU in the last 72 hours. LIVER PROFILE:   Recent Labs     12/17/21  1940   AST 12*   ALT 7*   LABALBU 2.8*   BILITOT 0.4   ALKPHOS 187*     PT/INR: No results for input(s): PROTIME, INR in the last 72 hours. APTT: No results for input(s): APTT in the last 72 hours. UA:  Recent Labs     12/17/21 2052 12/17/21 2126 12/18/21  1735   COLORU   < >  --  DK YELLOW   PHUR   < >  --  6.0   WBCUA   < > >900* 136*   RBCUA   < > >100* 639*   BACTERIA  --  4+*  --    CLARITYU   < >  --  CLOUDY*   SPECGRAV   < >  --  1.023   LEUKOCYTESUR   < >  --  MODERATE*   UROBILINOGEN   < >  --  1.0   BILIRUBINUR   < >  --  SMALL*   BLOODU   < >  --  LARGE*   GLUCOSEU   < >  --  Negative   KETUA   < >  --  TRACE*    < > = values in this interval not displayed. Microbiology:  Wound Culture:   Recent Labs     12/18/21  1433   ORG Staph aureus MRSA*     Nasal Culture:   Recent Labs     12/18/21  1433   ORG Staph aureus MRSA*   MRSAPCR POSITIVE for  Normal Range: Not detected  CONTACT PRECAUTIONS INDICATED  *     Blood Culture:   Recent Labs     12/18/21  1212   BLOODCULT2 No Growth to date.   Any change in status will be called. Fungal Culture:   No results for input(s): FUNGSM in the last 72 hours. No results for input(s): FUNCXBLD in the last 72 hours. CSF Culture:  No results for input(s): COLORCSF, APPEARCSF, CFTUBE, CLOTCSF, WBCCSF, RBCCSF, NEUTCSF, NUMCELLSCSF, LYMPHSCSF, MONOCSF, GLUCCSF, VOLCSF in the last 72 hours. Respiratory Culture:  No results for input(s): Lavaughn Jennifer in the last 72 hours. AFB:No results for input(s): AFBSMEAR in the last 72 hours. Urine Culture  Recent Labs     12/17/21 2126   LABURIN >50,000 CFU/ml Mixed pathogens  Multiple organisms isolated, no predominance. Culture  indicates probable contamination. Please review colony count  and clinical indications to determine if a repeat culture is  necessary. No further workup to be done. RADIOLOGY:    CT ABDOMEN PELVIS WO CONTRAST   Final Result   Addendum 1 of 1   ADDENDUM:   Left lower lobe consolidation, likely representing pneumonia. Chest CT is   recommended for further evaluation. Final   No acute pathology in the abdomen and pelvis. RECOMMENDATIONS:   Unavailable            XR CHEST PORTABLE   Final Result   No acute cardiopulmonary disease. CONSULTS:    PHARMACY TO DOSE VANCOMYCIN  IP CONSULT TO INFECTIOUS DISEASES    ASSESSMENT AND PLAN:      Active Problems:    Chronic obstructive pulmonary disease (HCC)    Paroxysmal atrial fibrillation (HCC)    Sepsis (HCC)    Acute respiratory failure (HCC)    Diastolic CHF ([facility] [address]    Aspiration pneumonia (HCC)    UTI (urinary tract infection)  Resolved Problems:    * No resolved hospital problems. *    Patient is a 80-year-old female who is a resident of a skilled nursing facility, with a past medical history of chronic kidney disease, COPD, ESBL UTI, hyperkalemia, hypertension, chronic respiratory, A. fib presented to the ED with altered mental status.   She was admitted with sepsis secondary to pneumonia with acute on chronic respiratory failure. Patient was initially placed on BiPAP and then weaned to Vapotherm patient has had multiple admissions in the past for respiratory failure with pneumonia with ESBL UTI. Acute on chronic hypoxic respiratory failure  -Patient needing 4-5  L of oxygen to maintain sats greater than 90%  -Wean oxygen as tolerated  -Uses 2 L oxygen at home    Pneumonia  -Continue broad-spectrum antibiotics. Treat as MRSA/gram-negative pneumonia  -Has had multiple admissions for pneumonia  - procalcitonin markedly elevated, trending down, repeat tomoprrow  -Last admission patient had a positive MRSA probe  -CT which covers the lower lobe base shows consolidation  -Pneumococcal antigen positive in urine    UTI in the setting of renal calculi and chronic Grace  -With a past history of ESBL on broad-spectrum antibiotics  -Patient has a remote history of ureteral stents. Has a left obstructing ureteral stone which is present since 9/20. Needs outpatient follow-up with  urology  -Tissue severe atrophy of left kidney with chronic ureteral calcification  -Culture no growth to date    Sepsis-White count improving  -With fever and leukocytosis  -Continue IV fluids, will cut back  -  Leukemoid reaction on admission    Hypotension  -Blood pressure trending down. Start IV fluids. Blood pressure does not improve patient will need pressors    A. Fib  -Restart low-dose Cardizem as blood pressure is better    History of COPD  -Continue inhalers    Acute metabolic encephalopathy-improving    Morbid obesity  -Getting current management and treatment    Goals of care discussed with the previous provider. Patient is a DNR. Does not want intubation or resuscitation. She is okay with pressors        DVT Prophylaxis: Lovenox  Diet: ADULT DIET; Regular; 4 carb choices (60 gm/meal);  Low Sodium (2 gm)  Code Status: DNR-CCA    PT/OT Eval Status:    Discharge plan -await IDs recommendation for antibiotics on discharge    The patient and / or the family were informed of the results of any tests, a time was given to answer questions, a plan was proposed and they agreed with plan. Discussed with consulting physicians, nursing and social work     The note was completed using EMR. Every effort was made to ensure accuracy; however, inadvertent computerized transcription errors may be present.        Tammy Miller MD

## 2021-12-21 PROBLEM — E66.09 CLASS 2 OBESITY DUE TO EXCESS CALORIES WITH BODY MASS INDEX (BMI) OF 39.0 TO 39.9 IN ADULT: Status: ACTIVE | Noted: 2020-09-18

## 2021-12-21 LAB
ANION GAP SERPL CALCULATED.3IONS-SCNC: 7 MMOL/L (ref 3–16)
BASOPHILS ABSOLUTE: 0 K/UL (ref 0–0.2)
BASOPHILS RELATIVE PERCENT: 0.1 %
BUN BLDV-MCNC: 16 MG/DL (ref 7–20)
CALCIUM SERPL-MCNC: 8.6 MG/DL (ref 8.3–10.6)
CHLORIDE BLD-SCNC: 108 MMOL/L (ref 99–110)
CO2: 25 MMOL/L (ref 21–32)
CREAT SERPL-MCNC: 0.6 MG/DL (ref 0.6–1.2)
CULTURE, BLOOD 2: NORMAL
EOSINOPHILS ABSOLUTE: 0.3 K/UL (ref 0–0.6)
EOSINOPHILS RELATIVE PERCENT: 1.4 %
GFR AFRICAN AMERICAN: >60
GFR NON-AFRICAN AMERICAN: >60
GLUCOSE BLD-MCNC: 72 MG/DL (ref 70–99)
HCT VFR BLD CALC: 37 % (ref 36–48)
HEMOGLOBIN: 10.8 G/DL (ref 12–16)
LYMPHOCYTES ABSOLUTE: 1.8 K/UL (ref 1–5.1)
LYMPHOCYTES RELATIVE PERCENT: 9.5 %
MAGNESIUM: 1.9 MG/DL (ref 1.8–2.4)
MCH RBC QN AUTO: 21.6 PG (ref 26–34)
MCHC RBC AUTO-ENTMCNC: 29.2 G/DL (ref 31–36)
MCV RBC AUTO: 74 FL (ref 80–100)
MONOCYTES ABSOLUTE: 0.8 K/UL (ref 0–1.3)
MONOCYTES RELATIVE PERCENT: 4.5 %
NEUTROPHILS ABSOLUTE: 15.8 K/UL (ref 1.7–7.7)
NEUTROPHILS RELATIVE PERCENT: 84.5 %
PDW BLD-RTO: 19.7 % (ref 12.4–15.4)
PLATELET # BLD: 371 K/UL (ref 135–450)
PMV BLD AUTO: 7.1 FL (ref 5–10.5)
POTASSIUM SERPL-SCNC: 3.7 MMOL/L (ref 3.5–5.1)
PROCALCITONIN: 5.95 NG/ML (ref 0–0.15)
RBC # BLD: 5 M/UL (ref 4–5.2)
SODIUM BLD-SCNC: 140 MMOL/L (ref 136–145)
VANCOMYCIN TROUGH: 19.1 UG/ML (ref 10–20)
WBC # BLD: 18.7 K/UL (ref 4–11)

## 2021-12-21 PROCEDURE — 2580000003 HC RX 258: Performed by: INTERNAL MEDICINE

## 2021-12-21 PROCEDURE — 6360000002 HC RX W HCPCS: Performed by: STUDENT IN AN ORGANIZED HEALTH CARE EDUCATION/TRAINING PROGRAM

## 2021-12-21 PROCEDURE — 6370000000 HC RX 637 (ALT 250 FOR IP): Performed by: INTERNAL MEDICINE

## 2021-12-21 PROCEDURE — 2700000000 HC OXYGEN THERAPY PER DAY

## 2021-12-21 PROCEDURE — 6370000000 HC RX 637 (ALT 250 FOR IP): Performed by: STUDENT IN AN ORGANIZED HEALTH CARE EDUCATION/TRAINING PROGRAM

## 2021-12-21 PROCEDURE — 80202 ASSAY OF VANCOMYCIN: CPT

## 2021-12-21 PROCEDURE — 2580000003 HC RX 258: Performed by: STUDENT IN AN ORGANIZED HEALTH CARE EDUCATION/TRAINING PROGRAM

## 2021-12-21 PROCEDURE — 94760 N-INVAS EAR/PLS OXIMETRY 1: CPT

## 2021-12-21 PROCEDURE — 6360000002 HC RX W HCPCS: Performed by: INTERNAL MEDICINE

## 2021-12-21 PROCEDURE — 80048 BASIC METABOLIC PNL TOTAL CA: CPT

## 2021-12-21 PROCEDURE — 83735 ASSAY OF MAGNESIUM: CPT

## 2021-12-21 PROCEDURE — 99233 SBSQ HOSP IP/OBS HIGH 50: CPT | Performed by: INTERNAL MEDICINE

## 2021-12-21 PROCEDURE — 94640 AIRWAY INHALATION TREATMENT: CPT

## 2021-12-21 PROCEDURE — 36415 COLL VENOUS BLD VENIPUNCTURE: CPT

## 2021-12-21 PROCEDURE — 84145 PROCALCITONIN (PCT): CPT

## 2021-12-21 PROCEDURE — 85025 COMPLETE CBC W/AUTO DIFF WBC: CPT

## 2021-12-21 PROCEDURE — 2060000000 HC ICU INTERMEDIATE R&B

## 2021-12-21 RX ORDER — AZITHROMYCIN 500 MG/1
500 TABLET, FILM COATED ORAL DAILY
Status: DISCONTINUED | OUTPATIENT
Start: 2021-12-22 | End: 2021-12-21

## 2021-12-21 RX ORDER — TRAMADOL HYDROCHLORIDE 50 MG/1
100 TABLET ORAL EVERY 6 HOURS PRN
Status: DISCONTINUED | OUTPATIENT
Start: 2021-12-21 | End: 2021-12-23 | Stop reason: HOSPADM

## 2021-12-21 RX ADMIN — MEROPENEM 500 MG: 500 INJECTION, POWDER, FOR SOLUTION INTRAVENOUS at 14:20

## 2021-12-21 RX ADMIN — DILTIAZEM HYDROCHLORIDE 120 MG: 120 CAPSULE, COATED, EXTENDED RELEASE ORAL at 07:46

## 2021-12-21 RX ADMIN — AMPICILLIN SODIUM AND SULBACTAM SODIUM 1500 MG: 1; .5 INJECTION, POWDER, FOR SOLUTION INTRAMUSCULAR; INTRAVENOUS at 21:32

## 2021-12-21 RX ADMIN — GABAPENTIN 300 MG: 300 CAPSULE ORAL at 12:56

## 2021-12-21 RX ADMIN — MEROPENEM 500 MG: 500 INJECTION, POWDER, FOR SOLUTION INTRAVENOUS at 07:48

## 2021-12-21 RX ADMIN — SODIUM CHLORIDE, PRESERVATIVE FREE 10 ML: 5 INJECTION INTRAVENOUS at 22:21

## 2021-12-21 RX ADMIN — GABAPENTIN 300 MG: 300 CAPSULE ORAL at 21:27

## 2021-12-21 RX ADMIN — SODIUM CHLORIDE, PRESERVATIVE FREE 10 ML: 5 INJECTION INTRAVENOUS at 07:47

## 2021-12-21 RX ADMIN — BUDESONIDE AND FORMOTEROL FUMARATE DIHYDRATE 2 PUFF: 160; 4.5 AEROSOL RESPIRATORY (INHALATION) at 19:32

## 2021-12-21 RX ADMIN — TRAMADOL HYDROCHLORIDE 100 MG: 50 TABLET, COATED ORAL at 21:27

## 2021-12-21 RX ADMIN — AZITHROMYCIN MONOHYDRATE 500 MG: 500 INJECTION, POWDER, LYOPHILIZED, FOR SOLUTION INTRAVENOUS at 05:11

## 2021-12-21 RX ADMIN — ATORVASTATIN CALCIUM 40 MG: 40 TABLET, FILM COATED ORAL at 07:46

## 2021-12-21 RX ADMIN — MEROPENEM 500 MG: 500 INJECTION, POWDER, FOR SOLUTION INTRAVENOUS at 02:55

## 2021-12-21 RX ADMIN — GABAPENTIN 300 MG: 300 CAPSULE ORAL at 07:46

## 2021-12-21 RX ADMIN — PANTOPRAZOLE SODIUM 40 MG: 40 TABLET, DELAYED RELEASE ORAL at 05:18

## 2021-12-21 RX ADMIN — SODIUM CHLORIDE: 9 INJECTION, SOLUTION INTRAVENOUS at 10:38

## 2021-12-21 RX ADMIN — SODIUM CHLORIDE, PRESERVATIVE FREE 10 ML: 5 INJECTION INTRAVENOUS at 07:49

## 2021-12-21 RX ADMIN — AMPICILLIN SODIUM AND SULBACTAM SODIUM 1500 MG: 1; .5 INJECTION, POWDER, FOR SOLUTION INTRAMUSCULAR; INTRAVENOUS at 16:17

## 2021-12-21 RX ADMIN — Medication 1 CAPSULE: at 07:46

## 2021-12-21 RX ADMIN — ENOXAPARIN SODIUM 40 MG: 100 INJECTION SUBCUTANEOUS at 07:47

## 2021-12-21 RX ADMIN — TRAMADOL HYDROCHLORIDE 100 MG: 50 TABLET, COATED ORAL at 07:46

## 2021-12-21 RX ADMIN — ACETAMINOPHEN 650 MG: 325 TABLET ORAL at 04:26

## 2021-12-21 RX ADMIN — DULOXETINE HYDROCHLORIDE 60 MG: 60 CAPSULE, DELAYED RELEASE ORAL at 07:46

## 2021-12-21 RX ADMIN — BUDESONIDE AND FORMOTEROL FUMARATE DIHYDRATE 2 PUFF: 160; 4.5 AEROSOL RESPIRATORY (INHALATION) at 08:37

## 2021-12-21 RX ADMIN — SODIUM CHLORIDE: 9 INJECTION, SOLUTION INTRAVENOUS at 21:30

## 2021-12-21 ASSESSMENT — PAIN DESCRIPTION - PROGRESSION
CLINICAL_PROGRESSION: GRADUALLY WORSENING
CLINICAL_PROGRESSION: GRADUALLY WORSENING
CLINICAL_PROGRESSION: NOT CHANGED

## 2021-12-21 ASSESSMENT — PAIN DESCRIPTION - LOCATION
LOCATION: LEG
LOCATION: LEG
LOCATION: HIP

## 2021-12-21 ASSESSMENT — PAIN SCALES - GENERAL
PAINLEVEL_OUTOF10: 8
PAINLEVEL_OUTOF10: 0
PAINLEVEL_OUTOF10: 9
PAINLEVEL_OUTOF10: 9
PAINLEVEL_OUTOF10: 0
PAINLEVEL_OUTOF10: 3

## 2021-12-21 ASSESSMENT — ENCOUNTER SYMPTOMS
ABDOMINAL PAIN: 0
FACIAL SWELLING: 0
DIARRHEA: 0
STRIDOR: 0
NAUSEA: 0
EYE REDNESS: 0
PHOTOPHOBIA: 0
APNEA: 0
BLOOD IN STOOL: 0
TROUBLE SWALLOWING: 0
EYE DISCHARGE: 0
SHORTNESS OF BREATH: 0
COUGH: 1
CHOKING: 0
RHINORRHEA: 0
COLOR CHANGE: 0
CHEST TIGHTNESS: 0

## 2021-12-21 ASSESSMENT — PAIN DESCRIPTION - FREQUENCY
FREQUENCY: INTERMITTENT
FREQUENCY: CONTINUOUS
FREQUENCY: INTERMITTENT

## 2021-12-21 ASSESSMENT — PAIN DESCRIPTION - DESCRIPTORS
DESCRIPTORS: ACHING

## 2021-12-21 ASSESSMENT — PAIN DESCRIPTION - ORIENTATION
ORIENTATION: LEFT

## 2021-12-21 ASSESSMENT — PAIN DESCRIPTION - ONSET
ONSET: ON-GOING
ONSET: GRADUAL
ONSET: GRADUAL

## 2021-12-21 ASSESSMENT — PAIN - FUNCTIONAL ASSESSMENT
PAIN_FUNCTIONAL_ASSESSMENT: PREVENTS OR INTERFERES SOME ACTIVE ACTIVITIES AND ADLS

## 2021-12-21 NOTE — ACP (ADVANCE CARE PLANNING)
Advance Care Planning     Advance Care Planning Activator (Inpatient)  Conversation Note      Date of ACP Conversation: 12/21/2021     Conversation Conducted with: Patient with Decision Making Capacity    ACP Activator: Manuel Villa RN    Health Care Decision Maker:     Current Designated Health Care Decision Maker:     Primary Decision Maker: Abrahan Ewing - Spouse - [phone]    Care Preferences    Ventilation: \"If you were in your present state of health and suddenly became very ill and were unable to breathe on your own, what would your preference be about the use of a ventilator (breathing machine) if it were available to you? \"      Would the patient desire the use of ventilator (breathing machine)?: no    \"If your health worsens and it becomes clear that your chance of recovery is unlikely, what would your preference be about the use of a ventilator (breathing machine) if it were available to you? \"     Would the patient desire the use of ventilator (breathing machine)?: No      Resuscitation  \"CPR works best to restart the heart when there is a sudden event, like a heart attack, in someone who is otherwise healthy. Unfortunately, CPR does not typically restart the heart for people who have serious health conditions or who are very sick. \"    \"In the event your heart stopped as a result of an underlying serious health condition, would you want attempts to be made to restart your heart (answer \"yes\" for attempt to resuscitate) or would you prefer a natural death (answer \"no\" for do not attempt to resuscitate)? \" no      Conversation Outcomes:  [x] ACP discussion completed -- previously documented ACP reviewed with patient, confirmed accurate, no changes.   [] Existing advance directive reviewed with patient; no changes to patient's previously recorded wishes  [] New Advance Directive completed  [] Portable Do Not Rescitate prepared for Provider review and signature  [] POLST/POST/MOLST/MOST prepared for Provider review and signature  Electronically signed by Salina Syed RN Case Management [phone] on 12/21/2021 at 4:05 PM

## 2021-12-21 NOTE — PROGRESS NOTES
Progress Note  Admit Date: 12/17/2021      PCP: Esteban Andujar MD     CC: F/U for altered mental status    Days in hospital:  4    SUBJECTIVE / Interval History:  Patient is awake and alert. No complaints at present    Heart rate better  Now needing 4 L, decreased to 3 L in the room      Allergies  Patient has no known allergies. Medications    Scheduled Meds:   [START ON 12/22/2021] azithromycin  500 mg Oral Daily    dilTIAZem  120 mg Oral Daily    vancomycin  1,500 mg IntraVENous Q24H    sodium chloride flush  5-40 mL IntraVENous 2 times per day    enoxaparin  40 mg SubCUTAneous Daily    atorvastatin  40 mg Oral Daily    DULoxetine  60 mg Oral Daily    budesonide-formoterol  2 puff Inhalation BID    gabapentin  300 mg Oral TID    pantoprazole  40 mg Oral Daily    meropenem  500 mg IntraVENous Q6H    lidocaine 1 % injection  5 mL IntraDERmal Once    sodium chloride flush  5-40 mL IntraVENous 2 times per day    lactobacillus  1 capsule Oral Daily with breakfast    vancomycin (VANCOCIN) intermittent dosing (placeholder)   Other RX Placeholder     Continuous Infusions:   sodium chloride      sodium chloride      sodium chloride 100 mL/hr at 12/20/21 2130       PRN Meds:  traMADol, sodium chloride flush, sodium chloride, acetaminophen **OR** acetaminophen, ipratropium-albuterol, sennosides-docusate sodium, polyethylene glycol, ondansetron, sodium chloride flush, sodium chloride, metoprolol    Vitals    /85   Pulse 104   Temp 98.6 °F (37 °C) (Oral)   Resp 20   Ht 5' 7\" (1.702 m)   Wt 249 lb 5.4 oz (113.1 kg)   SpO2 97%   BMI 39.05 kg/m²     Exam:    Gen: No distress. Ill looking  Eyes: PERRL. No sclera icterus. No conjunctival injection. ENT: No discharge. Pharynx clear. External appearance of ears and nose normal.  Neck: Trachea midline. No obvious mass. Resp: No accessory muscle use. No crackles. No wheezes. + rhonchi. No dullness on percussion. CV: Regular rate. Regular rhythm. No murmur or rub. No edema. GI: Non-tender. Non-distended. No hernia. Skin: Warm, dry, normal texture and turgor. No nodule on exposed extremities. Lymph: No cervical LAD. No supraclavicular LAD. M/S: No cyanosis. No clubbing. No joint deformity. Neuro: Moves all four extremities. CN 2-12 tested, no defect noted. Psych: Oriented x 3. No anxiety. Awake. Alert. Intact judgement and insight. Data    LABS  CBC:   Recent Labs     12/19/21  0600 12/20/21  0535 12/21/21  0526   WBC 37.8* 22.8* 18.7*   HGB 10.1* 9.9* 10.8*   HCT 35.0* 34.2* 37.0   MCV 74.6* 74.4* 74.0*    336 371     BMP:   Recent Labs     12/19/21  0600 12/20/21  0535 12/21/21  0526    141 140   K 4.0 3.6 3.7    108 108   CO2 22 24 25   BUN 22* 23* 16   CREATININE 0.7 0.6 0.6   GLUCOSE 123* 97 72     POC GLUCOSE:  No results for input(s): POCGLU in the last 72 hours. LIVER PROFILE:   No results for input(s): AST, ALT, LIPASE, AMYLASE, LABALBU, BILIDIR, BILITOT, ALKPHOS in the last 72 hours. PT/INR: No results for input(s): PROTIME, INR in the last 72 hours. APTT: No results for input(s): APTT in the last 72 hours. UA:  Recent Labs     12/18/21  1735   COLORU DK YELLOW   PHUR 6.0   WBCUA 136*   RBCUA 639*   CLARITYU CLOUDY*   SPECGRAV 1.023   LEUKOCYTESUR MODERATE*   UROBILINOGEN 1.0   BILIRUBINUR SMALL*   BLOODU LARGE*   GLUCOSEU Negative   KETUA TRACE*     Microbiology:  Wound Culture:   Recent Labs     12/18/21  1433   ORG Staph aureus MRSA*     Nasal Culture:   Recent Labs     12/18/21  1433   ORG Staph aureus MRSA*   MRSAPCR POSITIVE for  Normal Range: Not detected  CONTACT PRECAUTIONS INDICATED  *     Blood Culture:   Recent Labs     12/18/21  1212   BLOODCULT2 No Growth to date. Any change in status will be called. Fungal Culture:   No results for input(s): FUNGSM in the last 72 hours. No results for input(s): FUNCXBLD in the last 72 hours.   CSF Culture:  No results for input(s): COLORCSF, APPEARCSF, CFTUBE, CLOTCSF, WBCCSF, RBCCSF, NEUTCSF, NUMCELLSCSF, LYMPHSCSF, MONOCSF, GLUCCSF, VOLCSF in the last 72 hours. Respiratory Culture:  No results for input(s): Cary Levi in the last 72 hours. AFB:No results for input(s): AFBSMEAR in the last 72 hours. Urine Culture  No results for input(s): LABURIN in the last 72 hours. RADIOLOGY:    CT ABDOMEN PELVIS WO CONTRAST   Final Result   Addendum 1 of 1   ADDENDUM:   Left lower lobe consolidation, likely representing pneumonia. Chest CT is   recommended for further evaluation. Final   No acute pathology in the abdomen and pelvis. RECOMMENDATIONS:   Unavailable            XR CHEST PORTABLE   Final Result   No acute cardiopulmonary disease. CONSULTS:    PHARMACY TO DOSE VANCOMYCIN  IP CONSULT TO INFECTIOUS DISEASES    ASSESSMENT AND PLAN:      Active Problems:    Chronic obstructive pulmonary disease (HCC)    Paroxysmal atrial fibrillation (HCC)    Sepsis (HCC)    Acute respiratory failure (HCC)    Diastolic CHF ([address]    Aspiration pneumonia (HCC)    UTI (urinary tract infection)  Resolved Problems:    * No resolved hospital problems. *    Patient is a 82-year-old female who is a resident of a skilled nursing facility, with a past medical history of chronic kidney disease, COPD, ESBL UTI, hyperkalemia, hypertension, chronic respiratory, A. fib presented to the ED with altered mental status. She was admitted with sepsis secondary to pneumonia with acute on chronic respiratory failure. Patient was initially placed on BiPAP and then weaned to Vapotherm patient has had multiple admissions in the past for respiratory failure with pneumonia with ESBL UTI. Acute on chronic hypoxic respiratory failure  -Patient needing 4-5  L of oxygen to maintain sats greater than 90%  -Wean oxygen as tolerated  -Uses 2 L oxygen at home    Pneumonia  -Continue broad-spectrum antibiotics.   Treat as MRSA/gram-negative pneumonia, finished 7 days of antibiotics  -Has had multiple admissions for pneumonia  - procalcitonin markedly elevated, trending down, repeat today  -Last admission patient had a positive MRSA probe  -CT which covers the lower lobe base shows consolidation  -Pneumococcal antigen positive in urine    UTI in the setting of renal calculi and chronic Grace  -With a past history of ESBL on broad-spectrum antibiotics  -Patient has a remote history of ureteral stents. Has a left obstructing ureteral stone which is present since 9/20. Needs outpatient follow-up with  urology  -Tissue severe atrophy of left kidney with chronic ureteral calcification  -Culture no growth to date    Sepsis-White count improving  -With fever and leukocytosis  -Continue IV fluids, will cut back  -  Leukemoid reaction on admission    Hypotension  -Blood pressure trending down. Start IV fluids. Blood pressure does not improve patient will need pressors    A. Fib  -Restart low-dose Cardizem as blood pressure is better    History of COPD  -Continue inhalers    Acute metabolic encephalopathy-improving    Morbid obesity  -Getting current management and treatment    Goals of care discussed with the previous provider. Patient is a DNR. Does not want intubation or resuscitation. She is okay with pressors        DVT Prophylaxis: Lovenox  Diet: ADULT DIET; Regular; 4 carb choices (60 gm/meal); Low Sodium (2 gm)  Code Status: DNR-CCA    PT/OT Eval Status:    Discharge plan -await IDs recommendation for antibiotics on discharge, possibly tomorrow    The patient and / or the family were informed of the results of any tests, a time was given to answer questions, a plan was proposed and they agreed with plan. Discussed with consulting physicians, nursing and social work     The note was completed using EMR. Every effort was made to ensure accuracy; however, inadvertent computerized transcription errors may be present.        Case Cook MD

## 2021-12-21 NOTE — PROGRESS NOTES
Infectious Diseases   Progress Note      Admission Date: 12/17/2021  Hospital Day: Hospital Day: 5   Attending: Piedad Thurman MD  Date of service: 12/21/2021     Chief complaint/ Reason for consult:     · Sepsis on admission with fever, leukocytosis, metabolic encephalopathy  · Left lower lobe pneumonia, possible aspiration  · History of ESBL E. coli UTI in September 2021  · History of nephrolithiasis  · Ex-smoker  · COPD  · Essential hypertension    Microbiology:        I have reviewed allavailable micro lab data and cultures    · Blood culture (2/2) - collected on 12/17/2021: Negative  · Nasal MRSA probe- collectedon 12/18/2021: Positive  · Urine pneumococcal antigen- collected on 12/18/2021: Positive      Antibiotics and immunizations:       Current antibiotics: All antibiotics and their doses were reviewed by me    Recent Abx Admin                   meropenem (MERREM) 500 mg IVPB (mini-bag) (mg) 500 mg New Bag 12/21/21 1420     500 mg New Bag  0748     500 mg New Bag  0255     500 mg New Bag 12/20/21 2132    azithromycin (ZITHROMAX) 500 mg in D5W 250ml addavial (mg) 500 mg New Bag 12/21/21 0511    vancomycin (VANCOCIN) 1500 mg in dextrose 5 % 250 mL IVPB (mg) 1,500 mg New Bag 12/20/21 1646                  Immunization History: All immunization history was reviewed by me today. There is no immunization history on file for this patient. Known drug allergies: All allergies were reviewed and updated    No Known Allergies    Social history:     Social History:  All social andepidemiologic history was reviewed and updated by me today as needed. · Tobacco use:   reports that she has been smoking cigarettes. She has been smoking about 1.50 packs per day. She has never used smokeless tobacco.  · Alcohol use:   reports no history of alcohol use. · Currently lives in: [address]  ·  reports current drug use. Drug: Marijuana Miladis Enrique).      COVID VACCINATION AND LAB RESULT RECORDS: Internal Administration   First Dose      Second Dose           Last COVID Lab SARS-CoV-2 (no units)   Date Value   09/13/2021 Not Detected     SARS-CoV-2, SHEREEN (no units)   Date Value   08/14/2020 NOT DETECTED     SARS-CoV-2, NAAT (no units)   Date Value   12/17/2021 Not Detected            Assessment:     The patient is a 73 y.o. old female who  has a past medical history of Acute kidney failure ([facility] Chronic kidney disease, COPD (chronic obstructive pulmonary disease) ([facility] ESBL (extended spectrum beta-lactamase) producing bacteria infection (09/10/2021), Hyperkalemia, Hyperlipidemia, Hypertension, Hyponatremia, Kidney stone, Major depression, Melena, MRSA nasal colonization (09/10/2021), Obesity, Oxygen deficit, PAF (paroxysmal atrial fibrillation) ([facility] and [facility] with following problems:    · Sepsis on admission with fever, leukocytosis, metabolic encephalopathy  · Left lower lobe pneumonia, likely pneumococcal  · Nasal MRSA colonization  · History of ESBL E. coli UTI in September 2021  · History of nephrolithiasis  · Ex-smoker  · COPD  · Essential hypertension  · Proximal atrial fibrillation  · Chronic kidney disease  · History of depression  · Obesity Class 2 due to excess calorie intake : Body mass index is 39.05 kg/m². ·       Discussion:      The patient is currently on IV vancomycin and meropenem and azithromycin. Urine pneumococcal antigen has come back positive indicating pneumococcal pneumonia    Blood cultures from admission are negative. Urine culture only had mixed gloria    Nasal MRSA probe is positive, however, no evidence of MRSA pneumonia at this time    COVID 19 PCR test was done on 12/17/2021 and it was negative    Plan:     Diagnostic Workup:      · Continue to follow  fever curve, WBC count and blood cultures. · Continue to monitor blood counts, liver and renal function.     Antimicrobials:    · We will stop empiric IV vancomycin today  · We will stop meropenem and azithromycin today  · Will order IV Unasyn 1.5 g every 6 hours  · We will see how she does on IV Unasyn  · Continue oxygen support to maintain oxygen saturation above 92%. · Cough and deep breathing exercises  · We will follow up on the culture results and clinical progress and will make further recommendations accordingly. · Contact isolation for nasal MRSA colonization  · Continue close vitals monitoring. · Maintain good glycemic control. · Fall precautions. Aspiration precautions. · Continue to watch for new fever or diarrhea. · DVT prophylaxis. · Discussed all above with patient and RN. Drug Monitoring:    · Continue monitoring for antibiotic toxicity as follows: CBC, CMP   · Continue to watch for following: new or worsening fever, new hypotension, hives, lip swelling and redness or purulence at vascular access sites. I/v access Management:    · Continue to monitor i.v access sites for erythema, induration, discharge or tenderness. · As always, continue efforts to minimize tubes/lines/drains as clinically appropriate to reduce chances of line associated infections. Patient education and counseling:        · The patient was educated in detail about the side-effects of various antibiotics and things to watch for like new rashes, lip swelling, severe reaction, worsening diarrhea, break through fever etc.  · Discussed patient's condition and what to expect. All of the patient's questions were addressed in a satisfactory manner and patient verbalized understanding all instructions. Level of complexity of visit: High     Risk of Complications/Morbidity: High     · Illness(es)/ Infection present that pose threat to life/bodily function. · There is potential for severe exacerbation of infection/side effects of treatment. · Therapy requires intensive monitoring for antimicrobial agent toxicity. Thank you for involving me in the care of your patient. I will continue to follow.  If you have anyadditional questions, please do not hesitate to contact me. Subjective: Interval history: Interval history was obtained from chart review and patient/ RN. The patient appears tired. She seems to be tolerating antibiotics okay. No diarrhea. REVIEW OF SYSTEMS:     Review of Systems   Constitutional: Positive for fatigue. Negative for chills, diaphoresis and unexpected weight change. HENT: Negative for congestion, ear discharge, ear pain, facial swelling, hearing loss, rhinorrhea and trouble swallowing. Eyes: Negative for photophobia, discharge, redness and visual disturbance. Respiratory: Positive for cough. Negative for apnea, choking, chest tightness, shortness of breath and stridor. Cardiovascular: Negative for chest pain and palpitations. Gastrointestinal: Negative for abdominal pain, blood in stool, diarrhea and nausea. Endocrine: Negative for polydipsia, polyphagia and polyuria. Genitourinary: Negative for difficulty urinating, dysuria, frequency, hematuria, menstrual problem and vaginal discharge. Musculoskeletal: Negative for arthralgias, joint swelling, myalgias and neck stiffness. Skin: Negative for color change and rash. Allergic/Immunologic: Negative for immunocompromised state. Neurological: Negative for dizziness, seizures, speech difficulty, light-headedness and headaches. Hematological: Negative for adenopathy. Psychiatric/Behavioral: Negative for agitation, hallucinations and suicidal ideas. Past Medical History: All past medical history reviewed today. Past Medical History:   Diagnosis Date    Acute kidney failure (HCC)     Chronic kidney disease     [facility]ch[facility] disease) [facility]     ESBL (extended spectrum beta-lactamase) producing bacteria infection 09/10/2021    Escherichia coli ESBL.  Urine    Hyperkalemia     Hyperlipidemia     Hypertension     Hyponatremia     Kidney stone     Major depression     Melena     MRSA nasal colonization 09/10/2021    Obesity     Oxygen deficit     2 liters     PAF (paroxysmal atrial fibrillation) (HCC)     Sepsis ([facility]        Past Surgical History: All past surgical history was reviewed today. Past Surgical History:   Procedure Laterality Date    CHOLECYSTECTOMY      COLONOSCOPY N/A 4/6/2021    COLONOSCOPY POLYPECTOMY ABLATION with clip placement performed by Le Brewer MD at [address] [facility] [place] [facility] Left 9/19/2020    CYSTOSCOPY URETERAL STENT INSERTION LEFT RETROGRADE PYELOGRAM performed by Dakota Ness MD at [address] [facility] Left 8/10/2020    OPEN TREATMENT OF LEFT HIP FRACTURE WITH CEPHALOMEDULLARY NAIL performed by Marimar Prince MD at [address] [facility] 3/2/2021    SIGMOIDOSCOPY FLEXIBLE POLYPECTOMY with clip placement performed by Le Brewer MD at [address] [facility] 9/24/2020    EGD DIAGNOSTIC ONLY performed by Le Brewer MD [place] [address] [facility] [place] History: All family history was reviewed today. History reviewed. No pertinent family history. Objective:       PHYSICAL EXAM:      Vitals:   Vitals:    12/21/21 0412 12/21/21 0425 12/21/21 0746 12/21/21 1131   BP: 118/80  130/85 101/64   Pulse: 108  104 115   Resp: 20  20 20   Temp: 98.1 °F (36.7 °C)  98.6 °F (37 °C) 98.6 °F (37 °C)   TempSrc: Oral  Oral Oral   SpO2: 94%  97% 96%   Weight:  249 lb 5.4 oz (113.1 kg)     Height:           Physical Exam  Vitals and nursing note reviewed. Constitutional:       General: She is not in acute distress. Appearance: She is well-developed. She is not diaphoretic. HENT:      Head: Normocephalic. Right Ear: External ear normal.      Left Ear: External ear normal.      Nose: Nose normal.   Eyes:      General: No scleral icterus. Right eye: No discharge. Left eye: No discharge.       Conjunctiva/sclera: Conjunctivae normal.      Pupils: Pupils are equal, round, and reactive to light. Cardiovascular:      Rate and Rhythm: Normal rate and regular rhythm. Heart sounds: No murmur heard. No friction rub. Pulmonary:      Effort: Pulmonary effort is normal.      Breath sounds: No stridor. Rales (Left lower lobe) present. No wheezing. Chest:      Chest wall: No tenderness. Abdominal:      Palpations: Abdomen is soft. There is no mass. Tenderness: There is no abdominal tenderness. There is no guarding or rebound. Musculoskeletal:         General: No tenderness. Cervical back: Normal range of motion and neck supple. Lymphadenopathy:      Cervical: No cervical adenopathy. Skin:     General: Skin is warm and dry. Findings: No erythema or rash. Neurological:      Mental Status: She is alert and oriented to person, place, and time. Motor: No abnormal muscle tone. Psychiatric:         Judgment: Judgment normal.           Lines and drains: All vascular access sites are healthy with no local erythema, discharge or tenderness. Intake and output:    I/O last 3 completed shifts: In: 18 [P.O.:480]  Out: 850 [Urine:850]    Lab Data:   All available labs and old records have been reviewed by me.     CBC:  Recent Labs     12/19/21  0600 12/20/21  0535 12/21/21  0526   WBC 37.8* 22.8* 18.7*   RBC 4.69 4.60 5.00   HGB 10.1* 9.9* 10.8*   HCT 35.0* 34.2* 37.0    336 371   MCV 74.6* 74.4* 74.0*   MCH 21.5* 21.6* 21.6*   MCHC 28.8* 29.0* 29.2*   RDW 19.1* 19.2* 19.7*        BMP:  Recent Labs     12/19/21  0600 12/20/21  0535 12/21/21  0526    141 140   K 4.0 3.6 3.7    108 108   CO2 22 24 25   BUN 22* 23* 16   CREATININE 0.7 0.6 0.6   CALCIUM 8.7 8.1* 8.6   GLUCOSE 123* 97 72        Hepatic Function Panel:   Lab Results   Component Value Date    ALKPHOS 187 12/17/2021    ALT 7 12/17/2021    AST 12 12/17/2021    PROT 7.6 12/17/2021    BILITOT 0.4 12/17/2021    BILIDIR <0.2 09/25/2020    IBILI see below 09/25/2020    LABALBU 2.8 12/17/2021       CPK: No results found for: CKTOTAL  ESR: No results found for: SEDRATE  CRP: No results found for: CRP        Imaging: All pertinent images and reports for the current visit were reviewed by me during this visit. CT ABDOMEN PELVIS WO CONTRAST   Final Result   Addendum 1 of 1   ADDENDUM:   Left lower lobe consolidation, likely representing pneumonia. Chest CT is   recommended for further evaluation. Final   No acute pathology in the abdomen and pelvis. RECOMMENDATIONS:   Unavailable            XR CHEST PORTABLE   Final Result   No acute cardiopulmonary disease. Medications: All current and past medications were reviewed.      ampicillin-sulbactam  1,500 mg IntraVENous Q6H    dilTIAZem  120 mg Oral Daily    sodium chloride flush  5-40 mL IntraVENous 2 times per day    enoxaparin  40 mg SubCUTAneous Daily    atorvastatin  40 mg Oral Daily    DULoxetine  60 mg Oral Daily    budesonide-formoterol  2 puff Inhalation BID    gabapentin  300 mg Oral TID    pantoprazole  40 mg Oral Daily    lidocaine 1 % injection  5 mL IntraDERmal Once    sodium chloride flush  5-40 mL IntraVENous 2 times per day    lactobacillus  1 capsule Oral Daily with breakfast        sodium chloride      sodium chloride      sodium chloride 100 mL/hr at 12/21/21 1038       traMADol, sodium chloride flush, sodium chloride, acetaminophen **OR** acetaminophen, ipratropium-albuterol, sennosides-docusate sodium, polyethylene glycol, ondansetron, sodium chloride flush, sodium chloride, metoprolol      Problem list:       Patient Active Problem List   Diagnosis Code    Closed left hip fracture, initial encounter ([facility] S72.002A    Morbid obesity with BMI of 45.0-49.9, adult ([facility] E66.01, Z68.42    Hypoxemia requiring supplemental oxygen R09.02, Z99.81    Acute respiratory failure with hypoxia (HCC) J96.01    Acute on chronic respiratory failure with hypercapnia (HCC) J96.22    Acute pulmonary edema ([facility] J81.0    Multifocal pneumonia J18.9    Rapid atrial fibrillation (HCC) I48.91    Chronic obstructive pulmonary disease (HCC) J44.9    PAF (paroxysmal atrial fibrillation) (HCC) I48.0    PAT (paroxysmal atrial tachycardia) (HCC) I47.1    Kidney stone N20.0    Septic shock (HCC) A41.9, Q58.01    Neutrophilic leukocytosis R84.3    Class 2 obesity due to excess calories with body mass index (BMI) of 39.0 to 39.9 in adult E66.09, Z68.39    Chronic respiratory failure with hypoxia (HCC) J96.11    Hyperlipidemia E78.5    Kidney lesion, native, left N28.9    Hyponatremia E87.1    Hyperkalemia E87.5    Moderate protein-calorie malnutrition (HCC) E44.0    Hydronephrosis with left ureteropelvic junction (UPJ) obstruction Q62.11    Hematuria R31.9    Ureteral obstruction N13.5    Hydronephrosis with urinary obstruction due to ureteral calculus N13.2    DARSHAN (acute kidney injury) ([facility] N17.9    Abnormal CT scan, kidney R93.429    Essential hypertension I10    Acute hypercapnic respiratory failure (HCC) J96.02    Sepsis (HCC) A41.9    Acute respiratory failure (HCC) X83.62    Diastolic CHF ([facility]) X22.67    Aspiration pneumonia ([facility]) J69.0    UTI (urinary tract infection) N39.0    Respiratory distress R06.03    Pneumonia of left lower lobe due to Streptococcus pneumoniae ([facility] J13    MRSA colonization Z22.322    Ex-smoker Z87.891    History of depression Z86.59       Please note that this chart was generated using Dragon dictation software. Although every effort was made to ensure the accuracy of this automated transcription, some errors in transcription may have occurred inadvertently. If you may need any clarification, please do not hesitate to contact me through EPIC or at the phone number provided below with my electronic signature.   Any pictures or media included in this note were obtained after taking informed verbal consent from the patient and with their approval to include those in the patient's

## 2021-12-21 NOTE — PLAN OF CARE
Problem: Skin Integrity:  Goal: Will show no infection signs and symptoms  Description: Will show no infection signs and symptoms  Outcome: Ongoing  Goal: Absence of new skin breakdown  Description: Absence of new skin breakdown  Outcome: Ongoing     Problem: Falls - Risk of:  Goal: Will remain free from falls  Description: Will remain free from falls  Outcome: Ongoing  Goal: Absence of physical injury  Description: Absence of physical injury  Outcome: Ongoing     Problem: Discharge Planning:  Goal: Discharged to appropriate level of care  Description: Discharged to appropriate level of care  Outcome: Ongoing     Problem: Gas Exchange - Impaired:  Goal: Levels of oxygenation will improve  Description: Levels of oxygenation will improve  Outcome: Ongoing     Problem: Infection, Septic Shock:  Goal: Will show no infection signs and symptoms  Description: Will show no infection signs and symptoms  Outcome: Ongoing     Problem: Infection - Ventilator-Associated Pneumonia:  Goal: Absence of pulmonary infection  Description: Absence of pulmonary infection  Outcome: Ongoing     Problem: Serum Glucose Level - Abnormal:  Goal: Ability to maintain appropriate glucose levels will improve  Description: Ability to maintain appropriate glucose levels will improve  Outcome: Ongoing     Problem: Tissue Perfusion, Altered:  Goal: Circulatory function within specified parameters  Description: Circulatory function within specified parameters  Outcome: Ongoing     Problem: Venous Thromboembolism:  Goal: Will show no signs or symptoms of venous thromboembolism  Description: Will show no signs or symptoms of venous thromboembolism  Outcome: Ongoing  Goal: Absence of signs or symptoms of impaired coagulation  Description: Absence of signs or symptoms of impaired coagulation  Outcome: Ongoing

## 2021-12-21 NOTE — CARE COORDINATION
INITIAL CASE MANAGEMENT ASSESSMENT    Reviewed chart, met with patient to assess possible discharge needs. Explained Case Management role/services. Living Situation: Confirmed patient is a long term care (LTC) resident at [facility], AN AFFILIATE OF [facility]    ADLs: Patient able to feed self but gets assistance with all other ADLs. Patient tells me she no longer walks. DME: Provided by facility    PT/OT Recs: Not ordered     Active Services: LTC resident at [facility]     Medications: Managed by facility    PCP: Deya Villatoro MD      HD/PD: n/a    PLAN/COMMENTS: Patient confirms she'll return to [facility]. Spoke to Indira Ugarte from facility, patient able to return, no covid test needed. Stretcher transport at discharge. CM provided contact information for patient or family to call with any questions. CM will follow and assist as needed.   Electronically signed by Cresenciano Dubin, RN Case Management [phone] on 12/21/2021 at 4:04 PM

## 2021-12-22 LAB
ANION GAP SERPL CALCULATED.3IONS-SCNC: 6 MMOL/L (ref 3–16)
BASOPHILS ABSOLUTE: 0 K/UL (ref 0–0.2)
BASOPHILS RELATIVE PERCENT: 0.2 %
BUN BLDV-MCNC: 12 MG/DL (ref 7–20)
CALCIUM SERPL-MCNC: 8.4 MG/DL (ref 8.3–10.6)
CHLORIDE BLD-SCNC: 111 MMOL/L (ref 99–110)
CO2: 25 MMOL/L (ref 21–32)
CREAT SERPL-MCNC: <0.5 MG/DL (ref 0.6–1.2)
CULTURE, BLOOD 2: NORMAL
EOSINOPHILS ABSOLUTE: 0.4 K/UL (ref 0–0.6)
EOSINOPHILS RELATIVE PERCENT: 2.6 %
GFR AFRICAN AMERICAN: >60
GFR NON-AFRICAN AMERICAN: >60
GLUCOSE BLD-MCNC: 85 MG/DL (ref 70–99)
HCT VFR BLD CALC: 36.4 % (ref 36–48)
HEMOGLOBIN: 10.8 G/DL (ref 12–16)
LYMPHOCYTES ABSOLUTE: 1.7 K/UL (ref 1–5.1)
LYMPHOCYTES RELATIVE PERCENT: 12.2 %
MAGNESIUM: 1.9 MG/DL (ref 1.8–2.4)
MCH RBC QN AUTO: 22.1 PG (ref 26–34)
MCHC RBC AUTO-ENTMCNC: 29.6 G/DL (ref 31–36)
MCV RBC AUTO: 74.8 FL (ref 80–100)
MONOCYTES ABSOLUTE: 0.7 K/UL (ref 0–1.3)
MONOCYTES RELATIVE PERCENT: 4.7 %
NEUTROPHILS ABSOLUTE: 11.1 K/UL (ref 1.7–7.7)
NEUTROPHILS RELATIVE PERCENT: 80.3 %
PDW BLD-RTO: 19.3 % (ref 12.4–15.4)
PLATELET # BLD: 350 K/UL (ref 135–450)
PMV BLD AUTO: 7 FL (ref 5–10.5)
POTASSIUM SERPL-SCNC: 3.7 MMOL/L (ref 3.5–5.1)
RBC # BLD: 4.86 M/UL (ref 4–5.2)
SODIUM BLD-SCNC: 142 MMOL/L (ref 136–145)
WBC # BLD: 13.9 K/UL (ref 4–11)

## 2021-12-22 PROCEDURE — 94761 N-INVAS EAR/PLS OXIMETRY MLT: CPT

## 2021-12-22 PROCEDURE — 94640 AIRWAY INHALATION TREATMENT: CPT

## 2021-12-22 PROCEDURE — 2580000003 HC RX 258: Performed by: STUDENT IN AN ORGANIZED HEALTH CARE EDUCATION/TRAINING PROGRAM

## 2021-12-22 PROCEDURE — 2060000000 HC ICU INTERMEDIATE R&B

## 2021-12-22 PROCEDURE — 80048 BASIC METABOLIC PNL TOTAL CA: CPT

## 2021-12-22 PROCEDURE — 2700000000 HC OXYGEN THERAPY PER DAY

## 2021-12-22 PROCEDURE — 85025 COMPLETE CBC W/AUTO DIFF WBC: CPT

## 2021-12-22 PROCEDURE — 6370000000 HC RX 637 (ALT 250 FOR IP): Performed by: INTERNAL MEDICINE

## 2021-12-22 PROCEDURE — 2580000003 HC RX 258: Performed by: INTERNAL MEDICINE

## 2021-12-22 PROCEDURE — 99233 SBSQ HOSP IP/OBS HIGH 50: CPT | Performed by: INTERNAL MEDICINE

## 2021-12-22 PROCEDURE — 6360000002 HC RX W HCPCS: Performed by: INTERNAL MEDICINE

## 2021-12-22 PROCEDURE — 83735 ASSAY OF MAGNESIUM: CPT

## 2021-12-22 PROCEDURE — 6360000002 HC RX W HCPCS: Performed by: STUDENT IN AN ORGANIZED HEALTH CARE EDUCATION/TRAINING PROGRAM

## 2021-12-22 PROCEDURE — 6370000000 HC RX 637 (ALT 250 FOR IP): Performed by: STUDENT IN AN ORGANIZED HEALTH CARE EDUCATION/TRAINING PROGRAM

## 2021-12-22 RX ADMIN — AMPICILLIN SODIUM AND SULBACTAM SODIUM 1500 MG: 1; .5 INJECTION, POWDER, FOR SOLUTION INTRAMUSCULAR; INTRAVENOUS at 04:24

## 2021-12-22 RX ADMIN — DILTIAZEM HYDROCHLORIDE 120 MG: 120 CAPSULE, COATED, EXTENDED RELEASE ORAL at 08:27

## 2021-12-22 RX ADMIN — SODIUM CHLORIDE: 9 INJECTION, SOLUTION INTRAVENOUS at 20:06

## 2021-12-22 RX ADMIN — DULOXETINE HYDROCHLORIDE 60 MG: 60 CAPSULE, DELAYED RELEASE ORAL at 08:27

## 2021-12-22 RX ADMIN — SODIUM CHLORIDE, PRESERVATIVE FREE 10 ML: 5 INJECTION INTRAVENOUS at 08:27

## 2021-12-22 RX ADMIN — AMPICILLIN SODIUM AND SULBACTAM SODIUM 1500 MG: 1; .5 INJECTION, POWDER, FOR SOLUTION INTRAMUSCULAR; INTRAVENOUS at 09:07

## 2021-12-22 RX ADMIN — SODIUM CHLORIDE: 9 INJECTION, SOLUTION INTRAVENOUS at 09:07

## 2021-12-22 RX ADMIN — AMPICILLIN SODIUM AND SULBACTAM SODIUM 1500 MG: 1; .5 INJECTION, POWDER, FOR SOLUTION INTRAMUSCULAR; INTRAVENOUS at 22:17

## 2021-12-22 RX ADMIN — SODIUM CHLORIDE, PRESERVATIVE FREE 10 ML: 5 INJECTION INTRAVENOUS at 20:16

## 2021-12-22 RX ADMIN — PANTOPRAZOLE SODIUM 40 MG: 40 TABLET, DELAYED RELEASE ORAL at 05:53

## 2021-12-22 RX ADMIN — BUDESONIDE AND FORMOTEROL FUMARATE DIHYDRATE 2 PUFF: 160; 4.5 AEROSOL RESPIRATORY (INHALATION) at 08:28

## 2021-12-22 RX ADMIN — GABAPENTIN 300 MG: 300 CAPSULE ORAL at 08:27

## 2021-12-22 RX ADMIN — SODIUM CHLORIDE, PRESERVATIVE FREE 10 ML: 5 INJECTION INTRAVENOUS at 20:03

## 2021-12-22 RX ADMIN — ENOXAPARIN SODIUM 40 MG: 100 INJECTION SUBCUTANEOUS at 08:27

## 2021-12-22 RX ADMIN — AMPICILLIN SODIUM AND SULBACTAM SODIUM 1500 MG: 1; .5 INJECTION, POWDER, FOR SOLUTION INTRAMUSCULAR; INTRAVENOUS at 15:32

## 2021-12-22 RX ADMIN — ATORVASTATIN CALCIUM 40 MG: 40 TABLET, FILM COATED ORAL at 08:27

## 2021-12-22 RX ADMIN — Medication 1 CAPSULE: at 08:27

## 2021-12-22 RX ADMIN — BUDESONIDE AND FORMOTEROL FUMARATE DIHYDRATE 2 PUFF: 160; 4.5 AEROSOL RESPIRATORY (INHALATION) at 20:21

## 2021-12-22 RX ADMIN — GABAPENTIN 300 MG: 300 CAPSULE ORAL at 13:50

## 2021-12-22 RX ADMIN — GABAPENTIN 300 MG: 300 CAPSULE ORAL at 20:03

## 2021-12-22 ASSESSMENT — ENCOUNTER SYMPTOMS
FACIAL SWELLING: 0
APNEA: 0
SHORTNESS OF BREATH: 1
ABDOMINAL PAIN: 0
BLOOD IN STOOL: 0
CHOKING: 0
STRIDOR: 0
PHOTOPHOBIA: 0
DIARRHEA: 0
COLOR CHANGE: 0
EYE DISCHARGE: 0
TROUBLE SWALLOWING: 0
EYE REDNESS: 0
CHEST TIGHTNESS: 0
RHINORRHEA: 0
NAUSEA: 0
COUGH: 1

## 2021-12-22 ASSESSMENT — PAIN SCALES - GENERAL
PAINLEVEL_OUTOF10: 0

## 2021-12-22 NOTE — PLAN OF CARE
Problem: Skin Integrity:  Goal: Will show no infection signs and symptoms  Description: Will show no infection signs and symptoms  12/22/2021 0750 by Rahat Bonilla RN  Outcome: Ongoing     Problem: Skin Integrity:  Goal: Absence of new skin breakdown  Description: Absence of new skin breakdown  12/22/2021 0750 by Rahat Bonilla RN  Outcome: Ongoing     Problem: Falls - Risk of:  Goal: Will remain free from falls  Description: Will remain free from falls  12/22/2021 0750 by Rahat Bonilla RN  Outcome: Ongoing     Problem: Falls - Risk of:  Goal: Absence of physical injury  Description: Absence of physical injury  12/22/2021 0750 by Rahat Bonilla RN  Outcome: Ongoing     Problem: Discharge Planning:  Goal: Discharged to appropriate level of care  Description: Discharged to appropriate level of care  12/22/2021 0750 by Rahat Bonilla RN  Outcome: Ongoing     Problem: Gas Exchange - Impaired:  Goal: Levels of oxygenation will improve  Description: Levels of oxygenation will improve  12/22/2021 0750 by Rahat Bonilla RN  Outcome: Ongoing

## 2021-12-22 NOTE — PLAN OF CARE
Problem: Skin Integrity:  Goal: Will show no infection signs and symptoms  Description: Will show no infection signs and symptoms  Outcome: Ongoing  Goal: Absence of new skin breakdown  Description: Absence of new skin breakdown  Outcome: Ongoing     Problem: Falls - Risk of:  Goal: Will remain free from falls  Description: Will remain free from falls  Outcome: Ongoing  Goal: Absence of physical injury  Description: Absence of physical injury  Outcome: Ongoing     Problem: Discharge Planning:  Goal: Discharged to appropriate level of care  Description: Discharged to appropriate level of care  Outcome: Ongoing     Problem: Gas Exchange - Impaired:  Goal: Levels of oxygenation will improve  Description: Levels of oxygenation will improve  Outcome: Ongoing     Problem: Infection, Septic Shock:  Goal: Will show no infection signs and symptoms  Description: Will show no infection signs and symptoms  Outcome: Ongoing     Problem: Infection - Ventilator-Associated Pneumonia:  Goal: Absence of pulmonary infection  Description: Absence of pulmonary infection  Outcome: Ongoing     Problem: Serum Glucose Level - Abnormal:  Goal: Ability to maintain appropriate glucose levels will improve  Description: Ability to maintain appropriate glucose levels will improve  Outcome: Ongoing     Problem: Tissue Perfusion, Altered:  Goal: Circulatory function within specified parameters  Description: Circulatory function within specified parameters  Outcome: Ongoing     Problem: Venous Thromboembolism:  Goal: Will show no signs or symptoms of venous thromboembolism  Description: Will show no signs or symptoms of venous thromboembolism  Outcome: Ongoing  Goal: Absence of signs or symptoms of impaired coagulation  Description: Absence of signs or symptoms of impaired coagulation  Outcome: Ongoing     Problem: Pain:  Goal: Pain level will decrease  Description: Pain level will decrease  Outcome: Ongoing  Goal: Control of acute pain  Description: Control of acute pain  Outcome: Ongoing  Goal: Control of chronic pain  Description: Control of chronic pain  Outcome: Ongoing

## 2021-12-22 NOTE — PROGRESS NOTES
Infectious Diseases   Progress Note      Admission Date: 12/17/2021  Hospital Day: Hospital Day: 6   Attending: Jodi Zaldivar MD  Date of service: 12/22/2021     Chief complaint/ Reason for consult:     · Sepsis on admission with fever, leukocytosis, metabolic encephalopathy  · Left lower lobe pneumonia, possible aspiration  · History of ESBL E. coli UTI in September 2021  · History of nephrolithiasis  · Ex-smoker  · COPD  · Essential hypertension    Microbiology:        I have reviewed allavailable micro lab data and cultures    · Blood culture (2/2) - collected on 12/17/2021: Negative  · Nasal MRSA probe- collectedon 12/18/2021: Positive  · Urine pneumococcal antigen- collected on 12/18/2021: Positive      Antibiotics and immunizations:       Current antibiotics: All antibiotics and their doses were reviewed by me    Recent Abx Admin                   ampicillin-sulbactam (UNASYN) 1500 mg IVPB minibag (mg) 1,500 mg New Bag 12/22/21 1532     1,500 mg New Bag  0907     1,500 mg New Bag  0424      Restarted 12/21/21 2222     1,500 mg New Bag  2132                  Immunization History: All immunization history was reviewed by me today. There is no immunization history on file for this patient. Known drug allergies: All allergies were reviewed and updated    No Known Allergies    Social history:     Social History:  All social andepidemiologic history was reviewed and updated by me today as needed. · Tobacco use:   reports that she has been smoking cigarettes. She has been smoking about 1.50 packs per day. She has never used smokeless tobacco.  · Alcohol use:   reports no history of alcohol use. · Currently lives in: [place] [address]  ·  reports current drug use. Drug: Marijuana [employer]).      COVID VACCINATION AND LAB RESULT RECORDS:     Internal Administration   First Dose      Second Dose           Last COVID Lab SARS-CoV-2 (no units)   Date Value   09/13/2021 Not Detected     SARS-CoV-2, SHEREEN (no units)   Date Value   08/14/2020 NOT DETECTED     SARS-CoV-2, NAAT (no units)   Date Value   12/17/2021 Not Detected            Assessment:     The patient is a 73 y.o. old female who  has a past medical history of Acute kidney failure ([facility] Chronic kidney disease, COPD (chronic obstructive pulmonary disease) ([facility] ESBL (extended spectrum beta-lactamase) producing bacteria infection (09/10/2021), Hyperkalemia, Hyperlipidemia, Hypertension, Hyponatremia, Kidney stone, Major depression, Melena, MRSA nasal colonization (09/10/2021), Obesity, Oxygen deficit, PAF (paroxysmal atrial fibrillation) ([facility] and Sepsis ([facility] with following problems:    · Sepsis on admission with fever, leukocytosis, metabolic encephalopathy-improving  · Left lower lobe pneumonia, likely pneumococcal-covered to Unasyn  · Nasal MRSA colonization  · History of ESBL E. coli UTI in September 2021  · History of nephrolithiasis  · Ex-smoker  · COPD  · Essential hypertension-blood pressure okay  · Proximal atrial fibrillation  · Chronic kidney disease  · History of depression  · Obesity Class 2 due to excess calorie intake : Body mass index is 39.71 kg/m².-Counseling done  ·       Discussion:      I had de-escalated patient's antibiotics to IV Unasyn yesterday. She is tolerating Unasyn well. White cell count continues to improve and is 13,900 today. Blood culture from 12/17 and 12/18 remain negative. Serum creatinine 0.5. Plan:     Diagnostic Workup:      · Continue to follow  fever curve, WBC count and blood cultures. · Continue to monitor blood counts, liver and renal function.     Antimicrobials:    · Will continue IV Unasyn 1.5 g every 6 hours  · Continue to monitor her vitals closely  · Cough and deep breathing exercises  · If the patient continues to improve, she can potentially be switched to oral Augmentin at discharge  · Anticipate continuation of antibiotics until 12/30/2021  · Recommend oral probiotic twice daily while on antibiotics  · We will follow up on the culture results and clinical progress and will make further recommendations accordingly. · Continue close vitals monitoring. · Maintain good glycemic control. · Fall precautions. · Aspiration precautions. · Continue to watch for new fever or diarrhea. · DVT prophylaxis. · Discussed all above with patient and RN. Drug Monitoring:    · Continue monitoring for antibiotic toxicity as follows: CBC, CMP   · Continue to watch for following: new or worsening fever, new hypotension, hives, lip swelling and redness or purulence at vascular access sites. I/v access Management:    · Continue to monitor i.v access sites for erythema, induration, discharge or tenderness. · As always, continue efforts to minimize tubes/lines/drains as clinically appropriate to reduce chances of line associated infections. Patient education and counseling:        · The patient was educated in detail about the side-effects of various antibiotics and things to watch for like new rashes, lip swelling, severe reaction, worsening diarrhea, break through fever etc.  · Discussed patient's condition and what to expect. All of the patient's questions were addressed in a satisfactory manner and patient verbalized understanding all instructions. Level of complexity of visit: High     Weight loss counseling:    Extensive weight loss counseling was done. It is important to set a realistic weight loss goal. First goal should be to avoid gaining more weight and staying at current weight (or within 5 percent). People at high risk of developing diabetes who are able to lose 5 percent of their body weight and maintain this weight will reduce their risk of developing diabetes by about 50 percent and reduce their blood pressure.   Losing more than 15 percent of  body weight and staying at this weight is an extremely good result, even if you never reach your \"dream\" or \"ideal\" weight. Lifestyle changes including changing eating habits, substituting excess carbohydrates with proteins, stress reduction, using self-help programs like Weight Watchers®, Overeaters Anonymous®, and Take Off Pounds Sensibly (TOPS)© , following DASH diet and increasing exercise or walking briskly daily for half hour to and hour 5-7 days a week was suggested among other measures. Information was given about various weight loss education programs and their websites like www.cdc.gov/healthyweight, www.choosemyplate.gov and www.health.gov/dietaryguidelines/    TIME SPENT TODAY:     - Spent over  36 minutes on visit (including interval history, physical exam, review of data including labs, cultures, imaging, development and implementation of treatment plan and coordination of complex care). More than 50 percent of this includes face-to-face time spent with the patient for counseling and coordination of care. Thank you for involving me in the care of your patient. I will continue to follow. If you have anyadditional questions, please do not hesitate to contact me. Subjective: Interval history: Interval history was obtained from chart review and patient/ RN. She is afebrile. She is tolerating antibiotic okay. She is on 3 L oxygen via nasal cannula     REVIEW OF SYSTEMS:     Review of Systems   Constitutional: Positive for fatigue. Negative for chills, diaphoresis and unexpected weight change. HENT: Negative for congestion, ear discharge, ear pain, facial swelling, hearing loss, rhinorrhea and trouble swallowing. Eyes: Negative for photophobia, discharge, redness and visual disturbance. Respiratory: Positive for cough and shortness of breath. Negative for apnea, choking, chest tightness and stridor. Cardiovascular: Negative for chest pain and palpitations. Gastrointestinal: Negative for abdominal pain, blood in stool, diarrhea and nausea.    Endocrine: Negative for polydipsia, polyphagia and polyuria. Genitourinary: Negative for difficulty urinating, dysuria, frequency, hematuria, menstrual problem and vaginal discharge. Musculoskeletal: Negative for arthralgias, joint swelling, myalgias and neck stiffness. Skin: Negative for color change and rash. Allergic/Immunologic: Negative for immunocompromised state. Neurological: Negative for dizziness, seizures, speech difficulty, light-headedness and headaches. Hematological: Negative for adenopathy. Psychiatric/Behavioral: Negative for agitation, hallucinations and suicidal ideas. Past Medical History: All past medical history reviewed today. Past Medical History:   Diagnosis Date    Acute kidney failure (HCC)     Chronic kidney disease     COPD (chronic obstructive pulmonary disease) (HCC)     ESBL (extended spectrum beta-lactamase) producing bacteria infection 09/10/2021    Escherichia coli ESBL. Urine    Hyperkalemia     Hyperlipidemia     Hypertension     Hyponatremia     Kidney stone     Major depression     Melena     MRSA nasal colonization 09/10/2021    Obesity     Oxygen deficit     2 liters     PAF (paroxysmal atrial fibrillation) (HCC)     Sepsis ([address]        Past Surgical History: All past surgical history was reviewed today.     Past Surgical History:   Procedure Laterality Date    CHOLECYSTECTOMY      COLONOSCOPY N/A 4/6/2021    COLONOSCOPY POLYPECTOMY ABLATION with clip placement performed by Kelvin Segura MD at [facility] Left 9/19/2020    CYSTOSCOPY URETERAL STENT INSERTION LEFT RETROGRADE PYELOGRAM performed by April Mckenzie MD at [facility] Left 8/10/2020    OPEN TREATMENT OF LEFT HIP FRACTURE WITH CEPHALOMEDULLARY NAIL performed by Abbie Maza MD at [address] [place] [address] 3/2/2021    SIGMOIDOSCOPY FLEXIBLE POLYPECTOMY with clip placement performed by Kelvin Segura MD at [address] [place] [address] 9/24/2020    EGD vascular access sites are healthy with no local erythema, discharge or tenderness. Intake and output:    I/O last 3 completed shifts: In: 4666.8 [P.O.:240; I.V.:3592.6; IV Piggyback:834.2]  Out: 1275 [Urine:1275]    Lab Data:   All available labs and old records have been reviewed by me. CBC:  Recent Labs     12/20/21  0535 12/21/21  0526 12/22/21  0610   WBC 22.8* 18.7* 13.9*   RBC 4.60 5.00 4.86   HGB 9.9* 10.8* 10.8*   HCT 34.2* 37.0 36.4    371 350   MCV 74.4* 74.0* 74.8*   MCH 21.6* 21.6* 22.1*   MCHC 29.0* 29.2* 29.6*   RDW 19.2* 19.7* 19.3*        BMP:  Recent Labs     12/20/21  0535 12/21/21  0526 12/22/21  0610    140 142   K 3.6 3.7 3.7    108 111*   CO2 24 25 25   BUN 23* 16 12   CREATININE 0.6 0.6 <0.5*   CALCIUM 8.1* 8.6 8.4   GLUCOSE 97 72 85        Hepatic Function Panel:   Lab Results   Component Value Date    ALKPHOS 187 12/17/2021    ALT 7 12/17/2021    AST 12 12/17/2021    PROT 7.6 12/17/2021    BILITOT 0.4 12/17/2021    BILIDIR <0.2 09/25/2020    IBILI see below 09/25/2020    LABALBU 2.8 12/17/2021       CPK: No results found for: CKTOTAL  ESR: No results found for: SEDRATE  CRP: No results found for: CRP        Imaging: All pertinent images and reports for the current visit were reviewed by me during this visit. CT ABDOMEN PELVIS WO CONTRAST   Final Result   Addendum 1 of 1   ADDENDUM:   Left lower lobe consolidation, likely representing pneumonia. Chest CT is   recommended for further evaluation. Final   No acute pathology in the abdomen and pelvis. RECOMMENDATIONS:   Unavailable            XR CHEST PORTABLE   Final Result   No acute cardiopulmonary disease. Medications: All current and past medications were reviewed.      ampicillin-sulbactam  1,500 mg IntraVENous Q6H    dilTIAZem  120 mg Oral Daily    sodium chloride flush  5-40 mL IntraVENous 2 times per day    enoxaparin  40 mg SubCUTAneous Daily    atorvastatin  40 mg Oral Daily    DULoxetine  60 mg Oral Daily    budesonide-formoterol  2 puff Inhalation BID    gabapentin  300 mg Oral TID    pantoprazole  40 mg Oral Daily    lidocaine 1 % injection  5 mL IntraDERmal Once    sodium chloride flush  5-40 mL IntraVENous 2 times per day    lactobacillus  1 capsule Oral Daily with breakfast        sodium chloride      sodium chloride      sodium chloride 100 mL/hr at 12/22/21 6103       traMADol, sodium chloride flush, sodium chloride, acetaminophen **OR** acetaminophen, ipratropium-albuterol, sennosides-docusate sodium, polyethylene glycol, ondansetron, sodium chloride flush, sodium chloride, metoprolol      Problem list:       Patient Active Problem List   Diagnosis Code    Closed left hip fracture, initial encounter ([facility] S72.002A    Morbid obesity with BMI of 45.0-49.9, adult ([facility] E66.01, Z68.42    Hypoxemia requiring supplemental oxygen R09.02, Z99.81    Acute respiratory failure with hypoxia ([facility]) J96.01    Acute on chronic respiratory failure with hypercapnia ([facility]) J96.22    Acute pulmonary edema (HCC) J81.0    Multifocal pneumonia J18.9    Rapid atrial fibrillation ([facility]) I48.91    Chronic obstructive pulmonary disease (HCC) J44.9    PAF (paroxysmal atrial fibrillation) ([facility]) I48.0    PAT (paroxysmal atrial tachycardia) ([facility]) I47.1    Kidney stone N20.0    Septic shock ([facility]) A41.9, I96.62    Neutrophilic leukocytosis H01.4    Class 2 obesity due to excess calories with body mass index (BMI) of 39.0 to 39.9 in adult E66.09, Z68.39    Chronic respiratory failure with hypoxia (HCC) J96.11    Hyperlipidemia E78.5    Kidney lesion, native, left N28.9    Hyponatremia E87.1    Hyperkalemia E87.5    Moderate protein-calorie malnutrition (HCC) E44.0    Hydronephrosis with left ureteropelvic junction (UPJ) obstruction Q62.11    Hematuria R31.9    Ureteral obstruction N13.5    Hydronephrosis with urinary obstruction due to ureteral calculus N13.2    DARSHAN (acute kidney injury) ([facility] N17.9    Abnormal CT scan, kidney R93.429    Essential hypertension I10    Acute hypercapnic respiratory failure (HCC) J96.02    Sepsis (HCC) A41.9    Acute respiratory failure (HCC) K82.21    Diastolic CHF (HCC) G49.43    Aspiration pneumonia (HCC) J69.0    UTI (urinary tract infection) N39.0    Respiratory distress R06.03    Pneumonia of left lower lobe due to Streptococcus pneumoniae ([facility] J13    MRSA colonization Z22.200    Ex-smoker Z87.891    History of depression Z86.59       Please note that this chart was generated using Dragon dictation software. Although every effort was made to ensure the accuracy of this automated transcription, some errors in transcription may have occurred inadvertently. If you may need any clarification, please do not hesitate to contact me through EPIC or at the phone number provided below with my electronic signature. Any pictures or media included in this note were obtained after taking informed verbal consent from the patient and with their approval to include those in the patient's medical record.       Vargas Barbour MD, MPH, MARIA ISABEL Arshad  12/22/2021, 5:46 PM  [facility]   [address] [place] [address], [address] [facility], [address] [place] [address]  Office: [phone]  Fax: [phone]  Clinic days:  Tuesday & Thursday

## 2021-12-22 NOTE — PROGRESS NOTES
Progress Note  Admit Date: 12/17/2021      PCP: Hussain Jaimes MD     CC: F/U for altered mental status    Days in hospital:  5    SUBJECTIVE / Interval History:  Patient is awake and alert. No complaints at present    Patient on 3 L right now  White count trending down      Allergies  Patient has no known allergies. Medications    Scheduled Meds:   ampicillin-sulbactam  1,500 mg IntraVENous Q6H    dilTIAZem  120 mg Oral Daily    sodium chloride flush  5-40 mL IntraVENous 2 times per day    enoxaparin  40 mg SubCUTAneous Daily    atorvastatin  40 mg Oral Daily    DULoxetine  60 mg Oral Daily    budesonide-formoterol  2 puff Inhalation BID    gabapentin  300 mg Oral TID    pantoprazole  40 mg Oral Daily    lidocaine 1 % injection  5 mL IntraDERmal Once    sodium chloride flush  5-40 mL IntraVENous 2 times per day    lactobacillus  1 capsule Oral Daily with breakfast     Continuous Infusions:   sodium chloride      sodium chloride      sodium chloride 100 mL/hr at 12/22/21 0556       PRN Meds:  traMADol, sodium chloride flush, sodium chloride, acetaminophen **OR** acetaminophen, ipratropium-albuterol, sennosides-docusate sodium, polyethylene glycol, ondansetron, sodium chloride flush, sodium chloride, metoprolol    Vitals    /71   Pulse 103   Temp 97.9 °F (36.6 °C) (Oral)   Resp 20   Ht 5' 7\" (1.702 m)   Wt 253 lb 8.5 oz (115 kg)   SpO2 95%   BMI 39.71 kg/m²     Exam:    Gen: No distress. Ill looking  Eyes: PERRL. No sclera icterus. No conjunctival injection. ENT: No discharge. Pharynx clear. External appearance of ears and nose normal.  Neck: Trachea midline. No obvious mass. Resp: No accessory muscle use. No crackles. No wheezes. + rhonchi. No dullness on percussion. CV: Regular rate. Regular rhythm. No murmur or rub. No edema. GI: Non-tender. Non-distended. No hernia. Skin: Warm, dry, normal texture and turgor. No nodule on exposed extremities.    Lymph: No cervical LAD. No supraclavicular LAD. M/S: No cyanosis. No clubbing. No joint deformity. Neuro: Moves all four extremities. CN 2-12 tested, no defect noted. Psych: Oriented x 3. No anxiety. Awake. Alert. Intact judgement and insight. Data    LABS  CBC:   Recent Labs     12/20/21  0535 12/21/21  0526 12/22/21  0610   WBC 22.8* 18.7* 13.9*   HGB 9.9* 10.8* 10.8*   HCT 34.2* 37.0 36.4   MCV 74.4* 74.0* 74.8*    371 350     BMP:   Recent Labs     12/20/21  0535 12/21/21  0526 12/22/21  0610    140 142   K 3.6 3.7 3.7    108 111*   CO2 24 25 25   BUN 23* 16 12   CREATININE 0.6 0.6 <0.5*   GLUCOSE 97 72 85     POC GLUCOSE:  No results for input(s): POCGLU in the last 72 hours. LIVER PROFILE:   No results for input(s): AST, ALT, LIPASE, AMYLASE, LABALBU, BILIDIR, BILITOT, ALKPHOS in the last 72 hours. PT/INR: No results for input(s): PROTIME, INR in the last 72 hours. APTT: No results for input(s): APTT in the last 72 hours. UA:  No results for input(s): NITRITE, COLORU, PHUR, LABCAST, WBCUA, RBCUA, MUCUS, TRICHOMONAS, YEAST, BACTERIA, CLARITYU, SPECGRAV, LEUKOCYTESUR, UROBILINOGEN, BILIRUBINUR, BLOODU, GLUCOSEU, KETUA, AMORPHOUS in the last 72 hours. Microbiology:  Wound Culture:   No results for input(s): WNDABS, ORG in the last 72 hours. Invalid input(s):  LABGRAM  Nasal Culture:   No results for input(s): ORG, MRSAPCR in the last 72 hours. Blood Culture:   No results for input(s): BC, BLOODCULT2 in the last 72 hours. Fungal Culture:   No results for input(s): FUNGSM in the last 72 hours. No results for input(s): FUNCXBLD in the last 72 hours. CSF Culture:  No results for input(s): COLORCSF, APPEARCSF, CFTUBE, CLOTCSF, WBCCSF, RBCCSF, NEUTCSF, NUMCELLSCSF, LYMPHSCSF, MONOCSF, GLUCCSF, VOLCSF in the last 72 hours. Respiratory Culture:  No results for input(s): Joseph Gunning in the last 72 hours. AFB:No results for input(s): AFBSMEAR in the last 72 hours.   Urine Culture  No needing 3  L of oxygen to maintain sats greater than 90%  -Wean oxygen as tolerated  -Uses 2 L oxygen at home    Pneumonia  -Continue broad-spectrum antibiotics. Treat as MRSA/gram-negative pneumonia, finished 7 days of antibiotics. Switch to Unasyn per ID  -Has had multiple admissions for pneumonia  - procalcitonin markedly elevated, trending down, repeat today  -Last admission patient had a positive MRSA probe  -CT which covers the lower lobe base shows consolidation  -Pneumococcal antigen positive in urine    UTI in the setting of renal calculi and chronic Grace  -With a past history of ESBL on broad-spectrum antibiotics  -Patient has a remote history of ureteral stents. Has a left obstructing ureteral stone which is present since 9/20. Needs outpatient follow-up with  urology  -Tissue severe atrophy of left kidney with chronic ureteral calcification  -Culture no growth to date    Sepsis-White count improving  -With fever and leukocytosis  -Continue IV fluids, will cut back  -  Leukemoid reaction on admission    Hypotension  -Improved    A. Fib  -Restart low-dose Cardizem as blood pressure is better    History of COPD  -Continue inhalers    Acute metabolic encephalopathy-improving    Morbid obesity  -Getting current management and treatment    Goals of care discussed with the previous provider. Patient is a DNR. Does not want intubation or resuscitation. She is okay with pressors        DVT Prophylaxis: Lovenox  Diet: ADULT DIET; Regular; 4 carb choices (60 gm/meal); Low Sodium (2 gm)  Code Status: DNR-CCA    PT/OT Eval Status:    Discharge plan -monitor on Unasyn. Possible discharge tomorrow  The patient and / or the family were informed of the results of any tests, a time was given to answer questions, a plan was proposed and they agreed with plan. Discussed with consulting physicians, nursing and social work     The note was completed using EMR.  Every effort was made to ensure accuracy; however, inadvertent computerized transcription errors may be present.        Jodi Zaldivar MD

## 2021-12-23 VITALS
BODY MASS INDEX: 40.55 KG/M2 | DIASTOLIC BLOOD PRESSURE: 94 MMHG | WEIGHT: 258.38 LBS | SYSTOLIC BLOOD PRESSURE: 161 MMHG | RESPIRATION RATE: 20 BRPM | HEART RATE: 80 BPM | OXYGEN SATURATION: 90 % | TEMPERATURE: 97.5 F | HEIGHT: 67 IN

## 2021-12-23 PROBLEM — G89.29 OTHER CHRONIC PAIN: Status: ACTIVE | Noted: 2021-12-23

## 2021-12-23 LAB
ANION GAP SERPL CALCULATED.3IONS-SCNC: 5 MMOL/L (ref 3–16)
BASOPHILS ABSOLUTE: 0 K/UL (ref 0–0.2)
BASOPHILS RELATIVE PERCENT: 0.2 %
BUN BLDV-MCNC: 9 MG/DL (ref 7–20)
CALCIUM SERPL-MCNC: 8.5 MG/DL (ref 8.3–10.6)
CHLORIDE BLD-SCNC: 110 MMOL/L (ref 99–110)
CO2: 27 MMOL/L (ref 21–32)
CREAT SERPL-MCNC: <0.5 MG/DL (ref 0.6–1.2)
EOSINOPHILS ABSOLUTE: 0.6 K/UL (ref 0–0.6)
EOSINOPHILS RELATIVE PERCENT: 4.5 %
GFR AFRICAN AMERICAN: >60
GFR NON-AFRICAN AMERICAN: >60
GLUCOSE BLD-MCNC: 101 MG/DL (ref 70–99)
GLUCOSE BLD-MCNC: 77 MG/DL (ref 70–99)
GLUCOSE BLD-MCNC: 91 MG/DL (ref 70–99)
HCT VFR BLD CALC: 37.7 % (ref 36–48)
HEMOGLOBIN: 11.3 G/DL (ref 12–16)
LYMPHOCYTES ABSOLUTE: 1.7 K/UL (ref 1–5.1)
LYMPHOCYTES RELATIVE PERCENT: 13.6 %
MAGNESIUM: 2 MG/DL (ref 1.8–2.4)
MCH RBC QN AUTO: 22.2 PG (ref 26–34)
MCHC RBC AUTO-ENTMCNC: 30 G/DL (ref 31–36)
MCV RBC AUTO: 73.9 FL (ref 80–100)
MONOCYTES ABSOLUTE: 0.7 K/UL (ref 0–1.3)
MONOCYTES RELATIVE PERCENT: 5.6 %
NEUTROPHILS ABSOLUTE: 9.4 K/UL (ref 1.7–7.7)
NEUTROPHILS RELATIVE PERCENT: 76.1 %
PDW BLD-RTO: 19.1 % (ref 12.4–15.4)
PERFORMED ON: ABNORMAL
PERFORMED ON: NORMAL
PLATELET # BLD: 381 K/UL (ref 135–450)
PMV BLD AUTO: 7.2 FL (ref 5–10.5)
POTASSIUM SERPL-SCNC: 3.9 MMOL/L (ref 3.5–5.1)
RBC # BLD: 5.1 M/UL (ref 4–5.2)
SODIUM BLD-SCNC: 142 MMOL/L (ref 136–145)
WBC # BLD: 12.4 K/UL (ref 4–11)

## 2021-12-23 PROCEDURE — 6360000002 HC RX W HCPCS: Performed by: INTERNAL MEDICINE

## 2021-12-23 PROCEDURE — 6370000000 HC RX 637 (ALT 250 FOR IP): Performed by: INTERNAL MEDICINE

## 2021-12-23 PROCEDURE — 2580000003 HC RX 258: Performed by: INTERNAL MEDICINE

## 2021-12-23 PROCEDURE — 6360000002 HC RX W HCPCS: Performed by: STUDENT IN AN ORGANIZED HEALTH CARE EDUCATION/TRAINING PROGRAM

## 2021-12-23 PROCEDURE — 6370000000 HC RX 637 (ALT 250 FOR IP): Performed by: STUDENT IN AN ORGANIZED HEALTH CARE EDUCATION/TRAINING PROGRAM

## 2021-12-23 PROCEDURE — 94640 AIRWAY INHALATION TREATMENT: CPT

## 2021-12-23 PROCEDURE — 80048 BASIC METABOLIC PNL TOTAL CA: CPT

## 2021-12-23 PROCEDURE — 85025 COMPLETE CBC W/AUTO DIFF WBC: CPT

## 2021-12-23 PROCEDURE — 2580000003 HC RX 258: Performed by: STUDENT IN AN ORGANIZED HEALTH CARE EDUCATION/TRAINING PROGRAM

## 2021-12-23 PROCEDURE — 94761 N-INVAS EAR/PLS OXIMETRY MLT: CPT

## 2021-12-23 PROCEDURE — 83735 ASSAY OF MAGNESIUM: CPT

## 2021-12-23 PROCEDURE — 2700000000 HC OXYGEN THERAPY PER DAY

## 2021-12-23 RX ORDER — DILTIAZEM HYDROCHLORIDE 120 MG/1
120 CAPSULE, COATED, EXTENDED RELEASE ORAL DAILY
Qty: 30 CAPSULE | Refills: 3 | Status: SHIPPED | OUTPATIENT
Start: 2021-12-24

## 2021-12-23 RX ORDER — AMOXICILLIN AND CLAVULANATE POTASSIUM 875; 125 MG/1; MG/1
1 TABLET, FILM COATED ORAL 2 TIMES DAILY
Qty: 14 TABLET | Refills: 0 | Status: SHIPPED | OUTPATIENT
Start: 2021-12-23 | End: 2021-12-30

## 2021-12-23 RX ORDER — TRAMADOL HYDROCHLORIDE 50 MG/1
50-100 TABLET ORAL EVERY 6 HOURS PRN
Qty: 9 TABLET | Refills: 0 | Status: SHIPPED | OUTPATIENT
Start: 2021-12-23 | End: 2022-01-22

## 2021-12-23 RX ORDER — TRAMADOL HYDROCHLORIDE 50 MG/1
50-100 TABLET ORAL EVERY 6 HOURS PRN
Status: ON HOLD | COMMUNITY
End: 2021-12-23 | Stop reason: SDUPTHER

## 2021-12-23 RX ADMIN — BUDESONIDE AND FORMOTEROL FUMARATE DIHYDRATE 2 PUFF: 160; 4.5 AEROSOL RESPIRATORY (INHALATION) at 08:07

## 2021-12-23 RX ADMIN — PANTOPRAZOLE SODIUM 40 MG: 40 TABLET, DELAYED RELEASE ORAL at 05:12

## 2021-12-23 RX ADMIN — SODIUM CHLORIDE, PRESERVATIVE FREE 10 ML: 5 INJECTION INTRAVENOUS at 08:56

## 2021-12-23 RX ADMIN — GABAPENTIN 300 MG: 300 CAPSULE ORAL at 08:55

## 2021-12-23 RX ADMIN — DILTIAZEM HYDROCHLORIDE 120 MG: 120 CAPSULE, COATED, EXTENDED RELEASE ORAL at 08:55

## 2021-12-23 RX ADMIN — AMPICILLIN SODIUM AND SULBACTAM SODIUM 1500 MG: 1; .5 INJECTION, POWDER, FOR SOLUTION INTRAMUSCULAR; INTRAVENOUS at 05:11

## 2021-12-23 RX ADMIN — ENOXAPARIN SODIUM 40 MG: 100 INJECTION SUBCUTANEOUS at 08:55

## 2021-12-23 RX ADMIN — DULOXETINE HYDROCHLORIDE 60 MG: 60 CAPSULE, DELAYED RELEASE ORAL at 08:55

## 2021-12-23 RX ADMIN — ATORVASTATIN CALCIUM 40 MG: 40 TABLET, FILM COATED ORAL at 08:55

## 2021-12-23 RX ADMIN — AMPICILLIN SODIUM AND SULBACTAM SODIUM 1500 MG: 1; .5 INJECTION, POWDER, FOR SOLUTION INTRAMUSCULAR; INTRAVENOUS at 09:44

## 2021-12-23 RX ADMIN — SODIUM CHLORIDE, PRESERVATIVE FREE 10 ML: 5 INJECTION INTRAVENOUS at 08:55

## 2021-12-23 RX ADMIN — SODIUM CHLORIDE: 9 INJECTION, SOLUTION INTRAVENOUS at 05:10

## 2021-12-23 RX ADMIN — Medication 1 CAPSULE: at 08:55

## 2021-12-23 ASSESSMENT — PAIN SCALES - GENERAL
PAINLEVEL_OUTOF10: 0

## 2021-12-23 NOTE — PROGRESS NOTES
Report called to RN at [facility], [facility]. Patient was already familiar with patient prior to admission. RN updated on hospital stay and diagnosis of UTI. Patient returning to facility with PO antibiotics. All questions answered. Phone number left with RN incase any questions need to be answered. RN updated on 1245 transport time.

## 2021-12-23 NOTE — DISCHARGE INSTR - COC
Continuity of Care Form    Patient Name: Shereen Saucedo   :  1950  MRN:  [id number]    Admit date:  2021  Discharge date:  21    Code Status Order: DNR-CCA   Advance Directives:      Admitting Physician:  Leslie Cage DO  PCP: Elisabet Saba MD    Discharging Nurse: [facility] Unit/Room#: Q3Y-3556/5127-01  Discharging Unit Phone Number: [phone]    Emergency Contact:   Extended Emergency Contact Information  Primary Emergency Contact: Abrahan Ewing  Address: [address], [address] [place] [address],[address] Phone: [phone]  Work Phone: [phone]  Relation: Spouse    Past Surgical History:  Past Surgical History:   Procedure Laterality Date    CHOLECYSTECTOMY      COLONOSCOPY N/A 2021    COLONOSCOPY POLYPECTOMY ABLATION with clip placement performed by Jia Louise MD at [address] 2020    CYSTOSCOPY URETERAL STENT INSERTION LEFT RETROGRADE PYELOGRAM performed by Wojciech Hopkins MD at [address] [place] [address] Left 8/10/2020    OPEN TREATMENT OF LEFT HIP FRACTURE WITH CEPHALOMEDULLARY NAIL performed by Faviola Gibson MD at [address] [place] [address] N/A 3/2/2021    SIGMOIDOSCOPY FLEXIBLE POLYPECTOMY with clip placement performed by Jia Louise MD at [address] [facility] 2020    EGD DIAGNOSTIC ONLY performed by Jia Louise MD at [facility] ENDOSCOPY       Immunization History: There is no immunization history on file for this patient.     Active Problems:  Patient Active Problem List   Diagnosis Code    Closed left hip fracture, initial encounter ([facility] S72.002A    Morbid obesity with BMI of 45.0-49.9, adult ([facility] E66.01, Z68.42    Hypoxemia requiring supplemental oxygen R09.02, Z99.81    Acute respiratory failure with hypoxia (HCC) J96.01    Acute on chronic respiratory failure with hypercapnia (HCC) J96.22    Acute pulmonary edema ([facility] J81.0 Multifocal pneumonia J18.9    Rapid atrial fibrillation (HCC) I48.91    Chronic obstructive pulmonary disease (HCC) J44.9    Paroxysmal atrial fibrillation (HCC) I48.0    PAT (paroxysmal atrial tachycardia) ([facility]) I47.1    Kidney stone N20.0    Septic shock (HCC) A41.9, M46.58    Neutrophilic leukocytosis A43.7    Class 2 obesity due to excess calories with body mass index (BMI) of 39.0 to 39.9 in adult E66.09, Z68.39    Chronic respiratory failure with hypoxia ([facility]) J96.11    Hyperlipidemia E78.5    Kidney lesion, native, left N28.9    Hyponatremia E87.1    Hyperkalemia E87.5    Moderate protein-calorie malnutrition ([facility]) E44.0    Hydronephrosis with left ureteropelvic junction (UPJ) obstruction Q62.11    Hematuria R31.9    Ureteral obstruction N13.5    Hydronephrosis with urinary obstruction due to ureteral calculus N13.2    DARSHAN (acute kidney injury) ([address] N17.9    Abnormal CT scan, kidney R93.429    Essential hypertension I10    Acute hypercapnic respiratory failure (HCC) J96.02    Sepsis (HCC) A41.9    Acute respiratory failure ([facility]) Q35.48    Diastolic CHF ([address] M72.59    Aspiration pneumonia ([address] J69.0    UTI (urinary tract infection) N39.0    Respiratory distress R06.03    Pneumonia of left lower lobe due to Streptococcus pneumoniae ([address] J13    MRSA colonization Z22.322    Ex-smoker Z87.891    History of depression Z86.59    Weight loss counseling, encounter for Z71.3       Isolation/Infection:   Isolation            Contact          Patient Infection Status       Infection Onset Added Last Indicated Last Indicated By Review Planned Expiration Resolved Resolved By    ESBL (Extended Spectrum Beta Lactamase) 09/10/21 09/13/21 09/10/21 Culture, Urine        MRSA 09/10/21 09/11/21 12/18/21 MRSA DNA Probe, Nasal        Resolved    COVID-19 (Rule Out) 12/17/21 12/17/21 12/17/21 COVID-19, Rapid (Ordered)   12/17/21 Rule-Out Test Resulted    COVID-19 (Rule Out) 09/13/21 09/13/21 09/13/21 COVID-19 (Ordered) 21 Rule-Out Test Resulted    COVID-19 (Rule Out) 09/10/21 09/10/21 09/10/21 COVID-19, Rapid (Ordered)   09/10/21 Rule-Out Test Resulted    COVID-19 (Rule Out) 20 COVID-19 (Ordered)   20 Rule-Out Test Resulted    COVID-19 (Rule Out) 20 COVID-19 (Ordered)   20 Rule-Out Test Resulted    COVID-19 (Rule Out) 20 COVID-19 (Ordered)   08/15/20 Rule-Out Test Resulted    COVID-19 (Rule Out) 20 COVID-19 (Ordered)   20 Rule-Out Test Resulted            Nurse Assessment:  Last Vital Signs: /71   Pulse 80   Temp 97.8 °F (36.6 °C) (Oral)   Resp 20   Ht 5' 7\" (1.702 m)   Wt 258 lb 6.1 oz (117.2 kg)   SpO2 95%   BMI 40.47 kg/m²     Last documented pain score (0-10 scale): Pain Level: 0  Last Weight:   Wt Readings from Last 1 Encounters:   21 258 lb 6.1 oz (117.2 kg)     Mental Status:  oriented and alert    IV Access:  - None    Nursing Mobility/ADLs:  Walking   Dependent  Transfer  Dependent  Bathing  Dependent  Dressing  Dependent  Toileting  Dependent  Feeding  Independent  Med Admin  Assisted  Med Delivery   whole    Wound Care Documentation and Therapy:  Wound 21 redness scattered- open area in fold (Active)   Wound Etiology Skin Tear 21 075   Dressing Status Dry; Intact; Clean 21 075   Wound Cleansed Irrigated with saline 21 0753   Dressing/Treatment Zinc paste 21 075   Number of days: 4        Elimination:  Continence: Bowel: No  Bladder: Yes  Urinary Catheter: Last Change Date 2021    Colostomy/Ileostomy/Ileal Conduit: {YES / }       Date of Last BM: 21    Intake/Output Summary (Last 24 hours) at 2021 0905  Last data filed at 2021 0541  Gross per 24 hour   Intake 2481 ml   Output 1775 ml   Net 706 ml     I/O last 3 completed shifts:   In: 2481 [P.O.:480; I.V.:1901; IV Piggyback:100]  Out: 8871 [Urine:1775]    Safety Concerns: At Risk for Falls    Impairments/Disabilities:      None    Nutrition Therapy:  Current Nutrition Therapy:   - Oral Diet:  Carb Control 3 carbs/meal (1500kcals/day) and Low Sodium (2gm)    Routes of Feeding: Oral  Liquids: No Restrictions  Daily Fluid Restriction: yes - amount 1500 ml  Last Modified Barium Swallow with Video (Video Swallowing Test): {Done Not Done URK}    Treatments at the Time of Hospital Discharge:   Respiratory Treatments: n/a  Oxygen Therapy:  is on oxygen at 2 L/min per nasal cannula. Ventilator:    - No ventilator support    Rehab Therapies: Physical Therapy and Occupational Therapy  Weight Bearing Status/Restrictions: No weight bearing restirctions  Other Medical Equipment (for information only, NOT a DME order):  hospital bed  Other Treatments: n/a    Patient's personal belongings (please select all that are sent with patient):  None    RN SIGNATURE:  Electronically signed by Trudy Mendez RN on 21 at 9:13 AM EST    CASE MANAGEMENT/SOCIAL WORK SECTION    Inpatient Status Date: 21    Readmission Risk Assessment Score:  Readmission Risk              Risk of Unplanned Readmission:  19         Discharging to Facility/ Agency   Name: [facility]   Address:  [place] [address]   Phone:  [phone]  Fax:  [phone]    / signature: Electronically signed by TRACEY Bautista, ISMAW on 21 at 9:44 AM EST    PHYSICIAN SECTION    Prognosis: Fair    Condition at Discharge: Stable    Rehab Potential (if transferring to Rehab): Fair    Recommended Labs or Other Treatments After Discharge: none    Physician Certification: I certify the above information and transfer of Jonathon Coon  is necessary for the continuing treatment of the diagnosis listed and that she requires [facility] for less 30 days.      Update Admission H&P: No change in H&P    PHYSICIAN SIGNATURE:  Electronically signed by Kimberly Orona Rusty Flowers MD on 12/23/21 at 9:06 AM EST

## 2021-12-23 NOTE — PROGRESS NOTES
indwelling urinary catheter  -- UTI not due to indwelling urinary catheter  -- Other - I will add my own diagnosis  -- Disagree - Not applicable / Not valid  -- Disagree - Clinically unable to determine / Unknown  -- Refer to Clinical Documentation Reviewer    PROVIDER RESPONSE TEXT:    UTI ruled out    Query created by: Karolyn Hannah on 12/21/2021 9:39 AM      Electronically signed by:  Alban Morgan MD 12/23/2021 10:56 AM

## 2021-12-23 NOTE — PROGRESS NOTES
PICC line removed per MD and patient discharge. Pressure held for 5 minutes. No bleeding at site. Transparent dressing applied. Will continue to monitor.

## 2021-12-23 NOTE — PROGRESS NOTES
Progress Note  Admit Date: 12/17/2021      PCP: Bianca Andrade MD     CC: F/U for altered mental status    Days in hospital:  6    SUBJECTIVE / Interval History:  Patient is awake and alert. No complaints at present    Patient on 2 L O2 right now   White count trending down      Allergies  Patient has no known allergies. Medications    Scheduled Meds:   ampicillin-sulbactam  1,500 mg IntraVENous Q6H    dilTIAZem  120 mg Oral Daily    sodium chloride flush  5-40 mL IntraVENous 2 times per day    enoxaparin  40 mg SubCUTAneous Daily    atorvastatin  40 mg Oral Daily    DULoxetine  60 mg Oral Daily    budesonide-formoterol  2 puff Inhalation BID    gabapentin  300 mg Oral TID    pantoprazole  40 mg Oral Daily    lidocaine 1 % injection  5 mL IntraDERmal Once    sodium chloride flush  5-40 mL IntraVENous 2 times per day    lactobacillus  1 capsule Oral Daily with breakfast     Continuous Infusions:   sodium chloride      sodium chloride      sodium chloride 100 mL/hr at 12/23/21 0510       PRN Meds:  traMADol, sodium chloride flush, sodium chloride, acetaminophen **OR** acetaminophen, ipratropium-albuterol, sennosides-docusate sodium, polyethylene glycol, ondansetron, sodium chloride flush, sodium chloride, metoprolol    Vitals    /71   Pulse 80   Temp 97.8 °F (36.6 °C) (Oral)   Resp 20   Ht 5' 7\" (1.702 m)   Wt 258 lb 6.1 oz (117.2 kg)   SpO2 95%   BMI 40.47 kg/m²     Exam:    Gen: No distress. Ill looking  Eyes: PERRL. No sclera icterus. No conjunctival injection. ENT: No discharge. Pharynx clear. External appearance of ears and nose normal.  Neck: Trachea midline. No obvious mass. Resp: No accessory muscle use. No crackles. No wheezes. + rhonchi. No dullness on percussion. CV: Regular rate. Regular rhythm. No murmur or rub. No edema. GI: Non-tender. Non-distended. No hernia. Skin: Warm, dry, normal texture and turgor. No nodule on exposed extremities.    Lymph: No cervical LAD. No supraclavicular LAD. M/S: No cyanosis. No clubbing. No joint deformity. Neuro: Moves all four extremities. CN 2-12 tested, no defect noted. Psych: Oriented x 3. No anxiety. Awake. Alert. Intact judgement and insight. Data    LABS  CBC:   Recent Labs     12/21/21  0526 12/22/21  0610 12/23/21  0510   WBC 18.7* 13.9* 12.4*   HGB 10.8* 10.8* 11.3*   HCT 37.0 36.4 37.7   MCV 74.0* 74.8* 73.9*    350 381     BMP:   Recent Labs     12/21/21  0526 12/22/21  0610 12/23/21  0510    142 142   K 3.7 3.7 3.9    111* 110   CO2 25 25 27   BUN 16 12 9   CREATININE 0.6 <0.5* <0.5*   GLUCOSE 72 85 91     POC GLUCOSE:    Recent Labs     12/23/21  0806   POCGLU 77     LIVER PROFILE:   No results for input(s): AST, ALT, LIPASE, AMYLASE, LABALBU, BILIDIR, BILITOT, ALKPHOS in the last 72 hours. PT/INR: No results for input(s): PROTIME, INR in the last 72 hours. APTT: No results for input(s): APTT in the last 72 hours. UA:  No results for input(s): NITRITE, COLORU, PHUR, LABCAST, WBCUA, RBCUA, MUCUS, TRICHOMONAS, YEAST, BACTERIA, CLARITYU, SPECGRAV, LEUKOCYTESUR, UROBILINOGEN, BILIRUBINUR, BLOODU, GLUCOSEU, KETUA, AMORPHOUS in the last 72 hours. Microbiology:  Wound Culture:   No results for input(s): WNDABS, ORG in the last 72 hours. Invalid input(s):  LABGRAM  Nasal Culture:   No results for input(s): ORG, MRSAPCR in the last 72 hours. Blood Culture:   No results for input(s): BC, BLOODCULT2 in the last 72 hours. Fungal Culture:   No results for input(s): FUNGSM in the last 72 hours. No results for input(s): FUNCXBLD in the last 72 hours. CSF Culture:  No results for input(s): COLORCSF, APPEARCSF, CFTUBE, CLOTCSF, WBCCSF, RBCCSF, NEUTCSF, NUMCELLSCSF, LYMPHSCSF, MONOCSF, GLUCCSF, VOLCSF in the last 72 hours. Respiratory Culture:  No results for input(s): Penny Shaker in the last 72 hours. AFB:No results for input(s): AFBSMEAR in the last 72 hours.   Urine Culture  No results for input(s): LABURIN in the last 72 hours. RADIOLOGY:    CT ABDOMEN PELVIS WO CONTRAST   Final Result   Addendum 1 of 1   ADDENDUM:   Left lower lobe consolidation, likely representing pneumonia. Chest CT is   recommended for further evaluation. Final   No acute pathology in the abdomen and pelvis. RECOMMENDATIONS:   Unavailable            XR CHEST PORTABLE   Final Result   No acute cardiopulmonary disease. CONSULTS:    IP CONSULT TO INFECTIOUS DISEASES    ASSESSMENT AND PLAN:      Active Problems:    Chronic obstructive pulmonary disease (HCC)    Paroxysmal atrial fibrillation (HCC)    Class 2 obesity due to excess calories with body mass index (BMI) of 39.0 to 39.9 in adult    Sepsis (HCC)    Acute respiratory failure (HCC)    Diastolic CHF (HCC)    Aspiration pneumonia (HCC)    UTI (urinary tract infection)    Respiratory distress    Pneumonia of left lower lobe due to Streptococcus pneumoniae ([facility]    MRSA colonization    Ex-smoker    History of depression    Weight loss counseling, encounter for  Resolved Problems:    * No resolved hospital problems. *    Patient is a 65-year-old female who is a resident of a skilled nursing facility, with a past medical history of chronic kidney disease, COPD, ESBL UTI, hyperkalemia, hypertension, chronic respiratory, A. fib presented to the ED with altered mental status. She was admitted with sepsis secondary to pneumonia with acute on chronic respiratory failure. Patient was initially placed on BiPAP and then weaned to Vapotherm patient has had multiple admissions in the past for respiratory failure with pneumonia with ESBL UTI. Patient was admitted with acute on chronic hypoxic respiratory failure with pneumonia. She was treated as MRSA and gram-negative pneumonia. Her procalcitonin is markedly elevated and is trending down. Her urine culture pneumococcal antigen is positive. Infectious disease consulted.   Patient will be discharged on physicians, nursing and social work     The note was completed using EMR. Every effort was made to ensure accuracy; however, inadvertent computerized transcription errors may be present.        Mirtha Aldana MD

## 2021-12-23 NOTE — PLAN OF CARE
Problem: Skin Integrity:  Goal: Will show no infection signs and symptoms  Description: Will show no infection signs and symptoms  Outcome: Ongoing     Problem: Skin Integrity:  Goal: Absence of new skin breakdown  Description: Absence of new skin breakdown  Outcome: Ongoing     Problem: Falls - Risk of:  Goal: Will remain free from falls  Description: Will remain free from falls  Outcome: Ongoing     Problem: Falls - Risk of:  Goal: Absence of physical injury  Description: Absence of physical injury  Outcome: Ongoing     Problem: Discharge Planning:  Goal: Discharged to appropriate level of care  Description: Discharged to appropriate level of care  Outcome: Ongoing     Problem: Gas Exchange - Impaired:  Goal: Levels of oxygenation will improve  Description: Levels of oxygenation will improve  Outcome: Ongoing     Problem: Infection, Septic Shock:  Goal: Will show no infection signs and symptoms  Description: Will show no infection signs and symptoms  Outcome: Ongoing     Problem: Infection - Ventilator-Associated Pneumonia:  Goal: Absence of pulmonary infection  Description: Absence of pulmonary infection  Outcome: Ongoing     Problem: Serum Glucose Level - Abnormal:  Goal: Ability to maintain appropriate glucose levels will improve  Description: Ability to maintain appropriate glucose levels will improve  Outcome: Ongoing     Problem: Tissue Perfusion, Altered:  Goal: Circulatory function within specified parameters  Description: Circulatory function within specified parameters  Outcome: Ongoing     Problem: Venous Thromboembolism:  Goal: Will show no signs or symptoms of venous thromboembolism  Description: Will show no signs or symptoms of venous thromboembolism  Outcome: Ongoing     Problem: Venous Thromboembolism:  Goal: Absence of signs or symptoms of impaired coagulation  Description: Absence of signs or symptoms of impaired coagulation  Outcome: Ongoing     Problem: Pain:  Goal: Pain level will decrease  Description: Pain level will decrease  Outcome: Ongoing     Problem: Pain:  Goal: Control of acute pain  Description: Control of acute pain  Outcome: Ongoing     Problem: Pain:  Goal: Control of chronic pain  Description: Control of chronic pain  Outcome: Ongoing

## 2021-12-23 NOTE — DISCHARGE SUMMARY
Hospital Medicine Discharge Summary      Patient ID: Leonarda Mandujano      Patient's PCP: Kashif Villalobos MD    Admit Date: 12/17/2021     Discharge Date: 12/23/2021  The patient was seen and examined on day of discharge and this discharge summary is in conjunction with any daily progress note from day of discharge. Admitting Physician: Lula Ragsdale DO    Discharge Physician: Shiela Rubio MD     Admitted for   Chief Complaint   Patient presents with    Shortness of Breath     o2 77% on 3L (baseline)       Admitting Diagnosis Respiratory distress [R06.03]  Septicemia ([address] [A41.9]  Sepsis ([address] [A41.9]  Urinary tract infection without hematuria, site unspecified [N39.0]    Discharge Diagnoses: Active Hospital Problems    Diagnosis Date Noted    Other chronic pain [G89.29] 12/23/2021    Weight loss counseling, encounter for [Z71.3]     Respiratory distress [R06.03]     Pneumonia of left lower lobe due to Streptococcus pneumoniae ([address] [J13]     MRSA colonization [Z22.322]     Ex-smoker [Z87.891]     History of depression [Z86.59]     Sepsis ([address] [A41.9] 12/17/2021    Acute respiratory failure ([address] [J96.00] 46/92/4519    Diastolic CHF (HCC) [S18.81] 12/17/2021    Aspiration pneumonia ([address] [J69.0] 12/17/2021    UTI (urinary tract infection) [N39.0] 12/17/2021    Class 2 obesity due to excess calories with body mass index (BMI) of 39.0 to 39.9 in adult [E66.09, Z68.39] 09/18/2020    Paroxysmal atrial fibrillation (HCC) [I48.0] 08/16/2020    Chronic obstructive pulmonary disease ([address] [J44.9]        Follow Up: Primary Care Physician in one week    PCP to Follow up on   Post dc FU           Hospital Course:     Patient is a 46-year-old female who is a resident of a skilled nursing facility, with a past medical history of chronic kidney disease, COPD, ESBL UTI, hyperkalemia, hypertension, chronic respiratory, A. fib presented to the ED with altered mental status.   She was admitted with sepsis secondary to pneumonia with acute on chronic respiratory failure. Patient was initially placed on BiPAP and then weaned to Vapotherm patient has had multiple admissions in the past for respiratory failure with pneumonia with ESBL UTI. Patient was admitted with acute on chronic hypoxic respiratory failure with pneumonia. She was treated as MRSA and gram-negative pneumonia. Her procalcitonin is markedly elevated and is trending down. Her urine culture pneumococcal antigen is positive. Infectious disease consulted. Patient will be discharged on Augmentin. Need to finish 7 more days.     Acute on chronic hypoxic respiratory failure-now back on 2 L  --Wean oxygen as tolerated  -Uses 2 L oxygen at home     Pneumonia  -Continue broad-spectrum antibiotics. Treat as MRSA/gram-negative pneumonia, finished 7 days of antibiotics. Switch to Unasyn per ID> discharged on Augmentin  -Has had multiple admissions for pneumonia  - procalcitonin markedly elevated, trending down  -Last admission patient had a positive MRSA probe  -CT which covers the lower lobe base shows consolidation  -Pneumococcal antigen positive in urine     UTI in the setting of renal calculi and chronic Grace-repeat culture no growth to date  -With a past history of ESBL on broad-spectrum antibiotics  -Patient has a remote history of ureteral stents. Has a left obstructing ureteral stone which is present since 9/20. Needs outpatient follow-up with  urology  -Tissue severe atrophy of left kidney with chronic ureteral calcification        Sepsis-White count improving  -With fever and leukocytosis  -Continue IV fluids, will cut back  -  Leukemoid reaction on admission     Hypotension  -Improved, blood pressure still on the lower side. Discharge home lower dose of blood pressure medications     A.  Fib  -Restart low-dose Cardizem as blood pressure is better  - on eliquis at home     History of COPD  -Continue inhalers     Acute metabolic encephalopathy-improving     Morbid obesity  -Getting current management and treatment     Goals of care discussed with the previous provider. Patient is a DNR. Does not want intubation or resuscitation. She is okay with pressors       Consults:     IP CONSULT TO INFECTIOUS DISEASES        Disposition: SNF    Discharged Condition: Stable    Code Status: DNR-CCA    Activity: activity as tolerated    Diet: regular diet            Labs: For convenience and continuity at follow-up the following most recent labs are provided:    CBC:   Lab Results   Component Value Date    WBC 12.4 12/23/2021    HGB 11.3 12/23/2021    HCT 37.7 12/23/2021     12/23/2021       RENAL:   Lab Results   Component Value Date     12/23/2021    K 3.9 12/23/2021    K 4.3 09/11/2021     12/23/2021    CO2 27 12/23/2021    BUN 9 12/23/2021    CREATININE <0.5 12/23/2021           Discharge Medications:   Current Discharge Medication List           Details   amoxicillin-clavulanate (AUGMENTIN) 875-125 MG per tablet Take 1 tablet by mouth 2 times daily for 7 days  Qty: 14 tablet, Refills: 0              Details   dilTIAZem (CARDIZEM CD) 120 MG extended release capsule Take 1 capsule by mouth daily  Qty: 30 capsule, Refills: 3      traMADol (ULTRAM) 50 MG tablet Take 1-2 tablets by mouth every 6 hours as needed for Pain for up to 30 days.   Qty: 9 tablet, Refills: 0    Comments: Reduce doses taken as pain becomes manageable  Associated Diagnoses: Other chronic pain              Details   apixaban (ELIQUIS) 5 MG TABS tablet Take 5 mg by mouth 2 times daily      cyclobenzaprine (FLEXERIL) 10 MG tablet Take 10 mg by mouth every 6 hours as needed for Muscle spasms      DULoxetine (CYMBALTA) 60 MG extended release capsule Take 60 mg by mouth daily      acetaminophen (TYLENOL) 325 MG tablet Take 650 mg by mouth every 6 hours as needed for Pain or Fever       hydrocortisone (ANUSOL-HC) 25 MG suppository Place 25 mg rectally every 6 hours as needed for Hemorrhoids       bisacodyl (DULCOLAX) 10 MG suppository Place 10 mg rectally daily as needed for Constipation       magnesium hydroxide (MILK OF MAGNESIA) 400 MG/5ML suspension Take 30 mLs by mouth daily as needed for Constipation       polyethylene glycol (GLYCOLAX) 17 g packet Take 17 g by mouth daily as needed for Constipation      nystatin (MYCOSTATIN) 607255 UNIT/GM powder Apply topically 4 times daily Apply topically 4 times daily. sennosides-docusate sodium (SENOKOT-S) 8.6-50 MG tablet Take 2 tablets by mouth every 12 hours as needed for Constipation       lactobacillus (CULTURELLE) capsule Take 2 capsules by mouth 2 times daily (with meals)  Qty: 30 capsule, Refills: 0      pantoprazole (PROTONIX) 40 MG tablet Take 1 tablet by mouth daily  Qty: 30 tablet, Refills: 0      potassium chloride (KLOR-CON M) 20 MEQ extended release tablet Take 20 mEq by mouth daily      atorvastatin (LIPITOR) 40 MG tablet Take 40 mg by mouth daily      furosemide (LASIX) 40 MG tablet Take 1 tablet by mouth daily  Qty: 60 tablet, Refills: 0      gabapentin (NEURONTIN) 300 MG capsule Take 300 mg by mouth 3 times daily. fluticasone-salmeterol (ADVAIR) 250-50 MCG/DOSE AEPB Inhale 1 puff into the lungs 2 times daily       ipratropium-albuterol (DUONEB) 0.5-2.5 (3) MG/3ML SOLN nebulizer solution Inhale 3 mLs into the lungs every 2 hours as needed for Shortness of Breath      albuterol sulfate  (90 Base) MCG/ACT inhaler Inhale 2 puffs into the lungs every 4 hours as needed for Shortness of Breath              No future appointments. Time Spent on discharge is more than 45 minutes in the examination, evaluation, counseling and review of medications and discharge plan. Signed:  Tammy Miller MD   12/23/2021    The note was completed using EMR. Every effort was made to ensure accuracy; however, inadvertent computerized transcription errors may be present.      Thank you Shy Hernandez Mark Reese MD for the opportunity to be involved in this patient's care. If you have any questions or concerns please feel free to contact me at [phone].

## 2021-12-23 NOTE — CARE COORDINATION
CASE MANAGEMENT DISCHARGE SUMMARY:  Spoke to Jessica Dominique ([phone]) in admissions at [facility], [facility]. Notified of transport time and discharge. Confirmed patient can return today. Notified China Ramsey RN. Spoke to patient's spouse, Nereida Romero. Discussed discharge plan. Provided IM. Notified of transport time. Family agreeable with discharge plan.      DISCHARGE DATE: 12/23/21    DISCHARGED TO: [facility], [facility]                REPORT NUMBER: [phone]             FAX NUMBER: [phone]    TRANSPORTATION: [facility]              TIME: [place] [address]: At facility               INSURANCE PRECERT OBTAINED: n/a, LTC resident    RUBY/SHEELA COMPLETED: n/a, return to TRACEY Montoya LSW, Social Work/Case Management   [phone]  Electronically signed by TRACEY Cabrera, MIC on 12/23/2021 at 9:51 AM

## 2022-01-16 PROBLEM — N39.0 UTI (URINARY TRACT INFECTION): Status: RESOLVED | Noted: 2021-12-17 | Resolved: 2022-01-16

## 2023-02-02 ENCOUNTER — APPOINTMENT (OUTPATIENT)
Dept: CT IMAGING | Age: 73
DRG: 871 | End: 2023-02-02
Payer: COMMERCIAL

## 2023-02-02 ENCOUNTER — HOSPITAL ENCOUNTER (INPATIENT)
Age: 73
LOS: 5 days | Discharge: NURSING, REHAB OR LONG-TERM CARE | DRG: 871 | End: 2023-02-07
Attending: EMERGENCY MEDICINE | Admitting: INTERNAL MEDICINE
Payer: COMMERCIAL

## 2023-02-02 ENCOUNTER — APPOINTMENT (OUTPATIENT)
Dept: GENERAL RADIOLOGY | Age: 73
DRG: 871 | End: 2023-02-02
Payer: COMMERCIAL

## 2023-02-02 DIAGNOSIS — I48.91 ATRIAL FIBRILLATION WITH RAPID VENTRICULAR RESPONSE (HCC): ICD-10-CM

## 2023-02-02 DIAGNOSIS — J96.01 ACUTE RESPIRATORY FAILURE WITH HYPOXIA AND HYPERCAPNIA (HCC): Primary | ICD-10-CM

## 2023-02-02 DIAGNOSIS — I50.9 ACUTE CONGESTIVE HEART FAILURE, UNSPECIFIED HEART FAILURE TYPE (HCC): ICD-10-CM

## 2023-02-02 DIAGNOSIS — A41.9 SEPTICEMIA (HCC): ICD-10-CM

## 2023-02-02 DIAGNOSIS — J96.02 ACUTE RESPIRATORY FAILURE WITH HYPOXIA AND HYPERCAPNIA (HCC): Primary | ICD-10-CM

## 2023-02-02 DIAGNOSIS — J44.1 ACUTE EXACERBATION OF CHRONIC OBSTRUCTIVE PULMONARY DISEASE (HCC): ICD-10-CM

## 2023-02-02 PROBLEM — J96.21 ACUTE ON CHRONIC RESPIRATORY FAILURE WITH HYPOXIA AND HYPERCAPNIA (HCC): Status: ACTIVE | Noted: 2023-02-02

## 2023-02-02 PROBLEM — J96.22 ACUTE ON CHRONIC RESPIRATORY FAILURE WITH HYPOXIA AND HYPERCAPNIA (HCC): Status: ACTIVE | Noted: 2023-02-02

## 2023-02-02 LAB
A/G RATIO: 0.6 (ref 1.1–2.2)
ALBUMIN SERPL-MCNC: 2.6 G/DL (ref 3.4–5)
ALP BLD-CCNC: 131 U/L (ref 40–129)
ALT SERPL-CCNC: 9 U/L (ref 10–40)
ANION GAP SERPL CALCULATED.3IONS-SCNC: 10 MMOL/L (ref 3–16)
AST SERPL-CCNC: 14 U/L (ref 15–37)
BASE EXCESS ARTERIAL: 7.3 MMOL/L (ref -3–3)
BASOPHILS ABSOLUTE: 0 K/UL (ref 0–0.2)
BASOPHILS RELATIVE PERCENT: 0.1 %
BILIRUB SERPL-MCNC: 0.8 MG/DL (ref 0–1)
BUN BLDV-MCNC: 22 MG/DL (ref 7–20)
CALCIUM SERPL-MCNC: 8.7 MG/DL (ref 8.3–10.6)
CARBOXYHEMOGLOBIN ARTERIAL: 2.4 % (ref 0–1.5)
CHLORIDE BLD-SCNC: 95 MMOL/L (ref 99–110)
CO2: 32 MMOL/L (ref 21–32)
CREAT SERPL-MCNC: 1.1 MG/DL (ref 0.6–1.2)
EOSINOPHILS ABSOLUTE: 0 K/UL (ref 0–0.6)
EOSINOPHILS RELATIVE PERCENT: 0.2 %
GFR SERPL CREATININE-BSD FRML MDRD: 54 ML/MIN/{1.73_M2}
GLUCOSE BLD-MCNC: 132 MG/DL (ref 70–99)
GLUCOSE BLD-MCNC: 134 MG/DL (ref 70–99)
HCO3 ARTERIAL: 36.1 MMOL/L (ref 21–29)
HCT VFR BLD CALC: 45.9 % (ref 36–48)
HEMOGLOBIN, ART, EXTENDED: 14.5 G/DL (ref 12–16)
HEMOGLOBIN: 13.5 G/DL (ref 12–16)
LACTIC ACID, SEPSIS: 1.5 MMOL/L (ref 0.4–1.9)
LACTIC ACID, SEPSIS: 1.5 MMOL/L (ref 0.4–1.9)
LYMPHOCYTES ABSOLUTE: 0.5 K/UL (ref 1–5.1)
LYMPHOCYTES RELATIVE PERCENT: 2 %
MCH RBC QN AUTO: 26.2 PG (ref 26–34)
MCHC RBC AUTO-ENTMCNC: 29.3 G/DL (ref 31–36)
MCV RBC AUTO: 89.4 FL (ref 80–100)
METHEMOGLOBIN ARTERIAL: 0.3 %
MONOCYTES ABSOLUTE: 0.6 K/UL (ref 0–1.3)
MONOCYTES RELATIVE PERCENT: 2.7 %
NEUTROPHILS ABSOLUTE: 22.1 K/UL (ref 1.7–7.7)
NEUTROPHILS RELATIVE PERCENT: 95 %
O2 SAT, ARTERIAL: 98.3 %
O2 THERAPY: ABNORMAL
PCO2 ARTERIAL: 68.8 MMHG (ref 35–45)
PDW BLD-RTO: 19.2 % (ref 12.4–15.4)
PERFORMED ON: ABNORMAL
PH ARTERIAL: 7.33 (ref 7.35–7.45)
PLATELET # BLD: 266 K/UL (ref 135–450)
PMV BLD AUTO: 7.7 FL (ref 5–10.5)
PO2 ARTERIAL: 109 MMHG (ref 75–108)
POTASSIUM REFLEX MAGNESIUM: 4.8 MMOL/L (ref 3.5–5.1)
PRO-BNP: ABNORMAL PG/ML (ref 0–124)
RAPID INFLUENZA  B AGN: NEGATIVE
RAPID INFLUENZA A AGN: NEGATIVE
RBC # BLD: 5.14 M/UL (ref 4–5.2)
SARS-COV-2, NAAT: NOT DETECTED
SODIUM BLD-SCNC: 137 MMOL/L (ref 136–145)
TCO2 ARTERIAL: 38.2 MMOL/L
TOTAL PROTEIN: 7.1 G/DL (ref 6.4–8.2)
TROPONIN: 0.05 NG/ML
WBC # BLD: 23.3 K/UL (ref 4–11)

## 2023-02-02 PROCEDURE — 94761 N-INVAS EAR/PLS OXIMETRY MLT: CPT

## 2023-02-02 PROCEDURE — 85025 COMPLETE CBC W/AUTO DIFF WBC: CPT

## 2023-02-02 PROCEDURE — 83605 ASSAY OF LACTIC ACID: CPT

## 2023-02-02 PROCEDURE — 96375 TX/PRO/DX INJ NEW DRUG ADDON: CPT

## 2023-02-02 PROCEDURE — 82803 BLOOD GASES ANY COMBINATION: CPT

## 2023-02-02 PROCEDURE — 2580000003 HC RX 258

## 2023-02-02 PROCEDURE — 36415 COLL VENOUS BLD VENIPUNCTURE: CPT

## 2023-02-02 PROCEDURE — 93005 ELECTROCARDIOGRAM TRACING: CPT | Performed by: EMERGENCY MEDICINE

## 2023-02-02 PROCEDURE — 6370000000 HC RX 637 (ALT 250 FOR IP): Performed by: EMERGENCY MEDICINE

## 2023-02-02 PROCEDURE — 2700000000 HC OXYGEN THERAPY PER DAY

## 2023-02-02 PROCEDURE — 2500000003 HC RX 250 WO HCPCS: Performed by: EMERGENCY MEDICINE

## 2023-02-02 PROCEDURE — 6360000002 HC RX W HCPCS

## 2023-02-02 PROCEDURE — 87804 INFLUENZA ASSAY W/OPTIC: CPT

## 2023-02-02 PROCEDURE — 2580000003 HC RX 258: Performed by: EMERGENCY MEDICINE

## 2023-02-02 PROCEDURE — 71045 X-RAY EXAM CHEST 1 VIEW: CPT

## 2023-02-02 PROCEDURE — 1200000000 HC SEMI PRIVATE

## 2023-02-02 PROCEDURE — 83880 ASSAY OF NATRIURETIC PEPTIDE: CPT

## 2023-02-02 PROCEDURE — 6360000002 HC RX W HCPCS: Performed by: EMERGENCY MEDICINE

## 2023-02-02 PROCEDURE — 94660 CPAP INITIATION&MGMT: CPT

## 2023-02-02 PROCEDURE — 99285 EMERGENCY DEPT VISIT HI MDM: CPT

## 2023-02-02 PROCEDURE — 70450 CT HEAD/BRAIN W/O DYE: CPT

## 2023-02-02 PROCEDURE — 51702 INSERT TEMP BLADDER CATH: CPT

## 2023-02-02 PROCEDURE — 87635 SARS-COV-2 COVID-19 AMP PRB: CPT

## 2023-02-02 PROCEDURE — 87040 BLOOD CULTURE FOR BACTERIA: CPT

## 2023-02-02 PROCEDURE — 36600 WITHDRAWAL OF ARTERIAL BLOOD: CPT

## 2023-02-02 PROCEDURE — 84484 ASSAY OF TROPONIN QUANT: CPT

## 2023-02-02 PROCEDURE — 96365 THER/PROPH/DIAG IV INF INIT: CPT

## 2023-02-02 PROCEDURE — 80053 COMPREHEN METABOLIC PANEL: CPT

## 2023-02-02 PROCEDURE — 96366 THER/PROPH/DIAG IV INF ADDON: CPT

## 2023-02-02 RX ORDER — METHYLPREDNISOLONE SODIUM SUCCINATE 125 MG/2ML
125 INJECTION, POWDER, LYOPHILIZED, FOR SOLUTION INTRAMUSCULAR; INTRAVENOUS ONCE
Status: COMPLETED | OUTPATIENT
Start: 2023-02-02 | End: 2023-02-02

## 2023-02-02 RX ORDER — BISACODYL 10 MG
10 SUPPOSITORY, RECTAL RECTAL DAILY PRN
Status: ON HOLD | COMMUNITY
End: 2023-02-07 | Stop reason: HOSPADM

## 2023-02-02 RX ORDER — POLYVINYL ALCOHOL 14 MG/ML
2 SOLUTION/ DROPS OPHTHALMIC EVERY 4 HOURS PRN
COMMUNITY

## 2023-02-02 RX ORDER — ACETAMINOPHEN 650 MG/1
650 SUPPOSITORY RECTAL ONCE
Status: COMPLETED | OUTPATIENT
Start: 2023-02-02 | End: 2023-02-02

## 2023-02-02 RX ORDER — HYDROCODONE BITARTRATE AND ACETAMINOPHEN 5; 325 MG/1; MG/1
1 TABLET ORAL EVERY 6 HOURS PRN
Status: ON HOLD | COMMUNITY
Start: 2023-01-23 | End: 2023-02-07 | Stop reason: HOSPADM

## 2023-02-02 RX ORDER — KETOROLAC TROMETHAMINE 30 MG/ML
15 INJECTION, SOLUTION INTRAMUSCULAR; INTRAVENOUS ONCE
Status: COMPLETED | OUTPATIENT
Start: 2023-02-02 | End: 2023-02-02

## 2023-02-02 RX ORDER — CLOBETASOL PROPIONATE 0.5 MG/G
OINTMENT TOPICAL
COMMUNITY
Start: 2022-11-16

## 2023-02-02 RX ORDER — DIPHENHYDRAMINE HCL 25 MG
25 TABLET ORAL EVERY 6 HOURS PRN
COMMUNITY

## 2023-02-02 RX ORDER — FERROUS SULFATE 325(65) MG
325 TABLET ORAL
COMMUNITY

## 2023-02-02 RX ORDER — CALCIPOTRIENE 50 UG/G
CREAM TOPICAL 2 TIMES DAILY
COMMUNITY

## 2023-02-02 RX ORDER — UMECLIDINIUM 62.5 UG/1
1 AEROSOL, POWDER ORAL DAILY
COMMUNITY
Start: 2022-12-23

## 2023-02-02 RX ORDER — MENTHOL 40 MG/ML
GEL TOPICAL
COMMUNITY

## 2023-02-02 RX ORDER — TACROLIMUS 1 MG/G
OINTMENT TOPICAL
COMMUNITY
Start: 2022-10-04

## 2023-02-02 RX ADMIN — CEFEPIME 2000 MG: 2 INJECTION, POWDER, FOR SOLUTION INTRAVENOUS at 23:10

## 2023-02-02 RX ADMIN — METHYLPREDNISOLONE SODIUM SUCCINATE 125 MG: 125 INJECTION, POWDER, FOR SOLUTION INTRAMUSCULAR; INTRAVENOUS at 19:36

## 2023-02-02 RX ADMIN — KETOROLAC TROMETHAMINE 15 MG: 30 INJECTION, SOLUTION INTRAMUSCULAR at 19:38

## 2023-02-02 RX ADMIN — ACETAMINOPHEN 650 MG: 650 SUPPOSITORY RECTAL at 20:30

## 2023-02-02 RX ADMIN — DILTIAZEM HYDROCHLORIDE 5 MG/HR: 5 INJECTION, SOLUTION INTRAVENOUS at 20:30

## 2023-02-03 LAB
BACTERIA: ABNORMAL /HPF
BASOPHILS ABSOLUTE: 0 K/UL (ref 0–0.2)
BASOPHILS RELATIVE PERCENT: 0.1 %
BILIRUBIN URINE: NEGATIVE
BLOOD, URINE: ABNORMAL
CLARITY: ABNORMAL
COLOR: ABNORMAL
EKG DIAGNOSIS: NORMAL
EKG Q-T INTERVAL: 230 MS
EKG QRS DURATION: 82 MS
EKG QTC CALCULATION (BAZETT): 354 MS
EKG R AXIS: 68 DEGREES
EKG T AXIS: 252 DEGREES
EKG VENTRICULAR RATE: 143 BPM
EOSINOPHILS ABSOLUTE: 0 K/UL (ref 0–0.6)
EOSINOPHILS RELATIVE PERCENT: 0 %
EPITHELIAL CELLS, UA: 2 /HPF (ref 0–5)
GLUCOSE URINE: NEGATIVE MG/DL
HCT VFR BLD CALC: 44.2 % (ref 36–48)
HEMOGLOBIN: 13.2 G/DL (ref 12–16)
HYALINE CASTS: 5 /LPF (ref 0–8)
KETONES, URINE: ABNORMAL MG/DL
LACTIC ACID, SEPSIS: 1 MMOL/L (ref 0.4–1.9)
LACTIC ACID, SEPSIS: 1.1 MMOL/L (ref 0.4–1.9)
LEUKOCYTE ESTERASE, URINE: ABNORMAL
LYMPHOCYTES ABSOLUTE: 0.4 K/UL (ref 1–5.1)
LYMPHOCYTES RELATIVE PERCENT: 1.5 %
MCH RBC QN AUTO: 26.4 PG (ref 26–34)
MCHC RBC AUTO-ENTMCNC: 29.7 G/DL (ref 31–36)
MCV RBC AUTO: 88.8 FL (ref 80–100)
MICROSCOPIC EXAMINATION: YES
MONOCYTES ABSOLUTE: 0.2 K/UL (ref 0–1.3)
MONOCYTES RELATIVE PERCENT: 0.9 %
MRSA SCREEN RT-PCR: ABNORMAL
NEUTROPHILS ABSOLUTE: 24.2 K/UL (ref 1.7–7.7)
NEUTROPHILS RELATIVE PERCENT: 97.5 %
NITRITE, URINE: NEGATIVE
ORGANISM: ABNORMAL
PDW BLD-RTO: 19.3 % (ref 12.4–15.4)
PH UA: 5 (ref 5–8)
PLATELET # BLD: 251 K/UL (ref 135–450)
PMV BLD AUTO: 7.5 FL (ref 5–10.5)
PROCALCITONIN: 0.87 NG/ML (ref 0–0.15)
PROTEIN UA: 30 MG/DL
RBC # BLD: 4.98 M/UL (ref 4–5.2)
RBC UA: 1 /HPF (ref 0–4)
SPECIFIC GRAVITY UA: 1.04 (ref 1–1.03)
TROPONIN: 0.02 NG/ML
TROPONIN: 0.03 NG/ML
URINE REFLEX TO CULTURE: YES
URINE TYPE: ABNORMAL
UROBILINOGEN, URINE: 1 E.U./DL
WBC # BLD: 24.8 K/UL (ref 4–11)
WBC UA: 59 /HPF (ref 0–5)

## 2023-02-03 PROCEDURE — 2580000003 HC RX 258: Performed by: EMERGENCY MEDICINE

## 2023-02-03 PROCEDURE — 87641 MR-STAPH DNA AMP PROBE: CPT

## 2023-02-03 PROCEDURE — 6370000000 HC RX 637 (ALT 250 FOR IP): Performed by: FAMILY MEDICINE

## 2023-02-03 PROCEDURE — 84145 PROCALCITONIN (PCT): CPT

## 2023-02-03 PROCEDURE — 81003 URINALYSIS AUTO W/O SCOPE: CPT

## 2023-02-03 PROCEDURE — 36415 COLL VENOUS BLD VENIPUNCTURE: CPT

## 2023-02-03 PROCEDURE — 93005 ELECTROCARDIOGRAM TRACING: CPT | Performed by: FAMILY MEDICINE

## 2023-02-03 PROCEDURE — 6360000002 HC RX W HCPCS: Performed by: INTERNAL MEDICINE

## 2023-02-03 PROCEDURE — 6360000002 HC RX W HCPCS: Performed by: FAMILY MEDICINE

## 2023-02-03 PROCEDURE — 6360000002 HC RX W HCPCS: Performed by: EMERGENCY MEDICINE

## 2023-02-03 PROCEDURE — 94660 CPAP INITIATION&MGMT: CPT

## 2023-02-03 PROCEDURE — 87186 SC STD MICRODIL/AGAR DIL: CPT

## 2023-02-03 PROCEDURE — 2500000003 HC RX 250 WO HCPCS: Performed by: FAMILY MEDICINE

## 2023-02-03 PROCEDURE — 2580000003 HC RX 258: Performed by: INTERNAL MEDICINE

## 2023-02-03 PROCEDURE — 93010 ELECTROCARDIOGRAM REPORT: CPT | Performed by: INTERNAL MEDICINE

## 2023-02-03 PROCEDURE — 85025 COMPLETE CBC W/AUTO DIFF WBC: CPT

## 2023-02-03 PROCEDURE — 87086 URINE CULTURE/COLONY COUNT: CPT

## 2023-02-03 PROCEDURE — 2580000003 HC RX 258: Performed by: NURSE PRACTITIONER

## 2023-02-03 PROCEDURE — 87088 URINE BACTERIA CULTURE: CPT

## 2023-02-03 PROCEDURE — 6370000000 HC RX 637 (ALT 250 FOR IP): Performed by: NURSE PRACTITIONER

## 2023-02-03 PROCEDURE — 83605 ASSAY OF LACTIC ACID: CPT

## 2023-02-03 PROCEDURE — 84484 ASSAY OF TROPONIN QUANT: CPT

## 2023-02-03 PROCEDURE — 2700000000 HC OXYGEN THERAPY PER DAY

## 2023-02-03 PROCEDURE — 94761 N-INVAS EAR/PLS OXIMETRY MLT: CPT

## 2023-02-03 PROCEDURE — 2580000003 HC RX 258: Performed by: FAMILY MEDICINE

## 2023-02-03 PROCEDURE — 2060000000 HC ICU INTERMEDIATE R&B

## 2023-02-03 PROCEDURE — 94640 AIRWAY INHALATION TREATMENT: CPT

## 2023-02-03 PROCEDURE — 6360000002 HC RX W HCPCS: Performed by: NURSE PRACTITIONER

## 2023-02-03 RX ORDER — FUROSEMIDE 10 MG/ML
40 INJECTION INTRAMUSCULAR; INTRAVENOUS DAILY
Status: DISCONTINUED | OUTPATIENT
Start: 2023-02-03 | End: 2023-02-03

## 2023-02-03 RX ORDER — FUROSEMIDE 10 MG/ML
40 INJECTION INTRAMUSCULAR; INTRAVENOUS 2 TIMES DAILY
Status: COMPLETED | OUTPATIENT
Start: 2023-02-03 | End: 2023-02-06

## 2023-02-03 RX ORDER — ENOXAPARIN SODIUM 100 MG/ML
30 INJECTION SUBCUTANEOUS 2 TIMES DAILY
Status: DISCONTINUED | OUTPATIENT
Start: 2023-02-03 | End: 2023-02-03

## 2023-02-03 RX ORDER — METHYLPREDNISOLONE SODIUM SUCCINATE 40 MG/ML
40 INJECTION, POWDER, LYOPHILIZED, FOR SOLUTION INTRAMUSCULAR; INTRAVENOUS DAILY
Status: DISCONTINUED | OUTPATIENT
Start: 2023-02-03 | End: 2023-02-03

## 2023-02-03 RX ORDER — ACETAMINOPHEN 325 MG/1
650 TABLET ORAL EVERY 6 HOURS PRN
Status: DISCONTINUED | OUTPATIENT
Start: 2023-02-03 | End: 2023-02-08 | Stop reason: HOSPADM

## 2023-02-03 RX ORDER — SODIUM CHLORIDE 0.9 % (FLUSH) 0.9 %
5-40 SYRINGE (ML) INJECTION PRN
Status: DISCONTINUED | OUTPATIENT
Start: 2023-02-03 | End: 2023-02-04

## 2023-02-03 RX ORDER — IPRATROPIUM BROMIDE AND ALBUTEROL SULFATE 2.5; .5 MG/3ML; MG/3ML
3 SOLUTION RESPIRATORY (INHALATION) EVERY 4 HOURS PRN
Status: DISCONTINUED | OUTPATIENT
Start: 2023-02-03 | End: 2023-02-08 | Stop reason: HOSPADM

## 2023-02-03 RX ORDER — ENOXAPARIN SODIUM 150 MG/ML
1 INJECTION SUBCUTANEOUS 2 TIMES DAILY
Status: DISCONTINUED | OUTPATIENT
Start: 2023-02-03 | End: 2023-02-04

## 2023-02-03 RX ORDER — DILTIAZEM HYDROCHLORIDE 120 MG/1
120 CAPSULE, COATED, EXTENDED RELEASE ORAL DAILY
Status: DISCONTINUED | OUTPATIENT
Start: 2023-02-03 | End: 2023-02-05

## 2023-02-03 RX ORDER — ATORVASTATIN CALCIUM 40 MG/1
40 TABLET, FILM COATED ORAL DAILY
Status: DISCONTINUED | OUTPATIENT
Start: 2023-02-03 | End: 2023-02-08 | Stop reason: HOSPADM

## 2023-02-03 RX ORDER — SODIUM CHLORIDE 9 MG/ML
INJECTION, SOLUTION INTRAVENOUS PRN
Status: DISCONTINUED | OUTPATIENT
Start: 2023-02-03 | End: 2023-02-08 | Stop reason: HOSPADM

## 2023-02-03 RX ORDER — CLOTRIMAZOLE 1 %
CREAM (GRAM) TOPICAL 2 TIMES DAILY
Status: DISCONTINUED | OUTPATIENT
Start: 2023-02-03 | End: 2023-02-08 | Stop reason: HOSPADM

## 2023-02-03 RX ORDER — SODIUM CHLORIDE 0.9 % (FLUSH) 0.9 %
5-40 SYRINGE (ML) INJECTION EVERY 12 HOURS SCHEDULED
Status: DISCONTINUED | OUTPATIENT
Start: 2023-02-03 | End: 2023-02-04

## 2023-02-03 RX ORDER — ACETAMINOPHEN 650 MG/1
650 SUPPOSITORY RECTAL EVERY 6 HOURS PRN
Status: DISCONTINUED | OUTPATIENT
Start: 2023-02-03 | End: 2023-02-08 | Stop reason: HOSPADM

## 2023-02-03 RX ORDER — METHYLPREDNISOLONE SODIUM SUCCINATE 40 MG/ML
40 INJECTION, POWDER, LYOPHILIZED, FOR SOLUTION INTRAMUSCULAR; INTRAVENOUS EVERY 8 HOURS
Status: DISCONTINUED | OUTPATIENT
Start: 2023-02-03 | End: 2023-02-04

## 2023-02-03 RX ADMIN — METHYLPREDNISOLONE SODIUM SUCCINATE 40 MG: 40 INJECTION, POWDER, FOR SOLUTION INTRAMUSCULAR; INTRAVENOUS at 17:57

## 2023-02-03 RX ADMIN — VANCOMYCIN HYDROCHLORIDE 1000 MG: 1 INJECTION, POWDER, LYOPHILIZED, FOR SOLUTION INTRAVENOUS at 05:21

## 2023-02-03 RX ADMIN — CEFEPIME 2000 MG: 2 INJECTION, POWDER, FOR SOLUTION INTRAVENOUS at 08:58

## 2023-02-03 RX ADMIN — AMIODARONE HYDROCHLORIDE 1 MG/MIN: 50 INJECTION, SOLUTION INTRAVENOUS at 22:51

## 2023-02-03 RX ADMIN — FUROSEMIDE 40 MG: 10 INJECTION, SOLUTION INTRAMUSCULAR; INTRAVENOUS at 08:51

## 2023-02-03 RX ADMIN — ENOXAPARIN SODIUM 150 MG: 150 INJECTION SUBCUTANEOUS at 13:12

## 2023-02-03 RX ADMIN — METHYLPREDNISOLONE SODIUM SUCCINATE 40 MG: 40 INJECTION, POWDER, FOR SOLUTION INTRAMUSCULAR; INTRAVENOUS at 08:51

## 2023-02-03 RX ADMIN — CLOTRIMAZOLE: 10 CREAM TOPICAL at 16:17

## 2023-02-03 RX ADMIN — Medication 10 ML: at 22:10

## 2023-02-03 RX ADMIN — VANCOMYCIN HYDROCHLORIDE 1000 MG: 1 INJECTION, POWDER, LYOPHILIZED, FOR SOLUTION INTRAVENOUS at 00:47

## 2023-02-03 RX ADMIN — Medication 10 ML: at 09:09

## 2023-02-03 RX ADMIN — ENOXAPARIN SODIUM 150 MG: 150 INJECTION SUBCUTANEOUS at 22:10

## 2023-02-03 RX ADMIN — TIOTROPIUM BROMIDE INHALATION SPRAY 1 PUFF: 3.12 SPRAY, METERED RESPIRATORY (INHALATION) at 08:26

## 2023-02-03 RX ADMIN — ENOXAPARIN SODIUM 30 MG: 100 INJECTION SUBCUTANEOUS at 08:51

## 2023-02-03 RX ADMIN — FUROSEMIDE 40 MG: 10 INJECTION, SOLUTION INTRAMUSCULAR; INTRAVENOUS at 17:58

## 2023-02-03 RX ADMIN — CLOTRIMAZOLE: 10 CREAM TOPICAL at 22:11

## 2023-02-03 RX ADMIN — CEFEPIME 2000 MG: 2 INJECTION, POWDER, FOR SOLUTION INTRAVENOUS at 16:19

## 2023-02-03 RX ADMIN — MOMETASONE FUROATE AND FORMOTEROL FUMARATE DIHYDRATE 2 PUFF: 200; 5 AEROSOL RESPIRATORY (INHALATION) at 19:24

## 2023-02-03 RX ADMIN — AMIODARONE HYDROCHLORIDE 150 MG: 1.5 INJECTION, SOLUTION INTRAVENOUS at 20:38

## 2023-02-03 RX ADMIN — MOMETASONE FUROATE AND FORMOTEROL FUMARATE DIHYDRATE 2 PUFF: 200; 5 AEROSOL RESPIRATORY (INHALATION) at 08:26

## 2023-02-03 ASSESSMENT — LIFESTYLE VARIABLES
HOW OFTEN DO YOU HAVE A DRINK CONTAINING ALCOHOL: NEVER
HOW OFTEN DO YOU HAVE A DRINK CONTAINING ALCOHOL: NEVER
HOW MANY STANDARD DRINKS CONTAINING ALCOHOL DO YOU HAVE ON A TYPICAL DAY: PATIENT DOES NOT DRINK

## 2023-02-03 NOTE — PROGRESS NOTES
4 Eyes Skin Assessment     NAME:  Kaiser Franklin  YOB: 1950  MEDICAL RECORD NUMBER:  [id number]    The patient is being assessed for  Admission    I agree that One RN have performed a thorough Head to Toe Skin Assessment on the patient. ALL assessment sites listed below have been assessed. Areas assessed by both nurses:    Head, Face, Ears, Shoulders, Back, Chest, Arms, Elbows, Hands, Sacrum. Buttock, Coccyx, Ischium, and Legs. Feet and Heels        Does the Patient have a Wound? Yes wound(s) were present on assessment.  LDA wound assessment was Initiated and completed by RN       Nigel Prevention initiated by RN: Yes   Wound Care Orders initiated by RN: Yes    Pressure Injury (Stage 3,4, Unstageable, DTI, NWPT, and Complex wounds) if present place referral order by RN under : Yes    New and Established Ostomies, if present place, referral order under : NA      Nurse 1 eSignature: Electronically signed by Vargas Wallace RN on 2/3/23 at 5:57 AM EST    **SHARE this note so that the co-signing nurse is able to place an eSignature**    Nurse 2 eSignature: Electronically signed by Natasha Ruiz RN on 2/3/23 at 6:10 AM EST

## 2023-02-03 NOTE — PLAN OF CARE
Problem: Discharge Planning  Goal: Discharge to home or other facility with appropriate resources  Outcome: Progressing  Flowsheets (Taken 2/3/2023 1864)  Discharge to home or other facility with appropriate resources:   Identify barriers to discharge with patient and caregiver   Identify discharge learning needs (meds, wound care, etc)   Refer to discharge planning if patient needs post-hospital services based on physician order or complex needs related to functional status, cognitive ability or social support system   Arrange for needed discharge resources and transportation as appropriate     Problem: Safety - Adult  Goal: Free from fall injury  Outcome: Progressing     Problem: Skin/Tissue Integrity  Goal: Absence of new skin breakdown  Description: 1. Monitor for areas of redness and/or skin breakdown  2. Assess vascular access sites hourly  3. Every 4-6 hours minimum:  Change oxygen saturation probe site  4. Every 4-6 hours:  If on nasal continuous positive airway pressure, respiratory therapy assess nares and determine need for appliance change or resting period.   Outcome: Progressing     Problem: Chronic Conditions and Co-morbidities  Goal: Patient's chronic conditions and co-morbidity symptoms are monitored and maintained or improved  Outcome: Progressing     Problem: Pain  Goal: Verbalizes/displays adequate comfort level or baseline comfort level  Outcome: Progressing     Problem: Cognitive:  Goal: Knowledge of wound care  Description: Knowledge of wound care  Outcome: Progressing  Goal: Understands risk factors for wounds  Description: Understands risk factors for wounds  Outcome: Progressing     Problem: Wound:  Goal: Will show signs of wound healing; wound closure and no evidence of infection  Description: Will show signs of wound healing; wound closure and no evidence of infection  Outcome: Progressing     Problem: Pressure Ulcer:  Goal: Signs of wound healing will improve  Description: Signs of wound healing will improve  Outcome: Progressing  Goal: Absence of new pressure ulcer  Description: Absence of new pressure ulcer  Outcome: Progressing  Goal: Will show no infection signs and symptoms  Description: Will show no infection signs and symptoms  Outcome: Progressing     Problem: Arterial:  Goal: Optimize blood flow for wound healing  Description: Optimize blood flow for wound healing  Outcome: Progressing     Problem: Venous:  Goal: Signs of wound healing will improve  Description: Signs of wound healing will improve  Outcome: Progressing     Problem: Smoking cessation:  Goal: Ability to formulate a plan to maintain a tobacco-free life will be supported  Description: Ability to formulate a plan to maintain a tobacco-free life will be supported  Outcome: Progressing     Problem: Compression therapy:  Goal: Will be free from complications associated with compression therapy  Description: Will be free from complications associated with compression therapy  Outcome: Progressing     Problem: Weight control:  Goal: Ability to maintain an optimal weight for height and age will be supported  Description: Ability to maintain an optimal weight for height and age will be supported  Outcome: Progressing     Problem: Falls - Risk of:  Goal: Will remain free from falls  Description: Will remain free from falls  Outcome: Progressing     Problem: Blood Glucose:  Goal: Ability to maintain appropriate glucose levels will improve  Description: Ability to maintain appropriate glucose levels will improve  Outcome: Progressing

## 2023-02-03 NOTE — ED PROVIDER NOTES
[address] [place] [address]      Pt Name: Julius Almeida  MRN: [id number]  [place] 1950  Date of evaluation: 2/2/2023  Provider: Cuong Calixto, [address] [place] [address]       Chief Complaint   Patient presents with    Shortness of Breath     Pt from [facility], AN AFFILIATE OF [facility] with increased SOB. Pt satting 60-70% on NRB and NC upon EMS arrival. Placed onto cpap and given x2 Duonebs and x1 albuterol. HISTORY OF PRESENT ILLNESS   (Location/Symptom, Timing/Onset, Context/Setting, Quality, Duration, Modifying Factors, Severity)  Note limiting factors. Julius Almeida is a 81 y.o. female who presents to the emergency department with a complaint of shortness of breath and altered mental status. The patient is known to have a history of chronic respiratory failure with hypercapnia and hypoxia. She is a resident of [facility], AN AFFILIATE OF [facility]. She is nonambulatory. She wears oxygen 5 L per nasal cannula 24/7. Staff checked on her this evening and noticed that she was having increased labored breathing and was less responsive than usual.    The patient is DNR CC. She has accompanying paperwork that specifies no intubation or resuscitation. The patient is able to answer some limited questions. She denies any headache, chest pain. She does report shortness of breath and reports a cough but denies any productive sputum. There is no report of any exposure to illness. She denies any abdominal pain nausea vomiting or diarrhea. She denies any recent fall trauma or injury. She denies any focal weakness or numbness. Medical history is significant for pneumonia, prior smoking history, congestive heart failure, atrial fibrillation, COPD, morbid obesity, chronic kidney disease, hypertension, hyperlipidemia, hyponatremia. .    Nursing Notes were reviewed.     HPI        REVIEW OF SYSTEMS    (2-9 systems for level 4, 10 or more for level 5)     Review of systems are markedly limited due to the patient's current condition. She has significant shortness of breath with some alteration in mental status. PAST MEDICAL HISTORY     Past Medical History:   Diagnosis Date    Acute kidney failure (HCC)     Chronic kidney disease     COPD (chronic obstructive pulmonary disease) (HCC)     ESBL (extended spectrum beta-lactamase) producing bacteria infection 09/10/2021    Escherichia coli ESBL.  Urine    Hyperkalemia     Hyperlipidemia     Hypertension     Hyponatremia     Kidney stone     Major depression     Melena     MRSA nasal colonization 09/10/2021    Obesity     Oxygen deficit     2 liters     PAF (paroxysmal atrial fibrillation) ([facility] [address]     Sepsis ([facility] [address]          SURGICAL HISTORY       Past Surgical History:   Procedure Laterality Date    CHOLECYSTECTOMY      COLONOSCOPY N/A 4/6/2021    COLONOSCOPY POLYPECTOMY ABLATION with clip placement performed by Jayleen Mae MD at [facility] Left 9/19/2020    CYSTOSCOPY URETERAL STENT INSERTION LEFT RETROGRADE PYELOGRAM performed by Georgie Davis MD at [address] Left 8/10/2020    OPEN TREATMENT OF LEFT HIP FRACTURE WITH CEPHALOMEDULLARY NAIL performed by Beto Banuelos MD at [facility] N/A 3/2/2021    SIGMOIDOSCOPY FLEXIBLE POLYPECTOMY with clip placement performed by Jayleen Mae MD at [address] [facility] 9/24/2020    EGD DIAGNOSTIC ONLY performed by Jayleen Mae MD at [facility]       Previous Medications    ACETAMINOPHEN (TYLENOL) 325 MG TABLET    Take 650 mg by mouth every 6 hours as needed for Pain or Fever     ALBUTEROL SULFATE  (90 BASE) MCG/ACT INHALER    Inhale 2 puffs into the lungs every 4 hours as needed for Shortness of Breath     APIXABAN (ELIQUIS) 5 MG TABS TABLET    Take 5 mg by mouth 2 times daily    ATORVASTATIN (LIPITOR) 40 MG TABLET    Take 40 mg by mouth daily    BISACODYL (DULCOLAX) 10 MG SUPPOSITORY    Place 10 mg rectally daily as needed for Constipation     CYCLOBENZAPRINE (FLEXERIL) 10 MG TABLET    Take 10 mg by mouth every 6 hours as needed for Muscle spasms    DILTIAZEM (CARDIZEM CD) 120 MG EXTENDED RELEASE CAPSULE    Take 1 capsule by mouth daily    DULOXETINE (CYMBALTA) 60 MG EXTENDED RELEASE CAPSULE    Take 60 mg by mouth daily    FLUTICASONE-SALMETEROL (ADVAIR) 250-50 MCG/DOSE AEPB    Inhale 1 puff into the lungs 2 times daily     FUROSEMIDE (LASIX) 40 MG TABLET    Take 1 tablet by mouth daily    GABAPENTIN (NEURONTIN) 300 MG CAPSULE    Take 300 mg by mouth 3 times daily. HYDROCORTISONE (ANUSOL-HC) 25 MG SUPPOSITORY    Place 25 mg rectally every 6 hours as needed for Hemorrhoids     IPRATROPIUM-ALBUTEROL (DUONEB) 0.5-2.5 (3) MG/3ML SOLN NEBULIZER SOLUTION    Inhale 3 mLs into the lungs every 2 hours as needed for Shortness of Breath    LACTOBACILLUS (CULTURELLE) CAPSULE    Take 2 capsules by mouth 2 times daily (with meals)    MAGNESIUM HYDROXIDE (MILK OF MAGNESIA) 400 MG/5ML SUSPENSION    Take 30 mLs by mouth daily as needed for Constipation     NYSTATIN (MYCOSTATIN) 139518 UNIT/GM POWDER    Apply topically 4 times daily Apply topically 4 times daily. PANTOPRAZOLE (PROTONIX) 40 MG TABLET    Take 1 tablet by mouth daily    POLYETHYLENE GLYCOL (GLYCOLAX) 17 G PACKET    Take 17 g by mouth daily as needed for Constipation    POTASSIUM CHLORIDE (KLOR-CON M) 20 MEQ EXTENDED RELEASE TABLET    Take 20 mEq by mouth daily    SENNOSIDES-DOCUSATE SODIUM (SENOKOT-S) 8.6-50 MG TABLET    Take 2 tablets by mouth every 12 hours as needed for Constipation        ALLERGIES     Patient has no known allergies. FAMILY HISTORY     No family history on file.        SOCIAL HISTORY       Social History     Socioeconomic History    Marital status:    Tobacco Use    Smoking status: Every Day     Packs/day: 1.50     Types: Cigarettes    Smokeless tobacco: Never    Tobacco comments:     pack n half a day    Vaping Use    Vaping Use: Never used   Substance and Sexual Activity    Alcohol use: Never    Drug use: Yes     Types: Marijuana Lujean )     Comment: occasional        SCREENINGS    Sara Coma Scale  Eye Opening: To speech  Best Verbal Response: Incomprehensible speech  Best Motor Response: Obeys commands  Sara Coma Scale Score: 11        PHYSICAL EXAM    (up to 7 for level 4, 8 or more for level 5)     ED Triage Vitals [02/02/23 1900]   BP Temp Temp Source Heart Rate Resp SpO2 Height Weight   -- (!) 102.7 °F (39.3 °C) Axillary (!) 143 21 100 % -- (!) 347 lb 10.7 oz (157.7 kg)         Physical Exam   Constitutional: Drowsy but arousable with conversation. Head: No visible evidence of trauma. Normocephalic. Eyes: Pupils equal and reactive. No photophobia. Conjunctiva normal.    HENT: Oral mucosa moist.  Airway patent. Pharynx without erythema. Nares were clear. Neck:  Soft and supple. Nontender. Heart:  Regular rate and rhythm. No murmur. Lungs: Manage breath sounds bilaterally. Mild expiratory wheezes. Few crackles in the bases. Mild tachypnea with conversational dyspnea and some accessory muscle use. Chest: Chest wall non-tender. No evidence of trauma. Abdomen:  Soft, BMI 54.45. Nondistended, bowel sounds present. Nontender. No guarding rigidity or rebound. No masses. Musculoskeletal: Upper extremities non-tender with full range of motion. Chronic bilateral lower extremity edema, 3+ with chronic dry scaly skin and raised plaque-like lesions. Capillary refill less than 2 seconds in all digits. Radial and dorsalis pedis pulses were intact. No calf tenderness erythema or edema. Neurological: Drowsy but arousable with conversation. Follows simple commands. Answers yes and no questions. Able to stay awake during conversation but nods off after conversation is finished. No dysarthria. No aphasia. Speech clear. Cranial nerves II-XII intact. No facial droop.   No acute focal motor or sensory deficits. Strength equal bilaterally. Skin: Skin is warm and dry. Multiple plaque-like lesions noted to the torso and extremities consistent with the appearance of psoriasis. Lymphatic:  No lympadenopathy. Psychiatric: Unable to fully assess. DIAGNOSTIC RESULTS     EKG: All EKG's are interpreted by me, the Emergency Department Physician, who either signs or Co-signs this chart in the absence of a cardiologist.    Atrial fibrillation with rapid ventricular response. Rate 143. QRS duration 82 ms. QTc 354 ms. R axis 68 degrees. There is no ST elevation. Occasional PVCs were noted. No ST elevation. EKG is similar in appearance to a previous EKG from December 18, 2021. RADIOLOGY:   Non-plain film images such as CT, Ultrasound and MRI are read by the radiologist. Plain radiographic images are visualized and preliminarily interpreted by the emergency physician with the below findings:        Interpretation per the Radiologist below, if available at the time of this note:    CT HEAD WO CONTRAST   Final Result   No acute intracranial abnormality. XR CHEST PORTABLE   Final Result   Mild pulmonary vascular congestion.                ED BEDSIDE ULTRASOUND:   Performed by ED Physician - none    LABS:  Labs Reviewed   BLOOD GAS, ARTERIAL - Abnormal; Notable for the following components:       Result Value    pH, Arterial 7.328 (*)     pCO2, Arterial 68.8 (*)     pO2, Arterial 109.0 (*)     HCO3, Arterial 36.1 (*)     Base Excess, Arterial 7.3 (*)     Carboxyhgb, Arterial 2.4 (*)     All other components within normal limits   CBC WITH AUTO DIFFERENTIAL - Abnormal; Notable for the following components:    WBC 23.3 (*)     MCHC 29.3 (*)     RDW 19.2 (*)     Neutrophils Absolute 22.1 (*)     Lymphocytes Absolute 0.5 (*)     All other components within normal limits   COMPREHENSIVE METABOLIC PANEL W/ REFLEX TO MG FOR LOW K - Abnormal; Notable for the following components:    Chloride 95 (*)     Glucose 134 (*)     BUN 22 (*)     Est, Glom Filt Rate 54 (*)     Albumin 2.6 (*)     Albumin/Globulin Ratio 0.6 (*)     Alkaline Phosphatase 131 (*)     ALT 9 (*)     AST 14 (*)     All other components within normal limits   TROPONIN - Abnormal; Notable for the following components:    Troponin 0.05 (*)     All other components within normal limits   BRAIN NATRIURETIC PEPTIDE - Abnormal; Notable for the following components:    Pro-BNP 10,180 (*)     All other components within normal limits   POCT GLUCOSE - Abnormal; Notable for the following components:    POC Glucose 132 (*)     All other components within normal limits   COVID-19, RAPID   RAPID INFLUENZA A/B ANTIGENS   CULTURE, BLOOD 1   CULTURE, BLOOD 2   LACTATE, SEPSIS   LACTATE, SEPSIS   URINALYSIS WITH REFLEX TO CULTURE       All other labs were within normal range or not returned as of this dictation. EMERGENCY DEPARTMENT COURSE and DIFFERENTIAL DIAGNOSIS/ MEDICAL DECISION MAKING:   Vitals:    Vitals:    02/02/23 1900 02/02/23 1906 02/02/23 1930 02/02/23 1957   BP: (!) 130/114  (!) 158/122    Pulse: (!) 143 (!) 139 (!) 137    Resp: 21 19 26    Temp: (!) 102.7 °F (39.3 °C)      TempSrc: Axillary      SpO2: 100% 100% (!) 78% 96%   Weight: (!) 347 lb 10.7 oz (157.7 kg)            MDM      The patient presents to the emergency department with a complaint of altered mental status and respiratory distress. In route to the hospital she received 100% nonrebreather, and 2 albuterol treatments. On arrival she was noted to be drowsy but arousable with conversation and able to maintain alertness during conversation. Following conversation she quickly nodded off to sleep. Airway is patent. Gag reflex is intact. She was placed on BiPAP on arrival.    She was given Solu-Medrol 125 mg IV. She was noted to be febrile with a temp of 102.7. She was given Tylenol 650 mg per rectum.   She was given cefepime 2 g IV and vancomycin 1 g IV for suspected sepsis. She was noted to be in A. fib with RVR. Rate was in the 130s. No report of any chest pain. She was started on a Cardizem drip at 5 mg/h. CODE STATUS was reviewed. Paperwork was reviewed. The patient is DNR CC comfort care only. Specific notation was noted for no intubation or resuscitation. Is this patient to be included in the SEP-1 Core Measure due to severe sepsis or septic shock? Yes   SEP-1 CORE MEASURE DATA      Sepsis Criteria   Severe Sepsis Criteria   Septic Shock Criteria     Must be confirmed or suspected to move forward with diagnosis of sepsis. Must meet 2:    [x] Temperature > 100.9 F (38.3 C)        or < 96.8 F (36 C)  [x] HR > 90  [x] RR > 20  [x] WBC > 12 or < 4 or 10% bands      AND:      [x] Infection Confirmed or        Suspected. Must meet 1:    [] Lactate > 2       or   [x] Signs of Organ Dysfunction:    - SBP < 90 or MAP < 65  - Altered mental status  - Creatinine > 2 or increased from      baseline  - Urine Output < 0.5 ml/kg/hr  - Bilirubin > 2  - INR > 1.5 (not anticoagulated)  - Platelets < 099,741  - Acute Respiratory Failure as     evidenced by new need for NIPPV     or mechanical ventilation      [] No criteria met for Severe Sepsis. Must meet 1:    [] Lactate > 4        or   [] SBP < 90 or MAP < 65 for at        least two readings in the first        hour after fluid bolus        administration      [] Vasopressors initiated (if hypotension persists after fluid resuscitation)        [x] No criteria met for Septic Shock.    Patient Vitals for the past 6 hrs:   BP Temp Pulse Resp SpO2 Weight Weight Method Percent Weight Change   02/02/23 1900 (!) 130/114 (!) 102.7 °F (39.3 °C) (!) 143 21 100 % (!) 347 lb 10.7 oz (157.7 kg) Actual;Bed scale 0   02/02/23 1906 -- -- (!) 139 19 100 % -- -- --   02/02/23 1930 (!) 158/122 -- (!) 137 26 (!) 78 % -- -- --   02/02/23 1957 -- -- -- -- 96 % -- -- --      Recent Labs     02/02/23 1929   WBC 23.3*   CREATININE 1. 1   BILITOT 0.8            Time Severe Sepsis Identified: 8:30 PM    Fluid Resuscitation Rational: less than 30mL/kg because of a history of CHF NYHA III or IV with symptoms with minimal exertion/at rest.  Instead, 0 was ordered. More fluid initially would be potentially detrimental to the patient    Repeat lactate level: ordered and pending at this time    Reassessment Exam:   Not applicable. Patient does not have septic shock. REASSESSMENT/ MEDICAL DECISION MAKING          8:30 PM: Laboratory studies were reviewed. White blood cell count is elevated at 23.3. Hemoglobin 13.5. Platelets 2 66. BUN 22. Glucose 134. Creatinine 1.1. Troponin is slightly elevated at 0.05. However, the patient was significantly hypoxic on arrival.  I suspect that this may be elevated secondary to demand ischemia. She denies any chest pain. BNP is elevated at 10,180. Lactate is normal at 1.5.  pH is 7.32 on the ABG with a PCO2 of 68 and a PO2 of 109. Rapid COVID and influenza are negative. 10:20 PM: CT head was reviewed and was negative. CT chest PE protocol was ordered to further evaluate her dyspnea. However, due to the patient's body habitus she was unable to fit through the CT scanner. CT was unable to be obtained of the chest.    10:25 PM: Chest x-ray was reviewed. Mild pulmonary vascular congestion. No focal infiltrates identified. Clinical presentation is consistent with pneumonia. I suspect that the patient likely has pneumonia. However, I cannot exclude the possibility of some superimposed CHF. She was given Lasix 40 mg IV. Annette catheter was placed. The patient is awake and alert. Heart rate is 123. Cardizem will be titrated. She is improved. T he patient will be admitted for further treatment and evaluation. A call was placed to the hospitalist on-call for admission. I am the primary attending of record.     CRITICAL CARE TIME   Total Critical Care time was 40 minutes, excluding separately reportable procedures. There was a high probability of clinically significant/life threatening deterioration in the patient's condition which required my urgent intervention. CONSULTS:  None    PROCEDURES:  Unless otherwise noted below, none     Procedures        FINAL IMPRESSION      1. Acute respiratory failure with hypoxia and hypercapnia (HCC)    2. Acute exacerbation of chronic obstructive pulmonary disease ([facility] [address]    3. Atrial fibrillation with rapid ventricular response ([facility] [address]    4. Acute congestive heart failure, unspecified heart failure type ([facility] [address]    5. Septicemia [facility])          DISPOSITION/PLAN   DISPOSITION Decision To Admit 02/02/2023 10:31:43 PM      PATIENT REFERRED TO:  No follow-up provider specified. DISCHARGE MEDICATIONS:  New Prescriptions    No medications on file     Controlled Substances Monitoring:     No flowsheet data found. (Please note that portions of this note were completed with a voice recognition program.  Efforts were made to edit the dictations but occasionally words are mis-transcribed. )    1859 Ant Sierra DO (electronically signed)  Attending Emergency Physician           Kandy Burleson DO  02/02/23 [id number]

## 2023-02-03 NOTE — CARE COORDINATION
[id number] [facility] Wound Ostomy Continence Nurse  Consult Note       NAME:  Dorothea Coombs  MEDICAL RECORD NUMBER:  [id number]  AGE: 81 y.o. GENDER: female  : 1950  TODAY'S DATE:  2/3/2023    Subjective   Reason for WOCN Evaluation and Assessment: wounds to back, upper right thigh, skin tear      Dorothea Coombs is a 81 y.o. female referred by:   [] Physician  [x] Nursing  [] Other:     Wound Identification:  Wound Type: skin tear, skin condition  Contributing Factors: obesity and poor hygiene    Wound History: Pt presents from SNF. Appears to have hyperkeratosis throughout entire body. Appears unkempt. Skin tear to R FA POA. Current Wound Care Treatment:  n/a    Patient Goal of Care:  [x] Wound Healing  [] Odor Control  [] Palliative Care  [] Pain Control   [] Other:         PAST MEDICAL HISTORY        Diagnosis Date    Acute kidney failure (HCC)     Chronic kidney disease     COPD (chronic obstructive pulmonary disease) (HCC)     ESBL (extended spectrum beta-lactamase) producing bacteria infection 09/10/2021    Escherichia coli ESBL.  Urine    Hyperkalemia     Hyperlipidemia     Hypertension     Hyponatremia     Kidney stone     Major depression     Melena     MRSA nasal colonization 09/10/2021    Obesity     Oxygen deficit     2 liters     PAF (paroxysmal atrial fibrillation) ([facility]     Sepsis ([facility]        PAST SURGICAL HISTORY    Past Surgical History:   Procedure Laterality Date    CHOLECYSTECTOMY      COLONOSCOPY N/A 2021    COLONOSCOPY POLYPECTOMY ABLATION with clip placement performed by Rodolfo Odonenll MD at [place] [address] Left 2020    CYSTOSCOPY URETERAL STENT INSERTION LEFT RETROGRADE PYELOGRAM performed by Correne Bernheim, MD at [address] [place] [address] Left 8/10/2020    OPEN TREATMENT OF LEFT HIP FRACTURE WITH CEPHALOMEDULLARY NAIL performed by Adina Siemens, MD at [address] [place] [address] N/A 3/2/2021    SIGMOIDOSCOPY FLEXIBLE POLYPECTOMY with clip placement [facility] [address] N/A 9/24/2020    EGD DIAGNOSTIC ONLY performed by Darling Harris MD at [address] [facility]    History reviewed. No pertinent family history. SOCIAL HISTORY    Social History     Tobacco Use    Smoking status: Every Day     Packs/day: 1.50     Types: Cigarettes    Smokeless tobacco: Never    Tobacco comments:     pack n half a day    Vaping Use    Vaping Use: Never used   Substance Use Topics    Alcohol use: Never    Drug use: Yes     Types: Marijuana Delpha Reaper)     Comment: occasional        ALLERGIES    No Known Allergies    MEDICATIONS    No current facility-administered medications on file prior to encounter. Current Outpatient Medications on File Prior to Encounter   Medication Sig Dispense Refill    polyvinyl alcohol (LIQUIFILM TEARS) 1.4 % ophthalmic solution Place 2 drops into both eyes every 4 hours as needed      diphenhydrAMINE (BENADRYL) 25 MG tablet Take 25 mg by mouth every 6 hours as needed for Itching      Menthol, Topical Analgesic, (BIOFREEZE) 4 % GEL Apply to left shoulder topically every shift for psoriasis      bisacodyl (DULCOLAX) 10 MG suppository Place 10 mg rectally daily as needed for Constipation      calcipotriene (DOVONEX) 0.005 % cream Apply topically 2 times daily Apply topically every shift for psoriasis. ferrous sulfate (IRON 325) 325 (65 Fe) MG tablet Take 325 mg by mouth daily (with breakfast)      tacrolimus (PROTOPIC) 0.1 % ointment Apply twice daily as needed for flared areas on the face      INCRUSE ELLIPTA 62.5 MCG/ACT AEPB Inhale 1 puff into the lungs daily      HYDROcodone-acetaminophen (NORCO) 5-325 MG per tablet Take 1 tablet by mouth every 6 hours as needed. clobetasol (TEMOVATE) 0.05 % ointment Apply topically 2 times a day.  Twice daily to flared areas on the legs, arms, elbows and belly buttons      dilTIAZem (CARDIZEM CD) 120 MG extended release capsule Take 1 capsule by mouth daily 30 capsule 3    cyclobenzaprine (FLEXERIL) 10 MG tablet Take 10 mg by mouth every 6 hours as needed for Muscle spasms      DULoxetine (CYMBALTA) 30 MG extended release capsule Take 30 mg by mouth daily      acetaminophen (TYLENOL) 325 MG tablet Take 650 mg by mouth every 6 hours as needed for Pain or Fever       hydrocortisone (ANUSOL-HC) 25 MG suppository Place 25 mg rectally every 6 hours as needed for Hemorrhoids       bisacodyl (DULCOLAX) 10 MG suppository Place 10 mg rectally daily as needed for Constipation       magnesium hydroxide (MILK OF MAGNESIA) 400 MG/5ML suspension Take 30 mLs by mouth daily as needed for Constipation       polyethylene glycol (GLYCOLAX) 17 g packet Take 17 g by mouth daily as needed for Constipation      nystatin (MYCOSTATIN) 832609 UNIT/GM powder Apply topically 4 times daily Apply topically 4 times daily. sennosides-docusate sodium (SENOKOT-S) 8.6-50 MG tablet Take 2 tablets by mouth every 12 hours as needed for Constipation       lactobacillus (CULTURELLE) capsule Take 2 capsules by mouth 2 times daily (with meals) 30 capsule 0    pantoprazole (PROTONIX) 40 MG tablet Take 1 tablet by mouth daily 30 tablet 0    potassium chloride (KLOR-CON M) 20 MEQ extended release tablet Take 20 mEq by mouth daily      atorvastatin (LIPITOR) 40 MG tablet Take 40 mg by mouth daily      furosemide (LASIX) 40 MG tablet Take 1 tablet by mouth daily (Patient taking differently: Take 20 mg by mouth daily) 60 tablet 0    gabapentin (NEURONTIN) 600 MG tablet Take 600 mg by mouth 3 times daily.       fluticasone-salmeterol (ADVAIR) 250-50 MCG/DOSE AEPB Inhale 1 puff into the lungs 2 times daily       ipratropium-albuterol (DUONEB) 0.5-2.5 (3) MG/3ML SOLN nebulizer solution Inhale 3 mLs into the lungs every 2 hours as needed for Shortness of Breath      albuterol sulfate  (90 Base) MCG/ACT inhaler Inhale 2 puffs into the lungs every 4 hours as needed for Shortness of Breath Objective    BP (!) 142/111   Pulse 100   Temp 98.4 °F (36.9 °C) (Axillary)   Resp 19   Ht 5' 7\" (1.702 m)   Wt (!) 331 lb 12.7 oz (150.5 kg)   SpO2 98%   BMI 51.97 kg/m²     LABS:  WBC:    Lab Results   Component Value Date/Time    WBC 24.8 02/03/2023 06:21 AM     H/H:    Lab Results   Component Value Date/Time    HGB 13.2 02/03/2023 06:21 AM    HCT 44.2 02/03/2023 06:21 AM     PTT:    Lab Results   Component Value Date/Time    APTT 31.7 08/08/2020 06:23 AM   [APTT}  PT/INR:    Lab Results   Component Value Date/Time    PROTIME 11.8 08/08/2020 06:23 AM    INR 1.02 08/08/2020 06:23 AM     HgBA1c:    Lab Results   Component Value Date/Time    LABA1C 6.1 05/03/2013 04:25 AM       Assessment   Nigel Risk Score: Nigel Scale Score: 13    Patient Active Problem List   Diagnosis Code    Closed left hip fracture, initial encounter ([address] S72.002A    Morbid obesity with BMI of 45.0-49.9, adult ([facility]) E66.01, Z68.42    Hypoxemia requiring supplemental oxygen R09.02, Z99.81    Acute respiratory failure with hypoxia ([facility]) J96.01    Acute on chronic respiratory failure with hypercapnia ([facility]) J96.22    Acute pulmonary edema (HCC) J81.0    Multifocal pneumonia J18.9    Rapid atrial fibrillation (HCC) I48.91    Chronic obstructive pulmonary disease (HCC) J44.9    Paroxysmal atrial fibrillation (HCC) I48.0    PAT (paroxysmal atrial tachycardia) ([facility]) I47.1    Kidney stone N20.0    Septic shock ([facility]) A41.9, L77.97    Neutrophilic leukocytosis L80.7    Class 2 obesity due to excess calories with body mass index (BMI) of 39.0 to 39.9 in adult E66.09, Z68.39    Chronic respiratory failure with hypoxia ([facility]) J96.11    Hyperlipidemia E78.5    Kidney lesion, native, left N28.9    Hyponatremia E87.1    Hyperkalemia E87.5    Moderate protein-calorie malnutrition (HCC) E44.0    Hydronephrosis with left ureteropelvic junction (UPJ) obstruction Q62.11    Hematuria R31.9    Ureteral obstruction N13.5    Hydronephrosis with urinary obstruction due to ureteral calculus N13.2    DARSHAN (acute kidney injury) ([facility] N17.9    Abnormal CT scan, kidney R93.429    Essential hypertension I10    Acute hypercapnic respiratory failure (HCC) J96.02    Sepsis (HCC) A41.9    Acute respiratory failure (HCC) Q27.65    Diastolic CHF (HCC) U49.97    Aspiration pneumonia ([facility]) J69.0    Respiratory distress R06.03    Pneumonia of left lower lobe due to Streptococcus pneumoniae ([facility] J13    MRSA colonization Z22.322    Ex-smoker Z87.891    History of depression Z86.59    Weight loss counseling, encounter for Z71.3    Other chronic pain G89.29    Acute on chronic respiratory failure with hypoxia and hypercapnia (HCC) J96.21, J96.22    Atrial fibrillation with RVR ([facility] I48.91         Pt on bipap, alert. Pt has what appears to be hyperkeratosis scattered throughout entire body. Yeast under abd and breast folds. Skin tear to R FA dressed with versatel, kerlix. Bariatric specialty bed already ordered. Plan   Plan of Care:    Skin tear- versatel, kerlix, change q5 days  Skin redness- notified MD of recommendations for skin care.      Specialty Bed Required : Yes   [x] Low Air Loss   [] Pressure Redistribution  [] Fluid Immersion  [x] Bariatric  [] Total Pressure Relief  [] Other:     Current Diet: Diet NPO Exceptions are: Sips of Water with Meds  Dietician consult:  Yes    Discharge Plan:  Placement for patient upon discharge: skilled nursing    Patient appropriate for Outpatient [address]: No    Referrals:  []   [] [facility]  [] Supplies  [] Other    Patient/Caregiver Teaching:  Level of patient/caregiver understanding able to:   [] Indicates understanding       [] Needs reinforcement  [] Unsuccessful      [x] Verbal Understanding  [] Demonstrated understanding       [] No evidence of learning  [] Refused teaching         [] N/A       Electronically signed by Fidelina Hernandez RN, CWOCN on 2/3/2023 at 12:52 PM

## 2023-02-03 NOTE — ED NOTES
Report given to 5N RN Kenisha Amaya to assume care. All questions answered, RN verbalized understanding, SBAR discussed.       Slade Jeffers RN  02/03/23 0013

## 2023-02-03 NOTE — CARE COORDINATION
Hospice consult noted. Spoke to , hospice options provided. Wants a referral to [facility] [address]. Referral made. The Plan for Transition of Care is related to the following treatment goals: hospice    The patient's  was provided with a choice of provider and agrees   with the discharge plan. [x] Yes [] No    Freedom of choice list was provided with basic dialogue that supports the patient's individualized plan of care/goals, treatment preferences and shares the quality data associated with the providers.  [x] Yes [] No      Electronically signed by Amanda [place] RN Case Management on 2/3/2023 at 12:55 PM

## 2023-02-03 NOTE — FLOWSHEET NOTE
02/03/23 0810   Vitals   Temp 97.6 °F (36.4 °C)   Temp Source Axillary   Heart Rate (!) 119   Heart Rate Source Monitor   Resp 16   BP (!) 89/55   MAP (Calculated) 66   MAP (mmHg) 67   BP Location Right Arm   BP Upper/Lower Lower   BP Method Automatic   Patient Position Semi fowlers   Level of Consciousness 1   MEWS Score 5   Pain Assessment   Pain Assessment None - Denies Pain   Oxygen Therapy   SpO2 96 %   Pulse Oximetry Type Continuous   Pulse via Oximetry 103 beats per minute   Pulse Oximeter Device Mode Continuous   Pulse Oximeter Device Location Finger   O2 Device PAP (positive airway pressure)   FiO2  60 %

## 2023-02-03 NOTE — ED TRIAGE NOTES
Per SNF RN, around 5:30 pt became hypoxic on her 5L NC. States her O2 was 65% on the 5L. SNF placed her on 15L NRB and pt came up to mid 80's. Per SNF pt is also altered unable to state name, , year, etc. Per SNF pt is usually alert and oriented.

## 2023-02-03 NOTE — PROGRESS NOTES
810: Pt remains on BiPap at 60%. No s/s of distress, VSS. Pt opening eyes to voice. BP will need rechecked r/t dilt parameters in eMar. See flowsheet and eMar for additional documentation. 920: Secure message sent to Dr. Loulou De La Cruz. Pt increasingly lethargic. Opens eyes to name when asked and follows commands, but requiring increased prompting. BP remains soft, 89/66. Per Dr. Loulou De La Cruz drip is to be titrated off. RN to reach out if HR increases to >120.    945: RN attempted to reach spouse, Lexx Perez to come to bedside. No answer, no VM set up.    1000: Dr Loulou De La Cruz and RN at bedside, pt confirmed DNR status. Wants to pursue hospice at this time. 1600: RN spoke with Yris Jones with [address], pt does not want to be DNR and wants to be full code. RN went in room and spoke with pt to confirm this information. Secure message sent to Dr. Loulou De La Cruz so code status can be updated.

## 2023-02-03 NOTE — ACP (ADVANCE CARE PLANNING)
Advance Care Planning     Advance Care Planning Activator (Inpatient)  Conversation Note      Date of ACP Conversation: 2/3/2023     [employer] with:  Healthcare Decision Maker: Next of Kin by law (only applies in absence of above) (name)  Abrahan    ACP Activator: Tia Crowell, [id number] [place] [address] Maker:     Current Designated Health Care Decision Maker:     Primary Decision Maker: Abrahan Ewing - [facility] - [phone]    Today we documented Decision Maker(s) consistent with Legal Next of Kin hierarchy. Care Preferences    Ventilation: \"If you were in your present state of health and suddenly became very ill and were unable to breathe on your own, what would your preference be about the use of a ventilator (breathing machine) if it were available to you? \"      Would the patient desire the use of ventilator (breathing machine)?: no    \"If your health worsens and it becomes clear that your chance of recovery is unlikely, what would your preference be about the use of a ventilator (breathing machine) if it were available to you? \"     Would the patient desire the use of ventilator (breathing machine)?: No      Resuscitation  \"CPR works best to restart the heart when there is a sudden event, like a heart attack, in someone who is otherwise healthy. Unfortunately, CPR does not typically restart the heart for people who have serious health conditions or who are very sick. \"    \"In the event your heart stopped as a result of an underlying serious health condition, would you want attempts to be made to restart your heart (answer \"yes\" for attempt to resuscitate) or would you prefer a natural death (answer \"no\" for do not attempt to resuscitate)? \" no       [] Yes   [x] No   Educated Patient / [employer] regarding differences between Advance Directives and portable DNR orders. Length of ACP Conversation in minutes: 5 minutes     Conversation Outcomes:   [x] ACP discussion completed. Reviewed prior ACP, confirmed no changes.   [] Existing advance directive reviewed with patient; no changes to patient's previously recorded wishes  [] New Advance Directive completed  [] Portable Do Not Rescitate prepared for Provider review and signature  [] POLST/POST/MOLST/MOST prepared for Provider review and signature  Electronically signed by Sunny Newell RN Case Management on 2/3/2023 at 9:58 AM

## 2023-02-03 NOTE — CONSULTS
Clinical Pharmacy Note  Vancomycin Consult    Esmer Lester is a 81 y.o. female ordered Vancomycin for sepsis secondary to HAP; consult received [facility] to manage therapy. Also receiving cefepime. Allergies:  Patient has no known allergies. Temp max:  Temp (24hrs), Av.8 °F (37.7 °C), Min:98.2 °F (36.8 °C), Max:102.7 °F (39.3 °C)      Recent Labs     23   WBC 23.3*       Recent Labs     23   BUN 22*   CREATININE 1.1       No intake or output data in the 24 hours ending 23 0506    Culture Results:  pending    Ht Readings from Last 1 Encounters:   23 5' 7\" (1.702 m)        Wt Readings from Last 1 Encounters:   23 (!) 331 lb 12.7 oz (150.5 kg)         Estimated Creatinine Clearance: 73 mL/min (based on SCr of 1.1 mg/dL). Assessment/Plan:  Day # 1 of vancomycin. Vancomycin 1000mg x 1 given in ED, supplemental dose of 1000mg ordered to follow due to patient's weight (total LD of 2000mg, 13 mg/kg)  Vancomycin 1000 mg IV every 18 hours ordered. Goal -600  Predicted   Level ordered after 2 total doses (2000mg LD + 1000mg MD) due to high risk nephrotoxicity (age, weight, comorbidities)  Level ordered for / at 1600    Thank you for the consult.    Marihcuy Tomas, PharmD  2/3/2023 5:08 AM

## 2023-02-03 NOTE — ED TRIAGE NOTES
Pt from [facility], [facility] with increased SOB and confusion. Pt satting 60-70% on NRB and NC upon EMS arrival. Pt placed onto Cpap and given x2 Duonebs and x1 albuterol in route. Pt has hx COPD. Pt now on bipap.

## 2023-02-03 NOTE — PROGRESS NOTES
Medication Reconciliation     List of medications patient is currently taking is complete. Source of information:   1. Conversation with [facility]. 2. EPIC records        Notes regarding home medications:  1. Patient received all of her AM home medications today TYRESE.     Keisha Clark, Pharmacy Intern

## 2023-02-03 NOTE — PROGRESS NOTES
Hospitalist Progress Note    CC: Sepsis ([facility]      Admit date: 2/2/2023  Days in hospital:  1    Subjective/interval history: Pt S/E. Pt was on bipap and not very responsive earlier in the am, but by the time I saw her she was awake, oriented, and talking. Came off bipap to 5L and remained stable in rhe mid 90s. She has told me she wants to be dnr-cc, and is ok going back to her nh with hospice where I would complete her treatment of uti. ROS:   Pertinent items are noted in HPI. Objective:    BP (!) 89/55   Pulse (!) 104   Temp 97.6 °F (36.4 °C) (Axillary)   Resp 20   Ht 5' 7\" (1.702 m)   Wt (!) 331 lb 12.7 oz (150.5 kg)   SpO2 91%   BMI 51.97 kg/m²     Gen: alert, NAD  HEENT: NC/AT, moist mucous membranes, no oropharyngeal erythema or exudate  Neck: supple, trachea midline, no anterior cervical or SC LAD  Heart: Normal s1/s2, RRR, no murmurs, gallops, or rubs. Lungs: diminished,  no wheezing, no rales, no rhonchi, no use of accessory muscles  Abd: bowel sounds present, soft, nontender, nondistended, no masses  Extrem: No clubbing, cyanosis, no edema  Skin: no rashes or lesions  Psych: A & O x3, affect appropriate  Neuro: grossly intact, moves all four extremities spontaneously.  No focal deficits  Cap refill: +2 sec    Medications:  Scheduled Meds:   sodium chloride flush  5-40 mL IntraVENous 2 times per day    furosemide  40 mg IntraVENous Daily    tiotropium  1 puff Inhalation Daily    mometasone-formoterol  2 puff Inhalation BID    dilTIAZem  120 mg Oral Daily    atorvastatin  40 mg Oral Daily    enoxaparin  30 mg SubCUTAneous BID    methylPREDNISolone  40 mg IntraVENous Daily    cefepime  2,000 mg IntraVENous Q8H    [START ON 2/4/2023] vancomycin  1,000 mg IntraVENous Q18H    vancomycin (VANCOCIN) intermittent dosing (placeholder)   Other RX Placeholder       PRN Meds:  sodium chloride flush, sodium chloride, acetaminophen **OR** acetaminophen, ipratropium-albuterol    IV: sodium chloride      dilTIAZem 7.5 mg/hr (02/03/23 0813)         Intake/Output Summary (Last 24 hours) at 2/3/2023 0921  Last data filed at 2/3/2023 0813  Gross per 24 hour   Intake 861.93 ml   Output 200 ml   Net 661.93 ml       Results:  CBC:   Recent Labs     02/02/23 1929 02/03/23 0621   WBC 23.3* 24.8*   HGB 13.5 13.2   HCT 45.9 44.2   MCV 89.4 88.8    251     BMP:   Recent Labs     02/02/23 1929      K 4.8   CL 95*   CO2 32   BUN 22*   CREATININE 1.1     Mag: No results for input(s): MAG in the last 72 hours. Phos:   Lab Results   Component Value Date    PHOS 3.8 09/26/2020     No results found for: GLU    LIVER PROFILE:   Recent Labs     02/02/23 1929   AST 14*   ALT 9*   BILITOT 0.8   ALKPHOS 131*     PT/INR: No results for input(s): PROTIME, INR in the last 72 hours. APTT: No results for input(s): APTT in the last 72 hours. UA:  Recent Labs     02/03/23  0052   COLORU DARK YELLOW*   PHUR 5.0   WBCUA 59*   RBCUA 1   BACTERIA 4+*   CLARITYU CLOUDY*   SPECGRAV 1.041   LEUKOCYTESUR MODERATE*   UROBILINOGEN 1.0   BILIRUBINUR Negative   BLOODU MODERATE*   GLUCOSEU Negative       Invalid input(s): ABG  Lab Results   Component Value Date    CALCIUM 8.7 02/02/2023    PHOS 3.8 09/26/2020       Assessment:    Principal Problem:    Sepsis ([address]  Active Problems:    Acute on chronic respiratory failure with hypoxia and hypercapnia (HCC)    Atrial fibrillation with RVR ([address]  Resolved Problems:    * No resolved hospital problems. [facility] course: A 80 yo with h/o chronic hypoxic respiratory failure on 4L at baseline, jake, ohs, Severe morbid obesity, CKD, COPD, HTN, depression, A. Fib admitted with lethargy and respiratory distress, requiring bipap. She had improved the next day and was weaned to 4-5L o2. She has stated she wants dnr-cc. ED work-up patient was hypoxic at approximately 76% and apparently she was hypoxic at 46% per the ECF.   T-max 102.7, heart rate 130s A. fib with RVR, heart rate hypertensive, chest x-ray indicating vascular congestion, troponin 0.05 and BNP elevated. ABG indicating hypercapnia PCO2 68.8  Patient was started on Cardizem as well as broad-spectrum antibiotic therapy coverage for sepsis and pneumonia. Urinalysis still to be collected. Plan:    Acute on chronic hypoxic and hypercarbic respiratory failure, POA  - so2 < 90% on ra, rr> 18  - supplemental o2 to maintain so2 > 90%, on 5L at baseline  - on bipap qhs, prn  - solumedrol  - nebs    Sepsis, POA   Uti  BLE cellulitis may also be a source, possibly secondary infection from psoriasis lesions  - with criteria: leukocytosis, tachycardia, tachypnea  - blood cultures  - urine cultures  - repeat lactate   Pro-Yahir .87  - IVF given; reassess the need for further ivf before continuing infusion  - Cefepime and vancomycin, down grade to rocephin and keep vanc      Acute on chronic  hfpef  Trop elevated 0.05, BNP 10, 180, chest x-ray indicating vascular congestion, evidence of respiratory distress  NM myocardial perfusion: May 2019: Perfusion images satisfactory activity through the left ventricle at rest and with stress: EF 73%  Echocardiogram 2017: EF 60 to 65%, wall motion normal, systolic function normal, left ventricular hypertrophy, aortic valve thickening, tricuspid regurgitation    Acute encephalopathy  - multifactorial: toxic vs metabolic, due to infection, co2 narcosis   - head CT negative  - avoid anticholinergics  - treat associated conditions  - avoid sedating meds as able  - supportive care    A. fib with RVR, h/o paf  - change to amio, will place a picc  - telemetry  - on lovenox      Bilateral lower extremity cellulitis: Evidence of edema, patient is nonambulatory, hyperkeratotic, plaque formations  Wound care consult     Chronic conditions - continue home meds unless otherwise stated  Psoriasis   Morbid obesity due to excess calories (Body mass index is 47.14 kg/(m^2). )   - Complicating assessment and treatment. Placing patient at risk for multiple co-morbidities as well as early death and contributing to the patient's presentation. Counseled on weight loss.    Ckd    Code status:  dnr-cc  DVT prophylaxis: [] Lovenox  [] SQ Heparin  [] SCDs  [] warfarin/oral direct thrombin inhibitor [] Encourage ambulation      Disposition:  [] Home [] Rehab [] Psych [] SNF  [] LTAC  [] Transfer to ICU  [] Transfer to PCU [] Other: in pt      Electronically signed by Lionel Sheriff DO on 2/3/2023 at 9:21 AM

## 2023-02-03 NOTE — H&P
Hospital Medicine History & Physical      PCP: Adalberto Haddad MD    Date of Admission: 2/2/2023    Date of Service: Pt seen/examined on 2/2/2023 and Admitted to Inpatient     Chief Complaint:  respiratory distress      History Of Present Illness: The patient is a 81 y.o. female who presents to [facility] with PMHx: Severe morbid obesity, CKD, COPD oxygen dependent, ESBL, hyperkalemia, hyperlipidemia, HTN, depression, MRSA, A. fib, sepsis    CODE STATUS: DNR CC  Anticoagulation therapy: Eliquis  Resides at [facility]. Patient presented to the emergency department with reports of lethargy and respiratory distress from ECF. HPI provided by ED provider    ED work-up patient was hypoxic at approximately 76% and apparently she was hypoxic at 46% per the ECF. T-max 102.7, heart rate 130s A. fib with RVR, heart rate hypertensive, chest x-ray indicating vascular congestion, troponin 0.05 and BNP elevated. ABG indicating hypercapnia PCO2 68.8  Patient was started on Cardizem as well as broad-spectrum antibiotic therapy coverage for sepsis and pneumonia. Urinalysis still to be collected. On my exam the patient is awake and alert, moving all extremities. She appears chronically ill, she is severely morbidly obese. She has a skin tear on the right forearm and bilateral lower extremities are hyper keratotic with plaque as well as edematous and erythematous  She is responding positively to the BiPAP therapy: Currently on IPAP 12, EPAP 5 FiO2 at 70%          Past Medical History:        Diagnosis Date    Acute kidney failure (HCC)     Chronic kidney disease     COPD (chronic obstructive pulmonary disease) ([facility])     ESBL (extended spectrum beta-lactamase) producing bacteria infection 09/10/2021    Escherichia coli ESBL.  Urine    Hyperkalemia Hyperlipidemia     Hypertension     Hyponatremia     Kidney stone     Major depression     Melena     MRSA nasal colonization 09/10/2021    Obesity     Oxygen deficit     2 liters     PAF (paroxysmal atrial fibrillation) ([facility] [address]     Sepsis ([facility] [address]        Past Surgical History:        Procedure Laterality Date    CHOLECYSTECTOMY      COLONOSCOPY N/A 4/6/2021    COLONOSCOPY POLYPECTOMY ABLATION with clip placement [facility] Left 9/19/2020    CYSTOSCOPY URETERAL STENT INSERTION LEFT RETROGRADE PYELOGRAM performed by Bridgett Nguyen MD at [address] Left 8/10/2020    OPEN TREATMENT OF LEFT HIP FRACTURE WITH CEPHALOMEDULLARY NAIL performed by Jose Ramon Dillon MD at [facility] N/A 3/2/2021    SIGMOIDOSCOPY FLEXIBLE POLYPECTOMY with clip placement performed by Mariama Ramos MD at [address] [facility] 9/24/2020    EGD DIAGNOSTIC ONLY performed by Mariama Ramos MD at [address]       Medications Prior to Admission:    Prior to Admission medications    Medication Sig Start Date End Date Taking? Authorizing Provider   polyvinyl alcohol (LIQUIFILM TEARS) 1.4 % ophthalmic solution Place 2 drops into both eyes every 4 hours as needed   Yes Historical Provider, MD   diphenhydrAMINE (BENADRYL) 25 MG tablet Take 25 mg by mouth every 6 hours as needed for Itching   Yes Historical Provider, MD   Menthol, Topical Analgesic, (BIOFREEZE) 4 % GEL Apply to left shoulder topically every shift for psoriasis   Yes Historical Provider, MD   bisacodyl (DULCOLAX) 10 MG suppository Place 10 mg rectally daily as needed for Constipation   Yes Historical Provider, MD   calcipotriene (DOVONEX) 0.005 % cream Apply topically 2 times daily Apply topically every shift for psoriasis.    Yes Historical Provider, MD   ferrous sulfate (IRON 325) 325 (65 Fe) MG tablet Take 325 mg by mouth daily (with breakfast)   Yes Historical Provider, MD tacrolimus (PROTOPIC) 0.1 % ointment Apply twice daily as needed for flared areas on the face 10/4/22  Yes Historical Provider, MD   INCVERONICA ELLIPTA 62.5 MCG/ACT AEPB Inhale 1 puff into the lungs daily 12/23/22   Historical Provider, MD   HYDROcodone-acetaminophen (NORCO) 5-325 MG per tablet Take 1 tablet by mouth every 6 hours as needed. 1/23/23   Historical Provider, MD   clobetasol (TEMOVATE) 0.05 % ointment Apply topically 2 times a day. Twice daily to flared areas on the legs, arms, elbows and belly buttons 11/16/22   Historical Provider, MD   dilTIAZem (CARDIZEM CD) 120 MG extended release capsule Take 1 capsule by mouth daily 12/24/21   Carmita Santa MD   cyclobenzaprine (FLEXERIL) 10 MG tablet Take 10 mg by mouth every 6 hours as needed for Muscle spasms    Historical Provider, MD   DULoxetine (CYMBALTA) 30 MG extended release capsule Take 30 mg by mouth daily    Historical Provider, MD   acetaminophen (TYLENOL) 325 MG tablet Take 650 mg by mouth every 6 hours as needed for Pain or Fever     Historical Provider, MD   hydrocortisone (ANUSOL-HC) 25 MG suppository Place 25 mg rectally every 6 hours as needed for Hemorrhoids     Historical Provider, MD   bisacodyl (DULCOLAX) 10 MG suppository Place 10 mg rectally daily as needed for Constipation     Historical Provider, MD   magnesium hydroxide (MILK OF MAGNESIA) 400 MG/5ML suspension Take 30 mLs by mouth daily as needed for Constipation     Historical Provider, MD   polyethylene glycol (GLYCOLAX) 17 g packet Take 17 g by mouth daily as needed for Constipation    Historical Provider, MD   nystatin (MYCOSTATIN) 008137 UNIT/GM powder Apply topically 4 times daily Apply topically 4 times daily.     Historical Provider, MD   sennosides-docusate sodium (SENOKOT-S) 8.6-50 MG tablet Take 2 tablets by mouth every 12 hours as needed for Constipation     Historical Provider, MD   lactobacillus (CULTURELLE) capsule Take 2 capsules by mouth 2 times daily (with meals) 9/26/20   Alcides Ramirez MD   pantoprazole (PROTONIX) 40 MG tablet Take 1 tablet by mouth daily 9/26/20   Alcides Prince MD   potassium chloride (KLOR-CON M) 20 MEQ extended release tablet Take 20 mEq by mouth daily    Historical Provider, MD   atorvastatin (LIPITOR) 40 MG tablet Take 40 mg by mouth daily    Historical Provider, MD   furosemide (LASIX) 40 MG tablet Take 1 tablet by mouth daily  Patient taking differently: Take 20 mg by mouth daily 8/15/20   Alcides Daley MD   gabapentin (NEURONTIN) 600 MG tablet Take 600 mg by mouth 3 times daily. Historical Provider, MD   fluticasone-salmeterol (ADVAIR) 250-50 MCG/DOSE AEPB Inhale 1 puff into the lungs 2 times daily  4/24/19   Historical Provider, MD   ipratropium-albuterol (DUONEB) 0.5-2.5 (3) MG/3ML SOLN nebulizer solution Inhale 3 mLs into the lungs every 2 hours as needed for Shortness of Breath 5/23/19   Historical Provider, MD   albuterol sulfate  (90 Base) MCG/ACT inhaler Inhale 2 puffs into the lungs every 4 hours as needed for Shortness of Breath     Historical Provider, MD       Allergies:  Patient has no known allergies. Social History:  The patient currently lives [place]. TOBACCO:   reports that she has been smoking cigarettes. She has been smoking an average of 1.5 packs per day. She has never used smokeless tobacco.  ETOH:   reports no history of alcohol use. Family History:  Reviewed in detail and negative for DM, Early CAD, Cancer, CVA. Positive as follows:    No family history on file. REVIEW OF SYSTEMS:    as noted in the HPI. All other systems reviewed and negative. PHYSICAL EXAM:    BP (!) 154/142   Pulse (!) 130   Temp (!) 102.7 °F (39.3 °C) (Axillary)   Resp 20   Ht 5' 7\" (1.702 m)   Wt (!) 347 lb 10.7 oz (157.7 kg)   SpO2 94%   BMI 54.45 kg/m²     General appearance: Ill. body habitus may interfere with exam  HEENT Normal cephalic, atraumatic without obvious deformity.   Pupils equal, round, and reactive to light. Extra ocular muscles intact. Conjunctivae/corneas clear. Neck: Supple, No jugular venous distention/bruits. Trachea midline without thyromegaly or adenopathy with full range of motion. Lungs: Currently on noninvasive therapy/BiPAP: Respirations are easy regular nonlabored, breath sounds decreased throughout  Heart: Irregular irregular mildly tachycardic  Abdomen: obese, Soft, non-tender or non-distended without rigidity or guarding and positive bowel sounds all four quadrants. Extremities: Skin tear right arm, bilateral lower extremities edematous, mildly erythematous and hyperkeratotic  Skin: Skin color. .  Neurologic: She is alert and oriented, moving all extremities. Mental status: Alert, oriented, Capillary Refill: Acceptable  < 3 seconds  Peripheral Pulses: +3 Easily felt, not easily obliterated with pressure            CXR:  I have reviewed the CXR with the following interpretation: Vascular congestion  EKG:  I have reviewed the EKG with the following interpretation: Atrial fibrillation with RVR: Rate 143, QRS 82,     CBC   Recent Labs     02/02/23 1929   WBC 23.3*   HGB 13.5   HCT 45.9         RENAL  Recent Labs     02/02/23 1929      K 4.8   CL 95*   CO2 32   BUN 22*   CREATININE 1.1     LFT'S  Recent Labs     02/02/23 1929   AST 14*   ALT 9*   BILITOT 0.8   ALKPHOS 131*     COAG  No results for input(s): INR in the last 72 hours.   CARDIAC ENZYMES  Recent Labs     02/02/23 1929   TROPONINI 0.05*       U/A:    Lab Results   Component Value Date/Time    COLORU DARK YELLOW 02/03/2023 12:52 AM    WBCUA 59 02/03/2023 12:52 AM    RBCUA 1 02/03/2023 12:52 AM    MUCUS 1+ 05/03/2013 12:35 AM    BACTERIA 4+ 02/03/2023 12:52 AM    CLARITYU CLOUDY 02/03/2023 12:52 AM    SPECGRAV 1.041 02/03/2023 12:52 AM    LEUKOCYTESUR MODERATE 02/03/2023 12:52 AM    BLOODU MODERATE 02/03/2023 12:52 AM    GLUCOSEU Negative 02/03/2023 12:52 AM       ABG    Lab Results Component Value Date/Time    CDC1XBX 36.1 02/02/2023 07:02 PM    BEART 7.3 02/02/2023 07:02 PM    Z4NGACIS 98.3 02/02/2023 07:02 PM    PHART 7.328 02/02/2023 07:02 PM    YEN5QON 68.8 02/02/2023 07:02 PM    PO2ART 109.0 02/02/2023 07:02 PM    TMG2DIN 38.2 02/02/2023 07:02 PM           Active Hospital Problems    Diagnosis Date Noted    Acute on chronic respiratory failure with hypoxia and hypercapnia (HCC) [J96.21, J96.22] 02/02/2023     Priority: Medium    Atrial fibrillation with RVR ([facility] [address] [I48.91] 02/02/2023     Priority: Medium    Sepsis ([facility] [address] [A41.9] 12/17/2021         PHYSICIANS CERTIFICATION:    I certify that Dante Shah is expected to be hospitalized for more than 2 midnights based on the following assessment and plan:      ASSESSMENT/PLAN:    Acute on chronic respiratory failure with hypoxia and hypercapnia: Oxygen dependent of 5 L  Secondary to severe morbid obesity, known COPD, some evidence of possible pulmonary hypertension  Presented to the ED in fulminant distress, placed on BiPAP  ABG: pH 7.3/68.8/109  Continue noninvasive ventilation therapy to maintain saturation greater than 90%  Etiology differential: Progressive lung disease, COPD exacerbation, heart failure, A. fib with RVR, pneumonia  MRSA nasal  Solu-Medrol 40 mg IVP daily  DuoNeb  Continue maintenance inhaler    Sepsis:?   Pneumonia versus UTI, urine pending, BLE cellulitis may also be a source  Cefepime and vancomycin initiated in ED-> continue on admission  Blood cultures x2 in process  Lactic acid 1.5, WBC 23,000  Pro-Yahir, CBC, serial lactic acid    Acute on chronic HF:   Trop elevated 0.05, BNP 10, 180, chest x-ray indicating vascular congestion, evidence of respiratory distress  History of HF  Trend troponin  Echocardiogram: 2013: Left ventricular hypertrophy normal systolic function EF 80% with mild tricuspid regurgitation and evidence of mild pulmonary hypertension  NM myocardial perfusion: May 2019: Perfusion images satisfactory activity through the left ventricle at rest and with stress: EF 73%  Echocardiogram 2017: EF 60 to 65%, wall motion normal, systolic function normal, left ventricular hypertrophy, aortic valve thickening, tricuspid regurgitation    A. fib with RVR: Continue Cardizem, telemetry  Continue Eliquis    Bilateral lower extremity cellulitis: Evidence of edema, patient is nonambulatory, hyperkeratotic, plaque formations  Wound care consult    DVT Prophylaxis: Eliquis  Diet: No diet orders on file  Code Status: DNR-CC  PT/OT Eval Status: Nonambulatory    Dispo -admit, PCU       Tyler Watts, APRN - CNP    Thank you Demetra Lopez MD for the opportunity to be involved in this patient's care. If you have any questions or concerns please feel free to contact me at [phone]. This dictation was performed with a verbal recognition program (DRAGON) and it was checked for errors. It is possible that there are still dictated errors within this office note. If so, please bring any errors to my attention for an addendum. All efforts were made to ensure that this office note is accurate.

## 2023-02-03 NOTE — CARE COORDINATION
INITIAL CASE MANAGEMENT ASSESSMENT    Reviewed chart, patient lethargic, spoke to patient's  Aquilino Sesay to assess possible discharge needs. Explained Case Management role/services. Living Situation: Patient is a long term care (LTC) resident at [facility], AN AFFILIATE OF [facility] ([facility])    ADLs: Patient is non ambulatory. Patient gets assistance with all ADLs. DME: Provided by facility    PT/OT Recs: Not ordered     Active Services: LTC     Transportation: Arranged for patient PRN     Medications: Managed by facility    PCP: Elena Weiss      HD/PD: n/a    PLAN/COMMENTS:  tells me patient will return to [facility]. Spoke to Dao at [facility], confirms patient is a LTC resident and is able to return, no pre cert needed. Covid test requested w/in 72 hrs of discharge. Pt will need stretcher transport. SW/CM provided contact information for patient or family to call with any questions. SW/CM will follow and assist as needed.     Electronically signed by Fernanda Randle RN Case Management [phone] on 2/3/2023 at 9:55 AM

## 2023-02-03 NOTE — CONSULTS
[facility],        RN met with pt and  on speaker phone at bedside to discuss HOC philosophy, services and LOC. Emotional support given. Pt stated that she does not want hospice yet, she wants to be able to come back to the hospital for aggressive tx, she also does not want to be DNR-CC. Gave pt HOC folder and contact info. Updated FEMI Bennett and GABRIELLE Bell. Please call HOC if pt would like to discuss hospice further prior to discharge.      Thank you for this referral,   Please call HOC with questions/ concerns at [phone]  Madhavi Graves RN   HOC Admissions Liaison

## 2023-02-03 NOTE — DISCHARGE INSTR - COC
Continuity of Care Form    Patient Name: Kaiser Franklin   :  1950  MRN:  [id number]    Admit date:  2023  Discharge date:  2023    Code Status Order: DNR-CC   Advance Directives:     Admitting Physician:  Jonatan Cali MD  PCP: Doron Rothman MD    Discharging Nurse: Kenan Fuentes [facility] Unit/Room#: H2T-7034/5969-92  Discharging Unit Phone Number: [phone]    Emergency Contact:   Extended Emergency Contact Information  Primary Emergency Contact: Abrahan Ewing  Address: [address], [address] [place] [address],[address] Phone: [phone]  Work Phone: [phone]  Relation: Spouse    Past Surgical History:  Past Surgical History:   Procedure Laterality Date    CHOLECYSTECTOMY      COLONOSCOPY N/A 2021    COLONOSCOPY POLYPECTOMY ABLATION with clip placement performed by Darío Cheney MD at [address] 2020    CYSTOSCOPY URETERAL STENT INSERTION LEFT RETROGRADE PYELOGRAM performed by Raymundo Jesus MD at [address] [place] [address] Left 8/10/2020    OPEN TREATMENT OF LEFT HIP FRACTURE WITH CEPHALOMEDULLARY NAIL performed by Louise Ruano MD at [address] [place] [address] N/A 3/2/2021    SIGMOIDOSCOPY FLEXIBLE POLYPECTOMY with clip placement performed by Darío Cheney MD at [address] [place] [address] 2020    EGD DIAGNOSTIC ONLY performed by Darío Cheney MD at [facility] History:   Immunization History   Administered Date(s) Administered    Influenza, FLUBLOK, (age 25 y+), PF, 0.5mL 10/30/2019    Pneumococcal Conjugate 13-valent (Hhlhnju45) 2018    Pneumococcal Polysaccharide (Sijamhlqu08) 2017, 10/02/2020, 10/07/2020       Active Problems:  Patient Active Problem List   Diagnosis Code    Closed left hip fracture, initial encounter ([facility] S72.002A    Morbid obesity with BMI of 45.0-49.9, adult ([facility] E66.01, Z68.42    Hypoxemia requiring supplemental oxygen R09.02, Z99.81    Acute respiratory failure with hypoxia ([facility]) J96.01    Acute on chronic respiratory failure with hypercapnia (HCC) J96.22    Acute pulmonary edema (HCC) J81.0    Multifocal pneumonia J18.9    Rapid atrial fibrillation ([facility]) I48.91    Chronic obstructive pulmonary disease (HCC) J44.9    Paroxysmal atrial fibrillation ([facility]) I48.0    PAT (paroxysmal atrial tachycardia) ([facility]) I47.1    Kidney stone N20.0    Septic shock ([facility]) A41.9, E46.73    Neutrophilic leukocytosis B34.2    Class 2 obesity due to excess calories with body mass index (BMI) of 39.0 to 39.9 in adult E66.09, Z68.39    Chronic respiratory failure with hypoxia ([facility]) J96.11    Hyperlipidemia E78.5    Kidney lesion, native, left N28.9    Hyponatremia E87.1    Hyperkalemia E87.5    Moderate protein-calorie malnutrition ([facility]) E44.0    Hydronephrosis with left ureteropelvic junction (UPJ) obstruction Q62.11    Hematuria R31.9    Ureteral obstruction N13.5    Hydronephrosis with urinary obstruction due to ureteral calculus N13.2    DARSHAN (acute kidney injury) ([facility] N17.9    Abnormal CT scan, kidney R93.429    Essential hypertension I10    Acute hypercapnic respiratory failure ([facility]) J96.02    Sepsis ([facility]) A41.9    Acute respiratory failure ([facility]) W80.83    Diastolic CHF ([facility]) C01.24    Aspiration pneumonia ([facility]) J69.0    Respiratory distress R06.03    Pneumonia of left lower lobe due to Streptococcus pneumoniae ([facility]) J13    MRSA colonization Z22.322    Ex-smoker Z87.891    History of depression Z86.59    Weight loss counseling, encounter for Z71.3    Other chronic pain G89.29    Acute on chronic respiratory failure with hypoxia and hypercapnia ([facility]) J96.21, J96.22    Atrial fibrillation with RVR ([facility]) I48.91       Isolation/Infection:   Isolation            Contact          Patient Infection Status       Infection Onset Added Last Indicated Last Indicated By Review Planned Expiration Resolved Resolved By    ESBL (Extended Spectrum Beta Lactamase) 09/10/21 09/13/21 09/10/21 Culture, Urine        MRSA 09/10/21 09/11/21 12/18/21 MRSA DNA Probe, Nasal        Resolved    COVID-19 (Rule Out) 02/02/23 02/02/23 02/02/23 COVID-19, Rapid (Ordered)   02/02/23 Rule-Out Test Resulted    COVID-19 (Rule Out) 12/17/21 12/17/21 12/17/21 COVID-19, Rapid (Ordered)   12/17/21 Rule-Out Test Resulted    COVID-19 (Rule Out) 09/13/21 09/13/21 09/13/21 COVID-19 (Ordered)   09/13/21 Rule-Out Test Resulted    COVID-19 (Rule Out) 09/10/21 09/10/21 09/10/21 COVID-19, Rapid (Ordered)   09/10/21 Rule-Out Test Resulted    COVID-19 (Rule Out) 09/24/20 09/24/20 09/24/20 COVID-19 (Ordered)   09/24/20 Rule-Out Test Resulted    COVID-19 (Rule Out) 09/18/20 09/18/20 09/18/20 COVID-19 (Ordered)   09/18/20 Rule-Out Test Resulted    COVID-19 (Rule Out) 08/14/20 08/14/20 08/14/20 COVID-19 (Ordered)   08/15/20 Rule-Out Test Resulted    COVID-19 (Rule Out) 08/06/20 08/06/20 08/06/20 COVID-19 (Ordered)   08/06/20 Rule-Out Test Resulted            Nurse Assessment:  Last Vital Signs: BP (!) 142/111   Pulse 100   Temp 98.4 °F (36.9 °C) (Axillary)   Resp 19   Ht 5' 7\" (1.702 m)   Wt (!) 331 lb 12.7 oz (150.5 kg)   SpO2 98%   BMI 51.97 kg/m²     Last documented pain score (0-10 scale):    Last Weight:   Wt Readings from Last 1 Encounters:   02/03/23 (!) 331 lb 12.7 oz (150.5 kg)     Mental Status:  oriented and alert    IV Access:  - PICC - site  L Cephalic, insertion date: 02/04/2023    Nursing Mobility/ADLs:  Walking   Dependent  Transfer  Dependent  Bathing  Dependent  Dressing  Dependent  Toileting  Dependent  Feeding  Assisted  Med Admin  Dependent  Med Delivery   whole    Wound Care Documentation and Therapy:  Wound 02/03/23 Thigh Proximal;Right;Posterior; Upper (Active)   Wound Cleansed Soap and water 02/03/23 0300   Number of days: 0        Elimination:  Continence: Bowel: No  Bladder: ACUNA CATHETER IN PLACE  Urinary Catheter:  Insertion Date: 02/03/2023 Colostomy/Ileostomy/Ileal Conduit: No       Date of Last BM: 02/07/2023    Intake/Output Summary (Last 24 hours) at 2/3/2023 1256  Last data filed at 2/3/2023 1144  Gross per 24 hour   Intake 861.93 ml   Output 800 ml   Net 61.93 ml     I/O last 3 completed shifts: In: 250 [IV Piggyback:250]  Out: 200 [Urine:200]    Safety Concerns:     None    Impairments/Disabilities:      None    Nutrition Therapy:  Current Nutrition Therapy:   - Oral Diet:  General, Cardiac, and Low Sodium (2gm)    Routes of Feeding: Oral  Liquids: Thin Liquids  Daily Fluid Restriction: yes - amount 1800ML  Last Modified Barium Swallow with Video (Video Swallowing Test): not done    Treatments at the Time of Hospital Discharge:   Respiratory Treatments: N/A  Oxygen Therapy:  is on oxygen at 5 L/min per nasal cannula. Ventilator:    - BiPAP   IPAP: 14 cmH20, CPAP/EPAP: 6 cmH2O only when sleeping    Rehab Therapies: N/A  Weight Bearing Status/Restrictions: No weight bearing restrictions  Other Medical Equipment (for information only, NOT a DME order):  wheelchair and hospital bed  Other Treatments: N/A      Heart Failure Instructions for Daily Management  Patient was treated for chronic diastolic heart failure. she  will require the following:    Please weigh daily on the same scale and approximately the same time of day. Report weight gain of 3 pounds/day or 5 pounds/week to : jazmyn MENDIOLA and Carmen Huerta [phone]. Please use hospital discharge weight as baseline reference. Please monitor for signs and symptoms of and report to MD:  Worsening Heart Failure: sudden weight gain, shortness of breath, lower extremity or general edema/swelling, abdominal bloating/swelling, inability to lie flat, intolerance to usual activity, or cough (especially at night). Report these finding even if no increase in weight.   Dehydration:  having difficulty or a decrease in urination, dizziness, worsening fatigue, or new onset/worsening of generalized weakness. Please continue a LOW SODIUM diet and LIMIT fluid intake to 48 - 64 ounces ( 1.5 - 2 liters) per day. Call Jose Ramon Mcpherson [phone] and facility MD and/or Jaime Damon @ [phone] with any questions or concerns. Please continue heart failure education to patient and family/support system. See After Visit Summary for hospital follow up appointment details. Consider spiritual care referral for support and/or completion of advance directives [phone]. Consider: having the facility MD complete required 7 day follow up, [place] [address] telehealth program if patient agreeable and able to participate, and palliative care consult for ongoing goals of care, end of life, and/or chronic disease management discussions. Follow up   3/1/2023 1:20 PM Luzmaria Chávez, [address]            Patient's personal belongings (please select all that are sent with patient): ALL PERSONAL BELONGINGS    RN SIGNATURE:  Electronically signed by Emerita Genao RN on 2/7/23 at 6:30 PM EST    CASE MANAGEMENT/SOCIAL WORK SECTION    Inpatient Status Date: 2/2/23    Readmission Risk Assessment Score:  Readmission Risk              Risk of Unplanned Readmission:  17           Discharging to Facility/ Agency   Name: [facility], AN [facility]FFILI[facility] OF [facility]   Address:  [address] [place] [address]   Phone:  [phone]  Fax:  [phone] to follow up when completes IV antibiotics  Phone: [phone]      / signature: Electronically signed by Jannie Reyes RN on 2/3/23 at 12:56 PM EST    PHYSICIAN SECTION    Prognosis: Guarded    Condition at Discharge: Terminal    Rehab Potential (if transferring to Rehab): Guarded    Recommended Labs or Other Treatments After Discharge:    Invanz 1000 mg intravenously for 5 days.   Consult hospice  Continue BiPAP at night for and during the day as needed for shortness of breath. CBC, renal panel in 1 week. Physician Certification: I certify the above information and transfer of Timo Barnett  is necessary for the continuing treatment of the diagnosis listed and that she requires [facility] for less 30 days.      Update Admission H&P: No change in H&P    PHYSICIAN SIGNATURE:  Electronically signed by Brain Pierre MD on 2/7/23 at 2:04 PM EST

## 2023-02-03 NOTE — PROGRESS NOTES
Clinical Pharmacy Note  Renal Dose Adjustment    Brigitte Alanis is receiving cefepime. This medication is renally eliminated. Based on the patient's Estimated Creatinine Clearance: 73 mL/min (based on SCr of 1.1 mg/dL). , BMI, and indication of sepsis, the dose has been adjusted to cefepime 2g IV Q8H per protocol. Pharmacy will continue to monitor and adjust dose as needed for changes in renal function.       Madhu Licona, PharmD  2/3/2023 3:40 AM

## 2023-02-04 ENCOUNTER — APPOINTMENT (OUTPATIENT)
Dept: GENERAL RADIOLOGY | Age: 73
DRG: 871 | End: 2023-02-04
Payer: COMMERCIAL

## 2023-02-04 LAB
ANION GAP SERPL CALCULATED.3IONS-SCNC: 8 MMOL/L (ref 3–16)
BASOPHILS ABSOLUTE: 0 K/UL (ref 0–0.2)
BASOPHILS RELATIVE PERCENT: 0.2 %
BUN BLDV-MCNC: 34 MG/DL (ref 7–20)
CALCIUM SERPL-MCNC: 8.2 MG/DL (ref 8.3–10.6)
CHLORIDE BLD-SCNC: 96 MMOL/L (ref 99–110)
CO2: 36 MMOL/L (ref 21–32)
CREAT SERPL-MCNC: 1.1 MG/DL (ref 0.6–1.2)
EKG ATRIAL RATE: 133 BPM
EKG DIAGNOSIS: NORMAL
EKG Q-T INTERVAL: 338 MS
EKG QRS DURATION: 110 MS
EKG QTC CALCULATION (BAZETT): 471 MS
EKG R AXIS: 55 DEGREES
EKG T AXIS: 43 DEGREES
EKG VENTRICULAR RATE: 117 BPM
EOSINOPHILS ABSOLUTE: 0 K/UL (ref 0–0.6)
EOSINOPHILS RELATIVE PERCENT: 0 %
GFR SERPL CREATININE-BSD FRML MDRD: 54 ML/MIN/{1.73_M2}
GLUCOSE BLD-MCNC: 135 MG/DL (ref 70–99)
HCT VFR BLD CALC: 42.7 % (ref 36–48)
HEMOGLOBIN: 12.8 G/DL (ref 12–16)
LYMPHOCYTES ABSOLUTE: 0.4 K/UL (ref 1–5.1)
LYMPHOCYTES RELATIVE PERCENT: 2.4 %
MCH RBC QN AUTO: 26.3 PG (ref 26–34)
MCHC RBC AUTO-ENTMCNC: 30 G/DL (ref 31–36)
MCV RBC AUTO: 87.6 FL (ref 80–100)
MONOCYTES ABSOLUTE: 0.5 K/UL (ref 0–1.3)
MONOCYTES RELATIVE PERCENT: 3.3 %
NEUTROPHILS ABSOLUTE: 15 K/UL (ref 1.7–7.7)
NEUTROPHILS RELATIVE PERCENT: 94.1 %
PDW BLD-RTO: 18.6 % (ref 12.4–15.4)
PLATELET # BLD: 270 K/UL (ref 135–450)
PMV BLD AUTO: 8.2 FL (ref 5–10.5)
POTASSIUM REFLEX MAGNESIUM: 4.5 MMOL/L (ref 3.5–5.1)
PRO-BNP: 8839 PG/ML (ref 0–124)
PROCALCITONIN: 0.67 NG/ML (ref 0–0.15)
RBC # BLD: 4.88 M/UL (ref 4–5.2)
SODIUM BLD-SCNC: 140 MMOL/L (ref 136–145)
VANCOMYCIN RANDOM: 14.2 UG/ML
WBC # BLD: 16 K/UL (ref 4–11)

## 2023-02-04 PROCEDURE — 83880 ASSAY OF NATRIURETIC PEPTIDE: CPT

## 2023-02-04 PROCEDURE — 2060000000 HC ICU INTERMEDIATE R&B

## 2023-02-04 PROCEDURE — 84145 PROCALCITONIN (PCT): CPT

## 2023-02-04 PROCEDURE — 36569 INSJ PICC 5 YR+ W/O IMAGING: CPT

## 2023-02-04 PROCEDURE — C1751 CATH, INF, PER/CENT/MIDLINE: HCPCS

## 2023-02-04 PROCEDURE — 94761 N-INVAS EAR/PLS OXIMETRY MLT: CPT

## 2023-02-04 PROCEDURE — 2580000003 HC RX 258: Performed by: FAMILY MEDICINE

## 2023-02-04 PROCEDURE — 6360000002 HC RX W HCPCS: Performed by: NURSE PRACTITIONER

## 2023-02-04 PROCEDURE — 6360000002 HC RX W HCPCS: Performed by: FAMILY MEDICINE

## 2023-02-04 PROCEDURE — 85025 COMPLETE CBC W/AUTO DIFF WBC: CPT

## 2023-02-04 PROCEDURE — 94660 CPAP INITIATION&MGMT: CPT

## 2023-02-04 PROCEDURE — 2500000003 HC RX 250 WO HCPCS: Performed by: FAMILY MEDICINE

## 2023-02-04 PROCEDURE — 2700000000 HC OXYGEN THERAPY PER DAY

## 2023-02-04 PROCEDURE — 36415 COLL VENOUS BLD VENIPUNCTURE: CPT

## 2023-02-04 PROCEDURE — 6370000000 HC RX 637 (ALT 250 FOR IP): Performed by: FAMILY MEDICINE

## 2023-02-04 PROCEDURE — 2580000003 HC RX 258: Performed by: NURSE PRACTITIONER

## 2023-02-04 PROCEDURE — 6360000002 HC RX W HCPCS: Performed by: INTERNAL MEDICINE

## 2023-02-04 PROCEDURE — 93010 ELECTROCARDIOGRAM REPORT: CPT | Performed by: INTERNAL MEDICINE

## 2023-02-04 PROCEDURE — 94640 AIRWAY INHALATION TREATMENT: CPT

## 2023-02-04 PROCEDURE — 99223 1ST HOSP IP/OBS HIGH 75: CPT | Performed by: INTERNAL MEDICINE

## 2023-02-04 PROCEDURE — 99221 1ST HOSP IP/OBS SF/LOW 40: CPT | Performed by: INTERNAL MEDICINE

## 2023-02-04 PROCEDURE — 76937 US GUIDE VASCULAR ACCESS: CPT

## 2023-02-04 PROCEDURE — 71045 X-RAY EXAM CHEST 1 VIEW: CPT

## 2023-02-04 PROCEDURE — 80048 BASIC METABOLIC PNL TOTAL CA: CPT

## 2023-02-04 PROCEDURE — 02HV33Z INSERTION OF INFUSION DEVICE INTO SUPERIOR VENA CAVA, PERCUTANEOUS APPROACH: ICD-10-PCS | Performed by: INTERNAL MEDICINE

## 2023-02-04 PROCEDURE — 80202 ASSAY OF VANCOMYCIN: CPT

## 2023-02-04 RX ORDER — LIDOCAINE HYDROCHLORIDE 10 MG/ML
5 INJECTION, SOLUTION EPIDURAL; INFILTRATION; INTRACAUDAL; PERINEURAL ONCE
Status: COMPLETED | OUTPATIENT
Start: 2023-02-04 | End: 2023-02-04

## 2023-02-04 RX ORDER — SODIUM CHLORIDE 9 MG/ML
25 INJECTION, SOLUTION INTRAVENOUS PRN
Status: DISCONTINUED | OUTPATIENT
Start: 2023-02-04 | End: 2023-02-08 | Stop reason: HOSPADM

## 2023-02-04 RX ORDER — SODIUM CHLORIDE 0.9 % (FLUSH) 0.9 %
5-40 SYRINGE (ML) INJECTION PRN
Status: DISCONTINUED | OUTPATIENT
Start: 2023-02-04 | End: 2023-02-08 | Stop reason: HOSPADM

## 2023-02-04 RX ORDER — SODIUM CHLORIDE 0.9 % (FLUSH) 0.9 %
5-40 SYRINGE (ML) INJECTION EVERY 12 HOURS SCHEDULED
Status: DISCONTINUED | OUTPATIENT
Start: 2023-02-04 | End: 2023-02-08 | Stop reason: HOSPADM

## 2023-02-04 RX ORDER — DIGOXIN 0.25 MG/ML
125 INJECTION INTRAMUSCULAR; INTRAVENOUS DAILY
Status: DISCONTINUED | OUTPATIENT
Start: 2023-02-04 | End: 2023-02-06

## 2023-02-04 RX ADMIN — MOMETASONE FUROATE AND FORMOTEROL FUMARATE DIHYDRATE 2 PUFF: 200; 5 AEROSOL RESPIRATORY (INHALATION) at 09:04

## 2023-02-04 RX ADMIN — APIXABAN 5 MG: 5 TABLET, FILM COATED ORAL at 21:04

## 2023-02-04 RX ADMIN — DIGOXIN 125 MCG: 0.25 INJECTION INTRAMUSCULAR; INTRAVENOUS at 14:13

## 2023-02-04 RX ADMIN — METOPROLOL TARTRATE 12.5 MG: 25 TABLET, FILM COATED ORAL at 09:16

## 2023-02-04 RX ADMIN — ENOXAPARIN SODIUM 150 MG: 150 INJECTION SUBCUTANEOUS at 09:16

## 2023-02-04 RX ADMIN — LIDOCAINE HYDROCHLORIDE 5 ML: 10 INJECTION, SOLUTION EPIDURAL; INFILTRATION; INTRACAUDAL; PERINEURAL at 19:00

## 2023-02-04 RX ADMIN — Medication 10 ML: at 21:04

## 2023-02-04 RX ADMIN — MOMETASONE FUROATE AND FORMOTEROL FUMARATE DIHYDRATE 2 PUFF: 200; 5 AEROSOL RESPIRATORY (INHALATION) at 19:41

## 2023-02-04 RX ADMIN — CEFTRIAXONE 2000 MG: 2 INJECTION, POWDER, FOR SOLUTION INTRAMUSCULAR; INTRAVENOUS at 21:09

## 2023-02-04 RX ADMIN — FUROSEMIDE 40 MG: 10 INJECTION, SOLUTION INTRAMUSCULAR; INTRAVENOUS at 18:17

## 2023-02-04 RX ADMIN — CLOTRIMAZOLE: 10 CREAM TOPICAL at 09:16

## 2023-02-04 RX ADMIN — METHYLPREDNISOLONE SODIUM SUCCINATE 40 MG: 40 INJECTION, POWDER, FOR SOLUTION INTRAMUSCULAR; INTRAVENOUS at 02:10

## 2023-02-04 RX ADMIN — AMIODARONE HYDROCHLORIDE 0.5 MG/MIN: 50 INJECTION, SOLUTION INTRAVENOUS at 10:44

## 2023-02-04 RX ADMIN — VANCOMYCIN HYDROCHLORIDE 1000 MG: 1 INJECTION, POWDER, LYOPHILIZED, FOR SOLUTION INTRAVENOUS at 21:47

## 2023-02-04 RX ADMIN — VANCOMYCIN HYDROCHLORIDE 1000 MG: 1 INJECTION, POWDER, LYOPHILIZED, FOR SOLUTION INTRAVENOUS at 04:50

## 2023-02-04 RX ADMIN — TIOTROPIUM BROMIDE INHALATION SPRAY 1 PUFF: 3.12 SPRAY, METERED RESPIRATORY (INHALATION) at 09:02

## 2023-02-04 RX ADMIN — CLOTRIMAZOLE: 10 CREAM TOPICAL at 21:43

## 2023-02-04 RX ADMIN — FUROSEMIDE 40 MG: 10 INJECTION, SOLUTION INTRAMUSCULAR; INTRAVENOUS at 09:16

## 2023-02-04 RX ADMIN — Medication 10 ML: at 21:11

## 2023-02-04 RX ADMIN — CEFTRIAXONE 2000 MG: 2 INJECTION, POWDER, FOR SOLUTION INTRAMUSCULAR; INTRAVENOUS at 03:16

## 2023-02-04 RX ADMIN — METOPROLOL TARTRATE 12.5 MG: 25 TABLET, FILM COATED ORAL at 21:04

## 2023-02-04 RX ADMIN — Medication 10 ML: at 09:16

## 2023-02-04 RX ADMIN — METHYLPREDNISOLONE SODIUM SUCCINATE 40 MG: 40 INJECTION, POWDER, FOR SOLUTION INTRAMUSCULAR; INTRAVENOUS at 09:16

## 2023-02-04 ASSESSMENT — PAIN SCALES - GENERAL: PAINLEVEL_OUTOF10: 0

## 2023-02-04 NOTE — CONSULTS
REASON FOR CONSULTATION/CC: Hypoxia      Consult at request of Kyle Guevara DO     PCP: Michael Leiva MD      Chief Complaint   Patient presents with    Shortness of Breath     Pt from [facility], AN [facility] OF [facility] with increased SOB. Pt satting 60-70% on NRB and NC upon EMS arrival. Placed onto cpap and given x2 Duonebs and x1 albuterol. HISTORY OF PRESENT ILLNESS: Edwin Merchant is a 81y.o. year old female with a history of COPD who presents with acute on chronic shortness of breath. Symptoms present for 1 day. Progressively worsening. Moderate in severity. No associated symptoms. She resides at [facility], [facility]. During my visit she is on 12 L of oxygen. Concern for heart failure exacerbation and she is being aggressively diuresed. E. coli UTI as well identified. On antibiotics      Past Medical History:   Diagnosis Date    Acute kidney failure (HCC)     Chronic kidney disease     COPD (chronic obstructive pulmonary disease) ([facility])     ESBL (extended spectrum beta-lactamase) producing bacteria infection 09/10/2021    Escherichia coli ESBL.  Urine    Hyperkalemia     Hyperlipidemia     Hypertension     Hyponatremia     Kidney stone     Major depression     Melena     MRSA nasal colonization 09/10/2021    Obesity     Oxygen deficit     2 liters     PAF (paroxysmal atrial fibrillation) ([facility]     Sepsis ([facility]          Past Surgical History:   Procedure Laterality Date    CHOLECYSTECTOMY      COLONOSCOPY N/A 4/6/2021    COLONOSCOPY POLYPECTOMY ABLATION with clip placement performed by Darling Harris MD at [place] [address] Left 9/19/2020    CYSTOSCOPY URETERAL STENT INSERTION LEFT RETROGRADE PYELOGRAM performed by Lady Octavia MD at [facility] Left 8/10/2020    OPEN TREATMENT OF LEFT HIP FRACTURE WITH CEPHALOMEDULLARY NAIL performed by Kylie Mtz MD at [address] [place] [address] N/A 3/2/2021    SIGMOIDOSCOPY FLEXIBLE POLYPECTOMY with clip placement performed by Darío Cheney MD at [address] N/A 9/24/2020    EGD DIAGNOSTIC ONLY performed by Darío Cheney MD at [facility]  family history is not on file. Family history reviewed with patient, pertinent positive in HPI  Social Hx   reports that she has been smoking cigarettes. She has been smoking an average of 1.5 packs per day. She has never used smokeless tobacco.    Scheduled Meds:   metoprolol tartrate  12.5 mg Oral BID    digoxin  125 mcg IntraVENous Daily    sodium chloride flush  5-40 mL IntraVENous 2 times per day    tiotropium  1 puff Inhalation Daily    mometasone-formoterol  2 puff Inhalation BID    [Held by provider] dilTIAZem  120 mg Oral Daily    [Held by provider] atorvastatin  40 mg Oral Daily    vancomycin  1,000 mg IntraVENous Q18H    vancomycin (VANCOCIN) intermittent dosing (placeholder)   Other RX Placeholder    furosemide  40 mg IntraVENous BID    [Held by provider] apixaban  5 mg Oral BID    methylPREDNISolone  40 mg IntraVENous Q8H    enoxaparin  1 mg/kg SubCUTAneous BID    clotrimazole   Topical BID    cefTRIAXone (ROCEPHIN) IV  2,000 mg IntraVENous Q24H       Continuous Infusions:   sodium chloride      amiodarone 0.5 mg/min (02/04/23 1044)       PRN Meds:  sodium chloride flush, sodium chloride, acetaminophen **OR** acetaminophen, ipratropium-albuterol    ALLERGIES:  Patient has No Known Allergies.     REVIEW OF SYSTEMS:  Constitutional: Negative for fever  HENT: Negative for sore throat  Eyes: Negative for redness   Respiratory: Negative for dyspnea, cough  Cardiovascular: Negative for chest pain  Gastrointestinal: Negative for vomiting, diarrhea   Genitourinary: Negative for hematuria   Musculoskeletal: Negative for arthralgias   Skin: Negative for rash  Neurological: Negative for syncope  Hematological: Negative for adenopathy  Psychiatric/Behavorial: Negative for anxiety    Objective:   PHYSICAL EXAM:  Blood pressure 129/78, pulse (!) 115, temperature 98.4 °F (36.9 °C), temperature source Oral, resp. rate 27, height 5' 7\" (1.702 m), weight (!) 332 lb 3.7 oz (150.7 kg), SpO2 92 %.'  Gen:  No acute distress. Eyes: PERRL. Anicteric sclera. No conjunctival injection. ENT: No discharge. Posterior oropharynx clear. External appearance of ears and nose normal.  Neck: Trachea midline. No mass   Resp:  No crackles. + Expiratory wheezes. No rhonchi. No dullness on percussion. CV: Regular rate. Regular rhythm. No murmur or rub. No edema. GI: Soft, Non-tender. Non-distended. +BS  Skin: Warm, dry, w/o erythema. Lymph: No cervical or supraclavicular LAD. M/S: No cyanosis. No clubbing. Neuro:  CN 2-12 tested, no focal neurologic deficit. Moves all extremities  Psych: Awake and alert, Oriented x 3. Judgement and insight appropriate. Mood stable. Data Reviewed:   LABS:  CBC:   Recent Labs     02/02/23 1929 02/03/23  0621 02/04/23  0437   WBC 23.3* 24.8* 16.0*   HGB 13.5 13.2 12.8   HCT 45.9 44.2 42.7   MCV 89.4 88.8 87.6    251 270     BMP:   Recent Labs     02/02/23 1929 02/04/23  0437    140   K 4.8 4.5   CL 95* 96*   CO2 32 36*   BUN 22* 34*   CREATININE 1.1 1.1     LIVER PROFILE:   Recent Labs     02/02/23 1929   AST 14*   ALT 9*   BILITOT 0.8   ALKPHOS 131*     PT/INR: No results for input(s): PROTIME, INR in the last 72 hours. APTT: No results for input(s): APTT in the last 72 hours. UA:  Recent Labs     02/03/23  0052   COLORU DARK YELLOW*   PHUR 5.0   WBCUA 59*   RBCUA 1   BACTERIA 4+*   CLARITYU CLOUDY*   SPECGRAV 1.041   LEUKOCYTESUR MODERATE*   UROBILINOGEN 1.0   BILIRUBINUR Negative   BLOODU MODERATE*   GLUCOSEU Negative     Recent Labs     02/02/23 1902   PHART 7.328*   NDJ1PED 68.8*   PO2ART 109.0*            Radiology Review:  Pertinent images / reports were reviewed as a part of this visit.     Chest x-ray images reviewed personally by me, interpretation as follows:  -Very small lung volumes with crowded vasculature prominent. Most consistent with hypervolemia but cannot rule out significant atelectasis and pulmonary hypertension given such small lung volumes      ECHO  Summary:   Overall left ventricular ejection fraction is estimated to be 55-60%. The left ventricular wall motion is normal.   There is mild concentric left ventricular hypertrophy. The diastolic function is impaired and classified as Grade 1 (impaired   relaxation). The Aortic Valve is mildly calcified. The systolic pulmonary artery pressure cannot be estimated due to insufficient   tricuspid regurgitation. The left atrium is mildly dilated. Assessment/Plan:     Sepsis, due to  UTI and cellulitis  -On ceftriaxone/vancomycin    Acute on chronic heart failure  -Cardiology managing, undergoing aggressive diuresis    Acute hypoxemic and hypercapnic respiratory failure   -Wean supplemental oxygen to goal saturation of >90%  -BiPAP as tolerated    COPD  -Dulera, Spiriva, DuoNebs as needed  -IV Solu-Medrol    Morbid obesity BMI greater than 52    This note was transcribed using [id number] [employer]. Please disregard any translational errors.     Thank you for the consult    [address]  Pulmonary, Sleep and Critical Care Medicine

## 2023-02-04 NOTE — ACP (ADVANCE CARE PLANNING)
ADVANCED CARE PLANNING    Name:Kendal Ewing       :  1950              MRN:  [id number]      Purpose of Encounter: Advanced care planning in light of sepsis, respiratory failure. Parties in attendance: : Theone Johnnie, DO, Family members:none. Decisional Capacity: Yes    Diagnosis: Principal Problem:    Sepsis ([address]  Active Problems:    Acute on chronic respiratory failure with hypoxia and hypercapnia (HCC)    Atrial fibrillation with RVR (HCC)  Resolved Problems:    * No resolved hospital problems. *    Patients Medical Story: A 80 yo with h/o chronic hypoxic respiratory failure on 4L at baseline, jake, ohs, Severe morbid obesity, CKD, COPD, HTN, depression, A. Fib admitted with lethargy and respiratory distress, requiring bipap. She had improved the next day and was weaned to 4-5L o2. She has stated she wants dnr-cc. ED work-up patient was hypoxic at approximately 76% and apparently she was hypoxic at 46% per the ECF. T-max 102.7, heart rate 130s A. fib with RVR, heart rate hypertensive, chest x-ray indicating vascular congestion, troponin 0.05 and BNP elevated. ABG indicating hypercapnia PCO2 68.8  Patient was started on Cardizem as well as broad-spectrum antibiotic therapy coverage for sepsis and pneumonia. Goals of Care Determinations: Patient wishes to focus on getting better  Plan: Will notify Adalberto Haddad MD of change in care plan. Will look at further interventions as needed. Code Status: At this time patient wishes to be DNR-CC  Time Spent with Patient: 30 minutes      Electronically signed by Gian Fishcer DO on 2023 at 3:35 PM  Thank you Adalberto Haddad MD for the opportunity to be involved in this patient's care.

## 2023-02-04 NOTE — PROGRESS NOTES
Hospitalist Progress Note    CC: Sepsis ([facility] [address]      Admit date: 2/2/2023  Days in hospital:  2    Subjective/interval history: Pt S/E. Pt wore bipap overnight for at least 4 hours. Today she is on 12L hfnc, and oriented x4. ROS:   Pertinent items are noted in HPI. Objective:    /78   Pulse (!) 115   Temp 98.4 °F (36.9 °C) (Oral)   Resp 27   Ht 5' 7\" (1.702 m)   Wt (!) 332 lb 3.7 oz (150.7 kg)   SpO2 92%   BMI 52.04 kg/m²     Gen: alert, NAD. Morbidly obese  HEENT: NC/AT, moist mucous membranes  Neck: supple, trachea midline  Heart: Normal s1/s2, tachycardic, no murmurs, gallops, or rubs. Lungs: diminished,  no wheezing, no rales, no rhonchi,+ use of accessory muscles  Abd: bowel sounds present, soft, nontender, nondistended, no masses  Extrem: No clubbing, cyanosis, no edema  Skin: no rashes or lesions  Psych: A & O x3, affect appropriate  Neuro: grossly intact, moves all four extremities spontaneously.  No focal deficits  Cap refill: +2 sec    Medications:  Scheduled Meds:   metoprolol tartrate  12.5 mg Oral BID    digoxin  125 mcg IntraVENous Daily    sodium chloride flush  5-40 mL IntraVENous 2 times per day    tiotropium  1 puff Inhalation Daily    mometasone-formoterol  2 puff Inhalation BID    [Held by provider] dilTIAZem  120 mg Oral Daily    [Held by provider] atorvastatin  40 mg Oral Daily    vancomycin  1,000 mg IntraVENous Q18H    vancomycin (VANCOCIN) intermittent dosing (placeholder)   Other RX Placeholder    furosemide  40 mg IntraVENous BID    [Held by provider] apixaban  5 mg Oral BID    methylPREDNISolone  40 mg IntraVENous Q8H    enoxaparin  1 mg/kg SubCUTAneous BID    clotrimazole   Topical BID    cefTRIAXone (ROCEPHIN) IV  2,000 mg IntraVENous Q24H       PRN Meds:  sodium chloride flush, sodium chloride, acetaminophen **OR** acetaminophen, ipratropium-albuterol    IV:   sodium chloride      amiodarone 0.5 mg/min (02/04/23 1044)         Intake/Output Summary (Last 24 hours) at 2/4/2023 1513  Last data filed at 2/4/2023 1329  Gross per 24 hour   Intake 240 ml   Output 1725 ml   Net -1485 ml       Results:  CBC:   Recent Labs     02/02/23 1929 02/03/23  0621 02/04/23  0437   WBC 23.3* 24.8* 16.0*   HGB 13.5 13.2 12.8   HCT 45.9 44.2 42.7   MCV 89.4 88.8 87.6    251 270     BMP:   Recent Labs     02/02/23 1929 02/04/23  0437    140   K 4.8 4.5   CL 95* 96*   CO2 32 36*   BUN 22* 34*   CREATININE 1.1 1.1     Mag: No results for input(s): MAG in the last 72 hours. Phos:   Lab Results   Component Value Date    PHOS 3.8 09/26/2020     No results found for: GLU    LIVER PROFILE:   Recent Labs     02/02/23 1929   AST 14*   ALT 9*   BILITOT 0.8   ALKPHOS 131*     PT/INR: No results for input(s): PROTIME, INR in the last 72 hours. APTT: No results for input(s): APTT in the last 72 hours. UA:  Recent Labs     02/03/23  0052   COLORU DARK YELLOW*   PHUR 5.0   WBCUA 59*   RBCUA 1   BACTERIA 4+*   CLARITYU CLOUDY*   SPECGRAV 1.041   LEUKOCYTESUR MODERATE*   UROBILINOGEN 1.0   BILIRUBINUR Negative   BLOODU MODERATE*   GLUCOSEU Negative       Invalid input(s): ABG  Lab Results   Component Value Date    CALCIUM 8.2 (L) 02/04/2023    PHOS 3.8 09/26/2020       Assessment:    Principal Problem:    Sepsis ([address]  Active Problems:    Acute on chronic respiratory failure with hypoxia and hypercapnia (HCC)    Atrial fibrillation with RVR ([address]  Resolved Problems:    * No resolved hospital problems. [facility] course: A 80 yo with h/o chronic hypoxic respiratory failure on 4L at baseline, jake, ohs, Severe morbid obesity, CKD, COPD, HTN, depression, A. Fib admitted with lethargy and respiratory distress, requiring bipap. She had improved the next day and was weaned to 4-5L o2. She has stated she wants dnr-cc. ED work-up patient was hypoxic at approximately 76% and apparently she was hypoxic at 46% per the ECF.   T-max 102.7, heart rate 130s A. fib with RVR, heart rate hypertensive, chest x-ray indicating vascular congestion, troponin 0.05 and BNP elevated. ABG indicating hypercapnia PCO2 68.8  Patient was started on Cardizem as well as broad-spectrum antibiotic therapy coverage for sepsis and pneumonia. Urinalysis still to be collected. Plan:    Acute on chronic hypoxic and hypercarbic respiratory failure, POA  - so2 < 90% on ra, rr> 18  - supplemental o2 to maintain so2 > 90%, on 5L at baseline  - on bipap qhs, prn  - stop steroids  - nebs    Sepsis, POA   Uti  BLE cellulitis may also be a source, possibly secondary infection from psoriasis lesions  - with criteria: leukocytosis, tachycardia, tachypnea  - blood cultures  - urine cultures e coli  - repeat lactate   - Pro-Yahir .87 -> .67, wbc coming down  - IVF given; reassess the need for further ivf before continuing infusion  - Cont rocephin, vanc      Acute on chronic hfpef  - last ECHO: 1/2020, lvef 55-60%, ddI, mild lvh, systolic pap cannot be estimated due to insufficient tricuspid regurgitation.   - bnp 10K -> 8839  - lasix 40 mg IV BID  - cont BB, ACEi/ARB/arni  - daily wts  - I/Os  - consult CHF RN  - consult cardiology    Acute encephalopathy -improved  - multifactorial: toxic vs metabolic, due to infection, co2 narcosis   - head CT negative  - avoid anticholinergics  - treat associated conditions  - avoid sedating meds as able  - supportive care    A. fib with RVR, h/o paf  - changed to amio, will place a picc  - digoxin given   - telemetry  -needs eliquis for [id number]     Bilateral lower extremity cellulitis: Evidence of edema, patient is nonambulatory, hyperkeratotic, plaque formations  Wound care consulted     Chronic conditions - continue home meds unless otherwise stated  Psoriasis   Morbid obesity due to excess calories (Body mass index is 47.14 kg/(m^2). )   - Complicating assessment and treatment.  Placing patient at risk for multiple co-morbidities as well as early death and contributing to the patient's presentation. Counseled on weight loss.    Ckd    Code status:  dnr-cc, she wants to wait until Monday and will then consider hospice  DVT prophylaxis: [] Lovenox  [] SQ Heparin  [] SCDs  [] warfarin/oral direct thrombin inhibitor [] Encourage ambulation      Disposition:  [] Home [] Rehab [] Psych [] SNF  [] LTAC  [] Transfer to ICU  [] Transfer to PCU [] Other: in pt      Electronically signed by Mónica Hendricks DO on 2/4/2023 at 3:13 PM

## 2023-02-04 NOTE — PROGRESS NOTES
Patient refusing to have BP done this AM. Patient tenses up during BP which tends to give a false high reading, leading to a follow up reading, unable to tolerate BP cuff. This RN got a different sized BP cuff for patient and the pt said they would get it taken later \"not right now\". Will continue to monitor.

## 2023-02-04 NOTE — PROGRESS NOTES
Upon arrival to place PICC line assessed chart for issues related to picc placement, check for consent, and did time out with FEMI Bain. Pt. Tolerated PICC placement well, no difficulty accessing basilic vein and Xray technology used to verify PICC tip placement. Positive P wave with no negative deflection. Printed wave form and placed In chart.  Reported off to [employer]

## 2023-02-04 NOTE — CONSULTS
Cardiology Consultation   Date: 2/4/2023  Admit Date:  2/2/2023  Reason for Consultation: acute diastolic CHF; Afib  Consult Requesting Physician: Laura Sandoval DO     Chief Complaint   Patient presents with    Shortness of Breath     Pt from [facility], [facility] with increased SOB. Pt satting 60-70% on NRB and NC upon EMS arrival. Placed onto cpap and given x2 Duonebs and x1 albuterol. HPI: Edvin Daugherty is a 81 y.o. F h/o PAF (diagnosed several years ago, although not on DOAC), chronic diastolic CHF who presents from NH with lethargy and respiratory distress where, upon workup, SaO2 was found to be 76% on RA. EKG showed atrial fibrillation with v-rates 120's bpm. CXR showed signs of vascular congestion. T 102.7. Started on IV Abx and Amiodarone IV infusion. Denies angina/f/c/n/v/sick contacts. Past Medical History:   Diagnosis Date    Acute kidney failure (HCC)     Chronic kidney disease     COPD (chronic obstructive pulmonary disease) (HCC)     ESBL (extended spectrum beta-lactamase) producing bacteria infection 09/10/2021    Escherichia coli ESBL.  Urine    Hyperkalemia     Hyperlipidemia     Hypertension     Hyponatremia     Kidney stone     Major depression     Melena     MRSA nasal colonization 09/10/2021    Obesity     Oxygen deficit     2 liters     PAF (paroxysmal atrial fibrillation) ([facility] [address]     Sepsis ([facility] [address]         Past Surgical History:   Procedure Laterality Date    CHOLECYSTECTOMY      COLONOSCOPY N/A 4/6/2021    COLONOSCOPY POLYPECTOMY ABLATION with clip placement performed by Kaz Mason MD at [place] [address] Left 9/19/2020    CYSTOSCOPY URETERAL STENT INSERTION LEFT RETROGRADE PYELOGRAM performed by Lilibeth Barber MD at [address] [place] [address] Left 8/10/2020    OPEN TREATMENT OF LEFT HIP FRACTURE WITH CEPHALOMEDULLARY NAIL performed by Mackenzie Scanlon MD at [address] [place] [address] N/A 3/2/2021    SIGMOIDOSCOPY FLEXIBLE POLYPECTOMY with clip placement performed by Sandra De Anda MD at [address] [place] [address] N/A 2020    EGD DIAGNOSTIC ONLY performed by Sandra De Anda MD at [facility]       No Known Allergies    Social History:  Reviewed. reports that she has been smoking cigarettes. She has been smoking an average of 1.5 packs per day. She has never used smokeless tobacco. She reports current drug use. Drug: Marijuana Sydna Silk). She reports that she does not drink alcohol. Family History:  Reviewed. family history is not on file. No premature CAD. Review of System:  All other systems reviewed except for that noted above. Pertinent negatives and positives are:     General: negative for fever, chills   Ophthalmic ROS: negative for - eye pain or loss of vision  ENT ROS: negative for - headaches, sore throat   Respiratory: negative for - cough, sputum  Cardiovascular: Reviewed in HPI  Gastrointestinal: negative for - abdominal pain, diarrhea, N/V  Hematology: negative for - bleeding, blood clots, bruising or jaundice  Genito-Urinary:  negative for - Dysuria or incontinence  Musculoskeletal: negative for - Joint swelling, muscle pain  Neurological: negative for - confusion, dizziness, headaches   Psychiatric: No anxiety, no depression. Dermatological: negative for - rash    Physical Examination:  Vitals:    23 1209   BP:    Pulse: (!) 115   Resp: 27   Temp:    SpO2: 92%        Intake/Output Summary (Last 24 hours) at 2023 1229  Last data filed at 2023 1216  Gross per 24 hour   Intake 120 ml   Output 2025 ml   Net -1905 ml     In: 731.9 [P.O.:120;  I.V.:71.2]  Out: 2625    Wt Readings from Last 3 Encounters:   23 (!) 332 lb 3.7 oz (150.7 kg)   21 258 lb 6.1 oz (117.2 kg)   21 254 lb 13.6 oz (115.6 kg)     Temp  Av.1 °F (36.7 °C)  Min: 97.6 °F (36.4 °C)  Max: 98.6 °F (37 °C)  Pulse  Av.7  Min: 99  Max: 129  BP  Min: 93/73  Max: 129/78  SpO2  Av.1 %  Min: 90 %  Max: 97 %  FiO2   Av %  Min: 50 %  Max: 50 %    Telemetry: afib with v-rates 110's bpm  Constitutional: Alert. Oriented to person, place, and time. No distress. Head: Normocephalic and atraumatic. Mouth/Throat: Lips appear moist. Oropharynx is clear and moist.  Eyes: Conjunctivae normal. EOM are normal.   Neck: Neck supple. No lymphadenopathy. No rigidity. ? JVD present due to obese neck. Cardiovascular: tachycardic rate, irregular rhythm. Normal S1&S2. Carotid pulse 2+ bilaterally. Pulmonary/Chest: Bilateral respiratory sounds present. No respiratory accessory muscle use. No wheezes, No rhonchi. bibasilar rales. Abdominal: Soft. Normal bowel sounds present. No distension, No tenderness. No splenomegaly. No hernia. Musculoskeletal: No tenderness. Full range of motion in bilateral upper and lower extremities. 3+ pitting BLE edema    Lymphadenopathy: Has no cervical adenopathy. Neurological: Alert and oriented. Cranial nerve II-XII grossly intact, No gross deficit to touch. Skin: Skin is warm and dry. No rash, lesions, ulcerations noted. Psychiatric: No anxiety nor agitation. Labs:  Reviewed. Recent Labs     02/02/23 1929 02/04/23 0437    140   K 4.8 4.5   CL 95* 96*   CO2 32 36*   BUN 22* 34*   CREATININE 1.1 1.1     Recent Labs     02/02/23 1929 02/03/23  0621 02/04/23 0437   WBC 23.3* 24.8* 16.0*   HGB 13.5 13.2 12.8   HCT 45.9 44.2 42.7   MCV 89.4 88.8 87.6    251 270     Lab Results   Component Value Date/Time    TROPONINI 0.02 02/03/2023 08:46 AM     Lab Results   Component Value Date/Time    BNP 61.3 05/03/2013 04:25 AM     Lab Results   Component Value Date/Time    PROTIME 11.8 08/08/2020 06:23 AM    INR 1.02 08/08/2020 06:23 AM     No results found for: CHOL, HDL, TRIG    Diagnostic and imaging results reviewed. ECG: afib with v-rates 117 bpm, nonspecific T wave abnormalities  Echo: 2013 IMPRESSION:  Concentric left ventricular hypertrophy with normal systolic  function.   Ejection fraction is 60%.  Mild tricuspid regurgitation and mild  pulmonary hypertension.  Cath: n/a    I independently reviewed and interpreted the ECG, telemetry, serology, echocardiographic results.    Scheduled Meds:   metoprolol tartrate  12.5 mg Oral BID    sodium chloride flush  5-40 mL IntraVENous 2 times per day    tiotropium  1 puff Inhalation Daily    mometasone-formoterol  2 puff Inhalation BID    [Held by provider] dilTIAZem  120 mg Oral Daily    [Held by provider] atorvastatin  40 mg Oral Daily    vancomycin  1,000 mg IntraVENous Q18H    vancomycin (VANCOCIN) intermittent dosing (placeholder)   Other RX Placeholder    furosemide  40 mg IntraVENous BID    [Held by provider] apixaban  5 mg Oral BID    methylPREDNISolone  40 mg IntraVENous Q8H    enoxaparin  1 mg/kg SubCUTAneous BID    clotrimazole   Topical BID    cefTRIAXone (ROCEPHIN) IV  2,000 mg IntraVENous Q24H     Continuous Infusions:   sodium chloride      amiodarone 0.5 mg/min (02/04/23 1044)     PRN Meds:.sodium chloride flush, sodium chloride, acetaminophen **OR** acetaminophen, ipratropium-albuterol     Assessment:   Patient Active Problem List    Diagnosis Date Noted    Acute on chronic respiratory failure with hypoxia and hypercapnia ([facility]) 02/02/2023    Atrial fibrillation with RVR ([facility]) 02/02/2023    Other chronic pain 12/23/2021    Weight loss counseling, encounter for     Respiratory distress     Pneumonia of left lower lobe due to Streptococcus pneumoniae (HCC)     MRSA colonization     Ex-smoker     History of depression     Sepsis ([facility]) 12/17/2021    Acute respiratory failure (HCC) 12/17/2021    Diastolic CHF ([facility]) 12/17/2021    Aspiration pneumonia ([facility]) 12/17/2021    Acute hypercapnic respiratory failure (HCC) 09/10/2021    Essential hypertension 10/02/2020    Abnormal CT scan, kidney     Hyponatremia 09/19/2020    Hyperkalemia 09/19/2020    Moderate protein-calorie malnutrition (HCC) 09/19/2020    Hydronephrosis with left ureteropelvic junction (UPJ)  obstruction 09/19/2020    Hematuria 09/19/2020    Ureteral obstruction 09/19/2020    Hydronephrosis with urinary obstruction due to ureteral calculus     DARSHAN (acute kidney injury) ([facility] [address]     Kidney stone 09/18/2020    Septic shock ([facility] [address] 88/58/1672    Neutrophilic leukocytosis 75/06/4311    Class 2 obesity due to excess calories with body mass index (BMI) of 39.0 to 39.9 in adult 09/18/2020    Chronic respiratory failure with hypoxia ([address] 09/18/2020    Hyperlipidemia 09/18/2020    Kidney lesion, native, left 09/18/2020    PAT (paroxysmal atrial tachycardia) (HCC) 08/27/2020    Paroxysmal atrial fibrillation ([facility] [address] 08/16/2020    Acute on chronic respiratory failure with hypercapnia (HCC)     Acute pulmonary edema (HCC)     Multifocal pneumonia     Rapid atrial fibrillation (HCC)     Chronic obstructive pulmonary disease ([address]     Hypoxemia requiring supplemental oxygen     Acute respiratory failure with hypoxia ([address]     Closed left hip fracture, initial encounter ([address] 08/06/2020    Morbid obesity with BMI of 45.0-49.9, adult ([address] 08/06/2020      Active Hospital Problems    Diagnosis Date Noted    Acute on chronic respiratory failure with hypoxia and hypercapnia (HCC) [H63.17, J96.22] 02/02/2023     Priority: Medium    Atrial fibrillation with RVR ([address] [I48.91] 02/02/2023     Priority: Medium    Sepsis ([facility] [address] [A41.9] 12/17/2021         Recommendation(s):    Acute diastolic CHF  -hypervolemic. Will increase Lasix from 40 to now 60 mg IV BID. Strict I/O, daily wts, electrolyte repletion prn  -CHF nurse consultation for low-salt diet and outpatient CHF management    Afib, persistent?  -[id number] score of at least 3 and warrants [address]. Would recommend Eliquis 5mg po BID, but currently has open sores on R arm. Will defer to primary team as to when to start. -v-rates not well controlled. Will continue amiodarone IV infusion while diuresing (not adequate GI absorption with po meds while in CHF).  Once euvolemic, needs oral AV cj blocking agent.  -will add Digoxin 0.125mg IV daily for synergistic rate control.  -not a candidate for ablation or CV given not being on DOAC. Code status: DNR-CCA    Will follow with you. Thank you for allowing me to participate in the care of Nikki Alas . If you have any questions/comments, please do not hesitate to contact us.       Magnus Greenwood MD, MS, [facility], [facility]  Cardiac Electrophysiology  [address]  [address] [place], [facility]  Andres Anders [facility] [address]  [phone]

## 2023-02-05 LAB
ANION GAP SERPL CALCULATED.3IONS-SCNC: 7 MMOL/L (ref 3–16)
BASOPHILS ABSOLUTE: 0 K/UL (ref 0–0.2)
BASOPHILS RELATIVE PERCENT: 0.1 %
BUN BLDV-MCNC: 32 MG/DL (ref 7–20)
CALCIUM SERPL-MCNC: 8 MG/DL (ref 8.3–10.6)
CHLORIDE BLD-SCNC: 98 MMOL/L (ref 99–110)
CO2: 36 MMOL/L (ref 21–32)
CREAT SERPL-MCNC: 1.1 MG/DL (ref 0.6–1.2)
EOSINOPHILS ABSOLUTE: 0 K/UL (ref 0–0.6)
EOSINOPHILS RELATIVE PERCENT: 0 %
GFR SERPL CREATININE-BSD FRML MDRD: 54 ML/MIN/{1.73_M2}
GLUCOSE BLD-MCNC: 98 MG/DL (ref 70–99)
HCT VFR BLD CALC: 38.6 % (ref 36–48)
HEMOGLOBIN: 12.1 G/DL (ref 12–16)
LYMPHOCYTES ABSOLUTE: 0.6 K/UL (ref 1–5.1)
LYMPHOCYTES RELATIVE PERCENT: 4.9 %
MCH RBC QN AUTO: 27.1 PG (ref 26–34)
MCHC RBC AUTO-ENTMCNC: 31.5 G/DL (ref 31–36)
MCV RBC AUTO: 86 FL (ref 80–100)
MONOCYTES ABSOLUTE: 0.7 K/UL (ref 0–1.3)
MONOCYTES RELATIVE PERCENT: 5.1 %
NEUTROPHILS ABSOLUTE: 11.6 K/UL (ref 1.7–7.7)
NEUTROPHILS RELATIVE PERCENT: 89.9 %
ORGANISM: ABNORMAL
PDW BLD-RTO: 18.5 % (ref 12.4–15.4)
PLATELET # BLD: 244 K/UL (ref 135–450)
PMV BLD AUTO: 7.9 FL (ref 5–10.5)
POTASSIUM REFLEX MAGNESIUM: 3.9 MMOL/L (ref 3.5–5.1)
RBC # BLD: 4.49 M/UL (ref 4–5.2)
SODIUM BLD-SCNC: 141 MMOL/L (ref 136–145)
URINE CULTURE, ROUTINE: ABNORMAL
URINE CULTURE, ROUTINE: ABNORMAL
WBC # BLD: 12.9 K/UL (ref 4–11)

## 2023-02-05 PROCEDURE — 6360000002 HC RX W HCPCS: Performed by: FAMILY MEDICINE

## 2023-02-05 PROCEDURE — 6360000002 HC RX W HCPCS: Performed by: NURSE PRACTITIONER

## 2023-02-05 PROCEDURE — 6360000002 HC RX W HCPCS: Performed by: INTERNAL MEDICINE

## 2023-02-05 PROCEDURE — 94640 AIRWAY INHALATION TREATMENT: CPT

## 2023-02-05 PROCEDURE — 36592 COLLECT BLOOD FROM PICC: CPT

## 2023-02-05 PROCEDURE — 2580000003 HC RX 258: Performed by: FAMILY MEDICINE

## 2023-02-05 PROCEDURE — 2060000000 HC ICU INTERMEDIATE R&B

## 2023-02-05 PROCEDURE — 99233 SBSQ HOSP IP/OBS HIGH 50: CPT | Performed by: NURSE PRACTITIONER

## 2023-02-05 PROCEDURE — 85025 COMPLETE CBC W/AUTO DIFF WBC: CPT

## 2023-02-05 PROCEDURE — 99233 SBSQ HOSP IP/OBS HIGH 50: CPT | Performed by: INTERNAL MEDICINE

## 2023-02-05 PROCEDURE — 2700000000 HC OXYGEN THERAPY PER DAY

## 2023-02-05 PROCEDURE — 80048 BASIC METABOLIC PNL TOTAL CA: CPT

## 2023-02-05 PROCEDURE — 6370000000 HC RX 637 (ALT 250 FOR IP): Performed by: FAMILY MEDICINE

## 2023-02-05 PROCEDURE — 94761 N-INVAS EAR/PLS OXIMETRY MLT: CPT

## 2023-02-05 PROCEDURE — 2580000003 HC RX 258: Performed by: NURSE PRACTITIONER

## 2023-02-05 RX ADMIN — Medication 10 ML: at 21:00

## 2023-02-05 RX ADMIN — CLOTRIMAZOLE: 10 CREAM TOPICAL at 09:08

## 2023-02-05 RX ADMIN — DIGOXIN 125 MCG: 0.25 INJECTION INTRAMUSCULAR; INTRAVENOUS at 09:07

## 2023-02-05 RX ADMIN — APIXABAN 5 MG: 5 TABLET, FILM COATED ORAL at 09:08

## 2023-02-05 RX ADMIN — TIOTROPIUM BROMIDE INHALATION SPRAY 1 PUFF: 3.12 SPRAY, METERED RESPIRATORY (INHALATION) at 08:32

## 2023-02-05 RX ADMIN — VANCOMYCIN HYDROCHLORIDE 1000 MG: 1 INJECTION, POWDER, LYOPHILIZED, FOR SOLUTION INTRAVENOUS at 15:44

## 2023-02-05 RX ADMIN — METOPROLOL TARTRATE 12.5 MG: 25 TABLET, FILM COATED ORAL at 09:07

## 2023-02-05 RX ADMIN — MEROPENEM 1000 MG: 1 INJECTION, POWDER, FOR SOLUTION INTRAVENOUS at 12:26

## 2023-02-05 RX ADMIN — MOMETASONE FUROATE AND FORMOTEROL FUMARATE DIHYDRATE 2 PUFF: 200; 5 AEROSOL RESPIRATORY (INHALATION) at 20:15

## 2023-02-05 RX ADMIN — FUROSEMIDE 40 MG: 10 INJECTION, SOLUTION INTRAMUSCULAR; INTRAVENOUS at 17:59

## 2023-02-05 RX ADMIN — METOPROLOL TARTRATE 12.5 MG: 25 TABLET, FILM COATED ORAL at 21:00

## 2023-02-05 RX ADMIN — MOMETASONE FUROATE AND FORMOTEROL FUMARATE DIHYDRATE 2 PUFF: 200; 5 AEROSOL RESPIRATORY (INHALATION) at 08:34

## 2023-02-05 RX ADMIN — MEROPENEM 1000 MG: 1 INJECTION, POWDER, FOR SOLUTION INTRAVENOUS at 21:02

## 2023-02-05 RX ADMIN — APIXABAN 5 MG: 5 TABLET, FILM COATED ORAL at 21:00

## 2023-02-05 RX ADMIN — CLOTRIMAZOLE: 10 CREAM TOPICAL at 21:02

## 2023-02-05 RX ADMIN — AMIODARONE HYDROCHLORIDE 0.5 MG/MIN: 50 INJECTION, SOLUTION INTRAVENOUS at 17:59

## 2023-02-05 RX ADMIN — FUROSEMIDE 40 MG: 10 INJECTION, SOLUTION INTRAMUSCULAR; INTRAVENOUS at 09:07

## 2023-02-05 RX ADMIN — AMIODARONE HYDROCHLORIDE 0.5 MG/MIN: 50 INJECTION, SOLUTION INTRAVENOUS at 01:46

## 2023-02-05 RX ADMIN — Medication 10 ML: at 09:08

## 2023-02-05 ASSESSMENT — PAIN SCALES - GENERAL: PAINLEVEL_OUTOF10: 0

## 2023-02-05 NOTE — PROGRESS NOTES
Pulmonary Progress Note    Date of Admission: 2/2/2023   LOS: 3 days     Chief Complaint   Patient presents with    Shortness of Breath     Pt from [facility], [facility] with increased SOB. Pt satting 60-70% on NRB and NC upon EMS arrival. Placed onto cpap and given x2 Duonebs and x1 albuterol. Assessment/Plan:   Chronic hypoxemic and hypercapnic respiratory failure  -Continue to wean oxygen goal sat 90%  -BiPAP at night and with naps    COPD  -Dulera, Spiriva,  -DuoNebs prn    Acute on chronic congestive heart failure  -Cardiology following,  -Diuresis    Acute cystitis, ESBL E. coli  Lower extremity cellulitis  -On Merrem/vancomycin      24 Hour Events/Subjective  No acute events overnight. Oxygen requirement continues to improve. She is down to her home 5 L. No complaints today    ROS:   No nausea  No Vomiting  No chest pain      Intake/Output Summary (Last 24 hours) at 2/5/2023 1014  Last data filed at 2/5/2023 0604  Gross per 24 hour   Intake 1461.7 ml   Output 3225 ml   Net -1763.3 ml         PHYSICAL EXAM:   Blood pressure (!) 136/93, pulse 94, temperature 97.8 °F (36.6 °C), temperature source Oral, resp. rate 20, height 5' 7\" (1.702 m), weight (!) 387 lb 11.2 oz (175.9 kg), SpO2 99 %.'  Gen:  No acute distress. Eyes: PERRL. Anicteric sclera. No conjunctival injection. ENT: No discharge. Posterior oropharynx clear. External appearance of ears and nose normal.  Neck: Trachea midline. No mass   Resp:  No crackles. No wheezes. No rhonchi. No dullness on percussion. CV: Regular rate. Regular rhythm. No murmur or rub. No edema. GI: Soft, Non-tender. Non-distended. +BS  Skin: Warm, dry, w/o erythema. Lymph: No cervical or supraclavicular LAD. M/S: No cyanosis. No clubbing. Neuro:  no focal neurologic deficit. Moves all extremities  Psych: Awake and alert, Oriented x 3. Judgement and insight appropriate. Mood stable.       Medications:    Scheduled Meds:   metoprolol tartrate  12.5 mg Oral BID    digoxin  125 mcg IntraVENous Daily    sodium chloride flush  5-40 mL IntraVENous 2 times per day    tiotropium  1 puff Inhalation Daily    mometasone-formoterol  2 puff Inhalation BID    [Held by provider] dilTIAZem  120 mg Oral Daily    [Held by provider] atorvastatin  40 mg Oral Daily    vancomycin  1,000 mg IntraVENous Q18H    vancomycin (VANCOCIN) intermittent dosing (placeholder)   Other RX Placeholder    furosemide  40 mg IntraVENous BID    apixaban  5 mg Oral BID    clotrimazole   Topical BID    cefTRIAXone (ROCEPHIN) IV  2,000 mg IntraVENous Q24H       Continuous Infusions:   sodium chloride      sodium chloride      amiodarone 0.5 mg/min (02/05/23 0146)       PRN Meds:  sodium chloride flush, sodium chloride, sodium chloride, acetaminophen **OR** acetaminophen, ipratropium-albuterol    Labs reviewed:  CBC:   Recent Labs     02/03/23  0621 02/04/23  0437 02/05/23  0500   WBC 24.8* 16.0* 12.9*   HGB 13.2 12.8 12.1   HCT 44.2 42.7 38.6   MCV 88.8 87.6 86.0    270 244     BMP:   Recent Labs     02/02/23  1929 02/04/23  0437 02/05/23  0500    140 141   K 4.8 4.5 3.9   CL 95* 96* 98*   CO2 32 36* 36*   BUN 22* 34* 32*   CREATININE 1.1 1.1 1.1     LIVER PROFILE:   Recent Labs     02/02/23 1929   AST 14*   ALT 9*   BILITOT 0.8   ALKPHOS 131*     PT/INR: No results for input(s): PROTIME, INR in the last 72 hours. Films:  Radiology Review:  Pertinent images / reports were reviewed as a part of this visit. This note was transcribed using [id number] [facility]. Please disregard any translational errors.     Thank you for this consult,    Concetta Baxter MD  [facility] Pulmonary, Critical Care, and Sleep Medicine

## 2023-02-05 NOTE — PROGRESS NOTES
Clinical Pharmacy Note  Vancomycin Consult    Radha Sanchez is a 81 y.o. female ordered Vancomycin for sepsis secondary to HAP; consult received from [facility] to manage therapy. Also receiving ceftriaxone. Allergies:  Patient has no known allergies. Temp max:  Temp (24hrs), Av.2 °F (36.8 °C), Min:97.6 °F (36.4 °C), Max:98.8 °F (37.1 °C)      Recent Labs     23  0621 23  0437   WBC 23.3* 24.8* 16.0*       Recent Labs     23   BUN 22* 34*   CREATININE 1.1 1.1         Intake/Output Summary (Last 24 hours) at 2023  Last data filed at 2023 1824  Gross per 24 hour   Intake 240 ml   Output 1700 ml   Net -1460 ml       Culture Results:  MRSA probe (2/3/23): positive  Urine culture: E. coli    Ht Readings from Last 1 Encounters:   23 5' 7\" (1.702 m)        Wt Readings from Last 1 Encounters:   23 (!) 332 lb 3.7 oz (150.7 kg)         Estimated Creatinine Clearance: 73 mL/min (based on SCr of 1.1 mg/dL). Assessment:  Day # 2 of vancomycin. Current regimen: 1000 mg every 18 hours  Vancomycin level: 14.2 mg/L (15 hours after dose)  Predicted AUC: 506    Plan:  Continue current regimen. Will get another level with AM labs on  (after 2 more doses)    Thank you for the consult.    Joe Vogel, PharmD  2023 9:21 PM

## 2023-02-05 NOTE — PLAN OF CARE
Problem: Discharge Planning  Goal: Discharge to home or other facility with appropriate resources  Outcome: Progressing     Problem: Safety - Adult  Goal: Free from fall injury  Outcome: Progressing     Problem: Skin/Tissue Integrity  Goal: Absence of new skin breakdown  Description: 1. Monitor for areas of redness and/or skin breakdown  2. Assess vascular access sites hourly  3. Every 4-6 hours minimum:  Change oxygen saturation probe site  4. Every 4-6 hours:  If on nasal continuous positive airway pressure, respiratory therapy assess nares and determine need for appliance change or resting period.   Outcome: Progressing     Problem: Chronic Conditions and Co-morbidities  Goal: Patient's chronic conditions and co-morbidity symptoms are monitored and maintained or improved  Outcome: Progressing     Problem: Pain  Goal: Verbalizes/displays adequate comfort level or baseline comfort level  Outcome: Progressing     Problem: Cognitive:  Goal: Knowledge of wound care  Description: Knowledge of wound care  Outcome: Progressing     Problem: Cognitive:  Goal: Understands risk factors for wounds  Description: Understands risk factors for wounds  Outcome: Progressing     Problem: Wound:  Goal: Will show signs of wound healing; wound closure and no evidence of infection  Description: Will show signs of wound healing; wound closure and no evidence of infection  Outcome: Progressing     Problem: Pressure Ulcer:  Goal: Signs of wound healing will improve  Description: Signs of wound healing will improve  Outcome: Progressing     Problem: Pressure Ulcer:  Goal: Absence of new pressure ulcer  Description: Absence of new pressure ulcer  Outcome: Progressing     Problem: Pressure Ulcer:  Goal: Will show no infection signs and symptoms  Description: Will show no infection signs and symptoms  Outcome: Progressing     Problem: Arterial:  Goal: Optimize blood flow for wound healing  Description: Optimize blood flow for wound healing  Outcome: Progressing     Problem: Venous:  Goal: Signs of wound healing will improve  Description: Signs of wound healing will improve  Outcome: Progressing     Problem: Smoking cessation:  Goal: Ability to formulate a plan to maintain a tobacco-free life will be supported  Description: Ability to formulate a plan to maintain a tobacco-free life will be supported  Outcome: Progressing     Problem: Compression therapy:  Goal: Will be free from complications associated with compression therapy  Description: Will be free from complications associated with compression therapy  Outcome: Progressing     Problem: Weight control:  Goal: Ability to maintain an optimal weight for height and age will be supported  Description: Ability to maintain an optimal weight for height and age will be supported  Outcome: Progressing     Problem: Falls - Risk of:  Goal: Will remain free from falls  Description: Will remain free from falls  Outcome: Progressing     Problem: Blood Glucose:  Goal: Ability to maintain appropriate glucose levels will improve  Description: Ability to maintain appropriate glucose levels will improve  Outcome: Progressing     Problem: Respiratory - Adult  Goal: Achieves optimal ventilation and oxygenation  Outcome: Progressing     Problem: Cardiovascular - Adult  Goal: Maintains optimal cardiac output and hemodynamic stability  Outcome: Progressing     Problem: Skin/Tissue Integrity - Adult  Goal: Skin integrity remains intact  Outcome: Progressing     Problem: Metabolic/Fluid and Electrolytes - Adult  Goal: Electrolytes maintained within normal limits  Outcome: Progressing     Problem: Metabolic/Fluid and Electrolytes - Adult  Goal: Hemodynamic stability and optimal renal function maintained  Outcome: Progressing

## 2023-02-05 NOTE — PROGRESS NOTES
[facility]     Cardiology                                     Progress Note    Admission date:  2023    Reason for follow up visit: AF and CHF     HPI/CC: Mary Eid was admitted on 2023 with shortness of breath. Cardiology following for rapid AF and CHF. Has also been treated for E. Coli UTI. Rhythm has been AF with rates in the 90's to low 100's. Subjective: She reports some improvement in shortness of breath. Denies chest pain or palpitations. Vitals:  Blood pressure (!) 136/93, pulse (!) 111, temperature 97.8 °F (36.6 °C), temperature source Oral, resp. rate 18, height 5' 7\" (1.702 m), weight (!) 387 lb 11.2 oz (175.9 kg), SpO2 99 %.   Temp  Av.1 °F (36.7 °C)  Min: 97.6 °F (36.4 °C)  Max: 98.8 °F (37.1 °C)  Pulse  Av.7  Min: 93  Max: 123  BP  Min: 102/75  Max: 136/93  SpO2  Av.3 %  Min: 92 %  Max: 99 %  FiO2   Av %  Min: 50 %  Max: 50 %    24 hour I/O    Intake/Output Summary (Last 24 hours) at 2023 0842  Last data filed at 2023 0604  Gross per 24 hour   Intake 1461.7 ml   Output 3225 ml   Net -1763.3 ml     Current Facility-Administered Medications   Medication Dose Route Frequency Provider Last Rate Last Admin    metoprolol tartrate (LOPRESSOR) tablet 12.5 mg  12.5 mg Oral BID Luzmaria BRAEDEN Carrion, DO   12.5 mg at 23    digoxin (LANOXIN) injection 125 mcg  125 mcg IntraVENous Daily Jhoan Farr MD   125 mcg at 23 1413    sodium chloride flush 0.9 % injection 5-40 mL  5-40 mL IntraVENous 2 times per day Luzmaria BRAEDEN Carrion, DO   10 mL at 23    sodium chloride flush 0.9 % injection 5-40 mL  5-40 mL IntraVENous PRN Luzmaria BRAEDEN Siust, DO        0.9 % sodium chloride infusion  25 mL IntraVENous PRN Luzmaria BRAEDEN Carrion, DO        0.9 % sodium chloride infusion   IntraVENous PRN GABRIELA Gutierrez CNP        acetaminophen (TYLENOL) tablet 650 mg  650 mg Oral Q6H PRN GABRIELA Gutierrez CNP        Or    acetaminophen (TYLENOL) suppository 650 mg  650 mg Rectal Q6H PRN Adelaida Watts, APRN - CNP        tiotropium (SPIRIVA RESPIMAT) 2.5 MCG/ACT inhaler 1 puff  1 puff Inhalation Daily GABRIELA Gutierrez - CNP   1 puff at 02/05/23 0832    ipratropium-albuterol (DUONEB) nebulizer solution 3 mL  3 mL Inhalation Q4H PRN Adelaida Amorhoff, APRN - CNP        mometasone-formoterol (DULERA) 200-5 MCG/ACT inhaler 2 puff  2 puff Inhalation BID GABRIELA Gutierrez CNP   2 puff at 02/05/23 0834    [Held by provider] dilTIAZem (CARDIZEM CD) extended release capsule 120 mg  120 mg Oral Daily Adelaida Watts, APRN - CNP        [Held by provider] atorvastatin (LIPITOR) tablet 40 mg  40 mg Oral Daily Adelaida Watts, APRN - CNP        vancomycin 1000 mg IVPB in 250 mL NS addavial  1,000 mg IntraVENous Q18H GABRIELA Rudolph CNP   Stopped at 02/04/23 2312    vancomycin (VANCOCIN) intermittent dosing (placeholder)   Other RX Placeholder Adelaida Watts APRN - CNP        furosemide (LASIX) injection 40 mg  40 mg IntraVENous BID Luzmaria Carrion, DO   40 mg at 02/04/23 1817    apixaban (ELIQUIS) tablet 5 mg  5 mg Oral BID Luzmaria Siust, DO   5 mg at 02/04/23 2104    clotrimazole (LOTRIMIN) 1 % cream   Topical BID Luzmaria BRAEDEN Siust, DO   Given at 02/04/23 2143    cefTRIAXone (ROCEPHIN) 2,000 mg in sodium chloride 0.9 % 50 mL IVPB (mini-bag)  2,000 mg IntraVENous Q24H Luzmaria BRAEDEN Hurst, DO   Stopped at 02/04/23 2144    amiodarone (CORDARONE) 450 mg in dextrose 5 % 250 mL infusion  0.5 mg/min IntraVENous Continuous Luzmaria BRAEDEN Siust, DO 16.7 mL/hr at 02/05/23 0146 0.5 mg/min at 02/05/23 0146       Objective:     Telemetry monitor: AF    Physical Exam:  Constitutional and general appearance: alert, cooperative, no distress, and appears stated age  HEENT: PERRL, no cervical lymphadenopathy. No masses palpable. Normal oral mucosa  Respiratory:  Normal excursion and expansion without use of accessory muscles  Resp auscultation: dim throughout  Cardiovascular:   The apical impulse is not displaced  Heart tones are crisp and normal. irregular S1 and S2.  Jugular venous pulsation  unable to assess   The carotid upstroke is normal in amplitude and contour without delay or bruit  Peripheral pulses are symmetrical and full   Abdomen:  No masses or tenderness  Bowel sounds present  Extremities:   No cyanosis or clubbing   1+ firm bilateral lower extremity edema   Skin: warm and dry  Neurological:  Alert and oriented  Moves all extremities well  No abnormalities of mood, affect, memory, mentation, or behavior are noted    Data        Echo 5/2013:  LEFT VENTRICLE:  The left ventricle is hypertrophied with normal    systolic function. Ejection fraction is 60%. No regional wall     motion abnormalities. RIGHT VENTRICLE:  Right ventricular size and function is normal.    LEFT ATRIUM:  Left atrial size is normal.                           RIGHT ATRIUM:  Right atrial size is normal.                         MITRAL VALVE:  Mitral valve has no stenosis or regurgitation. TRICUSPID VALVE:  Tricuspid valve has mild regurgitation. AORTIC VALVE:  Aortic valve is normal.                              PULMONIC VALVE:  Pulmonic valve is not visualized. AORTA:  Aortic root is normal.                                      PERICARDIUM:  There is no pericardial effusion. INTRACARDIAC MASSES:  No intracardiac masses or thrombi. DOPPLER:  Doppler study shows PA systolic pressure of 32-94 mmHg,   suggestive of mild pulmonary hypertension. IMPRESSION:  Concentric left ventricular hypertrophy with normal systolic  function. Ejection fraction is 60%. Mild tricuspid regurgitation and mild  pulmonary hypertension. All labs and testing reviewed.   Lab Review     Renal Profile:   Lab Results   Component Value Date/Time    CREATININE 1.1 02/05/2023 05:00 AM    BUN 32 02/05/2023 05:00 AM  02/05/2023 05:00 AM    K 3.9 02/05/2023 05:00 AM    CL 98 02/05/2023 05:00 AM    CO2 36 02/05/2023 05:00 AM     CBC:    Lab Results   Component Value Date/Time    WBC 12.9 02/05/2023 05:00 AM    RBC 4.49 02/05/2023 05:00 AM    HGB 12.1 02/05/2023 05:00 AM    HCT 38.6 02/05/2023 05:00 AM    MCV 86.0 02/05/2023 05:00 AM    RDW 18.5 02/05/2023 05:00 AM     02/05/2023 05:00 AM     BNP:    Lab Results   Component Value Date/Time    BNP 61.3 05/03/2013 04:25 AM     Fasting Lipid Panel:  No results found for: CHOL, HDL, TRIG  Cardiac Enzymes:  CK/MbTroponin  Lab Results   Component Value Date/Time    TROPONINI 0.02 02/03/2023 08:46 AM     PT/ INR   Lab Results   Component Value Date/Time    INR 1.02 08/08/2020 06:23 AM    PROTIME 11.8 08/08/2020 06:23 AM     PTT No results found for: PTT   Lab Results   Component Value Date/Time    MG 2.00 12/23/2021 05:10 AM    No results found for: TSH    Assessment:  Atrial fibrillation of unknown duration: ongoing, likely persistent    -[id number] score 4 (age, gender, HTN, CHF)   Acute on chronic diastolic CHF: ongoing   -weights appear inaccurate    -has diuresed >2 L since admission   HTN: controlled   HLD  E. Coli UTI   CKD  Leukocytosis  COPD         Plan:   1. Continue Lasix as renal funciton is tolerating   2. Continue IV amiodarone while diuresing. Once near euvolemia, can start cj agents. 3. Continue IV digoxin. Check digoxin level in the morning. 4. Strict I&O, daily weight, fluid and sodium restriction  5. Continue to monitor on telemetry.       Elías Ruiz, APRN-CNP  [facility]  [phone]

## 2023-02-05 NOTE — PLAN OF CARE
Problem: Discharge Planning  Goal: Discharge to home or other facility with appropriate resources  Outcome: Progressing  Flowsheets (Taken 2/4/2023 2048)  Discharge to home or other facility with appropriate resources: Identify barriers to discharge with patient and caregiver     Problem: Safety - Adult  Goal: Free from fall injury  Outcome: Progressing     Problem: Skin/Tissue Integrity  Goal: Absence of new skin breakdown  Description: 1. Monitor for areas of redness and/or skin breakdown  2. Assess vascular access sites hourly  3. Every 4-6 hours minimum:  Change oxygen saturation probe site  4. Every 4-6 hours:  If on nasal continuous positive airway pressure, respiratory therapy assess nares and determine need for appliance change or resting period.   Outcome: Progressing     Problem: Chronic Conditions and Co-morbidities  Goal: Patient's chronic conditions and co-morbidity symptoms are monitored and maintained or improved  Outcome: Progressing  Flowsheets (Taken 2/4/2023 2048)  Care Plan - Patient's Chronic Conditions and Co-Morbidity Symptoms are Monitored and Maintained or Improved: Monitor and assess patient's chronic conditions and comorbid symptoms for stability, deterioration, or improvement     Problem: Pain  Goal: Verbalizes/displays adequate comfort level or baseline comfort level  Outcome: Progressing     Problem: Cognitive:  Goal: Knowledge of wound care  Description: Knowledge of wound care  Outcome: Progressing     Problem: Cognitive:  Goal: Understands risk factors for wounds  Description: Understands risk factors for wounds  Outcome: Progressing     Problem: Falls - Risk of:  Goal: Will remain free from falls  Description: Will remain free from falls  Outcome: Progressing     Problem: Respiratory - Adult  Goal: Achieves optimal ventilation and oxygenation  Outcome: Progressing

## 2023-02-05 NOTE — PROGRESS NOTES
Slept well, easily aroused for routine checks. Grace cath patent and draining yellow urine. In bed, call light in reach.

## 2023-02-05 NOTE — PROGRESS NOTES
Hospitalist Progress Note    CC: Sepsis ([facility] [address]      Admit date: 2/2/2023  Days in hospital:  3    Subjective/interval history: Pt S/E. Pt wore bipap overnight. Today she is on 6-7 L hfnc, and oriented x4. ROS:   Pertinent items are noted in HPI. Objective:    BP (!) 105/58   Pulse 91   Temp 97.8 °F (36.6 °C) (Oral)   Resp 17   Ht 5' 7\" (1.702 m)   Wt (!) 387 lb 11.2 oz (175.9 kg)   SpO2 98%   BMI 60.72 kg/m²     Gen: alert, NAD. Morbidly obese  HEENT: NC/AT, moist mucous membranes  Neck: supple, trachea midline  Heart: Normal s1/s2, tachycardic, no murmurs, gallops, or rubs. Lungs: diminished,  no wheezing, no rales, no rhonchi, no use of accessory muscles  Abd: bowel sounds present, soft, nontender, nondistended, no masses  Extrem: No clubbing, cyanosis, no edema  Skin: rt arm with a skin tea no signs of infection. Bl lower legs with psoriasis rash and scaling, erythematous and edematous with chronic lymphedema  Psych: A & O x3, affect appropriate  Neuro: grossly intact, moves all four extremities spontaneously.  No focal deficits  Cap refill: +2 sec    Medications:  Scheduled Meds:   meropenem  1,000 mg IntraVENous Q8H    metoprolol tartrate  12.5 mg Oral BID    digoxin  125 mcg IntraVENous Daily    sodium chloride flush  5-40 mL IntraVENous 2 times per day    tiotropium  1 puff Inhalation Daily    mometasone-formoterol  2 puff Inhalation BID    [Held by provider] atorvastatin  40 mg Oral Daily    vancomycin  1,000 mg IntraVENous Q18H    vancomycin (VANCOCIN) intermittent dosing (placeholder)   Other RX Placeholder    furosemide  40 mg IntraVENous BID    apixaban  5 mg Oral BID    clotrimazole   Topical BID       PRN Meds:  sodium chloride flush, sodium chloride, sodium chloride, acetaminophen **OR** acetaminophen, ipratropium-albuterol    IV:   sodium chloride      sodium chloride      amiodarone 0.5 mg/min (02/05/23 0146)         Intake/Output Summary (Last 24 hours) at 2/5/2023 1626  Last data filed at 2/5/2023 1543  Gross per 24 hour   Intake 1683.7 ml   Output 4700 ml   Net -3016.3 ml       Results:  CBC:   Recent Labs     02/03/23  0621 02/04/23  0437 02/05/23  0500   WBC 24.8* 16.0* 12.9*   HGB 13.2 12.8 12.1   HCT 44.2 42.7 38.6   MCV 88.8 87.6 86.0    270 244     BMP:   Recent Labs     02/02/23  1929 02/04/23  0437 02/05/23  0500    140 141   K 4.8 4.5 3.9   CL 95* 96* 98*   CO2 32 36* 36*   BUN 22* 34* 32*   CREATININE 1.1 1.1 1.1     Mag: No results for input(s): MAG in the last 72 hours. Phos:   Lab Results   Component Value Date    PHOS 3.8 09/26/2020     No results found for: GLU    LIVER PROFILE:   Recent Labs     02/02/23 1929   AST 14*   ALT 9*   BILITOT 0.8   ALKPHOS 131*     PT/INR: No results for input(s): PROTIME, INR in the last 72 hours. APTT: No results for input(s): APTT in the last 72 hours. UA:  Recent Labs     02/03/23  0052   COLORU DARK YELLOW*   PHUR 5.0   WBCUA 59*   RBCUA 1   BACTERIA 4+*   CLARITYU CLOUDY*   SPECGRAV 1.041   LEUKOCYTESUR MODERATE*   UROBILINOGEN 1.0   BILIRUBINUR Negative   BLOODU MODERATE*   GLUCOSEU Negative       Invalid input(s): ABG  Lab Results   Component Value Date    CALCIUM 8.0 (L) 02/05/2023    PHOS 3.8 09/26/2020       Assessment:    Principal Problem:    Sepsis ([address]  Active Problems:    Acute on chronic respiratory failure with hypoxia and hypercapnia (HCC)    Atrial fibrillation with RVR ([address]  Resolved Problems:    * No resolved hospital problems. [facility] course: A 80 yo with h/o chronic hypoxic respiratory failure on 4L at baseline, jake, ohs, Severe morbid obesity, CKD, COPD, HTN, depression, A. Fib admitted with lethargy and respiratory distress, requiring bipap. She had improved the next day and was weaned to 4-5L o2. She has stated she wants dnr-cc. ED work-up patient was hypoxic at approximately 76% and apparently she was hypoxic at 46% per the ECF.   T-max 102.7, heart rate 130s A. fib with RVR, heart rate hypertensive, chest x-ray indicating vascular congestion, troponin 0.05 and BNP elevated. ABG indicating hypercapnia PCO2 68.8  Patient was started on Cardizem as well as broad-spectrum antibiotic therapy coverage for sepsis and pneumonia. Urinalysis still to be collected. Plan:    Acute on chronic hypoxic and hypercarbic respiratory failure, POA - improving  - so2 < 90% on ra, rr> 18  - supplemental o2 to maintain so2 > 90%, on 5L at baseline  - on bipap qhs, prn  - stopped steroids  - nebs    Sepsis, POA   Uti  BLE cellulitis may also be a source, possibly secondary infection from psoriasis lesions  - with criteria: leukocytosis, tachycardia, tachypnea  - blood cultures  - urine cultures e coli esbl, change to merrem will need 10 days, already has a picc  - repeat lactate   - Pro-Yahir .87 -> .67, wbc coming down     Acute on chronic hfpef  - last ECHO: 1/2020, lvef 55-60%, ddI, mild lvh, systolic pap cannot be estimated due to insufficient tricuspid regurgitation.   - bnp 10K -> 8839  - lasix 40 mg IV BID  - cont BB, ACEi/ARB/arni  - daily wts  - I/Os: -1.8L 2 x4 hrs; -2.8 L total  - consult CHF RN  - consult cardiology    Acute encephalopathy -improved  - multifactorial: toxic vs metabolic, due to infection, co2 narcosis   - head CT negative  - avoid anticholinergics  - treat associated conditions  - avoid sedating meds as able  - supportive care    A. fib with RVR, h/o paf  - changed to amio,  placed a picc  - digoxin given   - telemetry  -needs eliquis for [id number]     Bilateral lower extremity chronic lymphedema and psoriasis with hyperkeratotic, plaque formations  Wound care consulted  - follows with dermatology, cont protopic for face prn, lidex for scalp, and clotrimazole for legs     Chronic conditions - continue home meds unless otherwise stated  Psoriasis   Morbid obesity due to excess calories (Body mass index is 47.14 kg/(m^2). )   - Complicating assessment and treatment. Placing patient at risk for multiple co-morbidities as well as early death and contributing to the patient's presentation. Counseled on weight loss.    Ckd    Code status:  dnr-cc, she wants to wait until Monday and will then consider hospice  DVT prophylaxis: [] Lovenox  [] SQ Heparin  [] SCDs  [] warfarin/oral direct thrombin inhibitor [] Encourage ambulation      Disposition:  [] Home [] Rehab [] Psych [] SNF  [] LTAC  [] Transfer to ICU  [] Transfer to PCU [] Other: in pt      Electronically signed by Lorena Corley DO on 2/5/2023 at 4:26 PM

## 2023-02-06 LAB
ANION GAP SERPL CALCULATED.3IONS-SCNC: 9 MMOL/L (ref 3–16)
BLOOD CULTURE, ROUTINE: NORMAL
BUN BLDV-MCNC: 24 MG/DL (ref 7–20)
CALCIUM SERPL-MCNC: 8 MG/DL (ref 8.3–10.6)
CHLORIDE BLD-SCNC: 96 MMOL/L (ref 99–110)
CO2: 39 MMOL/L (ref 21–32)
CREAT SERPL-MCNC: 1 MG/DL (ref 0.6–1.2)
CULTURE, BLOOD 2: NORMAL
DIGOXIN LEVEL: 0.5 NG/ML (ref 0.8–2)
GFR SERPL CREATININE-BSD FRML MDRD: >60 ML/MIN/{1.73_M2}
GLUCOSE BLD-MCNC: 76 MG/DL (ref 70–99)
POTASSIUM REFLEX MAGNESIUM: 3.6 MMOL/L (ref 3.5–5.1)
PRO-BNP: 9370 PG/ML (ref 0–124)
SODIUM BLD-SCNC: 144 MMOL/L (ref 136–145)
VANCOMYCIN RANDOM: 16 UG/ML

## 2023-02-06 PROCEDURE — 36415 COLL VENOUS BLD VENIPUNCTURE: CPT

## 2023-02-06 PROCEDURE — 2700000000 HC OXYGEN THERAPY PER DAY

## 2023-02-06 PROCEDURE — 6370000000 HC RX 637 (ALT 250 FOR IP): Performed by: NURSE PRACTITIONER

## 2023-02-06 PROCEDURE — 6360000002 HC RX W HCPCS: Performed by: NURSE PRACTITIONER

## 2023-02-06 PROCEDURE — 2060000000 HC ICU INTERMEDIATE R&B

## 2023-02-06 PROCEDURE — 99232 SBSQ HOSP IP/OBS MODERATE 35: CPT | Performed by: NURSE PRACTITIONER

## 2023-02-06 PROCEDURE — 94640 AIRWAY INHALATION TREATMENT: CPT

## 2023-02-06 PROCEDURE — 6370000000 HC RX 637 (ALT 250 FOR IP): Performed by: FAMILY MEDICINE

## 2023-02-06 PROCEDURE — 94761 N-INVAS EAR/PLS OXIMETRY MLT: CPT

## 2023-02-06 PROCEDURE — 99232 SBSQ HOSP IP/OBS MODERATE 35: CPT | Performed by: INTERNAL MEDICINE

## 2023-02-06 PROCEDURE — 83880 ASSAY OF NATRIURETIC PEPTIDE: CPT

## 2023-02-06 PROCEDURE — 6360000002 HC RX W HCPCS: Performed by: INTERNAL MEDICINE

## 2023-02-06 PROCEDURE — 2580000003 HC RX 258: Performed by: NURSE PRACTITIONER

## 2023-02-06 PROCEDURE — 36592 COLLECT BLOOD FROM PICC: CPT

## 2023-02-06 PROCEDURE — 2580000003 HC RX 258: Performed by: FAMILY MEDICINE

## 2023-02-06 PROCEDURE — 6360000002 HC RX W HCPCS: Performed by: FAMILY MEDICINE

## 2023-02-06 PROCEDURE — 80048 BASIC METABOLIC PNL TOTAL CA: CPT

## 2023-02-06 PROCEDURE — 80162 ASSAY OF DIGOXIN TOTAL: CPT

## 2023-02-06 PROCEDURE — 94660 CPAP INITIATION&MGMT: CPT

## 2023-02-06 PROCEDURE — 80202 ASSAY OF VANCOMYCIN: CPT

## 2023-02-06 RX ORDER — LISINOPRIL 5 MG/1
2.5 TABLET ORAL DAILY
Status: DISCONTINUED | OUTPATIENT
Start: 2023-02-06 | End: 2023-02-08 | Stop reason: HOSPADM

## 2023-02-06 RX ORDER — SPIRONOLACTONE 25 MG/1
25 TABLET ORAL DAILY
Status: DISCONTINUED | OUTPATIENT
Start: 2023-02-06 | End: 2023-02-08 | Stop reason: HOSPADM

## 2023-02-06 RX ORDER — DIGOXIN 125 MCG
125 TABLET ORAL DAILY
Status: DISCONTINUED | OUTPATIENT
Start: 2023-02-07 | End: 2023-02-08 | Stop reason: HOSPADM

## 2023-02-06 RX ADMIN — Medication 10 ML: at 20:44

## 2023-02-06 RX ADMIN — METOPROLOL TARTRATE 25 MG: 25 TABLET, FILM COATED ORAL at 20:44

## 2023-02-06 RX ADMIN — MUPIROCIN: 20 OINTMENT TOPICAL at 23:57

## 2023-02-06 RX ADMIN — VANCOMYCIN HYDROCHLORIDE 1000 MG: 1 INJECTION, POWDER, LYOPHILIZED, FOR SOLUTION INTRAVENOUS at 10:51

## 2023-02-06 RX ADMIN — MEROPENEM 1000 MG: 1 INJECTION, POWDER, FOR SOLUTION INTRAVENOUS at 04:18

## 2023-02-06 RX ADMIN — METOPROLOL TARTRATE 12.5 MG: 25 TABLET, FILM COATED ORAL at 09:00

## 2023-02-06 RX ADMIN — DIGOXIN 125 MCG: 0.25 INJECTION INTRAMUSCULAR; INTRAVENOUS at 09:00

## 2023-02-06 RX ADMIN — MEROPENEM 1000 MG: 1 INJECTION, POWDER, FOR SOLUTION INTRAVENOUS at 20:43

## 2023-02-06 RX ADMIN — AMIODARONE HYDROCHLORIDE 0.5 MG/MIN: 50 INJECTION, SOLUTION INTRAVENOUS at 09:09

## 2023-02-06 RX ADMIN — APIXABAN 5 MG: 5 TABLET, FILM COATED ORAL at 20:44

## 2023-02-06 RX ADMIN — MOMETASONE FUROATE AND FORMOTEROL FUMARATE DIHYDRATE 2 PUFF: 200; 5 AEROSOL RESPIRATORY (INHALATION) at 19:28

## 2023-02-06 RX ADMIN — MEROPENEM 1000 MG: 1 INJECTION, POWDER, FOR SOLUTION INTRAVENOUS at 13:33

## 2023-02-06 RX ADMIN — CLOTRIMAZOLE: 10 CREAM TOPICAL at 10:51

## 2023-02-06 RX ADMIN — TIOTROPIUM BROMIDE INHALATION SPRAY 1 PUFF: 3.12 SPRAY, METERED RESPIRATORY (INHALATION) at 08:44

## 2023-02-06 RX ADMIN — APIXABAN 5 MG: 5 TABLET, FILM COATED ORAL at 09:00

## 2023-02-06 RX ADMIN — LISINOPRIL 2.5 MG: 5 TABLET ORAL at 13:35

## 2023-02-06 RX ADMIN — MOMETASONE FUROATE AND FORMOTEROL FUMARATE DIHYDRATE 2 PUFF: 200; 5 AEROSOL RESPIRATORY (INHALATION) at 08:44

## 2023-02-06 RX ADMIN — SPIRONOLACTONE 25 MG: 25 TABLET ORAL at 13:35

## 2023-02-06 RX ADMIN — FUROSEMIDE 40 MG: 10 INJECTION, SOLUTION INTRAMUSCULAR; INTRAVENOUS at 18:21

## 2023-02-06 RX ADMIN — FUROSEMIDE 40 MG: 10 INJECTION, SOLUTION INTRAMUSCULAR; INTRAVENOUS at 09:00

## 2023-02-06 ASSESSMENT — PAIN SCALES - GENERAL: PAINLEVEL_OUTOF10: 0

## 2023-02-06 NOTE — PLAN OF CARE
Problem: Discharge Planning  Goal: Discharge to home or other facility with appropriate resources  Outcome: Progressing  Flowsheets (Taken 2/6/2023 1307)  Discharge to home or other facility with appropriate resources: Identify barriers to discharge with patient and caregiver     Problem: Safety - Adult  Goal: Free from fall injury  Outcome: Progressing     Problem: Skin/Tissue Integrity  Goal: Absence of new skin breakdown  Description: 1. Monitor for areas of redness and/or skin breakdown  2. Assess vascular access sites hourly  3. Every 4-6 hours minimum:  Change oxygen saturation probe site  4. Every 4-6 hours:  If on nasal continuous positive airway pressure, respiratory therapy assess nares and determine need for appliance change or resting period.   Outcome: Progressing     Problem: Chronic Conditions and Co-morbidities  Goal: Patient's chronic conditions and co-morbidity symptoms are monitored and maintained or improved  Outcome: Progressing  Flowsheets (Taken 2/6/2023 0842)  Care Plan - Patient's Chronic Conditions and Co-Morbidity Symptoms are Monitored and Maintained or Improved: Monitor and assess patient's chronic conditions and comorbid symptoms for stability, deterioration, or improvement     Problem: Pain  Goal: Verbalizes/displays adequate comfort level or baseline comfort level  Outcome: Progressing     Problem: Cognitive:  Goal: Knowledge of wound care  Description: Knowledge of wound care  Outcome: Progressing     Problem: Cognitive:  Goal: Understands risk factors for wounds  Description: Understands risk factors for wounds  Outcome: Progressing     Problem: Wound:  Goal: Will show signs of wound healing; wound closure and no evidence of infection  Description: Will show signs of wound healing; wound closure and no evidence of infection  Outcome: Progressing     Problem: Pressure Ulcer:  Goal: Signs of wound healing will improve  Description: Signs of wound healing will improve  Outcome: Progressing     Problem: Pressure Ulcer:  Goal: Absence of new pressure ulcer  Description: Absence of new pressure ulcer  Outcome: Progressing     Problem: Pressure Ulcer:  Goal: Will show no infection signs and symptoms  Description: Will show no infection signs and symptoms  Outcome: Progressing     Problem: Arterial:  Goal: Optimize blood flow for wound healing  Description: Optimize blood flow for wound healing  Outcome: Progressing     Problem: Venous:  Goal: Signs of wound healing will improve  Description: Signs of wound healing will improve  Outcome: Progressing     Problem: Smoking cessation:  Goal: Ability to formulate a plan to maintain a tobacco-free life will be supported  Description: Ability to formulate a plan to maintain a tobacco-free life will be supported  Outcome: Progressing     Problem: Compression therapy:  Goal: Will be free from complications associated with compression therapy  Description: Will be free from complications associated with compression therapy  Outcome: Progressing     Problem: Weight control:  Goal: Ability to maintain an optimal weight for height and age will be supported  Description: Ability to maintain an optimal weight for height and age will be supported  Outcome: Progressing     Problem: Falls - Risk of:  Goal: Will remain free from falls  Description: Will remain free from falls  Outcome: Progressing     Problem: Blood Glucose:  Goal: Ability to maintain appropriate glucose levels will improve  Description: Ability to maintain appropriate glucose levels will improve  Outcome: Progressing     Problem: Respiratory - Adult  Goal: Achieves optimal ventilation and oxygenation  Outcome: Progressing     Problem: Cardiovascular - Adult  Goal: Maintains optimal cardiac output and hemodynamic stability  Outcome: Progressing  Flowsheets (Taken 2/6/2023 0868)  Maintains optimal cardiac output and hemodynamic stability: Monitor blood pressure and heart rate     Problem: Skin/Tissue Integrity - Adult  Goal: Skin integrity remains intact  Outcome: Progressing  Flowsheets (Taken 2/6/2023 0842)  Skin Integrity Remains Intact: Monitor for areas of redness and/or skin breakdown     Problem: Metabolic/Fluid and Electrolytes - Adult  Goal: Electrolytes maintained within normal limits  Outcome: Progressing  Flowsheets (Taken 2/6/2023 0842)  Electrolytes maintained within normal limits: Monitor labs and assess patient for signs and symptoms of electrolyte imbalances     Problem: Metabolic/Fluid and Electrolytes - Adult  Goal: Hemodynamic stability and optimal renal function maintained  Outcome: Progressing  Flowsheets (Taken 2/6/2023 0842)  Hemodynamic stability and optimal renal function maintained: Monitor labs and assess for signs and symptoms of volume excess or deficit

## 2023-02-06 NOTE — PROGRESS NOTES
Slept off and on throughout night shift. Lisa Reus asleep after removing bipap and sleep apnea episodes increased. Re-educated patient to bipap and replaced bipap. Wore bipap for about 3 hours tonight, tolerated well. In bed, call light in reach.

## 2023-02-06 NOTE — PROGRESS NOTES
Patient wore the Bipap for about an hour and a half and took it off despite the education about benefits of wearing the Bipap

## 2023-02-06 NOTE — CARE COORDINATION
Spoke to Dao at [facility], AN AFFILIATE OF [facility], notified of need for IV antibiotics at discharge. Per rounds patient will likely needs Ertapenem at discharge. Dao to run benefits and make sure no issues with cost of this med. Dao confirms patient is able to return LTC with IVAB pending no issues with cost. No pre cert required to return.     Electronically signed by Vandana Mendoza RN Case Management on 2/6/2023 at 1:50 PM

## 2023-02-06 NOTE — PROGRESS NOTES
Clinical Pharmacy Note  Vancomycin Consult    Ayo Fisher is a 81 y.o. female ordered Vancomycin for sepsis secondary to HAP; consult received from [facility] to manage therapy. Also receiving ceftriaxone. Allergies:  Patient has no known allergies. Temp max:  Temp (24hrs), Av.2 °F (36.8 °C), Min:97.7 °F (36.5 °C), Max:98.9 °F (37.2 °C)      Recent Labs     23  0437 23  0500   WBC 16.0* 12.9*         Recent Labs     23  0437 23  0500 23  0600   BUN 34* 32* 24*   CREATININE 1.1 1.1 1.0           Intake/Output Summary (Last 24 hours) at 2023 0743  Last data filed at 2023 4291  Gross per 24 hour   Intake 2034.63 ml   Output 4850 ml   Net -2815.37 ml         Culture Results:  MRSA probe (2/3/23): positive  Urine culture: E. coli    Ht Readings from Last 1 Encounters:   23 5' 7\" (1.702 m)        Wt Readings from Last 1 Encounters:   23 (!) 367 lb (166.5 kg)         Estimated Creatinine Clearance: 86 mL/min (based on SCr of 1 mg/dL). Assessment:  Day # 4 of vancomycin. Current regimen: 1000 mg every 18 hours  Vancomycin level: 16 mg/L (14 hours after dose)  Predicted AUC: 458 mg/L*hr    Plan:  Continue current regimen. Thank you for the consult.     Jean Smalls [facility]  2023 7:44 AM

## 2023-02-06 NOTE — PROGRESS NOTES
Hospitalist Progress Note    CC: Sepsis ([facility] [address]      Admit date: 2/2/2023  Days in hospital:  4    Subjective/interval history: Pt S/E. Pt wore bipap overnight. Today she is down to 5L hfnc good uop. ROS:   Pertinent items are noted in HPI. Objective:    /81   Pulse (!) 105   Temp 98.9 °F (37.2 °C) (Axillary)   Resp 23   Ht 5' 7\" (1.702 m)   Wt (!) 367 lb (166.5 kg)   SpO2 92%   BMI 57.48 kg/m²     Gen: alert, NAD. Morbidly obese  HEENT: NC/AT, moist mucous membranes  Neck: supple, trachea midline  Heart: Normal s1/s2, tachycardic, no murmurs, gallops, or rubs. Lungs: diminished,  no wheezing, no rhonchi, no use of accessory muscles  Abd: bowel sounds present, soft, nontender, nondistended, no masses  Extrem: No clubbing, cyanosis, no edema  Skin: rt arm with a skin tea no signs of infection. Bl lower legs with psoriasis rash and scaling, erythematous and edematous with chronic lymphedema  Psych: A & O x3, affect appropriate  Neuro: grossly intact, moves all four extremities spontaneously.  No focal deficits  Cap refill: +2 sec    Medications:  Scheduled Meds:   meropenem  1,000 mg IntraVENous Q8H    metoprolol tartrate  12.5 mg Oral BID    digoxin  125 mcg IntraVENous Daily    sodium chloride flush  5-40 mL IntraVENous 2 times per day    tiotropium  1 puff Inhalation Daily    mometasone-formoterol  2 puff Inhalation BID    [Held by provider] atorvastatin  40 mg Oral Daily    vancomycin  1,000 mg IntraVENous Q18H    vancomycin (VANCOCIN) intermittent dosing (placeholder)   Other RX Placeholder    furosemide  40 mg IntraVENous BID    apixaban  5 mg Oral BID    clotrimazole   Topical BID       PRN Meds:  sodium chloride flush, sodium chloride, sodium chloride, acetaminophen **OR** acetaminophen, ipratropium-albuterol    IV:   sodium chloride      sodium chloride      amiodarone 0.5 mg/min (02/06/23 0909)         Intake/Output Summary (Last 24 hours) at 2/6/2023 0934  Last data filed at 2/6/2023 0853  Gross per 24 hour   Intake 2434.63 ml   Output 4850 ml   Net -2415.37 ml       Results:  CBC:   Recent Labs     02/04/23  0437 02/05/23  0500   WBC 16.0* 12.9*   HGB 12.8 12.1   HCT 42.7 38.6   MCV 87.6 86.0    244     BMP:   Recent Labs     02/04/23  0437 02/05/23  0500 02/06/23  0600    141 144   K 4.5 3.9 3.6   CL 96* 98* 96*   CO2 36* 36* 39*   BUN 34* 32* 24*   CREATININE 1.1 1.1 1.0     Mag: No results for input(s): MAG in the last 72 hours. Phos:   Lab Results   Component Value Date    PHOS 3.8 09/26/2020     No results found for: GLU    LIVER PROFILE:   No results for input(s): AST, ALT, LIPASE, BILIDIR, BILITOT, ALKPHOS in the last 72 hours. Invalid input(s): AMYLASE,  ALB    PT/INR: No results for input(s): PROTIME, INR in the last 72 hours. APTT: No results for input(s): APTT in the last 72 hours. UA:  No results for input(s): NITRITE, COLORU, PHUR, LABCAST, WBCUA, RBCUA, MUCUS, TRICHOMONAS, YEAST, BACTERIA, CLARITYU, SPECGRAV, LEUKOCYTESUR, UROBILINOGEN, BILIRUBINUR, BLOODU, GLUCOSEU, AMORPHOUS in the last 72 hours. Invalid input(s): Lacretia Girt input(s): ABG  Lab Results   Component Value Date    CALCIUM 8.0 (L) 02/06/2023    PHOS 3.8 09/26/2020       Assessment:    Principal Problem:    Sepsis ([address]  Active Problems:    Acute on chronic respiratory failure with hypoxia and hypercapnia (HCC)    Atrial fibrillation with RVR (HCC)  Resolved Problems:    * No resolved hospital problems. [facility] course: A 80 yo with h/o chronic hypoxic respiratory failure on 4L at baseline, jake, ohs, Severe morbid obesity, CKD, COPD, HTN, depression, A. Fib admitted with lethargy and respiratory distress, requiring bipap. She had improved the next day and was weaned to 4-5L o2. She has stated she wants dnr-cc. ED work-up patient was hypoxic at approximately 76% and apparently she was hypoxic at 46% per the ECF.   T-max 102.7, heart rate 130s A. fib with RVR, heart rate hypertensive, chest x-ray indicating vascular congestion, troponin 0.05 and BNP elevated. ABG indicating hypercapnia PCO2 68.8  Patient was started on Cardizem as well as broad-spectrum antibiotic therapy coverage for sepsis and pneumonia. Urinalysis still to be collected.      Plan:    Acute on chronic hypoxic and hypercarbic respiratory failure, POA - improving  - so2 < 90% on ra, rr> 18  - supplemental o2 to maintain so2 > 90%, on 5L at baseline - on that now  - on bipap qhs, prn  - stopped steroids  - nebs    Sepsis, POA - improving  Uti  BLE cellulitis may also be a source, possibly secondary infection from psoriasis lesions - vanc day 4, can dc with po doxy  - with criteria: leukocytosis, tachycardia, tachypnea  - blood cultures ngtd  - urine cultures e coli esbl, merrem day 2/10, already has a picc  - Pro-Yahir .87 -> .67, wbc coming down     Acute on chronic hfpef  - last ECHO: 1/2020, lvef 55-60%, ddI, mild lvh, systolic pap cannot be estimated due to insufficient tricuspid regurgitation.   - bnp 10K -> 8839  - lasix 40 mg IV BID  - cont BB, ACEi/ARB/arni  - daily wts are unreliable   - I/Os: - 2.8 L 2 x4 hrs; -5.5 L total  - consult CHF RN  - consult cardiology    Acute encephalopathy - resolved  - multifactorial: toxic vs metabolic, due to infection, co2 narcosis   - head CT negative  - avoid anticholinergics  - treat associated conditions  - avoid sedating meds as able  - supportive care    A. fib with RVR, h/o paf  - amio stopped and changed to lopressor, digoxin  - digoxin given   - cont lopressor  - telemetry  - started eliquis for [id number]     Bilateral lower extremity chronic lymphedema and psoriasis with hyperkeratotic, plaque formations  Wound care consulted  - follows with dermatology, cont protopic for face prn, lidex for scalp, and clotrimazole for legs     Chronic conditions - continue home meds unless otherwise stated  Psoriasis   Morbid obesity due to excess calories (Body mass index is 47.14 kg/(m^2). )   - Complicating assessment and treatment. Placing patient at risk for multiple co-morbidities as well as early death and contributing to the patient's presentation. Counseled on weight loss.    Ckd    Code status:  dnr-cc, she wants to wait until Monday and will then consider hospice  DVT prophylaxis: [] Lovenox  [] SQ Heparin  [] SCDs  [] warfarin/oral direct thrombin inhibitor [] Encourage ambulation      Disposition:  [] Home [] Rehab [] Psych [] SNF  [] LTAC  [] Transfer to ICU  [] Transfer to PCU [] Other: in pt, hope to dc tomorrow with picc and daily ertapenem       Electronically signed by Mirella Elaine DO on 2/6/2023 at 9:34 AM

## 2023-02-06 NOTE — PROGRESS NOTES
HOSPICE [facility]  Talked with Dr. Mary Gill about patient. She reports patient will need 10 days of IV antibiotics and patient is on day 2/10. Reached out to hospice medical director Dr. Emilia Harrington to see if patient can be admitted to hospice program while still on IV antibiotics. He states to have patient finish IV antibiotics first and then hospice will reach out to the patient at their long term care facility to enroll in hospice at that time if patient/family desires to do so. Essentially, he states we can not admit the patient right now since she is still receiving IV antibiotics and plans to continue with IV antibiotics in the future. We will follow at the long term care facility once IV antibiotics are completed and patient/family are agreeable to admission.      [address]  [phone] (work cell)  [phone] (main and referrals)

## 2023-02-06 NOTE — PLAN OF CARE
Problem: Discharge Planning  Goal: Discharge to home or other facility with appropriate resources  2/5/2023 2338 by Margaret Levin RN  Outcome: Progressing     Problem: Safety - Adult  Goal: Free from fall injury  2/5/2023 2338 by Margaret Levin RN  Outcome: Progressing     Problem: Skin/Tissue Integrity  Goal: Absence of new skin breakdown  Description: 1. Monitor for areas of redness and/or skin breakdown  2. Assess vascular access sites hourly  3. Every 4-6 hours minimum:  Change oxygen saturation probe site  4. Every 4-6 hours:  If on nasal continuous positive airway pressure, respiratory therapy assess nares and determine need for appliance change or resting period.   2/5/2023 2338 by Margaret Levin RN  Outcome: Progressing     Problem: Chronic Conditions and Co-morbidities  Goal: Patient's chronic conditions and co-morbidity symptoms are monitored and maintained or improved  2/5/2023 2338 by Margaret Levin RN  Outcome: Progressing     Problem: Pain  Goal: Verbalizes/displays adequate comfort level or baseline comfort level  2/5/2023 2338 by Margaret Levin RN  Outcome: Progressing     Problem: Falls - Risk of:  Goal: Will remain free from falls  Description: Will remain free from falls  2/5/2023 2338 by Margaret Levin RN  Outcome: Progressing     Problem: Metabolic/Fluid and Electrolytes - Adult  Goal: Hemodynamic stability and optimal renal function maintained  2/5/2023 2338 by Margaret Levin RN  Outcome: Progressing

## 2023-02-06 NOTE — PROGRESS NOTES
[address]   Daily Progress Note      Admit Date:  2/2/2023    CC: SOB    HPI:   Lou Bynum is a 81 y.o. female with PMH HF, AF, COPD, HTN, HLP, CKD. Adm to W with SOB. Cardiology consulted for HF and AF. Being treated for UTI. Diuresing with IV lasix and on Amio gtt and IV dig. She is non ambulatory, rests in bed,   On 5LNC which is her baseline. SOB has improved. She has chronic BLE edema with chronic skin color changes. Review of Systems:   General: Denies fever, chills, fatigue, weakness  Skin: Denies skin changes, rash, itching, lesions. HEENT: Denies headache, dizziness, vision changes, nosebleeds, sore throat, nasal drainage  RESP: Denies cough, sputum, dyspnea, wheeze, snoring  CARD: Denies palpitations,  murmur  GI:Denies nausea, vomiting, heartburn, loss of appetite, change in bowels  : Denies frequency, pain, incontinence, polyuria  VASC: Denies claudication, leg cramps, clots  MUSC/SKEL: Denies pain, stiffness, arthritis  PSYCH: Denies anxiety, depression, stress  NEURO: Denies numbness, tingling, weakness,change in mood or memory  HEME: Denies abn bruising, bleeding, anemia  ENDO: Denies intolerance to heat, cold, excessive thirst or hunger, hx thyroid disease    Objective:   /76   Pulse (!) 105   Temp 98 °F (36.7 °C) (Oral)   Resp 23   Ht 5' 7\" (1.702 m)   Wt (!) 367 lb (166.5 kg)   SpO2 92%   BMI 57.48 kg/m²         Intake/Output Summary (Last 24 hours) at 2/6/2023 1225  Last data filed at 2/6/2023 1056  Gross per 24 hour   Intake 1972.63 ml   Output 5600 ml   Net -3627.37 ml     I/O since adm: Neg 5.5L    WEIGHT:Admit Weight: (!) 347 lb 10.7 oz (157.7 kg)         Today  Weight: (!) 367 lb (166.5 kg)   DRY WEIGHT:  Wt Readings from Last 3 Encounters:   02/06/23 (!) 367 lb (166.5 kg)   12/23/21 258 lb 6.1 oz (117.2 kg)   09/16/21 254 lb 13.6 oz (115.6 kg)       Physical Exam:  GEN: Appears obese, no acute distress  SKIN: Pink, warm, dry. HEENT: PERRLA. No adenopathy. LUNG: AP diameter normal. Clear bilateral. No wheeze, rales, or ronchi. Respiratory effort normal.  HEART: S1S2 A/R. No JVD. No carotid bruit. No murmur, rub or gallop. ABD: Soft, nontender. +BS X 4 quads. No hepatomegaly. EXT: Radial and pedal pulses 2+ and symmetric. Without varicosities. No edema. MUSCSKEL: Good ROM X4 extremities. No deformity. NEURO: A/O X3. Calm and cooperative. Telemetry: AF HR 80-90s    Medications:    mupirocin   Nasal BID    meropenem  1,000 mg IntraVENous Q8H    metoprolol tartrate  12.5 mg Oral BID    digoxin  125 mcg IntraVENous Daily    sodium chloride flush  5-40 mL IntraVENous 2 times per day    tiotropium  1 puff Inhalation Daily    mometasone-formoterol  2 puff Inhalation BID    [Held by provider] atorvastatin  40 mg Oral Daily    vancomycin  1,000 mg IntraVENous Q18H    vancomycin (VANCOCIN) intermittent dosing (placeholder)   Other RX Placeholder    furosemide  40 mg IntraVENous BID    apixaban  5 mg Oral BID    clotrimazole   Topical BID      sodium chloride      sodium chloride      amiodarone 0.5 mg/min (02/06/23 0909)     sodium chloride flush, sodium chloride, sodium chloride, acetaminophen **OR** acetaminophen, ipratropium-albuterol    Lab Data: I have reviewed all labs below today.    CBC:   Recent Labs     02/04/23 0437 02/05/23  0500   WBC 16.0* 12.9*   HGB 12.8 12.1   HCT 42.7 38.6   MCV 87.6 86.0    244     BMP:   Recent Labs     02/04/23  0437 02/05/23  0500 02/06/23  0600    141 144   K 4.5 3.9 3.6   CL 96* 98* 96*   CO2 36* 36* 39*   BUN 34* 32* 24*   CREATININE 1.1 1.1 1.0     GLUCOSE:   Recent Labs     02/04/23  0437 02/05/23  0500 02/06/23  0600   GLUCOSE 135* 98 76     LIVER PROFILE:   Lab Results   Component Value Date/Time    AST 14 02/02/2023 07:29 PM    ALT 9 02/02/2023 07:29 PM    LABALBU 2.6 02/02/2023 07:29 PM    BILIDIR <0.2 09/25/2020 06:30 AM    BILITOT 0.8 02/02/2023 07:29 PM    ALKPHOS 131 02/02/2023 07:29 PM     PT/INR:   Lab Results   Component Value Date/Time    PROTIME 11.8 08/08/2020 06:23 AM    INR 1.02 08/08/2020 06:23 AM     APTT:   Lab Results   Component Value Date/Time    APTT 31.7 08/08/2020 06:23 AM     Pro-BNP:    Lab Results   Component Value Date/Time    PROBNP 9,370 02/06/2023 06:00 AM    PROBNP 8,839 02/04/2023 04:37 AM    PROBNP 10,180 02/02/2023 07:29 PM     Parameters:   > 450 pg/mL under age 48  > 900 pg/mL ages 54-65  > 1800 pg/mL over age 76    ENZYMES:  Lab Results   Component Value Date/Time    TROPONINI 0.02 02/03/2023 08:46 AM    TROPONINI 0.03 02/03/2023 06:21 AM    TROPONINI 0.05 02/02/2023 07:29 PM     FASTING LIPID PANEL:No results found for: CHOL, HDL, LDLCALC, TRIG    Diagnostics:    EKG:   Atrial fibrillation with rapid ventricular response  Poor data quality, interpretation may be adversely affected  Abnormal ECG  When compared with ECG of 02-FEB-2023 18:57,  QRS duration has increased  Poor data quality ekgs limits comparison  No significant change suspected  Confirmed by Afshin Christian MD, [address] ([id number]) on 2/4/2023 9:20:35 AM    ECHO: 2013  LEFT VENTRICLE:  The left ventricle is hypertrophied with normal    systolic function. Ejection fraction is 60%. No regional wall     motion abnormalities. RIGHT VENTRICLE:  Right ventricular size and function is normal.    LEFT ATRIUM:  Left atrial size is normal.                           RIGHT ATRIUM:  Right atrial size is normal.                         MITRAL VALVE:  Mitral valve has no stenosis or regurgitation. TRICUSPID VALVE:  Tricuspid valve has mild regurgitation. AORTIC VALVE:  Aortic valve is normal.                              PULMONIC VALVE:  Pulmonic valve is not visualized. AORTA:  Aortic root is normal.                                      PERICARDIUM:  There is no pericardial effusion.                      INTRACARDIAC MASSES:  No intracardiac masses or thrombi. DOPPLER:  Doppler study shows PA systolic pressure of 50-18 mmHg,   suggestive of mild pulmonary hypertension. IMPRESSION:  Concentric left ventricular hypertrophy with normal systolic  function. Ejection fraction is 60%. Mild tricuspid regurgitation and mild  pulmonary hypertension. Assessment/Plan:    1.) AF RVR:  HR improved. Remains in AF. Last documented EKG with NSR 9/2021 but had been paroxysmal with AF prior to that and EKGs show AF since 9/2021. CHADSVASC-  4   HASBLED-  Thromboembolic risk reduction (Preferred DOAC for all patients except mod-severe MR and mechanical valve): started on eliquis. Per DR. Alondra Garcia, plan is to rate control with AV cj blocking agent. Currently on amio gtt - will DC and increased lopressor to 25mg BID, change dig to po.     2.) Acute on chronic diastolic heart failure: Improved. NYHA Class III   Stage C  Cont IV lasix today, change to oral tomorrow and add lisinopril and spironolactone. Last ECHO 2013, pt is DNR and wants limited procedures. Will not repeat at this time.    Consider SGLT2i as OP    Cardiac Rehab for EF <35%: NA  ICD counseling: NA- DNR  2gm Na diet, daily weight, 64 oz fluid restriction  Avoid NSAIDS and other nephrotoxic meds     3.) Acute on chronic resp failure:   Multifactorial COPD and HF    Electronically signed by GABRIELA Pereyra - CNP on 2/6/2023 at 12:25 PM

## 2023-02-06 NOTE — PROGRESS NOTES
Pulmonary Progress Note    Date of Admission: 2/2/2023   LOS: 4 days     Chief Complaint   Patient presents with    Shortness of Breath     Pt from [facility], [facility] with increased SOB. Pt satting 60-70% on NRB and NC upon EMS arrival. Placed onto cpap and given x2 Duonebs and x1 albuterol. Assessment/Plan:   Chronic hypoxemic and hypercapnic respiratory failure  -Continue to wean oxygen goal sat 90%  -BiPAP at night and with naps  -On 5 L of oxygen    COPD  -Dulera, Spiriva,  -DuoNebs prn    Acute on chronic congestive heart failure  -Cardiology following,  -Successful diuresis    Acute cystitis, ESBL E. coli  Lower extremity cellulitis  -On Merrem/vancomycin    Pulmonary standpoint okay for discharge. 24 Hour Events/Subjective  No events overnight. Tolerating home oxygen requirement. Wearing PAP at night    ROS:   No nausea  No Vomiting  No chest pain      Intake/Output Summary (Last 24 hours) at 2/6/2023 0857  Last data filed at 2/6/2023 0853  Gross per 24 hour   Intake 2434.63 ml   Output 4850 ml   Net -2415.37 ml         PHYSICAL EXAM:   Blood pressure 136/81, pulse (!) 105, temperature 98.9 °F (37.2 °C), temperature source Axillary, resp. rate 23, height 5' 7\" (1.702 m), weight (!) 367 lb (166.5 kg), SpO2 92 %.'  Gen:  No acute distress. Eyes: PERRL. Anicteric sclera. No conjunctival injection. ENT: No discharge. Posterior oropharynx clear. External appearance of ears and nose normal.  Neck: Trachea midline. No mass   Resp:  No crackles. No wheezes. No rhonchi. No dullness on percussion. CV: Regular rate. Regular rhythm. No murmur or rub. No edema. GI: Soft, Non-tender. Non-distended. +BS  Skin: Warm, dry, w/o erythema. Lymph: No cervical or supraclavicular LAD. M/S: No cyanosis. No clubbing. Neuro:  no focal neurologic deficit. Moves all extremities  Psych: Awake and alert, Oriented x 3. Judgement and insight appropriate. Mood stable.       Medications:    Scheduled Meds: meropenem  1,000 mg IntraVENous Q8H    metoprolol tartrate  12.5 mg Oral BID    digoxin  125 mcg IntraVENous Daily    sodium chloride flush  5-40 mL IntraVENous 2 times per day    tiotropium  1 puff Inhalation Daily    mometasone-formoterol  2 puff Inhalation BID    [Held by provider] atorvastatin  40 mg Oral Daily    vancomycin  1,000 mg IntraVENous Q18H    vancomycin (VANCOCIN) intermittent dosing (placeholder)   Other RX Placeholder    furosemide  40 mg IntraVENous BID    apixaban  5 mg Oral BID    clotrimazole   Topical BID       Continuous Infusions:   sodium chloride      sodium chloride      amiodarone 0.5 mg/min (02/05/23 0124)       PRN Meds:  sodium chloride flush, sodium chloride, sodium chloride, acetaminophen **OR** acetaminophen, ipratropium-albuterol    Labs reviewed:  CBC:   Recent Labs     02/04/23  0437 02/05/23  0500   WBC 16.0* 12.9*   HGB 12.8 12.1   HCT 42.7 38.6   MCV 87.6 86.0    244     BMP:   Recent Labs     02/04/23  0437 02/05/23  0500 02/06/23  0600    141 144   K 4.5 3.9 3.6   CL 96* 98* 96*   CO2 36* 36* 39*   BUN 34* 32* 24*   CREATININE 1.1 1.1 1.0     LIVER PROFILE:   No results for input(s): AST, ALT, LIPASE, BILIDIR, BILITOT, ALKPHOS in the last 72 hours. Invalid input(s): AMYLASE,  ALB    PT/INR: No results for input(s): PROTIME, INR in the last 72 hours. Films:  Radiology Review:  Pertinent images / reports were reviewed as a part of this visit. This note was transcribed using [id number] [employer]. Please disregard any translational errors.     Thank you for this consult,    Tommy Negron MD  [facility] Pulmonary, Critical Care, and Sleep Medicine

## 2023-02-06 NOTE — CARE COORDINATION
Wound care re-consulted for arm and leg wounds. Pt seen Friday 2/3. Skin tear dressed to RFA. Notified MD of treatment recommendations. Orders placed. Please refer to previous note. Will continue to follow.   Electronically signed by Azam Willosn RN CWOCN on 2/6/2023 at 9:43 AM

## 2023-02-07 VITALS
SYSTOLIC BLOOD PRESSURE: 107 MMHG | HEIGHT: 67 IN | HEART RATE: 102 BPM | RESPIRATION RATE: 20 BRPM | WEIGHT: 293 LBS | BODY MASS INDEX: 45.99 KG/M2 | DIASTOLIC BLOOD PRESSURE: 72 MMHG | OXYGEN SATURATION: 92 % | TEMPERATURE: 98.6 F

## 2023-02-07 LAB
ANION GAP SERPL CALCULATED.3IONS-SCNC: 8 MMOL/L (ref 3–16)
BUN BLDV-MCNC: 19 MG/DL (ref 7–20)
CALCIUM SERPL-MCNC: 8.9 MG/DL (ref 8.3–10.6)
CHLORIDE BLD-SCNC: 93 MMOL/L (ref 99–110)
CO2: 39 MMOL/L (ref 21–32)
CREAT SERPL-MCNC: 0.7 MG/DL (ref 0.6–1.2)
GFR SERPL CREATININE-BSD FRML MDRD: >60 ML/MIN/{1.73_M2}
GLUCOSE BLD-MCNC: 133 MG/DL (ref 70–99)
POTASSIUM SERPL-SCNC: 3.2 MMOL/L (ref 3.5–5.1)
SARS-COV-2, NAAT: NOT DETECTED
SODIUM BLD-SCNC: 140 MMOL/L (ref 136–145)

## 2023-02-07 PROCEDURE — 6370000000 HC RX 637 (ALT 250 FOR IP): Performed by: HOSPITALIST

## 2023-02-07 PROCEDURE — 6370000000 HC RX 637 (ALT 250 FOR IP): Performed by: FAMILY MEDICINE

## 2023-02-07 PROCEDURE — 6360000002 HC RX W HCPCS: Performed by: FAMILY MEDICINE

## 2023-02-07 PROCEDURE — 94640 AIRWAY INHALATION TREATMENT: CPT

## 2023-02-07 PROCEDURE — 87635 SARS-COV-2 COVID-19 AMP PRB: CPT

## 2023-02-07 PROCEDURE — 2700000000 HC OXYGEN THERAPY PER DAY

## 2023-02-07 PROCEDURE — 6360000002 HC RX W HCPCS: Performed by: NURSE PRACTITIONER

## 2023-02-07 PROCEDURE — 2580000003 HC RX 258: Performed by: NURSE PRACTITIONER

## 2023-02-07 PROCEDURE — 6360000002 HC RX W HCPCS: Performed by: HOSPITALIST

## 2023-02-07 PROCEDURE — 6370000000 HC RX 637 (ALT 250 FOR IP): Performed by: NURSE PRACTITIONER

## 2023-02-07 PROCEDURE — 94761 N-INVAS EAR/PLS OXIMETRY MLT: CPT

## 2023-02-07 PROCEDURE — 2580000003 HC RX 258: Performed by: HOSPITALIST

## 2023-02-07 PROCEDURE — 2580000003 HC RX 258: Performed by: FAMILY MEDICINE

## 2023-02-07 PROCEDURE — 36592 COLLECT BLOOD FROM PICC: CPT

## 2023-02-07 PROCEDURE — 99232 SBSQ HOSP IP/OBS MODERATE 35: CPT | Performed by: INTERNAL MEDICINE

## 2023-02-07 PROCEDURE — 80048 BASIC METABOLIC PNL TOTAL CA: CPT

## 2023-02-07 PROCEDURE — 94660 CPAP INITIATION&MGMT: CPT

## 2023-02-07 RX ORDER — SPIRONOLACTONE 25 MG/1
25 TABLET ORAL DAILY
Qty: 30 TABLET | Refills: 3 | DISCHARGE
Start: 2023-02-08

## 2023-02-07 RX ORDER — LISINOPRIL 2.5 MG/1
2.5 TABLET ORAL DAILY
Qty: 30 TABLET | Refills: 3 | DISCHARGE
Start: 2023-02-08

## 2023-02-07 RX ORDER — POTASSIUM CHLORIDE 750 MG/1
40 TABLET, FILM COATED, EXTENDED RELEASE ORAL ONCE
Status: COMPLETED | OUTPATIENT
Start: 2023-02-07 | End: 2023-02-07

## 2023-02-07 RX ORDER — FUROSEMIDE 10 MG/ML
40 INJECTION INTRAMUSCULAR; INTRAVENOUS 2 TIMES DAILY
Status: DISCONTINUED | OUTPATIENT
Start: 2023-02-07 | End: 2023-02-07

## 2023-02-07 RX ORDER — DIGOXIN 125 MCG
125 TABLET ORAL DAILY
Qty: 30 TABLET | Refills: 3 | DISCHARGE
Start: 2023-02-08

## 2023-02-07 RX ORDER — LINEZOLID 600 MG/1
600 TABLET, FILM COATED ORAL 2 TIMES DAILY
Refills: 0 | DISCHARGE
Start: 2023-02-07 | End: 2023-02-12

## 2023-02-07 RX ORDER — FUROSEMIDE 40 MG/1
40 TABLET ORAL DAILY
Status: DISCONTINUED | OUTPATIENT
Start: 2023-02-07 | End: 2023-02-08 | Stop reason: HOSPADM

## 2023-02-07 RX ADMIN — MOMETASONE FUROATE AND FORMOTEROL FUMARATE DIHYDRATE 2 PUFF: 200; 5 AEROSOL RESPIRATORY (INHALATION) at 20:43

## 2023-02-07 RX ADMIN — CLOTRIMAZOLE: 10 CREAM TOPICAL at 12:01

## 2023-02-07 RX ADMIN — CLOTRIMAZOLE: 10 CREAM TOPICAL at 20:16

## 2023-02-07 RX ADMIN — AMIODARONE HYDROCHLORIDE 0.5 MG/MIN: 50 INJECTION, SOLUTION INTRAVENOUS at 01:24

## 2023-02-07 RX ADMIN — SPIRONOLACTONE 25 MG: 25 TABLET ORAL at 10:25

## 2023-02-07 RX ADMIN — DIGOXIN 125 MCG: 125 TABLET ORAL at 10:25

## 2023-02-07 RX ADMIN — VANCOMYCIN HYDROCHLORIDE 1000 MG: 1 INJECTION, POWDER, LYOPHILIZED, FOR SOLUTION INTRAVENOUS at 03:10

## 2023-02-07 RX ADMIN — LISINOPRIL 2.5 MG: 5 TABLET ORAL at 10:25

## 2023-02-07 RX ADMIN — TIOTROPIUM BROMIDE INHALATION SPRAY 1 PUFF: 3.12 SPRAY, METERED RESPIRATORY (INHALATION) at 08:16

## 2023-02-07 RX ADMIN — APIXABAN 5 MG: 5 TABLET, FILM COATED ORAL at 10:25

## 2023-02-07 RX ADMIN — APIXABAN 5 MG: 5 TABLET, FILM COATED ORAL at 22:12

## 2023-02-07 RX ADMIN — MEROPENEM 1000 MG: 1 INJECTION, POWDER, FOR SOLUTION INTRAVENOUS at 20:14

## 2023-02-07 RX ADMIN — MUPIROCIN: 20 OINTMENT TOPICAL at 10:29

## 2023-02-07 RX ADMIN — FUROSEMIDE 40 MG: 40 TABLET ORAL at 17:21

## 2023-02-07 RX ADMIN — FUROSEMIDE 40 MG: 10 INJECTION, SOLUTION INTRAMUSCULAR; INTRAVENOUS at 10:25

## 2023-02-07 RX ADMIN — MEROPENEM 1000 MG: 1 INJECTION, POWDER, FOR SOLUTION INTRAVENOUS at 12:55

## 2023-02-07 RX ADMIN — POTASSIUM CHLORIDE 40 MEQ: 750 TABLET, FILM COATED, EXTENDED RELEASE ORAL at 17:21

## 2023-02-07 RX ADMIN — Medication 10 ML: at 20:18

## 2023-02-07 RX ADMIN — SODIUM CHLORIDE: 9 INJECTION, SOLUTION INTRAVENOUS at 12:52

## 2023-02-07 RX ADMIN — METOPROLOL TARTRATE 25 MG: 25 TABLET, FILM COATED ORAL at 22:12

## 2023-02-07 RX ADMIN — METOPROLOL TARTRATE 25 MG: 25 TABLET, FILM COATED ORAL at 10:25

## 2023-02-07 RX ADMIN — MEROPENEM 1000 MG: 1 INJECTION, POWDER, FOR SOLUTION INTRAVENOUS at 04:19

## 2023-02-07 RX ADMIN — MOMETASONE FUROATE AND FORMOTEROL FUMARATE DIHYDRATE 2 PUFF: 200; 5 AEROSOL RESPIRATORY (INHALATION) at 08:18

## 2023-02-07 RX ADMIN — VANCOMYCIN HYDROCHLORIDE 1000 MG: 1 INJECTION, POWDER, LYOPHILIZED, FOR SOLUTION INTRAVENOUS at 21:03

## 2023-02-07 NOTE — PLAN OF CARE
Problem: Discharge Planning  Goal: Discharge to home or other facility with appropriate resources  2/7/2023 0012 by Manoj Bell RN  Outcome: Progressing  Flowsheets (Taken 2/6/2023 1936)  Discharge to home or other facility with appropriate resources: Identify barriers to discharge with patient and caregiver     Problem: Safety - Adult  Goal: Free from fall injury  2/7/2023 0012 by Manoj Bell RN  Outcome: Progressing     Problem: Skin/Tissue Integrity  Goal: Absence of new skin breakdown  Description: 1. Monitor for areas of redness and/or skin breakdown  2. Assess vascular access sites hourly  3. Every 4-6 hours minimum:  Change oxygen saturation probe site  4. Every 4-6 hours:  If on nasal continuous positive airway pressure, respiratory therapy assess nares and determine need for appliance change or resting period.   2/7/2023 0012 by Manoj Bell RN  Outcome: Progressing     Problem: Chronic Conditions and Co-morbidities  Goal: Patient's chronic conditions and co-morbidity symptoms are monitored and maintained or improved  2/7/2023 0012 by Manoj Bell RN  Outcome: Progressing  Flowsheets (Taken 2/6/2023 1936)  Care Plan - Patient's Chronic Conditions and Co-Morbidity Symptoms are Monitored and Maintained or Improved: Monitor and assess patient's chronic conditions and comorbid symptoms for stability, deterioration, or improvement     Problem: Cognitive:  Goal: Knowledge of wound care  Description: Knowledge of wound care  2/7/2023 0012 by Manoj Bell RN  Outcome: Progressing     Problem: Venous:  Goal: Signs of wound healing will improve  Description: Signs of wound healing will improve  2/7/2023 0012 by Manoj Bell RN  Outcome: Progressing     Problem: Falls - Risk of:  Goal: Will remain free from falls  Description: Will remain free from falls  2/7/2023 0012 by Manoj Bell RN  Outcome: Progressing

## 2023-02-07 NOTE — PROGRESS NOTES
[facility], [place]   Daily Progress Note      Admit Date:  2/2/2023    CC: SOB    HPI:   Siri Delgadillo is a 81 y.o. female with PMH HF, AF, COPD, HTN, HLP, CKD. Adm to [facility] with SOB. Cardiology consulted for HF and AF. Being treated for UTI. Diuresing with IV lasix and on Amio gtt and IV dig. She is non ambulatory, rests in bed,   On 5LNC which is her baseline. SOB has improved. She has chronic BLE edema with chronic skin color changes. Review of Systems:   General: Denies fever, chills, fatigue, weakness  Skin: Denies skin changes, rash, itching, lesions. HEENT: Denies headache, dizziness, vision changes, nosebleeds, sore throat, nasal drainage  RESP: Denies cough, sputum, dyspnea, wheeze, snoring  CARD: Denies palpitations,  murmur  GI:Denies nausea, vomiting, heartburn, loss of appetite, change in bowels  : Denies frequency, pain, incontinence, polyuria  VASC: Denies claudication, leg cramps, clots  MUSC/SKEL: Denies pain, stiffness, arthritis  PSYCH: Denies anxiety, depression, stress  NEURO: Denies numbness, tingling, weakness,change in mood or memory  HEME: Denies abn bruising, bleeding, anemia  ENDO: Denies intolerance to heat, cold, excessive thirst or hunger, hx thyroid disease    Objective:   /70   Pulse (!) 101   Temp 97.4 °F (36.3 °C) (Oral)   Resp 16   Ht 5' 7\" (1.702 m)   Wt (!) 373 lb (169.2 kg)   SpO2 95%   BMI 58.42 kg/m²         Intake/Output Summary (Last 24 hours) at 2/7/2023 0916  Last data filed at 2/7/2023 2035  Gross per 24 hour   Intake 2407.98 ml   Output 5850 ml   Net -3442.02 ml     I/O since adm: Neg 8.3L    WEIGHT:Admit Weight: (!) 347 lb 10.7 oz (157.7 kg)         Today  Weight: (!) 373 lb (169.2 kg)   DRY WEIGHT:  Wt Readings from Last 3 Encounters:   02/07/23 (!) 373 lb (169.2 kg)   12/23/21 258 lb 6.1 oz (117.2 kg)   09/16/21 254 lb 13.6 oz (115.6 kg)       Physical Exam:  GEN: Appears obese, no acute distress  SKIN: Pink, warm, dry. HEENT: PERRLA. No adenopathy. LUNG: AP diameter normal. Clear bilateral. No wheeze, rales, or ronchi. Respiratory effort normal.  HEART: S1S2 A/R. No JVD. No carotid bruit. No murmur, rub or gallop. ABD: Soft, nontender. +BS X 4 quads. No hepatomegaly. EXT: Radial and pedal pulses 2+ and symmetric. Without varicosities. No edema. MUSCSKEL: Good ROM X4 extremities. No deformity. NEURO: A/O X3. Calm and cooperative. Telemetry: AF HR 80-90s    Medications:    mupirocin   Nasal BID    metoprolol tartrate  25 mg Oral BID    digoxin  125 mcg Oral Daily    lisinopril  2.5 mg Oral Daily    spironolactone  25 mg Oral Daily    meropenem  1,000 mg IntraVENous Q8H    sodium chloride flush  5-40 mL IntraVENous 2 times per day    tiotropium  1 puff Inhalation Daily    mometasone-formoterol  2 puff Inhalation BID    [Held by provider] atorvastatin  40 mg Oral Daily    vancomycin  1,000 mg IntraVENous Q18H    vancomycin (VANCOCIN) intermittent dosing (placeholder)   Other RX Placeholder    apixaban  5 mg Oral BID    clotrimazole   Topical BID      sodium chloride      sodium chloride      amiodarone 0.5 mg/min (02/07/23 0124)     sodium chloride flush, sodium chloride, sodium chloride, acetaminophen **OR** acetaminophen, ipratropium-albuterol    Lab Data: I have reviewed all labs below today.    CBC:   Recent Labs     02/05/23  0500   WBC 12.9*   HGB 12.1   HCT 38.6   MCV 86.0        BMP:   Recent Labs     02/05/23  0500 02/06/23  0600    144   K 3.9 3.6   CL 98* 96*   CO2 36* 39*   BUN 32* 24*   CREATININE 1.1 1.0     GLUCOSE:   Recent Labs     02/05/23  0500 02/06/23  0600   GLUCOSE 98 76     LIVER PROFILE:   Lab Results   Component Value Date/Time    AST 14 02/02/2023 07:29 PM    ALT 9 02/02/2023 07:29 PM    LABALBU 2.6 02/02/2023 07:29 PM    BILIDIR <0.2 09/25/2020 06:30 AM    BILITOT 0.8 02/02/2023 07:29 PM    ALKPHOS 131 02/02/2023 07:29 PM     PT/INR:   Lab Results   Component Value Date/Time PROTIME 11.8 08/08/2020 06:23 AM    INR 1.02 08/08/2020 06:23 AM     APTT:   Lab Results   Component Value Date/Time    APTT 31.7 08/08/2020 06:23 AM     Pro-BNP:    Lab Results   Component Value Date/Time    PROBNP 9,370 02/06/2023 06:00 AM    PROBNP 8,839 02/04/2023 04:37 AM    PROBNP 10,180 02/02/2023 07:29 PM     Parameters:   > 450 pg/mL under age 48  > 900 pg/mL ages 54-65  > 1800 pg/mL over age 76    ENZYMES:  Lab Results   Component Value Date/Time    TROPONINI 0.02 02/03/2023 08:46 AM    TROPONINI 0.03 02/03/2023 06:21 AM    TROPONINI 0.05 02/02/2023 07:29 PM     FASTING LIPID PANEL:No results found for: CHOL, HDL, LDLCALC, TRIG    Diagnostics:    EKG:   Atrial fibrillation with rapid ventricular response  Poor data quality, interpretation may be adversely affected  Abnormal ECG  When compared with ECG of 02-FEB-2023 18:57,  QRS duration has increased  Poor data quality ekgs limits comparison  No significant change suspected  Confirmed by Salma Doe MD, [facility] ([id number]) on 2/4/2023 9:20:35 AM    ECHO: 2013  LEFT VENTRICLE:  The left ventricle is hypertrophied with normal    systolic function. Ejection fraction is 60%. No regional wall     motion abnormalities. RIGHT VENTRICLE:  Right ventricular size and function is normal.    LEFT ATRIUM:  Left atrial size is normal.                           RIGHT ATRIUM:  Right atrial size is normal.                         MITRAL VALVE:  Mitral valve has no stenosis or regurgitation. TRICUSPID VALVE:  Tricuspid valve has mild regurgitation. AORTIC VALVE:  Aortic valve is normal.                              PULMONIC VALVE:  Pulmonic valve is not visualized. AORTA:  Aortic root is normal.                                      PERICARDIUM:  There is no pericardial effusion. INTRACARDIAC MASSES:  No intracardiac masses or thrombi.             DOPPLER:  Doppler study shows PA systolic pressure of 37-59 mmHg,   suggestive of mild pulmonary hypertension. IMPRESSION:  Concentric left ventricular hypertrophy with normal systolic  function. Ejection fraction is 60%. Mild tricuspid regurgitation and mild  pulmonary hypertension. Assessment/Plan:    1.) AF RVR:  HR improved. Remains in AF. HR controlled. Last documented EKG with NSR 9/2021 but had been paroxysmal with AF prior to that and now EKGs show AF since 9/2021. CHADSVASC-  4   HASBLED-  Thromboembolic risk reduction (Preferred DOAC for all patients except mod-severe MR and mechanical valve): started on eliquis. Per DR. Geovanna Montanez, plan is to rate control with AV cj blocking agent. amio gtt stopped 2/6 - increased lopressor to 25mg BID, change dig to po.     2.) Acute on chronic diastolic heart failure: Improved. NYHA Class III   Stage C  IV lasix >change to oral, cont lisinopril and spironolactone. Last ECHO 2013, pt is DNR and wants limited procedures. Will not repeat at this time. Consider SGLT2i as OP    Cardiac Rehab for EF <35%: NA  ICD counseling: NA- DNR  2gm Na diet, daily weight, 64 oz fluid restriction  Avoid NSAIDS and other nephrotoxic meds     3.) Acute on chronic resp failure:   Multifactorial COPD and HF    Pt is stable from CV standpoint and will sign off. FU [facility] 2-3 weeks.      Electronically signed by GABRIELA Patrick CNP on 2/7/2023 at 9:16 AM

## 2023-02-07 NOTE — PLAN OF CARE
Problem: Discharge Planning  Goal: Discharge to home or other facility with appropriate resources  2/7/2023 1134 by Isiah Wilde RN  Outcome: Progressing     Problem: Safety - Adult  Goal: Free from fall injury  2/7/2023 1134 by Isiah Wilde RN  Outcome: Progressing     Problem: Skin/Tissue Integrity  Goal: Absence of new skin breakdown  Description: 1. Monitor for areas of redness and/or skin breakdown  2. Assess vascular access sites hourly  3. Every 4-6 hours minimum:  Change oxygen saturation probe site  4. Every 4-6 hours:  If on nasal continuous positive airway pressure, respiratory therapy assess nares and determine need for appliance change or resting period.   2/7/2023 1134 by Isiah Wilde RN  Outcome: Progressing     Problem: Chronic Conditions and Co-morbidities  Goal: Patient's chronic conditions and co-morbidity symptoms are monitored and maintained or improved  2/7/2023 1134 by Isiah Wilde RN  Outcome: Progressing     Problem: Pain  Goal: Verbalizes/displays adequate comfort level or baseline comfort level  2/7/2023 1134 by Isiah Wilde RN  Outcome: Progressing     Problem: Cognitive:  Goal: Knowledge of wound care  Description: Knowledge of wound care  2/7/2023 1134 by Isiah Wilde RN  Outcome: Progressing     Problem: Cognitive:  Goal: Understands risk factors for wounds  Description: Understands risk factors for wounds  2/7/2023 1134 by Isiah Wilde RN  Outcome: Progressing     Problem: Wound:  Goal: Will show signs of wound healing; wound closure and no evidence of infection  Description: Will show signs of wound healing; wound closure and no evidence of infection  2/7/2023 1134 by Isiah Wilde RN  Outcome: Progressing  2/7/2023 0012 by Eric Anand RN  Outcome: Progressing     Problem: Pressure Ulcer:  Goal: Signs of wound healing will improve  Description: Signs of wound healing will improve  2/7/2023 1134 by Isiah Wilde RN  Outcome: Progressing     Problem: Pressure Ulcer:  Goal: Absence of new pressure ulcer  Description: Absence of new pressure ulcer  2/7/2023 1134 by Emerita Genao RN  Outcome: Progressing     Problem: Pressure Ulcer:  Goal: Will show no infection signs and symptoms  Description: Will show no infection signs and symptoms  2/7/2023 1134 by Emerita Genao RN  Outcome: Progressing     Problem: Arterial:  Goal: Optimize blood flow for wound healing  Description: Optimize blood flow for wound healing  2/7/2023 1134 by Emerita Genao RN  Outcome: Progressing     Problem: Venous:  Goal: Signs of wound healing will improve  Description: Signs of wound healing will improve  2/7/2023 1134 by Emerita Genao RN  Outcome: Progressing     Problem: Smoking cessation:  Goal: Ability to formulate a plan to maintain a tobacco-free life will be supported  Description: Ability to formulate a plan to maintain a tobacco-free life will be supported  2/7/2023 1134 by Emerita Genao RN  Outcome: Progressing     Problem: Compression therapy:  Goal: Will be free from complications associated with compression therapy  Description: Will be free from complications associated with compression therapy  2/7/2023 1134 by Emerita Genao RN  Outcome: Progressing     Problem: Weight control:  Goal: Ability to maintain an optimal weight for height and age will be supported  Description: Ability to maintain an optimal weight for height and age will be supported  2/7/2023 1134 by Emerita Genao RN  Outcome: Progressing     Problem: Falls - Risk of:  Goal: Will remain free from falls  Description: Will remain free from falls  2/7/2023 1134 by Emerita Genao RN  Outcome: Progressing     Problem: Blood Glucose:  Goal: Ability to maintain appropriate glucose levels will improve  Description: Ability to maintain appropriate glucose levels will improve  2/7/2023 1134 by PHOENIX Elliott RN  Outcome: Progressing     Problem: Respiratory - Adult  Goal: Achieves optimal ventilation and oxygenation  2/7/2023 1134 by Jose Franklin RN  Outcome: Progressing     Problem: Cardiovascular - Adult  Goal: Maintains optimal cardiac output and hemodynamic stability  2/7/2023 1134 by Jose Franklin RN  Outcome: Progressing     Problem: Skin/Tissue Integrity - Adult  Goal: Skin integrity remains intact  2/7/2023 1134 by Jose Franklin RN  Outcome: Progressing     Problem: Metabolic/Fluid and Electrolytes - Adult  Goal: Electrolytes maintained within normal limits  2/7/2023 1134 by Jose Franklin RN  Outcome: Progressing     Problem: Metabolic/Fluid and Electrolytes - Adult  Goal: Hemodynamic stability and optimal renal function maintained  2/7/2023 1134 by Jose Franklin RN  Outcome: Progressing     Problem: Neurosensory - Adult  Goal: Achieves stable or improved neurological status  Outcome: Progressing     Problem: Neurosensory - Adult  Goal: Achieves maximal functionality and self care  Outcome: Progressing     Problem: Musculoskeletal - Adult  Goal: Return mobility to safest level of function  Outcome: Progressing     Problem: Musculoskeletal - Adult  Goal: Return ADL status to a safe level of function  Outcome: Progressing     Problem: Gastrointestinal - Adult  Goal: Maintains adequate nutritional intake  Outcome: Progressing     Problem: Genitourinary - Adult  Goal: Absence of urinary retention  Outcome: Progressing     Problem: Genitourinary - Adult  Goal: Urinary catheter remains patent  Outcome: Progressing     Problem: Infection - Adult  Goal: Absence of infection at discharge  Outcome: Progressing     Problem: Infection - Adult  Goal: Absence of infection during hospitalization  Outcome: Progressing     Problem: ABCDS Injury Assessment  Goal: Absence of physical injury  Outcome: Progressing

## 2023-02-07 NOTE — DISCHARGE SUMMARY
Hospital Medicine Discharge Summary      Patient Identification:   Siri Delgadillo   : 1950  MRN: [id number]   Account: [de-identified]      Patient's PCP: Sawyer Ugalde MD    Admit Date: 2023     Discharge Date:       Admitting Physician: Kraig Shone, MD     Discharge Physician: Destiny Finch MD     Consults:     IP CONSULT TO PHARMACY  IP CONSULT TO SOCIAL WORK  IP CONSULT TO HOSPICE  IP CONSULT TO CARDIOLOGY  IP CONSULT TO PULMONOLOGY    Disposition: ECF followed by Hospice     Condition at Discharge: Terminal    Code Status:  [facility]       Discharge Diagnoses:  Acute on chronic hypoxic and hypercapnic respiratory failure  Acute on chronic diastolic CHF  Bilateral lower extremity cellulitis with sepsis  ESBL urinary tract infection  Acute toxic metabolic encephalopathy. A-fib with RVR chronic lymphedema  Psoriasis  Morbid obesity      Hospital Course:   Siri Delgadillo is a 81 y.o. female hospitalized   2023    sepsis, acute on chronic hypoxic and hypercapnic respiratory failure. Patient treated with respiratory care protocol, BiPAP. Evaluated by pulmonary team.  Broad-spectrum antibiotic initiated for sepsis. Culture data revealed ESBL urinary tract infection, patient was also treated for bilateral cellulitis in the setting of psoriasis/lymphedema. Blood culture remain negative. Mental status improved. During her stay patient noted to have A-fib with RVR, evaluated by cardiology service in consultation, started on Cardizem infusion. Heart rate improved. Goal of care discussed with patient, hospice service was entertained. Eventually plan patient was discharged to extended-care facility to complete her antibiotic's and reassess recovery otherwise patient will sign with hospice. Patient discharged on Invanz and Zyvox to complete antibiotic therapy. Patient seen and examined in the day of discharge. Vital signs reviewed.    /70   Pulse 91   Temp 98.4 °F (36.9 °C) (Oral)   Resp 22   Ht 5' 7\" (1.702 m)   Wt (!) 373 lb (169.2 kg)   SpO2 93%   BMI 58.42 kg/m²     Patient alert, oriented, obese, no acute distress, on 5 L of oxygen, diminished air entry, distant heart sound, severe bilateral lower extremity edema, psoriatic lesions noted. *. Patient reached the maximum benefit of this hospitalization. Patient  was hemodynamically stable on discharge   Available consultant are in agreement with discharge planning. Patient symptoms was controlled and in agreement with discharge planning. Patient Instructions:    Discharge lab/important testing/finding that need follow up :  Completing Invanz, Zyvox  BiPAP at night  Revisit hospice consult    Activity: activity as tolerated    Diet: ADULT DIET; Regular; Low Fat/Low Chol/High Fiber/2 gm Na; 1800 ml      Follow-up visits:   No follow-up provider specified.        Discharge Medications:       Current Discharge Medication List        START taking these medications    Details   digoxin (LANOXIN) 125 MCG tablet Take 1 tablet by mouth daily  Qty: 30 tablet, Refills: 3      lisinopril (PRINIVIL;ZESTRIL) 2.5 MG tablet Take 1 tablet by mouth daily  Qty: 30 tablet, Refills: 3      metoprolol tartrate (LOPRESSOR) 25 MG tablet Take 1 tablet by mouth 2 times daily  Qty: 60 tablet, Refills: 3      spironolactone (ALDACTONE) 25 MG tablet Take 1 tablet by mouth daily  Qty: 30 tablet, Refills: 3           Current Discharge Medication List        Current Discharge Medication List        CONTINUE these medications which have NOT CHANGED    Details   polyvinyl alcohol (LIQUIFILM TEARS) 1.4 % ophthalmic solution Place 2 drops into both eyes every 4 hours as needed      diphenhydrAMINE (BENADRYL) 25 MG tablet Take 25 mg by mouth every 6 hours as needed for Itching      Menthol, Topical Analgesic, (BIOFREEZE) 4 % GEL Apply to left shoulder topically every shift for psoriasis      calcipotriene (DOVONEX) 0.005 % cream Apply topically 2 times daily Apply topically every shift for psoriasis. ferrous sulfate (IRON 325) 325 (65 Fe) MG tablet Take 325 mg by mouth daily (with breakfast)      tacrolimus (PROTOPIC) 0.1 % ointment Apply twice daily as needed for flared areas on the face      INCRUSE ELLIPTA 62.5 MCG/ACT AEPB Inhale 1 puff into the lungs daily      clobetasol (TEMOVATE) 0.05 % ointment Apply topically 2 times a day. Twice daily to flared areas on the legs, arms, elbows and belly buttons      cyclobenzaprine (FLEXERIL) 10 MG tablet Take 10 mg by mouth every 6 hours as needed for Muscle spasms      DULoxetine (CYMBALTA) 30 MG extended release capsule Take 30 mg by mouth daily      acetaminophen (TYLENOL) 325 MG tablet Take 650 mg by mouth every 6 hours as needed for Pain or Fever       hydrocortisone (ANUSOL-HC) 25 MG suppository Place 25 mg rectally every 6 hours as needed for Hemorrhoids       bisacodyl (DULCOLAX) 10 MG suppository Place 10 mg rectally daily as needed for Constipation       magnesium hydroxide (MILK OF MAGNESIA) 400 MG/5ML suspension Take 30 mLs by mouth daily as needed for Constipation       polyethylene glycol (GLYCOLAX) 17 g packet Take 17 g by mouth daily as needed for Constipation      nystatin (MYCOSTATIN) 025943 UNIT/GM powder Apply topically 4 times daily Apply topically 4 times daily.       sennosides-docusate sodium (SENOKOT-S) 8.6-50 MG tablet Take 2 tablets by mouth every 12 hours as needed for Constipation       lactobacillus (CULTURELLE) capsule Take 2 capsules by mouth 2 times daily (with meals)  Qty: 30 capsule, Refills: 0      pantoprazole (PROTONIX) 40 MG tablet Take 1 tablet by mouth daily  Qty: 30 tablet, Refills: 0      potassium chloride (KLOR-CON M) 20 MEQ extended release tablet Take 20 mEq by mouth daily      atorvastatin (LIPITOR) 40 MG tablet Take 40 mg by mouth daily      furosemide (LASIX) 40 MG tablet Take 1 tablet by mouth daily  Qty: 60 tablet, Refills: 0      gabapentin (NEURONTIN) 600 MG tablet Take 600 mg by mouth 3 times daily. fluticasone-salmeterol (ADVAIR) 250-50 MCG/DOSE AEPB Inhale 1 puff into the lungs 2 times daily       ipratropium-albuterol (DUONEB) 0.5-2.5 (3) MG/3ML SOLN nebulizer solution Inhale 3 mLs into the lungs every 2 hours as needed for Shortness of Breath      albuterol sulfate  (90 Base) MCG/ACT inhaler Inhale 2 puffs into the lungs every 4 hours as needed for Shortness of Breath            Current Discharge Medication List        STOP taking these medications       HYDROcodone-acetaminophen (NORCO) 5-325 MG per tablet Comments:   Reason for Stopping:         dilTIAZem (CARDIZEM CD) 120 MG extended release capsule Comments:   Reason for Stopping:         apixaban (ELIQUIS) 5 MG TABS tablet Comments:   Reason for Stopping:                 Labs: For convenience and continuity at follow-up the following most recent labs are provided:      CBC:    Lab Results   Component Value Date/Time    WBC 12.9 02/05/2023 05:00 AM    HGB 12.1 02/05/2023 05:00 AM    HCT 38.6 02/05/2023 05:00 AM     02/05/2023 05:00 AM       Renal:    Lab Results   Component Value Date/Time     02/06/2023 06:00 AM    K 3.6 02/06/2023 06:00 AM    CL 96 02/06/2023 06:00 AM    CO2 39 02/06/2023 06:00 AM    BUN 24 02/06/2023 06:00 AM    CREATININE 1.0 02/06/2023 06:00 AM    CALCIUM 8.0 02/06/2023 06:00 AM    PHOS 3.8 09/26/2020 05:18 AM         Significant Diagnostic Studies    Radiology:   XR CHEST PORTABLE   Final Result   Left PICC line terminates at the superior caval atrial junction. CT HEAD WO CONTRAST   Final Result   No acute intracranial abnormality. XR CHEST PORTABLE   Final Result   Mild pulmonary vascular congestion. Time Spent on discharge is 55 in the examination, evaluation, counseling and review of medications and discharge plan.           Thank you Sushila Beckham MD for the opportunity to be involved in this patient's care.          Electronically signed by Robert Zelaya MD on 2/7/2023 at 2:04 PM

## 2023-02-07 NOTE — CARE COORDINATION
CASE MANAGEMENT DISCHARGE SUMMARY:    DISCHARGE DATE: 2/7/23    DISCHARGED TO:    Name: [facility], [facility] OF [facility]   Address:  [address]   Report Number:  [phone]  Fax:  [phone]     [facility]             TIME: 10-10:30pm     INSURANCE PRECERT OBTAINED: n/a, return long term care    HENS/PASAAR COMPLETED: n/a    COVID: test ordered    COMMENTS: Met with patient, patient wants to return to [facility] today. Patient declines me calling her , says she will call herself to inform spouse of dc this evening. Spoke to [place] at facility, patient able to return and facility aware of need for IVAB. Will fax AVS upon completion. RN aware of dc plan.       Electronically signed by Sandra Arnold RN Case Management on 2/7/2023 at 2:47 PM

## 2023-02-07 NOTE — PLAN OF CARE
Problem: Discharge Planning  Goal: Discharge to home or other facility with appropriate resources  2/7/2023 1831 by Ashwin Mcbride RN  Outcome: Completed     Problem: Safety - Adult  Goal: Free from fall injury  2/7/2023 1831 by Ashwin Mcbride RN  Outcome: Completed     Problem: Skin/Tissue Integrity  Goal: Absence of new skin breakdown  Description: 1. Monitor for areas of redness and/or skin breakdown  2. Assess vascular access sites hourly  3. Every 4-6 hours minimum:  Change oxygen saturation probe site  4. Every 4-6 hours:  If on nasal continuous positive airway pressure, respiratory therapy assess nares and determine need for appliance change or resting period.   2/7/2023 1831 by Ashwin Mcbride RN  Outcome: Completed     Problem: Chronic Conditions and Co-morbidities  Goal: Patient's chronic conditions and co-morbidity symptoms are monitored and maintained or improved  2/7/2023 1831 by Ashwin Mcbride RN  Outcome: Completed     Problem: Pain  Goal: Verbalizes/displays adequate comfort level or baseline comfort level  2/7/2023 1831 by Ashwin Mcbride RN  Outcome: Completed     Problem: Cognitive:  Goal: Knowledge of wound care  Description: Knowledge of wound care  2/7/2023 1831 by Ashwin Mcbride RN  Outcome: Completed     Problem: Cognitive:  Goal: Understands risk factors for wounds  Description: Understands risk factors for wounds  2/7/2023 1831 by Ashwin Mcbride RN  Outcome: Completed     Problem: Wound:  Goal: Will show signs of wound healing; wound closure and no evidence of infection  Description: Will show signs of wound healing; wound closure and no evidence of infection  2/7/2023 1831 by Ashwin Mcbride RN  Outcome: Completed     Problem: Pressure Ulcer:  Goal: Signs of wound healing will improve  Description: Signs of wound healing will improve  2/7/2023 1831 by Ashwin Mcbride RN  Outcome: Completed     Problem: Pressure Ulcer:  Goal: Signs of wound healing will improve  Description: Signs of wound healing will improve  2/7/2023 1831 by Jose Franklin RN  Outcome: Completed     Problem: Pressure Ulcer:  Goal: Absence of new pressure ulcer  Description: Absence of new pressure ulcer  2/7/2023 1831 by Jose Franklin RN  Outcome: Completed     Problem: Pressure Ulcer:  Goal: Will show no infection signs and symptoms  Description: Will show no infection signs and symptoms  2/7/2023 1831 by Jose Franklin RN  Outcome: Completed     Problem: Arterial:  Goal: Optimize blood flow for wound healing  Description: Optimize blood flow for wound healing  2/7/2023 1831 by Jose Franklin RN  Outcome: Completed     Problem: Venous:  Goal: Signs of wound healing will improve  Description: Signs of wound healing will improve  2/7/2023 1831 by Jose Franklin RN  Outcome: Completed     Problem: Smoking cessation:  Goal: Ability to formulate a plan to maintain a tobacco-free life will be supported  Description: Ability to formulate a plan to maintain a tobacco-free life will be supported  2/7/2023 1831 by Jose Franklin RN  Outcome: Completed     Problem: Compression therapy:  Goal: Will be free from complications associated with compression therapy  Description: Will be free from complications associated with compression therapy  2/7/2023 1831 by Jose Franklin RN  Outcome: Completed     Problem: Weight control:  Goal: Ability to maintain an optimal weight for height and age will be supported  Description: Ability to maintain an optimal weight for height and age will be supported  2/7/2023 1831 by Jose Franklin RN  Outcome: Completed     Problem: Falls - Risk of:  Goal: Will remain free from falls  Description: Will remain free from falls  2/7/2023 1831 by Jose Franklin RN  Outcome: Completed     Problem: Blood Glucose:  Goal: Ability to maintain appropriate glucose levels will improve  Description: Ability to maintain appropriate glucose levels will improve  2/7/2023 1831 by Jose Franklin RN  Outcome: Completed     Problem: Respiratory - Adult  Goal: Achieves optimal ventilation and oxygenation  2/7/2023 1831 by Emerita Genao RN  Outcome: Completed

## 2023-02-07 NOTE — PROGRESS NOTES
Slept for a few hours with bipap after multiple de-sat episodes from sleep apnea and encouragement, tolerated well. Scratched at scaly areas on R posterior side drawing small amount of blood from scratching, cleansed area and applied barrier cream. In bed, call light in reach.

## 2023-02-07 NOTE — PROGRESS NOTES
Pulmonary Progress Note    Date of Admission: 2/2/2023   LOS: 5 days     Chief Complaint   Patient presents with    Shortness of Breath     Pt from [facility] with increased SOB. Pt satting 60-70% on NRB and NC upon EMS arrival. Placed onto cpap and given x2 Duonebs and x1 albuterol. Assessment/Plan:   Chronic hypoxemic and hypercapnic respiratory failure  -Continue to wean oxygen goal sat 90%  -BiPAP at night and with naps  -On 5 L of oxygen    COPD  -Dulera, Spiriva,  -DuoNebs prn    Acute on chronic congestive heart failure  -Cardiology following,  -Successful diuresis    Acute cystitis, ESBL E. coli  Lower extremity cellulitis  -On Merrem/vancomycin    Pulmonary standpoint okay for discharge. No further inpatient recommendations, we will sign off at this time. Please let us know if we can be of any further assistance. 24 Hour Events/Subjective  No events overnight. Tolerating home oxygen requirement. Wearing PAP at night, has no complaints respiratory wise    ROS:   No nausea  No Vomiting  No chest pain      Intake/Output Summary (Last 24 hours) at 2/7/2023 1051  Last data filed at 2/7/2023 1029  Gross per 24 hour   Intake 2887.98 ml   Output 5850 ml   Net -2962.02 ml         PHYSICAL EXAM:   Blood pressure 135/70, pulse (!) 101, temperature 97.4 °F (36.3 °C), temperature source Oral, resp. rate 16, height 5' 7\" (1.702 m), weight (!) 373 lb (169.2 kg), SpO2 95 %.'  Gen:  No acute distress. Eyes: PERRL. Anicteric sclera. No conjunctival injection. ENT: No discharge. Posterior oropharynx clear. External appearance of ears and nose normal.  Neck: Trachea midline. No mass   Resp:  No crackles. No wheezes. No rhonchi. No dullness on percussion. CV: Regular rate. Regular rhythm. No murmur or rub. No edema. GI: Soft, Non-tender. Non-distended. +BS  Skin: Warm, dry, w/o erythema. Lymph: No cervical or supraclavicular LAD. M/S: No cyanosis. No clubbing.     Neuro:  no focal neurologic deficit. Moves all extremities  Psych: Awake and alert, Oriented x 3. Judgement and insight appropriate. Mood stable. Medications:    Scheduled Meds:   furosemide  40 mg IntraVENous BID    mupirocin   Nasal BID    metoprolol tartrate  25 mg Oral BID    digoxin  125 mcg Oral Daily    lisinopril  2.5 mg Oral Daily    spironolactone  25 mg Oral Daily    meropenem  1,000 mg IntraVENous Q8H    sodium chloride flush  5-40 mL IntraVENous 2 times per day    tiotropium  1 puff Inhalation Daily    mometasone-formoterol  2 puff Inhalation BID    [Held by provider] atorvastatin  40 mg Oral Daily    vancomycin  1,000 mg IntraVENous Q18H    vancomycin (VANCOCIN) intermittent dosing (placeholder)   Other RX Placeholder    apixaban  5 mg Oral BID    clotrimazole   Topical BID       Continuous Infusions:   sodium chloride      sodium chloride      amiodarone 0.5 mg/min (02/07/23 0124)       PRN Meds:  sodium chloride flush, sodium chloride, sodium chloride, acetaminophen **OR** acetaminophen, ipratropium-albuterol    Labs reviewed:  CBC:   Recent Labs     02/05/23  0500   WBC 12.9*   HGB 12.1   HCT 38.6   MCV 86.0        BMP:   Recent Labs     02/05/23  0500 02/06/23  0600    144   K 3.9 3.6   CL 98* 96*   CO2 36* 39*   BUN 32* 24*   CREATININE 1.1 1.0     LIVER PROFILE:   No results for input(s): AST, ALT, LIPASE, BILIDIR, BILITOT, ALKPHOS in the last 72 hours. Invalid input(s): AMYLASE,  ALB    PT/INR: No results for input(s): PROTIME, INR in the last 72 hours. Films:  Radiology Review:  Pertinent images / reports were reviewed as a part of this visit. This note was transcribed using [id number] IDbyME. Please disregard any translational errors.     Thank you for this consult,    Daniele Hutchins MD  [facility] Pulmonary, Critical Care, and Sleep Medicine

## 2023-02-07 NOTE — PROGRESS NOTES
1554- K+ = 3.2, RN notified Dr Linda Collier.   Electronically signed by Mackenzie Mora RN on 2/7/2023 at 3:54 PM

## 2024-02-25 NOTE — ED NOTES
Report given to Luzmaria KAHN to assume care, all questions answered.      Marvis Severin, RN  12/18/21 2990 No

## (undated) DEVICE — SHEET,DRAPE,53X77,STERILE: Brand: MEDLINE

## (undated) DEVICE — SYRINGE MED 10ML LUERLOCK TIP W/O SFTY DISP

## (undated) DEVICE — 3M™ COBAN™ NL STERILE NON-LATEX SELF-ADHERENT WRAP, 2084S, 4 IN X 5 YD (10 CM X 4,5 M), 18 ROLLS/CASE: Brand: 3M™ COBAN™

## (undated) DEVICE — TRAP POLYP ETRAP

## (undated) DEVICE — ENDOSCOPY KIT: Brand: MEDLINE INDUSTRIES, INC.

## (undated) DEVICE — CANISTER, RIGID, 1200CC: Brand: MEDLINE INDUSTRIES, INC.

## (undated) DEVICE — ELECTRODE PT RET AD L9FT HI MOIST COND ADH HYDRGEL CORDED

## (undated) DEVICE — BITE BLOCK ENDOSCP AD 60 FR W/ ADJ STRP PLAS GRN BLOX

## (undated) DEVICE — MERCY HEALTH WEST TURNOVER: Brand: MEDLINE INDUSTRIES, INC.

## (undated) DEVICE — GOWN,SIRUS,POLYRNF,BRTHSLV,XL,30/CS: Brand: MEDLINE

## (undated) DEVICE — GLOVE SURG SZ 75 CRM LTX FREE POLYISOPRENE POLYMER BEAD ANTI

## (undated) DEVICE — REPLAY HEMOSTASIS CLIP, 16MM SPAN: Brand: REPLAY

## (undated) DEVICE — GUIDEWIRE URO L150CM DIA0.035IN TAPR 3CM NIT HYDRPHLC ANG

## (undated) DEVICE — Z INACTIVE USE 2635503 SOLUTION IRRIG 3000ML ST H2O USP UROMATIC PLAS CONT

## (undated) DEVICE — GOWN SIRUS NONREIN LG W/TWL: Brand: MEDLINE INDUSTRIES, INC.

## (undated) DEVICE — SNARE ENDOSCP L240CM SHTH DIA24MM LOOP W10MM POLYP RND REINF

## (undated) DEVICE — GUIDEWIRE UROLOGY L150CM DIA0.035IN ANG TIP L3CM PTFE NIT

## (undated) DEVICE — SKIN MARKER REGULAR TIP WITH RULER CAP AND LABELS: Brand: DEVON

## (undated) DEVICE — CATHETER F BLLN 5CC 16FR 2 W HYDRGEL COAT LESS TRAUM LUB

## (undated) DEVICE — TOWEL,OR,DSP,ST,BLUE,STD,4/PK,20PK/CS: Brand: MEDLINE

## (undated) DEVICE — CHLORAPREP 26ML ORANGE

## (undated) DEVICE — PACK,CYSTOSCOPY,PK III,AURORA: Brand: MEDLINE

## (undated) DEVICE — SOLUTION SCRB 4OZ 10% POVIDONE IOD ANTIMIC BTL

## (undated) DEVICE — KIT POS FOAM HANA TBL

## (undated) DEVICE — STERILE SURGICAL LUBRICANT, METAL TUBE: Brand: SURGILUBE

## (undated) DEVICE — BIT DRL L330MM DIA4.2MM CALIB 100MM 3 FLUT QUIK CPL

## (undated) DEVICE — SYRINGE IRRIG 60ML SFT PLIABLE BLB EZ TO GRP 1 HND USE W/

## (undated) DEVICE — GLOVE SURG SZ 8 L12IN FNGR THK79MIL GRN LTX FREE

## (undated) DEVICE — SUTURE VCRL SZ 1 L18IN ABSRB UD L36MM CT-1 1/2 CIR J841D

## (undated) DEVICE — DRAPE C ARM UNIV W41XL74IN CLR PLAS XR VELC CLSR POLY STRP

## (undated) DEVICE — SPONGE LAP W18XL18IN WHT COT 4 PLY FLD STRUNG RADPQ DISP ST

## (undated) DEVICE — 6619 2 PTNT ISO SYS INCISE AREA&LT;(&GT;&&LT;)&GT;P: Brand: STERI-DRAPE™ IOBAN™ 2

## (undated) DEVICE — STAPLER SKIN H3.9MM WIRE DIA0.58MM CRWN 6.9MM 35 STPL FIX

## (undated) DEVICE — MAJOR SET UP PK

## (undated) DEVICE — URETERAL STENT
Type: IMPLANTABLE DEVICE | Site: URETER | Status: NON-FUNCTIONAL
Brand: CONTOUR™
Removed: 2020-09-19

## (undated) DEVICE — NEEDLE HYPO 18GA L1.5IN THN WALL PIVOTING SHLD BVL ORIENTED

## (undated) DEVICE — CLIP LIG L235CM RESOL 360 BX/20

## (undated) DEVICE — CONTAINER SPEC 480ML CLR POLYSTYR 10% NEUT BUFF FRMLN ZN

## (undated) DEVICE — BAG URO DRN URO-CATCHER

## (undated) DEVICE — Z DISCONTINUED BY MEDLINE USE 2711682 TRAY SKIN PREP DRY W/ PREM GLV

## (undated) DEVICE — NEEDLE HYPO 18GA L1.5IN PNK POLYPR HUB S STL SHT BVL STR

## (undated) DEVICE — PADDING UNDERCAST W4INXL4YD 100% COT CRIMPED FINISH WBRL II

## (undated) DEVICE — SINGLE-USE POLYPECTOMY SNARE: Brand: CAPTIFLEX

## (undated) DEVICE — SNARE ENDOSCP L240CM OD2.4MM LOOP W25MM RND INSUL STIFF

## (undated) DEVICE — OPEN-END FLEXI-TIP URETERAL CATHETER: Brand: FLEXI-TIP

## (undated) DEVICE — PAD DRY FLOOR ABS 32X58IN GRN

## (undated) DEVICE — SOLUTION IV IRRIG POUR BRL 0.9% SODIUM CHL 2F7124

## (undated) DEVICE — INTENDED FOR TISSUE SEPARATION, AND OTHER PROCEDURES THAT REQUIRE A SHARP SURGICAL BLADE TO PUNCTURE OR CUT.: Brand: BARD-PARKER ® STAINLESS STEEL BLADES

## (undated) DEVICE — CYSTO/BLADDER IRRIGATION SET, REGULATING CLAMP

## (undated) DEVICE — GUIDEWIRE ORTH L400MM DIA3.2MM FOR TFN

## (undated) DEVICE — COVER LT HNDL BLU PLAS

## (undated) DEVICE — ROD RMR L950MM DIA3MM W/ STR BALL TIP

## (undated) DEVICE — GUIDEWIRE URO L150CM DIA0.038IN STD NIT HYDRPHLC STR TIP

## (undated) DEVICE — SOLUTION IV IRRIG WATER 1000ML POUR BRL 2F7114

## (undated) DEVICE — GLOVE ORANGE PI 8 1/2   MSG9085

## (undated) DEVICE — SUTURE VCRL SZ 2-0 L18IN ABSRB UD CT-1 L36MM 1/2 CIR J839D

## (undated) DEVICE — SUTURE VCRL 1 L27IN ABSRB VLT TP-1 L65MM 1/2 CIR TAPERPOINT J650G